# Patient Record
Sex: MALE | Race: WHITE | NOT HISPANIC OR LATINO | Employment: OTHER | URBAN - METROPOLITAN AREA
[De-identification: names, ages, dates, MRNs, and addresses within clinical notes are randomized per-mention and may not be internally consistent; named-entity substitution may affect disease eponyms.]

---

## 2017-01-10 ENCOUNTER — APPOINTMENT (OUTPATIENT)
Dept: LAB | Facility: CLINIC | Age: 82
End: 2017-01-10
Payer: MEDICARE

## 2017-01-10 DIAGNOSIS — Z51.81 ENCOUNTER FOR THERAPEUTIC DRUG LEVEL MONITORING: ICD-10-CM

## 2017-01-10 DIAGNOSIS — I48.91 ATRIAL FIBRILLATION (HCC): ICD-10-CM

## 2017-01-10 LAB
INR PPP: 3.07 (ref 0.86–1.16)
PROTHROMBIN TIME: 31.1 SECONDS (ref 12–14.3)

## 2017-01-10 PROCEDURE — 36415 COLL VENOUS BLD VENIPUNCTURE: CPT

## 2017-01-10 PROCEDURE — 85610 PROTHROMBIN TIME: CPT

## 2017-01-11 ENCOUNTER — APPOINTMENT (OUTPATIENT)
Dept: LAB | Age: 82
End: 2017-01-11
Payer: MEDICARE

## 2017-01-11 ENCOUNTER — TRANSCRIBE ORDERS (OUTPATIENT)
Dept: LAB | Age: 82
End: 2017-01-11

## 2017-01-11 DIAGNOSIS — I48.91 ATRIAL FIBRILLATION, UNSPECIFIED TYPE (HCC): Primary | ICD-10-CM

## 2017-01-16 ENCOUNTER — ALLSCRIPTS OFFICE VISIT (OUTPATIENT)
Dept: OTHER | Facility: OTHER | Age: 82
End: 2017-01-16

## 2017-01-17 ENCOUNTER — APPOINTMENT (OUTPATIENT)
Dept: LAB | Facility: CLINIC | Age: 82
End: 2017-01-17
Payer: MEDICARE

## 2017-01-17 ENCOUNTER — ALLSCRIPTS OFFICE VISIT (OUTPATIENT)
Dept: OTHER | Facility: OTHER | Age: 82
End: 2017-01-17

## 2017-01-17 DIAGNOSIS — I48.91 ATRIAL FIBRILLATION (HCC): ICD-10-CM

## 2017-01-17 DIAGNOSIS — E11.8 TYPE 2 DIABETES MELLITUS WITH COMPLICATIONS (HCC): ICD-10-CM

## 2017-01-17 DIAGNOSIS — Z51.81 ENCOUNTER FOR THERAPEUTIC DRUG LEVEL MONITORING: ICD-10-CM

## 2017-01-17 DIAGNOSIS — I48.91 ATRIAL FIBRILLATION, UNSPECIFIED TYPE (HCC): ICD-10-CM

## 2017-01-17 LAB
ALBUMIN SERPL BCP-MCNC: 3.7 G/DL (ref 3.5–5)
ALP SERPL-CCNC: 147 U/L (ref 46–116)
ALT SERPL W P-5'-P-CCNC: 19 U/L (ref 12–78)
ANION GAP SERPL CALCULATED.3IONS-SCNC: 7 MMOL/L (ref 4–13)
AST SERPL W P-5'-P-CCNC: 16 U/L (ref 5–45)
BACTERIA UR QL AUTO: ABNORMAL /HPF
BASOPHILS # BLD AUTO: 0.02 THOUSANDS/ΜL (ref 0–0.1)
BASOPHILS NFR BLD AUTO: 0 % (ref 0–1)
BILIRUB SERPL-MCNC: 0.56 MG/DL (ref 0.2–1)
BILIRUB UR QL STRIP: NEGATIVE
BUN SERPL-MCNC: 23 MG/DL (ref 5–25)
CALCIUM SERPL-MCNC: 8.7 MG/DL (ref 8.3–10.1)
CHLORIDE SERPL-SCNC: 104 MMOL/L (ref 100–108)
CHOLEST SERPL-MCNC: 116 MG/DL (ref 50–200)
CLARITY UR: ABNORMAL
CO2 SERPL-SCNC: 27 MMOL/L (ref 21–32)
COLOR UR: YELLOW
CREAT SERPL-MCNC: 2.41 MG/DL (ref 0.6–1.3)
CREAT UR-MCNC: 127 MG/DL
EOSINOPHIL # BLD AUTO: 0.14 THOUSAND/ΜL (ref 0–0.61)
EOSINOPHIL NFR BLD AUTO: 2 % (ref 0–6)
ERYTHROCYTE [DISTWIDTH] IN BLOOD BY AUTOMATED COUNT: 14 % (ref 11.6–15.1)
EST. AVERAGE GLUCOSE BLD GHB EST-MCNC: 146 MG/DL
GFR SERPL CREATININE-BSD FRML MDRD: 25.8 ML/MIN/1.73SQ M
GLUCOSE SERPL-MCNC: 117 MG/DL (ref 65–140)
GLUCOSE UR STRIP-MCNC: ABNORMAL MG/DL
HBA1C MFR BLD: 6.7 % (ref 4.2–6.3)
HCT VFR BLD AUTO: 41.8 % (ref 36.5–49.3)
HDLC SERPL-MCNC: 47 MG/DL (ref 40–60)
HGB BLD-MCNC: 13.8 G/DL (ref 12–17)
HGB UR QL STRIP.AUTO: ABNORMAL
INR PPP: 3.09 (ref 0.86–1.16)
KETONES UR STRIP-MCNC: NEGATIVE MG/DL
LEUKOCYTE ESTERASE UR QL STRIP: ABNORMAL
LYMPHOCYTES # BLD AUTO: 2.1 THOUSANDS/ΜL (ref 0.6–4.47)
LYMPHOCYTES NFR BLD AUTO: 25 % (ref 14–44)
MCH RBC QN AUTO: 30.5 PG (ref 26.8–34.3)
MCHC RBC AUTO-ENTMCNC: 33 G/DL (ref 31.4–37.4)
MCV RBC AUTO: 92 FL (ref 82–98)
MICROALBUMIN UR-MCNC: 75.9 MG/L (ref 0–20)
MICROALBUMIN/CREAT 24H UR: 60 MG/G CREATININE (ref 0–30)
MONOCYTES # BLD AUTO: 0.72 THOUSAND/ΜL (ref 0.17–1.22)
MONOCYTES NFR BLD AUTO: 9 % (ref 4–12)
NEUTROPHILS # BLD AUTO: 5.28 THOUSANDS/ΜL (ref 1.85–7.62)
NEUTS SEG NFR BLD AUTO: 64 % (ref 43–75)
NITRITE UR QL STRIP: POSITIVE
NON-SQ EPI CELLS URNS QL MICRO: ABNORMAL /HPF
NONHDLC SERPL-MCNC: 69 MG/DL
NRBC BLD AUTO-RTO: 0 /100 WBCS
PH UR STRIP.AUTO: 6 [PH] (ref 4.5–8)
PLATELET # BLD AUTO: 206 THOUSANDS/UL (ref 149–390)
PMV BLD AUTO: 10.1 FL (ref 8.9–12.7)
POTASSIUM SERPL-SCNC: 4 MMOL/L (ref 3.5–5.3)
PROT SERPL-MCNC: 7.5 G/DL (ref 6.4–8.2)
PROT UR STRIP-MCNC: ABNORMAL MG/DL
PROTHROMBIN TIME: 31.3 SECONDS (ref 12–14.3)
RBC # BLD AUTO: 4.53 MILLION/UL (ref 3.88–5.62)
RBC #/AREA URNS AUTO: ABNORMAL /HPF
SODIUM SERPL-SCNC: 138 MMOL/L (ref 136–145)
SP GR UR STRIP.AUTO: 1.02 (ref 1–1.03)
TRIGL SERPL-MCNC: 179 MG/DL
TSH SERPL DL<=0.05 MIU/L-ACNC: 2.48 UIU/ML (ref 0.36–3.74)
UROBILINOGEN UR QL STRIP.AUTO: 1 E.U./DL
WBC # BLD AUTO: 8.28 THOUSAND/UL (ref 4.31–10.16)
WBC #/AREA URNS AUTO: ABNORMAL /HPF

## 2017-01-17 PROCEDURE — 84478 ASSAY OF TRIGLYCERIDES: CPT

## 2017-01-17 PROCEDURE — 80053 COMPREHEN METABOLIC PANEL: CPT

## 2017-01-17 PROCEDURE — 84443 ASSAY THYROID STIM HORMONE: CPT

## 2017-01-17 PROCEDURE — 85025 COMPLETE CBC W/AUTO DIFF WBC: CPT

## 2017-01-17 PROCEDURE — 85610 PROTHROMBIN TIME: CPT

## 2017-01-17 PROCEDURE — 81001 URINALYSIS AUTO W/SCOPE: CPT

## 2017-01-17 PROCEDURE — 36415 COLL VENOUS BLD VENIPUNCTURE: CPT

## 2017-01-17 PROCEDURE — 83721 ASSAY OF BLOOD LIPOPROTEIN: CPT

## 2017-01-17 PROCEDURE — 82570 ASSAY OF URINE CREATININE: CPT

## 2017-01-17 PROCEDURE — 83036 HEMOGLOBIN GLYCOSYLATED A1C: CPT

## 2017-01-17 PROCEDURE — 83718 ASSAY OF LIPOPROTEIN: CPT

## 2017-01-17 PROCEDURE — 82043 UR ALBUMIN QUANTITATIVE: CPT

## 2017-01-24 ENCOUNTER — APPOINTMENT (OUTPATIENT)
Dept: LAB | Age: 82
End: 2017-01-24
Payer: MEDICARE

## 2017-01-24 ENCOUNTER — TRANSCRIBE ORDERS (OUTPATIENT)
Dept: LAB | Age: 82
End: 2017-01-24

## 2017-01-24 DIAGNOSIS — I48.91 ATRIAL FIBRILLATION (HCC): ICD-10-CM

## 2017-01-24 LAB
INR PPP: 2.19 (ref 0.86–1.16)
PROTHROMBIN TIME: 24.1 SECONDS (ref 12–14.3)

## 2017-01-24 PROCEDURE — 36415 COLL VENOUS BLD VENIPUNCTURE: CPT

## 2017-01-24 PROCEDURE — 85610 PROTHROMBIN TIME: CPT

## 2017-02-07 ENCOUNTER — APPOINTMENT (OUTPATIENT)
Dept: LAB | Age: 82
End: 2017-02-07
Payer: MEDICARE

## 2017-02-07 ENCOUNTER — TRANSCRIBE ORDERS (OUTPATIENT)
Dept: LAB | Age: 82
End: 2017-02-07

## 2017-02-07 DIAGNOSIS — Z51.81 ENCOUNTER FOR THERAPEUTIC DRUG LEVEL MONITORING: ICD-10-CM

## 2017-02-07 DIAGNOSIS — I48.91 ATRIAL FIBRILLATION (HCC): ICD-10-CM

## 2017-02-07 LAB
INR PPP: 2.66 (ref 0.86–1.16)
PROTHROMBIN TIME: 27.9 SECONDS (ref 12–14.3)

## 2017-02-07 PROCEDURE — 36415 COLL VENOUS BLD VENIPUNCTURE: CPT

## 2017-02-07 PROCEDURE — 85610 PROTHROMBIN TIME: CPT

## 2017-02-21 ENCOUNTER — APPOINTMENT (OUTPATIENT)
Dept: LAB | Age: 82
End: 2017-02-21
Payer: MEDICARE

## 2017-02-21 ENCOUNTER — TRANSCRIBE ORDERS (OUTPATIENT)
Dept: LAB | Age: 82
End: 2017-02-21

## 2017-02-21 ENCOUNTER — GENERIC CONVERSION - ENCOUNTER (OUTPATIENT)
Dept: OTHER | Facility: OTHER | Age: 82
End: 2017-02-21

## 2017-02-21 DIAGNOSIS — I48.91 ATRIAL FIBRILLATION (HCC): ICD-10-CM

## 2017-02-21 DIAGNOSIS — Z51.81 ENCOUNTER FOR THERAPEUTIC DRUG LEVEL MONITORING: ICD-10-CM

## 2017-02-21 LAB
INR PPP: 2.37 (ref 0.86–1.16)
PROTHROMBIN TIME: 25.6 SECONDS (ref 12–14.3)

## 2017-02-21 PROCEDURE — 36415 COLL VENOUS BLD VENIPUNCTURE: CPT

## 2017-02-21 PROCEDURE — 85610 PROTHROMBIN TIME: CPT

## 2017-03-07 ENCOUNTER — APPOINTMENT (OUTPATIENT)
Dept: LAB | Age: 82
End: 2017-03-07
Payer: MEDICARE

## 2017-03-07 ENCOUNTER — TRANSCRIBE ORDERS (OUTPATIENT)
Dept: LAB | Age: 82
End: 2017-03-07

## 2017-03-07 DIAGNOSIS — I48.91 ATRIAL FIBRILLATION, UNSPECIFIED TYPE (HCC): Primary | ICD-10-CM

## 2017-03-07 LAB
INR PPP: 2.72 (ref 0.86–1.16)
PROTHROMBIN TIME: 28.4 SECONDS (ref 12–14.3)

## 2017-03-07 PROCEDURE — 36415 COLL VENOUS BLD VENIPUNCTURE: CPT

## 2017-03-07 PROCEDURE — 85610 PROTHROMBIN TIME: CPT

## 2017-03-21 ENCOUNTER — APPOINTMENT (OUTPATIENT)
Dept: LAB | Age: 82
End: 2017-03-21
Payer: MEDICARE

## 2017-03-21 ENCOUNTER — TRANSCRIBE ORDERS (OUTPATIENT)
Dept: LAB | Age: 82
End: 2017-03-21

## 2017-03-21 DIAGNOSIS — I48.91 ATRIAL FIBRILLATION, UNSPECIFIED TYPE (HCC): Primary | ICD-10-CM

## 2017-03-21 LAB
INR PPP: 2.29 (ref 0.86–1.16)
PROTHROMBIN TIME: 24.9 SECONDS (ref 12–14.3)

## 2017-03-21 PROCEDURE — 85610 PROTHROMBIN TIME: CPT

## 2017-03-21 PROCEDURE — 36415 COLL VENOUS BLD VENIPUNCTURE: CPT

## 2017-04-04 ENCOUNTER — TRANSCRIBE ORDERS (OUTPATIENT)
Dept: LAB | Age: 82
End: 2017-04-04

## 2017-04-04 ENCOUNTER — APPOINTMENT (OUTPATIENT)
Dept: LAB | Age: 82
End: 2017-04-04
Payer: MEDICARE

## 2017-04-04 DIAGNOSIS — I48.91 ATRIAL FIBRILLATION, UNSPECIFIED TYPE (HCC): Primary | ICD-10-CM

## 2017-04-04 LAB
INR PPP: 2.82 (ref 0.86–1.16)
PROTHROMBIN TIME: 29.2 SECONDS (ref 12–14.3)

## 2017-04-04 PROCEDURE — 36415 COLL VENOUS BLD VENIPUNCTURE: CPT

## 2017-04-04 PROCEDURE — 85610 PROTHROMBIN TIME: CPT

## 2017-04-18 ENCOUNTER — TRANSCRIBE ORDERS (OUTPATIENT)
Dept: LAB | Age: 82
End: 2017-04-18

## 2017-04-18 ENCOUNTER — APPOINTMENT (OUTPATIENT)
Dept: LAB | Age: 82
End: 2017-04-18
Payer: MEDICARE

## 2017-04-18 DIAGNOSIS — I48.91 ATRIAL FIBRILLATION (HCC): ICD-10-CM

## 2017-04-18 DIAGNOSIS — Z51.81 ENCOUNTER FOR THERAPEUTIC DRUG LEVEL MONITORING: ICD-10-CM

## 2017-04-18 LAB
INR PPP: 3.32 (ref 0.86–1.16)
PROTHROMBIN TIME: 33 SECONDS (ref 12–14.3)

## 2017-04-18 PROCEDURE — 36415 COLL VENOUS BLD VENIPUNCTURE: CPT

## 2017-04-18 PROCEDURE — 85610 PROTHROMBIN TIME: CPT

## 2017-04-24 ENCOUNTER — TRANSCRIBE ORDERS (OUTPATIENT)
Dept: LAB | Age: 82
End: 2017-04-24

## 2017-04-24 ENCOUNTER — APPOINTMENT (OUTPATIENT)
Dept: LAB | Age: 82
End: 2017-04-24
Payer: MEDICARE

## 2017-04-24 DIAGNOSIS — I48.91 ATRIAL FIBRILLATION (HCC): ICD-10-CM

## 2017-04-24 DIAGNOSIS — Z51.81 ENCOUNTER FOR THERAPEUTIC DRUG LEVEL MONITORING: ICD-10-CM

## 2017-04-24 LAB
INR PPP: 3.43 (ref 0.86–1.16)
PROTHROMBIN TIME: 33.8 SECONDS (ref 12–14.3)

## 2017-04-24 PROCEDURE — 85610 PROTHROMBIN TIME: CPT

## 2017-04-24 PROCEDURE — 36415 COLL VENOUS BLD VENIPUNCTURE: CPT

## 2017-05-03 ENCOUNTER — TRANSCRIBE ORDERS (OUTPATIENT)
Dept: LAB | Age: 82
End: 2017-05-03

## 2017-05-03 ENCOUNTER — APPOINTMENT (OUTPATIENT)
Dept: LAB | Age: 82
End: 2017-05-03
Payer: MEDICARE

## 2017-05-03 DIAGNOSIS — Z51.81 ENCOUNTER FOR THERAPEUTIC DRUG LEVEL MONITORING: ICD-10-CM

## 2017-05-03 DIAGNOSIS — I48.91 ATRIAL FIBRILLATION (HCC): ICD-10-CM

## 2017-05-03 LAB
INR PPP: 1.84 (ref 0.86–1.16)
PROTHROMBIN TIME: 21.4 SECONDS (ref 12.1–14.4)

## 2017-05-03 PROCEDURE — 36415 COLL VENOUS BLD VENIPUNCTURE: CPT

## 2017-05-03 PROCEDURE — 85610 PROTHROMBIN TIME: CPT

## 2017-05-11 ENCOUNTER — ALLSCRIPTS OFFICE VISIT (OUTPATIENT)
Dept: OTHER | Facility: OTHER | Age: 82
End: 2017-05-11

## 2017-05-16 ENCOUNTER — TRANSCRIBE ORDERS (OUTPATIENT)
Dept: LAB | Age: 82
End: 2017-05-16

## 2017-05-16 ENCOUNTER — APPOINTMENT (OUTPATIENT)
Dept: LAB | Age: 82
End: 2017-05-16
Payer: MEDICARE

## 2017-05-16 DIAGNOSIS — I48.91 ATRIAL FIBRILLATION, UNSPECIFIED TYPE (HCC): Primary | ICD-10-CM

## 2017-05-16 LAB
INR PPP: 2.61 (ref 0.86–1.16)
PROTHROMBIN TIME: 28.3 SECONDS (ref 12.1–14.4)

## 2017-05-16 PROCEDURE — 85610 PROTHROMBIN TIME: CPT

## 2017-05-16 PROCEDURE — 36415 COLL VENOUS BLD VENIPUNCTURE: CPT

## 2017-05-31 ENCOUNTER — TRANSCRIBE ORDERS (OUTPATIENT)
Dept: LAB | Age: 82
End: 2017-05-31

## 2017-05-31 ENCOUNTER — APPOINTMENT (OUTPATIENT)
Dept: LAB | Age: 82
End: 2017-05-31
Payer: MEDICARE

## 2017-05-31 DIAGNOSIS — I48.91 ATRIAL FIBRILLATION, UNSPECIFIED TYPE (HCC): Primary | ICD-10-CM

## 2017-05-31 LAB
INR PPP: 2.22 (ref 0.86–1.16)
PROTHROMBIN TIME: 24.9 SECONDS (ref 12.1–14.4)

## 2017-05-31 PROCEDURE — 85610 PROTHROMBIN TIME: CPT

## 2017-05-31 PROCEDURE — 36415 COLL VENOUS BLD VENIPUNCTURE: CPT

## 2017-06-13 ENCOUNTER — APPOINTMENT (OUTPATIENT)
Dept: LAB | Age: 82
End: 2017-06-13
Payer: MEDICARE

## 2017-06-13 ENCOUNTER — TRANSCRIBE ORDERS (OUTPATIENT)
Dept: LAB | Age: 82
End: 2017-06-13

## 2017-06-13 DIAGNOSIS — I48.91 ATRIAL FIBRILLATION, UNSPECIFIED TYPE (HCC): Primary | ICD-10-CM

## 2017-06-13 LAB
INR PPP: 2.65 (ref 0.86–1.16)
PROTHROMBIN TIME: 28.6 SECONDS (ref 12.1–14.4)

## 2017-06-13 PROCEDURE — 36415 COLL VENOUS BLD VENIPUNCTURE: CPT

## 2017-06-13 PROCEDURE — 85610 PROTHROMBIN TIME: CPT

## 2017-06-27 DIAGNOSIS — Z51.81 ENCOUNTER FOR THERAPEUTIC DRUG LEVEL MONITORING: ICD-10-CM

## 2017-06-27 DIAGNOSIS — E11.8 TYPE 2 DIABETES MELLITUS WITH COMPLICATIONS (HCC): ICD-10-CM

## 2017-06-28 ENCOUNTER — APPOINTMENT (OUTPATIENT)
Dept: LAB | Age: 82
End: 2017-06-28
Payer: MEDICARE

## 2017-06-28 ENCOUNTER — TRANSCRIBE ORDERS (OUTPATIENT)
Dept: LAB | Age: 82
End: 2017-06-28

## 2017-06-28 DIAGNOSIS — Z51.81 ENCOUNTER FOR THERAPEUTIC DRUG LEVEL MONITORING: Primary | ICD-10-CM

## 2017-06-28 LAB
INR PPP: 2.35 (ref 0.86–1.16)
PROTHROMBIN TIME: 26 SECONDS (ref 12.1–14.4)

## 2017-06-28 PROCEDURE — 36415 COLL VENOUS BLD VENIPUNCTURE: CPT

## 2017-06-28 PROCEDURE — 85610 PROTHROMBIN TIME: CPT

## 2017-07-17 ENCOUNTER — APPOINTMENT (OUTPATIENT)
Dept: LAB | Facility: CLINIC | Age: 82
End: 2017-07-17
Payer: MEDICARE

## 2017-07-17 ENCOUNTER — ALLSCRIPTS OFFICE VISIT (OUTPATIENT)
Dept: OTHER | Facility: OTHER | Age: 82
End: 2017-07-17

## 2017-07-17 DIAGNOSIS — Z51.81 ENCOUNTER FOR THERAPEUTIC DRUG LEVEL MONITORING: ICD-10-CM

## 2017-07-17 LAB
INR PPP: 2.24 (ref 0.86–1.16)
PROTHROMBIN TIME: 25 SECONDS (ref 12.1–14.4)

## 2017-07-17 PROCEDURE — 36415 COLL VENOUS BLD VENIPUNCTURE: CPT

## 2017-07-17 PROCEDURE — 85610 PROTHROMBIN TIME: CPT

## 2017-08-01 ENCOUNTER — APPOINTMENT (OUTPATIENT)
Dept: LAB | Age: 82
End: 2017-08-01
Payer: MEDICARE

## 2017-08-01 ENCOUNTER — GENERIC CONVERSION - ENCOUNTER (OUTPATIENT)
Dept: OTHER | Facility: OTHER | Age: 82
End: 2017-08-01

## 2017-08-01 ENCOUNTER — TRANSCRIBE ORDERS (OUTPATIENT)
Dept: LAB | Age: 82
End: 2017-08-01

## 2017-08-01 DIAGNOSIS — Z51.81 ENCOUNTER FOR THERAPEUTIC DRUG MONITORING: Primary | ICD-10-CM

## 2017-08-01 LAB
INR PPP: 2.26 (ref 0.86–1.16)
PROTHROMBIN TIME: 25.2 SECONDS (ref 12.1–14.4)

## 2017-08-01 PROCEDURE — 85610 PROTHROMBIN TIME: CPT

## 2017-08-01 PROCEDURE — 36415 COLL VENOUS BLD VENIPUNCTURE: CPT

## 2017-08-15 ENCOUNTER — TRANSCRIBE ORDERS (OUTPATIENT)
Dept: LAB | Age: 82
End: 2017-08-15

## 2017-08-15 ENCOUNTER — APPOINTMENT (OUTPATIENT)
Dept: LAB | Age: 82
End: 2017-08-15
Payer: MEDICARE

## 2017-08-15 DIAGNOSIS — Z51.81 ENCOUNTER FOR THERAPEUTIC DRUG MONITORING: Primary | ICD-10-CM

## 2017-08-15 LAB
INR PPP: 2.41 (ref 0.86–1.16)
PROTHROMBIN TIME: 26.5 SECONDS (ref 12.1–14.4)

## 2017-08-15 PROCEDURE — 85610 PROTHROMBIN TIME: CPT

## 2017-08-15 PROCEDURE — 36415 COLL VENOUS BLD VENIPUNCTURE: CPT

## 2017-08-28 ENCOUNTER — APPOINTMENT (OUTPATIENT)
Dept: LAB | Age: 82
End: 2017-08-28
Payer: MEDICARE

## 2017-08-28 DIAGNOSIS — Z51.81 ENCOUNTER FOR THERAPEUTIC DRUG LEVEL MONITORING: ICD-10-CM

## 2017-08-28 DIAGNOSIS — E11.8 TYPE 2 DIABETES MELLITUS WITH COMPLICATIONS (HCC): ICD-10-CM

## 2017-08-28 DIAGNOSIS — I48.91 ATRIAL FIBRILLATION (HCC): ICD-10-CM

## 2017-08-28 LAB
ALBUMIN SERPL BCP-MCNC: 3.5 G/DL (ref 3.5–5)
ALP SERPL-CCNC: 153 U/L (ref 46–116)
ALT SERPL W P-5'-P-CCNC: 16 U/L (ref 12–78)
ANION GAP SERPL CALCULATED.3IONS-SCNC: 7 MMOL/L (ref 4–13)
AST SERPL W P-5'-P-CCNC: 16 U/L (ref 5–45)
BASOPHILS # BLD AUTO: 0.02 THOUSANDS/ΜL (ref 0–0.1)
BASOPHILS NFR BLD AUTO: 0 % (ref 0–1)
BILIRUB SERPL-MCNC: 0.59 MG/DL (ref 0.2–1)
BUN SERPL-MCNC: 22 MG/DL (ref 5–25)
CALCIUM SERPL-MCNC: 8.8 MG/DL (ref 8.3–10.1)
CHLORIDE SERPL-SCNC: 104 MMOL/L (ref 100–108)
CHOLEST SERPL-MCNC: 106 MG/DL (ref 50–200)
CO2 SERPL-SCNC: 25 MMOL/L (ref 21–32)
CREAT SERPL-MCNC: 2.52 MG/DL (ref 0.6–1.3)
EOSINOPHIL # BLD AUTO: 0.29 THOUSAND/ΜL (ref 0–0.61)
EOSINOPHIL NFR BLD AUTO: 4 % (ref 0–6)
ERYTHROCYTE [DISTWIDTH] IN BLOOD BY AUTOMATED COUNT: 13.7 % (ref 11.6–15.1)
EST. AVERAGE GLUCOSE BLD GHB EST-MCNC: 157 MG/DL
GFR SERPL CREATININE-BSD FRML MDRD: 22 ML/MIN/1.73SQ M
GLUCOSE P FAST SERPL-MCNC: 147 MG/DL (ref 65–99)
HBA1C MFR BLD: 7.1 % (ref 4.2–6.3)
HCT VFR BLD AUTO: 40.6 % (ref 36.5–49.3)
HDLC SERPL-MCNC: 31 MG/DL (ref 40–60)
HGB BLD-MCNC: 13.8 G/DL (ref 12–17)
INR PPP: 2.54 (ref 0.86–1.16)
LDLC SERPL CALC-MCNC: 36 MG/DL (ref 0–100)
LYMPHOCYTES # BLD AUTO: 1.87 THOUSANDS/ΜL (ref 0.6–4.47)
LYMPHOCYTES NFR BLD AUTO: 22 % (ref 14–44)
MCH RBC QN AUTO: 30.9 PG (ref 26.8–34.3)
MCHC RBC AUTO-ENTMCNC: 34 G/DL (ref 31.4–37.4)
MCV RBC AUTO: 91 FL (ref 82–98)
MONOCYTES # BLD AUTO: 0.62 THOUSAND/ΜL (ref 0.17–1.22)
MONOCYTES NFR BLD AUTO: 7 % (ref 4–12)
NEUTROPHILS # BLD AUTO: 5.53 THOUSANDS/ΜL (ref 1.85–7.62)
NEUTS SEG NFR BLD AUTO: 67 % (ref 43–75)
NRBC BLD AUTO-RTO: 0 /100 WBCS
PLATELET # BLD AUTO: 186 THOUSANDS/UL (ref 149–390)
PMV BLD AUTO: 10.3 FL (ref 8.9–12.7)
POTASSIUM SERPL-SCNC: 4.7 MMOL/L (ref 3.5–5.3)
PROT SERPL-MCNC: 7.1 G/DL (ref 6.4–8.2)
PROTHROMBIN TIME: 27.7 SECONDS (ref 12.1–14.4)
RBC # BLD AUTO: 4.47 MILLION/UL (ref 3.88–5.62)
SODIUM SERPL-SCNC: 136 MMOL/L (ref 136–145)
TRIGL SERPL-MCNC: 194 MG/DL
TSH SERPL DL<=0.05 MIU/L-ACNC: 1.83 UIU/ML (ref 0.36–3.74)
WBC # BLD AUTO: 8.36 THOUSAND/UL (ref 4.31–10.16)

## 2017-08-28 PROCEDURE — 85610 PROTHROMBIN TIME: CPT

## 2017-08-28 PROCEDURE — 84443 ASSAY THYROID STIM HORMONE: CPT

## 2017-08-28 PROCEDURE — 85025 COMPLETE CBC W/AUTO DIFF WBC: CPT

## 2017-08-28 PROCEDURE — 80061 LIPID PANEL: CPT

## 2017-08-28 PROCEDURE — 83036 HEMOGLOBIN GLYCOSYLATED A1C: CPT

## 2017-08-28 PROCEDURE — 80053 COMPREHEN METABOLIC PANEL: CPT

## 2017-08-28 PROCEDURE — 36415 COLL VENOUS BLD VENIPUNCTURE: CPT

## 2017-08-29 DIAGNOSIS — N18.4 CHRONIC KIDNEY DISEASE, STAGE IV (SEVERE) (HCC): ICD-10-CM

## 2017-08-29 DIAGNOSIS — I48.0 PAROXYSMAL ATRIAL FIBRILLATION (HCC): ICD-10-CM

## 2017-08-29 DIAGNOSIS — M79.89 OTHER DISORDERS OF SOFT TISSUE: ICD-10-CM

## 2017-08-29 DIAGNOSIS — Z51.81 ENCOUNTER FOR THERAPEUTIC DRUG LEVEL MONITORING: ICD-10-CM

## 2017-08-29 DIAGNOSIS — I48.91 ATRIAL FIBRILLATION (HCC): ICD-10-CM

## 2017-08-29 DIAGNOSIS — I25.10 ATHEROSCLEROTIC HEART DISEASE OF NATIVE CORONARY ARTERY WITHOUT ANGINA PECTORIS: ICD-10-CM

## 2017-08-30 ENCOUNTER — TRANSCRIBE ORDERS (OUTPATIENT)
Dept: LAB | Age: 82
End: 2017-08-30

## 2017-08-30 ENCOUNTER — APPOINTMENT (OUTPATIENT)
Dept: LAB | Age: 82
End: 2017-08-30
Payer: MEDICARE

## 2017-08-30 DIAGNOSIS — E11.8 TYPE 2 DIABETES MELLITUS WITH COMPLICATIONS (HCC): ICD-10-CM

## 2017-08-30 LAB
BACTERIA UR QL AUTO: ABNORMAL /HPF
BILIRUB UR QL STRIP: NEGATIVE
CLARITY UR: ABNORMAL
COLOR UR: YELLOW
CREAT UR-MCNC: 101 MG/DL
GLUCOSE UR STRIP-MCNC: NEGATIVE MG/DL
HGB UR QL STRIP.AUTO: ABNORMAL
HYALINE CASTS #/AREA URNS LPF: ABNORMAL /LPF
KETONES UR STRIP-MCNC: NEGATIVE MG/DL
LEUKOCYTE ESTERASE UR QL STRIP: ABNORMAL
MICROALBUMIN UR-MCNC: 57.9 MG/L (ref 0–20)
MICROALBUMIN/CREAT 24H UR: 57 MG/G CREATININE (ref 0–30)
NITRITE UR QL STRIP: NEGATIVE
NON-SQ EPI CELLS URNS QL MICRO: ABNORMAL /HPF
PH UR STRIP.AUTO: 6 [PH] (ref 4.5–8)
PROT UR STRIP-MCNC: ABNORMAL MG/DL
RBC #/AREA URNS AUTO: ABNORMAL /HPF
SP GR UR STRIP.AUTO: 1.01 (ref 1–1.03)
UROBILINOGEN UR QL STRIP.AUTO: 0.2 E.U./DL
WBC #/AREA URNS AUTO: ABNORMAL /HPF

## 2017-08-30 PROCEDURE — 82570 ASSAY OF URINE CREATININE: CPT

## 2017-08-30 PROCEDURE — 81001 URINALYSIS AUTO W/SCOPE: CPT

## 2017-08-30 PROCEDURE — 82043 UR ALBUMIN QUANTITATIVE: CPT

## 2017-09-12 ENCOUNTER — ALLSCRIPTS OFFICE VISIT (OUTPATIENT)
Dept: OTHER | Facility: OTHER | Age: 82
End: 2017-09-12

## 2017-09-12 ENCOUNTER — APPOINTMENT (OUTPATIENT)
Dept: LAB | Age: 82
End: 2017-09-12
Payer: MEDICARE

## 2017-09-12 DIAGNOSIS — Z51.81 ENCOUNTER FOR THERAPEUTIC DRUG LEVEL MONITORING: ICD-10-CM

## 2017-09-12 DIAGNOSIS — I48.91 ATRIAL FIBRILLATION (HCC): ICD-10-CM

## 2017-09-12 LAB
INR PPP: 2.81 (ref 0.86–1.16)
PROTHROMBIN TIME: 30 SECONDS (ref 12.1–14.4)

## 2017-09-12 PROCEDURE — 85610 PROTHROMBIN TIME: CPT

## 2017-09-12 PROCEDURE — 36415 COLL VENOUS BLD VENIPUNCTURE: CPT

## 2017-10-10 ENCOUNTER — APPOINTMENT (OUTPATIENT)
Dept: LAB | Age: 82
End: 2017-10-10
Payer: MEDICARE

## 2017-10-10 ENCOUNTER — TRANSCRIBE ORDERS (OUTPATIENT)
Dept: LAB | Age: 82
End: 2017-10-10

## 2017-10-10 DIAGNOSIS — Z51.81 ENCOUNTER FOR THERAPEUTIC DRUG LEVEL MONITORING: ICD-10-CM

## 2017-10-10 DIAGNOSIS — N18.4 CHRONIC KIDNEY DISEASE, STAGE IV (SEVERE) (HCC): ICD-10-CM

## 2017-10-10 DIAGNOSIS — M79.89 OTHER DISORDERS OF SOFT TISSUE: ICD-10-CM

## 2017-10-10 DIAGNOSIS — Z51.81 ENCOUNTER FOR THERAPEUTIC DRUG MONITORING: Primary | ICD-10-CM

## 2017-10-10 DIAGNOSIS — I25.10 ATHEROSCLEROTIC HEART DISEASE OF NATIVE CORONARY ARTERY WITHOUT ANGINA PECTORIS: ICD-10-CM

## 2017-10-10 DIAGNOSIS — I48.91 ATRIAL FIBRILLATION (HCC): ICD-10-CM

## 2017-10-10 DIAGNOSIS — I48.0 PAROXYSMAL ATRIAL FIBRILLATION (HCC): ICD-10-CM

## 2017-10-10 LAB
INR PPP: 2.43 (ref 0.86–1.16)
PROTHROMBIN TIME: 26.7 SECONDS (ref 12.1–14.4)

## 2017-10-10 PROCEDURE — 36415 COLL VENOUS BLD VENIPUNCTURE: CPT

## 2017-10-10 PROCEDURE — 85610 PROTHROMBIN TIME: CPT

## 2017-11-07 ENCOUNTER — APPOINTMENT (OUTPATIENT)
Dept: LAB | Age: 82
End: 2017-11-07
Payer: MEDICARE

## 2017-11-07 LAB
INR PPP: 2.54 (ref 0.86–1.16)
PROTHROMBIN TIME: 27.7 SECONDS (ref 12.1–14.4)

## 2017-11-07 PROCEDURE — 85610 PROTHROMBIN TIME: CPT

## 2017-11-07 PROCEDURE — 36415 COLL VENOUS BLD VENIPUNCTURE: CPT

## 2017-11-20 ENCOUNTER — TRANSCRIBE ORDERS (OUTPATIENT)
Dept: LAB | Age: 82
End: 2017-11-20

## 2017-11-20 ENCOUNTER — APPOINTMENT (OUTPATIENT)
Dept: LAB | Age: 82
End: 2017-11-20
Payer: MEDICARE

## 2017-11-20 DIAGNOSIS — I48.91 ATRIAL FIBRILLATION (HCC): ICD-10-CM

## 2017-11-20 DIAGNOSIS — Z51.81 ENCOUNTER FOR THERAPEUTIC DRUG LEVEL MONITORING: ICD-10-CM

## 2017-11-20 LAB
INR PPP: 2.75 (ref 0.86–1.16)
PROTHROMBIN TIME: 29.5 SECONDS (ref 12.1–14.4)

## 2017-11-20 PROCEDURE — 36415 COLL VENOUS BLD VENIPUNCTURE: CPT

## 2017-11-20 PROCEDURE — 85610 PROTHROMBIN TIME: CPT

## 2017-11-21 DIAGNOSIS — J61 PNEUMOCONIOSIS DUE TO ASBESTOS AND OTHER MINERAL FIBERS (HCC): ICD-10-CM

## 2017-11-21 DIAGNOSIS — I48.91 ATRIAL FIBRILLATION (HCC): ICD-10-CM

## 2017-11-21 DIAGNOSIS — Z51.81 ENCOUNTER FOR THERAPEUTIC DRUG LEVEL MONITORING: ICD-10-CM

## 2017-12-04 ENCOUNTER — TRANSCRIBE ORDERS (OUTPATIENT)
Dept: LAB | Age: 82
End: 2017-12-04

## 2017-12-04 ENCOUNTER — GENERIC CONVERSION - ENCOUNTER (OUTPATIENT)
Dept: OTHER | Facility: OTHER | Age: 82
End: 2017-12-04

## 2017-12-04 ENCOUNTER — APPOINTMENT (OUTPATIENT)
Dept: LAB | Age: 82
End: 2017-12-04
Payer: MEDICARE

## 2017-12-04 DIAGNOSIS — Z51.81 ENCOUNTER FOR THERAPEUTIC DRUG MONITORING: Primary | ICD-10-CM

## 2017-12-04 LAB
INR PPP: 3 (ref 0.86–1.16)
PROTHROMBIN TIME: 31.6 SECONDS (ref 12.1–14.4)

## 2017-12-04 PROCEDURE — 36415 COLL VENOUS BLD VENIPUNCTURE: CPT

## 2017-12-04 PROCEDURE — 85610 PROTHROMBIN TIME: CPT

## 2017-12-12 ENCOUNTER — APPOINTMENT (OUTPATIENT)
Dept: LAB | Age: 82
End: 2017-12-12
Payer: MEDICARE

## 2017-12-12 ENCOUNTER — GENERIC CONVERSION - ENCOUNTER (OUTPATIENT)
Dept: OTHER | Facility: OTHER | Age: 82
End: 2017-12-12

## 2017-12-12 ENCOUNTER — TRANSCRIBE ORDERS (OUTPATIENT)
Dept: LAB | Age: 82
End: 2017-12-12

## 2017-12-12 DIAGNOSIS — J61 PNEUMOCONIOSIS DUE TO ASBESTOS AND OTHER MINERAL FIBERS (HCC): ICD-10-CM

## 2017-12-12 DIAGNOSIS — Z51.81 ENCOUNTER FOR THERAPEUTIC DRUG LEVEL MONITORING: ICD-10-CM

## 2017-12-12 LAB
INR PPP: 3.05 (ref 0.86–1.16)
PROTHROMBIN TIME: 32 SECONDS (ref 12.1–14.4)

## 2017-12-12 PROCEDURE — 85610 PROTHROMBIN TIME: CPT

## 2017-12-12 PROCEDURE — 36415 COLL VENOUS BLD VENIPUNCTURE: CPT

## 2017-12-18 ENCOUNTER — APPOINTMENT (OUTPATIENT)
Dept: LAB | Age: 82
End: 2017-12-18
Payer: MEDICARE

## 2017-12-18 ENCOUNTER — GENERIC CONVERSION - ENCOUNTER (OUTPATIENT)
Dept: OTHER | Facility: OTHER | Age: 82
End: 2017-12-18

## 2017-12-18 ENCOUNTER — TRANSCRIBE ORDERS (OUTPATIENT)
Dept: LAB | Age: 82
End: 2017-12-18

## 2017-12-18 DIAGNOSIS — Z51.81 ENCOUNTER FOR THERAPEUTIC DRUG LEVEL MONITORING: ICD-10-CM

## 2017-12-18 DIAGNOSIS — I48.91 ATRIAL FIBRILLATION (HCC): ICD-10-CM

## 2017-12-18 LAB
INR PPP: 2.54 (ref 0.86–1.16)
PROTHROMBIN TIME: 27.7 SECONDS (ref 12.1–14.4)

## 2017-12-18 PROCEDURE — 36415 COLL VENOUS BLD VENIPUNCTURE: CPT

## 2017-12-18 PROCEDURE — 85610 PROTHROMBIN TIME: CPT

## 2017-12-19 ENCOUNTER — GENERIC CONVERSION - ENCOUNTER (OUTPATIENT)
Dept: OTHER | Facility: OTHER | Age: 82
End: 2017-12-19

## 2017-12-26 DIAGNOSIS — Z51.81 ENCOUNTER FOR THERAPEUTIC DRUG LEVEL MONITORING: ICD-10-CM

## 2017-12-26 DIAGNOSIS — I48.91 ATRIAL FIBRILLATION (HCC): ICD-10-CM

## 2017-12-27 ENCOUNTER — TRANSCRIBE ORDERS (OUTPATIENT)
Dept: LAB | Age: 82
End: 2017-12-27

## 2017-12-27 ENCOUNTER — GENERIC CONVERSION - ENCOUNTER (OUTPATIENT)
Dept: OTHER | Facility: OTHER | Age: 82
End: 2017-12-27

## 2017-12-27 ENCOUNTER — APPOINTMENT (OUTPATIENT)
Dept: LAB | Age: 82
End: 2017-12-27
Payer: MEDICARE

## 2017-12-27 DIAGNOSIS — Z51.81 ENCOUNTER FOR THERAPEUTIC DRUG LEVEL MONITORING: ICD-10-CM

## 2017-12-27 DIAGNOSIS — I48.91 ATRIAL FIBRILLATION (HCC): ICD-10-CM

## 2017-12-27 LAB
INR PPP: 2.7 (ref 0.86–1.16)
PROTHROMBIN TIME: 29 SECONDS (ref 12.1–14.4)

## 2017-12-27 PROCEDURE — 36415 COLL VENOUS BLD VENIPUNCTURE: CPT

## 2017-12-27 PROCEDURE — 85610 PROTHROMBIN TIME: CPT

## 2018-01-09 ENCOUNTER — TRANSCRIBE ORDERS (OUTPATIENT)
Dept: LAB | Age: 83
End: 2018-01-09

## 2018-01-09 ENCOUNTER — APPOINTMENT (OUTPATIENT)
Dept: LAB | Age: 83
End: 2018-01-09
Payer: MEDICARE

## 2018-01-09 ENCOUNTER — GENERIC CONVERSION - ENCOUNTER (OUTPATIENT)
Dept: OTHER | Facility: OTHER | Age: 83
End: 2018-01-09

## 2018-01-09 DIAGNOSIS — Z51.81 ENCOUNTER FOR THERAPEUTIC DRUG LEVEL MONITORING: ICD-10-CM

## 2018-01-09 DIAGNOSIS — I48.91 ATRIAL FIBRILLATION (HCC): ICD-10-CM

## 2018-01-09 LAB
INR PPP: 2.91 (ref 0.86–1.16)
PROTHROMBIN TIME: 30.8 SECONDS (ref 12.1–14.4)

## 2018-01-09 PROCEDURE — 36415 COLL VENOUS BLD VENIPUNCTURE: CPT

## 2018-01-09 PROCEDURE — 85610 PROTHROMBIN TIME: CPT

## 2018-01-12 VITALS
HEART RATE: 80 BPM | HEIGHT: 67 IN | SYSTOLIC BLOOD PRESSURE: 132 MMHG | DIASTOLIC BLOOD PRESSURE: 78 MMHG | WEIGHT: 185.5 LBS | BODY MASS INDEX: 29.11 KG/M2

## 2018-01-12 VITALS
WEIGHT: 183.25 LBS | HEIGHT: 67 IN | SYSTOLIC BLOOD PRESSURE: 112 MMHG | DIASTOLIC BLOOD PRESSURE: 72 MMHG | HEART RATE: 90 BPM | BODY MASS INDEX: 28.76 KG/M2

## 2018-01-13 VITALS
SYSTOLIC BLOOD PRESSURE: 110 MMHG | DIASTOLIC BLOOD PRESSURE: 78 MMHG | OXYGEN SATURATION: 97 % | HEART RATE: 88 BPM | BODY MASS INDEX: 29.03 KG/M2 | HEIGHT: 67 IN | WEIGHT: 185 LBS

## 2018-01-13 VITALS
SYSTOLIC BLOOD PRESSURE: 138 MMHG | HEART RATE: 80 BPM | BODY MASS INDEX: 30.18 KG/M2 | WEIGHT: 192.31 LBS | HEIGHT: 67 IN | DIASTOLIC BLOOD PRESSURE: 82 MMHG

## 2018-01-13 NOTE — RESULT NOTES
Verified Results  (1) PT WITH INR [id number] 11:46AM [employer]     Test Name Result Flag Reference   INR 2 52 H 0 86-1 16   PT 26 8 seconds H 12 0-14 3

## 2018-01-13 NOTE — RESULT NOTES
Verified Results  (1) PT WITH INR [id number] 10:09AM Cathryn Talamantes    Order Number: [id number]     Test Name Result Flag Reference   INR 2 37 H 0 86-1 16   PT 25 6 seconds H 12 0-14 3

## 2018-01-14 VITALS
DIASTOLIC BLOOD PRESSURE: 86 MMHG | HEART RATE: 89 BPM | WEIGHT: 180.25 LBS | SYSTOLIC BLOOD PRESSURE: 140 MMHG | BODY MASS INDEX: 28.23 KG/M2

## 2018-01-14 NOTE — RESULT NOTES
Verified Results  (1) PT WITH INR [id number] 10:01AM Darien Lockwood Order Number: [id number]     Test Name Result Flag Reference   INR 2 26 H 0 86-1 16   PT 25 2 seconds H 12 1-14 4

## 2018-01-15 NOTE — RESULT NOTES
Verified Results  (1) PT WITH INR [id number] 10:32AM David Franklin    Order Number: [id number]    TW Order Number: [id number]     Test Name Result Flag Reference   INR 2 41 H 0 86-1 16   PT 25 1 seconds H 11 8-14 1

## 2018-01-23 NOTE — RESULT NOTES
Verified Results  (1) PT WITH INR [id number] 10:37AM Zia DURBIN Order Number: [id number]     Test Name Result Flag Reference   INR 2 54 H 0 86-1 16   PT 27 7 seconds H 12 1-14 4

## 2018-01-24 ENCOUNTER — TRANSCRIBE ORDERS (OUTPATIENT)
Dept: LAB | Age: 83
End: 2018-01-24

## 2018-01-24 ENCOUNTER — APPOINTMENT (OUTPATIENT)
Dept: LAB | Age: 83
End: 2018-01-24
Payer: MEDICARE

## 2018-01-24 DIAGNOSIS — Z51.81 ENCOUNTER FOR THERAPEUTIC DRUG MONITORING: Primary | ICD-10-CM

## 2018-01-24 LAB
INR PPP: 3.11 (ref 0.86–1.16)
PROTHROMBIN TIME: 32.5 SECONDS (ref 12.1–14.4)

## 2018-01-24 PROCEDURE — 36415 COLL VENOUS BLD VENIPUNCTURE: CPT

## 2018-01-24 PROCEDURE — 85610 PROTHROMBIN TIME: CPT

## 2018-01-25 ENCOUNTER — ANTICOAG VISIT (OUTPATIENT)
Dept: INTERNAL MEDICINE CLINIC | Facility: CLINIC | Age: 83
End: 2018-01-25

## 2018-01-25 ENCOUNTER — TELEPHONE (OUTPATIENT)
Dept: INTERNAL MEDICINE CLINIC | Facility: CLINIC | Age: 83
End: 2018-01-25

## 2018-01-25 DIAGNOSIS — I48.91 ATRIAL FIBRILLATION, UNSPECIFIED TYPE (HCC): Primary | ICD-10-CM

## 2018-01-25 DIAGNOSIS — I48.0 PAROXYSMAL ATRIAL FIBRILLATION (HCC): Primary | ICD-10-CM

## 2018-01-25 NOTE — TELEPHONE ENCOUNTER
Per dr Fred Crawley note in results, cont same dose and recheck in a week  Pt currently on 3/1 5   I spoke w/ wife and notified

## 2018-01-26 ENCOUNTER — ANTICOAG VISIT (OUTPATIENT)
Dept: INTERNAL MEDICINE CLINIC | Facility: CLINIC | Age: 83
End: 2018-01-26

## 2018-01-26 DIAGNOSIS — I48.91 ATRIAL FIBRILLATION, UNSPECIFIED TYPE (HCC): Primary | ICD-10-CM

## 2018-01-30 ENCOUNTER — TRANSCRIBE ORDERS (OUTPATIENT)
Dept: LAB | Age: 83
End: 2018-01-30

## 2018-01-30 ENCOUNTER — APPOINTMENT (OUTPATIENT)
Dept: LAB | Age: 83
End: 2018-01-30
Payer: MEDICARE

## 2018-01-30 ENCOUNTER — TELEPHONE (OUTPATIENT)
Dept: INTERNAL MEDICINE CLINIC | Facility: CLINIC | Age: 83
End: 2018-01-30

## 2018-01-30 DIAGNOSIS — I48.0 PAROXYSMAL ATRIAL FIBRILLATION (HCC): ICD-10-CM

## 2018-01-30 LAB
INR PPP: 3.04 (ref 0.86–1.16)
PROTHROMBIN TIME: 31.9 SECONDS (ref 12.1–14.4)

## 2018-01-30 PROCEDURE — 85610 PROTHROMBIN TIME: CPT

## 2018-01-30 PROCEDURE — 36415 COLL VENOUS BLD VENIPUNCTURE: CPT

## 2018-02-03 DIAGNOSIS — E11.9 TYPE 2 DIABETES MELLITUS WITHOUT COMPLICATION, UNSPECIFIED LONG TERM INSULIN USE STATUS: Primary | ICD-10-CM

## 2018-02-05 RX ORDER — LINAGLIPTIN 5 MG/1
TABLET, FILM COATED ORAL
Qty: 90 TABLET | Refills: 0 | Status: SHIPPED | OUTPATIENT
Start: 2018-02-05 | End: 2018-05-03 | Stop reason: SDUPTHER

## 2018-02-06 ENCOUNTER — TRANSCRIBE ORDERS (OUTPATIENT)
Dept: LAB | Age: 83
End: 2018-02-06

## 2018-02-06 ENCOUNTER — TELEPHONE (OUTPATIENT)
Dept: INTERNAL MEDICINE CLINIC | Facility: CLINIC | Age: 83
End: 2018-02-06

## 2018-02-06 ENCOUNTER — APPOINTMENT (OUTPATIENT)
Dept: LAB | Age: 83
End: 2018-02-06
Payer: MEDICARE

## 2018-02-06 DIAGNOSIS — I48.91 ATRIAL FIBRILLATION, UNSPECIFIED TYPE (HCC): ICD-10-CM

## 2018-02-06 LAB
INR PPP: 3.61 (ref 0.86–1.16)
PROTHROMBIN TIME: 36.6 SECONDS (ref 12.1–14.4)

## 2018-02-06 PROCEDURE — 85610 PROTHROMBIN TIME: CPT

## 2018-02-06 PROCEDURE — 36415 COLL VENOUS BLD VENIPUNCTURE: CPT

## 2018-02-13 ENCOUNTER — APPOINTMENT (OUTPATIENT)
Dept: LAB | Age: 83
End: 2018-02-13
Payer: MEDICARE

## 2018-02-13 ENCOUNTER — TRANSCRIBE ORDERS (OUTPATIENT)
Dept: LAB | Age: 83
End: 2018-02-13

## 2018-02-13 ENCOUNTER — TELEPHONE (OUTPATIENT)
Dept: INTERNAL MEDICINE CLINIC | Facility: CLINIC | Age: 83
End: 2018-02-13

## 2018-02-13 DIAGNOSIS — I48.91 ATRIAL FIBRILLATION, UNSPECIFIED TYPE (HCC): Primary | ICD-10-CM

## 2018-02-13 DIAGNOSIS — I48.91 ATRIAL FIBRILLATION, UNSPECIFIED TYPE (HCC): ICD-10-CM

## 2018-02-13 LAB
INR PPP: 3.18 (ref 0.86–1.16)
PROTHROMBIN TIME: 33.1 SECONDS (ref 12.1–14.4)

## 2018-02-13 PROCEDURE — 85610 PROTHROMBIN TIME: CPT

## 2018-02-13 PROCEDURE — 36415 COLL VENOUS BLD VENIPUNCTURE: CPT

## 2018-02-13 RX ORDER — TAMSULOSIN HYDROCHLORIDE 0.4 MG/1
1 CAPSULE ORAL DAILY
COMMUNITY
Start: 2017-07-05 | End: 2018-12-29 | Stop reason: SDUPTHER

## 2018-02-13 RX ORDER — METOPROLOL TARTRATE 50 MG/1
1 TABLET, FILM COATED ORAL
COMMUNITY
Start: 2017-04-17 | End: 2018-04-07 | Stop reason: SDUPTHER

## 2018-02-13 RX ORDER — LEVOTHYROXINE SODIUM 0.07 MG/1
1 TABLET ORAL DAILY
COMMUNITY
Start: 2017-03-24 | End: 2018-03-30 | Stop reason: SDUPTHER

## 2018-02-13 RX ORDER — WARFARIN SODIUM 3 MG/1
1 TABLET ORAL DAILY
COMMUNITY
End: 2018-08-14 | Stop reason: SDUPTHER

## 2018-02-13 RX ORDER — ATORVASTATIN CALCIUM 40 MG/1
1 TABLET, FILM COATED ORAL DAILY
COMMUNITY
Start: 2015-10-20 | End: 2018-12-29 | Stop reason: SDUPTHER

## 2018-02-13 NOTE — TELEPHONE ENCOUNTER
----- Message from Tressa Sales MD sent at 2/13/2018  2:31 PM EST -----  Continue the same Coumadin and recheck the INR in 1 week

## 2018-02-14 ENCOUNTER — OFFICE VISIT (OUTPATIENT)
Dept: CARDIOLOGY CLINIC | Facility: CLINIC | Age: 83
End: 2018-02-14
Payer: MEDICARE

## 2018-02-14 VITALS
WEIGHT: 179.8 LBS | DIASTOLIC BLOOD PRESSURE: 84 MMHG | HEART RATE: 70 BPM | HEIGHT: 67 IN | BODY MASS INDEX: 28.22 KG/M2 | SYSTOLIC BLOOD PRESSURE: 138 MMHG

## 2018-02-14 DIAGNOSIS — I48.91 ATRIAL FIBRILLATION, UNSPECIFIED TYPE (HCC): Primary | ICD-10-CM

## 2018-02-14 DIAGNOSIS — E11.9 TYPE 2 DIABETES MELLITUS WITHOUT COMPLICATION, WITHOUT LONG-TERM CURRENT USE OF INSULIN (HCC): ICD-10-CM

## 2018-02-14 PROCEDURE — 93000 ELECTROCARDIOGRAM COMPLETE: CPT | Performed by: INTERNAL MEDICINE

## 2018-02-14 PROCEDURE — 99205 OFFICE O/P NEW HI 60 MIN: CPT | Performed by: INTERNAL MEDICINE

## 2018-02-14 NOTE — PROGRESS NOTES
EPS Progress Note - Nawaf James 80 y o  male MRN: [id number]           ASSESSMENT:  1  Atrial fibrillation, unspecified type ([address] )  POCT ECG   2  SSS - pacer in situ  3  CABG  4  CVA  5  CKD        PLAN:  This very pleasant gentleman has coronary artery disease with history of coronary artery bypass grafting his ejection fraction in 2015 was 50% which I explained is low normal he is on metoprolol atorvastatin and warfarin for secondary prevention of coronary artery disease  He has a pacemaker in situ that is working appropriately I will arrange transfer to our office they met with our pacemaker clinic today and will turn on his remote monitoring box  Next his kidney function has been stable since 2016 with creatinine ranging from 2 31 as high as 2 7 and in August of 2017 2 52  Shelvy Mingle He has an MRI compatible [employer] MRI I personally reviewed his interrogation from December of 2017 all leads were working appropriately very brief runs of atrial tachycardia impression normal device function    Shea Forrester is doing remarkably well for an 80year-old it will be my pleasure to follow up with him in six months    HPI:   Patient presents to establish cardiac care  ROS: labors with significant exertion but unchanged over past 4 years  Leg swelling changes from day to day but never consistent  No palpitations, no lightheadness or dizziness  All other 12 point ROS negative  Objective:     Vitals: Blood pressure 138/84, pulse 70, height 5' 7" (1 702 m), weight 81 6 kg (179 lb 12 8 oz)  , Body mass index is 28 16 kg/m²  ,        Physical Exam:    GEN: Nawaf James appears well, alert and oriented x 3, pleasant and cooperative   HEENT: pupils equal, round, and reactive to light; extraocular muscles intact  NECK: supple, no carotid bruits   HEART: regular rhythm, normal S1 and S2, no murmurs, clicks, gallops or rubs   LUNGS: clear to auscultation bilaterally; no wheezes, rales, or rhonchi   ABDOMEN: normal bowel sounds, soft, no tenderness, no distention  EXTREMITIES: peripheral pulses normal; no clubbing, cyanosis, or edema  NEURO: no focal findings   SKIN: normal without suspicious lesions on exposed skin    Medications:      Current Outpatient Prescriptions:     atorvastatin (LIPITOR) 40 mg tablet, Take 1 tablet by mouth daily, Disp: , Rfl:     glucose blood test strip, by In Vitro route daily, Disp: , Rfl:     levothyroxine 75 mcg tablet, Take 1 tablet by mouth daily, Disp: , Rfl:     metoprolol tartrate (LOPRESSOR) 50 mg tablet, Take 1 tablet by mouth, Disp: , Rfl:     tamsulosin (FLOMAX) 0 4 mg, Take 1 capsule by mouth daily, Disp: , Rfl:     TRADJENTA 5 MG TABS, TAKE 1 TABLET DAILY, Disp: 90 tablet, Rfl: 0    warfarin (COUMADIN) 3 mg tablet, Take 1 tablet by mouth daily, Disp: , Rfl:      Family History: mother heart disease unspecified     Labs & Results:  Below is the patient's most recent value for Albumin, ALT, AST, BUN, Calcium, Chloride, Cholesterol, CO2, Creatinine, GFR, Glucose, HDL, Hematocrit, Hemoglobin, Hemoglobin A1C, LDL, Magnesium, Phosphorus, Platelets, Potassium, PSA, Sodium, Triglycerides, and WBC  Lab Results   Component Value Date    ALT 16 08/28/2017    AST 16 08/28/2017    BUN 22 08/28/2017    CALCIUM 8 8 08/28/2017     08/28/2017    CHOL 106 08/28/2017    CO2 25 08/28/2017    CREATININE 2 52 (H) 08/28/2017    HDL 31 (L) 08/28/2017    HCT 40 6 08/28/2017    HGB 13 8 08/28/2017    HGBA1C 7 1 (H) 08/28/2017    MG 2 3 08/15/2016     08/28/2017    K 4 7 08/28/2017     08/28/2017    TRIG 194 (H) 08/28/2017    WBC 8 36 08/28/2017     Note: for a comprehensive list of the patient's lab results, access the Results Review activity           Cardiac testing:   Results for orders placed in visit on 10/29/15   Echo complete with contrast if indicated    [address], [address] [place] [address]  [phone]    Transthoracic Echocardiogram  2D, M-mode, and Color Doppler    Study date:  2015    Patient: Yelena Mclain  MR number: [id number]  Account number: [de-identified]  : 1932  Age: 80 years  Gender: Male  Status: Outpatient  Location: [facility]  Height: 67 in  Weight: 180 lb  BP: 128/ 70 mmHg    Indications:  Atrial Fibrillation  Diagnoses: I48 0 - Atrial fibrillation    Sonographer:  Goncalves RCS  Referring Physician:  Tapan Young MD  Group:  [facility]  Interpreting Physician:  Evi Gagnon MD    SUMMARY    LEFT VENTRICLE:  appears normal sized with mild concentric hypertrophy and markedly abnormal  septal motion likely due to conduction disease and prior open heart surgery  Hypokinesis is suggested involving the septum and anteroseptal walls, with  ejection fraction estimated at about 50% range  Doppler parameters were  consistent with abnormal left ventricular relaxation (grade 1 diastolic  dysfunction)  LEFT ATRIUM:  The atrium was mildly dilated  MITRAL VALVE:  There was moderate annular calcification  There was mild regurgitation  TRICUSPID VALVE:  There was mild regurgitation  AORTA:  The root exhibited normal size and mild fibrocalcific change  HISTORY: PRIOR HISTORY: Risk factors: hypertension, diabetes, and  medication-treated hypercholesterolemia  Cerebrovascular disease  PRIOR  PROCEDURES: Pacemaker implantation  Coronary bypass  PROCEDURE: The study was performed in the [address] [place] [address]  This  was a routine study  The transthoracic approach was used  The study included  complete 2D imaging, M-mode, and color Doppler  The heart rate was 71 bpm, at  the start of the study  Images were obtained from the parasternal, apical, and  subcostal acoustic windows  This was a technically difficult study      LEFT VENTRICLE: appears normal sized with mild concentric hypertrophy and  markedly abnormal septal motion likely due to conduction disease and prior open  heart surgery  Hypokinesis is suggested involving the septum and anteroseptal  walls, with ejection fraction estimated at about 50% range  DOPPLER: Doppler  parameters were consistent with abnormal left ventricular relaxation (grade 1  diastolic dysfunction)  RIGHT VENTRICLE: The size was normal  Systolic function was normal  Wall  thickness was normal  A pacing wire was present  LEFT ATRIUM: The atrium was mildly dilated  RIGHT ATRIUM: Size was normal  A pacing wire was present  MITRAL VALVE: There was moderate annular calcification  DOPPLER: There was mild  regurgitation  AORTIC VALVE: The valve was trileaflet  Leaflets exhibited mild calcification  TRICUSPID VALVE: DOPPLER: There was mild regurgitation  PERICARDIUM: There was no pericardial effusion  The pericardium was normal in  appearance  AORTA: The root exhibited normal size and mild fibrocalcific change  PULMONARY ARTERY: DOPPLER: Systolic pressure was within the normal range  SYSTEM MEASUREMENT TABLES    Apical four chamber  4 chamber Left Atrium Volume Index; Planimetry; End Systole; Apical four  chamber;: 17 49 cm2 Left Ventricular Diastolic Area; Method of Disks, Single  Plane; End Diastole; Apical four chamber;: 28 33 cm2 Left Ventricular systolic  Area; Method of Disks, Single Plane; End Systole; Apical four chamber;: 17 28  cm2 Right Atrium Systolic Area; Planimetry; End Systole; Apical four chamber;:  14 88 cm2 Right Ventricular Internal Diastolic Dimension; End Diastole; Apical  four chamber;: 30 3 mm    Unspecified Scan Mode  Aortic Root Diameter; End Systole;: 36 2 mm  Aortic valve Area; Continuity Equation by Velocity Time Integral; Systole;:  2 03 cm2  Cardiovascular Orifice Diameter; End Systole;: 21 2 mm  Gradient Pressure, Peak; Simplified Bernoulli; Antegrade Flow; Systole;: 9 8  mm[Hg] Gradient pressure, average; Simplified Bernoulli;  Antegrade Flow;  Systole;: 6 mm[Hg]  Left atrial diameter; End Diastole;: 41 9 mm  Cardiac Output; Teichholz; Systole;: 6 46 L/min  Heart rate; Teichholz;: 170 /min  Interventricular Septum Diastolic Thickness; Teichholz; End Diastole;: 11 3 mm  Left Ventricle Internal End Diastolic Dimension; Teichholz;: 40 3 mm  Left Ventricle Internal Systolic Dimension; Teichholz; End Systole;: 29 4 mm  Left Ventricle Mass; Mass AVCube with Teichholz; End Diastole;: 151 g  Left Ventricle Posterior Wall Diastolic Thickness; Teichholz; End Diastole;:  11 1 mm  Left Ventricular Ejection Fraction; Teichholz;: 53 3 %  Left Ventricular End Diastolic Volume; Teichholz;: 71 3 ml  Left Ventricular End Systolic Volume; Teichholz;: 33 3 ml  Left Ventricular Fractional Shortening;: 27 %  Stroke volume; Teichholz; Systole;: 38 ml  Gradient Pressure, Peak; Simplified Bernoulli; Antegrade Flow; Diastole;: 3 2  mm[Hg] Gradient pressure, average; Simplified Bernoulli; Antegrade Flow;  Diastole;: 1 mm[Hg]  Mitral Valve Area; Area by Pressure Half-Time; Systole;: 3 49 cm2  Mitral Valve E to A Ratio; Systole;: 0 93  Pressure half time; Diastole;: 0 06 s  Maximum Tricuspid valve regurgitation pressure gradient; Regurgitant Flow;  Systole;: 29 mm[Hg]    [facility]    Prepared and electronically signed by    Liam Fraga MD  Signed [id number] 12:38:11       No results found for this or any previous visit  No results found for this or any previous visit  No results found for this or any previous visit      EKG personally reviewed by Carolin Morales DO  apaced with jyoti and PK

## 2018-02-20 ENCOUNTER — APPOINTMENT (OUTPATIENT)
Dept: LAB | Age: 83
End: 2018-02-20
Payer: MEDICARE

## 2018-02-20 ENCOUNTER — ANTICOAG VISIT (OUTPATIENT)
Dept: INTERNAL MEDICINE CLINIC | Facility: CLINIC | Age: 83
End: 2018-02-20

## 2018-02-20 ENCOUNTER — TRANSCRIBE ORDERS (OUTPATIENT)
Dept: LAB | Age: 83
End: 2018-02-20

## 2018-02-20 ENCOUNTER — TELEPHONE (OUTPATIENT)
Dept: INTERNAL MEDICINE CLINIC | Facility: CLINIC | Age: 83
End: 2018-02-20

## 2018-02-20 DIAGNOSIS — I48.91 ATRIAL FIBRILLATION, UNSPECIFIED TYPE (HCC): ICD-10-CM

## 2018-02-20 LAB
INR PPP: 2.86 (ref 0.86–1.16)
PROTHROMBIN TIME: 30.4 SECONDS (ref 12.1–14.4)

## 2018-02-20 PROCEDURE — 36415 COLL VENOUS BLD VENIPUNCTURE: CPT

## 2018-02-20 PROCEDURE — 85610 PROTHROMBIN TIME: CPT

## 2018-02-27 ENCOUNTER — APPOINTMENT (OUTPATIENT)
Dept: LAB | Age: 83
End: 2018-02-27
Payer: MEDICARE

## 2018-02-27 ENCOUNTER — OFFICE VISIT (OUTPATIENT)
Dept: INTERNAL MEDICINE CLINIC | Facility: CLINIC | Age: 83
End: 2018-02-27
Payer: MEDICARE

## 2018-02-27 ENCOUNTER — TELEPHONE (OUTPATIENT)
Dept: INTERNAL MEDICINE CLINIC | Facility: CLINIC | Age: 83
End: 2018-02-27

## 2018-02-27 VITALS
SYSTOLIC BLOOD PRESSURE: 138 MMHG | OXYGEN SATURATION: 95 % | WEIGHT: 178.8 LBS | HEART RATE: 90 BPM | DIASTOLIC BLOOD PRESSURE: 78 MMHG | BODY MASS INDEX: 30.52 KG/M2 | HEIGHT: 64 IN

## 2018-02-27 DIAGNOSIS — I10 ESSENTIAL HYPERTENSION: ICD-10-CM

## 2018-02-27 DIAGNOSIS — E11.22 TYPE 2 DIABETES MELLITUS WITH STAGE 4 CHRONIC KIDNEY DISEASE, WITHOUT LONG-TERM CURRENT USE OF INSULIN (HCC): ICD-10-CM

## 2018-02-27 DIAGNOSIS — I48.0 PAROXYSMAL A-FIB (HCC): Primary | ICD-10-CM

## 2018-02-27 DIAGNOSIS — E03.9 ACQUIRED HYPOTHYROIDISM: ICD-10-CM

## 2018-02-27 DIAGNOSIS — E11.8 DIABETES MELLITUS WITH MULTIPLE COMPLICATIONS (HCC): ICD-10-CM

## 2018-02-27 DIAGNOSIS — N18.4 TYPE 2 DIABETES MELLITUS WITH STAGE 4 CHRONIC KIDNEY DISEASE, WITHOUT LONG-TERM CURRENT USE OF INSULIN (HCC): ICD-10-CM

## 2018-02-27 DIAGNOSIS — I25.810 CORONARY ARTERY DISEASE INVOLVING CORONARY BYPASS GRAFT OF NATIVE HEART WITHOUT ANGINA PECTORIS: ICD-10-CM

## 2018-02-27 DIAGNOSIS — I25.10 3-VESSEL CORONARY ARTERY DISEASE: ICD-10-CM

## 2018-02-27 DIAGNOSIS — I48.91 ATRIAL FIBRILLATION, UNSPECIFIED TYPE (HCC): Primary | ICD-10-CM

## 2018-02-27 PROCEDURE — 99214 OFFICE O/P EST MOD 30 MIN: CPT | Performed by: INTERNAL MEDICINE

## 2018-02-27 PROCEDURE — 85610 PROTHROMBIN TIME: CPT

## 2018-02-27 PROCEDURE — 36415 COLL VENOUS BLD VENIPUNCTURE: CPT

## 2018-02-27 NOTE — PROGRESS NOTES
Assessment/Plan:    Hypothyroidism  Treated and stable with levothyroxine 75    Diabetes mellitus with multiple complications ([facility])  Hemoglobin A1c has been stable with CKD stage 4 also on changing;    Paroxysmal a-fib (HCC)  Maintained on Coumadin  No recent episodes of symptomatic fibrillation  CKD (chronic kidney disease), stage IV (HCC)  Overall deterioration but recent stability    Hyperlipidemia  Sydney Ahmadi is here for follow up of elevated cholesterol  Compliance with treatment has been good  The patient exercises never  Patient complains of muscle pain associated with his medications  3-vessel coronary artery disease  Coronary Artery Disease  Patient presents for routine coronary artery disease follow-up  Current symptoms: none  Aggravating factors: none  Alleviating factors: none  Cardiac risk factors include advanced age (older than 54 for men, 72 for women), diabetes mellitus, dyslipidemia and family history of premature cardiovascular disease  Diagnoses and all orders for this visit:    Paroxysmal a-fib ([facility] )    Essential hypertension    Acquired hypothyroidism  -     Vitamin D 25 hydroxy; Future    Type 2 diabetes mellitus with stage 4 chronic kidney disease, without long-term current use of insulin ([facility])  -     CBC and differential; Future  -     Comprehensive metabolic panel; Future  -     HEMOGLOBIN A1C W/ EAG ESTIMATION; Future  -     Lipid Panel with Direct LDL reflex; Future  -     Microalbumin / creatinine urine ratio  -     TSH, 3rd generation with T4 reflex; Future  -     Urinalysis with reflex to microscopic  -     Vitamin D 25 hydroxy; Future    Diabetes mellitus with multiple complications ([facility] )    3-vessel coronary artery disease    Coronary artery disease involving coronary bypass graft of native heart without angina pectoris          Subjective:      Patient ID: Sydney Ahmadi is a 80 y o  male  F/u patient visit of this elderly patient    5 years ago, he had 2 strokes while visiting his daughter in [place] which affected the left side of his body  He still has some residual facial droop, dysarthria, and left-sided weakness  However, at this time, he has minimal residual signs  Actually, he looks normal  The etiology was atrial fibrillation  Currently, he is rate controlled,paced, and treated with warfarin for anticoagulation  Shortly thereafter, he had coronary artery bypass grafting for CAD  He told me he was asymptomatic  Background diagnoses apparently hypertension, diabetes, hyperlipidemia  He has chronic kidney disease severe enough to prevent the use of metformin  Creatinine 2 3 with BUN 26  Estimated GFR 33  Asbestosis and hypothyroidism also noted  These are stable   His last hemoglobin A1c which was done January 21017 was 6 7 %  Given his advanced age and multiple comorbidities which are already in place I am reluctant to push diabetic treatment but we will need to recheck  Globally he tires out easily  Apparently there are some issues regarding his difficulty in accepting this deterioration        The following portions of the patient's history were reviewed and updated as appropriate: allergies, current medications, past family history, past medical history, past social history, past surgical history and problem list     Review of Systems   Constitutional: Negative for activity change and appetite change  HENT: Negative for congestion, sore throat and trouble swallowing  Respiratory: Negative for cough and shortness of breath  Cardiovascular: Negative for chest pain and palpitations  Endocrine: Negative for cold intolerance and heat intolerance  Genitourinary: Positive for urgency  Negative for difficulty urinating and dysuria  Musculoskeletal: Positive for arthralgias  Skin: Negative for color change, pallor and rash  Neurological: Negative for dizziness and headaches     Psychiatric/Behavioral: Negative for dysphoric mood and sleep disturbance  Objective:      /78   Pulse 90   Ht 5' 4" (1 626 m)   Wt 81 1 kg (178 lb 12 8 oz)   SpO2 95%   BMI 30 69 kg/m²          Physical Exam   Constitutional: He is oriented to person, place, and time  He appears well-developed and well-nourished  Looks younger than stated age   HENT:   Head: Normocephalic and atraumatic  Eyes: Conjunctivae are normal  Pupils are equal, round, and reactive to light  Neck: Normal range of motion  No thyromegaly present  Cardiovascular: Normal rate, regular rhythm and S2 normal   Exam reveals distant heart sounds  Exam reveals no gallop  Murmur heard  Decrescendo systolic murmur is present with a grade of 2/6   Pulses:       Dorsalis pedis pulses are 1+ on the right side, and 1+ on the left side  Posterior tibial pulses are 0 on the right side, and 0 on the left side  Pulmonary/Chest: Effort normal  No respiratory distress  He has no wheezes  He has no rales  Abdominal: Soft  There is no tenderness  Genitourinary: Penis normal  Right testis shows no mass and no tenderness  Left testis shows no mass and no tenderness  Musculoskeletal: Normal range of motion  He exhibits no deformity  Lymphadenopathy:     He has no cervical adenopathy  Neurological: He is alert and oriented to person, place, and time  Skin: Skin is warm and dry  He is not diaphoretic  Psychiatric: He has a normal mood and affect   His behavior is normal  Judgment and thought content normal

## 2018-02-27 NOTE — TELEPHONE ENCOUNTER
----- Message from Tomas Duncan MD sent at 2/27/2018  4:43 PM EST -----  Continue the same Coumadin and recheck the INR in 2 weeks

## 2018-02-27 NOTE — PROGRESS NOTES
Assessment/Plan:    Hypothyroidism  Treated and stable with levothyroxine 75    Diabetes mellitus with multiple complications (HCC)  Hemoglobin A1c has been stable with CKD stage 4 also on changing;    Paroxysmal a-fib (HCC)  Maintained on Coumadin  No recent episodes of symptomatic fibrillation  CKD (chronic kidney disease), stage IV (HCC)  Overall deterioration but recent stability    Hyperlipidemia  Jamarcus Choi is here for follow up of elevated cholesterol  Compliance with treatment has been good  The patient exercises never  Patient complains of muscle pain associated with his medications  3-vessel coronary artery disease  Coronary Artery Disease  Patient presents for routine coronary artery disease follow-up  Current symptoms: none  Aggravating factors: none  Alleviating factors: none  Cardiac risk factors include advanced age (older than 54 for men, 72 for women), diabetes mellitus, dyslipidemia and family history of premature cardiovascular disease  Diagnoses and all orders for this visit:    Paroxysmal a-fib ([address] )    Essential hypertension    Acquired hypothyroidism  -     Vitamin D 25 hydroxy; Future    Type 2 diabetes mellitus with stage 4 chronic kidney disease, without long-term current use of insulin (HCC)  -     CBC and differential; Future  -     Comprehensive metabolic panel; Future  -     HEMOGLOBIN A1C W/ EAG ESTIMATION; Future  -     Lipid Panel with Direct LDL reflex; Future  -     Microalbumin / creatinine urine ratio  -     TSH, 3rd generation with T4 reflex; Future  -     Urinalysis with reflex to microscopic  -     Vitamin D 25 hydroxy; Future          Subjective:      Patient ID: Jamarcus hCoi is a 80 y o  male  Follow-up visit of an 59-year-old man who although he has multiple chronic diagnoses is actually doing fairly well            The following portions of the patient's history were reviewed and updated as appropriate: allergies, current medications, past family history, past medical history, past social history, past surgical history and problem list     Review of Systems      Objective:      /78   Pulse 90   Ht 5' 4" (1 626 m)   Wt 81 1 kg (178 lb 12 8 oz)   SpO2 95%   BMI 30 69 kg/m²          Physical Exam   Constitutional: He appears well-developed and well-nourished  Alert and appears younger than stated age  HENT:   Head: Normocephalic and atraumatic  Eyes: Pupils are equal, round, and reactive to light  Neck: No tracheal deviation present  No thyromegaly present  Cardiovascular: Normal rate and regular rhythm  Pulmonary/Chest: Effort normal and breath sounds normal  No respiratory distress  Abdominal: Soft  There is no tenderness  Musculoskeletal: Normal range of motion  Neurological: He is alert  Skin: Skin is warm and dry  Psychiatric: He has a normal mood and affect

## 2018-02-27 NOTE — ASSESSMENT & PLAN NOTE
Melonie Adam is here for follow up of elevated cholesterol  Compliance with treatment has been good  The patient exercises never  Patient complains of muscle pain associated with his medications

## 2018-02-27 NOTE — PATIENT INSTRUCTIONS
Continue PT plus on a regular basis  Routine testing next time  We will be transferring to primary care closer to home

## 2018-02-27 NOTE — ASSESSMENT & PLAN NOTE
Coronary Artery Disease  Patient presents for routine coronary artery disease follow-up  Current symptoms: none  Aggravating factors: none  Alleviating factors: none  Cardiac risk factors include advanced age (older than 54 for men, 72 for women), diabetes mellitus, dyslipidemia and family history of premature cardiovascular disease

## 2018-02-27 NOTE — PROGRESS NOTES
Diabetic Foot Exam    Patient's shoes and socks removed  Right Foot/Ankle   Right Foot Inspection  Skin Exam: skin normal and skin intact no dry skin, no warmth, no callus, no erythema, no maceration, no abnormal color, no pre-ulcer, no ulcer and no callus                            Sensory       Monofilament testing: intact  Vascular    The right DP pulse is 1+  The right PT pulse is 0  Left Foot/Ankle  Left Foot Inspection  Skin Exam: skin normal and skin intactno dry skin, no warmth, no erythema, no maceration, normal color, no pre-ulcer, no ulcer and no callus                                         Sensory       Monofilament: intact  Vascular    The left DP pulse is 1+  The left PT pulse is 0  Assign Risk Category:  Deformity present;  No loss of protective sensation; Weak pulses       Risk: 1

## 2018-03-13 ENCOUNTER — ANTICOAG VISIT (OUTPATIENT)
Dept: INTERNAL MEDICINE CLINIC | Facility: CLINIC | Age: 83
End: 2018-03-13

## 2018-03-13 ENCOUNTER — APPOINTMENT (OUTPATIENT)
Dept: LAB | Age: 83
End: 2018-03-13
Payer: MEDICARE

## 2018-03-13 ENCOUNTER — TELEPHONE (OUTPATIENT)
Dept: INTERNAL MEDICINE CLINIC | Facility: CLINIC | Age: 83
End: 2018-03-13

## 2018-03-13 DIAGNOSIS — I48.91 ATRIAL FIBRILLATION, UNSPECIFIED TYPE (HCC): ICD-10-CM

## 2018-03-13 LAB
INR PPP: 2.33 (ref 0.86–1.16)
PROTHROMBIN TIME: 25.8 SECONDS (ref 12.1–14.4)

## 2018-03-13 PROCEDURE — 85610 PROTHROMBIN TIME: CPT

## 2018-03-13 PROCEDURE — 36415 COLL VENOUS BLD VENIPUNCTURE: CPT

## 2018-03-13 NOTE — TELEPHONE ENCOUNTER
----- Message from Natali Ireland PA-C sent at 3/13/2018  3:17 PM EDT -----  Same Coumadin recheck 1 week

## 2018-03-20 ENCOUNTER — TELEPHONE (OUTPATIENT)
Dept: INTERNAL MEDICINE CLINIC | Facility: CLINIC | Age: 83
End: 2018-03-20

## 2018-03-20 ENCOUNTER — APPOINTMENT (OUTPATIENT)
Dept: LAB | Age: 83
End: 2018-03-20
Payer: MEDICARE

## 2018-03-20 ENCOUNTER — CLINICAL SUPPORT (OUTPATIENT)
Dept: CARDIOLOGY CLINIC | Facility: MEDICAL CENTER | Age: 83
End: 2018-03-20
Payer: MEDICARE

## 2018-03-20 DIAGNOSIS — I48.0 PAROXYSMAL ATRIAL FIBRILLATION (HCC): Primary | ICD-10-CM

## 2018-03-20 DIAGNOSIS — Z95.0 PRESENCE OF PERMANENT CARDIAC PACEMAKER: ICD-10-CM

## 2018-03-20 PROCEDURE — 93280 PM DEVICE PROGR EVAL DUAL: CPT | Performed by: INTERNAL MEDICINE

## 2018-03-20 PROCEDURE — 36415 COLL VENOUS BLD VENIPUNCTURE: CPT

## 2018-03-20 PROCEDURE — 85610 PROTHROMBIN TIME: CPT

## 2018-03-20 NOTE — PROGRESS NOTES
DC PACEMAKER INTERROGATED IN THE [place] OFFICE:  NP TO DEVICE CLINIC   BATTERY VOLTAGE ADEQUATE (5 5 YR)   AP 98 5%  0 7%   ALL LEAD PARAMETERS WITHIN NORMAL LIMITS   4 NEW VT-NS EPISODES WITH EGMS SHOWING 1 EPISODE OF NSVT (7 @ 168 BPM), AND 3 BRIEF EPISODES OF PAT   NO PROGRAMMING CHANGES MADE TO DEVICE PARAMETERS   NORMAL DEVICE FUNCTION   RG       Current Outpatient Prescriptions:     atorvastatin (LIPITOR) 40 mg tablet, Take 1 tablet by mouth daily, Disp: , Rfl:     glucose blood test strip, 1 each by Other route daily, Disp: 100 each, Rfl: 3    levothyroxine 75 mcg tablet, Take 1 tablet by mouth daily, Disp: , Rfl:     metoprolol tartrate (LOPRESSOR) 50 mg tablet, Take 1 tablet by mouth, Disp: , Rfl:     tamsulosin (FLOMAX) 0 4 mg, Take 1 capsule by mouth daily, Disp: , Rfl:     TRADJENTA 5 MG TABS, TAKE 1 TABLET DAILY, Disp: 90 tablet, Rfl: 0    warfarin (COUMADIN) 3 mg tablet, Take 1 tablet by mouth daily, Disp: , Rfl:     [facility], [address] [place] [address]  [phone]     Transthoracic Echocardiogram  2D, M-mode, and Color Doppler     Study date:  2015     Patient: Osmani Garvin  MR number: [id number]  Account number: [de-identified]  : 1932  Age: 80 years  Gender: Male  Status: Outpatient  Location: [facility]  Height: 67 in  Weight: 180 lb  BP: 128/ 70 mmHg     Indications:  Atrial Fibrillation      Diagnoses: I48 0 - Atrial fibrillation     Sonographer:  Thornton RCS  Referring Physician:  Keri Gross MD  Group:  [facility]  Interpreting Physician:  Abbey Aden MD     SUMMARY     LEFT VENTRICLE:  appears normal sized with mild concentric hypertrophy and markedly abnormal  septal motion likely due to conduction disease and prior open heart surgery  Hypokinesis is suggested involving the septum and anteroseptal walls, with  ejection fraction estimated at about 50% range   Doppler parameters were  consistent with abnormal left ventricular relaxation (grade 1 diastolic  dysfunction)

## 2018-03-20 NOTE — TELEPHONE ENCOUNTER
----- Message from Rin Baca PA-C sent at 3/20/2018  6:44 PM EDT -----  Same Coumadin recheck in a week

## 2018-03-28 ENCOUNTER — TELEPHONE (OUTPATIENT)
Dept: INTERNAL MEDICINE CLINIC | Facility: CLINIC | Age: 83
End: 2018-03-28

## 2018-03-28 ENCOUNTER — APPOINTMENT (OUTPATIENT)
Dept: LAB | Age: 83
End: 2018-03-28
Payer: MEDICARE

## 2018-03-28 DIAGNOSIS — I48.91 ATRIAL FIBRILLATION, UNSPECIFIED TYPE (HCC): Primary | ICD-10-CM

## 2018-03-28 DIAGNOSIS — I48.91 ATRIAL FIBRILLATION, UNSPECIFIED TYPE (HCC): ICD-10-CM

## 2018-03-28 LAB
INR PPP: 2.5 (ref 0.86–1.16)
PROTHROMBIN TIME: 27.3 SECONDS (ref 12.1–14.4)

## 2018-03-28 PROCEDURE — 85610 PROTHROMBIN TIME: CPT

## 2018-03-28 PROCEDURE — 36415 COLL VENOUS BLD VENIPUNCTURE: CPT

## 2018-03-28 NOTE — TELEPHONE ENCOUNTER
----- Message from Pineda Bojorquez MD sent at 3/28/2018 12:46 PM EDT -----  Continue the same Coumadin and recheck the INR in 2 weeks

## 2018-03-30 DIAGNOSIS — E03.9 HYPOTHYROIDISM, UNSPECIFIED TYPE: Primary | ICD-10-CM

## 2018-03-30 RX ORDER — LEVOTHYROXINE SODIUM 0.07 MG/1
TABLET ORAL
Qty: 90 TABLET | Refills: 3 | Status: SHIPPED | OUTPATIENT
Start: 2018-03-30 | End: 2019-03-30 | Stop reason: SDUPTHER

## 2018-04-07 DIAGNOSIS — I10 ESSENTIAL HYPERTENSION: Primary | ICD-10-CM

## 2018-04-07 RX ORDER — METOPROLOL TARTRATE 50 MG/1
TABLET, FILM COATED ORAL
Qty: 180 TABLET | Refills: 0 | Status: SHIPPED | OUTPATIENT
Start: 2018-04-07 | End: 2018-07-10 | Stop reason: SDUPTHER

## 2018-04-10 ENCOUNTER — ANTICOAG VISIT (OUTPATIENT)
Dept: INTERNAL MEDICINE CLINIC | Facility: CLINIC | Age: 83
End: 2018-04-10

## 2018-04-10 ENCOUNTER — APPOINTMENT (OUTPATIENT)
Dept: LAB | Age: 83
End: 2018-04-10
Payer: MEDICARE

## 2018-04-10 ENCOUNTER — TELEPHONE (OUTPATIENT)
Dept: INTERNAL MEDICINE CLINIC | Facility: CLINIC | Age: 83
End: 2018-04-10

## 2018-04-10 DIAGNOSIS — I48.91 ATRIAL FIBRILLATION, UNSPECIFIED TYPE (HCC): ICD-10-CM

## 2018-04-10 LAB
INR PPP: 2.57 (ref 0.86–1.16)
PROTHROMBIN TIME: 27.9 SECONDS (ref 12.1–14.4)

## 2018-04-10 PROCEDURE — 36415 COLL VENOUS BLD VENIPUNCTURE: CPT

## 2018-04-10 PROCEDURE — 85610 PROTHROMBIN TIME: CPT

## 2018-04-10 NOTE — TELEPHONE ENCOUNTER
----- Message from Alexandra Dominguez MD sent at 4/10/2018 12:43 PM EDT -----  Continue the same Coumadin and recheck the INR in 2 weeks  Continue the same Coumadin and recheck the INR in 2 weeks

## 2018-04-24 ENCOUNTER — ANTICOAG VISIT (OUTPATIENT)
Dept: INTERNAL MEDICINE CLINIC | Facility: CLINIC | Age: 83
End: 2018-04-24

## 2018-04-24 ENCOUNTER — TELEPHONE (OUTPATIENT)
Dept: INTERNAL MEDICINE CLINIC | Facility: CLINIC | Age: 83
End: 2018-04-24

## 2018-04-24 ENCOUNTER — LAB (OUTPATIENT)
Dept: LAB | Age: 83
End: 2018-04-24
Payer: MEDICARE

## 2018-04-24 DIAGNOSIS — I48.91 ATRIAL FIBRILLATION, UNSPECIFIED TYPE (HCC): ICD-10-CM

## 2018-04-24 DIAGNOSIS — I48.0 PAROXYSMAL ATRIAL FIBRILLATION (HCC): Primary | ICD-10-CM

## 2018-04-24 DIAGNOSIS — I48.0 PAROXYSMAL A-FIB (HCC): Primary | ICD-10-CM

## 2018-04-24 LAB — INR PPP: 2.34 (ref 0.86–1.16)

## 2018-04-24 PROCEDURE — 85610 PROTHROMBIN TIME: CPT

## 2018-04-24 PROCEDURE — 36415 COLL VENOUS BLD VENIPUNCTURE: CPT

## 2018-04-24 NOTE — TELEPHONE ENCOUNTER
----- Message from Nicho Castillo MD sent at 4/24/2018  1:17 PM EDT -----  Continue the same Coumadin and recheck the INR in 2 weeks  Continue the same Coumadin and recheck the INR in 2 weeks

## 2018-05-03 DIAGNOSIS — E11.9 TYPE 2 DIABETES MELLITUS WITHOUT COMPLICATION (HCC): ICD-10-CM

## 2018-05-03 RX ORDER — LINAGLIPTIN 5 MG/1
TABLET, FILM COATED ORAL
Qty: 90 TABLET | Refills: 0 | Status: SHIPPED | OUTPATIENT
Start: 2018-05-03 | End: 2018-08-09 | Stop reason: SDUPTHER

## 2018-05-08 ENCOUNTER — LAB (OUTPATIENT)
Dept: LAB | Age: 83
End: 2018-05-08
Payer: MEDICARE

## 2018-05-08 DIAGNOSIS — I48.0 PAROXYSMAL ATRIAL FIBRILLATION (HCC): ICD-10-CM

## 2018-05-08 LAB
INR PPP: 2.54 (ref 0.86–1.16)
INR PPP: 2.54 (ref 0.86–1.16)
PROTHROMBIN TIME: 27.7 SECONDS (ref 12.1–14.4)

## 2018-05-08 PROCEDURE — 36415 COLL VENOUS BLD VENIPUNCTURE: CPT

## 2018-05-08 PROCEDURE — 85610 PROTHROMBIN TIME: CPT

## 2018-05-09 ENCOUNTER — TELEPHONE (OUTPATIENT)
Dept: INTERNAL MEDICINE CLINIC | Facility: CLINIC | Age: 83
End: 2018-05-09

## 2018-05-09 DIAGNOSIS — I48.0 PAROXYSMAL A-FIB (HCC): Primary | ICD-10-CM

## 2018-05-09 NOTE — TELEPHONE ENCOUNTER
Wife called for Protime-INR from yesterday  She said that they usually call that day but she has not heard anything      Best contact# [phone]

## 2018-05-22 ENCOUNTER — APPOINTMENT (OUTPATIENT)
Dept: LAB | Age: 83
End: 2018-05-22
Payer: MEDICARE

## 2018-05-23 ENCOUNTER — TELEPHONE (OUTPATIENT)
Dept: INTERNAL MEDICINE CLINIC | Facility: CLINIC | Age: 83
End: 2018-05-23

## 2018-05-23 ENCOUNTER — ANTICOAG VISIT (OUTPATIENT)
Dept: INTERNAL MEDICINE CLINIC | Facility: CLINIC | Age: 83
End: 2018-05-23

## 2018-05-30 ENCOUNTER — OFFICE VISIT (OUTPATIENT)
Dept: FAMILY MEDICINE CLINIC | Facility: CLINIC | Age: 83
End: 2018-05-30
Payer: MEDICARE

## 2018-05-30 VITALS
HEIGHT: 64 IN | SYSTOLIC BLOOD PRESSURE: 126 MMHG | DIASTOLIC BLOOD PRESSURE: 70 MMHG | BODY MASS INDEX: 29.94 KG/M2 | WEIGHT: 175.4 LBS | TEMPERATURE: 97.4 F | HEART RATE: 84 BPM

## 2018-05-30 DIAGNOSIS — M75.01 ADHESIVE CAPSULITIS OF RIGHT SHOULDER: ICD-10-CM

## 2018-05-30 DIAGNOSIS — E78.2 MIXED HYPERLIPIDEMIA: ICD-10-CM

## 2018-05-30 DIAGNOSIS — I48.91 ATRIAL FIBRILLATION, UNSPECIFIED TYPE (HCC): ICD-10-CM

## 2018-05-30 DIAGNOSIS — E11.8 DIABETES MELLITUS WITH MULTIPLE COMPLICATIONS (HCC): ICD-10-CM

## 2018-05-30 DIAGNOSIS — I10 ESSENTIAL HYPERTENSION: ICD-10-CM

## 2018-05-30 DIAGNOSIS — Z13.5 SCREENING FOR DIABETIC RETINOPATHY: ICD-10-CM

## 2018-05-30 DIAGNOSIS — I25.10 3-VESSEL CORONARY ARTERY DISEASE: ICD-10-CM

## 2018-05-30 DIAGNOSIS — I48.0 PAROXYSMAL A-FIB (HCC): ICD-10-CM

## 2018-05-30 DIAGNOSIS — E11.8 TYPE 2 DIABETES MELLITUS WITH COMPLICATION, WITHOUT LONG-TERM CURRENT USE OF INSULIN (HCC): Primary | ICD-10-CM

## 2018-05-30 DIAGNOSIS — E03.9 ACQUIRED HYPOTHYROIDISM: ICD-10-CM

## 2018-05-30 DIAGNOSIS — N18.4 CKD (CHRONIC KIDNEY DISEASE), STAGE IV (HCC): ICD-10-CM

## 2018-05-30 PROBLEM — M75.00 FROZEN SHOULDER: Status: ACTIVE | Noted: 2018-05-30

## 2018-05-30 PROCEDURE — 99204 OFFICE O/P NEW MOD 45 MIN: CPT | Performed by: FAMILY MEDICINE

## 2018-05-30 NOTE — PROGRESS NOTES
Assessment/Plan:    Diabetes mellitus with multiple complications ([place] [address] )  Has been stable  Due for labs  Cont present meds  Recheck 6m - earlier if labs are abnormal    Hypothyroidism  Check TSH    3-vessel coronary artery disease  s/p CABG and pacer placement  Check labs  Cont present meds  Recheck 6m    Essential hypertension  At goal  Check labs    Paroxysmal a-fib (HCC)  Rate controlled  Rhythm seems regular by auscultation  Cont f/u with Cardio  Cont present meds    Frozen shoulder  I reviewed with pt and wife  Refer for PT - recheck 2-3 weeks if not improved    CKD (chronic kidney disease), stage IV (HCC)  Recheck CMP, microalbumen  Add PTH and Vit D levels  Diagnoses and all orders for this visit:    Type 2 diabetes mellitus with complication, without long-term current use of insulin ([facility])  -     Comprehensive metabolic panel; Future  -     HEMOGLOBIN A1C W/ EAG ESTIMATION; Future  -     Lipid panel; Future  -     Microalbumin / creatinine urine ratio; Future  -     TSH, 3rd generation; Future    Atrial fibrillation, unspecified type ([place] [address] )  -     Protime-INR; Standing  -     Protime-INR    Acquired hypothyroidism    Diabetes mellitus with multiple complications ([place] [address] )    3-vessel coronary artery disease    Essential hypertension  -     CBC and differential; Future    Paroxysmal A-fib (HCC)    CKD (chronic kidney disease), stage IV (HCC)  -     PTH, intact; Future    Mixed hyperlipidemia    Adhesive capsulitis of right shoulder  -     Ambulatory referral to Physical Therapy; Future    Screening for diabetic retinopathy  -     Ambulatory referral to Ophthalmology; Future    Other orders  -     Cancel: HEMOGLOBIN A1C W/ EAG ESTIMATION; Future          Subjective:      Patient ID: Mesfin Yi is a 80 y o  male  Here to be establsihed  - hx of CAD, s/p CABG x 3 in 2014  Pt also with hx of afib - followed by Dr Cleve Villa  Taking coumadin 3mg M,W,F and 1 5 mg al other days   Pt denies CP, palp, lightheadedness or other CV symptoms with or without exertion  - pt with hx of DMII - controlled  Tries to monitor diet but does not exercise routinely  Up to date with eye exams  - hx of CVA x 2  Initially had swallowing issues and L leg weakness  All better with therapy  Some gait disturbance but pt denies any falls  Complaint with meds  - pt denies any GI or  complaints at present  - pt notes some pain with decreased ROM of the R shoulder  Pain has been going on for over a year  No trauma noted  No distal weakness/numbness  - I reviewed PMHx, PSHx, FamHx and Soc Hx with pt  I also reviewed recent lab results        The following portions of the patient's history were reviewed and updated as appropriate:   He  has a past medical history of 3-vessel coronary artery disease  He   Patient Active Problem List    Diagnosis Date Noted    Frozen shoulder 05/30/2018    Paroxysmal A-fib ([facility] ) 04/13/2016    CKD (chronic kidney disease), stage IV ([facility] ) 01/12/2016    Hyperlipidemia 11/11/2015    Elevated serum alkaline phosphatase level 10/20/2015    Increased frequency of urination 10/20/2015    3-vessel coronary artery disease 08/26/2015    Diabetes mellitus with multiple complications ([facility] ) 74/43/0571    Hypothyroidism 08/26/2015    Asbestosis ([facility] ) 05/06/2014    Essential hypertension 05/06/2014    Seasonal allergies 05/06/2014     He  has a past surgical history that includes Coronary artery bypass graft and Cataract extraction  His family history includes No Known Problems in his father and mother  He  reports that he has never smoked  He has never used smokeless tobacco  He reports that he does not drink alcohol or use drugs    Current Outpatient Prescriptions   Medication Sig Dispense Refill    atorvastatin (LIPITOR) 40 mg tablet Take 1 tablet by mouth daily      glucose blood test strip 1 each by Other route daily 100 each 3    levothyroxine 75 mcg tablet TAKE 1 TABLET DAILY 90 tablet 3    metoprolol tartrate (LOPRESSOR) 50 mg tablet TAKE 1 TABLET BY MOUTH TWICE A DAY WITH FOOD 180 tablet 0    tamsulosin (FLOMAX) 0 4 mg Take 1 capsule by mouth daily      TRADJENTA 5 MG TABS TAKE 1 TABLET DAILY 90 tablet 0    warfarin (COUMADIN) 3 mg tablet Take 1 tablet by mouth daily       No current facility-administered medications for this visit  He has No Known Allergies       Review of Systems   HENT: Negative  Eyes: Negative  Respiratory: Negative  Cardiovascular: Negative  Gastrointestinal: Negative  Endocrine: Negative  Genitourinary: Negative  Musculoskeletal: Positive for arthralgias, gait problem and myalgias  Negative for back pain, joint swelling, neck pain and neck stiffness  Skin: Negative  Allergic/Immunologic: Negative  Neurological: Positive for weakness  Negative for dizziness, facial asymmetry, speech difficulty, light-headedness, numbness and headaches  Hematological: Negative  Psychiatric/Behavioral: Negative  Objective:      /70   Pulse 84   Temp (!) 97 4 °F (36 3 °C)   Ht 5' 4" (1 626 m)   Wt 79 6 kg (175 lb 6 4 oz)   BMI 30 11 kg/m²          Physical Exam   Constitutional: He is oriented to person, place, and time  He appears well-developed and well-nourished  HENT:   Head: Normocephalic and atraumatic  Right Ear: External ear normal    Left Ear: External ear normal    Nose: Nose normal    Mouth/Throat: Oropharynx is clear and moist    Eyes: Conjunctivae and EOM are normal  Pupils are equal, round, and reactive to light  Neck: Normal range of motion  Neck supple  No JVD present  No thyromegaly present  Cardiovascular: Normal rate, regular rhythm, normal heart sounds and intact distal pulses  Pulses are no weak pulses  Pulses:       Dorsalis pedis pulses are 2+ on the right side, and 2+ on the left side  Pulmonary/Chest: Effort normal and breath sounds normal    Abdominal: Soft   Bowel sounds are normal  He exhibits no distension and no mass  There is no tenderness  Musculoskeletal: He exhibits no edema  Right shoulder: He exhibits decreased range of motion (on abduction, anterior flexion and internal/external rotation)  He exhibits no swelling, no effusion, no spasm and normal strength  Feet:   Right Foot:   Skin Integrity: Negative for ulcer, skin breakdown, erythema, warmth, callus or dry skin  Left Foot:   Skin Integrity: Negative for ulcer, skin breakdown, erythema, warmth, callus or dry skin  Lymphadenopathy:     He has no cervical adenopathy  Neurological: He is alert and oriented to person, place, and time  No cranial nerve deficit or sensory deficit  Coordination and gait (sl ataxic gait with poor L arm swing) abnormal    Skin: Skin is warm and dry  Psychiatric: He has a normal mood and affect  His behavior is normal  Judgment and thought content normal    Vitals reviewed  Patient's shoes and socks removed  Right Foot/Ankle   Right Foot Inspection  Skin Exam: skin normal and skin intact no dry skin, no warmth, no callus, no erythema, no maceration, no abnormal color, no pre-ulcer, no ulcer and no callus                          Toe Exam: ROM and strength within normal limits  Sensory     Proprioception: intact   Monofilament testing: intact  Vascular  Capillary refills: < 3 seconds  The right DP pulse is 2+  Left Foot/Ankle  Left Foot Inspection  Skin Exam: skin normal and skin intactno dry skin, no warmth, no erythema, no maceration, normal color, no pre-ulcer, no ulcer and no callus                         Toe Exam: ROM and strength within normal limits                   Sensory     Proprioception: intact  Monofilament: intact  Vascular  Capillary refills: < 3 seconds  The left DP pulse is 2+  Assign Risk Category:  No deformity present; No loss of protective sensation;  No weak pulses       Risk: 0

## 2018-05-31 ENCOUNTER — TELEPHONE (OUTPATIENT)
Dept: INTERNAL MEDICINE CLINIC | Facility: CLINIC | Age: 83
End: 2018-05-31

## 2018-06-05 ENCOUNTER — LAB (OUTPATIENT)
Dept: LAB | Age: 83
End: 2018-06-05
Payer: MEDICARE

## 2018-06-05 ENCOUNTER — TELEPHONE (OUTPATIENT)
Dept: INTERNAL MEDICINE CLINIC | Facility: CLINIC | Age: 83
End: 2018-06-05

## 2018-06-05 ENCOUNTER — ANTICOAG VISIT (OUTPATIENT)
Dept: INTERNAL MEDICINE CLINIC | Facility: CLINIC | Age: 83
End: 2018-06-05

## 2018-06-05 DIAGNOSIS — E11.8 TYPE 2 DIABETES MELLITUS WITH COMPLICATION, WITHOUT LONG-TERM CURRENT USE OF INSULIN (HCC): ICD-10-CM

## 2018-06-05 DIAGNOSIS — N18.4 CKD (CHRONIC KIDNEY DISEASE), STAGE IV (HCC): ICD-10-CM

## 2018-06-05 DIAGNOSIS — I10 ESSENTIAL HYPERTENSION: ICD-10-CM

## 2018-06-05 DIAGNOSIS — I48.91 ATRIAL FIBRILLATION, UNSPECIFIED TYPE (HCC): ICD-10-CM

## 2018-06-05 DIAGNOSIS — I25.10 3-VESSEL CORONARY ARTERY DISEASE: Primary | ICD-10-CM

## 2018-06-05 LAB
ALBUMIN SERPL BCP-MCNC: 3.4 G/DL (ref 3.5–5)
ALP SERPL-CCNC: 148 U/L (ref 46–116)
ALT SERPL W P-5'-P-CCNC: 17 U/L (ref 12–78)
ANION GAP SERPL CALCULATED.3IONS-SCNC: 9 MMOL/L (ref 4–13)
AST SERPL W P-5'-P-CCNC: 19 U/L (ref 5–45)
BASOPHILS # BLD AUTO: 0.05 THOUSANDS/ΜL (ref 0–0.1)
BASOPHILS NFR BLD AUTO: 1 % (ref 0–1)
BILIRUB SERPL-MCNC: 0.62 MG/DL (ref 0.2–1)
BUN SERPL-MCNC: 28 MG/DL (ref 5–25)
CALCIUM SERPL-MCNC: 8.8 MG/DL (ref 8.3–10.1)
CHLORIDE SERPL-SCNC: 103 MMOL/L (ref 100–108)
CHOLEST SERPL-MCNC: 101 MG/DL (ref 50–200)
CO2 SERPL-SCNC: 25 MMOL/L (ref 21–32)
CREAT SERPL-MCNC: 2.5 MG/DL (ref 0.6–1.3)
CREAT UR-MCNC: 110 MG/DL
EOSINOPHIL # BLD AUTO: 0.26 THOUSAND/ΜL (ref 0–0.61)
EOSINOPHIL NFR BLD AUTO: 3 % (ref 0–6)
ERYTHROCYTE [DISTWIDTH] IN BLOOD BY AUTOMATED COUNT: 13.5 % (ref 11.6–15.1)
EST. AVERAGE GLUCOSE BLD GHB EST-MCNC: 160 MG/DL
GFR SERPL CREATININE-BSD FRML MDRD: 22 ML/MIN/1.73SQ M
GLUCOSE P FAST SERPL-MCNC: 157 MG/DL (ref 65–99)
HBA1C MFR BLD: 7.2 % (ref 4.2–6.3)
HCT VFR BLD AUTO: 40.4 % (ref 36.5–49.3)
HDLC SERPL-MCNC: 34 MG/DL (ref 40–60)
HGB BLD-MCNC: 13 G/DL (ref 12–17)
IMM GRANULOCYTES # BLD AUTO: 0.03 THOUSAND/UL (ref 0–0.2)
IMM GRANULOCYTES NFR BLD AUTO: 0 % (ref 0–2)
INR PPP: 2.15 (ref 0.86–1.17)
LDLC SERPL CALC-MCNC: 33 MG/DL (ref 0–100)
LYMPHOCYTES # BLD AUTO: 2.28 THOUSANDS/ΜL (ref 0.6–4.47)
LYMPHOCYTES NFR BLD AUTO: 25 % (ref 14–44)
MCH RBC QN AUTO: 30 PG (ref 26.8–34.3)
MCHC RBC AUTO-ENTMCNC: 32.2 G/DL (ref 31.4–37.4)
MCV RBC AUTO: 93 FL (ref 82–98)
MICROALBUMIN UR-MCNC: 95.8 MG/L (ref 0–20)
MICROALBUMIN/CREAT 24H UR: 87 MG/G CREATININE (ref 0–30)
MONOCYTES # BLD AUTO: 0.62 THOUSAND/ΜL (ref 0.17–1.22)
MONOCYTES NFR BLD AUTO: 7 % (ref 4–12)
NEUTROPHILS # BLD AUTO: 6 THOUSANDS/ΜL (ref 1.85–7.62)
NEUTS SEG NFR BLD AUTO: 64 % (ref 43–75)
NONHDLC SERPL-MCNC: 67 MG/DL
NRBC BLD AUTO-RTO: 0 /100 WBCS
PLATELET # BLD AUTO: 184 THOUSANDS/UL (ref 149–390)
PMV BLD AUTO: 10.9 FL (ref 8.9–12.7)
POTASSIUM SERPL-SCNC: 4.1 MMOL/L (ref 3.5–5.3)
PROT SERPL-MCNC: 7.6 G/DL (ref 6.4–8.2)
PROTHROMBIN TIME: 24.1 SECONDS (ref 11.8–14.2)
PTH-INTACT SERPL-MCNC: 74.8 PG/ML (ref 18.4–80.1)
RBC # BLD AUTO: 4.33 MILLION/UL (ref 3.88–5.62)
SODIUM SERPL-SCNC: 137 MMOL/L (ref 136–145)
TRIGL SERPL-MCNC: 170 MG/DL
TSH SERPL DL<=0.05 MIU/L-ACNC: 2.74 UIU/ML (ref 0.36–3.74)
WBC # BLD AUTO: 9.24 THOUSAND/UL (ref 4.31–10.16)

## 2018-06-05 PROCEDURE — 36415 COLL VENOUS BLD VENIPUNCTURE: CPT

## 2018-06-05 PROCEDURE — 84443 ASSAY THYROID STIM HORMONE: CPT

## 2018-06-05 PROCEDURE — 85610 PROTHROMBIN TIME: CPT

## 2018-06-05 PROCEDURE — 82570 ASSAY OF URINE CREATININE: CPT

## 2018-06-05 PROCEDURE — 83970 ASSAY OF PARATHORMONE: CPT

## 2018-06-05 PROCEDURE — 83036 HEMOGLOBIN GLYCOSYLATED A1C: CPT

## 2018-06-05 PROCEDURE — 80061 LIPID PANEL: CPT

## 2018-06-05 PROCEDURE — 82043 UR ALBUMIN QUANTITATIVE: CPT

## 2018-06-05 PROCEDURE — 85025 COMPLETE CBC W/AUTO DIFF WBC: CPT

## 2018-06-05 PROCEDURE — 80053 COMPREHEN METABOLIC PANEL: CPT

## 2018-06-05 NOTE — TELEPHONE ENCOUNTER
Then cancel the order for the INR and inform the patient's wife that the order needs to be obtained from the new doctor

## 2018-06-05 NOTE — TELEPHONE ENCOUNTER
Patient wife Jean Paul Trivedi advised of patient INR, no change in dose and recheck in 1 month  Patient also informed us that patient is no longer a patient of Dr Nurys Claudio  Patient switched to Dr Caron Chaparro

## 2018-06-19 ENCOUNTER — TELEPHONE (OUTPATIENT)
Dept: FAMILY MEDICINE CLINIC | Facility: CLINIC | Age: 83
End: 2018-06-19

## 2018-06-19 NOTE — TELEPHONE ENCOUNTER
He is supposed to go for Protime on 7/5 , he is going away on 6/24 and not back until 7/8,  Can he go for Protime on 6/22 and get back on [facility] when he returns? Pl adv

## 2018-06-21 ENCOUNTER — TELEPHONE (OUTPATIENT)
Dept: FAMILY MEDICINE CLINIC | Facility: CLINIC | Age: 83
End: 2018-06-21

## 2018-06-21 ENCOUNTER — APPOINTMENT (OUTPATIENT)
Dept: LAB | Age: 83
End: 2018-06-21
Payer: MEDICARE

## 2018-06-21 ENCOUNTER — ANTICOAG VISIT (OUTPATIENT)
Dept: FAMILY MEDICINE CLINIC | Facility: CLINIC | Age: 83
End: 2018-06-21

## 2018-06-21 ENCOUNTER — IN-CLINIC DEVICE VISIT (OUTPATIENT)
Dept: CARDIOLOGY CLINIC | Facility: CLINIC | Age: 83
End: 2018-06-21
Payer: MEDICARE

## 2018-06-21 ENCOUNTER — ANTICOAG VISIT (OUTPATIENT)
Dept: INTERNAL MEDICINE CLINIC | Facility: CLINIC | Age: 83
End: 2018-06-21

## 2018-06-21 DIAGNOSIS — Z95.0 PRESENCE OF CARDIAC PACEMAKER: ICD-10-CM

## 2018-06-21 DIAGNOSIS — I25.119 CORONARY ARTERY DISEASE WITH ANGINA PECTORIS, UNSPECIFIED VESSEL OR LESION TYPE, UNSPECIFIED WHETHER NATIVE OR TRANSPLANTED HEART (HCC): ICD-10-CM

## 2018-06-21 DIAGNOSIS — I48.0 PAROXYSMAL ATRIAL FIBRILLATION (HCC): Primary | ICD-10-CM

## 2018-06-21 DIAGNOSIS — I25.10 3-VESSEL CORONARY ARTERY DISEASE: ICD-10-CM

## 2018-06-21 LAB
INR PPP: 2.36 (ref 0.86–1.17)
PROTHROMBIN TIME: 25.9 SECONDS (ref 11.8–14.2)

## 2018-06-21 PROCEDURE — 93294 REM INTERROG EVL PM/LDLS PM: CPT | Performed by: INTERNAL MEDICINE

## 2018-06-21 PROCEDURE — 93296 REM INTERROG EVL PM/IDS: CPT | Performed by: INTERNAL MEDICINE

## 2018-06-21 PROCEDURE — 85610 PROTHROMBIN TIME: CPT

## 2018-06-21 PROCEDURE — 36415 COLL VENOUS BLD VENIPUNCTURE: CPT

## 2018-06-21 NOTE — TELEPHONE ENCOUNTER
Patients wife Con Serum called asking what the patients INR is? Also should patient get his next INR drawn in a month or less than a month on the regular date?

## 2018-06-21 NOTE — PROGRESS NOTES
Results for orders placed or performed in visit on 06/21/18   Cardiac EP device report    Narrative    DCP  CARELINK TRANSMISSION  BATTERY VOLTAGE ADEQUATE (5 5 YRS)  AP 97 4%  1 3%  ALL LEAD PARAMETERS WITHIN NORMAL LIMITS   3 NEW VT-NS EPISODES WITH EGMS SHOWING EPISODES OF BRIEF PAT W/ HR -156 BPM   PACEMAKER FUNCTIONING APPROPRIATELY    CH/EB

## 2018-07-09 DIAGNOSIS — I10 ESSENTIAL HYPERTENSION: ICD-10-CM

## 2018-07-09 RX ORDER — METOPROLOL TARTRATE 50 MG/1
TABLET, FILM COATED ORAL
Qty: 180 TABLET | Refills: 0 | OUTPATIENT
Start: 2018-07-09

## 2018-07-10 DIAGNOSIS — I10 ESSENTIAL HYPERTENSION: ICD-10-CM

## 2018-07-10 RX ORDER — METOPROLOL TARTRATE 50 MG/1
TABLET, FILM COATED ORAL
Qty: 180 TABLET | Refills: 0 | Status: SHIPPED | OUTPATIENT
Start: 2018-07-10 | End: 2018-10-07 | Stop reason: SDUPTHER

## 2018-07-19 ENCOUNTER — ANTICOAG VISIT (OUTPATIENT)
Dept: FAMILY MEDICINE CLINIC | Facility: CLINIC | Age: 83
End: 2018-07-19

## 2018-07-19 ENCOUNTER — APPOINTMENT (OUTPATIENT)
Dept: LAB | Age: 83
End: 2018-07-19
Payer: MEDICARE

## 2018-07-19 DIAGNOSIS — I48.91 ATRIAL FIBRILLATION, UNSPECIFIED TYPE (HCC): ICD-10-CM

## 2018-07-19 LAB
INR PPP: 2.6 (ref 0.86–1.17)
PROTHROMBIN TIME: 27.9 SECONDS (ref 11.8–14.2)

## 2018-07-19 PROCEDURE — 36415 COLL VENOUS BLD VENIPUNCTURE: CPT

## 2018-07-19 PROCEDURE — 85610 PROTHROMBIN TIME: CPT

## 2018-08-04 DIAGNOSIS — E11.9 TYPE 2 DIABETES MELLITUS WITHOUT COMPLICATION (HCC): ICD-10-CM

## 2018-08-04 RX ORDER — LINAGLIPTIN 5 MG/1
TABLET, FILM COATED ORAL
Qty: 90 TABLET | Refills: 0 | OUTPATIENT
Start: 2018-08-04

## 2018-08-08 DIAGNOSIS — E11.9 TYPE 2 DIABETES MELLITUS WITHOUT COMPLICATION (HCC): ICD-10-CM

## 2018-08-08 RX ORDER — LINAGLIPTIN 5 MG/1
TABLET, FILM COATED ORAL
Qty: 90 TABLET | Refills: 0 | Status: CANCELLED | OUTPATIENT
Start: 2018-08-08

## 2018-08-09 DIAGNOSIS — E11.9 TYPE 2 DIABETES MELLITUS WITHOUT COMPLICATION, WITH LONG-TERM CURRENT USE OF INSULIN (HCC): ICD-10-CM

## 2018-08-09 DIAGNOSIS — Z79.4 TYPE 2 DIABETES MELLITUS WITHOUT COMPLICATION, WITH LONG-TERM CURRENT USE OF INSULIN (HCC): ICD-10-CM

## 2018-08-14 DIAGNOSIS — I48.91 ATRIAL FIBRILLATION, UNSPECIFIED TYPE (HCC): Primary | ICD-10-CM

## 2018-08-14 RX ORDER — WARFARIN SODIUM 3 MG/1
TABLET ORAL
Qty: 90 TABLET | Refills: 3 | OUTPATIENT
Start: 2018-08-14

## 2018-08-14 RX ORDER — WARFARIN SODIUM 3 MG/1
TABLET ORAL
Qty: 90 TABLET | Refills: 3 | Status: ON HOLD | OUTPATIENT
Start: 2018-08-14 | End: 2019-06-16 | Stop reason: SDUPTHER

## 2018-08-21 ENCOUNTER — ANTICOAG VISIT (OUTPATIENT)
Dept: FAMILY MEDICINE CLINIC | Facility: CLINIC | Age: 83
End: 2018-08-21

## 2018-08-21 ENCOUNTER — APPOINTMENT (OUTPATIENT)
Dept: LAB | Age: 83
End: 2018-08-21
Payer: MEDICARE

## 2018-09-19 ENCOUNTER — APPOINTMENT (OUTPATIENT)
Dept: LAB | Age: 83
End: 2018-09-19
Payer: MEDICARE

## 2018-09-19 ENCOUNTER — TRANSCRIBE ORDERS (OUTPATIENT)
Dept: LAB | Age: 83
End: 2018-09-19

## 2018-09-19 ENCOUNTER — ANTICOAG VISIT (OUTPATIENT)
Dept: FAMILY MEDICINE CLINIC | Facility: CLINIC | Age: 83
End: 2018-09-19

## 2018-09-20 ENCOUNTER — REMOTE DEVICE CLINIC VISIT (OUTPATIENT)
Dept: CARDIOLOGY CLINIC | Facility: CLINIC | Age: 83
End: 2018-09-20
Payer: MEDICARE

## 2018-09-20 ENCOUNTER — OFFICE VISIT (OUTPATIENT)
Dept: CARDIOLOGY CLINIC | Facility: CLINIC | Age: 83
End: 2018-09-20
Payer: MEDICARE

## 2018-09-20 VITALS
HEART RATE: 84 BPM | WEIGHT: 175.7 LBS | DIASTOLIC BLOOD PRESSURE: 76 MMHG | BODY MASS INDEX: 30 KG/M2 | SYSTOLIC BLOOD PRESSURE: 132 MMHG | HEIGHT: 64 IN

## 2018-09-20 DIAGNOSIS — I25.10 3-VESSEL CORONARY ARTERY DISEASE: ICD-10-CM

## 2018-09-20 DIAGNOSIS — I48.91 ATRIAL FIBRILLATION, UNSPECIFIED TYPE (HCC): Primary | ICD-10-CM

## 2018-09-20 DIAGNOSIS — E11.8 DIABETES MELLITUS WITH MULTIPLE COMPLICATIONS (HCC): ICD-10-CM

## 2018-09-20 DIAGNOSIS — N18.4 CKD (CHRONIC KIDNEY DISEASE), STAGE IV (HCC): ICD-10-CM

## 2018-09-20 DIAGNOSIS — I10 ESSENTIAL HYPERTENSION: ICD-10-CM

## 2018-09-20 DIAGNOSIS — Z95.0 PRESENCE OF PERMANENT CARDIAC PACEMAKER: ICD-10-CM

## 2018-09-20 DIAGNOSIS — I25.119 CORONARY ARTERY DISEASE WITH ANGINA PECTORIS, UNSPECIFIED VESSEL OR LESION TYPE, UNSPECIFIED WHETHER NATIVE OR TRANSPLANTED HEART (HCC): Primary | ICD-10-CM

## 2018-09-20 DIAGNOSIS — I48.0 PAROXYSMAL ATRIAL FIBRILLATION (HCC): ICD-10-CM

## 2018-09-20 PROCEDURE — 93296 REM INTERROG EVL PM/IDS: CPT | Performed by: INTERNAL MEDICINE

## 2018-09-20 PROCEDURE — 93294 REM INTERROG EVL PM/LDLS PM: CPT | Performed by: INTERNAL MEDICINE

## 2018-09-20 PROCEDURE — 93000 ELECTROCARDIOGRAM COMPLETE: CPT | Performed by: INTERNAL MEDICINE

## 2018-09-20 PROCEDURE — 99214 OFFICE O/P EST MOD 30 MIN: CPT | Performed by: INTERNAL MEDICINE

## 2018-09-20 NOTE — PROGRESS NOTES
MDT DUAL CHAMBER PM  CARELINK TRANSMISSION:  BATTERY VOLTAGE ADEQUATE (5 5 YR)   AP 97 3%  1 0%    ALL LEAD PARAMETERS WITHIN NORMAL LIMITS   2 NEW VT EPISODES ON 7/16/2018 AT 7:32AM WITH EGMS SHOWING NSVT (19 @ 158 BPM, 14 @ 154 BPM - TERMINATION NOT DOCUMENTED)   EF 50%  (ECHO 11/2015)   PT TAKES METOPROLOL TART AND WARFARIN   NORMAL DEVICE FUNCTION   RG

## 2018-09-20 NOTE — PROGRESS NOTES
Cardiology Follow Up    Ovidio Martin  1/29/1932  [id number]  HEART & VASCULAR [facility]  [address]        Assessment/Plan     1  Atrial fibrillation, unspecified type ([facility] )  POCT ECG   2  Diabetes mellitus with multiple complications ([facility] )     3  3-vessel coronary artery disease     4  Essential hypertension     5  CKD (chronic kidney disease), stage IV ([facility] )       1  Paroxsymal nonvalvular atrial fibrillation, asymptomatic    * on [facility] w/ coumadin with last INR being 2 5    * currently a paced    * continue current dose of medications     2  T2DM   3  CKD IV    * Cr 2 50 on outpatient labs     4  CAD s/p CABG    * on BB    * on statin    * no ASA due to being on coumadin     5  S/p DC MDT PPM    * implanted in 2014    * a pacing     Patient is completely stable from a fib and device standpoint, plan for f/u in 1 year  History of Present Illness     HPI/INTERVAL HISTORY: Ovidio Martin is a 80 y o  male with a history of CAD s/p CABG x 3, HTN, paroxsymal nonvalvular atrial fibrillation AC w/ coumadin, s/p DC [employer] PPM (2014), T2DM, CKD IV, CVA x 2 who presents to the EP office for routine f/u  He normally follows with Dr Yasemin Foreman  Since his last visit he has no new concerns or complaints  He has been struggling with right shoulder discomfort but does not want to go to PT  He really wants to drive again since he has not since 2014  He is going to begin taking driving lessons with a private instructor  His only exercise is climbing the 13 stairs in his home multiple times per day which he can do without difficulty  Review of Systems  ROS as noted above, otherwise 12 point review of systems was performed and is negative         Historical Information   Social History     Social History    Marital status: /Civil Union     Spouse name: N/A    Number of children: N/A    Years of education: N/A     Occupational History    Not on file      Social History Main Topics    Smoking status: Never Smoker    Smokeless tobacco: Never Used    Alcohol use No    Drug use: No    Sexual activity: No     Other Topics Concern    Not on file     Social History Narrative    No advance directive     Past Medical History:   Diagnosis Date    3-vessel coronary artery disease     last assessed: 08/15/2016     Past Surgical History:   Procedure Laterality Date    CATARACT EXTRACTION      last assessed: 11/11/2015    CORONARY ARTERY BYPASS GRAFT      last assessed: 08/26/2015     History   Alcohol Use No     History   Drug Use No     History   Smoking Status    Never Smoker   Smokeless Tobacco    Never Used     Family History   Problem Relation Age of Onset    No Known Problems Mother     No Known Problems Father        Meds/Allergies       Current Outpatient Prescriptions:     atorvastatin (LIPITOR) 40 mg tablet, Take 1 tablet by mouth daily, Disp: , Rfl:     glucose blood test strip, 1 each by Other route daily, Disp: 100 each, Rfl: 3    levothyroxine 75 mcg tablet, TAKE 1 TABLET DAILY, Disp: 90 tablet, Rfl: 3    Linagliptin (TRADJENTA) 5 MG TABS, Take 5 mg by mouth daily, Disp: 90 tablet, Rfl: 3    metoprolol tartrate (LOPRESSOR) 50 mg tablet, TAKE 1 TABLET BY MOUTH TWICE A DAY WITH FOOD, Disp: 180 tablet, Rfl: 0    tamsulosin (FLOMAX) 0 4 mg, Take 1 capsule by mouth daily, Disp: , Rfl:     warfarin (COUMADIN) 3 mg tablet, TAKE 1 TABLET DAILY, Disp: 90 tablet, Rfl: 3    No Known Allergies    Objective   Vitals: Blood pressure 132/76, pulse 84, height 5' 4" (1 626 m), weight 79 7 kg (175 lb 11 2 oz)  Physical Exam   Constitutional: He is oriented to person, place, and time  He appears well-developed and well-nourished  HENT:   Head: Normocephalic and atraumatic  Eyes: EOM are normal  Pupils are equal, round, and reactive to light  Neck: Normal range of motion  Neck supple  Cardiovascular: Normal rate and regular rhythm  Pulmonary/Chest: Effort normal and breath sounds normal    Abdominal: Soft  Bowel sounds are normal    Musculoskeletal: Normal range of motion  Neurological: He is alert and oriented to person, place, and time  Skin: Skin is warm and dry  Psychiatric: He has a normal mood and affect  Labs:not applicable    Imaging: I have personally reviewed pertinent reports  ECHO:   Results for orders placed in visit on 10/29/15   Echo complete with contrast if indicated    Narrative [facility], [address] [facility]  [phone]    Transthoracic Echocardiogram  2D, M-mode, and Color Doppler    Study date:  2015    Patient: Mejia Pompa  MR number: [id number]  Account number: [de-identified]  : 1932  Age: 80 years  Gender: Male  Status: Outpatient  [facility]: [facility]  Height: 67 in  Weight: 180 lb  BP: 128/ 70 mmHg    Indications:  Atrial Fibrillation  Diagnoses: I48 0 - Atrial fibrillation    Sonographer:  Marquez RCS  Referring Physician:  Norma Johns MD  Group:  [facility]  Interpreting Physician:  Michelle Decker MD    SUMMARY    LEFT VENTRICLE:  appears normal sized with mild concentric hypertrophy and markedly abnormal  septal motion likely due to conduction disease and prior open heart surgery  Hypokinesis is suggested involving the septum and anteroseptal walls, with  ejection fraction estimated at about 50% range  Doppler parameters were  consistent with abnormal left ventricular relaxation (grade 1 diastolic  dysfunction)  LEFT ATRIUM:  The atrium was mildly dilated  MITRAL VALVE:  There was moderate annular calcification  There was mild regurgitation  TRICUSPID VALVE:  There was mild regurgitation  AORTA:  The root exhibited normal size and mild fibrocalcific change  HISTORY: PRIOR HISTORY: Risk factors: hypertension, diabetes, and  medication-treated hypercholesterolemia  Cerebrovascular disease  PRIOR  PROCEDURES: Pacemaker implantation  Coronary bypass  PROCEDURE: The study was performed in the [address] [place] [address]  This  was a routine study  The transthoracic approach was used  The study included  complete 2D imaging, M-mode, and color Doppler  The heart rate was 71 bpm, at  the start of the study  Images were obtained from the parasternal, apical, and  subcostal acoustic windows  This was a technically difficult study  LEFT VENTRICLE: appears normal sized with mild concentric hypertrophy and  markedly abnormal septal motion likely due to conduction disease and prior open  heart surgery  Hypokinesis is suggested involving the septum and anteroseptal  walls, with ejection fraction estimated at about 50% range  DOPPLER: Doppler  parameters were consistent with abnormal left ventricular relaxation (grade 1  diastolic dysfunction)  RIGHT VENTRICLE: The size was normal  Systolic function was normal  Wall  thickness was normal  A pacing wire was present  LEFT ATRIUM: The atrium was mildly dilated  RIGHT ATRIUM: Size was normal  A pacing wire was present  MITRAL VALVE: There was moderate annular calcification  DOPPLER: There was mild  regurgitation  AORTIC VALVE: The valve was trileaflet  Leaflets exhibited mild calcification  TRICUSPID VALVE: DOPPLER: There was mild regurgitation  PERICARDIUM: There was no pericardial effusion  The pericardium was normal in  appearance  AORTA: The root exhibited normal size and mild fibrocalcific change  PULMONARY ARTERY: DOPPLER: Systolic pressure was within the normal range  SYSTEM MEASUREMENT TABLES    Apical four chamber  4 chamber Left Atrium Volume Index; Planimetry; End Systole; Apical four  chamber;: 17 49 cm2 Left Ventricular Diastolic Area; Method of Disks, Single  Plane; End Diastole; Apical four chamber;: 28 33 cm2 Left Ventricular systolic  Area; Method of Disks, Single Plane;  End Systole; Apical four chamber;: 17 28  cm2 Right Atrium Systolic Area; Planimetry; End Systole; Apical four chamber;:  14 88 cm2 Right Ventricular Internal Diastolic Dimension; End Diastole; Apical  four chamber;: 30 3 mm    Unspecified Scan Mode  Aortic Root Diameter; End Systole;: 36 2 mm  Aortic valve Area; Continuity Equation by Velocity Time Integral; Systole;:  2 03 cm2  Cardiovascular Orifice Diameter; End Systole;: 21 2 mm  Gradient Pressure, Peak; Simplified Bernoulli; Antegrade Flow; Systole;: 9 8  mm[Hg] Gradient pressure, average; Simplified Bernoulli; Antegrade Flow;  Systole;: 6 mm[Hg]  Left atrial diameter; End Diastole;: 41 9 mm  Cardiac Output; Teichholz; Systole;: 6 46 L/min  Heart rate; Teichholz;: 170 /min  Interventricular Septum Diastolic Thickness; Teichholz; End Diastole;: 11 3 mm  Left Ventricle Internal End Diastolic Dimension; Teichholz;: 40 3 mm  Left Ventricle Internal Systolic Dimension; Teichholz; End Systole;: 29 4 mm  Left Ventricle Mass; Mass AVCube with Teichholz; End Diastole;: 151 g  Left Ventricle Posterior Wall Diastolic Thickness; Teichholz; End Diastole;:  11 1 mm  Left Ventricular Ejection Fraction; Teichholz;: 53 3 %  Left Ventricular End Diastolic Volume; Teichholz;: 71 3 ml  Left Ventricular End Systolic Volume; Teichholz;: 33 3 ml  Left Ventricular Fractional Shortening;: 27 %  Stroke volume; Teichholz; Systole;: 38 ml  Gradient Pressure, Peak; Simplified Bernoulli; Antegrade Flow; Diastole;: 3 2  mm[Hg] Gradient pressure, average; Simplified Bernoulli;  Antegrade Flow;  Diastole;: 1 mm[Hg]  Mitral Valve Area; Area by Pressure Half-Time; Systole;: 3 49 cm2  Mitral Valve E to A Ratio; Systole;: 0 93  Pressure half time; Diastole;: 0 06 s  Maximum Tricuspid valve regurgitation pressure gradient; Regurgitant Flow;  Systole;: 29 mm[Hg]    [facility]    Prepared and electronically signed by    Melisa Forbes MD  Signed [id number] 12:38:11         CATH/STRESS TEST:   None     EKG:   A-paced rhythm

## 2018-10-07 DIAGNOSIS — I10 ESSENTIAL HYPERTENSION: ICD-10-CM

## 2018-10-08 RX ORDER — METOPROLOL TARTRATE 50 MG/1
TABLET, FILM COATED ORAL
Qty: 180 TABLET | Refills: 0 | Status: SHIPPED | OUTPATIENT
Start: 2018-10-08 | End: 2019-01-03 | Stop reason: SDUPTHER

## 2018-10-19 ENCOUNTER — APPOINTMENT (OUTPATIENT)
Dept: LAB | Age: 83
End: 2018-10-19
Payer: MEDICARE

## 2018-10-22 ENCOUNTER — ANTICOAG VISIT (OUTPATIENT)
Dept: FAMILY MEDICINE CLINIC | Facility: CLINIC | Age: 83
End: 2018-10-22

## 2018-11-20 ENCOUNTER — TRANSCRIBE ORDERS (OUTPATIENT)
Dept: LAB | Age: 83
End: 2018-11-20

## 2018-11-20 ENCOUNTER — ANTICOAG VISIT (OUTPATIENT)
Dept: FAMILY MEDICINE CLINIC | Facility: CLINIC | Age: 83
End: 2018-11-20

## 2018-11-20 ENCOUNTER — APPOINTMENT (OUTPATIENT)
Dept: LAB | Age: 83
End: 2018-11-20
Payer: MEDICARE

## 2018-11-20 DIAGNOSIS — I48.91 ATRIAL FIBRILLATION, UNSPECIFIED TYPE (HCC): Primary | ICD-10-CM

## 2018-11-20 DIAGNOSIS — I48.91 ATRIAL FIBRILLATION, UNSPECIFIED TYPE (HCC): ICD-10-CM

## 2018-11-20 LAB
INR PPP: 2.62 (ref 0.86–1.17)
PROTHROMBIN TIME: 28.1 SECONDS (ref 11.8–14.2)

## 2018-11-20 PROCEDURE — 85610 PROTHROMBIN TIME: CPT

## 2018-11-20 PROCEDURE — 36415 COLL VENOUS BLD VENIPUNCTURE: CPT

## 2018-12-03 ENCOUNTER — OFFICE VISIT (OUTPATIENT)
Dept: FAMILY MEDICINE CLINIC | Facility: CLINIC | Age: 83
End: 2018-12-03
Payer: MEDICARE

## 2018-12-03 VITALS
HEART RATE: 80 BPM | HEIGHT: 64 IN | TEMPERATURE: 98.7 F | BODY MASS INDEX: 29.53 KG/M2 | DIASTOLIC BLOOD PRESSURE: 80 MMHG | WEIGHT: 173 LBS | SYSTOLIC BLOOD PRESSURE: 122 MMHG

## 2018-12-03 DIAGNOSIS — E78.2 MIXED HYPERLIPIDEMIA: ICD-10-CM

## 2018-12-03 DIAGNOSIS — J61 ASBESTOSIS (HCC): ICD-10-CM

## 2018-12-03 DIAGNOSIS — I48.0 PAROXYSMAL A-FIB (HCC): ICD-10-CM

## 2018-12-03 DIAGNOSIS — E03.9 ACQUIRED HYPOTHYROIDISM: ICD-10-CM

## 2018-12-03 DIAGNOSIS — I10 ESSENTIAL HYPERTENSION: ICD-10-CM

## 2018-12-03 DIAGNOSIS — Z00.00 MEDICARE ANNUAL WELLNESS VISIT, SUBSEQUENT: ICD-10-CM

## 2018-12-03 DIAGNOSIS — E11.8 DIABETES MELLITUS WITH MULTIPLE COMPLICATIONS (HCC): Primary | ICD-10-CM

## 2018-12-03 DIAGNOSIS — E11.21 TYPE 2 DIABETES MELLITUS WITH DIABETIC NEPHROPATHY, WITHOUT LONG-TERM CURRENT USE OF INSULIN (HCC): ICD-10-CM

## 2018-12-03 DIAGNOSIS — I25.10 3-VESSEL CORONARY ARTERY DISEASE: ICD-10-CM

## 2018-12-03 DIAGNOSIS — N18.4 CKD (CHRONIC KIDNEY DISEASE), STAGE IV (HCC): ICD-10-CM

## 2018-12-03 DIAGNOSIS — Z91.81 RISK FOR FALLS: ICD-10-CM

## 2018-12-03 LAB — SL AMB POCT HEMOGLOBIN AIC: 6.7

## 2018-12-03 PROCEDURE — 83036 HEMOGLOBIN GLYCOSYLATED A1C: CPT | Performed by: FAMILY MEDICINE

## 2018-12-03 PROCEDURE — G0439 PPPS, SUBSEQ VISIT: HCPCS | Performed by: FAMILY MEDICINE

## 2018-12-03 PROCEDURE — 99214 OFFICE O/P EST MOD 30 MIN: CPT | Performed by: FAMILY MEDICINE

## 2018-12-03 NOTE — PROGRESS NOTES
Assessment and Plan:    Problem List Items Addressed This Visit        Endocrine    Diabetes mellitus with multiple complications ([facility] ) - Primary    Relevant Orders    POCT hemoglobin A1c (Completed)    Microalbumin / creatinine urine ratio    Hypothyroidism    Relevant Orders    TSH, 3rd generation       Respiratory    Asbestosis ([facility] )    Relevant Orders    XR chest pa & lateral       Cardiovascular and Mediastinum    3-vessel coronary artery disease    Relevant Orders    CBC and differential    Comprehensive metabolic panel    Lipid panel    Essential hypertension    Relevant Orders    CBC and differential    Comprehensive metabolic panel    Lipid panel    Paroxysmal A-fib ([facility] )       Genitourinary    CKD (chronic kidney disease), stage IV ([facility] )       Other    Hyperlipidemia      Other Visit Diagnoses     Medicare annual wellness visit, subsequent        Risk for falls            Health Maintenance Due   Topic Date Due    Medicare Annual Wellness Visit (AWV)  01/29/1932    DTaP,Tdap,and Td Vaccines (1 - Tdap) 01/29/1953    Pneumococcal PPSV23/PCV13 65+ Years / Low and Medium Risk (2 of 2 - PPSV23) 10/20/2016    DM Eye Exam  01/20/2017    HEMOGLOBIN A1C  09/05/2018         HPI:  Miguel Rosen is a 80 y o  male here for his {AWV Welcome/Initial/Subsequent:50141}    Patient Active Problem List   Diagnosis    3-vessel coronary artery disease    [facility]osis ([facility]    CKD (chronic kidney disease), stage IV ([facility] )    Diabetes mellitus with multiple complications ([facility] )    Elevated serum alkaline phosphatase level    Hyperlipidemia    Essential hypertension    Hypothyroidism    Paroxysmal A-fib ([facility]    Seasonal allergies    Increased frequency of urination    Frozen shoulder     Past Medical History:   Diagnosis Date    3-vessel coronary artery disease     last assessed: 08/15/2016     Past Surgical History:   Procedure Laterality Date    CATARACT EXTRACTION      last assessed: 11/11/2015    CORONARY ARTERY BYPASS GRAFT      last assessed: 08/26/2015     Family History   Problem Relation Age of Onset    No Known Problems Mother     No Known Problems Father      History   Smoking Status    Never Smoker   Smokeless Tobacco    Never Used     History   Alcohol Use No      History   Drug Use No       Current Outpatient Prescriptions   Medication Sig Dispense Refill    atorvastatin (LIPITOR) 40 mg tablet Take 1 tablet by mouth daily      glucose blood test strip 1 each by Other route daily 100 each 3    levothyroxine 75 mcg tablet TAKE 1 TABLET DAILY 90 tablet 3    Linagliptin (TRADJENTA) 5 MG TABS Take 5 mg by mouth daily 90 tablet 3    metoprolol tartrate (LOPRESSOR) 50 mg tablet TAKE 1 TABLET TWICE A DAY WITH FOOD 180 tablet 0    tamsulosin (FLOMAX) 0 4 mg Take 1 capsule by mouth daily      warfarin (COUMADIN) 3 mg tablet TAKE 1 TABLET DAILY 90 tablet 3     No current facility-administered medications for this visit        No Known Allergies  Immunization History   Administered Date(s) Administered    Influenza 10/31/2013, 01/01/2014, 10/01/2015, 10/06/2015, 10/23/2018    Influenza TIV (IM) 01/01/2014, 10/01/2015    Pneumococcal Conjugate 13-Valent 02/17/2015, 10/20/2015    Pneumococcal Polysaccharide PPV23 01/29/1932    Tdap 01/29/1932    Zoster 01/29/1932       Patient Care Team:  Elias Hernandez MD as PCP - General (Family Medicine)    Medicare Screening Tests and Risk Assessments:  Medicare Annual Wellness Visit

## 2018-12-03 NOTE — PROGRESS NOTES
Assessment and Plan:    Problem List Items Addressed This Visit        Endocrine    Diabetes mellitus with multiple complications ([place] [address] ) - Primary    Hypothyroidism       Respiratory    Asbestosis ([place] [address] )       Cardiovascular and Mediastinum    3-vessel coronary artery disease    Essential hypertension    Paroxysmal A-fib ([facility] )       Genitourinary    CKD (chronic kidney disease), stage IV ([place] [address] )       Other    Hyperlipidemia        Health Maintenance Due   Topic Date Due    Medicare Annual Wellness Visit (AWV)  01/29/1932    DTaP,Tdap,and Td Vaccines (1 - Tdap) 01/29/1953    Pneumococcal PPSV23/PCV13 65+ Years / Low and Medium Risk (2 of 2 - PPSV23) 10/20/2016    DM Eye Exam  01/20/2017    HEMOGLOBIN A1C  09/05/2018         HPI:  Parmjit Snider is a 80 y o  male here for his Subsequent Wellness Visit      Patient Active Problem List   Diagnosis    3-vessel coronary artery disease    Asbestosis ([place] [address] )    CKD (chronic kidney disease), stage IV ([place] [address] )    Diabetes mellitus with multiple complications (HCC)    Elevated serum alkaline phosphatase level    Hyperlipidemia    Essential hypertension    Hypothyroidism    Paroxysmal A-fib (HCC)    Seasonal allergies    Increased frequency of urination    Frozen shoulder     Past Medical History:   Diagnosis Date    3-vessel coronary artery disease     last assessed: 08/15/2016     Past Surgical History:   Procedure Laterality Date    CATARACT EXTRACTION      last assessed: 11/11/2015    CORONARY ARTERY BYPASS GRAFT      last assessed: 08/26/2015     Family History   Problem Relation Age of Onset    No Known Problems Mother     No Known Problems Father      History   Smoking Status    Never Smoker   Smokeless Tobacco    Never Used     History   Alcohol Use No      History   Drug Use No       Current Outpatient Prescriptions   Medication Sig Dispense Refill    atorvastatin (LIPITOR) 40 mg tablet Take 1 tablet by mouth daily      glucose blood test strip 1 each by Other route daily 100 each 3    levothyroxine 75 mcg tablet TAKE 1 TABLET DAILY 90 tablet 3    Linagliptin (TRADJENTA) 5 MG TABS Take 5 mg by mouth daily 90 tablet 3    metoprolol tartrate (LOPRESSOR) 50 mg tablet TAKE 1 TABLET TWICE A DAY WITH FOOD 180 tablet 0    tamsulosin (FLOMAX) 0 4 mg Take 1 capsule by mouth daily      warfarin (COUMADIN) 3 mg tablet TAKE 1 TABLET DAILY 90 tablet 3     No current facility-administered medications for this visit  No Known Allergies  Immunization History   Administered Date(s) Administered    Influenza 10/31/2013, 01/01/2014, 10/01/2015, 10/06/2015, 10/23/2018    Influenza TIV (IM) 01/01/2014, 10/01/2015    Pneumococcal Conjugate 13-Valent 02/17/2015, 10/20/2015    Pneumococcal Polysaccharide PPV23 01/29/1932    Tdap 01/29/1932    Zoster 01/29/1932       Patient Care Team:  Marisela Vanegas MD as PCP - General (Family Medicine)    Medicare Screening Tests and Risk Assessments:  Ian Michael is here for his Subsequent Wellness visit  Health Risk Assessment:  Patient rates overall health as good  Patient feels that their physical health rating is Same  Eyesight was rated as Same  Hearing was rated as Same  Patient feels that their emotional and mental health rating is Same  Pain experienced by patient in the last 7 days has been None  Patient states that he has experienced no weight loss or gain in last 6 months  Emotional/Mental Health:  Patient has been feeling nervous/anxious  PHQ-9 Depression Screening:    Frequency of the following problems over the past two weeks:      1  Little interest or pleasure in doing things: 0 - not at all      2  Feeling down, depressed, or hopeless: 0 - not at all  PHQ-2 Score: 0          Broken Bones/Falls: Fall Risk Assessment:    In the past year, patient has experienced: History of falling in past year     Number of falls: 1  Patient does not feel he is unsteady standing  Patient is not taking medication that can cause feelings of lightheadedness or tiredness  Patient often has no need to rush to the toilet  Bladder/Bowel:  Patient has not leaked urine accidently in the last six months  Patient reports no loss of bowel control  Immunizations:  Patient has had a flu vaccination within the last year  Patient has received a pneumonia shot  Patient has not received a shingles shot  Patient has received tetanus/diphtheria shot  Home Safety:  Patient does not have trouble with stairs inside or outside of their home  Patient currently reports that there are no safety hazards present in home, working smoke alarms, working carbon monoxide detectors  Preventative Screenings:   no prostate cancer screen performed, no colon cancer screen completed, cholesterol screen completed, 6/5/2018  glaucoma eye exam completed,     Nutrition:  Current diet: Regular and Frequent junk food with servings of the following:    Medications:  Patient is currently taking over-the-counter supplements  List of OTC medications includes: tylenol   Patient is able to manage medications  Lifestyle Choices:  Patient reports no tobacco use  Patient has not smoked or used tobacco in the past   Patient reports alcohol use  Alcohol use per week: rare use   Patient does not drive a vehicle  Patient wears seat belt  Current level of exercise of physical activity described by patient as: around the house   Activities of Daily Living:  Can get out of bed by his or her self, able to dress self, able to make own meals, able to do own shopping, able to bathe self, can do own laundry/housekeeping, can manage own money, pay bills and track expenses    Previous Hospitalizations:  No hospitalization or ED visit in past 12 months        Advanced Directives:  Patient has decided on a power of   Patient has spoken to designated power of   Patient has completed advanced directive          Preventative Screening/Counseling:      Cardiovascular:      General: Risks and Benefits Discussed and Screening Current      Counseling: Improve Cholesterol, Improve Blood Pressure, Healthy Weight and Healthy Diet          Diabetes:      Counseling: Healthy Diet, Healthy Weight and Improve Physical Activity      Comments: Pt is diabetic         Colorectal Cancer:      General: Patient Declines and Risks and Benefits Discussed          Prostate Cancer:      General: Screening Not Indicated and Risks and Benefits Discussed          Osteoporosis:      General: Screening Not Indicated          AAA:      General: Screening Not Indicated          Glaucoma:      General: Risks and Benefits Discussed      Referrals: Optometry          HIV:      General: Screening Not Indicated          Hepatitis C:      General: Screening Current        Advanced Directives:   Patient has living will for healthcare, has durable POA for healthcare, patient has an advanced directive  Immunizations:      Influenza: Risks & Benefits Discussed and Influenza UTD This Year      Pneumococcal: Lifetime Vaccine Completed and Risks & Benefits Discussed      Shingrix: Patient Declines      Zostavax: Patient Declines      Other Preventative Counseling (Non-Medicare):   Fall Prevention and Nutrition Counseling

## 2018-12-03 NOTE — PROGRESS NOTES
Assessment/Plan:    Diabetes mellitus with multiple complications [facility])  Lab Results   Component Value Date    HGBA1C 6 7% 12/03/2018     Well controlled  Cont present treatment  Monitor labs  Recheck 6m      Hypothyroidism  Check TSH    Asbestosis ([facility] )  Breathing stable  Check CXR  Cont to monitor    3-vessel coronary artery disease  Up to date with Cardio  Pt without symptoms  Cont present care  Check labs  Recheck 6m    Essential hypertension  Well controlled  Cont present treatment  Monitor labs  Recheck 6m      Paroxysmal a-fib (HCC)  Clinically in NSR  Cont present care  Recheck 6m    CKD (chronic kidney disease), stage IV ([facility] )  Recheck labs  Cont to monitor    Hyperlipidemia  Check labs       Diagnoses and all orders for this visit:    Diabetes mellitus with multiple complications ([place] [address] )  -     POCT hemoglobin A1c  -     Microalbumin / creatinine urine ratio; Future    Asbestosis ([place] [address] )  -     XR chest pa & lateral; Future    Acquired hypothyroidism  -     TSH, 3rd generation; Future    3-vessel coronary artery disease  -     CBC and differential; Future  -     Comprehensive metabolic panel; Future  -     Lipid panel; Future    Essential hypertension  -     CBC and differential; Future  -     Comprehensive metabolic panel; Future  -     Lipid panel; Future    Paroxysmal A-fib (HCC)    CKD (chronic kidney disease), stage IV ([facility] )    Mixed hyperlipidemia    Medicare annual wellness visit, subsequent    Risk for falls    Type 2 diabetes mellitus with diabetic nephropathy, without long-term current use of insulin (HCC)  -     glucose blood test strip; 1 each by Other route daily          Subjective:      Patient ID: Miguel Rosen is a 80 y o  male  f/u multiple med issues and AWV  - Pt states that home BGs are controlled  Averaging between 130-140  No low BG episodes  Tries to monitor diet but does not exercise routinely   To have eye exam by end of January  - Pt with hx of afib - followed by Dr Renan Crandall (up to date)  Taking coumadin 3mg M,W,F and 1 5 mg al other days  Pt denies CP, palp, lightheadedness or other CV symptoms with or without exertion  - swallowing and L leg weakness are stable s/p CVA  Had one fall getting out of a car 2 weeks ago  Compliant with meds  - pt denies any GI or  complaints at present  - still with some R shoulder pain and stiffness  Does not do any exercises for it         The following portions of the patient's history were reviewed and updated as appropriate:   He  has a past medical history of 3-vessel coronary artery disease  He   Patient Active Problem List    Diagnosis Date Noted    Frozen shoulder 05/30/2018    Paroxysmal A-fib ([facility] ) 04/13/2016    CKD (chronic kidney disease), stage IV ([facility] ) 01/12/2016    Hyperlipidemia 11/11/2015    Elevated serum alkaline phosphatase level 10/20/2015    Increased frequency of urination 10/20/2015    3-vessel coronary artery disease 08/26/2015    Diabetes mellitus with multiple complications ([place] [address] ) 92[birth year]92/1980    Hypothyroidism 08/26/2015    Asbestosis ([place] [address] ) 05/06/2014    Essential hypertension 05/06/2014    Seasonal allergies 05/06/2014     He  has a past surgical history that includes Coronary artery bypass graft and Cataract extraction  He  reports that he has never smoked  He has never used smokeless tobacco  He reports that he does not drink alcohol or use drugs    Current Outpatient Prescriptions   Medication Sig Dispense Refill    atorvastatin (LIPITOR) 40 mg tablet Take 1 tablet by mouth daily      glucose blood test strip 1 each by Other route daily 100 each 1    levothyroxine 75 mcg tablet TAKE 1 TABLET DAILY 90 tablet 3    Linagliptin (TRADJENTA) 5 MG TABS Take 5 mg by mouth daily 90 tablet 3    metoprolol tartrate (LOPRESSOR) 50 mg tablet TAKE 1 TABLET TWICE A DAY WITH FOOD 180 tablet 0    tamsulosin (FLOMAX) 0 4 mg Take 1 capsule by mouth daily      warfarin (COUMADIN) 3 mg tablet TAKE 1 TABLET DAILY 90 tablet 3 No current facility-administered medications for this visit  He has No Known Allergies       Review of Systems   HENT: Negative  Eyes: Negative  Respiratory: Negative  Cardiovascular: Negative  Gastrointestinal: Negative  Endocrine: Negative  Genitourinary: Negative  Musculoskeletal: Positive for arthralgias, gait problem and myalgias  Negative for back pain, joint swelling, neck pain and neck stiffness  Skin: Negative  Allergic/Immunologic: Negative  Neurological: Positive for weakness  Negative for dizziness, facial asymmetry, speech difficulty, light-headedness, numbness and headaches  Hematological: Negative  Psychiatric/Behavioral: Negative  Objective:      /80   Pulse 80   Temp 98 7 °F (37 1 °C)   Ht 5' 4" (1 626 m)   Wt 78 5 kg (173 lb)   BMI 29 70 kg/m²          Physical Exam   Constitutional: He is oriented to person, place, and time  He appears well-developed and well-nourished  HENT:   Head: Normocephalic and atraumatic  Right Ear: External ear normal    Left Ear: External ear normal    Nose: Nose normal    Mouth/Throat: Oropharynx is clear and moist    Eyes: Pupils are equal, round, and reactive to light  Conjunctivae and EOM are normal    Neck: Normal range of motion  Neck supple  No JVD present  No thyromegaly present  Cardiovascular: Normal rate, regular rhythm, normal heart sounds and intact distal pulses  Pulses are no weak pulses  Pulses:       Dorsalis pedis pulses are 1+ on the right side, and 1+ on the left side  Pulmonary/Chest: Effort normal and breath sounds normal    Abdominal: Soft  Bowel sounds are normal  He exhibits no distension and no mass  There is no tenderness  Musculoskeletal: He exhibits edema (trace ankle edema)  Right shoulder: He exhibits decreased range of motion (on abduction, anterior flexion and internal/external rotation)  He exhibits no swelling, no effusion, no spasm and normal strength  Feet:   Right Foot:   Skin Integrity: Negative for ulcer, skin breakdown, erythema, warmth, callus or dry skin  Left Foot:   Skin Integrity: Negative for ulcer, skin breakdown, erythema, warmth, callus or dry skin  Lymphadenopathy:     He has no cervical adenopathy  Neurological: He is alert and oriented to person, place, and time  No cranial nerve deficit or sensory deficit  Coordination and gait (sl ataxic gait with poor L arm swing) abnormal    MMSE 28/30 (1/3 recall)   Skin: Skin is warm and dry  Psychiatric: He has a normal mood and affect  His behavior is normal  Judgment and thought content normal    PHQ-2 = 0   Vitals reviewed  Patient's shoes and socks removed  Right Foot/Ankle   Right Foot Inspection  Skin Exam: skin normal and skin intact no dry skin, no warmth, no callus, no erythema, no maceration, no abnormal color, no pre-ulcer, no ulcer and no callus                          Toe Exam: ROM and strength within normal limits  Sensory   Vibration: diminished    Monofilament testing: diminished  Vascular  Capillary refills: < 3 seconds  The right DP pulse is 1+  Left Foot/Ankle  Left Foot Inspection  Skin Exam: skin normal and skin intactno dry skin, no warmth, no erythema, no maceration, normal color, no pre-ulcer, no ulcer and no callus                         Toe Exam: ROM and strength within normal limits                   Sensory     Proprioception: intact  Monofilament: intact  Vascular  Capillary refills: < 3 seconds  The left DP pulse is 1+  Assign Risk Category:  No deformity present; No loss of protective sensation; No weak pulses       Risk: 0      Assessment and Plan:      Health Maintenance Due   Topic Date Due    Medicare Annual Wellness Visit (AWV)  01/29/1932    DTaP,Tdap,and Td Vaccines (1 - Tdap) 01/29/1953    Pneumococcal PPSV23/PCV13 65+ Years / Low and Medium Risk (2 of 2 - PPSV23) 10/20/2016    DM Eye Exam  01/20/2017         HPI:  Liz Arauz is a 80 y o  male here for his Subsequent Wellness Visit  Patient Active Problem List   Diagnosis    3-vessel coronary artery disease    Asbestosis ([facility] )    CKD (chronic kidney disease), stage IV ([facility] )    Diabetes mellitus with multiple complications ([facility] )    Elevated serum alkaline phosphatase level    Hyperlipidemia    Essential hypertension    Hypothyroidism    Paroxysmal A-fib (HCC)    Seasonal allergies    Increased frequency of urination    Frozen shoulder     Past Medical History:   Diagnosis Date    3-vessel coronary artery disease     last assessed: 08/15/2016     Past Surgical History:   Procedure Laterality Date    CATARACT EXTRACTION      last assessed: 11/11/2015    CORONARY ARTERY BYPASS GRAFT      last assessed: 08/26/2015     Family History   Problem Relation Age of Onset    No Known Problems Mother     No Known Problems Father      History   Smoking Status    Never Smoker   Smokeless Tobacco    Never Used     History   Alcohol Use No      History   Drug Use No       Current Outpatient Prescriptions   Medication Sig Dispense Refill    atorvastatin (LIPITOR) 40 mg tablet Take 1 tablet by mouth daily      glucose blood test strip 1 each by Other route daily 100 each 1    levothyroxine 75 mcg tablet TAKE 1 TABLET DAILY 90 tablet 3    Linagliptin (TRADJENTA) 5 MG TABS Take 5 mg by mouth daily 90 tablet 3    metoprolol tartrate (LOPRESSOR) 50 mg tablet TAKE 1 TABLET TWICE A DAY WITH FOOD 180 tablet 0    tamsulosin (FLOMAX) 0 4 mg Take 1 capsule by mouth daily      warfarin (COUMADIN) 3 mg tablet TAKE 1 TABLET DAILY 90 tablet 3     No current facility-administered medications for this visit        No Known Allergies  Immunization History   Administered Date(s) Administered    Influenza 10/31/2013, 01/01/2014, 10/01/2015, 10/06/2015, 10/23/2018    Influenza TIV (IM) 01/01/2014, 10/01/2015    Pneumococcal Conjugate 13-Valent 02/17/2015, 10/20/2015    Pneumococcal Polysaccharide PPV23 01/29/1932    Tdap 01/29/1932    Zoster 01/29/1932       Patient Care Team:  Aminah Caruso MD as PCP - General (Family Medicine)    Medicare Screening Tests and Risk Assessments:  Medicare Annual Wellness Visit

## 2018-12-03 NOTE — PROGRESS NOTES
Assessment and Plan:    Problem List Items Addressed This Visit        Endocrine    Diabetes mellitus with multiple complications [facility]) - Primary     Lab Results   Component Value Date    HGBA1C 6 7% 12/03/2018     Well controlled  Cont present treatment  Monitor labs  Recheck 6m           Relevant Orders    POCT hemoglobin A1c (Completed)    Microalbumin / creatinine urine ratio    Hypothyroidism     Check TSH         Relevant Orders    TSH, 3rd generation       Respiratory    Asbestosis ([facility] )     Breathing stable  Check CXR  Cont to monitor         Relevant Orders    XR chest pa & lateral       Cardiovascular and Mediastinum    3-vessel coronary artery disease     Up to date with Cardio  Pt without symptoms  Cont present care  Check labs  Recheck 6m         Relevant Orders    CBC and differential    Comprehensive metabolic panel    Lipid panel    Essential hypertension     Well controlled  Cont present treatment  Monitor labs  Recheck 6m           Relevant Orders    CBC and differential    Comprehensive metabolic panel    Lipid panel    Paroxysmal A-fib (HCC)     Clinically in NSR  Cont present care  Recheck 6m            Genitourinary    CKD (chronic kidney disease), stage IV ([facility] )     Recheck labs  Cont to monitor            Other    Hyperlipidemia     Check labs           Other Visit Diagnoses     Medicare annual wellness visit, subsequent        Risk for falls        Type 2 diabetes mellitus with diabetic nephropathy, without long-term current use of insulin ([facility] )        Relevant Medications    glucose blood test strip        Health Maintenance Due   Topic Date Due    Medicare Annual Wellness Visit (AWV)  01/29/1932    DTaP,Tdap,and Td Vaccines (1 - Tdap) 01/29/1953    Pneumococcal PPSV23/PCV13 65+ Years / Low and Medium Risk (2 of 2 - PPSV23) 10/20/2016    DM Eye Exam  01/20/2017         HPI:  Phillip Woodall is a 80 y o  male here for his Subsequent Wellness Visit      Patient Active Problem List Diagnosis    3-vessel coronary artery disease    Asbestosis ([facility] )    CKD (chronic kidney disease), stage IV ([facility] )    Diabetes mellitus with multiple complications ([place] [address] )    Elevated serum alkaline phosphatase level    Hyperlipidemia    Essential hypertension    Hypothyroidism    Paroxysmal A-fib (HCC)    Seasonal allergies    Increased frequency of urination    Frozen shoulder     Past Medical History:   Diagnosis Date    3-vessel coronary artery disease     last assessed: 08/15/2016     Past Surgical History:   Procedure Laterality Date    CATARACT EXTRACTION      last assessed: 11/11/2015    CORONARY ARTERY BYPASS GRAFT      last assessed: 08/26/2015     Family History   Problem Relation Age of Onset    No Known Problems Mother     No Known Problems Father      History   Smoking Status    Never Smoker   Smokeless Tobacco    Never Used     History   Alcohol Use No      History   Drug Use No       Current Outpatient Prescriptions   Medication Sig Dispense Refill    atorvastatin (LIPITOR) 40 mg tablet Take 1 tablet by mouth daily      glucose blood test strip 1 each by Other route daily 100 each 1    levothyroxine 75 mcg tablet TAKE 1 TABLET DAILY 90 tablet 3    Linagliptin (TRADJENTA) 5 MG TABS Take 5 mg by mouth daily 90 tablet 3    metoprolol tartrate (LOPRESSOR) 50 mg tablet TAKE 1 TABLET TWICE A DAY WITH FOOD 180 tablet 0    tamsulosin (FLOMAX) 0 4 mg Take 1 capsule by mouth daily      warfarin (COUMADIN) 3 mg tablet TAKE 1 TABLET DAILY 90 tablet 3     No current facility-administered medications for this visit        No Known Allergies  Immunization History   Administered Date(s) Administered    Influenza 10/31/2013, 01/01/2014, 10/01/2015, 10/06/2015, 10/23/2018    Influenza TIV (IM) 01/01/2014, 10/01/2015    Pneumococcal Conjugate 13-Valent 02/17/2015, 10/20/2015    Pneumococcal Polysaccharide PPV23 01[birth year]29/1932    Tdap 01[birth year]29/1932    Zoster 01/29/1932       Patient Care Team:  Sonja Antunez MD as PCP - General (Family Medicine)    Medicare Screening Tests and Risk Assessments:  Joseph Mendiola is here for his Subsequent Wellness visit  Health Risk Assessment:  Patient rates overall health as very good  Patient feels that their physical health rating is Slightly better  Eyesight was rated as Same  Hearing was rated as Same  Patient feels that their emotional and mental health rating is Slightly better  Pain experienced by patient in the last 7 days has been None  Patient states that he has experienced no weight loss or gain in last 6 months  Emotional/Mental Health:  Patient has been feeling nervous/anxious  PHQ-9 Depression Screening:    Frequency of the following problems over the past two weeks:      1  Little interest or pleasure in doing things: 0 - not at all      2  Feeling down, depressed, or hopeless: 0 - not at all  PHQ-2 Score: 0          Broken Bones/Falls: Fall Risk Assessment:    In the past year, patient has experienced: History of falling in past year  Patient does not feel he is unsteady standing  Patient is not taking medication that can cause feelings of lightheadedness or tiredness  Patient often has no need to rush to the toilet  Chronic conditions that may contribute to falls strokes  Bladder/Bowel:  Patient has not leaked urine accidently in the last six months  Patient reports no loss of bowel control  Immunizations:  Patient has had a flu vaccination within the last year  Patient has received a pneumonia shot  Patient has not received a shingles shot  Patient has not received tetanus/diphtheria shot  (Additional Comments: Refuses Shingrix/Zostvax)    Home Safety:  Patient does not have trouble with stairs inside or outside of their home  Patient currently reports that there are no safety hazards present in home, working smoke alarms, working carbon monoxide detectors        Preventative Screenings:   no prostate cancer screen performed, no colon cancer screen completed, cholesterol screen completed, glaucoma eye exam completed,     Nutrition:  Current diet: Diabetic and Low Cholesterol with servings of the following:    Medications:  Patient is not currently taking any over-the-counter supplements  Patient is able to manage medications  Lifestyle Choices:  Patient reports no tobacco use  Patient has not smoked or used tobacco in the past   Patient reports no alcohol use  Patient drives a vehicle  Patient wears seat belt  Current level of exercise of physical activity described by patient as: minimal         Activities of Daily Living:  Can get out of bed by his or her self, able to dress self, able to make own meals, able to do own shopping, able to bathe self, can do own laundry/housekeeping, can manage own money, pay bills and track expenses    Previous Hospitalizations:  No hospitalization or ED visit in past 12 months        Advanced Directives:  Patient has decided on a power of   Patient has spoken to designated power of   Patient has completed advanced directive          Preventative Screening/Counseling:      Cardiovascular:      General: Risks and Benefits Discussed      Counseling: Healthy Diet, Healthy Weight, Improve Cholesterol and Improve Exercise Tolerance          Diabetes:      General: Risks and Benefits Discussed      Counseling: Healthy Diet, Healthy Weight and Improve Physical Activity      Comments: Pt is diabetic        Colorectal Cancer:      General: Risks and Benefits Discussed and Screening Not Indicated          Prostate Cancer:      General: Screening Not Indicated          Osteoporosis:      General: Screening Not Indicated          AAA:      General: Screening Not Indicated          Glaucoma:      General: Risks and Benefits Discussed and Screening Current          HIV:      General: Screening Not Indicated          Hepatitis C:      General: Screening Not Indicated        Advanced Directives:   Patient has living will for healthcare, has durable POA for healthcare, patient has an advanced directive  Immunizations:      Influenza: Risks & Benefits Discussed and Influenza UTD This Year      Pneumococcal: Risks & Benefits Discussed      Shingrix: Risks & Benefits Discussed and Patient Declines      Zostavax: Risks & Benefits Discussed and Patient Declines      Other Preventative Counseling (Non-Medicare):   Fall Prevention and Nutrition Counseling

## 2018-12-03 NOTE — PROGRESS NOTES
Assessment/Plan:    No problem-specific Assessment & Plan notes found for this encounter  Diagnoses and all orders for this visit:    Diabetes mellitus with multiple complications ([facility] )    Asbestosis ([place] [address] )    Acquired hypothyroidism    3-vessel coronary artery disease    Essential hypertension    Paroxysmal A-fib ([facility] )    CKD (chronic kidney disease), stage IV ([facility] )    Mixed hyperlipidemia          Subjective:      Patient ID: Miguel Montana is a 80 y o  male  HPI    The following portions of the patient's history were reviewed and updated as appropriate:   He  has a past medical history of 3-vessel coronary artery disease  He   Patient Active Problem List    Diagnosis Date Noted    Frozen shoulder 05/30/2018    Paroxysmal A-fib ([facility] ) 04/13/2016    CKD (chronic kidney disease), stage IV ([place] [address] ) 01/12/2016    Hyperlipidemia 11/11/2015    Elevated serum alkaline phosphatase level 10/20/2015    Increased frequency of urination 10/20/2015    3-vessel coronary artery disease 08/26/2015    Diabetes mellitus with multiple complications ([place] [address] ) 99/99/4941    Hypothyroidism 08/26/2015    Asbestosis ([facility] ) 05/06/2014    Essential hypertension 05/06/2014    Seasonal allergies 05/06/2014     He  has a past surgical history that includes Coronary artery bypass graft and Cataract extraction  He  reports that he has never smoked  He has never used smokeless tobacco  He reports that he does not drink alcohol or use drugs    Current Outpatient Prescriptions   Medication Sig Dispense Refill    atorvastatin (LIPITOR) 40 mg tablet Take 1 tablet by mouth daily      glucose blood test strip 1 each by Other route daily 100 each 3    levothyroxine 75 mcg tablet TAKE 1 TABLET DAILY 90 tablet 3    Linagliptin (TRADJENTA) 5 MG TABS Take 5 mg by mouth daily 90 tablet 3    metoprolol tartrate (LOPRESSOR) 50 mg tablet TAKE 1 TABLET TWICE A DAY WITH FOOD 180 tablet 0    tamsulosin (FLOMAX) 0 4 mg Take 1 capsule by mouth daily      warfarin (COUMADIN) 3 mg tablet TAKE 1 TABLET DAILY 90 tablet 3     No current facility-administered medications for this visit  He has No Known Allergies       Review of Systems      Objective:      /80   Pulse 80   Temp 98 7 °F (37 1 °C)   Ht 5' 4" (1 626 m)   Wt 78 5 kg (173 lb)   BMI 29 70 kg/m²          Physical Exam

## 2018-12-04 NOTE — ASSESSMENT & PLAN NOTE
Lab Results   Component Value Date    HGBA1C 6 7% 12/03/2018     Well controlled  Cont present treatment  Monitor labs   Recheck 6m

## 2018-12-12 ENCOUNTER — APPOINTMENT (OUTPATIENT)
Dept: RADIOLOGY | Facility: MEDICAL CENTER | Age: 83
End: 2018-12-12
Payer: MEDICARE

## 2018-12-12 DIAGNOSIS — J61 ASBESTOSIS (HCC): ICD-10-CM

## 2018-12-12 PROCEDURE — 71046 X-RAY EXAM CHEST 2 VIEWS: CPT

## 2018-12-17 ENCOUNTER — APPOINTMENT (OUTPATIENT)
Dept: LAB | Age: 83
End: 2018-12-17
Payer: MEDICARE

## 2018-12-17 ENCOUNTER — ANTICOAG VISIT (OUTPATIENT)
Dept: FAMILY MEDICINE CLINIC | Facility: CLINIC | Age: 83
End: 2018-12-17

## 2018-12-17 DIAGNOSIS — I25.10 3-VESSEL CORONARY ARTERY DISEASE: ICD-10-CM

## 2018-12-17 DIAGNOSIS — E03.9 ACQUIRED HYPOTHYROIDISM: ICD-10-CM

## 2018-12-17 DIAGNOSIS — I10 ESSENTIAL HYPERTENSION: ICD-10-CM

## 2018-12-17 LAB
ALBUMIN SERPL BCP-MCNC: 3.4 G/DL (ref 3.5–5)
ALP SERPL-CCNC: 162 U/L (ref 46–116)
ALT SERPL W P-5'-P-CCNC: 17 U/L (ref 12–78)
ANION GAP SERPL CALCULATED.3IONS-SCNC: 9 MMOL/L (ref 4–13)
AST SERPL W P-5'-P-CCNC: 15 U/L (ref 5–45)
BASOPHILS # BLD AUTO: 0.03 THOUSANDS/ΜL (ref 0–0.1)
BASOPHILS NFR BLD AUTO: 0 % (ref 0–1)
BILIRUB SERPL-MCNC: 0.55 MG/DL (ref 0.2–1)
BUN SERPL-MCNC: 28 MG/DL (ref 5–25)
CALCIUM SERPL-MCNC: 8.9 MG/DL (ref 8.3–10.1)
CHLORIDE SERPL-SCNC: 104 MMOL/L (ref 100–108)
CHOLEST SERPL-MCNC: 119 MG/DL (ref 50–200)
CO2 SERPL-SCNC: 24 MMOL/L (ref 21–32)
CREAT SERPL-MCNC: 3.26 MG/DL (ref 0.6–1.3)
EOSINOPHIL # BLD AUTO: 0.24 THOUSAND/ΜL (ref 0–0.61)
EOSINOPHIL NFR BLD AUTO: 3 % (ref 0–6)
ERYTHROCYTE [DISTWIDTH] IN BLOOD BY AUTOMATED COUNT: 13.8 % (ref 11.6–15.1)
GFR SERPL CREATININE-BSD FRML MDRD: 16 ML/MIN/1.73SQ M
GLUCOSE P FAST SERPL-MCNC: 174 MG/DL (ref 65–99)
HCT VFR BLD AUTO: 42 % (ref 36.5–49.3)
HDLC SERPL-MCNC: 33 MG/DL (ref 40–60)
HGB BLD-MCNC: 13.3 G/DL (ref 12–17)
IMM GRANULOCYTES # BLD AUTO: 0.03 THOUSAND/UL (ref 0–0.2)
IMM GRANULOCYTES NFR BLD AUTO: 0 % (ref 0–2)
INR PPP: 2.42 (ref 0.86–1.17)
LDLC SERPL CALC-MCNC: 42 MG/DL (ref 0–100)
LYMPHOCYTES # BLD AUTO: 2.76 THOUSANDS/ΜL (ref 0.6–4.47)
LYMPHOCYTES NFR BLD AUTO: 29 % (ref 14–44)
MCH RBC QN AUTO: 30 PG (ref 26.8–34.3)
MCHC RBC AUTO-ENTMCNC: 31.7 G/DL (ref 31.4–37.4)
MCV RBC AUTO: 95 FL (ref 82–98)
MONOCYTES # BLD AUTO: 0.59 THOUSAND/ΜL (ref 0.17–1.22)
MONOCYTES NFR BLD AUTO: 6 % (ref 4–12)
NEUTROPHILS # BLD AUTO: 5.88 THOUSANDS/ΜL (ref 1.85–7.62)
NEUTS SEG NFR BLD AUTO: 62 % (ref 43–75)
NONHDLC SERPL-MCNC: 86 MG/DL
NRBC BLD AUTO-RTO: 0 /100 WBCS
PLATELET # BLD AUTO: 134 THOUSANDS/UL (ref 149–390)
PMV BLD AUTO: 10.6 FL (ref 8.9–12.7)
POTASSIUM SERPL-SCNC: 4.5 MMOL/L (ref 3.5–5.3)
PROT SERPL-MCNC: 7.6 G/DL (ref 6.4–8.2)
PROTHROMBIN TIME: 26.4 SECONDS (ref 11.8–14.2)
RBC # BLD AUTO: 4.44 MILLION/UL (ref 3.88–5.62)
SODIUM SERPL-SCNC: 137 MMOL/L (ref 136–145)
TRIGL SERPL-MCNC: 219 MG/DL
TSH SERPL DL<=0.05 MIU/L-ACNC: 3.08 UIU/ML (ref 0.36–3.74)
WBC # BLD AUTO: 9.53 THOUSAND/UL (ref 4.31–10.16)

## 2018-12-17 PROCEDURE — 80061 LIPID PANEL: CPT

## 2018-12-17 PROCEDURE — 36415 COLL VENOUS BLD VENIPUNCTURE: CPT

## 2018-12-17 PROCEDURE — 84443 ASSAY THYROID STIM HORMONE: CPT

## 2018-12-17 PROCEDURE — 80053 COMPREHEN METABOLIC PANEL: CPT

## 2018-12-17 PROCEDURE — 85025 COMPLETE CBC W/AUTO DIFF WBC: CPT

## 2018-12-17 PROCEDURE — 85610 PROTHROMBIN TIME: CPT

## 2018-12-20 ENCOUNTER — REMOTE DEVICE CLINIC VISIT (OUTPATIENT)
Dept: CARDIOLOGY CLINIC | Facility: CLINIC | Age: 83
End: 2018-12-20
Payer: MEDICARE

## 2018-12-20 DIAGNOSIS — Z95.0 CARDIAC PACEMAKER IN SITU: ICD-10-CM

## 2018-12-20 DIAGNOSIS — I48.0 PAROXYSMAL ATRIAL FIBRILLATION (HCC): Primary | ICD-10-CM

## 2018-12-20 DIAGNOSIS — I25.10 CORONARY ARTERY DISEASE INVOLVING NATIVE CORONARY ARTERY OF NATIVE HEART WITHOUT ANGINA PECTORIS: ICD-10-CM

## 2018-12-20 PROCEDURE — 93294 REM INTERROG EVL PM/LDLS PM: CPT | Performed by: INTERNAL MEDICINE

## 2018-12-20 PROCEDURE — 93296 REM INTERROG EVL PM/IDS: CPT | Performed by: INTERNAL MEDICINE

## 2018-12-20 NOTE — PROGRESS NOTES
Results for orders placed or performed in visit on 12/20/18   Cardiac EP device report    Narrative    MDT DUAL CHAMBER PM  CARELINK TRANSMISSION: BATTERY VOLTAGE ADEQUATE (4 5 YRS)  AP-99%, -0 5%  ALL AVAILABLE LEAD PARAMETERS WITHIN NORMAL LIMITS  1 NSVT EPISODE FOR 7 BEATS, AVG CL~350MS  EF-50% (ECHO 11/2/15)  PT ON WARFARIN & METOPROLOL  NORMAL DEVICE FUNCTION   GV

## 2018-12-29 DIAGNOSIS — R39.11 URINARY HESITANCY: ICD-10-CM

## 2018-12-29 DIAGNOSIS — E78.2 MIXED HYPERLIPIDEMIA: Primary | ICD-10-CM

## 2018-12-31 RX ORDER — ATORVASTATIN CALCIUM 40 MG/1
TABLET, FILM COATED ORAL
Qty: 90 TABLET | Refills: 3 | Status: SHIPPED | OUTPATIENT
Start: 2018-12-31 | End: 2019-11-08 | Stop reason: SDUPTHER

## 2018-12-31 RX ORDER — TAMSULOSIN HYDROCHLORIDE 0.4 MG/1
CAPSULE ORAL
Qty: 90 CAPSULE | Refills: 3 | Status: SHIPPED | OUTPATIENT
Start: 2018-12-31 | End: 2019-11-04 | Stop reason: SDUPTHER

## 2019-01-03 DIAGNOSIS — I10 ESSENTIAL HYPERTENSION: ICD-10-CM

## 2019-01-03 RX ORDER — METOPROLOL TARTRATE 50 MG/1
TABLET, FILM COATED ORAL
Qty: 180 TABLET | Refills: 1 | Status: ON HOLD | OUTPATIENT
Start: 2019-01-03 | End: 2019-07-08 | Stop reason: SDUPTHER

## 2019-01-17 ENCOUNTER — APPOINTMENT (OUTPATIENT)
Dept: LAB | Age: 84
End: 2019-01-17
Payer: MEDICARE

## 2019-01-17 ENCOUNTER — ANTICOAG VISIT (OUTPATIENT)
Dept: FAMILY MEDICINE CLINIC | Facility: CLINIC | Age: 84
End: 2019-01-17

## 2019-01-17 ENCOUNTER — TRANSCRIBE ORDERS (OUTPATIENT)
Dept: LAB | Age: 84
End: 2019-01-17

## 2019-01-17 DIAGNOSIS — I48.91 ATRIAL FIBRILLATION, UNSPECIFIED TYPE (HCC): Primary | ICD-10-CM

## 2019-01-17 DIAGNOSIS — I48.91 ATRIAL FIBRILLATION, UNSPECIFIED TYPE (HCC): ICD-10-CM

## 2019-01-17 LAB
INR PPP: 2.39 (ref 0.86–1.17)
PROTHROMBIN TIME: 26.1 SECONDS (ref 11.8–14.2)

## 2019-01-17 PROCEDURE — 85610 PROTHROMBIN TIME: CPT

## 2019-01-17 PROCEDURE — 36415 COLL VENOUS BLD VENIPUNCTURE: CPT

## 2019-02-18 ENCOUNTER — LAB (OUTPATIENT)
Dept: LAB | Age: 84
End: 2019-02-18
Payer: MEDICARE

## 2019-02-18 ENCOUNTER — TRANSCRIBE ORDERS (OUTPATIENT)
Dept: LAB | Age: 84
End: 2019-02-18

## 2019-02-18 DIAGNOSIS — I48.91 ATRIAL FIBRILLATION, UNSPECIFIED TYPE (HCC): Primary | ICD-10-CM

## 2019-02-18 DIAGNOSIS — I48.91 ATRIAL FIBRILLATION, UNSPECIFIED TYPE (HCC): ICD-10-CM

## 2019-02-18 LAB
INR PPP: 2.58 (ref 0.86–1.17)
PROTHROMBIN TIME: 27.7 SECONDS (ref 11.8–14.2)

## 2019-02-18 PROCEDURE — 36415 COLL VENOUS BLD VENIPUNCTURE: CPT

## 2019-02-18 PROCEDURE — 85610 PROTHROMBIN TIME: CPT

## 2019-02-19 ENCOUNTER — ANTICOAG VISIT (OUTPATIENT)
Dept: FAMILY MEDICINE CLINIC | Facility: CLINIC | Age: 84
End: 2019-02-19

## 2019-03-19 ENCOUNTER — TELEPHONE (OUTPATIENT)
Dept: INTERNAL MEDICINE CLINIC | Facility: CLINIC | Age: 84
End: 2019-03-19

## 2019-03-19 ENCOUNTER — APPOINTMENT (OUTPATIENT)
Dept: LAB | Age: 84
End: 2019-03-19
Payer: MEDICARE

## 2019-03-19 DIAGNOSIS — I48.91 ATRIAL FIBRILLATION, UNSPECIFIED TYPE (HCC): ICD-10-CM

## 2019-03-19 LAB
INR PPP: 2.73 (ref 0.86–1.17)
PROTHROMBIN TIME: 29 SECONDS (ref 11.8–14.2)

## 2019-03-19 PROCEDURE — 85610 PROTHROMBIN TIME: CPT

## 2019-03-19 PROCEDURE — 36415 COLL VENOUS BLD VENIPUNCTURE: CPT

## 2019-03-19 NOTE — TELEPHONE ENCOUNTER
----- Message from Andrea Johnson MD sent at 3/19/2019 12:35 PM EDT -----  Please call the patient regarding his  Result  Same coumadin recheck the INR in 2 weeks

## 2019-03-20 ENCOUNTER — ANTICOAG VISIT (OUTPATIENT)
Dept: FAMILY MEDICINE CLINIC | Facility: CLINIC | Age: 84
End: 2019-03-20

## 2019-03-20 DIAGNOSIS — I48.91 ATRIAL FIBRILLATION, UNSPECIFIED TYPE (HCC): Primary | ICD-10-CM

## 2019-03-29 DIAGNOSIS — E03.9 HYPOTHYROIDISM, UNSPECIFIED TYPE: ICD-10-CM

## 2019-03-29 RX ORDER — LEVOTHYROXINE SODIUM 0.07 MG/1
TABLET ORAL
Qty: 90 TABLET | Refills: 3 | OUTPATIENT
Start: 2019-03-29

## 2019-03-30 DIAGNOSIS — E03.9 HYPOTHYROIDISM, UNSPECIFIED TYPE: ICD-10-CM

## 2019-03-31 RX ORDER — LEVOTHYROXINE SODIUM 0.07 MG/1
TABLET ORAL
Qty: 90 TABLET | Refills: 3 | Status: SHIPPED | OUTPATIENT
Start: 2019-03-31 | End: 2020-04-17

## 2019-04-09 ENCOUNTER — IN-CLINIC DEVICE VISIT (OUTPATIENT)
Dept: CARDIOLOGY CLINIC | Facility: MEDICAL CENTER | Age: 84
End: 2019-04-09
Payer: MEDICARE

## 2019-04-09 DIAGNOSIS — Z95.0 PRESENCE OF PERMANENT CARDIAC PACEMAKER: ICD-10-CM

## 2019-04-09 DIAGNOSIS — I25.119 CORONARY ARTERY DISEASE INVOLVING NATIVE CORONARY ARTERY OF NATIVE HEART WITH ANGINA PECTORIS (HCC): ICD-10-CM

## 2019-04-09 DIAGNOSIS — I48.0 PAROXYSMAL ATRIAL FIBRILLATION (HCC): Primary | ICD-10-CM

## 2019-04-09 PROCEDURE — 93280 PM DEVICE PROGR EVAL DUAL: CPT | Performed by: INTERNAL MEDICINE

## 2019-04-16 ENCOUNTER — ANTICOAG VISIT (OUTPATIENT)
Dept: FAMILY MEDICINE CLINIC | Facility: CLINIC | Age: 84
End: 2019-04-16

## 2019-04-16 ENCOUNTER — APPOINTMENT (OUTPATIENT)
Dept: LAB | Age: 84
End: 2019-04-16
Payer: MEDICARE

## 2019-04-22 ENCOUNTER — TELEPHONE (OUTPATIENT)
Dept: FAMILY MEDICINE CLINIC | Facility: CLINIC | Age: 84
End: 2019-04-22

## 2019-04-22 ENCOUNTER — OFFICE VISIT (OUTPATIENT)
Dept: FAMILY MEDICINE CLINIC | Facility: CLINIC | Age: 84
End: 2019-04-22
Payer: MEDICARE

## 2019-04-22 VITALS
SYSTOLIC BLOOD PRESSURE: 122 MMHG | BODY MASS INDEX: 30.32 KG/M2 | WEIGHT: 177.6 LBS | DIASTOLIC BLOOD PRESSURE: 82 MMHG | HEART RATE: 80 BPM | TEMPERATURE: 98.4 F | HEIGHT: 64 IN

## 2019-04-22 DIAGNOSIS — B02.9 HERPES ZOSTER WITHOUT COMPLICATION: Primary | ICD-10-CM

## 2019-04-22 DIAGNOSIS — E11.8 DIABETES MELLITUS WITH MULTIPLE COMPLICATIONS (HCC): ICD-10-CM

## 2019-04-22 PROCEDURE — 99213 OFFICE O/P EST LOW 20 MIN: CPT | Performed by: FAMILY MEDICINE

## 2019-04-22 RX ORDER — VALACYCLOVIR HYDROCHLORIDE 1 G/1
1000 TABLET, FILM COATED ORAL DAILY
Qty: 7 TABLET | Refills: 0 | Status: SHIPPED | OUTPATIENT
Start: 2019-04-22 | End: 2019-06-12

## 2019-04-23 ENCOUNTER — TELEPHONE (OUTPATIENT)
Dept: FAMILY MEDICINE CLINIC | Facility: CLINIC | Age: 84
End: 2019-04-23

## 2019-04-23 DIAGNOSIS — B02.29 HZV (HERPES ZOSTER VIRUS) POST HERPETIC NEURALGIA: Primary | ICD-10-CM

## 2019-04-23 RX ORDER — GABAPENTIN 300 MG/1
300 CAPSULE ORAL DAILY
Qty: 30 CAPSULE | Refills: 1 | Status: SHIPPED | OUTPATIENT
Start: 2019-04-23 | End: 2019-05-15 | Stop reason: SDUPTHER

## 2019-04-24 ENCOUNTER — APPOINTMENT (OUTPATIENT)
Dept: LAB | Age: 84
End: 2019-04-24
Payer: MEDICARE

## 2019-04-24 DIAGNOSIS — E11.8 DIABETES MELLITUS WITH MULTIPLE COMPLICATIONS (HCC): ICD-10-CM

## 2019-04-24 LAB
ANION GAP SERPL CALCULATED.3IONS-SCNC: 5 MMOL/L (ref 4–13)
BUN SERPL-MCNC: 32 MG/DL (ref 5–25)
CALCIUM SERPL-MCNC: 8.3 MG/DL (ref 8.3–10.1)
CHLORIDE SERPL-SCNC: 105 MMOL/L (ref 100–108)
CO2 SERPL-SCNC: 24 MMOL/L (ref 21–32)
CREAT SERPL-MCNC: 3.49 MG/DL (ref 0.6–1.3)
EST. AVERAGE GLUCOSE BLD GHB EST-MCNC: 146 MG/DL
GFR SERPL CREATININE-BSD FRML MDRD: 15 ML/MIN/1.73SQ M
GLUCOSE SERPL-MCNC: 158 MG/DL (ref 65–140)
HBA1C MFR BLD: 6.7 % (ref 4.2–6.3)
POTASSIUM SERPL-SCNC: 4.3 MMOL/L (ref 3.5–5.3)
SODIUM SERPL-SCNC: 134 MMOL/L (ref 136–145)

## 2019-04-24 PROCEDURE — 36415 COLL VENOUS BLD VENIPUNCTURE: CPT

## 2019-04-24 PROCEDURE — 83036 HEMOGLOBIN GLYCOSYLATED A1C: CPT

## 2019-04-24 PROCEDURE — 80048 BASIC METABOLIC PNL TOTAL CA: CPT

## 2019-04-26 ENCOUNTER — APPOINTMENT (OUTPATIENT)
Dept: LAB | Facility: CLINIC | Age: 84
End: 2019-04-26
Payer: MEDICARE

## 2019-04-26 ENCOUNTER — OFFICE VISIT (OUTPATIENT)
Dept: FAMILY MEDICINE CLINIC | Facility: CLINIC | Age: 84
End: 2019-04-26
Payer: MEDICARE

## 2019-04-26 VITALS
DIASTOLIC BLOOD PRESSURE: 82 MMHG | HEIGHT: 64 IN | BODY MASS INDEX: 30.22 KG/M2 | TEMPERATURE: 98.4 F | WEIGHT: 177 LBS | SYSTOLIC BLOOD PRESSURE: 120 MMHG | HEART RATE: 78 BPM

## 2019-04-26 DIAGNOSIS — I10 ESSENTIAL HYPERTENSION: ICD-10-CM

## 2019-04-26 DIAGNOSIS — E11.8 DIABETES MELLITUS WITH MULTIPLE COMPLICATIONS (HCC): ICD-10-CM

## 2019-04-26 DIAGNOSIS — N18.4 CKD (CHRONIC KIDNEY DISEASE), STAGE IV (HCC): Primary | ICD-10-CM

## 2019-04-26 DIAGNOSIS — N18.4 CKD (CHRONIC KIDNEY DISEASE), STAGE IV (HCC): ICD-10-CM

## 2019-04-26 DIAGNOSIS — J61 ASBESTOSIS (HCC): ICD-10-CM

## 2019-04-26 LAB
CREAT UR-MCNC: 170 MG/DL
MICROALBUMIN UR-MCNC: 143 MG/L (ref 0–20)
MICROALBUMIN/CREAT 24H UR: 84 MG/G CREATININE (ref 0–30)

## 2019-04-26 PROCEDURE — 82570 ASSAY OF URINE CREATININE: CPT

## 2019-04-26 PROCEDURE — 99214 OFFICE O/P EST MOD 30 MIN: CPT | Performed by: FAMILY MEDICINE

## 2019-04-26 PROCEDURE — 82043 UR ALBUMIN QUANTITATIVE: CPT

## 2019-04-29 PROBLEM — R45.89 DEPRESSED MOOD: Status: ACTIVE | Noted: 2019-04-29

## 2019-04-29 PROBLEM — B02.9 HERPES ZOSTER WITHOUT COMPLICATION: Status: ACTIVE | Noted: 2019-04-29

## 2019-05-03 ENCOUNTER — HOSPITAL ENCOUNTER (OUTPATIENT)
Dept: RADIOLOGY | Facility: MEDICAL CENTER | Age: 84
Discharge: HOME | End: 2019-05-03
Payer: MEDICARE

## 2019-05-03 DIAGNOSIS — N18.4 CKD (CHRONIC KIDNEY DISEASE), STAGE IV (HCC): ICD-10-CM

## 2019-05-03 PROCEDURE — 76770 US EXAM ABDO BACK WALL COMP: CPT

## 2019-05-08 DIAGNOSIS — N40.0 ENLARGED PROSTATE: Primary | ICD-10-CM

## 2019-05-15 DIAGNOSIS — B02.29 HZV (HERPES ZOSTER VIRUS) POST HERPETIC NEURALGIA: ICD-10-CM

## 2019-05-16 RX ORDER — GABAPENTIN 300 MG/1
CAPSULE ORAL
Qty: 30 CAPSULE | Refills: 0 | Status: SHIPPED | OUTPATIENT
Start: 2019-05-16 | End: 2019-06-12

## 2019-05-21 ENCOUNTER — APPOINTMENT (OUTPATIENT)
Dept: LAB | Age: 84
End: 2019-05-21
Payer: MEDICARE

## 2019-05-21 ENCOUNTER — ANTICOAG VISIT (OUTPATIENT)
Dept: FAMILY MEDICINE CLINIC | Facility: CLINIC | Age: 84
End: 2019-05-21

## 2019-05-30 ENCOUNTER — CONSULT (OUTPATIENT)
Dept: NEPHROLOGY | Facility: CLINIC | Age: 84
End: 2019-05-30
Payer: MEDICARE

## 2019-05-30 VITALS — BODY MASS INDEX: 30.22 KG/M2 | HEIGHT: 64 IN | WEIGHT: 177 LBS

## 2019-05-30 DIAGNOSIS — N18.4 CKD (CHRONIC KIDNEY DISEASE), STAGE IV (HCC): ICD-10-CM

## 2019-05-30 DIAGNOSIS — I10 ESSENTIAL HYPERTENSION: Primary | ICD-10-CM

## 2019-05-30 LAB
SL AMB  POCT GLUCOSE, UA: ABNORMAL
SL AMB LEUKOCYTE ESTERASE,UA: ABNORMAL
SL AMB POCT BILIRUBIN,UA: ABNORMAL
SL AMB POCT BLOOD,UA: 50
SL AMB POCT CLARITY,UA: CLEAR
SL AMB POCT COLOR,UA: YELLOW
SL AMB POCT KETONES,UA: ABNORMAL
SL AMB POCT NITRITE,UA: ABNORMAL
SL AMB POCT PH,UA: 5
SL AMB POCT SPECIFIC GRAVITY,UA: 1.01
SL AMB POCT URINE PROTEIN: 30
SL AMB POCT UROBILINOGEN: ABNORMAL

## 2019-05-30 PROCEDURE — 81002 URINALYSIS NONAUTO W/O SCOPE: CPT | Performed by: INTERNAL MEDICINE

## 2019-05-30 PROCEDURE — 99204 OFFICE O/P NEW MOD 45 MIN: CPT | Performed by: INTERNAL MEDICINE

## 2019-06-05 ENCOUNTER — TELEPHONE (OUTPATIENT)
Dept: FAMILY MEDICINE CLINIC | Facility: CLINIC | Age: 84
End: 2019-06-05

## 2019-06-06 ENCOUNTER — APPOINTMENT (OUTPATIENT)
Dept: RADIOLOGY | Facility: MEDICAL CENTER | Age: 84
End: 2019-06-06
Payer: MEDICARE

## 2019-06-06 ENCOUNTER — TRANSCRIBE ORDERS (OUTPATIENT)
Dept: ADMINISTRATIVE | Facility: HOSPITAL | Age: 84
End: 2019-06-06

## 2019-06-06 DIAGNOSIS — S32.9XXA CLOSED NONDISPLACED FRACTURE OF PELVIS, UNSPECIFIED PART OF PELVIS, INITIAL ENCOUNTER (HCC): ICD-10-CM

## 2019-06-06 DIAGNOSIS — M84.359A STRESS FRACTURE, HIP, UNSPECIFIED, INITIAL ENCOUNTER FOR FRACTURE: Primary | ICD-10-CM

## 2019-06-06 DIAGNOSIS — M84.359A STRESS FRACTURE, HIP, UNSPECIFIED, INITIAL ENCOUNTER FOR FRACTURE: ICD-10-CM

## 2019-06-06 PROCEDURE — 73502 X-RAY EXAM HIP UNI 2-3 VIEWS: CPT

## 2019-06-06 PROCEDURE — 72100 X-RAY EXAM L-S SPINE 2/3 VWS: CPT

## 2019-06-11 ENCOUNTER — HOSPITAL ENCOUNTER (OUTPATIENT)
Dept: NON INVASIVE DIAGNOSTICS | Facility: CLINIC | Age: 84
Discharge: HOME | DRG: 690 | End: 2019-06-11
Payer: MEDICARE

## 2019-06-11 ENCOUNTER — OFFICE VISIT (OUTPATIENT)
Dept: FAMILY MEDICINE CLINIC | Facility: CLINIC | Age: 84
End: 2019-06-11
Payer: MEDICARE

## 2019-06-11 ENCOUNTER — HOSPITAL ENCOUNTER (OUTPATIENT)
Dept: RADIOLOGY | Facility: HOSPITAL | Age: 84
Discharge: HOME | DRG: 690 | End: 2019-06-11
Payer: MEDICARE

## 2019-06-11 DIAGNOSIS — I48.0 PAROXYSMAL A-FIB (HCC): ICD-10-CM

## 2019-06-11 DIAGNOSIS — R06.02 SHORTNESS OF BREATH: ICD-10-CM

## 2019-06-11 DIAGNOSIS — I10 ESSENTIAL HYPERTENSION: ICD-10-CM

## 2019-06-11 DIAGNOSIS — R60.0 LEG EDEMA, LEFT: ICD-10-CM

## 2019-06-11 DIAGNOSIS — N18.4 CKD (CHRONIC KIDNEY DISEASE), STAGE IV (HCC): ICD-10-CM

## 2019-06-11 DIAGNOSIS — R07.9 RIGHT-SIDED CHEST PAIN: ICD-10-CM

## 2019-06-11 DIAGNOSIS — R07.9 RIGHT-SIDED CHEST PAIN: Primary | ICD-10-CM

## 2019-06-11 DIAGNOSIS — I25.10 3-VESSEL CORONARY ARTERY DISEASE: ICD-10-CM

## 2019-06-11 PROCEDURE — 93000 ELECTROCARDIOGRAM COMPLETE: CPT | Performed by: FAMILY MEDICINE

## 2019-06-11 PROCEDURE — 93971 EXTREMITY STUDY: CPT

## 2019-06-11 PROCEDURE — 71046 X-RAY EXAM CHEST 2 VIEWS: CPT

## 2019-06-11 PROCEDURE — 93971 EXTREMITY STUDY: CPT | Performed by: SURGERY

## 2019-06-11 PROCEDURE — 99215 OFFICE O/P EST HI 40 MIN: CPT | Performed by: FAMILY MEDICINE

## 2019-06-12 ENCOUNTER — APPOINTMENT (OUTPATIENT)
Dept: NON INVASIVE DIAGNOSTICS | Facility: HOSPITAL | Age: 84
DRG: 690 | End: 2019-06-12
Payer: MEDICARE

## 2019-06-12 ENCOUNTER — TELEPHONE (OUTPATIENT)
Dept: OTHER | Facility: OTHER | Age: 84
End: 2019-06-12

## 2019-06-12 ENCOUNTER — APPOINTMENT (EMERGENCY)
Dept: RADIOLOGY | Facility: HOSPITAL | Age: 84
DRG: 690 | End: 2019-06-12
Payer: MEDICARE

## 2019-06-12 ENCOUNTER — TELEPHONE (OUTPATIENT)
Dept: FAMILY MEDICINE CLINIC | Facility: CLINIC | Age: 84
End: 2019-06-12

## 2019-06-12 ENCOUNTER — APPOINTMENT (OUTPATIENT)
Dept: ULTRASOUND IMAGING | Facility: HOSPITAL | Age: 84
DRG: 690 | End: 2019-06-12
Payer: MEDICARE

## 2019-06-12 ENCOUNTER — HOSPITAL ENCOUNTER (INPATIENT)
Facility: HOSPITAL | Age: 84
LOS: 3 days | Discharge: NURSING, REHAB OR LONG-TERM CARE | DRG: 690 | End: 2019-06-16
Attending: EMERGENCY MEDICINE | Admitting: INTERNAL MEDICINE
Payer: MEDICARE

## 2019-06-12 DIAGNOSIS — I48.91 ATRIAL FIBRILLATION, UNSPECIFIED TYPE (HCC): ICD-10-CM

## 2019-06-12 DIAGNOSIS — I48.0 PAROXYSMAL A-FIB (HCC): ICD-10-CM

## 2019-06-12 DIAGNOSIS — N39.0 UTI (URINARY TRACT INFECTION): ICD-10-CM

## 2019-06-12 DIAGNOSIS — R07.9 CHEST PAIN: Primary | ICD-10-CM

## 2019-06-12 DIAGNOSIS — N18.4 CKD (CHRONIC KIDNEY DISEASE), STAGE IV (HCC): ICD-10-CM

## 2019-06-12 DIAGNOSIS — I25.10 3-VESSEL CORONARY ARTERY DISEASE: ICD-10-CM

## 2019-06-12 DIAGNOSIS — E11.8 DIABETES MELLITUS WITH MULTIPLE COMPLICATIONS (HCC): ICD-10-CM

## 2019-06-12 PROBLEM — R26.2 AMBULATORY DYSFUNCTION: Status: ACTIVE | Noted: 2019-06-12

## 2019-06-12 LAB
ALBUMIN SERPL BCP-MCNC: 3.5 G/DL (ref 3.5–5)
ALP SERPL-CCNC: 154 U/L (ref 46–116)
ALT SERPL W P-5'-P-CCNC: 20 U/L (ref 12–78)
ANION GAP SERPL CALCULATED.3IONS-SCNC: 11 MMOL/L (ref 4–13)
APTT PPP: 42 SECONDS (ref 26–38)
AST SERPL W P-5'-P-CCNC: 23 U/L (ref 5–45)
ATRIAL RATE: 83 BPM
BASOPHILS # BLD AUTO: 0.03 THOUSANDS/ΜL (ref 0–0.1)
BASOPHILS NFR BLD AUTO: 0 % (ref 0–1)
BILIRUB SERPL-MCNC: 0.8 MG/DL (ref 0.2–1)
BUN SERPL-MCNC: 32 MG/DL (ref 5–25)
CALCIUM SERPL-MCNC: 9.1 MG/DL (ref 8.3–10.1)
CHLORIDE SERPL-SCNC: 106 MMOL/L (ref 100–108)
CO2 SERPL-SCNC: 23 MMOL/L (ref 21–32)
CREAT SERPL-MCNC: 3.43 MG/DL (ref 0.6–1.3)
EOSINOPHIL # BLD AUTO: 0.26 THOUSAND/ΜL (ref 0–0.61)
EOSINOPHIL NFR BLD AUTO: 3 % (ref 0–6)
ERYTHROCYTE [DISTWIDTH] IN BLOOD BY AUTOMATED COUNT: 14.2 % (ref 11.6–15.1)
GFR SERPL CREATININE-BSD FRML MDRD: 15 ML/MIN/1.73SQ M
GLUCOSE SERPL-MCNC: 128 MG/DL (ref 65–140)
GLUCOSE SERPL-MCNC: 148 MG/DL (ref 65–140)
GLUCOSE SERPL-MCNC: 153 MG/DL (ref 65–140)
GLUCOSE SERPL-MCNC: 217 MG/DL (ref 65–140)
HCT VFR BLD AUTO: 40.9 % (ref 36.5–49.3)
HGB BLD-MCNC: 13.3 G/DL (ref 12–17)
IMM GRANULOCYTES # BLD AUTO: 0.03 THOUSAND/UL (ref 0–0.2)
IMM GRANULOCYTES NFR BLD AUTO: 0 % (ref 0–2)
INR PPP: 2.58 (ref 0.86–1.17)
LYMPHOCYTES # BLD AUTO: 1.75 THOUSANDS/ΜL (ref 0.6–4.47)
LYMPHOCYTES NFR BLD AUTO: 21 % (ref 14–44)
MCH RBC QN AUTO: 30.6 PG (ref 26.8–34.3)
MCHC RBC AUTO-ENTMCNC: 32.5 G/DL (ref 31.4–37.4)
MCV RBC AUTO: 94 FL (ref 82–98)
MONOCYTES # BLD AUTO: 0.59 THOUSAND/ΜL (ref 0.17–1.22)
MONOCYTES NFR BLD AUTO: 7 % (ref 4–12)
NEUTROPHILS # BLD AUTO: 5.52 THOUSANDS/ΜL (ref 1.85–7.62)
NEUTS SEG NFR BLD AUTO: 69 % (ref 43–75)
NRBC BLD AUTO-RTO: 0 /100 WBCS
NT-PROBNP SERPL-MCNC: 2700 PG/ML
PLATELET # BLD AUTO: 201 THOUSANDS/UL (ref 149–390)
PMV BLD AUTO: 9.4 FL (ref 8.9–12.7)
POTASSIUM SERPL-SCNC: 3.9 MMOL/L (ref 3.5–5.3)
PROT SERPL-MCNC: 7.5 G/DL (ref 6.4–8.2)
PROTHROMBIN TIME: 26.9 SECONDS (ref 11.8–14.2)
QRS AXIS: -38 DEGREES
QRSD INTERVAL: 84 MS
QT INTERVAL: 376 MS
QTC INTERVAL: 425 MS
RBC # BLD AUTO: 4.35 MILLION/UL (ref 3.88–5.62)
SODIUM SERPL-SCNC: 140 MMOL/L (ref 136–145)
T WAVE AXIS: 55 DEGREES
TROPONIN I SERPL-MCNC: 0.07 NG/ML
TROPONIN I SERPL-MCNC: 0.1 NG/ML
TROPONIN I SERPL-MCNC: 0.11 NG/ML
VENTRICULAR RATE: 77 BPM
WBC # BLD AUTO: 8.18 THOUSAND/UL (ref 4.31–10.16)

## 2019-06-12 PROCEDURE — 82948 REAGENT STRIP/BLOOD GLUCOSE: CPT

## 2019-06-12 PROCEDURE — 85025 COMPLETE CBC W/AUTO DIFF WBC: CPT | Performed by: EMERGENCY MEDICINE

## 2019-06-12 PROCEDURE — 85610 PROTHROMBIN TIME: CPT | Performed by: EMERGENCY MEDICINE

## 2019-06-12 PROCEDURE — 93005 ELECTROCARDIOGRAM TRACING: CPT

## 2019-06-12 PROCEDURE — 99220 PR INITIAL OBSERVATION CARE/DAY 70 MINUTES: CPT | Performed by: INTERNAL MEDICINE

## 2019-06-12 PROCEDURE — 93306 TTE W/DOPPLER COMPLETE: CPT

## 2019-06-12 PROCEDURE — 99285 EMERGENCY DEPT VISIT HI MDM: CPT

## 2019-06-12 PROCEDURE — 84484 ASSAY OF TROPONIN QUANT: CPT | Performed by: INTERNAL MEDICINE

## 2019-06-12 PROCEDURE — 71046 X-RAY EXAM CHEST 2 VIEWS: CPT

## 2019-06-12 PROCEDURE — 93923 UPR/LXTR ART STDY 3+ LVLS: CPT

## 2019-06-12 PROCEDURE — 84484 ASSAY OF TROPONIN QUANT: CPT | Performed by: EMERGENCY MEDICINE

## 2019-06-12 PROCEDURE — 85730 THROMBOPLASTIN TIME PARTIAL: CPT | Performed by: EMERGENCY MEDICINE

## 2019-06-12 PROCEDURE — 80053 COMPREHEN METABOLIC PANEL: CPT | Performed by: EMERGENCY MEDICINE

## 2019-06-12 PROCEDURE — 83880 ASSAY OF NATRIURETIC PEPTIDE: CPT | Performed by: EMERGENCY MEDICINE

## 2019-06-12 PROCEDURE — 99205 OFFICE O/P NEW HI 60 MIN: CPT | Performed by: INTERNAL MEDICINE

## 2019-06-12 PROCEDURE — 93010 ELECTROCARDIOGRAM REPORT: CPT | Performed by: INTERNAL MEDICINE

## 2019-06-12 PROCEDURE — 36415 COLL VENOUS BLD VENIPUNCTURE: CPT | Performed by: EMERGENCY MEDICINE

## 2019-06-12 PROCEDURE — 99284 EMERGENCY DEPT VISIT MOD MDM: CPT | Performed by: EMERGENCY MEDICINE

## 2019-06-12 RX ORDER — LEVOTHYROXINE SODIUM 0.07 MG/1
75 TABLET ORAL DAILY
Status: DISCONTINUED | OUTPATIENT
Start: 2019-06-12 | End: 2019-06-16 | Stop reason: HOSPADM

## 2019-06-12 RX ORDER — TAMSULOSIN HYDROCHLORIDE 0.4 MG/1
0.4 CAPSULE ORAL DAILY
Status: DISCONTINUED | OUTPATIENT
Start: 2019-06-12 | End: 2019-06-16 | Stop reason: HOSPADM

## 2019-06-12 RX ORDER — ATORVASTATIN CALCIUM 40 MG/1
40 TABLET, FILM COATED ORAL EVERY EVENING
Status: DISCONTINUED | OUTPATIENT
Start: 2019-06-12 | End: 2019-06-16 | Stop reason: HOSPADM

## 2019-06-12 RX ORDER — ASPIRIN 81 MG/1
324 TABLET, CHEWABLE ORAL ONCE
Status: COMPLETED | OUTPATIENT
Start: 2019-06-12 | End: 2019-06-12

## 2019-06-12 RX ORDER — ONDANSETRON 2 MG/ML
4 INJECTION INTRAMUSCULAR; INTRAVENOUS EVERY 6 HOURS PRN
Status: DISCONTINUED | OUTPATIENT
Start: 2019-06-12 | End: 2019-06-16 | Stop reason: HOSPADM

## 2019-06-12 RX ORDER — ACETAMINOPHEN 325 MG/1
650 TABLET ORAL EVERY 4 HOURS PRN
Status: DISCONTINUED | OUTPATIENT
Start: 2019-06-12 | End: 2019-06-16 | Stop reason: HOSPADM

## 2019-06-12 RX ORDER — ASPIRIN 81 MG/1
81 TABLET ORAL DAILY
Status: DISCONTINUED | OUTPATIENT
Start: 2019-06-12 | End: 2019-06-16 | Stop reason: HOSPADM

## 2019-06-12 RX ORDER — WARFARIN SODIUM 3 MG/1
3 TABLET ORAL DAILY
Status: DISCONTINUED | OUTPATIENT
Start: 2019-06-12 | End: 2019-06-16 | Stop reason: HOSPADM

## 2019-06-12 RX ORDER — METOPROLOL TARTRATE 50 MG/1
50 TABLET, FILM COATED ORAL 2 TIMES DAILY WITH MEALS
Status: DISCONTINUED | OUTPATIENT
Start: 2019-06-12 | End: 2019-06-16 | Stop reason: HOSPADM

## 2019-06-12 RX ADMIN — ATORVASTATIN CALCIUM 40 MG: 40 TABLET, FILM COATED ORAL at 18:00

## 2019-06-12 RX ADMIN — INSULIN LISPRO 2 UNITS: 100 INJECTION, SOLUTION INTRAVENOUS; SUBCUTANEOUS at 18:00

## 2019-06-12 RX ADMIN — METOPROLOL TARTRATE 50 MG: 50 TABLET, FILM COATED ORAL at 18:00

## 2019-06-12 RX ADMIN — ASPIRIN 81 MG: 81 TABLET, COATED ORAL at 13:09

## 2019-06-12 RX ADMIN — TAMSULOSIN HYDROCHLORIDE 0.4 MG: 0.4 CAPSULE ORAL at 13:09

## 2019-06-12 RX ADMIN — LEVOTHYROXINE SODIUM 75 MCG: 75 TABLET ORAL at 13:09

## 2019-06-12 RX ADMIN — WARFARIN SODIUM 3 MG: 3 TABLET ORAL at 18:00

## 2019-06-12 RX ADMIN — ASPIRIN 81 MG 324 MG: 81 TABLET ORAL at 09:42

## 2019-06-13 ENCOUNTER — APPOINTMENT (OUTPATIENT)
Dept: NUCLEAR MEDICINE | Facility: HOSPITAL | Age: 84
DRG: 690 | End: 2019-06-13
Payer: MEDICARE

## 2019-06-13 ENCOUNTER — APPOINTMENT (OUTPATIENT)
Dept: NON INVASIVE DIAGNOSTICS | Facility: HOSPITAL | Age: 84
DRG: 690 | End: 2019-06-13
Payer: MEDICARE

## 2019-06-13 ENCOUNTER — APPOINTMENT (OUTPATIENT)
Dept: CT IMAGING | Facility: HOSPITAL | Age: 84
DRG: 690 | End: 2019-06-13
Payer: MEDICARE

## 2019-06-13 VITALS
DIASTOLIC BLOOD PRESSURE: 80 MMHG | RESPIRATION RATE: 20 BRPM | BODY MASS INDEX: 30.38 KG/M2 | OXYGEN SATURATION: 96 % | HEIGHT: 64 IN | HEART RATE: 64 BPM | SYSTOLIC BLOOD PRESSURE: 138 MMHG | TEMPERATURE: 98.3 F

## 2019-06-13 PROBLEM — R06.02 SHORTNESS OF BREATH: Status: ACTIVE | Noted: 2019-06-13

## 2019-06-13 PROBLEM — G89.29 CHRONIC BILATERAL LOW BACK PAIN WITHOUT SCIATICA: Status: ACTIVE | Noted: 2019-06-13

## 2019-06-13 PROBLEM — R60.0 LEG EDEMA, LEFT: Status: ACTIVE | Noted: 2019-06-13

## 2019-06-13 PROBLEM — I70.202 TIBIAL ARTERY OCCLUSION, LEFT (HCC): Status: ACTIVE | Noted: 2019-06-13

## 2019-06-13 PROBLEM — M54.50 CHRONIC BILATERAL LOW BACK PAIN WITHOUT SCIATICA: Status: ACTIVE | Noted: 2019-06-13

## 2019-06-13 PROBLEM — R30.0 DYSURIA: Status: ACTIVE | Noted: 2019-06-13

## 2019-06-13 LAB
ANION GAP SERPL CALCULATED.3IONS-SCNC: 10 MMOL/L (ref 4–13)
BACTERIA UR QL AUTO: ABNORMAL /HPF
BILIRUB UR QL STRIP: NEGATIVE
BUN SERPL-MCNC: 33 MG/DL (ref 5–25)
CALCIUM SERPL-MCNC: 8.6 MG/DL (ref 8.3–10.1)
CHEST PAIN STATEMENT: NORMAL
CHLORIDE SERPL-SCNC: 106 MMOL/L (ref 100–108)
CHOLEST SERPL-MCNC: 83 MG/DL (ref 50–200)
CLARITY UR: CLEAR
CO2 SERPL-SCNC: 23 MMOL/L (ref 21–32)
COLOR UR: YELLOW
CREAT SERPL-MCNC: 3.31 MG/DL (ref 0.6–1.3)
GFR SERPL CREATININE-BSD FRML MDRD: 16 ML/MIN/1.73SQ M
GLUCOSE P FAST SERPL-MCNC: 112 MG/DL (ref 65–99)
GLUCOSE SERPL-MCNC: 112 MG/DL (ref 65–140)
GLUCOSE SERPL-MCNC: 131 MG/DL (ref 65–140)
GLUCOSE SERPL-MCNC: 164 MG/DL (ref 65–140)
GLUCOSE SERPL-MCNC: 178 MG/DL (ref 65–140)
GLUCOSE SERPL-MCNC: 202 MG/DL (ref 65–140)
GLUCOSE UR STRIP-MCNC: NEGATIVE MG/DL
HDLC SERPL-MCNC: 29 MG/DL (ref 40–60)
HGB UR QL STRIP.AUTO: ABNORMAL
KETONES UR STRIP-MCNC: NEGATIVE MG/DL
LDLC SERPL CALC-MCNC: 31 MG/DL (ref 0–100)
LEUKOCYTE ESTERASE UR QL STRIP: ABNORMAL
MAX DIASTOLIC BP: 90 MMHG
MAX HEART RATE: 121 BPM
MAX PREDICTED HEART RATE: 133 BPM
MAX. SYSTOLIC BP: 205 MMHG
NITRITE UR QL STRIP: NEGATIVE
NON-SQ EPI CELLS URNS QL MICRO: ABNORMAL /HPF
NONHDLC SERPL-MCNC: 54 MG/DL
PH UR STRIP.AUTO: 5.5 [PH]
POTASSIUM SERPL-SCNC: 3.9 MMOL/L (ref 3.5–5.3)
PROT UR STRIP-MCNC: ABNORMAL MG/DL
PROTOCOL NAME: NORMAL
RBC #/AREA URNS AUTO: ABNORMAL /HPF
REASON FOR TERMINATION: NORMAL
SODIUM SERPL-SCNC: 139 MMOL/L (ref 136–145)
SP GR UR STRIP.AUTO: 1.02 (ref 1–1.03)
TARGET HR FORMULA: NORMAL
TEST INDICATION: NORMAL
TIME IN EXERCISE PHASE: NORMAL
TRIGL SERPL-MCNC: 117 MG/DL
TSH SERPL DL<=0.05 MIU/L-ACNC: 2.31 UIU/ML (ref 0.36–3.74)
UROBILINOGEN UR QL STRIP.AUTO: 0.2 E.U./DL
WBC #/AREA URNS AUTO: ABNORMAL /HPF

## 2019-06-13 PROCEDURE — G8987 SELF CARE CURRENT STATUS: HCPCS

## 2019-06-13 PROCEDURE — 80061 LIPID PANEL: CPT | Performed by: INTERNAL MEDICINE

## 2019-06-13 PROCEDURE — 93017 CV STRESS TEST TRACING ONLY: CPT

## 2019-06-13 PROCEDURE — 93016 CV STRESS TEST SUPVJ ONLY: CPT | Performed by: INTERNAL MEDICINE

## 2019-06-13 PROCEDURE — 97116 GAIT TRAINING THERAPY: CPT

## 2019-06-13 PROCEDURE — 87077 CULTURE AEROBIC IDENTIFY: CPT | Performed by: PHYSICIAN ASSISTANT

## 2019-06-13 PROCEDURE — G8988 SELF CARE GOAL STATUS: HCPCS

## 2019-06-13 PROCEDURE — 99225 PR SBSQ OBSERVATION CARE/DAY 25 MINUTES: CPT | Performed by: PHYSICIAN ASSISTANT

## 2019-06-13 PROCEDURE — G8978 MOBILITY CURRENT STATUS: HCPCS

## 2019-06-13 PROCEDURE — 70450 CT HEAD/BRAIN W/O DYE: CPT

## 2019-06-13 PROCEDURE — 93018 CV STRESS TEST I&R ONLY: CPT | Performed by: INTERNAL MEDICINE

## 2019-06-13 PROCEDURE — 82948 REAGENT STRIP/BLOOD GLUCOSE: CPT

## 2019-06-13 PROCEDURE — 80048 BASIC METABOLIC PNL TOTAL CA: CPT | Performed by: INTERNAL MEDICINE

## 2019-06-13 PROCEDURE — G8979 MOBILITY GOAL STATUS: HCPCS

## 2019-06-13 PROCEDURE — 99232 SBSQ HOSP IP/OBS MODERATE 35: CPT | Performed by: INTERNAL MEDICINE

## 2019-06-13 PROCEDURE — 84443 ASSAY THYROID STIM HORMONE: CPT | Performed by: PHYSICIAN ASSISTANT

## 2019-06-13 PROCEDURE — 78452 HT MUSCLE IMAGE SPECT MULT: CPT | Performed by: INTERNAL MEDICINE

## 2019-06-13 PROCEDURE — 93923 UPR/LXTR ART STDY 3+ LVLS: CPT | Performed by: SURGERY

## 2019-06-13 PROCEDURE — 93306 TTE W/DOPPLER COMPLETE: CPT | Performed by: INTERNAL MEDICINE

## 2019-06-13 PROCEDURE — 97167 OT EVAL HIGH COMPLEX 60 MIN: CPT

## 2019-06-13 PROCEDURE — A9502 TC99M TETROFOSMIN: HCPCS

## 2019-06-13 PROCEDURE — 78452 HT MUSCLE IMAGE SPECT MULT: CPT

## 2019-06-13 PROCEDURE — 97163 PT EVAL HIGH COMPLEX 45 MIN: CPT

## 2019-06-13 PROCEDURE — 87086 URINE CULTURE/COLONY COUNT: CPT | Performed by: PHYSICIAN ASSISTANT

## 2019-06-13 PROCEDURE — 87186 SC STD MICRODIL/AGAR DIL: CPT | Performed by: PHYSICIAN ASSISTANT

## 2019-06-13 PROCEDURE — 81001 URINALYSIS AUTO W/SCOPE: CPT | Performed by: PHYSICIAN ASSISTANT

## 2019-06-13 RX ORDER — FLUTICASONE PROPIONATE 50 MCG
1 SPRAY, SUSPENSION (ML) NASAL DAILY
Status: DISCONTINUED | OUTPATIENT
Start: 2019-06-13 | End: 2019-06-16 | Stop reason: HOSPADM

## 2019-06-13 RX ORDER — RANOLAZINE 500 MG/1
500 TABLET, EXTENDED RELEASE ORAL EVERY 12 HOURS SCHEDULED
Status: DISCONTINUED | OUTPATIENT
Start: 2019-06-13 | End: 2019-06-16 | Stop reason: HOSPADM

## 2019-06-13 RX ORDER — AMLODIPINE BESYLATE 5 MG/1
5 TABLET ORAL DAILY
Status: DISCONTINUED | OUTPATIENT
Start: 2019-06-13 | End: 2019-06-16 | Stop reason: HOSPADM

## 2019-06-13 RX ADMIN — INSULIN LISPRO 1 UNITS: 100 INJECTION, SOLUTION INTRAVENOUS; SUBCUTANEOUS at 17:09

## 2019-06-13 RX ADMIN — ACETAMINOPHEN 650 MG: 325 TABLET, FILM COATED ORAL at 22:20

## 2019-06-13 RX ADMIN — INSULIN LISPRO 1 UNITS: 100 INJECTION, SOLUTION INTRAVENOUS; SUBCUTANEOUS at 22:21

## 2019-06-13 RX ADMIN — RANOLAZINE 500 MG: 500 TABLET, FILM COATED, EXTENDED RELEASE ORAL at 22:20

## 2019-06-13 RX ADMIN — ASPIRIN 81 MG: 81 TABLET, COATED ORAL at 12:42

## 2019-06-13 RX ADMIN — INSULIN LISPRO 1 UNITS: 100 INJECTION, SOLUTION INTRAVENOUS; SUBCUTANEOUS at 12:50

## 2019-06-13 RX ADMIN — CEFTRIAXONE SODIUM 1000 MG: 10 INJECTION, POWDER, FOR SOLUTION INTRAVENOUS at 18:33

## 2019-06-13 RX ADMIN — METOPROLOL TARTRATE 50 MG: 50 TABLET, FILM COATED ORAL at 17:09

## 2019-06-13 RX ADMIN — REGADENOSON 0.4 MG: 0.08 INJECTION, SOLUTION INTRAVENOUS at 10:20

## 2019-06-13 RX ADMIN — ONDANSETRON 4 MG: 2 INJECTION INTRAMUSCULAR; INTRAVENOUS at 10:59

## 2019-06-13 RX ADMIN — LEVOTHYROXINE SODIUM 75 MCG: 75 TABLET ORAL at 12:43

## 2019-06-13 RX ADMIN — RANOLAZINE 500 MG: 500 TABLET, FILM COATED, EXTENDED RELEASE ORAL at 12:49

## 2019-06-13 RX ADMIN — WARFARIN SODIUM 3 MG: 3 TABLET ORAL at 17:08

## 2019-06-13 RX ADMIN — ATORVASTATIN CALCIUM 40 MG: 40 TABLET, FILM COATED ORAL at 17:08

## 2019-06-13 RX ADMIN — FLUTICASONE PROPIONATE 1 SPRAY: 50 SPRAY, METERED NASAL at 22:25

## 2019-06-13 RX ADMIN — AMLODIPINE BESYLATE 5 MG: 5 TABLET ORAL at 12:49

## 2019-06-13 RX ADMIN — TAMSULOSIN HYDROCHLORIDE 0.4 MG: 0.4 CAPSULE ORAL at 12:41

## 2019-06-14 PROBLEM — N39.0 UTI (URINARY TRACT INFECTION): Status: ACTIVE | Noted: 2019-06-13

## 2019-06-14 LAB
GLUCOSE SERPL-MCNC: 112 MG/DL (ref 65–140)
GLUCOSE SERPL-MCNC: 168 MG/DL (ref 65–140)
GLUCOSE SERPL-MCNC: 179 MG/DL (ref 65–140)
GLUCOSE SERPL-MCNC: 195 MG/DL (ref 65–140)

## 2019-06-14 PROCEDURE — 97535 SELF CARE MNGMENT TRAINING: CPT

## 2019-06-14 PROCEDURE — 97116 GAIT TRAINING THERAPY: CPT

## 2019-06-14 PROCEDURE — G8996 SWALLOW CURRENT STATUS: HCPCS

## 2019-06-14 PROCEDURE — 82948 REAGENT STRIP/BLOOD GLUCOSE: CPT

## 2019-06-14 PROCEDURE — 92610 EVALUATE SWALLOWING FUNCTION: CPT

## 2019-06-14 PROCEDURE — G8997 SWALLOW GOAL STATUS: HCPCS

## 2019-06-14 PROCEDURE — 97110 THERAPEUTIC EXERCISES: CPT

## 2019-06-14 PROCEDURE — 99232 SBSQ HOSP IP/OBS MODERATE 35: CPT | Performed by: PHYSICIAN ASSISTANT

## 2019-06-14 PROCEDURE — 99232 SBSQ HOSP IP/OBS MODERATE 35: CPT | Performed by: INTERNAL MEDICINE

## 2019-06-14 PROCEDURE — 86618 LYME DISEASE ANTIBODY: CPT | Performed by: INTERNAL MEDICINE

## 2019-06-14 RX ADMIN — METOPROLOL TARTRATE 50 MG: 50 TABLET, FILM COATED ORAL at 16:35

## 2019-06-14 RX ADMIN — INSULIN LISPRO 1 UNITS: 100 INJECTION, SOLUTION INTRAVENOUS; SUBCUTANEOUS at 16:37

## 2019-06-14 RX ADMIN — LEVOTHYROXINE SODIUM 75 MCG: 75 TABLET ORAL at 08:26

## 2019-06-14 RX ADMIN — TAMSULOSIN HYDROCHLORIDE 0.4 MG: 0.4 CAPSULE ORAL at 08:26

## 2019-06-14 RX ADMIN — FLUTICASONE PROPIONATE 1 SPRAY: 50 SPRAY, METERED NASAL at 21:48

## 2019-06-14 RX ADMIN — RANOLAZINE 500 MG: 500 TABLET, FILM COATED, EXTENDED RELEASE ORAL at 21:47

## 2019-06-14 RX ADMIN — CEFTRIAXONE SODIUM 1000 MG: 10 INJECTION, POWDER, FOR SOLUTION INTRAVENOUS at 17:32

## 2019-06-14 RX ADMIN — ASPIRIN 81 MG: 81 TABLET, COATED ORAL at 08:26

## 2019-06-14 RX ADMIN — ATORVASTATIN CALCIUM 40 MG: 40 TABLET, FILM COATED ORAL at 17:32

## 2019-06-14 RX ADMIN — AMLODIPINE BESYLATE 5 MG: 5 TABLET ORAL at 08:26

## 2019-06-14 RX ADMIN — METOPROLOL TARTRATE 50 MG: 50 TABLET, FILM COATED ORAL at 08:26

## 2019-06-14 RX ADMIN — WARFARIN SODIUM 3 MG: 3 TABLET ORAL at 17:32

## 2019-06-14 RX ADMIN — RANOLAZINE 500 MG: 500 TABLET, FILM COATED, EXTENDED RELEASE ORAL at 08:26

## 2019-06-15 LAB
B BURGDOR IGG SER IA-ACNC: 0.66
B BURGDOR IGM SER IA-ACNC: 0.16
BACTERIA UR CULT: ABNORMAL
GLUCOSE SERPL-MCNC: 136 MG/DL (ref 65–140)
GLUCOSE SERPL-MCNC: 144 MG/DL (ref 65–140)
GLUCOSE SERPL-MCNC: 178 MG/DL (ref 65–140)
GLUCOSE SERPL-MCNC: 230 MG/DL (ref 65–140)

## 2019-06-15 PROCEDURE — 99232 SBSQ HOSP IP/OBS MODERATE 35: CPT | Performed by: PHYSICIAN ASSISTANT

## 2019-06-15 PROCEDURE — 97530 THERAPEUTIC ACTIVITIES: CPT

## 2019-06-15 PROCEDURE — 82948 REAGENT STRIP/BLOOD GLUCOSE: CPT

## 2019-06-15 PROCEDURE — 97535 SELF CARE MNGMENT TRAINING: CPT

## 2019-06-15 RX ORDER — POLYETHYLENE GLYCOL 3350 17 G/17G
17 POWDER, FOR SOLUTION ORAL DAILY PRN
Status: DISCONTINUED | OUTPATIENT
Start: 2019-06-15 | End: 2019-06-16 | Stop reason: HOSPADM

## 2019-06-15 RX ORDER — BISACODYL 10 MG
10 SUPPOSITORY, RECTAL RECTAL DAILY PRN
Status: DISCONTINUED | OUTPATIENT
Start: 2019-06-15 | End: 2019-06-16 | Stop reason: HOSPADM

## 2019-06-15 RX ORDER — DOCUSATE SODIUM 100 MG/1
100 CAPSULE, LIQUID FILLED ORAL 2 TIMES DAILY
Status: DISCONTINUED | OUTPATIENT
Start: 2019-06-15 | End: 2019-06-16 | Stop reason: HOSPADM

## 2019-06-15 RX ADMIN — METOPROLOL TARTRATE 50 MG: 50 TABLET, FILM COATED ORAL at 08:57

## 2019-06-15 RX ADMIN — LEVOTHYROXINE SODIUM 75 MCG: 75 TABLET ORAL at 08:57

## 2019-06-15 RX ADMIN — ASPIRIN 81 MG: 81 TABLET, COATED ORAL at 08:57

## 2019-06-15 RX ADMIN — DOCUSATE SODIUM 100 MG: 100 CAPSULE, LIQUID FILLED ORAL at 17:29

## 2019-06-15 RX ADMIN — WARFARIN SODIUM 3 MG: 3 TABLET ORAL at 17:30

## 2019-06-15 RX ADMIN — METOPROLOL TARTRATE 50 MG: 50 TABLET, FILM COATED ORAL at 17:29

## 2019-06-15 RX ADMIN — FLUTICASONE PROPIONATE 1 SPRAY: 50 SPRAY, METERED NASAL at 22:09

## 2019-06-15 RX ADMIN — ATORVASTATIN CALCIUM 40 MG: 40 TABLET, FILM COATED ORAL at 17:29

## 2019-06-15 RX ADMIN — POLYETHYLENE GLYCOL 3350 17 G: 17 POWDER, FOR SOLUTION ORAL at 19:36

## 2019-06-15 RX ADMIN — INSULIN LISPRO 2 UNITS: 100 INJECTION, SOLUTION INTRAVENOUS; SUBCUTANEOUS at 12:23

## 2019-06-15 RX ADMIN — RANOLAZINE 500 MG: 500 TABLET, FILM COATED, EXTENDED RELEASE ORAL at 08:57

## 2019-06-15 RX ADMIN — TAMSULOSIN HYDROCHLORIDE 0.4 MG: 0.4 CAPSULE ORAL at 08:57

## 2019-06-15 RX ADMIN — RANOLAZINE 500 MG: 500 TABLET, FILM COATED, EXTENDED RELEASE ORAL at 21:53

## 2019-06-15 RX ADMIN — AMLODIPINE BESYLATE 5 MG: 5 TABLET ORAL at 08:57

## 2019-06-15 RX ADMIN — CEFTRIAXONE SODIUM 1000 MG: 10 INJECTION, POWDER, FOR SOLUTION INTRAVENOUS at 17:30

## 2019-06-16 VITALS
BODY MASS INDEX: 28.1 KG/M2 | HEART RATE: 69 BPM | RESPIRATION RATE: 18 BRPM | SYSTOLIC BLOOD PRESSURE: 161 MMHG | WEIGHT: 168.65 LBS | TEMPERATURE: 97.8 F | DIASTOLIC BLOOD PRESSURE: 70 MMHG | HEIGHT: 65 IN | OXYGEN SATURATION: 96 %

## 2019-06-16 LAB
ANION GAP SERPL CALCULATED.3IONS-SCNC: 12 MMOL/L (ref 4–13)
BASOPHILS # BLD AUTO: 0.04 THOUSANDS/ΜL (ref 0–0.1)
BASOPHILS NFR BLD AUTO: 1 % (ref 0–1)
BUN SERPL-MCNC: 43 MG/DL (ref 5–25)
CALCIUM SERPL-MCNC: 8.6 MG/DL (ref 8.3–10.1)
CHLORIDE SERPL-SCNC: 102 MMOL/L (ref 100–108)
CO2 SERPL-SCNC: 22 MMOL/L (ref 21–32)
CREAT SERPL-MCNC: 3.57 MG/DL (ref 0.6–1.3)
EOSINOPHIL # BLD AUTO: 0.38 THOUSAND/ΜL (ref 0–0.61)
EOSINOPHIL NFR BLD AUTO: 4 % (ref 0–6)
ERYTHROCYTE [DISTWIDTH] IN BLOOD BY AUTOMATED COUNT: 14.2 % (ref 11.6–15.1)
GFR SERPL CREATININE-BSD FRML MDRD: 14 ML/MIN/1.73SQ M
GLUCOSE SERPL-MCNC: 125 MG/DL (ref 65–140)
GLUCOSE SERPL-MCNC: 139 MG/DL (ref 65–140)
GLUCOSE SERPL-MCNC: 186 MG/DL (ref 65–140)
HCT VFR BLD AUTO: 38.8 % (ref 36.5–49.3)
HGB BLD-MCNC: 12.2 G/DL (ref 12–17)
IMM GRANULOCYTES # BLD AUTO: 0.04 THOUSAND/UL (ref 0–0.2)
IMM GRANULOCYTES NFR BLD AUTO: 1 % (ref 0–2)
INR PPP: 3.89 (ref 0.86–1.17)
LYMPHOCYTES # BLD AUTO: 2.39 THOUSANDS/ΜL (ref 0.6–4.47)
LYMPHOCYTES NFR BLD AUTO: 27 % (ref 14–44)
MCH RBC QN AUTO: 29.6 PG (ref 26.8–34.3)
MCHC RBC AUTO-ENTMCNC: 31.4 G/DL (ref 31.4–37.4)
MCV RBC AUTO: 94 FL (ref 82–98)
MONOCYTES # BLD AUTO: 0.72 THOUSAND/ΜL (ref 0.17–1.22)
MONOCYTES NFR BLD AUTO: 8 % (ref 4–12)
NEUTROPHILS # BLD AUTO: 5.25 THOUSANDS/ΜL (ref 1.85–7.62)
NEUTS SEG NFR BLD AUTO: 59 % (ref 43–75)
NRBC BLD AUTO-RTO: 0 /100 WBCS
PLATELET # BLD AUTO: 178 THOUSANDS/UL (ref 149–390)
PMV BLD AUTO: 10.7 FL (ref 8.9–12.7)
POTASSIUM SERPL-SCNC: 4.1 MMOL/L (ref 3.5–5.3)
PROTHROMBIN TIME: 37 SECONDS (ref 11.8–14.2)
RBC # BLD AUTO: 4.12 MILLION/UL (ref 3.88–5.62)
SODIUM SERPL-SCNC: 136 MMOL/L (ref 136–145)
WBC # BLD AUTO: 8.82 THOUSAND/UL (ref 4.31–10.16)

## 2019-06-16 PROCEDURE — 99239 HOSP IP/OBS DSCHRG MGMT >30: CPT | Performed by: PHYSICIAN ASSISTANT

## 2019-06-16 PROCEDURE — 85610 PROTHROMBIN TIME: CPT | Performed by: PHYSICIAN ASSISTANT

## 2019-06-16 PROCEDURE — 85025 COMPLETE CBC W/AUTO DIFF WBC: CPT | Performed by: INTERNAL MEDICINE

## 2019-06-16 PROCEDURE — 80048 BASIC METABOLIC PNL TOTAL CA: CPT | Performed by: PHYSICIAN ASSISTANT

## 2019-06-16 PROCEDURE — 82948 REAGENT STRIP/BLOOD GLUCOSE: CPT

## 2019-06-16 RX ORDER — AMLODIPINE BESYLATE 5 MG/1
5 TABLET ORAL DAILY
Qty: 30 TABLET | Refills: 0 | Status: SHIPPED | OUTPATIENT
Start: 2019-06-17 | End: 2019-09-14 | Stop reason: HOSPADM

## 2019-06-16 RX ORDER — RANOLAZINE 500 MG/1
500 TABLET, EXTENDED RELEASE ORAL EVERY 12 HOURS SCHEDULED
Qty: 60 TABLET | Refills: 0 | Status: SHIPPED | OUTPATIENT
Start: 2019-06-16 | End: 2019-10-03 | Stop reason: SDUPTHER

## 2019-06-16 RX ORDER — WARFARIN SODIUM 3 MG/1
3 TABLET ORAL DAILY
Qty: 1 TABLET | Refills: 0 | Status: SHIPPED | OUTPATIENT
Start: 2019-06-17 | End: 2019-07-12 | Stop reason: HOSPADM

## 2019-06-16 RX ORDER — ASPIRIN 81 MG/1
81 TABLET ORAL DAILY
Qty: 30 TABLET | Refills: 0 | Status: SHIPPED | OUTPATIENT
Start: 2019-06-17 | End: 2021-05-15 | Stop reason: HOSPADM

## 2019-06-16 RX ORDER — CEPHALEXIN 250 MG/1
250 CAPSULE ORAL EVERY 24 HOURS
Qty: 4 CAPSULE | Refills: 0 | Status: SHIPPED | OUTPATIENT
Start: 2019-06-16 | End: 2019-06-20

## 2019-06-16 RX ADMIN — METOPROLOL TARTRATE 50 MG: 50 TABLET, FILM COATED ORAL at 08:37

## 2019-06-16 RX ADMIN — DOCUSATE SODIUM 100 MG: 100 CAPSULE, LIQUID FILLED ORAL at 08:37

## 2019-06-16 RX ADMIN — TAMSULOSIN HYDROCHLORIDE 0.4 MG: 0.4 CAPSULE ORAL at 08:37

## 2019-06-16 RX ADMIN — AMLODIPINE BESYLATE 5 MG: 5 TABLET ORAL at 08:37

## 2019-06-16 RX ADMIN — RANOLAZINE 500 MG: 500 TABLET, FILM COATED, EXTENDED RELEASE ORAL at 08:37

## 2019-06-16 RX ADMIN — ASPIRIN 81 MG: 81 TABLET, COATED ORAL at 08:37

## 2019-06-16 RX ADMIN — LEVOTHYROXINE SODIUM 75 MCG: 75 TABLET ORAL at 08:37

## 2019-06-17 ENCOUNTER — TELEPHONE (OUTPATIENT)
Dept: FAMILY MEDICINE CLINIC | Facility: CLINIC | Age: 84
End: 2019-06-17

## 2019-06-18 ENCOUNTER — TRANSITIONAL CARE MANAGEMENT (OUTPATIENT)
Dept: FAMILY MEDICINE CLINIC | Facility: CLINIC | Age: 84
End: 2019-06-18

## 2019-06-25 ENCOUNTER — EPISODE CHANGES (OUTPATIENT)
Dept: CASE MANAGEMENT | Facility: HOSPITAL | Age: 84
End: 2019-06-25

## 2019-06-27 DIAGNOSIS — E11.9 TYPE 2 DIABETES MELLITUS WITHOUT COMPLICATION, WITH LONG-TERM CURRENT USE OF INSULIN (HCC): ICD-10-CM

## 2019-06-27 DIAGNOSIS — Z79.4 TYPE 2 DIABETES MELLITUS WITHOUT COMPLICATION, WITH LONG-TERM CURRENT USE OF INSULIN (HCC): ICD-10-CM

## 2019-06-27 RX ORDER — LINAGLIPTIN 5 MG/1
TABLET, FILM COATED ORAL
Qty: 90 TABLET | Refills: 3 | Status: SHIPPED | OUTPATIENT
Start: 2019-06-27 | End: 2020-07-11

## 2019-06-28 ENCOUNTER — TELEPHONE (OUTPATIENT)
Dept: FAMILY MEDICINE CLINIC | Facility: CLINIC | Age: 84
End: 2019-06-28

## 2019-07-01 ENCOUNTER — TRANSCRIBE ORDERS (OUTPATIENT)
Dept: ADMINISTRATIVE | Facility: HOSPITAL | Age: 84
End: 2019-07-01

## 2019-07-01 DIAGNOSIS — M54.5 LOW BACK PAIN, UNSPECIFIED BACK PAIN LATERALITY, UNSPECIFIED CHRONICITY, WITH SCIATICA PRESENCE UNSPECIFIED: ICD-10-CM

## 2019-07-01 DIAGNOSIS — M50.122 DISORDER OF INTERVERTEBRAL DISC AT C5-C6 LEVEL WITH RADICULOPATHY: ICD-10-CM

## 2019-07-01 DIAGNOSIS — M54.12 CERVICAL RADICULITIS: Primary | ICD-10-CM

## 2019-07-04 ENCOUNTER — APPOINTMENT (EMERGENCY)
Dept: CT IMAGING | Facility: HOSPITAL | Age: 84
DRG: 872 | End: 2019-07-04
Payer: MEDICARE

## 2019-07-04 ENCOUNTER — PATIENT OUTREACH (OUTPATIENT)
Dept: CASE MANAGEMENT | Facility: HOSPITAL | Age: 84
End: 2019-07-04

## 2019-07-04 ENCOUNTER — HOSPITAL ENCOUNTER (INPATIENT)
Facility: HOSPITAL | Age: 84
LOS: 8 days | Discharge: NURSING, REHAB OR LONG-TERM CARE | DRG: 872 | End: 2019-07-12
Attending: EMERGENCY MEDICINE | Admitting: HOSPITALIST
Payer: MEDICARE

## 2019-07-04 ENCOUNTER — APPOINTMENT (EMERGENCY)
Dept: RADIOLOGY | Facility: HOSPITAL | Age: 84
DRG: 872 | End: 2019-07-04
Payer: MEDICARE

## 2019-07-04 DIAGNOSIS — N17.9 AKI (ACUTE KIDNEY INJURY) (HCC): ICD-10-CM

## 2019-07-04 DIAGNOSIS — R78.81 BACTEREMIA: ICD-10-CM

## 2019-07-04 DIAGNOSIS — A41.9 SEVERE SEPSIS (HCC): ICD-10-CM

## 2019-07-04 DIAGNOSIS — R33.9 URINARY RETENTION: ICD-10-CM

## 2019-07-04 DIAGNOSIS — N39.0 URINARY TRACT INFECTION: ICD-10-CM

## 2019-07-04 DIAGNOSIS — A41.9 SEPSIS (HCC): Primary | ICD-10-CM

## 2019-07-04 DIAGNOSIS — K59.00 CONSTIPATION: ICD-10-CM

## 2019-07-04 DIAGNOSIS — R65.20 SEVERE SEPSIS (HCC): ICD-10-CM

## 2019-07-04 PROBLEM — I50.32 CHRONIC DIASTOLIC CONGESTIVE HEART FAILURE (HCC): Status: ACTIVE | Noted: 2019-07-04

## 2019-07-04 PROBLEM — E87.20 LACTIC ACIDOSIS: Status: ACTIVE | Noted: 2019-07-04

## 2019-07-04 PROBLEM — E87.2 LACTIC ACIDOSIS: Status: ACTIVE | Noted: 2019-07-04

## 2019-07-04 PROBLEM — I50.30 DIASTOLIC CHF (HCC): Status: ACTIVE | Noted: 2019-07-04

## 2019-07-04 PROBLEM — E87.1 HYPONATREMIA: Status: ACTIVE | Noted: 2019-07-04

## 2019-07-04 LAB
ALBUMIN SERPL BCP-MCNC: 3 G/DL (ref 3.5–5)
ALP SERPL-CCNC: 191 U/L (ref 46–116)
ALT SERPL W P-5'-P-CCNC: 19 U/L (ref 12–78)
ANION GAP SERPL CALCULATED.3IONS-SCNC: 11 MMOL/L (ref 4–13)
ANISOCYTOSIS BLD QL SMEAR: PRESENT
APTT PPP: 46 SECONDS (ref 23–37)
AST SERPL W P-5'-P-CCNC: 35 U/L (ref 5–45)
BACTERIA UR QL AUTO: ABNORMAL /HPF
BASOPHILS # BLD MANUAL: 0 THOUSAND/UL (ref 0–0.1)
BASOPHILS NFR MAR MANUAL: 0 % (ref 0–1)
BILIRUB SERPL-MCNC: 1.2 MG/DL (ref 0.2–1)
BILIRUB UR QL STRIP: NEGATIVE
BUN SERPL-MCNC: 60 MG/DL (ref 5–25)
CALCIUM SERPL-MCNC: 7.9 MG/DL (ref 8.3–10.1)
CHLORIDE SERPL-SCNC: 93 MMOL/L (ref 100–108)
CLARITY UR: CLEAR
CO2 SERPL-SCNC: 24 MMOL/L (ref 21–32)
COLOR UR: YELLOW
CREAT SERPL-MCNC: 4.21 MG/DL (ref 0.6–1.3)
DOHLE BOD BLD QL SMEAR: PRESENT
EOSINOPHIL # BLD MANUAL: 0 THOUSAND/UL (ref 0–0.4)
EOSINOPHIL NFR BLD MANUAL: 0 % (ref 0–6)
ERYTHROCYTE [DISTWIDTH] IN BLOOD BY AUTOMATED COUNT: 14 % (ref 11.6–15.1)
GFR SERPL CREATININE-BSD FRML MDRD: 12 ML/MIN/1.73SQ M
GLUCOSE SERPL-MCNC: 172 MG/DL (ref 65–140)
GLUCOSE UR STRIP-MCNC: NEGATIVE MG/DL
HCT VFR BLD AUTO: 32.3 % (ref 36.5–49.3)
HGB BLD-MCNC: 10.7 G/DL (ref 12–17)
HGB UR QL STRIP.AUTO: ABNORMAL
HYPERCHROMIA BLD QL SMEAR: PRESENT
INR PPP: 2.28 (ref 0.84–1.19)
KETONES UR STRIP-MCNC: NEGATIVE MG/DL
LACTATE SERPL-SCNC: 1.7 MMOL/L (ref 0.5–2)
LACTATE SERPL-SCNC: 2.8 MMOL/L (ref 0.5–2)
LEUKOCYTE ESTERASE UR QL STRIP: ABNORMAL
LIPASE SERPL-CCNC: 264 U/L (ref 73–393)
LYMPHOCYTES # BLD AUTO: 0.3 THOUSAND/UL (ref 0.6–4.47)
LYMPHOCYTES # BLD AUTO: 3 % (ref 14–44)
MCH RBC QN AUTO: 30.4 PG (ref 26.8–34.3)
MCHC RBC AUTO-ENTMCNC: 33.1 G/DL (ref 31.4–37.4)
MCV RBC AUTO: 92 FL (ref 82–98)
METAMYELOCYTES NFR BLD MANUAL: 3 % (ref 0–1)
MONOCYTES # BLD AUTO: 0 THOUSAND/UL (ref 0–1.22)
MONOCYTES NFR BLD: 0 % (ref 4–12)
MYELOCYTES NFR BLD MANUAL: 2 % (ref 0–1)
NEUTROPHILS # BLD MANUAL: 9.09 THOUSAND/UL (ref 1.85–7.62)
NEUTS BAND NFR BLD MANUAL: 27 % (ref 0–8)
NEUTS SEG NFR BLD AUTO: 63 % (ref 43–75)
NITRITE UR QL STRIP: NEGATIVE
NON-SQ EPI CELLS URNS QL MICRO: ABNORMAL /HPF
NRBC BLD AUTO-RTO: 0 /100 WBCS
PH UR STRIP.AUTO: 6 [PH]
PLATELET # BLD AUTO: 116 THOUSANDS/UL (ref 149–390)
PLATELET BLD QL SMEAR: ABNORMAL
PMV BLD AUTO: 10.3 FL (ref 8.9–12.7)
POLYCHROMASIA BLD QL SMEAR: PRESENT
POTASSIUM SERPL-SCNC: 4 MMOL/L (ref 3.5–5.3)
PROCALCITONIN SERPL-MCNC: 167.59 NG/ML
PROT SERPL-MCNC: 6.6 G/DL (ref 6.4–8.2)
PROT UR STRIP-MCNC: ABNORMAL MG/DL
PROTHROMBIN TIME: 24.2 SECONDS (ref 11.6–14.5)
RBC # BLD AUTO: 3.52 MILLION/UL (ref 3.88–5.62)
RBC #/AREA URNS AUTO: ABNORMAL /HPF
SODIUM SERPL-SCNC: 128 MMOL/L (ref 136–145)
SP GR UR STRIP.AUTO: 1.01 (ref 1–1.03)
TOTAL CELLS COUNTED SPEC: 100
TOXIC GRANULES BLD QL SMEAR: PRESENT
UROBILINOGEN UR QL STRIP.AUTO: 0.2 E.U./DL
VARIANT LYMPHS # BLD AUTO: 2 %
WBC # BLD AUTO: 10.1 THOUSAND/UL (ref 4.31–10.16)
WBC #/AREA URNS AUTO: ABNORMAL /HPF

## 2019-07-04 PROCEDURE — 84145 PROCALCITONIN (PCT): CPT | Performed by: EMERGENCY MEDICINE

## 2019-07-04 PROCEDURE — 83690 ASSAY OF LIPASE: CPT | Performed by: EMERGENCY MEDICINE

## 2019-07-04 PROCEDURE — 99284 EMERGENCY DEPT VISIT MOD MDM: CPT | Performed by: EMERGENCY MEDICINE

## 2019-07-04 PROCEDURE — 99223 1ST HOSP IP/OBS HIGH 75: CPT | Performed by: HOSPITALIST

## 2019-07-04 PROCEDURE — 85007 BL SMEAR W/DIFF WBC COUNT: CPT | Performed by: EMERGENCY MEDICINE

## 2019-07-04 PROCEDURE — 99285 EMERGENCY DEPT VISIT HI MDM: CPT

## 2019-07-04 PROCEDURE — 84300 ASSAY OF URINE SODIUM: CPT | Performed by: HOSPITALIST

## 2019-07-04 PROCEDURE — 87040 BLOOD CULTURE FOR BACTERIA: CPT | Performed by: EMERGENCY MEDICINE

## 2019-07-04 PROCEDURE — 85027 COMPLETE CBC AUTOMATED: CPT | Performed by: EMERGENCY MEDICINE

## 2019-07-04 PROCEDURE — 83605 ASSAY OF LACTIC ACID: CPT | Performed by: EMERGENCY MEDICINE

## 2019-07-04 PROCEDURE — 83935 ASSAY OF URINE OSMOLALITY: CPT | Performed by: HOSPITALIST

## 2019-07-04 PROCEDURE — 87086 URINE CULTURE/COLONY COUNT: CPT | Performed by: EMERGENCY MEDICINE

## 2019-07-04 PROCEDURE — 85730 THROMBOPLASTIN TIME PARTIAL: CPT | Performed by: EMERGENCY MEDICINE

## 2019-07-04 PROCEDURE — 81001 URINALYSIS AUTO W/SCOPE: CPT | Performed by: EMERGENCY MEDICINE

## 2019-07-04 PROCEDURE — 82948 REAGENT STRIP/BLOOD GLUCOSE: CPT

## 2019-07-04 PROCEDURE — 87186 SC STD MICRODIL/AGAR DIL: CPT | Performed by: EMERGENCY MEDICINE

## 2019-07-04 PROCEDURE — 85610 PROTHROMBIN TIME: CPT | Performed by: EMERGENCY MEDICINE

## 2019-07-04 PROCEDURE — 71046 X-RAY EXAM CHEST 2 VIEWS: CPT

## 2019-07-04 PROCEDURE — 36415 COLL VENOUS BLD VENIPUNCTURE: CPT | Performed by: EMERGENCY MEDICINE

## 2019-07-04 PROCEDURE — 74176 CT ABD & PELVIS W/O CONTRAST: CPT

## 2019-07-04 PROCEDURE — 96365 THER/PROPH/DIAG IV INF INIT: CPT

## 2019-07-04 PROCEDURE — 80053 COMPREHEN METABOLIC PANEL: CPT | Performed by: EMERGENCY MEDICINE

## 2019-07-04 PROCEDURE — 87077 CULTURE AEROBIC IDENTIFY: CPT | Performed by: EMERGENCY MEDICINE

## 2019-07-04 PROCEDURE — 83605 ASSAY OF LACTIC ACID: CPT | Performed by: HOSPITALIST

## 2019-07-04 RX ORDER — ONDANSETRON 2 MG/ML
4 INJECTION INTRAMUSCULAR; INTRAVENOUS EVERY 6 HOURS PRN
Status: DISCONTINUED | OUTPATIENT
Start: 2019-07-04 | End: 2019-07-12 | Stop reason: HOSPADM

## 2019-07-04 RX ORDER — METOPROLOL TARTRATE 50 MG/1
50 TABLET, FILM COATED ORAL 2 TIMES DAILY WITH MEALS
Status: DISCONTINUED | OUTPATIENT
Start: 2019-07-04 | End: 2019-07-12 | Stop reason: HOSPADM

## 2019-07-04 RX ORDER — WARFARIN SODIUM 2 MG/1
2 TABLET ORAL
Status: DISCONTINUED | OUTPATIENT
Start: 2019-07-04 | End: 2019-07-06

## 2019-07-04 RX ORDER — ASPIRIN 81 MG/1
81 TABLET ORAL DAILY
Status: DISCONTINUED | OUTPATIENT
Start: 2019-07-05 | End: 2019-07-12 | Stop reason: HOSPADM

## 2019-07-04 RX ORDER — LEVOTHYROXINE SODIUM 0.07 MG/1
75 TABLET ORAL
Status: DISCONTINUED | OUTPATIENT
Start: 2019-07-05 | End: 2019-07-12 | Stop reason: HOSPADM

## 2019-07-04 RX ORDER — RANOLAZINE 500 MG/1
500 TABLET, EXTENDED RELEASE ORAL EVERY 12 HOURS SCHEDULED
Status: DISCONTINUED | OUTPATIENT
Start: 2019-07-04 | End: 2019-07-12 | Stop reason: HOSPADM

## 2019-07-04 RX ORDER — ATORVASTATIN CALCIUM 40 MG/1
40 TABLET, FILM COATED ORAL EVERY EVENING
Status: DISCONTINUED | OUTPATIENT
Start: 2019-07-04 | End: 2019-07-12 | Stop reason: HOSPADM

## 2019-07-04 RX ORDER — SODIUM CHLORIDE 9 MG/ML
125 INJECTION, SOLUTION INTRAVENOUS CONTINUOUS
Status: DISPENSED | OUTPATIENT
Start: 2019-07-04 | End: 2019-07-05

## 2019-07-04 RX ORDER — TAMSULOSIN HYDROCHLORIDE 0.4 MG/1
0.4 CAPSULE ORAL DAILY
Status: DISCONTINUED | OUTPATIENT
Start: 2019-07-05 | End: 2019-07-12 | Stop reason: HOSPADM

## 2019-07-04 RX ADMIN — SODIUM CHLORIDE 500 ML: 0.9 INJECTION, SOLUTION INTRAVENOUS at 16:15

## 2019-07-04 RX ADMIN — SODIUM CHLORIDE 125 ML/HR: 0.9 INJECTION, SOLUTION INTRAVENOUS at 19:16

## 2019-07-04 RX ADMIN — RANOLAZINE 500 MG: 500 TABLET, FILM COATED, EXTENDED RELEASE ORAL at 21:13

## 2019-07-04 RX ADMIN — WARFARIN SODIUM 2 MG: 2 TABLET ORAL at 19:18

## 2019-07-04 RX ADMIN — ATORVASTATIN CALCIUM 40 MG: 40 TABLET, FILM COATED ORAL at 19:16

## 2019-07-04 RX ADMIN — SODIUM CHLORIDE 500 ML: 0.9 INJECTION, SOLUTION INTRAVENOUS at 17:35

## 2019-07-04 RX ADMIN — CEFEPIME HYDROCHLORIDE 2000 MG: 2 INJECTION, POWDER, FOR SOLUTION INTRAVENOUS at 16:23

## 2019-07-04 NOTE — H&P
H&P- Nevin Torresors 1/29/1932, 80 y o  male MRN: [id number]    Unit/Bed#: -01 Encounter: [id number]    Primary Care Provider: Janit Gilford, MD   Date and time admitted to hospital: 7/4/2019  3:34 PM        * Severe sepsis ([facility] [address] )  Assessment & Plan  · POA, evidenced by fevers, leukocytosis with bandemia and elevated lactate  · Suspect urinary source  · Continue cefepime for now  · Follow Ucx/ Bcx  · Narrow as able      Lactic acidosis  Assessment & Plan  · Elevated to 2 8 on admission  · Continue to trend until normal     MAGUI (acute kidney injury) ([facility] [address] )  Assessment & Plan  · BsCr: 3 4; elevated to 4 5 on admission  · Suspect prerenal due to sepsis  · Cont IVF hydration  · avoid hypotension, nephrotoxins     Hyponatremia  Assessment & Plan  · NA: 128 on admission from 136 a few weeks ago; pt has evidence of volume overload but wouldn't hold off on hydrating until sepsis trend is clear  · Check [place] Plant  · Received NS in the ED; will continue hydration for sepsis and follow-up repeat BMP in the am     Chronic diastolic congestive heart failure (HCC)  Assessment & Plan  Wt Readings from Last 3 Encounters:   07/04/19 82 5 kg (181 lb 14 1 oz)   06/12/19 76 5 kg (168 lb 10 4 oz)   05/30/19 80 3 kg (177 lb)     monitor volume status with resuscitation; pt is already above weight from recent discharge so suspect he will need diuresis once infection adequately treated           Chronic right-sided low back pain without sciatica  Assessment & Plan  · Pt reports worsening low back pain when moved at [facility]  Describes shooting pain down right leg  · Unable to lift his R leg; no dorsiflexion  · Will check MRI lumbar spine as concern for myelopathy       Paroxysmal A-fib (HCC)  Assessment & Plan  · Cont BB  · INR daily; currently 2 2  · Continue warfarin 3mg daily       VTE Prophylaxis: Warfarin (Coumadin)  / sequential compression device   Code Status: full  POLST: POLST form is not discussed and not completed at this time  Anticipated Length of Stay:  Patient will be admitted on an Inpatient basis with an anticipated length of stay of  > 2 midnights  Justification for Hospital Stay: sepsis, lactic acidosis     Total Time for Visit, including Counseling / Coordination of Care: 1 hour  Greater than 50% of this total time spent on direct patient counseling and coordination of care  Chief Complaint:   Chills     History of Present Illness:    Oswaldo Gomez is a 80 y o  male history of diastolic heart failure, paroxysmal AFib with pacemaker in place on Coumadin who presents with an episode of fever with chills  Patient was recently in the hospital have been sent to [facility] for rehab  According to family and patient patient had not been doing well at rehab  Not making much progress with physical therapy  Over the last 2 days has not been feeling well however today complained of shaking chills and had measured temperature of 102°  Has been unable to empty his bladder completely but denies dysuria  No flank pain  Main complaint is right-sided back pain mostly with passive movement  Patient is unable to lift his right leg and reports decreased sensation in that leg when compared to left  He has no cough or shortness of breath  Able to lie flat without issue  Does report increased lower extremity swelling  Has had chronic left-sided swelling but now bilateral     Review of Systems:    Review of Systems   Constitutional: Positive for chills and fever  Negative for fatigue  HENT: Negative  Respiratory: Negative  Cardiovascular: Negative  Musculoskeletal: Positive for back pain  Skin: Negative  Neurological: Positive for weakness and numbness  All other systems reviewed and are negative        Past Medical and Surgical History:     Past Medical History:   Diagnosis Date    3-vessel coronary artery disease     last assessed: 08/15/2016    BPH (benign prostatic hyperplasia)     Chronic kidney disease     Diabetes mellitus ([facility] [address] )     Hyperlipidemia     Hypertension     Shingles 04/29/2019    SOB (shortness of breath)     Stroke [facility])        Past Surgical History:   Procedure Laterality Date    CARDIAC PACEMAKER PLACEMENT      CATARACT EXTRACTION      last assessed: 11/11/2015    CORONARY ARTERY BYPASS GRAFT      last assessed: 08/26/2015       Meds/Allergies:    Prior to Admission medications    Medication Sig Start Date End Date Taking? Authorizing Provider   amLODIPine (NORVASC) 5 mg tablet Take 1 tablet (5 mg total) by mouth daily 6/17/19  Yes Enrique Valdez PA-C   aspirin (ECOTRIN LOW STRENGTH) 81 mg EC tablet Take 1 tablet (81 mg total) by mouth daily 6/17/19  Yes Kimberly Butt PA-C   atorvastatin (LIPITOR) 40 mg tablet TAKE 1 TABLET BY MOUTH EVERY DAY  Patient taking differently: TAKE 1 TABLET BY MOUTH EVERY EVENING 12/31/18  Yes Kip Berrios MD   glucose blood test strip 1 each by Other route daily 12/3/18  Yes Bernice Muse MD   levothyroxine 75 mcg tablet TAKE 1 TABLET BY MOUTH EVERY DAY 3/31/19  Yes Bernice Muse MD   metoprolol tartrate (LOPRESSOR) 50 mg tablet TAKE 1 TABLET BY MOUTH TWICE A DAY WITH FOOD 1/3/19  Yes Bernice Muse MD   ranolazine (RANEXA) 500 mg 12 hr tablet Take 1 tablet (500 mg total) by mouth every 12 (twelve) hours 6/16/19  Yes Kimberly Butt PA-C   tamsulosin (FLOMAX) 0 4 mg TAKE ONE CAPSULE BY MOUTH ONCE DAILY  Patient taking differently: TAKE ONE CAPSULE BY MOUTH ONCE hs 12/31/18  Yes Kip Berrios MD   TRADJENTA 5 MG TABS TAKE 1 TABLET (5 MG) BY MOUTH DAILY 6/27/19  Yes MAURICE Schwartz   warfarin (COUMADIN) 3 mg tablet Take 1 tablet (3 mg total) by mouth daily DO NOT TAKE COUMADIN ON Sunday, June 16th 2019 due to elevated INR  Please have repeat INR on Monday 6/17/19 and refer to your provider for further instruction on coumadin dosing    Patient taking differently: Take 2 mg by mouth daily DO NOT TAKE COUMADIN ON Sunday, June 16th 2019 due to elevated INR  Please have repeat INR on Monday 6/17/19 and refer to your provider for further instruction on coumadin dosing  6/17/19  Yes Juana Durand PA-C     I have reviewed home medications using allscripts  Allergies: No Known Allergies    Social History:     Marital Status: /Civil Union   Occupation:   Patient Pre-hospital Living Situation:   Patient Pre-hospital Level of Mobility:   Patient Pre-hospital Diet Restrictions:   Substance Use History:   Social History     Substance and Sexual Activity   Alcohol Use No     Social History     Tobacco Use   Smoking Status Never Smoker   Smokeless Tobacco Never Used     Social History     Substance and Sexual Activity   Drug Use No       Family History:    non-contributory    Physical Exam:     Vitals:   Blood Pressure: 115/56 (07/04/19 1751)  Pulse: 71 (07/04/19 1751)  Temperature: 98 8 °F (37 1 °C) (07/04/19 1751)  Temp Source: Oral (07/04/19 1751)  Respirations: 18 (07/04/19 1751)  Height: 5' 5" (165 1 cm) (07/04/19 1751)  Weight - Scale: 82 kg (180 lb 12 4 oz) (07/04/19 1751)  SpO2: 92 % (07/04/19 1751)    Physical Exam   Constitutional: He is oriented to person, place, and time  No distress  HENT:   Head: Normocephalic and atraumatic  Mouth/Throat: Oropharyngeal exudate present  Cardiovascular: Normal rate and regular rhythm  Exam reveals no friction rub  No murmur heard  Pulmonary/Chest: Effort normal and breath sounds normal  No stridor  No respiratory distress  He has no wheezes  Abdominal: Soft  Bowel sounds are normal  He exhibits no distension  There is no tenderness  There is no guarding  Musculoskeletal: He exhibits edema  He exhibits no deformity  Neurological: He is alert and oriented to person, place, and time  B/l LE weakness worse on right; absent dorsiflexion  Gross sensory deficits on R as well  Skin: Skin is warm and dry  He is not diaphoretic  No erythema     Psychiatric: He has a normal mood and affect  His behavior is normal    Nursing note and vitals reviewed  Additional Data:     Lab Results: I have personally reviewed pertinent reports  Results from last 7 days   Lab Units 07/04/19  1552   WBC Thousand/uL 10 10   HEMOGLOBIN g/dL 10 7*   HEMATOCRIT % 32 3*   PLATELETS Thousands/uL 116*   LYMPHO PCT % 3*   MONO PCT % 0*   EOS PCT % 0     Results from last 7 days   Lab Units 07/04/19  1552   POTASSIUM mmol/L 4 0   CHLORIDE mmol/L 93*   CO2 mmol/L 24   BUN mg/dL 60*   CREATININE mg/dL 4 21*   CALCIUM mg/dL 7 9*   ALK PHOS U/L 191*   ALT U/L 19   AST U/L 35     Results from last 7 days   Lab Units 07/04/19  1552   INR  2 28*       Imaging: I have personally reviewed pertinent reports  Ct Abdomen Pelvis Wo Contrast    Result Date: 7/4/2019  Narrative: CT ABDOMEN AND PELVIS WITHOUT IV CONTRAST INDICATION:   sepsis, back pain  COMPARISON:  CT chest abdomen pelvis 9/21/2010, chest radiograph 6/12/2019 TECHNIQUE:  CT examination of the abdomen and pelvis was performed without intravenous contrast   Axial, sagittal, and coronal 2D reformatted images were created from the source data and submitted for interpretation  Radiation dose length product (DLP) for this visit:  439 mGy-cm   This examination, like all CT scans performed in the [facility], was performed utilizing techniques to minimize radiation dose exposure, including the use of iterative reconstruction and automated exposure control  Enteric contrast was administered  FINDINGS: ABDOMEN LOWER CHEST:  There are calcified and noncalcified pleural plaques present  Partially visualized pacer leads  LIVER/BILIARY TREE:  Unremarkable  GALLBLADDER:  There are gallstone(s) within the gallbladder, without pericholecystic inflammatory changes  SPLEEN:  Unremarkable  PANCREAS:  Unremarkable  ADRENAL GLANDS:  Unremarkable  KIDNEYS/URETERS:  Unremarkable  No hydronephrosis   STOMACH AND BOWEL:  Mild stool retention throughout the colon  No evidence of bowel obstruction  APPENDIX:  No findings to suggest appendicitis  ABDOMINOPELVIC CAVITY:  No ascites or free intraperitoneal air  No lymphadenopathy  VESSELS:  Atherosclerotic changes are present  No evidence of aneurysm  PELVIS REPRODUCTIVE ORGANS:  Unremarkable for patient's age  URINARY BLADDER:  Unremarkable  ABDOMINAL WALL/INGUINAL REGIONS:  There are small fat-containing bilateral inguinal hernias  OSSEOUS STRUCTURES:  There are age appropriate degenerative changes  No acute fracture or destructive osseous lesion  There are median sternotomy wires  Impression: 1  No acute intra-abdominal inflammatory process  2   Moderate stool retention throughout the colon  3   Cholelithiasis without other evidence of acute cholecystitis  Workstation performed: [id number]     Xr Chest 2 Views    Result Date: 6/12/2019  Narrative: CHEST INDICATION:   CP/SOB  COMPARISON:  6/11/2019 EXAM PERFORMED/VIEWS:  XR CHEST PA & LATERAL FINDINGS:  Dual lead cardiac pacemaker unchanged in position with tips in the right atrium and right ventricle  Median sternotomy wires are aligned and intact  Mediastinal surgical clips again seen  Left atrial appendage exclusion device again noted  Cardiac silhouette is stable in size  Atherosclerotic vascular calcifications are again noted  The lungs are clear  No pneumothorax or pleural effusion  Calcified pleural plaques again seen  Osseous structures are unchanged  Impression: No acute cardiopulmonary disease  Unchanged calcified pleural plaques  Workstation performed: [id number]     Xr Chest Pa & Lateral    Result Date: 6/12/2019  Narrative: CHEST INDICATION:   R07 9: Chest pain, unspecified R06 02: Shortness of breath  COMPARISON:  Two-view chest 12/12/2018 EXAM PERFORMED/VIEWS:  XR CHEST PA & LATERAL FINDINGS: Sternal wire sutures and mediastinal clips are present compatible with prior coronary artery bypass graft surgery    Left atrial appendage occlusion clear  Left chest wall cardiac pacer device  Normal cardiac silhouette  Aortic calcification is present  Unfolded aorta  Calcified right pleural plaque  No airspace consolidation, pneumothorax, pulmonary edema, or pleural effusion  Bilateral acromioclavicular and glenohumeral degenerative disease  Thoracolumbar spondylosis  Impression: No radiographic evidence of acute intrathoracic process or significant interval change  Workstation performed: [id number]     Xr Spine Lumbar 2 Or 3 Views Injury    Result Date: 6/13/2019  Narrative: LUMBAR SPINE INDICATION:   M84 359A: Stress fracture, hip, unspecified, initial encounter for fracture  Fall last week  COMPARISON:  CT chest, abdomen and pelvis 9/21/2010 VIEWS:  XR SPINE LUMBAR 2 OR 3 VIEWS INJURY Images: 3 FINDINGS: There is no evidence of acute fracture or destructive osseous lesion  There is mild lumbar curvature convex left  Grade 1 anterolisthesis L3 on L4 with slight retrolisthesis of L2 on L3  There is diffuse, advanced degenerative disc disease throughout the lumbar spine  Lower lumbar facet arthropathy  Paravertebral ossifications lower thoracic spine  The pedicles appear intact  There are atherosclerotic calcifications  Soft tissues are otherwise unremarkable  Impression: No acute osseous abnormality  Degenerative changes as described  Workstation performed: [id number]     Xr Hip/pelv 2-3 Vws Right If Performed    Result Date: 6/13/2019  Narrative: RIGHT HIP INDICATION:   S32  9XXA: Fracture of unspecified parts of lumbosacral spine and pelvis, initial encounter for closed fracture  Right posterior hip pain, fall last week  COMPARISON:  CT chest, abdomen and pelvis 9/21/2010 VIEWS:  XR HIP/PELV 2-3 VWS RIGHT W PELVIS IF PERFORMED Images: 3 FINDINGS: There is no acute fracture or dislocation  No significant hip degenerative changes  No lytic or blastic osseous lesions  There are atherosclerotic calcifications   Small peripheral pelvic calcification(s) which may represent calcified phleboliths  Degenerative changes visualized lower lumbar spine  Impression: No fracture  Workstation performed: [id number]     Ct Head Wo Contrast    Result Date: 6/13/2019  Narrative: CT BRAIN - WITHOUT CONTRAST INDICATION:   amb dysfunction, weakness, anisocoria  COMPARISON:  None  TECHNIQUE:  CT examination of the brain was performed  In addition to axial images, coronal 2D reformatted images were created and submitted for interpretation  Radiation dose length product (DLP) for this visit:  1020 mGy-cm   This examination, like all CT scans performed in the [facility], was performed utilizing techniques to minimize radiation dose exposure, including the use of iterative reconstruction and automated exposure control  IMAGE QUALITY:  Diagnostic  FINDINGS: PARENCHYMA:  Encephalomalacia within the right frontal and parietal lobe extending inferiorly into the posterior superior temporal lobe  Chronic microangiopathic change within both cerebral hemispheres  Brainstem and cerebellum demonstrate normal density  No mass or hemorrhage  Mild cerebral volume loss  VENTRICLES AND EXTRA-AXIAL SPACES:  Asymmetry with mild ex vacuo dilatation of the posterior body, atria and occipital horn of the right lateral ventricle  VISUALIZED ORBITS AND PARANASAL SINUSES:  Unremarkable orbits  Mild mucosal thickening of the ethmoid air cells  CALVARIUM AND EXTRACRANIAL SOFT TISSUES:  Normal      Impression: Mild chronic microangiopathic change and age-appropriate cerebral volume loss  Right hemispheric encephalomalacia and gliosis suggestive of old infarction  Workstation performed: [id number]     Colleen Leak & Waveform Analysis, Multiple Levels    Result Date: 6/13/2019  Narrative:  [facility] REPORT CLINICAL: Indications: This is a follow up study from the venous duplex done on 6/11/19 which demonstrated an occluded left PTA   The patient denies any pain  Operative History: CABG Pacemaker Risk Factors The patient has history of HTN, Diabetes (Yes), HLD, CKD and CAD  He has no history of DVT or Recent Trauma  Clinical Right Pressure:  128/ mm Hg, Left Pressure:  122/ mm Hg  FINDINGS:  Segment       Rig  Left                          P    P  Ant  Tibial    79  193  Post  Tibial  118  152  Ankle         118  193  Metatarsal     87   89  Great Toe      76   83     CONCLUSION: Impression RIGHT LOWER LIMB Ankle/Brachial Index: 0 92  which is in the Normal range PPG/PVR Tracings are normal Metatarsal Pressure 87mmHg Great Toe Pressure: 76mmHg,  within the healing range  LEFT LOWER LIMB Ankle/Brachial Index: 1 5  which is in the above Normal/ Unreliable range PPG/PVR Tracings are normal Metatarsal Pressure 89mmHg Great Toe Pressure: 83mmHg, within the healing range  SIGNATURE: Electronically Signed by: Zoila Wright on 2019-06-13 01:12:10 PM    Vas Lower Limb Venous Duplex Study, Unilateral/limited    Result Date: 6/11/2019  Narrative:  [facility] REPORT CLINICAL: Indications: Patient has left leg swelling X one week  Operative History: CABG Risk Factors The patient has history of HTN, Diabetes ,AFIB, HLD, CKD, on Coumadin, and CAD  He has no history of DVT or Recent Trauma  The patient current BMI is 30 38, Weight is 177 lb and height is 64 in  FINDINGS:  Segment  Right            Left              Impression       Impression       CFV      Normal (Patent)  Normal (Patent)     CONCLUSION: Impression: RIGHT LOWER LIMB LIMITED: Evaluation shows no evidence of thrombus in the common femoral vein  Doppler evaluation shows a normal response to augmentation maneuvers  LEFT LOWER LIMB: No evidence of acute or chronic deep vein thrombosis No evidence of superficial thrombophlebitis noted  Doppler evaluation shows a normal response to augmentation maneuvers  Popliteal, and anterior tibial arterial Doppler waveforms are biphasic   The distal posterior tibial artery appears to be occluded  There is a well defined hypoechoic non-vascularized cystic-type structure noted in the popliteal fossa  Technical findings were given to Dr Brent Juarez  SIGNATURE: Electronically Signed by: Harjit Acosta on 2019-06-11 05:51:51 PM    Allscripts / Epic Records Reviewed: Yes     ** Please Note: This note has been constructed using a voice recognition system   **

## 2019-07-04 NOTE — ASSESSMENT & PLAN NOTE
· POA, evidenced by fevers, leukocytosis with bandemia and elevated lactate  · Suspect urinary source  · Continue cefepime for now  · Follow Ucx/ Bcx  · Narrow as able

## 2019-07-04 NOTE — ASSESSMENT & PLAN NOTE
· NA: 128 on admission from 136 a few weeks ago; pt has evidence of volume overload but wouldn't hold off on hydrating until sepsis trend is clear     · Check Veronica Villatoro  · Received NS in the ED; will continue hydration for sepsis and follow-up repeat BMP in the am

## 2019-07-04 NOTE — ASSESSMENT & PLAN NOTE
· Pt reports worsening low back pain when moved at [address]  Describes shooting pain down right leg  · Unable to lift his R leg; no dorsiflexion  · Will check MRI lumbar spine as concern for myelopathy

## 2019-07-04 NOTE — ASSESSMENT & PLAN NOTE
Wt Readings from Last 3 Encounters:   07/04/19 82 5 kg (181 lb 14 1 oz)   06/12/19 76 5 kg (168 lb 10 4 oz)   05/30/19 80 3 kg (177 lb)     monitor volume status with resuscitation; pt is already above weight from recent discharge so suspect he will need diuresis once infection adequately treated

## 2019-07-04 NOTE — ED PROVIDER NOTES
History  Chief Complaint   Patient presents with    Fever - 75 years or older     per EMS pt coming from [facility] with temp of 103  given 650 mg of tylenol at 230 pm  denies, SOB, CP, N/V/D       History provided by:  Patient   used: No    Fever - 75 years or older   Associated symptoms: no chest pain, no chills, no congestion, no cough, no diarrhea, no dysuria, no headaches, no nausea, no rash, no sore throat and no vomiting      Patient is a 80-year-old male presenting to emergency department with fever  Has been feeling weak  Lower back pain  Episode of vomiting today  No chest pain  No shortness of breath  No diarrhea  No abdominal pain  No trauma  No falls  No rashes  MDM septic evaluation, antibiotics, admission      Prior to Admission Medications   Prescriptions Last Dose Informant Patient Reported? Taking?    TRADJENTA 5 MG TABS   No Yes   Sig: TAKE 1 TABLET (5 MG) BY MOUTH DAILY   amLODIPine (NORVASC) 5 mg tablet   No Yes   Sig: Take 1 tablet (5 mg total) by mouth daily   aspirin (ECOTRIN LOW STRENGTH) 81 mg EC tablet   No Yes   Sig: Take 1 tablet (81 mg total) by mouth daily   atorvastatin (LIPITOR) 40 mg tablet  Self No Yes   Sig: TAKE 1 TABLET BY MOUTH EVERY DAY   Patient taking differently: TAKE 1 TABLET BY MOUTH EVERY EVENING   glucose blood test strip  Self No Yes   Si each by Other route daily   levothyroxine 75 mcg tablet  Self No Yes   Sig: TAKE 1 TABLET BY MOUTH EVERY DAY   metoprolol tartrate (LOPRESSOR) 50 mg tablet  Self No Yes   Sig: TAKE 1 TABLET BY MOUTH TWICE A DAY WITH FOOD   ranolazine (RANEXA) 500 mg 12 hr tablet   No Yes   Sig: Take 1 tablet (500 mg total) by mouth every 12 (twelve) hours   tamsulosin (FLOMAX) 0 4 mg  Self No Yes   Sig: TAKE ONE CAPSULE BY MOUTH ONCE DAILY   Patient taking differently: TAKE ONE CAPSULE BY MOUTH ONCE hs   warfarin (COUMADIN) 3 mg tablet   No Yes   Sig: Take 1 tablet (3 mg total) by mouth daily DO NOT TAKE COUMADIN ON Sunday, June 16th 2019 due to elevated INR  Please have repeat INR on Monday 6/17/19 and refer to your provider for further instruction on coumadin dosing  Patient taking differently: Take 2 mg by mouth daily DO NOT TAKE COUMADIN ON Sunday, June 16th 2019 due to elevated INR  Please have repeat INR on Monday 6/17/19 and refer to your provider for further instruction on coumadin dosing  Facility-Administered Medications: None       Past Medical History:   Diagnosis Date    3-vessel coronary artery disease     last assessed: 08/15/2016    BPH (benign prostatic hyperplasia)     Chronic kidney disease     Diabetes mellitus ([facility] [address] )     Hyperlipidemia     Hypertension     Shingles 04/29/2019    SOB (shortness of breath)     Stroke [facility])        Past Surgical History:   Procedure Laterality Date    CARDIAC PACEMAKER PLACEMENT      CATARACT EXTRACTION      last assessed: 11/11/2015    CORONARY ARTERY BYPASS GRAFT      last assessed: 08/26/2015       Family History   Problem Relation Age of Onset    Heart disease Mother     Cancer Father         stomach    No Known Problems Sister     No Known Problems Brother     No Known Problems Brother      I have reviewed and agree with the history as documented  Social History     Tobacco Use    Smoking status: Never Smoker    Smokeless tobacco: Never Used   Substance Use Topics    Alcohol use: No    Drug use: No        Review of Systems   Constitutional: Negative for chills, diaphoresis and fever  HENT: Negative for congestion and sore throat  Respiratory: Negative for cough, shortness of breath, wheezing and stridor  Cardiovascular: Negative for chest pain, palpitations and leg swelling  Gastrointestinal: Negative for abdominal pain, blood in stool, diarrhea, nausea and vomiting  Genitourinary: Negative for dysuria, frequency and urgency  Musculoskeletal: Negative for neck pain and neck stiffness     Skin: Negative for pallor and rash    Neurological: Negative for dizziness, syncope, weakness, light-headedness and headaches  All other systems reviewed and are negative  Physical Exam  Physical Exam   Constitutional: He is oriented to person, place, and time  He appears well-developed and well-nourished  HENT:   Head: Normocephalic and atraumatic  Eyes: Pupils are equal, round, and reactive to light  Neck: Neck supple  Cardiovascular: Normal rate, regular rhythm, normal heart sounds and intact distal pulses  Pulmonary/Chest: Effort normal and breath sounds normal  No respiratory distress  Abdominal: Soft  Bowel sounds are normal  There is no tenderness  Musculoskeletal: Normal range of motion  He exhibits no edema, tenderness or deformity  Neurological: He is alert and oriented to person, place, and time  Skin: Skin is warm and dry  Capillary refill takes less than 2 seconds  No rash noted  No erythema  No pallor  Vitals reviewed        Vital Signs  ED Triage Vitals   Temperature Pulse Respirations Blood Pressure SpO2   07/04/19 1536 07/04/19 1536 07/04/19 1536 07/04/19 1536 07/04/19 1536   (!) 100 8 °F (38 2 °C) 86 18 115/51 93 %      Temp Source Heart Rate Source Patient Position - Orthostatic VS BP Location FiO2 (%)   07/04/19 1536 07/04/19 1536 07/04/19 1536 07/04/19 1536 --   Oral Monitor Lying Right arm       Pain Score       07/04/19 1746       No Pain           Vitals:    07/04/19 1751 07/04/19 2206 07/05/19 0744 07/05/19 1614   BP: 115/56 126/64 140/65 132/64   Pulse: 71 70 70 69   Patient Position - Orthostatic VS: Lying Lying Lying Lying         Visual Acuity      ED Medications  Medications   atorvastatin (LIPITOR) tablet 40 mg (40 mg Oral Given 7/5/19 1649)   aspirin (ECOTRIN LOW STRENGTH) EC tablet 81 mg (81 mg Oral Given 7/5/19 0853)   levothyroxine tablet 75 mcg (75 mcg Oral Given 7/5/19 8684)   metoprolol tartrate (LOPRESSOR) tablet 50 mg (50 mg Oral Given 7/5/19 1648)   ranolazine (RANEXA) 12 hr tablet 500 mg (500 mg Oral Given 7/5/19 0853)   tamsulosin (FLOMAX) capsule 0 4 mg (0 4 mg Oral Given 7/5/19 0853)   warfarin (COUMADIN) tablet 2 mg (2 mg Oral Given 7/5/19 1648)   sodium chloride 0 9 % infusion (125 mL/hr Intravenous New Bag 7/5/19 0324)   ondansetron (ZOFRAN) injection 4 mg (has no administration in time range)   cefepime (MAXIPIME) 2,000 mg in dextrose 5 % 50 mL IVPB (has no administration in time range)   insulin lispro (HumaLOG) 100 units/mL subcutaneous injection 2-12 Units (2 Units Subcutaneous Not Given 7/5/19 1450)   insulin lispro (HumaLOG) 100 units/mL subcutaneous injection 1-5 Units (2 Units Subcutaneous Given 7/5/19 1650)   polyethylene glycol (MIRALAX) packet 17 g (17 g Oral Given 7/5/19 1649)   cefepime (MAXIPIME) 2 g/50 mL dextrose IVPB (0 mg Intravenous Stopped 7/4/19 1731)   sodium chloride 0 9 % bolus 500 mL (0 mL Intravenous Stopped 7/4/19 1731)   sodium chloride 0 9 % bolus 500 mL (500 mL Intravenous New Bag 7/4/19 1735)       Diagnostic Studies  Results Reviewed     Procedure Component Value Units Date/Time    Osmolality, urine [[id number]]  (Normal) Collected:  07/04/19 2350    Lab Status:  Final result Specimen:  Urine, Clean Catch Updated:  07/05/19 0821     Osmolality, Ur 350 mmol/KG     Sodium, urine, random [[id number]] Collected:  07/04/19 2350    Lab Status:  Final result Specimen:  Urine, Clean Catch Updated:  07/05/19 0821     Sodium, Ur 17    Procalcitonin [[id number]]  (Abnormal) Collected:  07/04/19 1552    Lab Status:  Final result Specimen:  Blood from Arm, Left Updated:  07/04/19 2316     Procalcitonin 167 59 ng/ml     Lactic acid x2 [[id number]]  (Normal) Collected:  07/04/19 1816    Lab Status:  Final result Specimen:  Blood from Arm, Left Updated:  07/04/19 1855     LACTIC ACID 1 7 mmol/L     Narrative:       Result may be elevated if tourniquet was used during collection      Urine Microscopic [[id number]]  (Abnormal) Collected:  07/04/19 0542    Lab Status: Final result Specimen:  Urine, Straight Cath Updated:  07/04/19 1649     RBC, UA 4-10 /hpf      WBC, UA 30-50 /hpf      Epithelial Cells Occasional /hpf      Bacteria, UA Innumerable /hpf     Urine culture [[id number]] Collected:  07/04/19 1622    Lab Status: In process Specimen:  Urine, Straight Cath Updated:  07/04/19 1649    UA w Reflex to Microscopic w Reflex to Culture [[id number]]  (Abnormal) Collected:  07/04/19 1622    Lab Status:  Final result Specimen:  Urine, Straight Cath Updated:  07/04/19 1643     Color, UA Yellow     Clarity, UA Clear     Specific Gravity, UA 1 015     pH, UA 6 0     Leukocytes, UA Moderate     Nitrite, UA Negative     Protein, UA Trace mg/dl      Glucose, UA Negative mg/dl      Ketones, UA Negative mg/dl      Urobilinogen, UA 0 2 E U /dl      Bilirubin, UA Negative     Blood, UA Small    CBC and differential [[id number]]  (Abnormal) Collected:  07/04/19 1552    Lab Status:  Final result Specimen:  Blood from Arm, Left Updated:  07/04/19 1640     WBC 10 10 Thousand/uL      RBC 3 52 Million/uL      Hemoglobin 10 7 g/dL      Hematocrit 32 3 %      MCV 92 fL      MCH 30 4 pg      MCHC 33 1 g/dL      RDW 14 0 %      MPV 10 3 fL      Platelets 713 Thousands/uL      nRBC 0 /100 WBCs     Narrative: This is an appended report  These results have been appended to a previously verified report  Lactic acid x2 [[id number]]  (Abnormal) Collected:  07/04/19 1552    Lab Status:  Final result Specimen:  Blood from Arm, Left Updated:  07/04/19 1630     LACTIC ACID 2 8 mmol/L     Narrative:       Result may be elevated if tourniquet was used during collection      Protime-INR [[id number]]  (Abnormal) Collected:  07/04/19 1552    Lab Status:  Final result Specimen:  Blood from Arm, Left Updated:  07/04/19 1621     Protime 24 2 seconds      INR 2 28    APTT [[id number]]  (Abnormal) Collected:  07/04/19 1552    Lab Status:  Final result Specimen:  Blood from Arm, Left Updated:  07/04/19 1621     PTT 46 seconds     Comprehensive metabolic panel [[id number]]  (Abnormal) Collected:  07/04/19 1552    Lab Status:  Final result Specimen:  Blood from Arm, Left Updated:  07/04/19 1618     Sodium 128 mmol/L      Potassium 4 0 mmol/L      Chloride 93 mmol/L      CO2 24 mmol/L      ANION GAP 11 mmol/L      BUN 60 mg/dL      Creatinine 4 21 mg/dL      Glucose 172 mg/dL      Calcium 7 9 mg/dL      AST 35 U/L      ALT 19 U/L      Alkaline Phosphatase 191 U/L      Total Protein 6 6 g/dL      Albumin 3 0 g/dL      Total Bilirubin 1 20 mg/dL      eGFR 12 ml/min/1 73sq m     Narrative:       [place] guidelines for Chronic Kidney Disease (CKD):     Stage 1 with normal or high GFR (GFR > 90 mL/min/1 73 square meters)    Stage 2 Mild CKD (GFR = 60-89 mL/min/1 73 square meters)    Stage 3A Moderate CKD (GFR = 45-59 mL/min/1 73 square meters)    Stage 3B Moderate CKD (GFR = 30-44 mL/min/1 73 square meters)    Stage 4 Severe CKD (GFR = 15-29 mL/min/1 73 square meters)    Stage 5 End Stage CKD (GFR <15 mL/min/1 73 square meters)  Note: GFR calculation is accurate only with a steady state creatinine    Lipase [[id number]]  (Normal) Collected:  07/04/19 1552    Lab Status:  Final result Specimen:  Blood from Arm, Left Updated:  07/04/19 1618     Lipase 264 u/L     Blood culture #1 [[id number]] Collected:  07/04/19 1552    Lab Status: In process Specimen:  Blood from Arm, Right Updated:  07/04/19 1558    Blood culture #2 [[id number]] Collected:  07/04/19 1552    Lab Status: In process Specimen:  Blood from Arm, Left Updated:  07/04/19 1558                 MRI lumbar spine wo contrast   Final Result by Graham Pastor MD (07/05 2596)         1  Advanced multilevel spondylosis, most severely at L2-3 where there is a large left paracentral disc extrusion        Workstation performed: [id number]         XR chest pa & lateral   Final Result by Edward Carbone MD (07/05 9517)      Findings most suspicious for developing patchy medial right upper pneumonia and new small right costophrenic angle effusion  Continued radiographic follow-up is recommended to document complete interval resolution  Calcified pleural plaques, similar from previous examination  The study was marked in [facility] for immediate notification  Workstation performed: [id number]         CT abdomen pelvis wo contrast   Final Result by Tressa Brewster MD (07/04 1617)   1  No acute intra-abdominal inflammatory process  2   Moderate stool retention throughout the colon  3   Cholelithiasis without other evidence of acute cholecystitis  Workstation performed: [id number]         MRI inpatient order    (Results Pending)              Procedures  Procedures       ED Course                   Initial Sepsis Screening     Row Name 07/04/19 6767                Is the patient's history suggestive of a new or worsening infection? (!) Yes (Proceed)  -AT        Suspected source of infection  urinary tract infection  -AT        Are two or more of the following signs & symptoms of infection both present and new to the patient? (!) Yes (Proceed)  -AT        Indicate SIRS criteria  Hyperthemia > 38 3C (100 9F);WBC > 10% bands  -AT        If the answer is yes to both questions, suspicion of sepsis is present          If severe sepsis is present AND tissue hypoperfusion perists in the hour after fluid resuscitation or lactate > 4, the patient meets criteria for SEPTIC SHOCK          Are any of the following organ dysfunction criteria present within 6 hours of suspected infection and SIRS criteria that are NOT considered to be chronic conditions?   No  -AT        Organ dysfunction          Date of presentation of severe sepsis          Time of presentation of severe sepsis          Tissue hypoperfusion persists in the hour after crystalloid fluid administration, evidenced, by either:          Was hypotension present within one hour of the conclusion of crystalloid fluid administration?         Date of presentation of septic shock          Time of presentation of septic shock            User Key  (r) = Recorded By, (t) = Taken By, (c) = Cosigned By    Initials Name Provider Type    AT Vernie Gowers, MD Physician                  MDM    Disposition  Final diagnoses:   Sepsis ([facility] [address] )   Urinary tract infection     Time reflects when diagnosis was documented in both MDM as applicable and the Disposition within this note     Time User Action Codes Description Comment    7/4/2019  4:54 PM Mara Barrett Add [A41 9] Sepsis ([facility] [address] )     7/4/2019  4:54 PM Mara Barrett Add [N39 0] Urinary tract infection       ED Disposition     ED Disposition Condition Date/Time Comment    Admit Stable Thu Jul 4, 2019  4:54 PM Case was discussed with medicine and the patient's admission status was agreed to be Admission Status: inpatient status to the service of Dr Modesta Gagnon   Follow-up Information    None         Current Discharge Medication List      CONTINUE these medications which have NOT CHANGED    Details   amLODIPine (NORVASC) 5 mg tablet Take 1 tablet (5 mg total) by mouth daily  Qty: 30 tablet, Refills: 0    Associated Diagnoses: Paroxysmal A-fib (HCC)      aspirin (ECOTRIN LOW STRENGTH) 81 mg EC tablet Take 1 tablet (81 mg total) by mouth daily  Qty: 30 tablet, Refills: 0    Associated Diagnoses: 3-vessel coronary artery disease      atorvastatin (LIPITOR) 40 mg tablet TAKE 1 TABLET BY MOUTH EVERY DAY  Qty: 90 tablet, Refills: 3    Associated Diagnoses: Mixed hyperlipidemia      glucose blood test strip 1 each by Other route daily  Qty: 100 each, Refills: 1    Associated Diagnoses: Type 2 diabetes mellitus with diabetic nephropathy, without long-term current use of insulin (HCC)      levothyroxine 75 mcg tablet TAKE 1 TABLET BY MOUTH EVERY DAY  Qty: 90 tablet, Refills: 3    Comments: DX Code Needed      Associated Diagnoses: Hypothyroidism, unspecified type      metoprolol tartrate (LOPRESSOR) 50 mg tablet TAKE 1 TABLET BY MOUTH TWICE A DAY WITH FOOD  Qty: 180 tablet, Refills: 1    Associated Diagnoses: Essential hypertension      ranolazine (RANEXA) 500 mg 12 hr tablet Take 1 tablet (500 mg total) by mouth every 12 (twelve) hours  Qty: 60 tablet, Refills: 0    Associated Diagnoses: Chest pain; 3-vessel coronary artery disease      tamsulosin (FLOMAX) 0 4 mg TAKE ONE CAPSULE BY MOUTH ONCE DAILY  Qty: 90 capsule, Refills: 3    Associated Diagnoses: Urinary hesitancy      TRADJENTA 5 MG TABS TAKE 1 TABLET (5 MG) BY MOUTH DAILY  Qty: 90 tablet, Refills: 3    Associated Diagnoses: Type 2 diabetes mellitus without complication, with long-term current use of insulin (HCC)      warfarin (COUMADIN) 3 mg tablet Take 1 tablet (3 mg total) by mouth daily DO NOT TAKE COUMADIN ON Sunday, June 16th 2019 due to elevated INR  Please have repeat INR on Monday 6/17/19 and refer to your provider for further instruction on coumadin dosing  Qty: 1 tablet, Refills: 0    Comments: PATIENT IS REQUESTING REFILLS  THANK YOU  Associated Diagnoses: Atrial fibrillation, unspecified type ([facility] [address] )           No discharge procedures on file      ED Provider  Electronically Signed by           Beverly Murray MD  07/05/19 8701       Beverly Murray MD  07/05/19 9958

## 2019-07-04 NOTE — SEPSIS NOTE
Sepsis Note   Isaiah Victor 80 y o  male MRN: [id number]  Unit/Bed#: ED 06 Encounter: [id number]      qSOFA     [address] Name 07/04/19 1603 07/04/19 1536             Altered mental status GCS < 15  0         Respiratory Rate > / =22    0       Systolic BP < / =294    0       Q Sofa Score  0  0           Initial Sepsis Screening     Row Name 07/04/19 1649                Is the patient's history suggestive of a new or worsening infection? (!) Yes (Proceed)  -AT        Suspected source of infection  urinary tract infection  -AT        Are two or more of the following signs & symptoms of infection both present and new to the patient? (!) Yes (Proceed)  -AT        Indicate SIRS criteria  Hyperthemia > 38 3C (100 9F);WBC > 10% bands  -AT        If the answer is yes to both questions, suspicion of sepsis is present          If severe sepsis is present AND tissue hypoperfusion perists in the hour after fluid resuscitation or lactate > 4, the patient meets criteria for SEPTIC SHOCK          Are any of the following organ dysfunction criteria present within 6 hours of suspected infection and SIRS criteria that are NOT considered to be chronic conditions? No  -AT        Organ dysfunction          Date of presentation of severe sepsis          Time of presentation of severe sepsis          Tissue hypoperfusion persists in the hour after crystalloid fluid administration, evidenced, by either:          Was hypotension present within one hour of the conclusion of crystalloid fluid administration?           Date of presentation of septic shock          Time of presentation of septic shock            User Key  (r) = Recorded By, (t) = Taken By, (c) = Cosigned By    Initials Name Provider Type    AT Tha Peng MD Physician

## 2019-07-05 ENCOUNTER — APPOINTMENT (INPATIENT)
Dept: MRI IMAGING | Facility: HOSPITAL | Age: 84
DRG: 872 | End: 2019-07-05
Payer: MEDICARE

## 2019-07-05 PROBLEM — E87.20 LACTIC ACIDOSIS: Status: RESOLVED | Noted: 2019-07-04 | Resolved: 2019-07-05

## 2019-07-05 PROBLEM — E87.2 LACTIC ACIDOSIS: Status: RESOLVED | Noted: 2019-07-04 | Resolved: 2019-07-05

## 2019-07-05 LAB
ANION GAP SERPL CALCULATED.3IONS-SCNC: 12 MMOL/L (ref 4–13)
BASOPHILS # BLD MANUAL: 0 THOUSAND/UL (ref 0–0.1)
BASOPHILS NFR MAR MANUAL: 0 % (ref 0–1)
BUN SERPL-MCNC: 59 MG/DL (ref 5–25)
CALCIUM SERPL-MCNC: 8 MG/DL (ref 8.3–10.1)
CHLORIDE SERPL-SCNC: 99 MMOL/L (ref 100–108)
CO2 SERPL-SCNC: 22 MMOL/L (ref 21–32)
CREAT SERPL-MCNC: 3.82 MG/DL (ref 0.6–1.3)
DOHLE BOD BLD QL SMEAR: PRESENT
EOSINOPHIL # BLD MANUAL: 0.38 THOUSAND/UL (ref 0–0.4)
EOSINOPHIL NFR BLD MANUAL: 3 % (ref 0–6)
ERYTHROCYTE [DISTWIDTH] IN BLOOD BY AUTOMATED COUNT: 14.3 % (ref 11.6–15.1)
GFR SERPL CREATININE-BSD FRML MDRD: 13 ML/MIN/1.73SQ M
GLUCOSE SERPL-MCNC: 115 MG/DL (ref 65–140)
GLUCOSE SERPL-MCNC: 117 MG/DL (ref 65–140)
GLUCOSE SERPL-MCNC: 157 MG/DL (ref 65–140)
GLUCOSE SERPL-MCNC: 161 MG/DL (ref 65–140)
GLUCOSE SERPL-MCNC: 169 MG/DL (ref 65–140)
GLUCOSE SERPL-MCNC: 260 MG/DL (ref 65–140)
HCT VFR BLD AUTO: 30 % (ref 36.5–49.3)
HGB BLD-MCNC: 9.7 G/DL (ref 12–17)
LYMPHOCYTES # BLD AUTO: 1.4 THOUSAND/UL (ref 0.6–4.47)
LYMPHOCYTES # BLD AUTO: 11 % (ref 14–44)
MAGNESIUM SERPL-MCNC: 1.9 MG/DL (ref 1.6–2.6)
MCH RBC QN AUTO: 30.4 PG (ref 26.8–34.3)
MCHC RBC AUTO-ENTMCNC: 32.3 G/DL (ref 31.4–37.4)
MCV RBC AUTO: 94 FL (ref 82–98)
MONOCYTES # BLD AUTO: 0.77 THOUSAND/UL (ref 0–1.22)
MONOCYTES NFR BLD: 6 % (ref 4–12)
NEUTROPHILS # BLD MANUAL: 10.21 THOUSAND/UL (ref 1.85–7.62)
NEUTS BAND NFR BLD MANUAL: 13 % (ref 0–8)
NEUTS SEG NFR BLD AUTO: 67 % (ref 43–75)
NRBC BLD AUTO-RTO: 0 /100 WBCS
OSMOLALITY UR: 350 MMOL/KG
PLATELET # BLD AUTO: 98 THOUSANDS/UL (ref 149–390)
PLATELET BLD QL SMEAR: ABNORMAL
PMV BLD AUTO: 11.6 FL (ref 8.9–12.7)
POTASSIUM SERPL-SCNC: 3.7 MMOL/L (ref 3.5–5.3)
RBC # BLD AUTO: 3.19 MILLION/UL (ref 3.88–5.62)
SODIUM 24H UR-SCNC: 17 MOL/L
SODIUM SERPL-SCNC: 133 MMOL/L (ref 136–145)
TOTAL CELLS COUNTED SPEC: 100
TOXIC GRANULES BLD QL SMEAR: PRESENT
WBC # BLD AUTO: 12.76 THOUSAND/UL (ref 4.31–10.16)

## 2019-07-05 PROCEDURE — 72148 MRI LUMBAR SPINE W/O DYE: CPT

## 2019-07-05 PROCEDURE — 80048 BASIC METABOLIC PNL TOTAL CA: CPT | Performed by: HOSPITALIST

## 2019-07-05 PROCEDURE — 99232 SBSQ HOSP IP/OBS MODERATE 35: CPT | Performed by: HOSPITALIST

## 2019-07-05 PROCEDURE — 85027 COMPLETE CBC AUTOMATED: CPT | Performed by: HOSPITALIST

## 2019-07-05 PROCEDURE — 82948 REAGENT STRIP/BLOOD GLUCOSE: CPT

## 2019-07-05 PROCEDURE — 83735 ASSAY OF MAGNESIUM: CPT | Performed by: HOSPITALIST

## 2019-07-05 PROCEDURE — 85007 BL SMEAR W/DIFF WBC COUNT: CPT | Performed by: HOSPITALIST

## 2019-07-05 RX ORDER — POLYETHYLENE GLYCOL 3350 17 G/17G
17 POWDER, FOR SOLUTION ORAL DAILY PRN
Status: DISCONTINUED | OUTPATIENT
Start: 2019-07-05 | End: 2019-07-12 | Stop reason: HOSPADM

## 2019-07-05 RX ADMIN — METOPROLOL TARTRATE 50 MG: 50 TABLET, FILM COATED ORAL at 16:48

## 2019-07-05 RX ADMIN — INSULIN LISPRO 1 UNITS: 100 INJECTION, SOLUTION INTRAVENOUS; SUBCUTANEOUS at 21:28

## 2019-07-05 RX ADMIN — INSULIN LISPRO 2 UNITS: 100 INJECTION, SOLUTION INTRAVENOUS; SUBCUTANEOUS at 16:50

## 2019-07-05 RX ADMIN — SODIUM CHLORIDE 125 ML/HR: 0.9 INJECTION, SOLUTION INTRAVENOUS at 03:24

## 2019-07-05 RX ADMIN — ASPIRIN 81 MG: 81 TABLET, COATED ORAL at 08:53

## 2019-07-05 RX ADMIN — RANOLAZINE 500 MG: 500 TABLET, FILM COATED, EXTENDED RELEASE ORAL at 21:27

## 2019-07-05 RX ADMIN — CEFEPIME HYDROCHLORIDE 2000 MG: 2 INJECTION, POWDER, FOR SOLUTION INTRAVENOUS at 18:00

## 2019-07-05 RX ADMIN — LEVOTHYROXINE SODIUM 75 MCG: 75 TABLET ORAL at 05:04

## 2019-07-05 RX ADMIN — METOPROLOL TARTRATE 50 MG: 50 TABLET, FILM COATED ORAL at 08:53

## 2019-07-05 RX ADMIN — TAMSULOSIN HYDROCHLORIDE 0.4 MG: 0.4 CAPSULE ORAL at 08:53

## 2019-07-05 RX ADMIN — INSULIN LISPRO 2 UNITS: 100 INJECTION, SOLUTION INTRAVENOUS; SUBCUTANEOUS at 01:11

## 2019-07-05 RX ADMIN — ATORVASTATIN CALCIUM 40 MG: 40 TABLET, FILM COATED ORAL at 16:49

## 2019-07-05 RX ADMIN — WARFARIN SODIUM 2 MG: 2 TABLET ORAL at 16:48

## 2019-07-05 RX ADMIN — POLYETHYLENE GLYCOL 3350 17 G: 17 POWDER, FOR SOLUTION ORAL at 16:49

## 2019-07-05 RX ADMIN — RANOLAZINE 500 MG: 500 TABLET, FILM COATED, EXTENDED RELEASE ORAL at 08:53

## 2019-07-05 NOTE — ASSESSMENT & PLAN NOTE
· Pt reports worsening low back pain when moved at [facility]  Describes shooting pain down right leg  · Unable to lift his R leg; no dorsiflexion  · Will check MRI lumbar spine as concern for myelopathy

## 2019-07-05 NOTE — ASSESSMENT & PLAN NOTE
· BsCr: 3 4; elevated to 4 5 on admission  · Improved to 3 8 with hydration    · Suspect prerenal due to sepsis  · avoid hypotension, nephrotoxins

## 2019-07-05 NOTE — ASSESSMENT & PLAN NOTE
· NA: 128 on admission from 136 a few weeks ago; pt has evidence of volume overload but wouldn't hold off on hydrating until sepsis trend is clear     · Check Katie: <20 suggesting hypovolemia  · BMP daily

## 2019-07-05 NOTE — PROGRESS NOTES
Progress Note - Nathalia Zamora 1/29/1932, 80 y o  male MRN: [id number]    Unit/Bed#: -01 Encounter: [id number]    Primary Care Provider: Miguel Angel Rayo MD   Date and time admitted to hospital: 7/4/2019  3:34 PM    * Severe sepsis [facility])  Assessment & Plan  · POA, evidenced by fevers, leukocytosis with bandemia and elevated lactate  · Suspect urinary source  · Continue cefepime for now  · Follow Ucx/ Bcx  · Narrow as able      Lactic acidosisresolved as of 7/5/2019  Assessment & Plan  · Elevated to 2 8 on admission  · Continue to trend until normal     MAGUI (acute kidney injury) ([facility] [address] )  Assessment & Plan  · BsCr: 3 4; elevated to 4 5 on admission  · Improved to 3 8 with hydration    · Suspect prerenal due to sepsis  · avoid hypotension, nephrotoxins     Hyponatremia  Assessment & Plan  · NA: 128 on admission from 136 a few weeks ago; pt has evidence of volume overload but wouldn't hold off on hydrating until sepsis trend is clear  · Check Katie: <20 suggesting hypovolemia  · BMP daily     Chronic diastolic congestive heart failure (HCC)  Assessment & Plan  Wt Readings from Last 3 Encounters:   07/04/19 82 kg (180 lb 12 4 oz)   06/12/19 76 5 kg (168 lb 10 4 oz)   05/30/19 80 3 kg (177 lb)     monitor volume status with resuscitation; pt is already above weight from recent discharge so suspect he will need diuresis once infection adequately treated   Continue I/Os and daily weights           Chronic right-sided low back pain without sciatica  Assessment & Plan  · Pt reports worsening low back pain when moved at [facility]  Describes shooting pain down right leg  · Unable to lift his R leg; no dorsiflexion  · Will check MRI lumbar spine as concern for myelopathy       Paroxysmal A-fib (HCC)  Assessment & Plan  · Cont BB  · INR daily; currently 2 2  · Continue warfarin 2-3mg daily       VTE Pharmacologic Prophylaxis:   Pharmacologic: Warfarin (Coumadin)  Mechanical VTE Prophylaxis in Place: Yes    Patient Centered Rounds: I have performed bedside rounds with nursing staff today  Discussions with Specialists or Other Care Team Provider:     Education and Discussions with Family / Patient: patient and family at bedside     Time Spent for Care: 30 minutes  More than 50% of total time spent on counseling and coordination of care as described above  Current Length of Stay: 1 day(s)    Current Patient Status: Inpatient   Certification Statement: The patient will continue to require additional inpatient hospital stay due to severe sepsis on IV antibiotics     Discharge Plan / Estimated Discharge Date: TBD based on clinical course    Code Status: Level 1 - Full Code    Subjective:   Pt is feeling a little better  No f/c  Denies pain at rest      Objective:     Vitals:   Temp (24hrs), Av °F (37 2 °C), Min:97 6 °F (36 4 °C), Max:100 8 °F (38 2 °C)    Temp:  [97 6 °F (36 4 °C)-100 8 °F (38 2 °C)] 97 6 °F (36 4 °C)  HR:  [70-86] 70  Resp:  [16-18] 16  BP: (115-140)/(51-65) 140/65  SpO2:  [90 %-94 %] 90 %  Body mass index is 30 08 kg/m²  Input and Output Summary (last 24 hours): Intake/Output Summary (Last 24 hours) at 2019 1036  Last data filed at 2019 3345  Gross per 24 hour   Intake 2627 08 ml   Output 550 ml   Net 2077 08 ml       Physical Exam:     Physical Exam   Constitutional: No distress  HENT:   Head: Normocephalic and atraumatic  Mouth/Throat: Oropharynx is clear and moist    Cardiovascular: Normal rate and regular rhythm  Exam reveals no friction rub  No murmur heard  Pulmonary/Chest: Effort normal and breath sounds normal  No stridor  No respiratory distress  Abdominal: Soft  Bowel sounds are normal  He exhibits no distension  There is no tenderness  There is no guarding  Musculoskeletal: He exhibits edema  Neurological: He is alert  RLe weakness with foot drop    Skin: Skin is warm  No rash noted  He is not diaphoretic  No erythema     Nursing note and vitals reviewed  Additional Data:     Labs:    Results from last 7 days   Lab Units 07/05/19  0459   WBC Thousand/uL 12 76*   HEMOGLOBIN g/dL 9 7*   HEMATOCRIT % 30 0*   PLATELETS Thousands/uL 98*   LYMPHO PCT % 11*   MONO PCT % 6   EOS PCT % 3     Results from last 7 days   Lab Units 07/05/19  0459 07/04/19  1552   POTASSIUM mmol/L 3 7 4 0   CHLORIDE mmol/L 99* 93*   CO2 mmol/L 22 24   BUN mg/dL 59* 60*   CREATININE mg/dL 3 82* 4 21*   CALCIUM mg/dL 8 0* 7 9*   ALK PHOS U/L  --  191*   ALT U/L  --  19   AST U/L  --  35     Results from last 7 days   Lab Units 07/04/19  1552   INR  2 28*       * I Have Reviewed All Lab Data Listed Above  * Additional Pertinent Lab Tests Reviewed: All Labs Within Last 24 Hours Reviewed    Imaging:    Imaging Reports Reviewed Today Include:   Imaging Personally Reviewed by Myself Includes:      Recent Cultures (last 7 days):           Last 24 Hours Medication List:     Current Facility-Administered Medications:  aspirin 81 mg Oral Daily Arben Musa MD   atorvastatin 40 mg Oral QPM Arben Musa MD   cefepime 2,000 mg Intravenous Q24H Arben Musa MD   insulin lispro 1-5 Units Subcutaneous HS Catherine Spaulding PA-C   insulin lispro 2-12 Units Subcutaneous TID AC Catherine Spaulding PA-C   levothyroxine 75 mcg Oral Early Morning Arben Musa MD   metoprolol tartrate 50 mg Oral BID With Meals Arben Musa MD   ondansetron 4 mg Intravenous Q6H PRN Arben Musa MD   ranolazine 500 mg Oral Q12H Johnathan Fan MD   tamsulosin 0 4 mg Oral Daily Arben Musa MD   warfarin 2 mg Oral Daily (warfarin) Arben Musa MD        Today, Patient Was Seen By: Arben Musa MD    ** Please Note: This note has been constructed using a voice recognition system   **

## 2019-07-05 NOTE — UTILIZATION REVIEW
Initial Clinical Review    Admission: Date/Time/Statement: 7/4/19 @ 1655     Orders Placed This Encounter   Procedures    Inpatient Admission     Standing Status:   Standing     Number of Occurrences:   1     Order Specific Question:   Admitting Physician     Answer:   Berkley Auryfanny     Order Specific Question:   Level of Care     Answer:   Med Surg [16]     Order Specific Question:   Estimated length of stay     Answer:   More than 2 Midnights     Order Specific Question:   Certification     Answer:   I certify that inpatient services are medically necessary for this patient for a duration of greater than two midnights  See H&P and MD Progress Notes for additional information about the patient's course of treatment  ED Arrival Information     Expected Arrival Acuity Means of Arrival Escorted By Service Admission Type    - 7/4/2019 15:34 Emergent Ambulance [facility] General Medicine Emergency    Arrival Complaint    -        Chief Complaint   Patient presents with    Fever - 75 years or older     [place] EMS [place] 103  given 650 mg of tylenol at 230 pm  denies, SOB, CP, N/V/D     Assessment/Plan: 80 y o  male history of diastolic heart failure, paroxysmal AFib with pacemaker in place on Coumadin who presents with an episode of fever with chills  Patient was recently in the hospital have been sent to [facility] for rehab  According to family and patient patient had not been doing well at rehab  Not making much progress with physical therapy  Over the last 2 days has not been feeling well however today complained of shaking chills and had measured temperature of 102°  Has been unable to empty his bladder completely but denies dysuria  No flank pain    Main complaint is right-sided back pain mostly with passive movement  Severe sepsis (HCC)  Assessment & Plan  · POA, evidenced by fevers, leukocytosis with bandemia and elevated lactate  · Suspect urinary source  · Continue cefepime for now  · Follow Ucx/ Bcx  ? Narrow as able     Lactic acidosis  Assessment & Plan  · Elevated to 2 8 on admission  · Continue to trend until normal      MAGUI (acute kidney injury) ([facility] [address] )  Assessment & Plan  · BsCr: 3 4; elevated to 4 5 on admission  · Suspect prerenal due to sepsis  · Cont IVF hydration  · avoid hypotension, nephrotoxins      Hyponatremia  Assessment & Plan  · NA: 128 on admission from 136 a few weeks ago; pt has evidence of volume overload but wouldn't hold off on hydrating until sepsis trend is clear  · Check Meryle Jeovanny  · Received NS in the ED; will continue hydration for sepsis and follow-up repeat BMP in the am      Chronic diastolic congestive heart failure (HCC)  Assessment & Plan      Wt Readings from Last 3 Encounters:   07/04/19 82 5 kg (181 lb 14 1 oz)   06/12/19 76 5 kg (168 lb 10 4 oz)   05/30/19 80 3 kg (177 lb)      monitor volume status with resuscitation; pt is already above weight from recent discharge so suspect he will need diuresis once infection adequately treated        Chronic right-sided low back pain without sciatica  Assessment & Plan  · Pt reports worsening low back pain when moved at [employer]  Describes shooting pain down right leg    · Unable to lift his R leg; no dorsiflexion  · Will check MRI lumbar spine as concern for myelopathy       Paroxysmal A-fib (HCC)  Assessment & Plan  · Cont BB  · INR daily; currently 2 2  · Continue warfarin 3mg daily          ED Triage Vitals   Temperature Pulse Respirations Blood Pressure SpO2   07/04/19 1536 07/04/19 1536 07/04/19 1536 07/04/19 1536 07/04/19 1536   (!) 100 8 °F (38 2 °C) 86 18 115/51 93 %      Temp Source Heart Rate Source Patient Position - Orthostatic VS BP Location FiO2 (%)   07/04/19 1536 07/04/19 1536 07/04/19 1536 07/04/19 1536 --   Oral Monitor Lying Right arm       Pain Score       07/04/19 1746       No Pain        Wt Readings from Last 1 Encounters:   07/05/19 82 6 kg (182 lb 1 6 oz)     Additional Vital Signs:   07/05/19 0744  97 6 °F (36 4 °C)  70  16  140/65  84  90 %  None (Room air)  Lying   07/04/19 2206  98 7 °F (37 1 °C)  70  18  126/64  89  94 %  None (Room air)  Lying   07/04/19 1751  98 8 °F (37 1 °C)  71  18  115/56  79  92 %  None (Room air)         Pertinent Labs/Diagnostic Test Results:   7/5/2019 CxR - Findings most suspicious for developing patchy medial right upper pneumonia and new small right costophrenic angle effusion   Continued radiographic follow-up is recommended to document complete interval resolution  Calcified pleural plaques, similar from previous examination    7/4/2019  CT abdomen - No acute intra-abdominal inflammatory process  2   Moderate stool retention throughout the colon    3   Cholelithiasis without other evidence of acute cholecystitis  Results from last 7 days   Lab Units 07/05/19  0459 07/04/19  1552   WBC Thousand/uL 12 76* 10 10   HEMOGLOBIN g/dL 9 7* 10 7*   HEMATOCRIT % 30 0* 32 3*   PLATELETS Thousands/uL 98* 116*   BANDS PCT % 13* 27*         Results from last 7 days   Lab Units 07/05/19  0459 07/04/19  1552   SODIUM mmol/L 133* 128*   POTASSIUM mmol/L 3 7 4 0   CHLORIDE mmol/L 99* 93*   CO2 mmol/L 22 24   ANION GAP mmol/L 12 11   BUN mg/dL 59* 60*   CREATININE mg/dL 3 82* 4 21*   EGFR ml/min/1 73sq m 13 12   CALCIUM mg/dL 8 0* 7 9*   MAGNESIUM mg/dL 1 9  --      Results from last 7 days   Lab Units 07/04/19  1552   AST U/L 35   ALT U/L 19   ALK PHOS U/L 191*   TOTAL PROTEIN g/dL 6 6   ALBUMIN g/dL 3 0*   TOTAL BILIRUBIN mg/dL 1 20*     Results from last 7 days   Lab Units 07/05/19  1121 07/05/19  0746 07/04/19  2351   POC GLUCOSE mg/dl 157* 117 260*     Results from last 7 days   Lab Units 07/05/19  0459 07/04/19  1552   GLUCOSE RANDOM mg/dL 115 172*     Results from last 7 days   Lab Units 07/04/19  1552   PROTIME seconds 24 2*   INR  2 28*   PTT seconds 46*     Results from last 7 days   Lab Units 07/04/19  0429 PROCALCITONIN ng/ml 167 59*     Results from last 7 days   Lab Units 07/04/19  1816 07/04/19  1552   LACTIC ACID mmol/L 1 7 2 8*     Results from last 7 days   Lab Units 07/04/19  1552   LIPASE u/L 264     Results from last 7 days   Lab Units 07/04/19  2350 07/04/19  1622   CLARITY UA   --  Clear   COLOR UA   --  Yellow   SPEC GRAV UA   --  1 015   PH UA   --  6 0   GLUCOSE UA mg/dl  --  Negative   KETONES UA mg/dl  --  Negative   BLOOD UA   --  Small*   PROTEIN UA mg/dl  --  Trace*   NITRITE UA   --  Negative   BILIRUBIN UA   --  Negative   UROBILINOGEN UA E U /dl  --  0 2   LEUKOCYTES UA   --  Moderate*   WBC UA /hpf  --  30-50*   RBC UA /hpf  --  4-10*   BACTERIA UA /hpf  --  Innumerable*   EPITHELIAL CELLS WET PREP /hpf  --  Occasional   SODIUM UR  17  --      ED Treatment:   Medication Administration from 07/04/2019 1528 to 07/04/2019 1744       Date/Time Order Dose Route Action Comments     07/04/2019 1623 cefepime (MAXIPIME) 2 g/50 mL dextrose IVPB 2,000 mg Intravenous New Bag      07/04/2019 1615 sodium chloride 0 9 % bolus 500 mL 500 mL Intravenous New Bag      07/04/2019 1735 sodium chloride 0 9 % bolus 500 mL 500 mL Intravenous New Bag         Past Medical History:   Diagnosis Date    3-vessel coronary artery disease     last assessed: 08/15/2016    BPH (benign prostatic hyperplasia)     Chronic kidney disease     Diabetes mellitus ([facility] [address] )     Hyperlipidemia     Hypertension     Shingles 04/29/2019    SOB (shortness of breath)     Stroke (HCC)      Present on Admission:   Paroxysmal A-fib (HCC)   Chronic right-sided low back pain without sciatica      Admitting Diagnosis: Urinary tract infection [N39 0]  Fever [R50 9]  Sepsis ([facility] [address] ) [A41 9]  Age/Sex: 80 y o  male  Admission Orders: 7/4/2019  1656 INPATIENT     Current Facility-Administered Medications:  aspirin 81 mg Oral Daily   atorvastatin 40 mg Oral QPM   cefepime 2,000 mg Intravenous Q24H   insulin lispro 1-5 Units Subcutaneous HS   insulin lispro 2-12 Units Subcutaneous TID AC   levothyroxine 75 mcg Oral Early Morning   metoprolol tartrate 50 mg Oral BID With Meals   ondansetron 4 mg Intravenous Q6H PRN   ranolazine 500 mg Oral Q12H DERIAN   tamsulosin 0 4 mg Oral Daily   warfarin 2 mg Oral Daily (warfarin)   sodium chloride 0 9 % infusion   Rate: 125 mL/hr Dose: 125 mL/hr  Freq: Continuous Route: IV  Last Dose: 125 mL/hr (07/05/19 0324)  Start: 07/04/19 1800 End: 07/05/19 0515      [facility] Utilization Review Department  Phone: [phone]; Fax [phone]  [email or web address]  ATTENTION: Please call with any questions or concerns to [phone]  and carefully listen to the prompts so that you are directed to the right person  Send all requests for admission clinical reviews, approved or denied determinations and any other requests to fax [phone]   All voicemails are confidential

## 2019-07-05 NOTE — PLAN OF CARE
Problem: Potential for Falls  Goal: Patient will remain free of falls  Description  INTERVENTIONS:  - Assess patient frequently for physical needs  -  Identify cognitive and physical deficits and behaviors that affect risk of falls    -  [place] fall precautions as indicated by assessment   - Educate patient/family on patient safety including physical limitations  - Instruct patient to call for assistance with activity based on assessment  - Modify environment to reduce risk of injury  - Consider OT/PT consult to assist with strengthening/mobility  Outcome: Progressing     Problem: Prexisting or High Potential for Compromised Skin Integrity  Goal: Skin integrity is maintained or improved  Description  INTERVENTIONS:  - Identify patients at risk for skin breakdown  - Assess and monitor skin integrity  - Assess and monitor nutrition and hydration status  - Monitor labs (i e  albumin)  - Assess for incontinence   - Turn and reposition patient  - Assist with mobility/ambulation  - Relieve pressure over bony prominences  - Avoid friction and shearing  - Provide appropriate hygiene as needed including keeping skin clean and dry  - Evaluate need for skin moisturizer/barrier cream  - Collaborate with interdisciplinary team (i e  Nutrition, Rehabilitation, etc )   - Patient/family teaching  Outcome: Progressing     Problem: METABOLIC, FLUID AND ELECTROLYTES - ADULT  Goal: Electrolytes maintained within normal limits  Description  INTERVENTIONS:  - Monitor labs and assess patient for signs and symptoms of electrolyte imbalances  - Administer electrolyte replacement as ordered  - Monitor response to electrolyte replacements, including repeat lab results as appropriate  - Instruct patient on fluid and nutrition as appropriate  Outcome: Progressing  Goal: Fluid balance maintained  Description  INTERVENTIONS:  - Monitor labs and assess for signs and symptoms of volume excess or deficit  - Monitor I/O and WT  - Instruct patient on fluid and nutrition as appropriate  Outcome: Progressing  Goal: Glucose maintained within target range  Description  INTERVENTIONS:  - Monitor Blood Glucose as ordered  - Assess for signs and symptoms of hyperglycemia and hypoglycemia  - Administer ordered medications to maintain glucose within target range  - Assess nutritional intake and initiate nutrition service referral as needed  Outcome: Progressing     Problem: INFECTION - ADULT  Goal: Absence or prevention of progression during hospitalization  Description  INTERVENTIONS:  - Assess and monitor for signs and symptoms of infection  - Monitor lab/diagnostic results  - Monitor all insertion sites, i e  indwelling lines, tubes, and drains  - Monitor endotracheal (as able) and nasal secretions for changes in amount and color  - [place] appropriate cooling/warming therapies per order  - Administer medications as ordered  - Instruct and encourage patient and family to use good hand hygiene technique  - Identify and instruct in appropriate isolation precautions for identified infection/condition  Outcome: Progressing  Goal: Absence of fever/infection during neutropenic period  Description  INTERVENTIONS:  - Monitor WBC  - Implement neutropenic guidelines  Outcome: Progressing     Problem: DISCHARGE PLANNING  Goal: Discharge to home or other facility with appropriate resources  Description  INTERVENTIONS:  - Identify barriers to discharge w/patient and caregiver  - Arrange for needed discharge resources and transportation as appropriate  - Identify discharge learning needs (meds, wound care, etc )  - Arrange for interpretive services to assist at discharge as needed  - Refer to Case Management Department for coordinating discharge planning if the patient needs post-hospital services based on physician/advanced practitioner order or complex needs related to functional status, cognitive ability, or social support system  Outcome: Progressing     Problem: Knowledge Deficit  Goal: Patient/family/caregiver demonstrates understanding of disease process, treatment plan, medications, and discharge instructions  Description  Complete learning assessment and assess knowledge base    Interventions:  - Provide teaching at level of understanding  - Provide teaching via preferred learning methods  Outcome: Progressing

## 2019-07-05 NOTE — ASSESSMENT & PLAN NOTE
Wt Readings from Last 3 Encounters:   07/04/19 82 kg (180 lb 12 4 oz)   06/12/19 76 5 kg (168 lb 10 4 oz)   05/30/19 80 3 kg (177 lb)     monitor volume status with resuscitation; pt is already above weight from recent discharge so suspect he will need diuresis once infection adequately treated   Continue I/Os and daily weights

## 2019-07-05 NOTE — SOCIAL WORK
CM met with pt and family at bedside  Family informed CM that pt was at [facility] for STR  Family refused pt to return there  Pt's wife stated they would like pt to return home with VNA services  Pt's son stated pt's S-I-L is an orthopedic surgeon , Dr Talha Alcantara  Pt's son stated they would like pt to be transferred to [place] so pt can have spinal surgery  Pt's son would like pt to transfer once he is  medically stable  CM will continue to follow

## 2019-07-05 NOTE — PHYSICIAN ADVISOR
Current patient class: Inpatient  The patient is currently on Hospital Day: 2 at [facility]      The patient was admitted to the hospital at [phone] on 7/4/19 for the following diagnosis:  Urinary tract infection [N39 0]  Fever [R50 9]  Sepsis ([address] ) [A41 9]       There is documentation in the medical record of an expected length of stay of at least 2 midnights  The patient is therefore expected to satisfy the 2 midnight benchmark and given the 2 midnight presumption is appropriate for INPATIENT ADMISSION  Given this expectation of a satisfying stay, CMS instructs us that the patient is most often appropriate for inpatient admission under part A provided medical necessity is documented in the chart  After review of the relevant documentation, labs, vital signs and test results, the patient is appropriate for INPATIENT ADMISSION  Admission to the hospital as an inpatient is a complex decision making process which requires the practitioner to consider the patients presenting complaint, history and physical examination and all relevant testing  With this in mind, in this case, the patient was deemed appropriate for INPATIENT ADMISSION  After review of the documentation and testing available at the time of the admission I concur with this clinical determination of medical necessity  Rationale is as follows: The patient is a 80 yrs old Male who presented to the ED at 7/4/2019  3:34 PM with a chief complaint of Fever - 75 years or older (per EMS pt coming from [facility] with temp of 103  given 650 mg of tylenol at 230 pm  denies, SOB, CP, N/V/D)     Patient admitted with a report of fever with chills, B/L leg swelling and right-sided back pain with inability to raise his leg  He did meet severe sepsis criteria and was started on IV antibiotics  Abnormal labs include a sodium of 128, BUN of 60, creatinine of 4 21, lactate of 2 8 and procalcitonin of 167  59    A UA revealed moderate leukocytes and result of urine and blood cultures are pending  A CXR reveals findings consistent with a RUL pneumonia  Imaging studies of the back are ordered in order to rule out a myelopathy  With the ongoing noted acute care for this patient, a two night admission class to the hospital would be considered appropriate          The patients vitals on arrival were ED Triage Vitals   Temperature Pulse Respirations Blood Pressure SpO2   07/04/19 1536 07/04/19 1536 07/04/19 1536 07/04/19 1536 07/04/19 1536   (!) 100 8 °F (38 2 °C) 86 18 115/51 93 %      Temp Source Heart Rate Source Patient Position - Orthostatic VS BP Location FiO2 (%)   07/04/19 1536 07/04/19 1536 07/04/19 1536 07/04/19 1536 --   Oral Monitor Lying Right arm       Pain Score       07/04/19 1746       No Pain           Past Medical History:   Diagnosis Date    3-vessel coronary artery disease     last assessed: 08/15/2016    BPH (benign prostatic hyperplasia)     Chronic kidney disease     Diabetes mellitus ([facility] )     Hyperlipidemia     Hypertension     Shingles 04/29/2019    SOB (shortness of breath)     Stroke [facility])      Past Surgical History:   Procedure Laterality Date    CARDIAC PACEMAKER PLACEMENT      CATARACT EXTRACTION      last assessed: 11/11/2015    CORONARY ARTERY BYPASS GRAFT      last assessed: 08/26/2015           Consults have been placed to:   None    Vitals:    07/04/19 1751 07/04/19 2206 07/05/19 0744 07/05/19 1017   BP: 115/56 126/64 140/65    BP Location: Right arm Right arm Right arm    Pulse: 71 70 70    Resp: 18 18 16    Temp: 98 8 °F (37 1 °C) 98 7 °F (37 1 °C) 97 6 °F (36 4 °C)    TempSrc: Oral Oral Oral    SpO2: 92% 94% 90%    Weight: 82 kg (180 lb 12 4 oz)   82 6 kg (182 lb 1 6 oz)   Height: 5' 5" (1 651 m)          Most recent labs:    Recent Labs     07/04/19  1552 07/05/19  0459   WBC 10 10 12 76*   HGB 10 7* 9 7*   HCT 32 3* 30 0*   * 98*   K 4 0 3 7   CALCIUM 7 9* 8 0*   BUN 60* 59* CREATININE 4 21* 3 82*   LIPASE 264  --    INR 2 28*  --    AST 35  --    ALT 19  --    ALKPHOS 191*  --        Scheduled Meds:  Current Facility-Administered Medications:  aspirin 81 mg Oral Daily Robin Jordan MD   atorvastatin 40 mg Oral QPM Robin Jordan MD   cefepime 2,000 mg Intravenous Q24H Robin Jordan MD   insulin lispro 1-5 Units Subcutaneous HS Catherine Spaulding PA-C   insulin lispro 2-12 Units Subcutaneous TID AC Catherine Spaulding PA-C   levothyroxine 75 mcg Oral Early Morning Robin Jordan MD   metoprolol tartrate 50 mg Oral BID With Meals Robin Jordan MD   ondansetron 4 mg Intravenous Q6H PRN Robin Jordan MD   ranolazine 500 mg Oral Q12H Atiya Woods MD   tamsulosin 0 4 mg Oral Daily Robin Jordan MD   warfarin 2 mg Oral Daily (warfarin) Robin Jordan MD     Continuous Infusions:   PRN Meds: ondansetron    Surgical procedures (if appropriate):

## 2019-07-05 NOTE — PLAN OF CARE
Problem: Potential for Falls  Goal: Patient will remain free of falls  Description  INTERVENTIONS:  - Assess patient frequently for physical needs  -  Identify cognitive and physical deficits and behaviors that affect risk of falls    -  [place] fall precautions as indicated by assessment   - Educate patient/family on patient safety including physical limitations  - Instruct patient to call for assistance with activity based on assessment  - Modify environment to reduce risk of injury  - Consider OT/PT consult to assist with strengthening/mobility  Outcome: Progressing     Problem: Prexisting or High Potential for Compromised Skin Integrity  Goal: Skin integrity is maintained or improved  Description  INTERVENTIONS:  - Identify patients at risk for skin breakdown  - Assess and monitor skin integrity  - Assess and monitor nutrition and hydration status  - Monitor labs (i e  albumin)  - Assess for incontinence   - Turn and reposition patient  - Assist with mobility/ambulation  - Relieve pressure over bony prominences  - Avoid friction and shearing  - Provide appropriate hygiene as needed including keeping skin clean and dry  - Evaluate need for skin moisturizer/barrier cream  - Collaborate with interdisciplinary team (i e  Nutrition, Rehabilitation, etc )   - Patient/family teaching  Outcome: Progressing     Problem: METABOLIC, FLUID AND ELECTROLYTES - ADULT  Goal: Electrolytes maintained within normal limits  Description  INTERVENTIONS:  - Monitor labs and assess patient for signs and symptoms of electrolyte imbalances  - Administer electrolyte replacement as ordered  - Monitor response to electrolyte replacements, including repeat lab results as appropriate  - Instruct patient on fluid and nutrition as appropriate  Outcome: Progressing  Goal: Fluid balance maintained  Description  INTERVENTIONS:  - Monitor labs and assess for signs and symptoms of volume excess or deficit  - Monitor I/O and WT  - Instruct patient on fluid and nutrition as appropriate  Outcome: Progressing  Goal: Glucose maintained within target range  Description  INTERVENTIONS:  - Monitor Blood Glucose as ordered  - Assess for signs and symptoms of hyperglycemia and hypoglycemia  - Administer ordered medications to maintain glucose within target range  - Assess nutritional intake and initiate nutrition service referral as needed  Outcome: Progressing     Problem: INFECTION - ADULT  Goal: Absence or prevention of progression during hospitalization  Description  INTERVENTIONS:  - Assess and monitor for signs and symptoms of infection  - Monitor lab/diagnostic results  - Monitor all insertion sites, i e  indwelling lines, tubes, and drains  - Monitor endotracheal (as able) and nasal secretions for changes in amount and color  - [place] appropriate cooling/warming therapies per order  - Administer medications as ordered  - Instruct and encourage patient and family to use good hand hygiene technique  - Identify and instruct in appropriate isolation precautions for identified infection/condition  Outcome: Progressing  Goal: Absence of fever/infection during neutropenic period  Description  INTERVENTIONS:  - Monitor WBC  - Implement neutropenic guidelines  Outcome: Progressing     Problem: DISCHARGE PLANNING  Goal: Discharge to home or other facility with appropriate resources  Description  INTERVENTIONS:  - Identify barriers to discharge w/patient and caregiver  - Arrange for needed discharge resources and transportation as appropriate  - Identify discharge learning needs (meds, wound care, etc )  - Arrange for interpretive services to assist at discharge as needed  - Refer to Case Management Department for coordinating discharge planning if the patient needs post-hospital services based on physician/advanced practitioner order or complex needs related to functional status, cognitive ability, or social support system  Outcome: Progressing     Problem: Knowledge Deficit  Goal: Patient/family/caregiver demonstrates understanding of disease process, treatment plan, medications, and discharge instructions  Description  Complete learning assessment and assess knowledge base    Interventions:  - Provide teaching at level of understanding  - Provide teaching via preferred learning methods  Outcome: Progressing

## 2019-07-06 PROBLEM — R33.9 URINARY RETENTION: Status: ACTIVE | Noted: 2019-07-06

## 2019-07-06 PROBLEM — R78.81 GRAM-NEGATIVE BACTEREMIA: Status: ACTIVE | Noted: 2019-07-06

## 2019-07-06 LAB
ANION GAP SERPL CALCULATED.3IONS-SCNC: 10 MMOL/L (ref 4–13)
BASOPHILS # BLD AUTO: 0.06 THOUSANDS/ΜL (ref 0–0.1)
BASOPHILS NFR BLD AUTO: 0 % (ref 0–1)
BUN SERPL-MCNC: 55 MG/DL (ref 5–25)
CALCIUM SERPL-MCNC: 7.9 MG/DL (ref 8.3–10.1)
CHLORIDE SERPL-SCNC: 100 MMOL/L (ref 100–108)
CO2 SERPL-SCNC: 23 MMOL/L (ref 21–32)
CREAT SERPL-MCNC: 3.61 MG/DL (ref 0.6–1.3)
EOSINOPHIL # BLD AUTO: 0.37 THOUSAND/ΜL (ref 0–0.61)
EOSINOPHIL NFR BLD AUTO: 3 % (ref 0–6)
ERYTHROCYTE [DISTWIDTH] IN BLOOD BY AUTOMATED COUNT: 14.2 % (ref 11.6–15.1)
GFR SERPL CREATININE-BSD FRML MDRD: 14 ML/MIN/1.73SQ M
GLUCOSE SERPL-MCNC: 110 MG/DL (ref 65–140)
GLUCOSE SERPL-MCNC: 129 MG/DL (ref 65–140)
GLUCOSE SERPL-MCNC: 143 MG/DL (ref 65–140)
GLUCOSE SERPL-MCNC: 144 MG/DL (ref 65–140)
GLUCOSE SERPL-MCNC: 191 MG/DL (ref 65–140)
HCT VFR BLD AUTO: 30.6 % (ref 36.5–49.3)
HGB BLD-MCNC: 10.3 G/DL (ref 12–17)
IMM GRANULOCYTES # BLD AUTO: 0.13 THOUSAND/UL (ref 0–0.2)
IMM GRANULOCYTES NFR BLD AUTO: 1 % (ref 0–2)
INR PPP: 2.25 (ref 0.84–1.19)
LYMPHOCYTES # BLD AUTO: 1.35 THOUSANDS/ΜL (ref 0.6–4.47)
LYMPHOCYTES NFR BLD AUTO: 9 % (ref 14–44)
MCH RBC QN AUTO: 31.1 PG (ref 26.8–34.3)
MCHC RBC AUTO-ENTMCNC: 33.7 G/DL (ref 31.4–37.4)
MCV RBC AUTO: 92 FL (ref 82–98)
MONOCYTES # BLD AUTO: 0.68 THOUSAND/ΜL (ref 0.17–1.22)
MONOCYTES NFR BLD AUTO: 5 % (ref 4–12)
NEUTROPHILS # BLD AUTO: 12.01 THOUSANDS/ΜL (ref 1.85–7.62)
NEUTS SEG NFR BLD AUTO: 82 % (ref 43–75)
NRBC BLD AUTO-RTO: 0 /100 WBCS
PLATELET # BLD AUTO: 130 THOUSANDS/UL (ref 149–390)
PMV BLD AUTO: 10.2 FL (ref 8.9–12.7)
POTASSIUM SERPL-SCNC: 3.7 MMOL/L (ref 3.5–5.3)
PROTHROMBIN TIME: 24 SECONDS (ref 11.6–14.5)
RBC # BLD AUTO: 3.31 MILLION/UL (ref 3.88–5.62)
SODIUM SERPL-SCNC: 133 MMOL/L (ref 136–145)
WBC # BLD AUTO: 14.6 THOUSAND/UL (ref 4.31–10.16)

## 2019-07-06 PROCEDURE — 80048 BASIC METABOLIC PNL TOTAL CA: CPT | Performed by: HOSPITALIST

## 2019-07-06 PROCEDURE — 85610 PROTHROMBIN TIME: CPT | Performed by: HOSPITALIST

## 2019-07-06 PROCEDURE — 99223 1ST HOSP IP/OBS HIGH 75: CPT | Performed by: INTERNAL MEDICINE

## 2019-07-06 PROCEDURE — 85025 COMPLETE CBC W/AUTO DIFF WBC: CPT | Performed by: HOSPITALIST

## 2019-07-06 PROCEDURE — 82948 REAGENT STRIP/BLOOD GLUCOSE: CPT

## 2019-07-06 PROCEDURE — 99232 SBSQ HOSP IP/OBS MODERATE 35: CPT | Performed by: HOSPITALIST

## 2019-07-06 RX ADMIN — RANOLAZINE 500 MG: 500 TABLET, FILM COATED, EXTENDED RELEASE ORAL at 08:52

## 2019-07-06 RX ADMIN — ATORVASTATIN CALCIUM 40 MG: 40 TABLET, FILM COATED ORAL at 17:14

## 2019-07-06 RX ADMIN — ASPIRIN 81 MG: 81 TABLET, COATED ORAL at 08:52

## 2019-07-06 RX ADMIN — LEVOTHYROXINE SODIUM 75 MCG: 75 TABLET ORAL at 06:09

## 2019-07-06 RX ADMIN — INSULIN LISPRO 2 UNITS: 100 INJECTION, SOLUTION INTRAVENOUS; SUBCUTANEOUS at 08:53

## 2019-07-06 RX ADMIN — RANOLAZINE 500 MG: 500 TABLET, FILM COATED, EXTENDED RELEASE ORAL at 21:18

## 2019-07-06 RX ADMIN — CEFTRIAXONE 2000 MG: 2 INJECTION, POWDER, FOR SOLUTION INTRAMUSCULAR; INTRAVENOUS at 14:44

## 2019-07-06 RX ADMIN — INSULIN LISPRO 2 UNITS: 100 INJECTION, SOLUTION INTRAVENOUS; SUBCUTANEOUS at 17:14

## 2019-07-06 RX ADMIN — METOPROLOL TARTRATE 50 MG: 50 TABLET, FILM COATED ORAL at 08:52

## 2019-07-06 RX ADMIN — METOPROLOL TARTRATE 50 MG: 50 TABLET, FILM COATED ORAL at 17:14

## 2019-07-06 RX ADMIN — POLYETHYLENE GLYCOL 3350 17 G: 17 POWDER, FOR SOLUTION ORAL at 08:53

## 2019-07-06 RX ADMIN — TAMSULOSIN HYDROCHLORIDE 0.4 MG: 0.4 CAPSULE ORAL at 08:52

## 2019-07-06 NOTE — PROGRESS NOTES
Patient resting comfortably in bed, family at bedside, no complaints at this time, call bell within reach, bed alarm on, will continue to monitor

## 2019-07-06 NOTE — ASSESSMENT & PLAN NOTE
· NA: 128 on admission from 136 a few weeks ago; pt has evidence of volume overload but wouldn't hold off on hydrating until sepsis trend is clear     · Katie: <20 suggesting hypovolemia  · BMP daily

## 2019-07-06 NOTE — ASSESSMENT & PLAN NOTE
· POA, evidenced by fevers, leukocytosis with bandemia and elevated lactate  · Suspect urinary source  · Continue cefepime for now  · Follow Ucx: >100K GNR  · Bcx: non lactose fermenting GNR x2 (?pseudomonas)

## 2019-07-06 NOTE — CONSULTS
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 that is sensitive to all cephalosporins   -discontinue cefepime as he is likely to have the same sensitive Klebsiella that he had early this month  -ceftriaxone 2 g IV Q 24 hours  -follow-up blood cultures and urine cultures and sensitivities and adjust antibiotics as needed  -will recheck blood cultures x2 sets as the patient does have a permanent pacemaker in place  -check CBC with diff and CMP  -recheck procalcitonin level  -supportive care    2  Klebsiella bacteremia-likely the same organism found in the urine repeatedly  Suspect this is all secondary to a UTI with obstructive pyelonephritis   -antibiotics as above  -follow up sensitivities and adjust antibiotics as needed  -recheck blood cultures x2 sets confirm clearance  -no additional ID workup for now    3  Obstructive pyelonephritis-patient noted to have urinary retention requiring straight catheterization  Suspect this is why he had relapse of the Klebsiella UTI   -antibiotics as above  -monitor symptomatology  -recommend urology evaluation  -straight catheterization as needed  -may end up needing Barrientos catheter    4  Acute kidney injury-complicating chronic kidney disease  Likely a pre renal issue  Perhaps sepsis playing a role  The renal function has improved with resuscitation and treatment of the sepsis  Perhaps obstruction was also playing a role    -monitor creatinine clearance  -volume management  -no need to dose adjust ceftriaxone  -straight catheterization as needed    5  Chronic right-sided low back pain-with some notable weakness that is been persisting  MRI of the lumbar spine negative   -await cervical spine MRI      Detailed review of the patient's previous hospital medical records from his recent hospital stay    Discussed in detail with the patient's son-in-law Dylon Segura, who is an orthopedic surgeon at [address] [place] [address]:  Reason for Consult:  Bacteremia  HPI: Kassie Yang is a 80y o  year old male with a history of ambulatory dysfunction with chronic disability requiring rehab placement admitted to [facility] with fever and shaking chills who I am asked to assist with management of positive blood culture  The patient apparently has been struggling with ambulation and generalized weakness in suffering from a severe bout of zoster earlier this year  He was admitted to the hospital on the 12th of June with ambulatory dysfunction and generalized weakness and found to have urinary tract infection  His urine culture grew Klebsiella sensitive to 1st generation cephalosporins and his antibiotics were initially ceftriaxone and de-escalated to Keflex and he completed a 7 day course of treatment  Because of his debilitation, he was admitted to rehab [facility]  Of note the patient has been struggling with completely voiding his urine recently  On the day prior to admission the patient developed sudden onset of fever and shaking chills  He was unable to completely void and he was sent to the ER for further evaluation  In the emergency department the patient was found have a high fever but was hemodynamically stable  He had blood cultures and urine cultures obtained and was started on cefepime and admitted for further management  He was found to have urinary retention and underwent straight catheterization    Since being admitted his temperatures come down although he continues to have a leukocytosis  He is feeling much better overall  He denies any current fever chills or sweats, denies any nausea vomiting or diarrhea, denies any cough or shortness of breath, denies any dysuria or hematuria  He does suffers from some chronic right-sided back pain  At baseline the patient has chronic kidney disease with a baseline creatinine of 3 4, however he was found to have acute kidney injury  REVIEW OF SYSTEMS:  A complete 12 point system-based review of systems is negative other than that noted in the HPI  PAST MEDICAL HISTORY:  Past Medical History:   Diagnosis Date    3-vessel coronary artery disease     last assessed: 08/15/2016    BPH (benign prostatic hyperplasia)     Chronic kidney disease     Diabetes mellitus ([facility] [address] )     Hyperlipidemia     Hypertension     Shingles 2019    SOB (shortness of breath)     Stroke [facility])      Past Surgical History:   Procedure Laterality Date    CARDIAC PACEMAKER PLACEMENT      CATARACT EXTRACTION      last assessed: 2015    CORONARY ARTERY BYPASS GRAFT      last assessed: 2015       FAMILY HISTORY:  Non-contributory    SOCIAL HISTORY:  Social History   Social History     Substance and Sexual Activity   Alcohol Use No     Social History     Substance and Sexual Activity   Drug Use No     Social History     Tobacco Use   Smoking Status Never Smoker   Smokeless Tobacco Never Used       ALLERGIES:  No Known Allergies    MEDICATIONS:  All current active medications have been reviewed    Antibiotics:  Cefepime 3    PHYSICAL EXAM:  Temp:  [97 6 °F (36 4 °C)-98 4 °F (36 9 °C)] 97 6 °F (36 4 °C)  HR:  [67-69] 69  Resp:  [17-18] 18  BP: (132-148)/(64-70) 148/70  SpO2:  [93 %-95 %] 94 %  Temp (24hrs), Av °F (36 7 °C), Min:97 6 °F (36 4 °C), Max:98 4 °F (36 9 °C)  Current: Temperature: 97 6 °F (36 4 °C)    Intake/Output Summary (Last 24 hours) at 2019 1325  Last data filed at 2019 0812  Gross per 24 hour   Intake 120 ml   Output 1589 ml Net -1469 ml       General Appearance:  Appearing well, nontoxic, and in no distress   Head:  Normocephalic, without obvious abnormality, atraumatic   Eyes:  Conjunctiva pink and sclera anicteric, both eyes   Nose: Nares normal, mucosa normal, no drainage   Throat: Oropharynx moist without lesions   Neck: Supple, symmetrical, no adenopathy, no tenderness/mass/nodules   Back:   Symmetric, no curvature, ROM normal, no CVA tenderness   Lungs:   Decreased breath sounds bilaterally, respirations unlabored   Chest Wall:  No tenderness or deformity   Heart:  RRR; no murmur, rub or gallop   Abdomen:   Soft, non-tender, non-distended, positive bowel sounds    Extremities: No cyanosis, clubbing or edema   Skin: No rashes or lesions  No draining wounds noted  Lymph nodes: Cervical, supraclavicular nodes normal   Neurologic: Alert and oriented times 3, extremity strength 5/5 and symmetric       LABS, IMAGING, & OTHER STUDIES:  Lab Results:  I have personally reviewed pertinent labs    Results from last 7 days   Lab Units 07/06/19  1000 07/05/19  0459 07/04/19  1552   WBC Thousand/uL 14 60* 12 76* 10 10   HEMOGLOBIN g/dL 10 3* 9 7* 10 7*   PLATELETS Thousands/uL 130* 98* 116*     Results from last 7 days   Lab Units 07/06/19  1000 07/05/19  0459 07/04/19  1552   SODIUM mmol/L 133* 133* 128*   POTASSIUM mmol/L 3 7 3 7 4 0   CHLORIDE mmol/L 100 99* 93*   CO2 mmol/L 23 22 24   BUN mg/dL 55* 59* 60*   CREATININE mg/dL 3 61* 3 82* 4 21*   EGFR ml/min/1 73sq m 14 13 12   CALCIUM mg/dL 7 9* 8 0* 7 9*   AST U/L  --   --  35   ALT U/L  --   --  19   ALK PHOS U/L  --   --  191*     Results from last 7 days   Lab Units 07/04/19  1622 07/04/19  1552   BLOOD CULTURE   --  Klebsiella pneumoniae*  Klebsiella pneumoniae*   GRAM STAIN RESULT   --  Gram negative rods*  Gram negative rods*   URINE CULTURE  >100,000 cfu/ml Gram Negative Yannick*  --      Results from last 7 days   Lab Units 07/04/19  1552   PROCALCITONIN ng/ml 167 59* Imaging Studies:   I have personally reviewed pertinent imaging study reports and images in PACS  MRI lumbar spine-multilevel spondylosis most prominent at L2-3    Chest x-ray-patchy consolidation on the right with a small effusion  No area of dense consolidation    CT abdomen and pelvis-no renal abscess or hydronephrosis    No other acute intra-abdominal pathology    Images personally reviewed by me in PACS

## 2019-07-06 NOTE — ASSESSMENT & PLAN NOTE
· Pt reports worsening low back pain when moved at [facility]  Describes shooting pain down right leg  · Unable to lift his R leg; no dorsiflexion  · MRI C-spine pending

## 2019-07-06 NOTE — ASSESSMENT & PLAN NOTE
· Was initially retaining requiring straight cath x2; was able to void spontaneously   · Urinary retention protocol

## 2019-07-06 NOTE — PROGRESS NOTES
Pt resting in bed, denies any pain, breakfast is ordered, call bell within reach  PCA and RN to bathe pt after breakfast  Will continue hourly rounds

## 2019-07-06 NOTE — PROGRESS NOTES
Pt was DTV @ 2300  Pt did not void  Scanned pt's bladder, 323mL present  Pt stated that he felt uncomfortable d/t full bladder  Straight cath pt @ 2323 f/ 500mL  Post sttraight cath bladder scan 37mL  Pt DTV 0530 7/6/19  Will continue to monitor

## 2019-07-06 NOTE — PROGRESS NOTES
Progress Note - Flynn Perry 1/29/1932, 80 y o  male MRN: [id number]    Unit/Bed#: -01 Encounter: [id number]    Primary Care Provider: Edgar Lakhani MD   Date and time admitted to hospital: 7/4/2019  3:34 PM    * Severe sepsis ([facility] [address] )  Assessment & Plan  · POA, evidenced by fevers, leukocytosis with bandemia and elevated lactate  · Suspect urinary source  · Continue cefepime for now  · Follow Ucx: >100K GNR  · Bcx: non lactose fermenting GNR x2 (?pseudomonas)      Gram-negative bacteremia  Assessment & Plan  · BCx: non lactose fermenting GNR x2   · Appreciate ID input     MAGUI (acute kidney injury) ([facility] )  Assessment & Plan  · BsCr: 3 4; elevated to 4 5 on admission  · Improved to 3 8 with hydration    · Suspect prerenal due to sepsis  · avoid hypotension, nephrotoxins     Hyponatremia  Assessment & Plan  · NA: 128 on admission from 136 a few weeks ago; pt has evidence of volume overload but wouldn't hold off on hydrating until sepsis trend is clear  · Katie: <20 suggesting hypovolemia  · BMP daily     Chronic diastolic congestive heart failure ([facility] [address] )  Assessment & Plan  Wt Readings from Last 3 Encounters:   07/06/19 82 5 kg (181 lb 14 1 oz)   06/12/19 76 5 kg (168 lb 10 4 oz)   05/30/19 80 3 kg (177 lb)     monitor volume status with resuscitation; pt is already above weight from recent discharge so suspect he will need diuresis once infection adequately treated   Continue I/Os and daily weights           Chronic right-sided low back pain without sciatica  Assessment & Plan  · Pt reports worsening low back pain when moved at [facility]  Describes shooting pain down right leg  · Unable to lift his R leg; no dorsiflexion  · MRI C-spine pending        Urinary retention  Assessment & Plan  · Was initially retaining requiring straight cath x2; was able to void spontaneously   · Urinary retention protocol     Paroxysmal A-fib (HCC)  Assessment & Plan  · Cont BB  · INR daily  · Will hold warfarin given upcoming surgery    VTE Pharmacologic Prophylaxis:   Pharmacologic: Warfarin (Coumadin)  Mechanical VTE Prophylaxis in Place: Yes    Patient Centered Rounds: I have performed bedside rounds with nursing staff today  Discussions with Specialists or Other Care Team Provider:     Education and Discussions with Family / Patient: patient and wife     Time Spent for Care: 30 minutes  More than 50% of total time spent on counseling and coordination of care as described above  Current Length of Stay: 2 day(s)    Current Patient Status: Inpatient   Certification Statement: The patient will continue to require additional inpatient hospital stay due to [employer] bacteremia     Discharge Plan / Estimated Discharge Date: TBD based on clinical course      Code Status: Level 1 - Full Code    Subjective:   Feeling better each day  No SOB  No CP  Appetite is poor     Objective:     Vitals:   Temp (24hrs), Av °F (36 7 °C), Min:97 6 °F (36 4 °C), Max:98 4 °F (36 9 °C)    Temp:  [97 6 °F (36 4 °C)-98 4 °F (36 9 °C)] 97 6 °F (36 4 °C)  HR:  [67-69] 69  Resp:  [17-18] 18  BP: (132-148)/(64-70) 148/70  SpO2:  [93 %-95 %] 94 %  Body mass index is 30 27 kg/m²  Input and Output Summary (last 24 hours): Intake/Output Summary (Last 24 hours) at 2019 0907  Last data filed at 2019 5723  Gross per 24 hour   Intake 120 ml   Output 1589 ml   Net -1469 ml       Physical Exam:     Physical Exam   Constitutional: He is oriented to person, place, and time  No distress  HENT:   Head: Normocephalic and atraumatic  Mouth/Throat: Oropharynx is clear and moist    Cardiovascular: Normal rate and regular rhythm  No murmur heard  Pulmonary/Chest: Effort normal and breath sounds normal  No stridor  No respiratory distress  He has no wheezes  Abdominal: Soft  Bowel sounds are normal  He exhibits no distension  There is no tenderness  There is no guarding  Musculoskeletal: He exhibits edema  He exhibits no deformity     Neurological: He is alert and oriented to person, place, and time  Skin: Skin is warm and dry  He is not diaphoretic  Psychiatric: He has a normal mood and affect  His behavior is normal    Nursing note and vitals reviewed  Additional Data:     Labs:    Results from last 7 days   Lab Units 07/05/19  0459   WBC Thousand/uL 12 76*   HEMOGLOBIN g/dL 9 7*   HEMATOCRIT % 30 0*   PLATELETS Thousands/uL 98*   LYMPHO PCT % 11*   MONO PCT % 6   EOS PCT % 3     Results from last 7 days   Lab Units 07/05/19  0459 07/04/19  1552   POTASSIUM mmol/L 3 7 4 0   CHLORIDE mmol/L 99* 93*   CO2 mmol/L 22 24   BUN mg/dL 59* 60*   CREATININE mg/dL 3 82* 4 21*   CALCIUM mg/dL 8 0* 7 9*   ALK PHOS U/L  --  191*   ALT U/L  --  19   AST U/L  --  35     Results from last 7 days   Lab Units 07/06/19  0557   INR  2 25*       * I Have Reviewed All Lab Data Listed Above  * Additional Pertinent Lab Tests Reviewed:  All Labs Within Last 24 Hours Reviewed    Imaging:    Imaging Reports Reviewed Today Include:   Imaging Personally Reviewed by Myself Includes:      Recent Cultures (last 7 days):     Results from last 7 days   Lab Units 07/04/19  1622 07/04/19  1552   BLOOD CULTURE   --  Non lactose fermenting gram negative yannick*  Non lactose fermenting gram negative yannick*   GRAM STAIN RESULT   --  Gram negative rods*  Gram negative rods*   URINE CULTURE  >100,000 cfu/ml Gram Negative Yannick*  --        Last 24 Hours Medication List:     Current Facility-Administered Medications:  aspirin 81 mg Oral Daily Pilar Whitley MD    atorvastatin 40 mg Oral QPM Pilar Whitley MD    cefepime 2,000 mg Intravenous Q24H Pilar Whitley MD Last Rate: 2,000 mg (07/05/19 1800)   insulin lispro 1-5 Units Subcutaneous HS Catherine Spaulding PA-C    insulin lispro 2-12 Units Subcutaneous TID AC Catherine Spaulding PA-C    levothyroxine 75 mcg Oral Early Morning Pilar Whitley MD    metoprolol tartrate 50 mg Oral BID With Meals Pilar Whitley MD    ondansetron 4 mg Intravenous Q6H PRN Pilar Whitley MD    polyethylene glycol 17 g Oral Daily PRN Wilfred Vicente MD    ranolazine 500 mg Oral Q12H Frankey Crochet, MD    tamsulosin 0 4 mg Oral Daily Wilfred Vicente MD         Today, Patient Was Seen By: Wilfred Vicente MD    ** Please Note: This note has been constructed using a voice recognition system   **

## 2019-07-06 NOTE — PROGRESS NOTES
Provider on call notified that patient has LUE swelling  Pt c/o LUE heaviness but denies pain  Radial pulse intact  Sensory and motor function normal   IV LUE flushing however will ask RN to place IV in RUE  Check RUE duplex r/o DVT in the setting of sepsis  INR this morning 2 25  Continue to monitor closely  LUE elevation

## 2019-07-07 ENCOUNTER — APPOINTMENT (INPATIENT)
Dept: ULTRASOUND IMAGING | Facility: HOSPITAL | Age: 84
DRG: 872 | End: 2019-07-07
Payer: MEDICARE

## 2019-07-07 PROBLEM — R60.0 EDEMA OF UPPER EXTREMITY: Status: ACTIVE | Noted: 2019-07-07

## 2019-07-07 LAB
ALBUMIN SERPL BCP-MCNC: 2.7 G/DL (ref 3.5–5)
ALP SERPL-CCNC: 108 U/L (ref 46–116)
ALT SERPL W P-5'-P-CCNC: 15 U/L (ref 12–78)
ANION GAP SERPL CALCULATED.3IONS-SCNC: 8 MMOL/L (ref 4–13)
AST SERPL W P-5'-P-CCNC: 31 U/L (ref 5–45)
BACTERIA BLD CULT: ABNORMAL
BACTERIA BLD CULT: ABNORMAL
BACTERIA UR CULT: ABNORMAL
BASOPHILS # BLD AUTO: 0.04 THOUSANDS/ΜL (ref 0–0.1)
BASOPHILS NFR BLD AUTO: 0 % (ref 0–1)
BILIRUB SERPL-MCNC: 0.6 MG/DL (ref 0.2–1)
BUN SERPL-MCNC: 49 MG/DL (ref 5–25)
CALCIUM SERPL-MCNC: 7.9 MG/DL (ref 8.3–10.1)
CHLORIDE SERPL-SCNC: 101 MMOL/L (ref 100–108)
CO2 SERPL-SCNC: 24 MMOL/L (ref 21–32)
CREAT SERPL-MCNC: 3.34 MG/DL (ref 0.6–1.3)
EOSINOPHIL # BLD AUTO: 0.41 THOUSAND/ΜL (ref 0–0.61)
EOSINOPHIL NFR BLD AUTO: 4 % (ref 0–6)
ERYTHROCYTE [DISTWIDTH] IN BLOOD BY AUTOMATED COUNT: 14.6 % (ref 11.6–15.1)
GFR SERPL CREATININE-BSD FRML MDRD: 16 ML/MIN/1.73SQ M
GLUCOSE SERPL-MCNC: 101 MG/DL (ref 65–140)
GLUCOSE SERPL-MCNC: 142 MG/DL (ref 65–140)
GLUCOSE SERPL-MCNC: 145 MG/DL (ref 65–140)
GLUCOSE SERPL-MCNC: 168 MG/DL (ref 65–140)
GLUCOSE SERPL-MCNC: 99 MG/DL (ref 65–140)
GRAM STN SPEC: ABNORMAL
GRAM STN SPEC: ABNORMAL
HCT VFR BLD AUTO: 31.5 % (ref 36.5–49.3)
HGB BLD-MCNC: 10.1 G/DL (ref 12–17)
IMM GRANULOCYTES # BLD AUTO: 0.11 THOUSAND/UL (ref 0–0.2)
IMM GRANULOCYTES NFR BLD AUTO: 1 % (ref 0–2)
INR PPP: 2.13 (ref 0.84–1.19)
LYMPHOCYTES # BLD AUTO: 1.43 THOUSANDS/ΜL (ref 0.6–4.47)
LYMPHOCYTES NFR BLD AUTO: 13 % (ref 14–44)
MCH RBC QN AUTO: 30 PG (ref 26.8–34.3)
MCHC RBC AUTO-ENTMCNC: 32.1 G/DL (ref 31.4–37.4)
MCV RBC AUTO: 94 FL (ref 82–98)
MONOCYTES # BLD AUTO: 0.59 THOUSAND/ΜL (ref 0.17–1.22)
MONOCYTES NFR BLD AUTO: 5 % (ref 4–12)
NEUTROPHILS # BLD AUTO: 8.71 THOUSANDS/ΜL (ref 1.85–7.62)
NEUTS SEG NFR BLD AUTO: 77 % (ref 43–75)
NRBC BLD AUTO-RTO: 0 /100 WBCS
PLATELET # BLD AUTO: 132 THOUSANDS/UL (ref 149–390)
PMV BLD AUTO: 11 FL (ref 8.9–12.7)
POTASSIUM SERPL-SCNC: 4.1 MMOL/L (ref 3.5–5.3)
PROCALCITONIN SERPL-MCNC: 131.47 NG/ML
PROT SERPL-MCNC: 6.1 G/DL (ref 6.4–8.2)
PROTHROMBIN TIME: 23 SECONDS (ref 11.6–14.5)
RBC # BLD AUTO: 3.37 MILLION/UL (ref 3.88–5.62)
SODIUM SERPL-SCNC: 133 MMOL/L (ref 136–145)
WBC # BLD AUTO: 11.29 THOUSAND/UL (ref 4.31–10.16)

## 2019-07-07 PROCEDURE — 85025 COMPLETE CBC W/AUTO DIFF WBC: CPT | Performed by: HOSPITALIST

## 2019-07-07 PROCEDURE — 99232 SBSQ HOSP IP/OBS MODERATE 35: CPT | Performed by: INTERNAL MEDICINE

## 2019-07-07 PROCEDURE — 87040 BLOOD CULTURE FOR BACTERIA: CPT | Performed by: INTERNAL MEDICINE

## 2019-07-07 PROCEDURE — 82948 REAGENT STRIP/BLOOD GLUCOSE: CPT

## 2019-07-07 PROCEDURE — 80053 COMPREHEN METABOLIC PANEL: CPT | Performed by: INTERNAL MEDICINE

## 2019-07-07 PROCEDURE — 99232 SBSQ HOSP IP/OBS MODERATE 35: CPT | Performed by: HOSPITALIST

## 2019-07-07 PROCEDURE — 93971 EXTREMITY STUDY: CPT

## 2019-07-07 PROCEDURE — 99222 1ST HOSP IP/OBS MODERATE 55: CPT | Performed by: UROLOGY

## 2019-07-07 PROCEDURE — 84145 PROCALCITONIN (PCT): CPT | Performed by: INTERNAL MEDICINE

## 2019-07-07 PROCEDURE — 93971 EXTREMITY STUDY: CPT | Performed by: SURGERY

## 2019-07-07 PROCEDURE — 85610 PROTHROMBIN TIME: CPT | Performed by: HOSPITALIST

## 2019-07-07 RX ORDER — BISACODYL 10 MG
10 SUPPOSITORY, RECTAL RECTAL DAILY PRN
Status: DISCONTINUED | OUTPATIENT
Start: 2019-07-07 | End: 2019-07-12

## 2019-07-07 RX ORDER — CEFAZOLIN SODIUM 1 G/50ML
1000 SOLUTION INTRAVENOUS EVERY 8 HOURS
Status: DISCONTINUED | OUTPATIENT
Start: 2019-07-07 | End: 2019-07-09

## 2019-07-07 RX ORDER — SODIUM PHOSPHATE, DIBASIC AND SODIUM PHOSPHATE, MONOBASIC 7; 19 G/133ML; G/133ML
1 ENEMA RECTAL ONCE AS NEEDED
Status: DISCONTINUED | OUTPATIENT
Start: 2019-07-07 | End: 2019-07-07

## 2019-07-07 RX ORDER — CEFAZOLIN SODIUM 1 G/50ML
1000 SOLUTION INTRAVENOUS EVERY 12 HOURS
Status: DISCONTINUED | OUTPATIENT
Start: 2019-07-07 | End: 2019-07-07

## 2019-07-07 RX ADMIN — ATORVASTATIN CALCIUM 40 MG: 40 TABLET, FILM COATED ORAL at 16:39

## 2019-07-07 RX ADMIN — LEVOTHYROXINE SODIUM 75 MCG: 75 TABLET ORAL at 05:07

## 2019-07-07 RX ADMIN — BISACODYL 10 MG: 10 SUPPOSITORY RECTAL at 12:54

## 2019-07-07 RX ADMIN — TAMSULOSIN HYDROCHLORIDE 0.4 MG: 0.4 CAPSULE ORAL at 08:00

## 2019-07-07 RX ADMIN — METOPROLOL TARTRATE 50 MG: 50 TABLET, FILM COATED ORAL at 16:34

## 2019-07-07 RX ADMIN — RANOLAZINE 500 MG: 500 TABLET, FILM COATED, EXTENDED RELEASE ORAL at 21:50

## 2019-07-07 RX ADMIN — CEFAZOLIN SODIUM 1000 MG: 1 SOLUTION INTRAVENOUS at 16:34

## 2019-07-07 RX ADMIN — METOPROLOL TARTRATE 50 MG: 50 TABLET, FILM COATED ORAL at 08:00

## 2019-07-07 RX ADMIN — INSULIN LISPRO 1 UNITS: 100 INJECTION, SOLUTION INTRAVENOUS; SUBCUTANEOUS at 21:53

## 2019-07-07 RX ADMIN — ASPIRIN 81 MG: 81 TABLET, COATED ORAL at 08:00

## 2019-07-07 RX ADMIN — CEFAZOLIN SODIUM 1000 MG: 1 SOLUTION INTRAVENOUS at 22:38

## 2019-07-07 RX ADMIN — RANOLAZINE 500 MG: 500 TABLET, FILM COATED, EXTENDED RELEASE ORAL at 08:01

## 2019-07-07 NOTE — PROGRESS NOTES
Progress Note - Verlan Cassette 1/29/1932, 80 y o  male MRN: [id number]    Unit/Bed#: -01 Encounter: [id number]    Primary Care Provider: Lionel Osborne MD   Date and time admitted to hospital: 7/4/2019  3:34 PM    * Severe sepsis ([facility] [address] )  Assessment & Plan  · POA, evidenced by fevers, leukocytosis with bandemia and elevated lactate  · Suspect urinary source  · Cefepime-->ceftriaxone   · Follow Ucx: >100K Klebsiella   · Bcx: klebsiella x2       Gram-negative bacteremia  Assessment & Plan  · Appreciate ID input     MAGUI (acute kidney injury) ([facility] [address] )  Assessment & Plan  · BsCr: 3 4; elevated to 4 5 on admission  · Improved to 3 3   · Suspect prerenal due to sepsis  · avoid hypotension, nephrotoxins     Hyponatremia  Assessment & Plan  · NA: 128 on admission from 136 a few weeks ago; pt has evidence of volume overload but wouldn't hold off on hydrating until sepsis trend is clear  · Stable at 133   · Tatiana Anais: <20 suggesting hypovolemia  · BMP daily     Chronic diastolic congestive heart failure (HCC)  Assessment & Plan  Wt Readings from Last 3 Encounters:   07/07/19 82 5 kg (181 lb 14 1 oz)   06/12/19 76 5 kg (168 lb 10 4 oz)   05/30/19 80 3 kg (177 lb)     monitor volume status with resuscitation; pt is already above weight from recent discharge so suspect he will need diuresis once infection adequately treated   Continue I/Os and daily weights           Chronic right-sided low back pain without sciatica  Assessment & Plan  · Pt reports worsening low back pain when moved at [facility]  Describes shooting pain down right leg    · Unable to lift his R leg; no dorsiflexion  · Awaiting MRI C-spine     Urinary retention  Assessment & Plan  · Was initially retaining requiring straight cath x2; was able to void spontaneously   · Urinary retention protocol   · Urology consult; appreciate input  · Finasteride added to regimen     Paroxysmal A-fib (HCC)  Assessment & Plan  · Cont BB  · INR daily: 2 1   · CHADSVasc: 7 (11% risk) also had prior embolic stroke   · Given this, would start heparin as AC when INR<2   · Will hold warfarin given upcoming surgery    Edema of upper extremity  Assessment & Plan  · Noted yesterday; suspect related to IV placement  · Now moved; arm elevated overnight with improvement  · UE duplex ordered       VTE Pharmacologic Prophylaxis:   Pharmacologic: Warfarin (Coumadin)  Mechanical VTE Prophylaxis in Place: Yes    Patient Centered Rounds: I have performed bedside rounds with nursing staff today  Discussions with Specialists or Other Care Team Provider:     Education and Discussions with Family / Patient:     Time Spent for Care: 30 minutes  More than 50% of total time spent on counseling and coordination of care as described above  Current Length of Stay: 3 day(s)    Current Patient Status: Inpatient   Certification Statement: The patient will continue to require additional inpatient hospital stay due to Klebsiella bacteremia on IV abx  Discharge Plan / Estimated Discharge Date: TBD based on clinical course; suspect dc midweek  Code Status: Level 1 - Full Code    Subjective:   Feels bloated  No appetite today  LUE swelling is improving  Objective:     Vitals:   Temp (24hrs), Av °F (36 7 °C), Min:97 5 °F (36 4 °C), Max:98 8 °F (37 1 °C)    Temp:  [97 5 °F (36 4 °C)-98 8 °F (37 1 °C)] 97 7 °F (36 5 °C)  HR:  [70] 70  Resp:  [18] 18  BP: (136-153)/(63-80) 136/63  SpO2:  [93 %-95 %] 95 %  Body mass index is 30 27 kg/m²  Input and Output Summary (last 24 hours): Intake/Output Summary (Last 24 hours) at 2019 1432  Last data filed at 2019 0145  Gross per 24 hour   Intake 240 ml   Output 825 ml   Net -585 ml       Physical Exam:     Physical Exam   Constitutional: He is oriented to person, place, and time  No distress  HENT:   Head: Normocephalic and atraumatic  Cardiovascular: Normal rate and regular rhythm  Exam reveals no friction rub     No murmur heard   Pulmonary/Chest: Effort normal and breath sounds normal  No stridor  No respiratory distress  Abdominal: He exhibits distension  There is no tenderness  There is no guarding  Hypoactive    Musculoskeletal: He exhibits edema (b/l LE and LUE )  Neurological: He is alert and oriented to person, place, and time  Skin: Skin is warm  He is not diaphoretic  Psychiatric: He has a normal mood and affect  His behavior is normal    Nursing note and vitals reviewed  Additional Data:     Labs:    Results from last 7 days   Lab Units 07/07/19  0604   WBC Thousand/uL 11 29*   HEMOGLOBIN g/dL 10 1*   HEMATOCRIT % 31 5*   PLATELETS Thousands/uL 132*   NEUTROS PCT % 77*   LYMPHS PCT % 13*   MONOS PCT % 5   EOS PCT % 4     Results from last 7 days   Lab Units 07/07/19  0604   POTASSIUM mmol/L 4 1   CHLORIDE mmol/L 101   CO2 mmol/L 24   BUN mg/dL 49*   CREATININE mg/dL 3 34*   CALCIUM mg/dL 7 9*   ALK PHOS U/L 108   ALT U/L 15   AST U/L 31     Results from last 7 days   Lab Units 07/07/19  0604   INR  2 13*       * I Have Reviewed All Lab Data Listed Above  * Additional Pertinent Lab Tests Reviewed:  All Labs Within Last 24 Hours Reviewed    Imaging:    Imaging Reports Reviewed Today Include:   Imaging Personally Reviewed by Myself Includes:      Recent Cultures (last 7 days):     Results from last 7 days   Lab Units 07/04/19  1622 07/04/19  1552   BLOOD CULTURE   --  Klebsiella pneumoniae*  Klebsiella pneumoniae*   GRAM STAIN RESULT   --  Gram negative rods*  Gram negative rods*   URINE CULTURE  >100,000 cfu/ml Klebsiella pneumoniae*  --        Last 24 Hours Medication List:     Current Facility-Administered Medications:  aspirin 81 mg Oral Daily Johnathan Infante MD   atorvastatin 40 mg Oral QPM Johnathan Infante MD   bisacodyl 10 mg Rectal Daily PRN Johnathan Infante MD   cefazolin 1,000 mg Intravenous Q8H Breanne Andrews MD   insulin lispro 1-5 Units Subcutaneous HS Catherine Spaulding PA-C   insulin lispro 2-12 Units Subcutaneous TID TAMRA Spaulding PA-C   levothyroxine 75 mcg Oral Early Morning Pilar Fought, MD   metoprolol tartrate 50 mg Oral BID With Meals [place] Fought, MD   ondansetron 4 mg Intravenous Q6H PRN [place] Fought, MD   polyethylene glycol 17 g Oral Daily PRN Pilar Fought, MD   ranolazine 500 mg Oral Q12H Ирина Guerra MD   sodium phosphate-biphosphate 1 enema Rectal Once PRN [place] Fought, MD   tamsulosin 0 4 mg Oral Daily Pilar Fought, MD        Today, Patient Was Seen By: Pilar Whitley MD    ** Please Note: This note has been constructed using a voice recognition system   **

## 2019-07-07 NOTE — ASSESSMENT & PLAN NOTE
· Noted yesterday; suspect related to IV placement  · Now moved; arm elevated overnight with improvement  · UE duplex ordered

## 2019-07-07 NOTE — ASSESSMENT & PLAN NOTE
· BsCr: 3 4; elevated to 4 5 on admission  · Improved to 3 3   · Suspect prerenal due to sepsis  · avoid hypotension, nephrotoxins

## 2019-07-07 NOTE — PLAN OF CARE
Problem: Potential for Falls  Goal: Patient will remain free of falls  Description  INTERVENTIONS:  - Assess patient frequently for physical needs  -  Identify cognitive and physical deficits and behaviors that affect risk of falls    -  [place] fall precautions as indicated by assessment   - Educate patient/family on patient safety including physical limitations  - Instruct patient to call for assistance with activity based on assessment  - Modify environment to reduce risk of injury  - Consider OT/PT consult to assist with strengthening/mobility  Outcome: Progressing     Problem: Prexisting or High Potential for Compromised Skin Integrity  Goal: Skin integrity is maintained or improved  Description  INTERVENTIONS:  - Identify patients at risk for skin breakdown  - Assess and monitor skin integrity  - Assess and monitor nutrition and hydration status  - Monitor labs (i e  albumin)  - Assess for incontinence   - Turn and reposition patient  - Assist with mobility/ambulation  - Relieve pressure over bony prominences  - Avoid friction and shearing  - Provide appropriate hygiene as needed including keeping skin clean and dry  - Evaluate need for skin moisturizer/barrier cream  - Collaborate with interdisciplinary team (i e  Nutrition, Rehabilitation, etc )   - Patient/family teaching  Outcome: Progressing     Problem: METABOLIC, FLUID AND ELECTROLYTES - ADULT  Goal: Electrolytes maintained within normal limits  Description  INTERVENTIONS:  - Monitor labs and assess patient for signs and symptoms of electrolyte imbalances  - Administer electrolyte replacement as ordered  - Monitor response to electrolyte replacements, including repeat lab results as appropriate  - Instruct patient on fluid and nutrition as appropriate  Outcome: Progressing  Goal: Fluid balance maintained  Description  INTERVENTIONS:  - Monitor labs and assess for signs and symptoms of volume excess or deficit  - Monitor I/O and WT  - Instruct patient on fluid and nutrition as appropriate  Outcome: Progressing  Goal: Glucose maintained within target range  Description  INTERVENTIONS:  - Monitor Blood Glucose as ordered  - Assess for signs and symptoms of hyperglycemia and hypoglycemia  - Administer ordered medications to maintain glucose within target range  - Assess nutritional intake and initiate nutrition service referral as needed  Outcome: Progressing     Problem: INFECTION - ADULT  Goal: Absence or prevention of progression during hospitalization  Description  INTERVENTIONS:  - Assess and monitor for signs and symptoms of infection  - Monitor lab/diagnostic results  - Monitor all insertion sites, i e  indwelling lines, tubes, and drains  - Monitor endotracheal (as able) and nasal secretions for changes in amount and color  - [place] appropriate cooling/warming therapies per order  - Administer medications as ordered  - Instruct and encourage patient and family to use good hand hygiene technique  - Identify and instruct in appropriate isolation precautions for identified infection/condition  Outcome: Progressing  Goal: Absence of fever/infection during neutropenic period  Description  INTERVENTIONS:  - Monitor WBC  - Implement neutropenic guidelines  Outcome: Progressing     Problem: DISCHARGE PLANNING  Goal: Discharge to home or other facility with appropriate resources  Description  INTERVENTIONS:  - Identify barriers to discharge w/patient and caregiver  - Arrange for needed discharge resources and transportation as appropriate  - Identify discharge learning needs (meds, wound care, etc )  - Arrange for interpretive services to assist at discharge as needed  - Refer to Case Management Department for coordinating discharge planning if the patient needs post-hospital services based on physician/advanced practitioner order or complex needs related to functional status, cognitive ability, or social support system  Outcome: Progressing     Problem: Knowledge Deficit  Goal: Patient/family/caregiver demonstrates understanding of disease process, treatment plan, medications, and discharge instructions  Description  Complete learning assessment and assess knowledge base    Interventions:  - Provide teaching at level of understanding  - Provide teaching via preferred learning methods  Outcome: Progressing

## 2019-07-07 NOTE — ASSESSMENT & PLAN NOTE
· Was initially retaining requiring straight cath x2; was able to void spontaneously   · Urinary retention protocol   · Urology consult; appreciate input  · Finasteride added to regimen

## 2019-07-07 NOTE — ASSESSMENT & PLAN NOTE
· Cont BB  · INR daily: 2 1   · CHADSVasc: 7 (11% risk) also had prior embolic stroke   · Given this, would start heparin as AC when INR<2   · Will hold warfarin given upcoming surgery

## 2019-07-07 NOTE — ASSESSMENT & PLAN NOTE
· POA, evidenced by fevers, leukocytosis with bandemia and elevated lactate  · Suspect urinary source  · Cefepime-->ceftriaxone   · Follow Ucx: >100K Klebsiella   · Bcx: klebsiella x2

## 2019-07-07 NOTE — ASSESSMENT & PLAN NOTE
Wt Readings from Last 3 Encounters:   07/07/19 82 5 kg (181 lb 14 1 oz)   06/12/19 76 5 kg (168 lb 10 4 oz)   05/30/19 80 3 kg (177 lb)     monitor volume status with resuscitation; pt is already above weight from recent discharge so suspect he will need diuresis once infection adequately treated   Continue I/Os and daily weights

## 2019-07-07 NOTE — PLAN OF CARE
Problem: Potential for Falls  Goal: Patient will remain free of falls  Description  INTERVENTIONS:  - Assess patient frequently for physical needs  -  Identify cognitive and physical deficits and behaviors that affect risk of falls    -  [place] fall precautions as indicated by assessment   - Educate patient/family on patient safety including physical limitations  - Instruct patient to call for assistance with activity based on assessment  - Modify environment to reduce risk of injury  - Consider OT/PT consult to assist with strengthening/mobility  Outcome: Progressing     Problem: Prexisting or High Potential for Compromised Skin Integrity  Goal: Skin integrity is maintained or improved  Description  INTERVENTIONS:  - Identify patients at risk for skin breakdown  - Assess and monitor skin integrity  - Assess and monitor nutrition and hydration status  - Monitor labs (i e  albumin)  - Assess for incontinence   - Turn and reposition patient  - Assist with mobility/ambulation  - Relieve pressure over bony prominences  - Avoid friction and shearing  - Provide appropriate hygiene as needed including keeping skin clean and dry  - Evaluate need for skin moisturizer/barrier cream  - Collaborate with interdisciplinary team (i e  Nutrition, Rehabilitation, etc )   - Patient/family teaching  Outcome: Progressing     Problem: METABOLIC, FLUID AND ELECTROLYTES - ADULT  Goal: Electrolytes maintained within normal limits  Description  INTERVENTIONS:  - Monitor labs and assess patient for signs and symptoms of electrolyte imbalances  - Administer electrolyte replacement as ordered  - Monitor response to electrolyte replacements, including repeat lab results as appropriate  - Instruct patient on fluid and nutrition as appropriate  Outcome: Progressing  Goal: Fluid balance maintained  Description  INTERVENTIONS:  - Monitor labs and assess for signs and symptoms of volume excess or deficit  - Monitor I/O and WT  - Instruct patient on fluid and nutrition as appropriate  Outcome: Progressing  Goal: Glucose maintained within target range  Description  INTERVENTIONS:  - Monitor Blood Glucose as ordered  - Assess for signs and symptoms of hyperglycemia and hypoglycemia  - Administer ordered medications to maintain glucose within target range  - Assess nutritional intake and initiate nutrition service referral as needed  Outcome: Progressing     Problem: INFECTION - ADULT  Goal: Absence or prevention of progression during hospitalization  Description  INTERVENTIONS:  - Assess and monitor for signs and symptoms of infection  - Monitor lab/diagnostic results  - Monitor all insertion sites, i e  indwelling lines, tubes, and drains  - Monitor endotracheal (as able) and nasal secretions for changes in amount and color  - [place] appropriate cooling/warming therapies per order  - Administer medications as ordered  - Instruct and encourage patient and family to use good hand hygiene technique  - Identify and instruct in appropriate isolation precautions for identified infection/condition  Outcome: Progressing  Goal: Absence of fever/infection during neutropenic period  Description  INTERVENTIONS:  - Monitor WBC  - Implement neutropenic guidelines  Outcome: Progressing     Problem: DISCHARGE PLANNING  Goal: Discharge to home or other facility with appropriate resources  Description  INTERVENTIONS:  - Identify barriers to discharge w/patient and caregiver  - Arrange for needed discharge resources and transportation as appropriate  - Identify discharge learning needs (meds, wound care, etc )  - Arrange for interpretive services to assist at discharge as needed  - Refer to Case Management Department for coordinating discharge planning if the patient needs post-hospital services based on physician/advanced practitioner order or complex needs related to functional status, cognitive ability, or social support system  Outcome: Progressing     Problem: Knowledge Deficit  Goal: Patient/family/caregiver demonstrates understanding of disease process, treatment plan, medications, and discharge instructions  Description  Complete learning assessment and assess knowledge base    Interventions:  - Provide teaching at level of understanding  - Provide teaching via preferred learning methods  Outcome: Progressing

## 2019-07-07 NOTE — ASSESSMENT & PLAN NOTE
· Pt reports worsening low back pain when moved at [facility]  Describes shooting pain down right leg    · Unable to lift his R leg; no dorsiflexion  · Awaiting MRI C-spine

## 2019-07-07 NOTE — PROGRESS NOTES
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 that is sensitive to all cephalosporins   -discontinue ceftriaxone  -cefazolin 1 g IV q 8 hours  -recheck blood cultures x2 sets as the patient does have a permanent pacemaker in place  -check CBC with diff and CMP  -recheck procalcitonin level  -supportive care  -if patient continues to improve, can likely transition to oral antibiotics in the next 24-48 hours as long as repeat blood cultures remain negative     2  Klebsiella bacteremia-Suspect this is all secondary to a UTI with obstructive UTI   -antibiotics as above  -follow up sensitivities and adjust antibiotics as needed  -recheck blood cultures x2 sets confirm clearance  -no additional ID workup for now     3  Obstructive UTI-patient noted to have urinary retention requiring straight catheterization  Suspect this is why he had relapse of the Klebsiella UTI   -antibiotics as above  -monitor symptomatology  -urology follow-up for management of retention  -straight catheterization as needed     4  Acute kidney injury-complicating chronic kidney disease  Likely a pre renal issue  Perhaps sepsis playing a role  The renal function has improved with resuscitation and treatment of the sepsis  Perhaps obstruction was also playing a role  -recheck BMP  -volume management  -dose adjusted antibiotics as above  -straight catheterization as needed     5  Chronic right-sided low back pain-with some notable weakness that is been persisting    MRI of the lumbar spine negative   -await cervical spine MRI      Antibiotics:  Ceftriaxone 2  Antibiotics 4  Subjective:  Patient has no fever, chills, sweats; no nausea, vomiting, diarrhea; no cough, shortness of breath; no pain  No new symptoms  He is feeling much better overall  Objective:  Vitals:  Temp:  [97 5 °F (36 4 °C)-98 8 °F (37 1 °C)] 97 7 °F (36 5 °C)  HR:  [70] 70  Resp:  [18] 18  BP: (136-153)/(63-80) 136/63  SpO2:  [93 %-95 %] 95 %  Temp (24hrs), Av °F (36 7 °C), Min:97 5 °F (36 4 °C), Max:98 8 °F (37 1 °C)  Current: Temperature: 97 7 °F (36 5 °C)    Physical Exam:   General Appearance:  Alert, interactive, nontoxic, no acute distress  Throat: Oropharynx moist without lesions  Lungs:   Clear to auscultation bilaterally; no wheezes, rhonchi or rales; respirations unlabored   Heart:  RRR; no murmur, rub or gallop   Abdomen:   Soft, non-tender, non-distended, positive bowel sounds  Extremities: No clubbing, cyanosis or edema   Skin: No new rashes or lesions  No draining wounds noted         Labs, Imaging, & Other studies:   All pertinent labs and imaging studies were personally reviewed  Results from last 7 days   Lab Units 19  0604 19  1000 19  0459   WBC Thousand/uL 11 29* 14 60* 12 76*   HEMOGLOBIN g/dL 10 1* 10 3* 9 7*   PLATELETS Thousands/uL 132* 130* 98*     Results from last 7 days   Lab Units 19  0604 19  1000 19  0459 19  1552   SODIUM mmol/L 133* 133* 133* 128*   POTASSIUM mmol/L 4 1 3 7 3 7 4 0   CHLORIDE mmol/L 101 100 99* 93*   CO2 mmol/L 24 23 22 24   BUN mg/dL 49* 55* 59* 60*   CREATININE mg/dL 3 34* 3 61* 3 82* 4 21*   EGFR ml/min/1 73sq m 16 14 13 12   CALCIUM mg/dL 7 9* 7 9* 8 0* 7 9*   AST U/L 31  --   --  35   ALT U/L 15  --   --  19   ALK PHOS U/L 108  --   --  191*     Results from last 7 days   Lab Units 19  1622 19  2162   BLOOD CULTURE   --  Klebsiella pneumoniae*  Klebsiella pneumoniae*   GRAM STAIN RESULT   --  Gram negative rods*  Gram negative rods*   URINE CULTURE  >100,000 cfu/ml Klebsiella pneumoniae*  --      Results from last 7 days   Lab Units 07/07/19  0604 07/04/19  1552   PROCALCITONIN ng/ml 131 47* 167  59*

## 2019-07-07 NOTE — CONSULTS
UROLOGY CONSULTATION NOTE     Patient Identifiers: Rhea Emery (MRN [id number])  Service Requesting Consultation:  [facility] infectious disease  Service Providing Consultation:  Urology, La Nena Mcintyre MD    Date of Service: 7/7/2019  Consults    Reason for Consultation:  Urinary tract infection, recurring    History of Present Illness:     Rhea Emery is a 80 y o  old with a history of some cognitive impairment, history of urinary tract infection previously, per review with the patient he states that I do not know, when family member she tells me that this is his 1st urinary tract infection in some time  In addition to his current urinary tract infection, he has poor mobility as well as history of admission to rehabilitation facility  Review of his imaging shows no hydronephrosis, it does show an impressive stool burden throughout the colon, which is a contributing factor to recurrent infections  When I ask the patient, he states that he does not have difficulty urinating, however given his mental state I am uncertain whether not this is objectively true  He is seen to have positive blood cultures with Klebsiella  Urine culture also reviewed  I did discuss with the patient the need for maximal medical therapy and his case with the addition of finasteride 5 milligrams p o  Daily as well as aggressive treatment of any component of constipation      Past Medical, Past Surgical History:     Past Medical History:   Diagnosis Date    3-vessel coronary artery disease     last assessed: 08/15/2016    BPH (benign prostatic hyperplasia)     Chronic kidney disease     Diabetes mellitus ([facility] [address] )     Hyperlipidemia     Hypertension     Shingles 04/29/2019    SOB (shortness of breath)     Stroke [facility])    :    Past Surgical History:   Procedure Laterality Date    CARDIAC PACEMAKER PLACEMENT      CATARACT EXTRACTION      last assessed: 11/11/2015    CORONARY ARTERY BYPASS GRAFT      last assessed: 08/26/2015   :    Medications, Allergies:     Current Facility-Administered Medications   Medication Dose Route Frequency    aspirin (ECOTRIN LOW STRENGTH) EC tablet 81 mg  81 mg Oral Daily    atorvastatin (LIPITOR) tablet 40 mg  40 mg Oral QPM    cefTRIAXone (ROCEPHIN) 2,000 mg in dextrose 5 % 50 mL IVPB  2,000 mg Intravenous Q24H    insulin lispro (HumaLOG) 100 units/mL subcutaneous injection 1-5 Units  1-5 Units Subcutaneous HS    insulin lispro (HumaLOG) 100 units/mL subcutaneous injection 2-12 Units  2-12 Units Subcutaneous TID AC    levothyroxine tablet 75 mcg  75 mcg Oral Early Morning    metoprolol tartrate (LOPRESSOR) tablet 50 mg  50 mg Oral BID With Meals    ondansetron (ZOFRAN) injection 4 mg  4 mg Intravenous Q6H PRN    polyethylene glycol (MIRALAX) packet 17 g  17 g Oral Daily PRN    ranolazine (RANEXA) 12 hr tablet 500 mg  500 mg Oral Q12H DERIAN    tamsulosin (FLOMAX) capsule 0 4 mg  0 4 mg Oral Daily       Allergies:  No Known Allergies:    Social and Family History:   Social History:   Social History     Tobacco Use    Smoking status: Never Smoker    Smokeless tobacco: Never Used   Substance Use Topics    Alcohol use: No    Drug use: No        Social History     Tobacco Use   Smoking Status Never Smoker   Smokeless Tobacco Never Used       Family History:  Family History   Problem Relation Age of Onset    Heart disease Mother     Cancer Father         stomach    No Known Problems Sister     No Known Problems Brother     No Known Problems Brother    :     Review of Systems:     General: Fever, chills, or night sweats: Negative currently, although reports of previous fever  Cardiac: Negative for chest pain  Pulmonary: Negative for shortness of breath  Gastrointestinal: Abdominal pain negative  Nausea, vomiting, or diarrhea negative,  Genitourinary: See HPI above  Patient does not have hematuria    All other systems were queried and were negative except as listed above in HPI and here in the ROS  Physical Exam:   /63 (BP Location: Right arm)   Pulse 70   Temp 97 7 °F (36 5 °C) (Oral)   Resp 18   Ht 5' 5" (1 651 m)   Wt 82 5 kg (181 lb 14 1 oz)   SpO2 95%   BMI 30 27 kg/m² Temp (24hrs), Av °F (36 7 °C), Min:97 5 °F (36 4 °C), Max:98 8 °F (37 1 °C)  current; Temperature: 97 7 °F (36 5 °C)  I/O last 24 hours: In: 240 [P O :240]  Out: 1050 [Urine:1050]  General: Patient is pleasant and in NAD  Awake and alert, patient is pleasantly aloof this morning    Cardiac: Peripheral edema: Slight, peripheral pulses are present     Pulmonary: Non-labored breathing, speaking in full sentences, no wheezing, no coughing    Abdomen: Soft, non-tender, non-distended  the bladder is not palpable    Genitourinary: Negative CVA tenderness, negative suprapubic tenderness  Neurological: Cranial nerves II-XII are intact, no gross deficits    Musculoskeletal: Extremities are warm, ROM is not assessed    Psychiatric: The patient's train of thought is Difficult to assess, cognitive impairment present, mood and affect are abnormal and aloof,     Lymphatic: There is not adenopathy in the abdominal region      Skin:  Skin changes of aging are present    SCOTT: none        Labs:     Lab Results   Component Value Date    HGB 10 1 (L) 2019    HCT 31 5 (L) 2019    WBC 11 29 (H) 2019     (L) 2019   ]    Lab Results   Component Value Date     2016    K 4 1 2019     2019    CO2 24 2019    BUN 49 (H) 2019    CREATININE 3 34 (H) 2019    CALCIUM 7 9 (L) 2019    GLUCOSE 161 (H) 2016   ]    Imaging:   I personally reviewed the images and report of the following studies, and reviewed them with the patient:    CT Abdomen/Pelvis: Reviewed, impressive stool burden throughout the colon, no hydronephrosis      ASSESSMENT:     80 y o  old male with  urinary tract infection with positive blood cultures, likely multifactorial due to poor mobility, constipation, and some degree of enlarged prostate  Subjectively, at least, the patient has no complaints currently  PLAN:       Recommend the addition of finasteride 5 milligrams p o  Daily to his medication regimen  Consider the addition of suppressive dosing of nitrofurantoin 100 milligrams nightly  Recommend straight catheterization as needed, if patient continues to be in retention can consider an indwelling Barrientos catheter for drainage of the urinary bladder in the long-term  No indication for urologic intervention at this time, the patient can follow up with us as an outpatient  Please re-consult as necessary  The following portions of the patient's history were reviewed and updated as appropriate: allergies, current medications, past family history, past medical history, past social history, past surgical history and problem list     Portions of the above record have been created with voice recognition software  Occasional wrong word or "sound alike" substitution may have occurred due to the inherent limitations of voice recognition software  Read the chart carefully and recognize, using context, where substitution may have occurred  Thank you for allowing me to participate in this patients care  Please do not hesitate to call with any additional questions    La Nena Mcintyre MD

## 2019-07-08 ENCOUNTER — TELEPHONE (OUTPATIENT)
Dept: NEPHROLOGY | Facility: CLINIC | Age: 84
End: 2019-07-08

## 2019-07-08 DIAGNOSIS — I10 ESSENTIAL HYPERTENSION: ICD-10-CM

## 2019-07-08 LAB
APTT PPP: 43 SECONDS (ref 23–37)
APTT PPP: >210 SECONDS (ref 23–37)
BASOPHILS # BLD AUTO: 0.04 THOUSANDS/ΜL (ref 0–0.1)
BASOPHILS NFR BLD AUTO: 1 % (ref 0–1)
EOSINOPHIL # BLD AUTO: 0.44 THOUSAND/ΜL (ref 0–0.61)
EOSINOPHIL NFR BLD AUTO: 5 % (ref 0–6)
ERYTHROCYTE [DISTWIDTH] IN BLOOD BY AUTOMATED COUNT: 14.2 % (ref 11.6–15.1)
GLUCOSE SERPL-MCNC: 114 MG/DL (ref 65–140)
GLUCOSE SERPL-MCNC: 119 MG/DL (ref 65–140)
GLUCOSE SERPL-MCNC: 137 MG/DL (ref 65–140)
GLUCOSE SERPL-MCNC: 138 MG/DL (ref 65–140)
HCT VFR BLD AUTO: 29.9 % (ref 36.5–49.3)
HGB BLD-MCNC: 9.6 G/DL (ref 12–17)
IMM GRANULOCYTES # BLD AUTO: 0.12 THOUSAND/UL (ref 0–0.2)
IMM GRANULOCYTES NFR BLD AUTO: 1 % (ref 0–2)
INR PPP: 1.67 (ref 0.84–1.19)
INR PPP: 1.88 (ref 0.84–1.19)
LYMPHOCYTES # BLD AUTO: 1.55 THOUSANDS/ΜL (ref 0.6–4.47)
LYMPHOCYTES NFR BLD AUTO: 18 % (ref 14–44)
MCH RBC QN AUTO: 30 PG (ref 26.8–34.3)
MCHC RBC AUTO-ENTMCNC: 32.1 G/DL (ref 31.4–37.4)
MCV RBC AUTO: 93 FL (ref 82–98)
MONOCYTES # BLD AUTO: 0.51 THOUSAND/ΜL (ref 0.17–1.22)
MONOCYTES NFR BLD AUTO: 6 % (ref 4–12)
NEUTROPHILS # BLD AUTO: 5.88 THOUSANDS/ΜL (ref 1.85–7.62)
NEUTS SEG NFR BLD AUTO: 69 % (ref 43–75)
NRBC BLD AUTO-RTO: 0 /100 WBCS
PLATELET # BLD AUTO: 145 THOUSANDS/UL (ref 149–390)
PMV BLD AUTO: 10.7 FL (ref 8.9–12.7)
PROCALCITONIN SERPL-MCNC: 55.38 NG/ML
PROTHROMBIN TIME: 18.9 SECONDS (ref 11.6–14.5)
PROTHROMBIN TIME: 20.8 SECONDS (ref 11.6–14.5)
RBC # BLD AUTO: 3.2 MILLION/UL (ref 3.88–5.62)
WBC # BLD AUTO: 8.54 THOUSAND/UL (ref 4.31–10.16)

## 2019-07-08 PROCEDURE — 99232 SBSQ HOSP IP/OBS MODERATE 35: CPT | Performed by: HOSPITALIST

## 2019-07-08 PROCEDURE — 99232 SBSQ HOSP IP/OBS MODERATE 35: CPT | Performed by: INTERNAL MEDICINE

## 2019-07-08 PROCEDURE — 84145 PROCALCITONIN (PCT): CPT | Performed by: INTERNAL MEDICINE

## 2019-07-08 PROCEDURE — 85025 COMPLETE CBC W/AUTO DIFF WBC: CPT | Performed by: HOSPITALIST

## 2019-07-08 PROCEDURE — 85610 PROTHROMBIN TIME: CPT | Performed by: HOSPITALIST

## 2019-07-08 PROCEDURE — 82948 REAGENT STRIP/BLOOD GLUCOSE: CPT

## 2019-07-08 PROCEDURE — 85730 THROMBOPLASTIN TIME PARTIAL: CPT | Performed by: HOSPITALIST

## 2019-07-08 RX ORDER — HEPARIN SODIUM 1000 [USP'U]/ML
3200 INJECTION, SOLUTION INTRAVENOUS; SUBCUTANEOUS AS NEEDED
Status: DISCONTINUED | OUTPATIENT
Start: 2019-07-08 | End: 2019-07-12

## 2019-07-08 RX ORDER — HEPARIN SODIUM 1000 [USP'U]/ML
6400 INJECTION, SOLUTION INTRAVENOUS; SUBCUTANEOUS AS NEEDED
Status: DISCONTINUED | OUTPATIENT
Start: 2019-07-08 | End: 2019-07-12

## 2019-07-08 RX ORDER — METOPROLOL TARTRATE 50 MG/1
TABLET, FILM COATED ORAL
Qty: 180 TABLET | Refills: 1 | Status: SHIPPED | OUTPATIENT
Start: 2019-07-08 | End: 2019-09-14 | Stop reason: HOSPADM

## 2019-07-08 RX ORDER — HEPARIN SODIUM 1000 [USP'U]/ML
6400 INJECTION, SOLUTION INTRAVENOUS; SUBCUTANEOUS ONCE
Status: COMPLETED | OUTPATIENT
Start: 2019-07-08 | End: 2019-07-08

## 2019-07-08 RX ORDER — HEPARIN SODIUM 10000 [USP'U]/100ML
3-30 INJECTION, SOLUTION INTRAVENOUS
Status: DISCONTINUED | OUTPATIENT
Start: 2019-07-08 | End: 2019-07-12

## 2019-07-08 RX ADMIN — METOPROLOL TARTRATE 50 MG: 50 TABLET, FILM COATED ORAL at 17:31

## 2019-07-08 RX ADMIN — CEFAZOLIN SODIUM 1000 MG: 1 SOLUTION INTRAVENOUS at 06:40

## 2019-07-08 RX ADMIN — ASPIRIN 81 MG: 81 TABLET, COATED ORAL at 09:43

## 2019-07-08 RX ADMIN — ATORVASTATIN CALCIUM 40 MG: 40 TABLET, FILM COATED ORAL at 17:31

## 2019-07-08 RX ADMIN — LEVOTHYROXINE SODIUM 75 MCG: 75 TABLET ORAL at 05:46

## 2019-07-08 RX ADMIN — TAMSULOSIN HYDROCHLORIDE 0.4 MG: 0.4 CAPSULE ORAL at 09:43

## 2019-07-08 RX ADMIN — CEFAZOLIN SODIUM 1000 MG: 1 SOLUTION INTRAVENOUS at 21:29

## 2019-07-08 RX ADMIN — HEPARIN SODIUM 6400 UNITS: 1000 INJECTION INTRAVENOUS; SUBCUTANEOUS at 11:28

## 2019-07-08 RX ADMIN — METOPROLOL TARTRATE 50 MG: 50 TABLET, FILM COATED ORAL at 09:43

## 2019-07-08 RX ADMIN — HEPARIN SODIUM 18 UNITS/KG/HR: 10000 INJECTION, SOLUTION INTRAVENOUS at 11:30

## 2019-07-08 RX ADMIN — RANOLAZINE 500 MG: 500 TABLET, FILM COATED, EXTENDED RELEASE ORAL at 09:43

## 2019-07-08 RX ADMIN — CEFAZOLIN SODIUM 1000 MG: 1 SOLUTION INTRAVENOUS at 15:14

## 2019-07-08 RX ADMIN — RANOLAZINE 500 MG: 500 TABLET, FILM COATED, EXTENDED RELEASE ORAL at 21:29

## 2019-07-08 NOTE — ASSESSMENT & PLAN NOTE
· Noted yesterday; suspect related to IV placement  · Improving with elevation   · UE duplex was negative

## 2019-07-08 NOTE — ASSESSMENT & PLAN NOTE
Wt Readings from Last 3 Encounters:   07/08/19 81 6 kg (180 lb)   06/12/19 76 5 kg (168 lb 10 4 oz)   05/30/19 80 3 kg (177 lb)     monitor volume status with resuscitation; pt is already above weight from recent discharge   Continue I/Os and daily weights

## 2019-07-08 NOTE — PROGRESS NOTES
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 that is sensitive to all cephalosporins   -continue Ancef  -follow up pending blood cultures  -check CBC with diff and CMP  -recheck procalcitonin level  -supportive care  -if cultures remain negative will transition to oral antibiotics tomorrow  -ideally would like to see cultures without growth for 72 hours prior to discharge      2  Klebsiella bacteremia-Suspect this is all secondary to a UTI with obstructive UTI   -antibiotics as above  -follow up pending blood cultures  -no additional ID workup for now     3  Obstructive UTI-patient noted to have urinary retention requiring straight catheterization   Suspect this is why he had relapse of the Klebsiella UTI   -antibiotics as above  -monitor symptomatology  -urology follow-up for management of retention  -straight catheterization as needed     4  Acute kidney injury-complicating chronic kidney disease   Likely a pre renal issue   Perhaps sepsis playing a role   The renal function has improved with resuscitation and treatment of the sepsis   Perhaps obstruction was also playing a role  -recheck BMP  -volume management  -dose adjusted antibiotics as above  -straight catheterization as needed     5  Chronic right-sided low back pain-with some notable weakness that is been persisting   MRI of the lumbar spine negative  Back pain remains stable at this time   -await cervical spine MRI     -patient plan for eventual surgery after this admission      Above plan discussed in detail with the patient and his partner at bedside  Primary service updated of the above plan  ID consult service will continue to follow  Antibiotics:  Ancef    24 hour events:  No acute events noted overnight on chart review  Procalcitonin continues to down trend  Patient's CBC is stable  Patient's other vitals are stable  Pending repeat cultures  He is currently afebrile    Subjective:  Patient has no fever, chills, sweats; no nausea, vomiting, diarrhea; no cough, shortness of breath; no pain  No new symptoms  He continues to report back pain which is primarily with motion  Reviewed with the patient and his partner and apparently his back pain has been progressive since April in again is exacerbated by movement  It also leads to shooting pain down the patient's right leg  Objective:  Vitals:  Temp:  [97 5 °F (36 4 °C)-98 9 °F (37 2 °C)] 98 2 °F (36 8 °C)  HR:  [70-73] 70  Resp:  [18] 18  BP: (137-168)/(63-73) 163/73  SpO2:  [92 %-96 %] 96 %  Temp (24hrs), Av 2 °F (36 8 °C), Min:97 5 °F (36 4 °C), Max:98 9 °F (37 2 °C)  Current: Temperature: 98 2 °F (36 8 °C)    Physical Exam:   General Appearance:  Alert, interactive, nontoxic, no acute distress  Chronically ill-appearing  Throat: Oropharynx moist without lesions  Lungs:   Clear to auscultation bilaterally; no wheezes, rhonchi or rales; respirations unlabored on room air   Heart:  RRR; no murmur, rub or gallop; no acute skin changes noted at the patient's pacemaker site  Abdomen:   Soft, non-tender, non-distended, positive bowel sounds  Extremities: No clubbing, cyanosis or edema; patient unable to turn for me on exam due to pain  At baseline he continues to have weakness in the right lower extremity  He has no focal deficit in the left lower extremity  Skin: No new rashes or lesions  No new draining wounds noted         Labs, Imaging, & Other studies:   All pertinent labs and imaging studies were personally reviewed  Results from last 7 days   Lab Units 07/08/19  0438 07/07/19  0604 07/06/19  1000   WBC Thousand/uL 8 54 11 29* 14 60*   HEMOGLOBIN g/dL 9 6* 10 1* 10 3*   PLATELETS Thousands/uL 145* 132* 130*     Results from last 7 days   Lab Units 07/07/19  0604  07/04/19  1552   POTASSIUM mmol/L 4 1   < > 4 0   CHLORIDE mmol/L 101   < > 93*   CO2 mmol/L 24   < > 24   BUN mg/dL 49*   < > 60*   CREATININE mg/dL 3 34*   < > 4 21*   EGFR ml/min/1 73sq m 16   < > 12   CALCIUM mg/dL 7 9*   < > 7 9*   AST U/L 31  --  35   ALT U/L 15  --  19   ALK PHOS U/L 108  --  191*    < > = values in this interval not displayed       Results from last 7 days   Lab Units 07/04/19  1622 07/04/19  1552   BLOOD CULTURE   --  Klebsiella pneumoniae*  Klebsiella pneumoniae*   GRAM STAIN RESULT   --  Gram negative rods*  Gram negative rods*   URINE CULTURE  >100,000 cfu/ml Klebsiella pneumoniae*  --

## 2019-07-08 NOTE — PROGRESS NOTES
Progress Note - Talya Dunn 1/29/1932, 80 y o  male MRN: [id number]    Unit/Bed#: -01 Encounter: [id number]    Primary Care Provider: Delmer Song MD   Date and time admitted to hospital: 7/4/2019  3:34 PM    * Severe sepsis ([facility] [address] )  Assessment & Plan  · POA, evidenced by fevers, leukocytosis with bandemia and elevated lactate  · Suspect urinary source  · Cefepime-->ceftriaxone   · Follow Ucx: >100K Klebsiella   · Bcx: klebsiella x2   · Repeat Bcx: NGTD       Gram-negative bacteremia  Assessment & Plan  · Appreciate ID input     MAGUI (acute kidney injury) ([facility] [address] )  Assessment & Plan  · BsCr: 3 4; elevated to 4 5 on admission  · Improved to 3 3   · Suspect prerenal due to sepsis  · avoid hypotension, nephrotoxins     Hyponatremia  Assessment & Plan  · NA: 128 on admission from 136 a few weeks ago; pt has evidence of volume overload but wouldn't hold off on hydrating until sepsis trend is clear  · Stable at 133   · Katie: <20 on admission suggesting hypovolemia  · BMP daily     Chronic diastolic congestive heart failure (HCC)  Assessment & Plan  Wt Readings from Last 3 Encounters:   07/08/19 81 6 kg (180 lb)   06/12/19 76 5 kg (168 lb 10 4 oz)   05/30/19 80 3 kg (177 lb)     monitor volume status with resuscitation; pt is already above weight from recent discharge   Continue I/Os and daily weights           Chronic right-sided low back pain without sciatica  Assessment & Plan  · Pt reports worsening low back pain when moved at [facility]  Describes shooting pain down right leg    · MRI L spine with large disk bulge   · Awaiting MRI C-spine     Urinary retention  Assessment & Plan  · Was initially retaining requiring straight cath x2; was able to void spontaneously   · Urinary retention protocol   · Urology consult; appreciate input  · Finasteride added to regimen     Paroxysmal A-fib (HCC)  Assessment & Plan  · Cont BB  · INR daily: 2 1   · CHADSVasc: 7 (11% risk) also had prior embolic stroke   · Given this, would start heparin gtt as AC when INR<2   · Will hold warfarin given upcoming surgery    Edema of upper extremity  Assessment & Plan  · Noted yesterday; suspect related to IV placement  · Improving with elevation   · UE duplex was negative       VTE Pharmacologic Prophylaxis:   Pharmacologic: Heparin Drip  Mechanical VTE Prophylaxis in Place: Yes    Patient Centered Rounds: I have performed bedside rounds with nursing staff today  Discussions with Specialists or Other Care Team Provider:     Education and Discussions with Family / Patient: patient and wife     Time Spent for Care: 30 minutes  More than 50% of total time spent on counseling and coordination of care as described above  Current Length of Stay: 4 day(s)    Current Patient Status: Inpatient   Certification Statement: The patient will continue to require additional inpatient hospital stay due to bacteremia on IV antibiotics     Discharge Plan / Estimated Discharge Date: TBD based on clinical course    Code Status: Level 1 - Full Code    Subjective:   Pt is feeling well  Would like to get up into the chair  No SOB  Objective:     Vitals:   Temp (24hrs), Av 1 °F (36 7 °C), Min:97 5 °F (36 4 °C), Max:98 9 °F (37 2 °C)    Temp:  [97 5 °F (36 4 °C)-98 9 °F (37 2 °C)] 98 °F (36 7 °C)  HR:  [70-73] 70  Resp:  [18] 18  BP: (137-168)/(63-71) 168/69  SpO2:  [92 %-95 %] 95 %  Body mass index is 29 95 kg/m²  Input and Output Summary (last 24 hours): Intake/Output Summary (Last 24 hours) at 2019 1034  Last data filed at 2019 0300  Gross per 24 hour   Intake    Output 750 ml   Net -750 ml       Physical Exam:     Physical Exam   Constitutional: No distress  HENT:   Head: Normocephalic and atraumatic  Cardiovascular: Normal rate and regular rhythm  Exam reveals no friction rub  No murmur heard  Pulmonary/Chest: Effort normal and breath sounds normal  No stridor  No respiratory distress  Abdominal: Soft   Bowel sounds are normal  He exhibits no distension  There is no tenderness  Musculoskeletal: He exhibits edema  Skin: Skin is warm and dry  He is not diaphoretic  Psychiatric: He has a normal mood and affect  His behavior is normal    Nursing note and vitals reviewed  Additional Data:     Labs:    Results from last 7 days   Lab Units 07/08/19  0438   WBC Thousand/uL 8 54   HEMOGLOBIN g/dL 9 6*   HEMATOCRIT % 29 9*   PLATELETS Thousands/uL 145*   NEUTROS PCT % 69   LYMPHS PCT % 18   MONOS PCT % 6   EOS PCT % 5     Results from last 7 days   Lab Units 07/07/19  0604   POTASSIUM mmol/L 4 1   CHLORIDE mmol/L 101   CO2 mmol/L 24   BUN mg/dL 49*   CREATININE mg/dL 3 34*   CALCIUM mg/dL 7 9*   ALK PHOS U/L 108   ALT U/L 15   AST U/L 31     Results from last 7 days   Lab Units 07/08/19  0438   INR  1 88*       * I Have Reviewed All Lab Data Listed Above  * Additional Pertinent Lab Tests Reviewed:  All Labs Within Last 24 Hours Reviewed    Imaging:    Imaging Reports Reviewed Today Include:   Imaging Personally Reviewed by Myself Includes:      Recent Cultures (last 7 days):     Results from last 7 days   Lab Units 07/04/19  1622 07/04/19  1552   BLOOD CULTURE   --  Klebsiella pneumoniae*  Klebsiella pneumoniae*   GRAM STAIN RESULT   --  Gram negative rods*  Gram negative rods*   URINE CULTURE  >100,000 cfu/ml Klebsiella pneumoniae*  --        Last 24 Hours Medication List:     Current Facility-Administered Medications:  aspirin 81 mg Oral Daily Chalino Mccurdy MD    atorvastatin 40 mg Oral QPM Chalino Mccurdy MD    bisacodyl 10 mg Rectal Daily PRN Chalino Mccurdy MD    cefazolin 1,000 mg Intravenous Q8H Coni White MD Last Rate: 1,000 mg (07/08/19 0640)   insulin lispro 1-5 Units Subcutaneous HS Catherine Spaulding PA-C    insulin lispro 2-12 Units Subcutaneous TID AC Catherine Spaulding PA-C    levothyroxine 75 mcg Oral Early Morning Chalino Mccurdy MD    metoprolol tartrate 50 mg Oral BID With Meals Chalino Mccurdy MD    ondansetron 4 mg Intravenous Q6H PRN Juan Carlos Bryan MD    polyethylene glycol 17 g Oral Daily PRN Juan Carlos Bryan MD    ranolazine 500 mg Oral Q12H Miguel Cutler MD    tamsulosin 0 4 mg Oral Daily Juan Carlos Bryan MD         Today, Patient Was Seen By: Juan Carlos Bryan MD    ** Please Note: This note has been constructed using a voice recognition system   **

## 2019-07-08 NOTE — PLAN OF CARE
Problem: Potential for Falls  Goal: Patient will remain free of falls  Description  INTERVENTIONS:  - Assess patient frequently for physical needs  -  Identify cognitive and physical deficits and behaviors that affect risk of falls    -  [place] fall precautions as indicated by assessment   - Educate patient/family on patient safety including physical limitations  - Instruct patient to call for assistance with activity based on assessment  - Modify environment to reduce risk of injury  - Consider OT/PT consult to assist with strengthening/mobility  Outcome: Progressing     Problem: Prexisting or High Potential for Compromised Skin Integrity  Goal: Skin integrity is maintained or improved  Description  INTERVENTIONS:  - Identify patients at risk for skin breakdown  - Assess and monitor skin integrity  - Assess and monitor nutrition and hydration status  - Monitor labs (i e  albumin)  - Assess for incontinence   - Turn and reposition patient  - Assist with mobility/ambulation  - Relieve pressure over bony prominences  - Avoid friction and shearing  - Provide appropriate hygiene as needed including keeping skin clean and dry  - Evaluate need for skin moisturizer/barrier cream  - Collaborate with interdisciplinary team (i e  Nutrition, Rehabilitation, etc )   - Patient/family teaching  Outcome: Progressing     Problem: METABOLIC, FLUID AND ELECTROLYTES - ADULT  Goal: Electrolytes maintained within normal limits  Description  INTERVENTIONS:  - Monitor labs and assess patient for signs and symptoms of electrolyte imbalances  - Administer electrolyte replacement as ordered  - Monitor response to electrolyte replacements, including repeat lab results as appropriate  - Instruct patient on fluid and nutrition as appropriate  Outcome: Progressing  Goal: Fluid balance maintained  Description  INTERVENTIONS:  - Monitor labs and assess for signs and symptoms of volume excess or deficit  - Monitor I/O and WT  - Instruct patient on fluid and nutrition as appropriate  Outcome: Progressing  Goal: Glucose maintained within target range  Description  INTERVENTIONS:  - Monitor Blood Glucose as ordered  - Assess for signs and symptoms of hyperglycemia and hypoglycemia  - Administer ordered medications to maintain glucose within target range  - Assess nutritional intake and initiate nutrition service referral as needed  Outcome: Progressing     Problem: INFECTION - ADULT  Goal: Absence or prevention of progression during hospitalization  Description  INTERVENTIONS:  - Assess and monitor for signs and symptoms of infection  - Monitor lab/diagnostic results  - Monitor all insertion sites, i e  indwelling lines, tubes, and drains  - Monitor endotracheal (as able) and nasal secretions for changes in amount and color  - [place] appropriate cooling/warming therapies per order  - Administer medications as ordered  - Instruct and encourage patient and family to use good hand hygiene technique  - Identify and instruct in appropriate isolation precautions for identified infection/condition  Outcome: Progressing  Goal: Absence of fever/infection during neutropenic period  Description  INTERVENTIONS:  - Monitor WBC  - Implement neutropenic guidelines  Outcome: Progressing     Problem: DISCHARGE PLANNING  Goal: Discharge to home or other facility with appropriate resources  Description  INTERVENTIONS:  - Identify barriers to discharge w/patient and caregiver  - Arrange for needed discharge resources and transportation as appropriate  - Identify discharge learning needs (meds, wound care, etc )  - Arrange for interpretive services to assist at discharge as needed  - Refer to Case Management Department for coordinating discharge planning if the patient needs post-hospital services based on physician/advanced practitioner order or complex needs related to functional status, cognitive ability, or social support system  Outcome: Progressing     Problem: Knowledge Deficit  Goal: Patient/family/caregiver demonstrates understanding of disease process, treatment plan, medications, and discharge instructions  Description  Complete learning assessment and assess knowledge base    Interventions:  - Provide teaching at level of understanding  - Provide teaching via preferred learning methods  Outcome: Progressing

## 2019-07-08 NOTE — ASSESSMENT & PLAN NOTE
· POA, evidenced by fevers, leukocytosis with bandemia and elevated lactate  · Suspect urinary source  · Cefepime-->ceftriaxone   · Follow Ucx: >100K Klebsiella   · Bcx: klebsiella x2   · Repeat Bcx: NGTD

## 2019-07-08 NOTE — ASSESSMENT & PLAN NOTE
· Cont BB  · INR daily: 2 1   · CHADSVasc: 7 (11% risk) also had prior embolic stroke   · Given this, would start heparin gtt as AC when INR<2   · Will hold warfarin given upcoming surgery

## 2019-07-08 NOTE — TELEPHONE ENCOUNTER
I left a message for patient to schedule August follow up with Dr Katy Pennington in the [facility] [place] office

## 2019-07-08 NOTE — ASSESSMENT & PLAN NOTE
· Pt reports worsening low back pain when moved at [address]  Describes shooting pain down right leg    · MRI L spine with large disk bulge   · Awaiting MRI C-spine

## 2019-07-08 NOTE — PLAN OF CARE
Problem: Potential for Falls  Goal: Patient will remain free of falls  Description  INTERVENTIONS:  - Assess patient frequently for physical needs  -  Identify cognitive and physical deficits and behaviors that affect risk of falls    -  [place] fall precautions as indicated by assessment   - Educate patient/family on patient safety including physical limitations  - Instruct patient to call for assistance with activity based on assessment  - Modify environment to reduce risk of injury  - Consider OT/PT consult to assist with strengthening/mobility  Outcome: Progressing     Problem: Prexisting or High Potential for Compromised Skin Integrity  Goal: Skin integrity is maintained or improved  Description  INTERVENTIONS:  - Identify patients at risk for skin breakdown  - Assess and monitor skin integrity  - Assess and monitor nutrition and hydration status  - Monitor labs (i e  albumin)  - Assess for incontinence   - Turn and reposition patient  - Assist with mobility/ambulation  - Relieve pressure over bony prominences  - Avoid friction and shearing  - Provide appropriate hygiene as needed including keeping skin clean and dry  - Evaluate need for skin moisturizer/barrier cream  - Collaborate with interdisciplinary team (i e  Nutrition, Rehabilitation, etc )   - Patient/family teaching  Outcome: Progressing     Problem: METABOLIC, FLUID AND ELECTROLYTES - ADULT  Goal: Electrolytes maintained within normal limits  Description  INTERVENTIONS:  - Monitor labs and assess patient for signs and symptoms of electrolyte imbalances  - Administer electrolyte replacement as ordered  - Monitor response to electrolyte replacements, including repeat lab results as appropriate  - Instruct patient on fluid and nutrition as appropriate  Outcome: Progressing  Goal: Fluid balance maintained  Description  INTERVENTIONS:  - Monitor labs and assess for signs and symptoms of volume excess or deficit  - Monitor I/O and WT  - Instruct patient on fluid and nutrition as appropriate  Outcome: Progressing  Goal: Glucose maintained within target range  Description  INTERVENTIONS:  - Monitor Blood Glucose as ordered  - Assess for signs and symptoms of hyperglycemia and hypoglycemia  - Administer ordered medications to maintain glucose within target range  - Assess nutritional intake and initiate nutrition service referral as needed  Outcome: Progressing     Problem: INFECTION - ADULT  Goal: Absence or prevention of progression during hospitalization  Description  INTERVENTIONS:  - Assess and monitor for signs and symptoms of infection  - Monitor lab/diagnostic results  - Monitor all insertion sites, i e  indwelling lines, tubes, and drains  - Monitor endotracheal (as able) and nasal secretions for changes in amount and color  - [place] appropriate cooling/warming therapies per order  - Administer medications as ordered  - Instruct and encourage patient and family to use good hand hygiene technique  - Identify and instruct in appropriate isolation precautions for identified infection/condition  Outcome: Progressing  Goal: Absence of fever/infection during neutropenic period  Description  INTERVENTIONS:  - Monitor WBC  - Implement neutropenic guidelines  Outcome: Progressing     Problem: DISCHARGE PLANNING  Goal: Discharge to home or other facility with appropriate resources  Description  INTERVENTIONS:  - Identify barriers to discharge w/patient and caregiver  - Arrange for needed discharge resources and transportation as appropriate  - Identify discharge learning needs (meds, wound care, etc )  - Arrange for interpretive services to assist at discharge as needed  - Refer to Case Management Department for coordinating discharge planning if the patient needs post-hospital services based on physician/advanced practitioner order or complex needs related to functional status, cognitive ability, or social support system  Outcome: Progressing     Problem: Knowledge Deficit  Goal: Patient/family/caregiver demonstrates understanding of disease process, treatment plan, medications, and discharge instructions  Description  Complete learning assessment and assess knowledge base    Interventions:  - Provide teaching at level of understanding  - Provide teaching via preferred learning methods  Outcome: Progressing

## 2019-07-09 ENCOUNTER — TELEPHONE (OUTPATIENT)
Dept: CARDIOLOGY CLINIC | Facility: CLINIC | Age: 84
End: 2019-07-09

## 2019-07-09 LAB
ANION GAP SERPL CALCULATED.3IONS-SCNC: 9 MMOL/L (ref 4–13)
APTT PPP: 183 SECONDS (ref 23–37)
APTT PPP: 57 SECONDS (ref 23–37)
APTT PPP: >210 SECONDS (ref 23–37)
BASOPHILS # BLD AUTO: 0.04 THOUSANDS/ΜL (ref 0–0.1)
BASOPHILS NFR BLD AUTO: 0 % (ref 0–1)
BUN SERPL-MCNC: 45 MG/DL (ref 5–25)
CALCIUM SERPL-MCNC: 8.1 MG/DL (ref 8.3–10.1)
CHLORIDE SERPL-SCNC: 99 MMOL/L (ref 100–108)
CO2 SERPL-SCNC: 24 MMOL/L (ref 21–32)
CREAT SERPL-MCNC: 3.24 MG/DL (ref 0.6–1.3)
EOSINOPHIL # BLD AUTO: 0.47 THOUSAND/ΜL (ref 0–0.61)
EOSINOPHIL NFR BLD AUTO: 5 % (ref 0–6)
ERYTHROCYTE [DISTWIDTH] IN BLOOD BY AUTOMATED COUNT: 14.2 % (ref 11.6–15.1)
GFR SERPL CREATININE-BSD FRML MDRD: 16 ML/MIN/1.73SQ M
GLUCOSE SERPL-MCNC: 101 MG/DL (ref 65–140)
GLUCOSE SERPL-MCNC: 154 MG/DL (ref 65–140)
GLUCOSE SERPL-MCNC: 159 MG/DL (ref 65–140)
GLUCOSE SERPL-MCNC: 170 MG/DL (ref 65–140)
GLUCOSE SERPL-MCNC: 93 MG/DL (ref 65–140)
HCT VFR BLD AUTO: 30.4 % (ref 36.5–49.3)
HGB BLD-MCNC: 9.9 G/DL (ref 12–17)
IMM GRANULOCYTES # BLD AUTO: 0.13 THOUSAND/UL (ref 0–0.2)
IMM GRANULOCYTES NFR BLD AUTO: 2 % (ref 0–2)
INR PPP: 1.73 (ref 0.84–1.19)
LYMPHOCYTES # BLD AUTO: 1.9 THOUSANDS/ΜL (ref 0.6–4.47)
LYMPHOCYTES NFR BLD AUTO: 21 % (ref 14–44)
MCH RBC QN AUTO: 30.4 PG (ref 26.8–34.3)
MCHC RBC AUTO-ENTMCNC: 32.6 G/DL (ref 31.4–37.4)
MCV RBC AUTO: 93 FL (ref 82–98)
MONOCYTES # BLD AUTO: 0.5 THOUSAND/ΜL (ref 0.17–1.22)
MONOCYTES NFR BLD AUTO: 6 % (ref 4–12)
NEUTROPHILS # BLD AUTO: 5.87 THOUSANDS/ΜL (ref 1.85–7.62)
NEUTS SEG NFR BLD AUTO: 66 % (ref 43–75)
NRBC BLD AUTO-RTO: 0 /100 WBCS
PLATELET # BLD AUTO: 153 THOUSANDS/UL (ref 149–390)
PMV BLD AUTO: 11.9 FL (ref 8.9–12.7)
POTASSIUM SERPL-SCNC: 4.1 MMOL/L (ref 3.5–5.3)
PROTHROMBIN TIME: 19.5 SECONDS (ref 11.6–14.5)
RBC # BLD AUTO: 3.26 MILLION/UL (ref 3.88–5.62)
SODIUM SERPL-SCNC: 132 MMOL/L (ref 136–145)
WBC # BLD AUTO: 8.91 THOUSAND/UL (ref 4.31–10.16)

## 2019-07-09 PROCEDURE — 80048 BASIC METABOLIC PNL TOTAL CA: CPT | Performed by: HOSPITALIST

## 2019-07-09 PROCEDURE — 85610 PROTHROMBIN TIME: CPT | Performed by: HOSPITALIST

## 2019-07-09 PROCEDURE — 85730 THROMBOPLASTIN TIME PARTIAL: CPT | Performed by: HOSPITALIST

## 2019-07-09 PROCEDURE — 99232 SBSQ HOSP IP/OBS MODERATE 35: CPT | Performed by: INTERNAL MEDICINE

## 2019-07-09 PROCEDURE — 82948 REAGENT STRIP/BLOOD GLUCOSE: CPT

## 2019-07-09 PROCEDURE — 92610 EVALUATE SWALLOWING FUNCTION: CPT

## 2019-07-09 PROCEDURE — 85730 THROMBOPLASTIN TIME PARTIAL: CPT | Performed by: INTERNAL MEDICINE

## 2019-07-09 PROCEDURE — G8996 SWALLOW CURRENT STATUS: HCPCS

## 2019-07-09 PROCEDURE — 85025 COMPLETE CBC W/AUTO DIFF WBC: CPT | Performed by: HOSPITALIST

## 2019-07-09 PROCEDURE — G8997 SWALLOW GOAL STATUS: HCPCS

## 2019-07-09 RX ORDER — WARFARIN SODIUM 3 MG/1
3 TABLET ORAL
Status: COMPLETED | OUTPATIENT
Start: 2019-07-09 | End: 2019-07-09

## 2019-07-09 RX ORDER — FINASTERIDE 5 MG/1
5 TABLET, FILM COATED ORAL DAILY
Status: DISCONTINUED | OUTPATIENT
Start: 2019-07-09 | End: 2019-07-12 | Stop reason: HOSPADM

## 2019-07-09 RX ORDER — CEFDINIR 300 MG/1
300 CAPSULE ORAL EVERY 24 HOURS
Status: DISCONTINUED | OUTPATIENT
Start: 2019-07-09 | End: 2019-07-12 | Stop reason: HOSPADM

## 2019-07-09 RX ADMIN — ASPIRIN 81 MG: 81 TABLET, COATED ORAL at 08:47

## 2019-07-09 RX ADMIN — HEPARIN SODIUM 12 UNITS/KG/HR: 10000 INJECTION, SOLUTION INTRAVENOUS at 10:27

## 2019-07-09 RX ADMIN — RANOLAZINE 500 MG: 500 TABLET, FILM COATED, EXTENDED RELEASE ORAL at 08:47

## 2019-07-09 RX ADMIN — INSULIN LISPRO 2 UNITS: 100 INJECTION, SOLUTION INTRAVENOUS; SUBCUTANEOUS at 17:37

## 2019-07-09 RX ADMIN — HEPARIN SODIUM 3200 UNITS: 1000 INJECTION INTRAVENOUS; SUBCUTANEOUS at 11:29

## 2019-07-09 RX ADMIN — LEVOTHYROXINE SODIUM 75 MCG: 75 TABLET ORAL at 05:13

## 2019-07-09 RX ADMIN — RANOLAZINE 500 MG: 500 TABLET, FILM COATED, EXTENDED RELEASE ORAL at 21:11

## 2019-07-09 RX ADMIN — METOPROLOL TARTRATE 50 MG: 50 TABLET, FILM COATED ORAL at 08:45

## 2019-07-09 RX ADMIN — TAMSULOSIN HYDROCHLORIDE 0.4 MG: 0.4 CAPSULE ORAL at 08:47

## 2019-07-09 RX ADMIN — FINASTERIDE 5 MG: 5 TABLET, FILM COATED ORAL at 17:44

## 2019-07-09 RX ADMIN — BISACODYL 10 MG: 10 SUPPOSITORY RECTAL at 11:38

## 2019-07-09 RX ADMIN — CEFDINIR 300 MG: 300 CAPSULE ORAL at 17:37

## 2019-07-09 RX ADMIN — INSULIN LISPRO 2 UNITS: 100 INJECTION, SOLUTION INTRAVENOUS; SUBCUTANEOUS at 11:33

## 2019-07-09 RX ADMIN — WARFARIN SODIUM 3 MG: 3 TABLET ORAL at 17:37

## 2019-07-09 RX ADMIN — CEFAZOLIN SODIUM 1000 MG: 1 SOLUTION INTRAVENOUS at 08:45

## 2019-07-09 RX ADMIN — METOPROLOL TARTRATE 50 MG: 50 TABLET, FILM COATED ORAL at 17:37

## 2019-07-09 RX ADMIN — INSULIN LISPRO 1 UNITS: 100 INJECTION, SOLUTION INTRAVENOUS; SUBCUTANEOUS at 21:29

## 2019-07-09 RX ADMIN — ATORVASTATIN CALCIUM 40 MG: 40 TABLET, FILM COATED ORAL at 17:37

## 2019-07-09 NOTE — PLAN OF CARE
Problem: Potential for Falls  Goal: Patient will remain free of falls  Description  INTERVENTIONS:  - Assess patient frequently for physical needs  -  Identify cognitive and physical deficits and behaviors that affect risk of falls    -  [place] fall precautions as indicated by assessment   - Educate patient/family on patient safety including physical limitations  - Instruct patient to call for assistance with activity based on assessment  - Modify environment to reduce risk of injury  - Consider OT/PT consult to assist with strengthening/mobility  Outcome: Progressing     Problem: Prexisting or High Potential for Compromised Skin Integrity  Goal: Skin integrity is maintained or improved  Description  INTERVENTIONS:  - Identify patients at risk for skin breakdown  - Assess and monitor skin integrity  - Assess and monitor nutrition and hydration status  - Monitor labs (i e  albumin)  - Assess for incontinence   - Turn and reposition patient  - Assist with mobility/ambulation  - Relieve pressure over bony prominences  - Avoid friction and shearing  - Provide appropriate hygiene as needed including keeping skin clean and dry  - Evaluate need for skin moisturizer/barrier cream  - Collaborate with interdisciplinary team (i e  Nutrition, Rehabilitation, etc )   - Patient/family teaching  Outcome: Progressing     Problem: METABOLIC, FLUID AND ELECTROLYTES - ADULT  Goal: Electrolytes maintained within normal limits  Description  INTERVENTIONS:  - Monitor labs and assess patient for signs and symptoms of electrolyte imbalances  - Administer electrolyte replacement as ordered  - Monitor response to electrolyte replacements, including repeat lab results as appropriate  - Instruct patient on fluid and nutrition as appropriate  Outcome: Progressing  Goal: Fluid balance maintained  Description  INTERVENTIONS:  - Monitor labs and assess for signs and symptoms of volume excess or deficit  - Monitor I/O and WT  - Instruct patient on fluid and nutrition as appropriate  Outcome: Progressing  Goal: Glucose maintained within target range  Description  INTERVENTIONS:  - Monitor Blood Glucose as ordered  - Assess for signs and symptoms of hyperglycemia and hypoglycemia  - Administer ordered medications to maintain glucose within target range  - Assess nutritional intake and initiate nutrition service referral as needed  Outcome: Progressing     Problem: INFECTION - ADULT  Goal: Absence or prevention of progression during hospitalization  Description  INTERVENTIONS:  - Assess and monitor for signs and symptoms of infection  - Monitor lab/diagnostic results  - Monitor all insertion sites, i e  indwelling lines, tubes, and drains  - Monitor endotracheal (as able) and nasal secretions for changes in amount and color  - [place] appropriate cooling/warming therapies per order  - Administer medications as ordered  - Instruct and encourage patient and family to use good hand hygiene technique  - Identify and instruct in appropriate isolation precautions for identified infection/condition  Outcome: Progressing  Goal: Absence of fever/infection during neutropenic period  Description  INTERVENTIONS:  - Monitor WBC  - Implement neutropenic guidelines  Outcome: Progressing     Problem: DISCHARGE PLANNING  Goal: Discharge to home or other facility with appropriate resources  Description  INTERVENTIONS:  - Identify barriers to discharge w/patient and caregiver  - Arrange for needed discharge resources and transportation as appropriate  - Identify discharge learning needs (meds, wound care, etc )  - Arrange for interpretive services to assist at discharge as needed  - Refer to Case Management Department for coordinating discharge planning if the patient needs post-hospital services based on physician/advanced practitioner order or complex needs related to functional status, cognitive ability, or social support system  Outcome: Progressing     Problem: Knowledge Deficit  Goal: Patient/family/caregiver demonstrates understanding of disease process, treatment plan, medications, and discharge instructions  Description  Complete learning assessment and assess knowledge base    Interventions:  - Provide teaching at level of understanding  - Provide teaching via preferred learning methods  Outcome: Progressing

## 2019-07-09 NOTE — PROGRESS NOTES
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 that is sensitive to all cephalosporins  -transition to oral cefdinir  -follow up pending blood cultures while admitted  -continue to trend fever curve/vitals while admitted  -repeat CBC and BMP tomorrow  -supportive care  -if cultures from 07/07 remain without growth for 72 hours patient is cleared from an ID standpoint for discharge   -plan to treat for total of 10 days through 7/17     2  Klebsiella bacteremia-Suspect this is all secondary to a UTI with obstructive UTI   -antibiotics as above  -follow up pending blood cultures  -additional care as per primary     3  Obstructive UTI-patient noted to have urinary retention requiring straight catheterization   Suspect this is why he had relapse of the Klebsiella UTI   -antibiotics as above  -monitor symptomatology  -urology follow-up for management of retention  -straight catheterization as needed     4  Acute kidney injury-complicating chronic kidney disease   Likely a pre renal issue   Perhaps sepsis playing a role   The renal function has improved with resuscitation and treatment of the sepsis   Perhaps obstruction was also playing a role  -recheck BMP  -volume management  -dose adjusted antibiotics as above  -straight catheterization as needed     5  Chronic right-sided low back pain-with some notable weakness that is been persisting   MRI of the lumbar spine negative    Back pain remains stable at this time   -await cervical spine MRI     -patient planned for eventual surgery after this admission      Above plan discussed in detail with the patient and his partner at bedside      Primary service updated of the above plan      ID consult service will continue to follow  Antibiotics:  Ancef    24 hour events:  No acute events noted overnight on chart review  Patient's cultures remain without growth  Patient's other vitals are stable  He is currently afebrile and without leukocytosis  Creatinine is stable    Subjective:  Patient has no fever, chills, sweats; no nausea, vomiting, diarrhea; no cough, shortness of breath; no pain  No new symptoms  He currently reports ongoing immobility due to back pain  He continues to have weakness in the right lower leg  Objective:  Vitals:  Temp:  [98 2 °F (36 8 °C)-98 9 °F (37 2 °C)] 98 2 °F (36 8 °C)  HR:  [58-70] 70  Resp:  [18] 18  BP: (160-178)/(71-84) 178/84  SpO2:  [95 %-96 %] 95 %  Temp (24hrs), Av 4 °F (36 9 °C), Min:98 2 °F (36 8 °C), Max:98 9 °F (37 2 °C)  Current: Temperature: 98 2 °F (36 8 °C)    Physical Exam:   General Appearance:  Alert, interactive, nontoxic, no acute distress  Patient again remains on his back as he reports feeling more comfortable in terms of pain  Throat: Oropharynx moist without lesions  Lungs:   Clear to auscultation bilaterally; no wheezes, rhonchi or rales; respirations unlabored on room air   Heart:  RRR; no murmur, rub or gallop   Abdomen:   Soft, non-tender, non-distended, positive bowel sounds  Extremities: No clubbing, cyanosis or edema   Skin: No new rashes or lesions  No new draining wounds noted         Labs, Imaging, & Other studies:   All pertinent labs and imaging studies were personally reviewed  Results from last 7 days   Lab Units 19  04219  0438 19  0604   WBC Thousand/uL 8 91 8 54 11 29*   HEMOGLOBIN g/dL 9 9* 9 6* 10 1*   PLATELETS Thousands/uL 153 145* 132*     Results from last 7 days   Lab Units 19  0427 19  0604  19  1552   POTASSIUM mmol/L 4 1 4 1   < > 4 0   CHLORIDE mmol/L 99* 101   < > 93*   CO2 mmol/L 24 24   < > 24   BUN mg/dL 45* 49*   < > 60*   CREATININE mg/dL 3 24* 3 34*   < > 4 21*   EGFR ml/min/1 73sq m 16 16   < > 12   CALCIUM mg/dL 8 1* 7 9*   < > 7 9*   AST U/L  --  31  --  35   ALT U/L  --  15  --  19   ALK PHOS U/L  --  108  --  191*    < > = values in this interval not displayed  Results from last 7 days   Lab Units 07/07/19  1505 07/04/19  1622 07/04/19  1552   BLOOD CULTURE  No Growth at 24 hrs    No Growth at 24 hrs   --  Klebsiella pneumoniae*  Klebsiella pneumoniae*   GRAM STAIN RESULT   --   --  Gram negative rods*  Gram negative rods*   URINE CULTURE   --  >100,000 cfu/ml Klebsiella pneumoniae*  --

## 2019-07-09 NOTE — ASSESSMENT & PLAN NOTE
Assessment:  Patient reports lower back pain  Not able to ambulate currently  MRI showed large left paracentral disc extrusion L2-3 level  For possible surgery this weekend according to wife  Plan: Will await final results of blood culture  For possible surgery once infection is cleared  Refer to PT for evaluation and treatment

## 2019-07-09 NOTE — ASSESSMENT & PLAN NOTE
Assessment:  Probably secondary to klebsiella bacteremia from UTI  Patient had previous history of infection with same organism, was treated with cephalosporins, patient responded well  Patient is currently on Ancef 1000mg/IV every 8 hours  No recurrence of fever  Latest WBC result unremarkable  Plan:  Monitor CBC with differential  Awaiting results of repeat blood culture  Infectious Disease consult done  Noted possible shift to oral antibiotic tomorrow if cultures remain negative for growth

## 2019-07-09 NOTE — SPEECH THERAPY NOTE
Speech-Language Pathology Bedside Swallow Evaluation        Patient Name: Francoise OLIVEROS Date: 7/9/2019     Problem List  Patient Active Problem List   Diagnosis    3-vessel coronary artery disease    Asbestosis ([facility] )    CKD (chronic kidney disease), stage IV ([facility] )    Diabetes mellitus with multiple complications (HCC)    Elevated serum alkaline phosphatase level    Hyperlipidemia    Essential hypertension    Hypothyroidism    Paroxysmal A-fib (HCC)    Seasonal allergies    Increased frequency of urination    Frozen shoulder    Herpes zoster without complication    Depressed mood    Chest pain    Ambulatory dysfunction/generalized weakness    Leg edema, left    Shortness of breath    Chronic right-sided low back pain without sciatica    UTI (urinary tract infection)    Tibial artery occlusion, left (HCC)    Severe sepsis (HCC)    MAGUI (acute kidney injury) ([facility] )    Hyponatremia    Chronic diastolic congestive heart failure (HCC)    Gram-negative bacteremia    Urinary retention    Edema of upper extremity       Past Medical History  Past Medical History:   Diagnosis Date    3-vessel coronary artery disease     last assessed: 08/15/2016    BPH (benign prostatic hyperplasia)     Chronic kidney disease     Diabetes mellitus ([facility] )     Hyperlipidemia     Hypertension     Shingles 04/29/2019    SOB (shortness of breath)     Stroke [facility])        Past Surgical History  Past Surgical History:   Procedure Laterality Date    CARDIAC PACEMAKER PLACEMENT      CATARACT EXTRACTION      last assessed: 11/11/2015    CORONARY ARTERY BYPASS GRAFT      last assessed: 08/26/2015       Summary    Pt presents with Mild oropharyngeal dysphagia characterized by risk for aspiration w/ thin liquids by straw and pocketing on L        Recommendations:   Diet: soft/level 3 diet and thin liquids - NO STRAW  Meds: whole with puree   Frequent Oral care: 2x/day  Aspiration precautions and compensatory swallowing strategies: upright posture, only feed when fully alert, slow rate of feeding, small bites/sips and no straws  Other Recommendations/ considerations: will follow up for diet tolerance       Current Medical Status  Pt is a 80 y o  male who presented to [address] [place] [address] with severe sepsis  Pt presented w/ fever and chills  Patient was recently in the hospital have been sent to [facility] for rehab  According to family and patient patient had not been doing well at rehab  Not making much progress with physical therapy  Over the last 2 days has not been feeling well however today complained of shaking chills and had measured temperature of 102°  Has been unable to empty his bladder completely but denies dysuria  No flank pain  Main complaint is right-sided back pain mostly with passive movement  Patient is unable to lift his right leg and reports decreased sensation in that leg when compared to left  He has no cough or shortness of breath  Able to lie flat without issue  Does report increased lower extremity swelling  Has had chronic left-sided swelling but now bilateral     Past medical history:   Please see H&P for details    Special Studies:  CXR: 7/4/19 Findings most suspicious for developing patchy medial right upper pneumonia and new small right costophrenic angle effusion  Social/Education/Vocational Hx:  Pt lives with family    Swallow Information   Current Risks for Dysphagia & Aspiration: known history of dysphagia  Current Symptoms/Concerns: current pneumonia  Current Diet: mechanically altered/level 2 diet and thin liquids -wife stated this is the most pt has eaten in days   (ate less than 50%)  Baseline Diet: regular diet and thin liquids    Baseline Assessment   Behavior/Cognition: alert  Speech/Language Status: able to participate in conversation and able to follow commands  Patient Positioning: upright in bed     Swallow Mechanism Exam   Facial: left facial droop  Labial: decreased ROM left side  Lingual: WFL  Velum: unable to visualize  Mandible: adequate ROM  Dentition: full dentures  Vocal quality:clear/adequate   Volitional Cough: strong/productive   Respiratory: RA    Consistencies Assessed and Performance   Consistencies Administered: pt seen at end of lunch w/ chopped carrots, minced chicken, mashed pot, danielle doone cookie, NTL, thin liquids by cup and straw  Oral Stage: pt self fed small amts of mashed potatoes, minced chicken, chopped carrots  Adequate mastication/manipulation  Pt c/o some pocketing on R, although facial/labial droop is on L  Pt had decreased oral control w/ thin liquids by straw, better control w/ cup sips of thin liquids  Pharyngeal Stage: Swallows appeared timely  Coughing noted after straw drinking, no s/s aspiration w/ cup sips of thin liquids         Esophageal Concerns: none reported      Results Reviewed with: patient, RN, MD and family   Dysphagia Goals: pt will tolerate dysphagia 3 diet with thin liquids without s/s of aspiration x48 hours

## 2019-07-09 NOTE — TELEPHONE ENCOUNTER
Pt's wife called, pt has been in and out of the hospital and rehab recently  Currently admitted in [facility] for UTI and sepsis  He will be undergoing back surgery in the near future  She needed to cancel the appt with you on 7/22 but will call back to reschedule when pt is feeling better

## 2019-07-09 NOTE — ASSESSMENT & PLAN NOTE
Assessment:  Probable cause of recurrence of klebsiella bacteremia  Required straight catheterization previously  Urology consult done  Patient assessed today and reports that he is able to urinate  Plan:  Continue monitoring I&O  Continue Tamsulosin 0 4mg OD  Start Finasteride 5mg OD

## 2019-07-09 NOTE — SOCIAL WORK
LOS 5   UTI bundle;  Readmission  Pt had been admitted with chest pain on 6/12/19 and subsequently treated for UTI with IV abx  Pt was then discharged to [facility] for rehab  Pt then returned to [facility] ED with temp of 103 and found to have Sepsis  Prior to rehab, pt lives with his wife in a 2-level home with 1-2 JYOTSNA  Pt had been using a cane however before admission he had regressed to a RW  No other DME  Pt had been independent with ADLs  Pt came from [facility] where he has been for rehab since 6/16/19  Pt has not had [facility] in the past   PCP- Dr Randal Vaca  Pharmacy- [facility] [address] in [place]  Pt able to afford medications with Rx coverage  Pt has SSI and pension for income  Pt had been driving prior to last admission  He does not currently drive  CM reviewed discharge planning process including the following: identifying caregivers at home, preference for d/c planning needs, availability of [facility] to [facility] program, availability of treatment team to discuss questions or concerns patient and/or family may have regarding diagnosis, plan of care, old or new medications and discharge planning   CM will continue to follow for care coordination and update assessment as appropriate

## 2019-07-09 NOTE — TELEPHONE ENCOUNTER
Patient is currently in the hospital and then going to [facility] for a procedure and then rehab per wife, they will call back to schedule once he is fully recovered

## 2019-07-09 NOTE — PROGRESS NOTES
Progress Note Michelle Cantu 1/29/1932, 80 y o  male MRN: [id number]    Unit/Bed#: -01 Encounter: [id number]    Primary Care Provider: Renetta Taylor MD   Date and time admitted to hospital: 7/4/2019  3:34 PM        Urinary retention  Assessment & Plan  Assessment:  Probable cause of recurrence of klebsiella bacteremia  Required straight catheterization previously  Urology consult done  Patient assessed today and reports that he is able to urinate  Plan:  Continue monitoring I&O  Continue Tamsulosin 0 4mg OD  Start Finasteride 5mg OD  * Severe sepsis [facility])  Assessment & Plan  Assessment:  Probably secondary to klebsiella bacteremia from UTI  Patient had previous history of infection with same organism, was treated with cephalosporins, patient responded well  Patient is currently on Ancef 1000mg/IV every 8 hours  No recurrence of fever  Latest WBC result unremarkable  Plan:  Monitor CBC with differential  Awaiting results of repeat blood culture  Infectious Disease consult done  Noted possible shift to oral antibiotic tomorrow if cultures remain negative for growth  Objective:  Vitals: Blood pressure (!) 178/84, pulse 70, temperature 98 2 °F (36 8 °C), temperature source Oral, resp  rate 18, height 5' 5" (1 651 m), weight 81 kg (178 lb 9 2 oz), SpO2 95 %  ,Body mass index is 29 72 kg/m²        Intake/Output Summary (Last 24 hours) at 7/9/2019 1458  Last data filed at 7/9/2019 1100  Gross per 24 hour   Intake 540 ml   Output 1775 ml   Net -1235 ml       Invasive Devices     Peripheral Intravenous Line            Peripheral IV 07/06/19 Right Wrist 2 days                Physical Exam: BP (!) 178/84 (BP Location: Right arm)   Pulse 70   Temp 98 2 °F (36 8 °C) (Oral)   Resp 18   Ht 5' 5" (1 651 m)   Wt 81 kg (178 lb 9 2 oz) Comment: patient not ambulating at this time  SpO2 95%   BMI 29 72 kg/m²     General Appearance:    Alert, cooperative, no distress, appears stated age Head:    Normocephalic, without obvious abnormality, atraumatic   Eyes:    PERRL, conjunctiva/corneas clear, EOM's intact, fundi     benign, both eyes        Ears:    Normal TM's and external ear canals, both ears   Nose:   Nares normal, septum midline, mucosa normal, no drainage    or sinus tenderness   Throat:   Lips, mucosa, and tongue normal; teeth and gums normal   Neck:   Supple, symmetrical, trachea midline, no adenopathy;        thyroid:  No enlargement/tenderness/nodules; no carotid    bruit or JVD   Back:     Symmetric, no curvature, ROM normal, no CVA tenderness   Lungs:     Clear to auscultation bilaterally, respirations unlabored   Chest wall:    No tenderness or deformity   Heart:    Regular rate and rhythm, S1 and S2 normal, no murmur, rub   or gallop   Abdomen:     Soft, non-tender, bowel sounds active all four quadrants,     no masses, no organomegaly   Genitalia:    Normal male without lesion, discharge or tenderness   Rectal:    Normal tone, normal prostate, no masses or tenderness;    guaiac negative stool   Extremities:   Extremities normal, atraumatic, no cyanosis or edema   Pulses:   2+ and symmetric all extremities   Skin:   Skin color, texture, turgor normal, no rashes or lesions   Lymph nodes:   Cervical, supraclavicular, and axillary nodes normal   Neurologic:   CNII-XII intact  Normal strength, sensation and reflexes       throughout       Lab, Imaging and other studies: I have personally reviewed pertinent reports      VTE Pharmacologic Prophylaxis: Heparin  VTE Mechanical Prophylaxis: sequential compression device

## 2019-07-10 LAB
ANION GAP SERPL CALCULATED.3IONS-SCNC: 9 MMOL/L (ref 4–13)
APTT PPP: 46 SECONDS (ref 23–37)
APTT PPP: 64 SECONDS (ref 23–37)
APTT PPP: 98 SECONDS (ref 23–37)
BASOPHILS # BLD AUTO: 0.04 THOUSANDS/ΜL (ref 0–0.1)
BASOPHILS NFR BLD AUTO: 1 % (ref 0–1)
BUN SERPL-MCNC: 45 MG/DL (ref 5–25)
CALCIUM SERPL-MCNC: 8 MG/DL (ref 8.3–10.1)
CHLORIDE SERPL-SCNC: 100 MMOL/L (ref 100–108)
CO2 SERPL-SCNC: 23 MMOL/L (ref 21–32)
CREAT SERPL-MCNC: 3.25 MG/DL (ref 0.6–1.3)
EOSINOPHIL # BLD AUTO: 0.36 THOUSAND/ΜL (ref 0–0.61)
EOSINOPHIL NFR BLD AUTO: 4 % (ref 0–6)
ERYTHROCYTE [DISTWIDTH] IN BLOOD BY AUTOMATED COUNT: 14.3 % (ref 11.6–15.1)
GFR SERPL CREATININE-BSD FRML MDRD: 16 ML/MIN/1.73SQ M
GLUCOSE SERPL-MCNC: 136 MG/DL (ref 65–140)
GLUCOSE SERPL-MCNC: 146 MG/DL (ref 65–140)
GLUCOSE SERPL-MCNC: 180 MG/DL (ref 65–140)
GLUCOSE SERPL-MCNC: 74 MG/DL (ref 65–140)
GLUCOSE SERPL-MCNC: 96 MG/DL (ref 65–140)
HCT VFR BLD AUTO: 30.8 % (ref 36.5–49.3)
HGB BLD-MCNC: 9.7 G/DL (ref 12–17)
IMM GRANULOCYTES # BLD AUTO: 0.11 THOUSAND/UL (ref 0–0.2)
IMM GRANULOCYTES NFR BLD AUTO: 1 % (ref 0–2)
INR PPP: 1.67 (ref 0.84–1.19)
LYMPHOCYTES # BLD AUTO: 1.98 THOUSANDS/ΜL (ref 0.6–4.47)
LYMPHOCYTES NFR BLD AUTO: 23 % (ref 14–44)
MCH RBC QN AUTO: 29.8 PG (ref 26.8–34.3)
MCHC RBC AUTO-ENTMCNC: 31.5 G/DL (ref 31.4–37.4)
MCV RBC AUTO: 95 FL (ref 82–98)
MONOCYTES # BLD AUTO: 0.69 THOUSAND/ΜL (ref 0.17–1.22)
MONOCYTES NFR BLD AUTO: 8 % (ref 4–12)
NEUTROPHILS # BLD AUTO: 5.47 THOUSANDS/ΜL (ref 1.85–7.62)
NEUTS SEG NFR BLD AUTO: 63 % (ref 43–75)
NRBC BLD AUTO-RTO: 0 /100 WBCS
PLATELET # BLD AUTO: 152 THOUSANDS/UL (ref 149–390)
PMV BLD AUTO: 11.9 FL (ref 8.9–12.7)
POTASSIUM SERPL-SCNC: 4.6 MMOL/L (ref 3.5–5.3)
PROTHROMBIN TIME: 18.9 SECONDS (ref 11.6–14.5)
RBC # BLD AUTO: 3.26 MILLION/UL (ref 3.88–5.62)
SODIUM SERPL-SCNC: 132 MMOL/L (ref 136–145)
WBC # BLD AUTO: 8.65 THOUSAND/UL (ref 4.31–10.16)

## 2019-07-10 PROCEDURE — 82948 REAGENT STRIP/BLOOD GLUCOSE: CPT

## 2019-07-10 PROCEDURE — G8979 MOBILITY GOAL STATUS: HCPCS

## 2019-07-10 PROCEDURE — G8978 MOBILITY CURRENT STATUS: HCPCS

## 2019-07-10 PROCEDURE — 99232 SBSQ HOSP IP/OBS MODERATE 35: CPT | Performed by: INTERNAL MEDICINE

## 2019-07-10 PROCEDURE — 85730 THROMBOPLASTIN TIME PARTIAL: CPT | Performed by: INTERNAL MEDICINE

## 2019-07-10 PROCEDURE — 80048 BASIC METABOLIC PNL TOTAL CA: CPT | Performed by: HOSPITALIST

## 2019-07-10 PROCEDURE — 92526 ORAL FUNCTION THERAPY: CPT

## 2019-07-10 PROCEDURE — 97163 PT EVAL HIGH COMPLEX 45 MIN: CPT

## 2019-07-10 PROCEDURE — 85025 COMPLETE CBC W/AUTO DIFF WBC: CPT | Performed by: HOSPITALIST

## 2019-07-10 PROCEDURE — 85610 PROTHROMBIN TIME: CPT | Performed by: HOSPITALIST

## 2019-07-10 RX ORDER — WARFARIN SODIUM 3 MG/1
3 TABLET ORAL
Status: COMPLETED | OUTPATIENT
Start: 2019-07-10 | End: 2019-07-10

## 2019-07-10 RX ADMIN — ATORVASTATIN CALCIUM 40 MG: 40 TABLET, FILM COATED ORAL at 17:51

## 2019-07-10 RX ADMIN — WARFARIN SODIUM 3 MG: 3 TABLET ORAL at 17:51

## 2019-07-10 RX ADMIN — METOPROLOL TARTRATE 50 MG: 50 TABLET, FILM COATED ORAL at 17:51

## 2019-07-10 RX ADMIN — CEFDINIR 300 MG: 300 CAPSULE ORAL at 14:17

## 2019-07-10 RX ADMIN — FINASTERIDE 5 MG: 5 TABLET, FILM COATED ORAL at 08:21

## 2019-07-10 RX ADMIN — RANOLAZINE 500 MG: 500 TABLET, FILM COATED, EXTENDED RELEASE ORAL at 08:21

## 2019-07-10 RX ADMIN — HEPARIN SODIUM 9 UNITS/KG/HR: 10000 INJECTION, SOLUTION INTRAVENOUS at 14:42

## 2019-07-10 RX ADMIN — RANOLAZINE 500 MG: 500 TABLET, FILM COATED, EXTENDED RELEASE ORAL at 21:27

## 2019-07-10 RX ADMIN — TAMSULOSIN HYDROCHLORIDE 0.4 MG: 0.4 CAPSULE ORAL at 08:21

## 2019-07-10 RX ADMIN — LEVOTHYROXINE SODIUM 75 MCG: 75 TABLET ORAL at 06:12

## 2019-07-10 RX ADMIN — INSULIN LISPRO 2 UNITS: 100 INJECTION, SOLUTION INTRAVENOUS; SUBCUTANEOUS at 12:03

## 2019-07-10 RX ADMIN — HEPARIN SODIUM 3200 UNITS: 1000 INJECTION INTRAVENOUS; SUBCUTANEOUS at 17:57

## 2019-07-10 RX ADMIN — ASPIRIN 81 MG: 81 TABLET, COATED ORAL at 08:21

## 2019-07-10 RX ADMIN — METOPROLOL TARTRATE 50 MG: 50 TABLET, FILM COATED ORAL at 08:21

## 2019-07-10 NOTE — ASSESSMENT & PLAN NOTE
· Probably secondary to klebsiella bacteremia from UTI  No recurrence of fever today  Latest WBC result 8 65  · Awaiting results of repeat blood culture   Infectious Disease input noted:  · Shift to oral cefdinir 300mg OD  · Monitor CBC with differential

## 2019-07-10 NOTE — SOCIAL WORK
Cm met with pt and wife at length to discuss plans for DC  Wife is aware pt will need to go to rehab for several days to finish his PO abx course before transferring to [place] to get surgery  Wife stated she does not want him to go back to [facility], [facility] as they had a bad experience  CM explained pt is able to go to any other facility they would like  Discussed Freedom of Choice with patient  List of facilities given to patient via in person  patient aware the list is custom filtered for them by zip code location and that [facility] post acute providers are designated  Pt's daughter was on the phone and suggested pt go to a rehab near [place] so it would be easier to transfer pt once he has completed his abx  Cm explained the transportation may not be covered as pt needs to be going to the closest facility chosen  Daughter understands this  Wife then stated she would prefer pt stay local as she would like to visit every day  Wife is quite overwhelmed and cm suggested she discuss facilities with her son who lives locally and take some time to choose some facilities  She stated she defiantly wants a referral made to [facility] and is considering [facility]  She requested referrals be made to those 2 facilities at this time  Referrals have been made  Cm will follow up with her to obtain additional choices

## 2019-07-10 NOTE — ASSESSMENT & PLAN NOTE
· Initial cultures showed klebsiella pneumonia infection  · Currently on cefdinir 300mg tab OD for 10 days  · Follow up final culture results

## 2019-07-10 NOTE — ASSESSMENT & PLAN NOTE
Wt Readings from Last 3 Encounters:   07/10/19 81 2 kg (179 lb 0 2 oz)   06/12/19 76 5 kg (168 lb 10 4 oz)   05/30/19 80 3 kg (177 lb)     · Monitor volume status   Continue I/Os and weigh daily

## 2019-07-10 NOTE — PLAN OF CARE
Problem: Potential for Falls  Goal: Patient will remain free of falls  Description  INTERVENTIONS:  - Assess patient frequently for physical needs  -  Identify cognitive and physical deficits and behaviors that affect risk of falls    -  [place] fall precautions as indicated by assessment   - Educate patient/family on patient safety including physical limitations  - Instruct patient to call for assistance with activity based on assessment  - Modify environment to reduce risk of injury  - Consider OT/PT consult to assist with strengthening/mobility  Outcome: Progressing     Problem: Prexisting or High Potential for Compromised Skin Integrity  Goal: Skin integrity is maintained or improved  Description  INTERVENTIONS:  - Identify patients at risk for skin breakdown  - Assess and monitor skin integrity  - Assess and monitor nutrition and hydration status  - Monitor labs (i e  albumin)  - Assess for incontinence   - Turn and reposition patient  - Assist with mobility/ambulation  - Relieve pressure over bony prominences  - Avoid friction and shearing  - Provide appropriate hygiene as needed including keeping skin clean and dry  - Evaluate need for skin moisturizer/barrier cream  - Collaborate with interdisciplinary team (i e  Nutrition, Rehabilitation, etc )   - Patient/family teaching  Outcome: Progressing     Problem: METABOLIC, FLUID AND ELECTROLYTES - ADULT  Goal: Electrolytes maintained within normal limits  Description  INTERVENTIONS:  - Monitor labs and assess patient for signs and symptoms of electrolyte imbalances  - Administer electrolyte replacement as ordered  - Monitor response to electrolyte replacements, including repeat lab results as appropriate  - Instruct patient on fluid and nutrition as appropriate  Outcome: Progressing  Goal: Fluid balance maintained  Description  INTERVENTIONS:  - Monitor labs and assess for signs and symptoms of volume excess or deficit  - Monitor I/O and WT  - Instruct patient on fluid and nutrition as appropriate  Outcome: Progressing  Goal: Glucose maintained within target range  Description  INTERVENTIONS:  - Monitor Blood Glucose as ordered  - Assess for signs and symptoms of hyperglycemia and hypoglycemia  - Administer ordered medications to maintain glucose within target range  - Assess nutritional intake and initiate nutrition service referral as needed  Outcome: Progressing     Problem: INFECTION - ADULT  Goal: Absence or prevention of progression during hospitalization  Description  INTERVENTIONS:  - Assess and monitor for signs and symptoms of infection  - Monitor lab/diagnostic results  - Monitor all insertion sites, i e  indwelling lines, tubes, and drains  - Monitor endotracheal (as able) and nasal secretions for changes in amount and color  - [place] appropriate cooling/warming therapies per order  - Administer medications as ordered  - Instruct and encourage patient and family to use good hand hygiene technique  - Identify and instruct in appropriate isolation precautions for identified infection/condition  Outcome: Progressing  Goal: Absence of fever/infection during neutropenic period  Description  INTERVENTIONS:  - Monitor WBC  - Implement neutropenic guidelines  Outcome: Progressing     Problem: DISCHARGE PLANNING  Goal: Discharge to home or other facility with appropriate resources  Description  INTERVENTIONS:  - Identify barriers to discharge w/patient and caregiver  - Arrange for needed discharge resources and transportation as appropriate  - Identify discharge learning needs (meds, wound care, etc )  - Arrange for interpretive services to assist at discharge as needed  - Refer to Case Management Department for coordinating discharge planning if the patient needs post-hospital services based on physician/advanced practitioner order or complex needs related to functional status, cognitive ability, or social support system  Outcome: Progressing     Problem: Knowledge Deficit  Goal: Patient/family/caregiver demonstrates understanding of disease process, treatment plan, medications, and discharge instructions  Description  Complete learning assessment and assess knowledge base  Interventions:  - Provide teaching at level of understanding  - Provide teaching via preferred learning methods  Outcome: Progressing     Problem: Nutrition/Hydration-ADULT  Goal: Nutrient/Hydration intake appropriate for improving, restoring or maintaining nutritional needs  Description  Monitor and assess patient's nutrition/hydration status for malnutrition (ex- brittle hair, bruises, dry skin, pale skin and conjunctiva, muscle wasting, smooth red tongue, and disorientation)  Collaborate with interdisciplinary team and initiate plan and interventions as ordered  Monitor patient's weight and dietary intake as ordered or per policy  Utilize nutrition screening tool and intervene per policy  Determine patient's food preferences and provide high-protein, high-caloric foods as appropriate       INTERVENTIONS:  - Monitor oral intake, urinary output, labs, and treatment plans  - Assess nutrition and hydration status and recommend course of action  - Evaluate amount of meals eaten  - Assist patient with eating if necessary   - Allow adequate time for meals  - Recommend/ encourage appropriate diets, oral nutritional supplements, and vitamin/mineral supplements  - Order, calculate, and assess calorie counts as needed  - Recommend, monitor, and adjust tube feedings and TPN/PPN based on assessed needs  - Assess need for intravenous fluids  - Provide specific nutrition/hydration education as appropriate  - Include patient/family/caregiver in decisions related to nutrition  Outcome: Progressing

## 2019-07-10 NOTE — PHYSICAL THERAPY NOTE
PHYSICAL THERAPY EVALUATION  NAME:  Solo Kowalski  DATE: 07/10/19    AGE:   80 y o  Mrn:   [id number]  ADMIT DX:  Urinary tract infection [N39 0]  Fever [R50 9]  Sepsis ([facility] ) [A41 9]    Past Medical History:   Diagnosis Date    3-vessel coronary artery disease     last assessed: 08/15/2016    BPH (benign prostatic hyperplasia)     Chronic kidney disease     Diabetes mellitus ([facility] )     Hyperlipidemia     Hypertension     Shingles 04/29/2019    SOB (shortness of breath)     Stroke ([facility] )      Length Of Stay: 6  Performed at least 2 patient identifiers during session: Name and Birthday  PHYSICAL THERAPY EVALUATION :    07/10/19 1500   Note Type   Note type Eval only   Pain Assessment   Pain Assessment 0-10   Pain Score No Pain   Home Living   Type of [address] Two level   Home Equipment Stair glide   Additional Comments , pt lives with his wife in a 2-level home with 1-2 JYOTSNA  Pt had been using a cane however before admission he had regressed to a RW  No other DME  Pt had been independent with ADLs  [email or web address]  Prior Function   Level of [place] Independent with ADLs and functional mobility  (Needed A prior to THIS admission)   Lives With Spouse   Falls in the last 6 months 0   Restrictions/Precautions   Braces or Orthoses   (no brace ordered)   Other Precautions Bed Alarm; Chair Alarm;Cognitive; Fall Risk   General   Additional Pertinent History Per family, pt is going to be having surgery in [place], performed by son in law to fix his cervical disc   Family/Caregiver Present Yes  (wife)   Cognition   Overall Cognitive Status Impaired   Arousal/Participation Lethargic   Orientation Level Oriented to person   Memory Decreased short term memory;Decreased recall of recent events;Decreased recall of precautions   Following Commands Follows one step commands with increased time or repetition   RUE Strength   RUE Overall Strength Within Functional Limits - able to perform ADL tasks with strength   LUE Strength   LUE Overall Strength Deficits;Due to pain  (<90 @ shoulder, pt attributes to bicep tear)   RLE Assessment   RLE Assessment   (pitting edema)   Strength RLE   R Hip Flexion 3+/5   R Knee Extension 4-/5   R Ankle Dorsiflexion 3-/5   LLE Assessment   LLE Assessment   (pitting edema)   Strength LLE   L Hip Flexion 4/5   L Knee Extension 4/5   L Ankle Dorsiflexion 4-/5   Coordination   Movements are Fluid and Coordinated 0  (bradykinetic)   Light Touch   RLE Light Touch Grossly intact   LLE Light Touch Grossly intact   Bed Mobility   Supine to Sit 3  Moderate assistance   Additional items Assist x 1;HOB elevated; Bedrails; Increased time required;Verbal cues   Sit to Supine 2  Maximal assistance   Additional items Assist x 1; Increased time required;Verbal cues; Bedrails;HOB elevated   Transfers   Sit to Stand 2  Maximal assistance   Additional items Assist x 1; Increased time required;Verbal cues  (BUE support of bedrailing)   Stand to Sit 2  Maximal assistance   Additional items Assist x 1; Increased time required;Verbal cues;Armrests  (BUE support of bedrailing)   Ambulation/Elevation   Gait pattern   (advance/retreat only w/BUE blocking x3 reps BLEs)   Gait Assistance 3  Moderate assist   Additional items Assist x 1;Verbal cues   Assistive Device   (BUE support of bedrailing)   Distance 0, pregait only   Stair Management Assistance Not tested   Balance   Static [place] +   Static Standing Poor +  (20-30 seconds x 3 trials)   Ambulatory Poor   Endurance Deficit   Endurance Deficit Yes   Endurance Deficit Description limited standing tolerance , < 1 min   Activity Tolerance   Activity Tolerance Patient limited by pain; Patient limited by fatigue   Medical Staff Made Aware spoke to [facility] from case management   Nurse Made Aware spoke to [facility] and [facility], including recommend quick move lift for transfers   Assessment   Prognosis Fair   Problem List Decreased strength;Decreased range of motion;Decreased endurance; Impaired balance;Decreased mobility; Decreased coordination;Pain  (gait deviations)   Assessment Pt is a 80 y o  male seen for PT evaluation s/p admit to [facility] on 7/4/2019 w/ Severe sepsis ([address] )  Order placed for PT  Upon evaluation: Pt requires mod to max assistance for bed mobility, mod to max assistance for transfers and not appropriate for more than pregait activity at this time  Pt's clinical presentation is currently unstable/unpredictable given the functional mobility deficits above, especially weakness, decreased endurance, gait deviations, pain, decreased activity tolerance and decreased functional mobility tolerance, coupled with fall risks including impaired balance and impaired coordination, and combined with medical complications of hypertension , abnormal H&H, multiple readmissions, abnormal sodium values and fear/retreat  Pt to benefit from continued skilled PT tx while in hospital and upon DC to address deficits as defined above and maximize level of functional mobility  From PT/mobility standpoint, recommendation at time of d/c would be inpatient rehab pending progress in order to maximize pt's functional independence and consistency w/ mobility in order to facilitate return to PLOF  Recommend trial with WC next 1-2 sessions, trial with quick move next 1-2 sessions, ther ex next 1-2 sessions and case management consult  Barriers to Discharge Decreased caregiver support; Inaccessible home environment   Barriers to Discharge Comments Comorbidities affecting pt's physical performance at time of assessment include: DM, HTN, obesity, CAD and CABG and pacemaker, recent shingles 2019, CKD  Personal factors affecting pt at time of IE include: steps to enter environment, multi-level environment, advanced age, inability to perform IADLs, inability to navigate community distances and recent fall(s)     Goals   Patient Goals to be able to walk again in the future   STG Expiration Date 07/20/19   Short Term Goal #1 Pt will: Perform bed mobility tasks to min A to decrease burden of care, prepare for transfers and reposition self in bed  Perform transfers to min A to decrease risk for falls and improve ease of transfers  Perform ambulation with rolling walker for <10' to  A Of 1  to decrease burden of care and decrease risk for falls  Propel WC for 50' w/S to have alternative means for mobility until pt has upcoming surgery; Standing tolerance w/BUE support w/A of 1 for > 1 min to improve activity tolerance and improve ability to perform upright tasks at home   Treatment Day 0   Plan   Treatment/Interventions Functional transfer training;LE strengthening/ROM; Therapeutic exercise; Endurance training;Cognitive reorientation;Patient/family training;Equipment eval/education; Bed mobility;Gait training;Spoke to case management;Spoke to nursing  [facility] Mobility)   PT Frequency Other (Comment)  (3-5x/wk)   Recommendation   Recommendation Short-term skilled PT   Equipment Recommended Wheelchair; Other (Comment)  (quick move)   Barthel Index   Feeding 5   Bathing 0   Grooming Score 5   Dressing Score 0   Bladder Score 10   Bowels Score 10   Toilet Use Score 0   Transfers (Bed/Chair) Score 0   Mobility (Level Surface) Score 0   Stairs Score 0   Barthel Index Score 30     (Please find full objective findings from PT assessment regarding body systems outlined above)      Adriana Schwab, PT, DPT

## 2019-07-10 NOTE — ASSESSMENT & PLAN NOTE
· Patient reports lower back pain  Not able to ambulate currently  MRI showed large left paracentral disc extrusion L2-3 level  Still for possible back surgery  Currently patient is not complaining of pain  · Referred to PT for evaluation and treatment  ID input noted  · Antibiotic shifted to oral cefdinir for 10 days   Once course finished, may do surgery for back  · Follow up final culture results

## 2019-07-10 NOTE — ASSESSMENT & PLAN NOTE
· BsCr: 3 4; crea on admission elevated to 4 5  · Crea today improved to 3 25  · Possible cause prerenal due to sepsis  · Watch out for hypotension, avoid nephrotoxins, monitor BMP

## 2019-07-10 NOTE — ASSESSMENT & PLAN NOTE
· Patient's INR today is 1 67  Patient on Heparin drip and Coumadin  · Continue Heparin drip until INR within therapeutic range   Give Coumadin 3mg tab PO

## 2019-07-10 NOTE — NURSING NOTE
Pt resting in bed  Bed in lowest position, bed alarm on, call bell within reach  Will continue to monitor

## 2019-07-10 NOTE — PROGRESS NOTES
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 that is sensitive to all cephalosporins   -continue oral cefdinir  -follow up pending blood cultures while admitted  -continue to trend fever curve/vitals while admitted  -supportive care  -if cultures from 07/07 remain without growth for 72 hours patient is cleared from an ID standpoint for discharge   -plan to treat for total of 10 days through 7/17     2  Klebsiella bacteremia-Suspect this is all secondary to a UTI with obstructive UTI   -antibiotics as above  -follow up pending blood cultures  -additional care as per primary     3  Obstructive UTI-patient noted to have urinary retention requiring straight catheterization   Suspect this is why he had relapse of the Klebsiella UTI   -antibiotics as above  -monitor symptomatology  -urology follow-up for management of retention  -straight catheterization as needed     4  Acute kidney injury-complicating chronic kidney disease   Likely a pre renal issue   Perhaps sepsis playing a role   The renal function has improved with resuscitation and treatment of the sepsis   Perhaps obstruction was also playing a role   -volume management  -dose adjusted antibiotics as above  -straight catheterization as needed     5   Chronic right-sided low back pain-with some notable weakness that is been persisting   MRI of the lumbar spine negative   Back pain remains stable at this time   -await cervical spine MRI     -patient planned for eventual surgery after this admission      Above plan discussed in detail with the patient and his partner at bedside      Primary service updated of the above plan      ID consult service will continue to follow          Antibiotics:  Cefdinir    24 hour events:  No acute events noted overnight on chart review  Patient's blood cultures remain without growth at 48 hours  He is without any leukocytosis  Patient's other labs are stable  No new imaging overnight    Subjective:  Patient has no fever, chills, sweats; no nausea, vomiting, diarrhea; no cough, shortness of breath; no pain  No new symptoms  He continues with back pain and decreased mobility then in the right lower extremity  Currently tolerating antibiotic without development of rash or diarrhea  Objective:  Vitals:  Temp:  [98 °F (36 7 °C)-98 1 °F (36 7 °C)] 98 1 °F (36 7 °C)  HR:  [70-71] 70  Resp:  [18] 18  BP: (143-161)/(67-72) 143/70  SpO2:  [95 %-96 %] 95 %  Temp (24hrs), Av °F (36 7 °C), Min:98 °F (36 7 °C), Max:98 1 °F (36 7 °C)  Current: Temperature: 98 1 °F (36 7 °C)    Physical Exam:   General Appearance:  Alert, interactive, nontoxic, no acute distress  Throat: Oropharynx moist without lesions  Lungs:   Clear to auscultation bilaterally; no wheezes, rhonchi or rales; respirations unlabored   Heart:  RRR; no murmur, rub or gallop   Abdomen:   Soft, non-tender, non-distended, positive bowel sounds  Extremities: No clubbing, cyanosis or edema; patient continues with decreased range of motion in the right lower extremity  Skin: No new rashes or lesions  No new draining wounds noted         Labs, Imaging, & Other studies:   All pertinent labs and imaging studies were personally reviewed  Results from last 7 days   Lab Units 07/10/19  0433 19  0428 19  0438   WBC Thousand/uL 8 65 8 91 8 54   HEMOGLOBIN g/dL 9 7* 9 9* 9 6*   PLATELETS Thousands/uL 152 153 145*     Results from last 7 days   Lab Units 07/10/19  0428  19  0604  19  1552   POTASSIUM mmol/L 4 6   < > 4 1   < > 4 0 CHLORIDE mmol/L 100   < > 101   < > 93*   CO2 mmol/L 23   < > 24   < > 24   BUN mg/dL 45*   < > 49*   < > 60*   CREATININE mg/dL 3 25*   < > 3 34*   < > 4 21*   EGFR ml/min/1 73sq m 16   < > 16   < > 12   CALCIUM mg/dL 8 0*   < > 7 9*   < > 7 9*   AST U/L  --   --  31  --  35   ALT U/L  --   --  15  --  19   ALK PHOS U/L  --   --  108  --  191*    < > = values in this interval not displayed  Results from last 7 days   Lab Units 07/07/19  1505 07/04/19  1622 07/04/19  1552   BLOOD CULTURE  No Growth at 48 hrs  No Growth at 48 hrs    --  Klebsiella pneumoniae*  Klebsiella pneumoniae*   GRAM STAIN RESULT   --   --  Gram negative rods*  Gram negative rods*   URINE CULTURE   --  >100,000 cfu/ml Klebsiella pneumoniae*  --

## 2019-07-10 NOTE — PLAN OF CARE
Problem: Potential for Falls  Goal: Patient will remain free of falls  Description  INTERVENTIONS:  - Assess patient frequently for physical needs  -  Identify cognitive and physical deficits and behaviors that affect risk of falls    -  [place] fall precautions as indicated by assessment   - Educate patient/family on patient safety including physical limitations  - Instruct patient to call for assistance with activity based on assessment  - Modify environment to reduce risk of injury  - Consider OT/PT consult to assist with strengthening/mobility  Outcome: Progressing     Problem: Prexisting or High Potential for Compromised Skin Integrity  Goal: Skin integrity is maintained or improved  Description  INTERVENTIONS:  - Identify patients at risk for skin breakdown  - Assess and monitor skin integrity  - Assess and monitor nutrition and hydration status  - Monitor labs (i e  albumin)  - Assess for incontinence   - Turn and reposition patient  - Assist with mobility/ambulation  - Relieve pressure over bony prominences  - Avoid friction and shearing  - Provide appropriate hygiene as needed including keeping skin clean and dry  - Evaluate need for skin moisturizer/barrier cream  - Collaborate with interdisciplinary team (i e  Nutrition, Rehabilitation, etc )   - Patient/family teaching  Outcome: Progressing     Problem: METABOLIC, FLUID AND ELECTROLYTES - ADULT  Goal: Electrolytes maintained within normal limits  Description  INTERVENTIONS:  - Monitor labs and assess patient for signs and symptoms of electrolyte imbalances  - Administer electrolyte replacement as ordered  - Monitor response to electrolyte replacements, including repeat lab results as appropriate  - Instruct patient on fluid and nutrition as appropriate  Outcome: Progressing  Goal: Fluid balance maintained  Description  INTERVENTIONS:  - Monitor labs and assess for signs and symptoms of volume excess or deficit  - Monitor I/O and WT  - Instruct patient on fluid and nutrition as appropriate  Outcome: Progressing  Goal: Glucose maintained within target range  Description  INTERVENTIONS:  - Monitor Blood Glucose as ordered  - Assess for signs and symptoms of hyperglycemia and hypoglycemia  - Administer ordered medications to maintain glucose within target range  - Assess nutritional intake and initiate nutrition service referral as needed  Outcome: Progressing     Problem: INFECTION - ADULT  Goal: Absence or prevention of progression during hospitalization  Description  INTERVENTIONS:  - Assess and monitor for signs and symptoms of infection  - Monitor lab/diagnostic results  - Monitor all insertion sites, i e  indwelling lines, tubes, and drains  - Monitor endotracheal (as able) and nasal secretions for changes in amount and color  - [place] appropriate cooling/warming therapies per order  - Administer medications as ordered  - Instruct and encourage patient and family to use good hand hygiene technique  - Identify and instruct in appropriate isolation precautions for identified infection/condition  Outcome: Progressing  Goal: Absence of fever/infection during neutropenic period  Description  INTERVENTIONS:  - Monitor WBC  - Implement neutropenic guidelines  Outcome: Progressing     Problem: DISCHARGE PLANNING  Goal: Discharge to home or other facility with appropriate resources  Description  INTERVENTIONS:  - Identify barriers to discharge w/patient and caregiver  - Arrange for needed discharge resources and transportation as appropriate  - Identify discharge learning needs (meds, wound care, etc )  - Arrange for interpretive services to assist at discharge as needed  - Refer to Case Management Department for coordinating discharge planning if the patient needs post-hospital services based on physician/advanced practitioner order or complex needs related to functional status, cognitive ability, or social support system  Outcome: Progressing     Problem: Knowledge Deficit  Goal: Patient/family/caregiver demonstrates understanding of disease process, treatment plan, medications, and discharge instructions  Description  Complete learning assessment and assess knowledge base  Interventions:  - Provide teaching at level of understanding  - Provide teaching via preferred learning methods  Outcome: Progressing     Problem: Nutrition/Hydration-ADULT  Goal: Nutrient/Hydration intake appropriate for improving, restoring or maintaining nutritional needs  Description  Monitor and assess patient's nutrition/hydration status for malnutrition (ex- brittle hair, bruises, dry skin, pale skin and conjunctiva, muscle wasting, smooth red tongue, and disorientation)  Collaborate with interdisciplinary team and initiate plan and interventions as ordered  Monitor patient's weight and dietary intake as ordered or per policy  Utilize nutrition screening tool and intervene per policy  Determine patient's food preferences and provide high-protein, high-caloric foods as appropriate       INTERVENTIONS:  - Monitor oral intake, urinary output, labs, and treatment plans  - Assess nutrition and hydration status and recommend course of action  - Evaluate amount of meals eaten  - Assist patient with eating if necessary   - Allow adequate time for meals  - Recommend/ encourage appropriate diets, oral nutritional supplements, and vitamin/mineral supplements  - Order, calculate, and assess calorie counts as needed  - Recommend, monitor, and adjust tube feedings and TPN/PPN based on assessed needs  - Assess need for intravenous fluids  - Provide specific nutrition/hydration education as appropriate  - Include patient/family/caregiver in decisions related to nutrition  Outcome: Progressing

## 2019-07-10 NOTE — SPEECH THERAPY NOTE
Speech Language/Pathology    Speech/Language Pathology Progress Note    Patient Name: Clif Ferreira  XXMOL'[id number] Date: 7/10/2019       Subjective:  Pt seen for dysphagia tx at end of lunch  Pt on dysphagia 3 diet w/ thin liquids by cup  Objective: Wife present, stated pt did well w/ breakfast and lunch, she made sure pt used a cup and no coughing noted  However, pt does continue to take sips of water by straw, wife also stated no coughing noted  No pocketing noted after meal  Pt drank 8 oz of milk by cup, tended to drink fast- single and successive sips  Appeared w/ good oral control and transfer  Swallows were timely, no coughing noted, voice remained clear  Requested pt drink some water by straw since wife stated pt drank w/ straw earlier w/o coughing, however pt refused at this time, "too full"  Assessment:  Pt tolerated cup drinking w/o overt s/s aspiration  Plan/Recommendations:   Will follow up at a meal      Rakesh Hamilton CCC-SLP  Speech Pathogist  Available via  tiger text

## 2019-07-10 NOTE — PROGRESS NOTES
Progress Note Tien Martines 1/29/1932, 80 y o  male MRN: [id number]    Unit/Bed#: -01 Encounter: [id number]    Primary Care Provider: Devonte Leblanc MD   Date and time admitted to hospital: 7/4/2019  3:34 PM        * Severe sepsis [facility])  Assessment & Plan  · Probably secondary to klebsiella bacteremia from UTI  No recurrence of fever today  Latest WBC result 8 65  · Awaiting results of repeat blood culture  Infectious Disease input noted:  · Shift to oral cefdinir 300mg OD  · Monitor CBC with differential    Paroxysmal A-fib (HCC)  Assessment & Plan  · Patient's INR today is 1 67  Patient on Heparin drip and Coumadin  · Continue Heparin drip until INR within therapeutic range  Give Coumadin 3mg tab PO    Chronic right-sided low back pain without sciatica  Assessment & Plan  · Patient reports lower back pain  Not able to ambulate currently  MRI showed large left paracentral disc extrusion L2-3 level  Still for possible back surgery  Currently patient is not complaining of pain  · Referred to PT for evaluation and treatment  ID input noted  · Antibiotic shifted to oral cefdinir for 10 days  Once course finished, may do surgery for back  · Follow up final culture results    Urinary retention  Assessment & Plan  · Patient currently able to void  · Continue medications  Monitor I&Os      Gram-negative bacteremia  Assessment & Plan  · Initial cultures showed klebsiella pneumonia infection  · Currently on cefdinir 300mg tab OD for 10 days  · Follow up final culture results    Chronic diastolic congestive heart failure [facility])  Assessment & Plan  Wt Readings from Last 3 Encounters:   07/10/19 81 2 kg (179 lb 0 2 oz)   06/12/19 76 5 kg (168 lb 10 4 oz)   05/30/19 80 3 kg (177 lb)     · Monitor volume status  Continue I/Os and weigh daily          Hyponatremia  Assessment & Plan  · Current Na level 132  · Monitor fluid status   Monitor BMP    MAGUI (acute kidney injury) ([facility] [address] )  Assessment & Plan  · BsCr: 3 4; crea on admission elevated to 4 5  · Crea today improved to 3 25  · Possible cause prerenal due to sepsis  · Watch out for hypotension, avoid nephrotoxins, monitor BMP    VTE Pharmacologic Prophylaxis:   Pharmacologic: Heparin Drip  Mechanical VTE Prophylaxis in Place: Yes    Patient Centered Rounds: I have performed bedside rounds with nursing staff today  Discussions with Specialists or Other Care Team Provider: Discussed possible discharge to rehab facility with case management    Education and Discussions with Family / Patient: Discussed with patient and wife  Time Spent for Care: 20 minutes  More than 50% of total time spent on counseling and coordination of care as described above  Current Length of Stay: 6 day(s)    Current Patient Status: Inpatient   Certification Statement: The patient will continue to require additional inpatient hospital stay due to IV meds and monitoring of labs    Discharge Plan: Possible discharge within 24-48 hours  Discussed with case management possible discharge to rehab facility    Code Status: Level 1 - Full Code      Subjective:   Patient seen today  Comfortable, able to sleep well last night  No other complaints noted  Objective:     Vitals:   Temp (24hrs), Av °F (36 7 °C), Min:98 °F (36 7 °C), Max:98 1 °F (36 7 °C)    Temp:  [98 °F (36 7 °C)-98 1 °F (36 7 °C)] 98 1 °F (36 7 °C)  HR:  [70-71] 70  Resp:  [18] 18  BP: (143-161)/(67-72) 143/70  SpO2:  [95 %-96 %] 95 %  Body mass index is 29 79 kg/m²  Input and Output Summary (last 24 hours): Intake/Output Summary (Last 24 hours) at 7/10/2019 1207  Last data filed at 7/10/2019 1201  Gross per 24 hour   Intake 360 ml   Output 1400 ml   Net -1040 ml       Physical Exam:     Physical Exam   Constitutional: He is oriented to person, place, and time  No distress  HENT:   Head: Normocephalic and atraumatic  Eyes: Pupils are equal, round, and reactive to light   Conjunctivae and EOM are normal  No scleral icterus  Neck: Normal range of motion  Neck supple  No JVD present  Cardiovascular: Normal rate and regular rhythm  Pulmonary/Chest: Effort normal and breath sounds normal    Abdominal: Soft  Bowel sounds are normal  He exhibits no distension and no mass  There is no tenderness  Musculoskeletal: Normal range of motion  He exhibits no tenderness or deformity  Lymphadenopathy:     He has no cervical adenopathy  Neurological: He is alert and oriented to person, place, and time  Skin: Skin is warm  No rash noted  Psychiatric: He has a normal mood and affect  His behavior is normal          Additional Data:     Labs:    Results from last 7 days   Lab Units 07/10/19  0433  07/05/19  0459   WBC Thousand/uL 8 65   < > 12 76*   HEMOGLOBIN g/dL 9 7*   < > 9 7*   HEMATOCRIT % 30 8*   < > 30 0*   PLATELETS Thousands/uL 152   < > 98*   BANDS PCT %  --   --  13*   NEUTROS PCT % 63   < >  --    LYMPHS PCT % 23   < >  --    LYMPHO PCT %  --   --  11*   MONOS PCT % 8   < >  --    MONO PCT %  --   --  6   EOS PCT % 4   < > 3    < > = values in this interval not displayed  Results from last 7 days   Lab Units 07/10/19  0428  07/07/19  0604   SODIUM mmol/L 132*   < > 133*   POTASSIUM mmol/L 4 6   < > 4 1   CHLORIDE mmol/L 100   < > 101   CO2 mmol/L 23   < > 24   BUN mg/dL 45*   < > 49*   CREATININE mg/dL 3 25*   < > 3 34*   ANION GAP mmol/L 9   < > 8   CALCIUM mg/dL 8 0*   < > 7 9*   ALBUMIN g/dL  --   --  2 7*   TOTAL BILIRUBIN mg/dL  --   --  0 60   ALK PHOS U/L  --   --  108   ALT U/L  --   --  15   AST U/L  --   --  31   GLUCOSE RANDOM mg/dL 74   < > 101    < > = values in this interval not displayed       Results from last 7 days   Lab Units 07/10/19  0428   INR  1 67*     Results from last 7 days   Lab Units 07/10/19  1058 07/10/19  0655 07/09/19  2119 07/09/19  1551 07/09/19  1055 07/09/19  0719 07/08/19  2049 07/08/19  1557 07/08/19  1120 07/08/19  0730 07/07/19  2109 07/07/19  1614   POC GLUCOSE mg/dl 180* 96 170* 159* 154* 101 119 137 138 114 168* 142*         Results from last 7 days   Lab Units 07/08/19  0438 07/07/19  0604 07/04/19  1816 07/04/19  1552   LACTIC ACID mmol/L  --   --  1 7 2 8*   PROCALCITONIN ng/ml 55 38* 131 47*  --  167 59*           * I Have Reviewed All Lab Data Listed Above  * Additional Pertinent Lab Tests Reviewed: All Labs Within Last 24 Hours Reviewed    Imaging:    Imaging Reports Reviewed Today Include: Awaiting MRI of L-spine  Imaging Personally Reviewed by Myself Includes:    Recent Cultures (last 7 days):     Results from last 7 days   Lab Units 07/07/19  1505 07/04/19  1622 07/04/19  1552   BLOOD CULTURE  No Growth at 48 hrs  No Growth at 48 hrs    --  Klebsiella pneumoniae*  Klebsiella pneumoniae*   GRAM STAIN RESULT   --   --  Gram negative rods*  Gram negative rods*   URINE CULTURE   --  >100,000 cfu/ml Klebsiella pneumoniae*  --        Last 24 Hours Medication List:     Current Facility-Administered Medications:  aspirin 81 mg Oral Daily Roly Fields MD    atorvastatin 40 mg Oral QPM Roly Fields MD    bisacodyl 10 mg Rectal Daily PRN Roly Fields MD    cefdinir 300 mg Oral Q24H Francisco Hurtado MD    finasteride 5 mg Oral Daily Elie Villegas MD    heparin (porcine) 3-30 Units/kg/hr (Order-Specific) Intravenous Titrated Roly Fields MD Last Rate: 9 Units/kg/hr (07/10/19 1000)   heparin (porcine) 3,200 Units Intravenous PRN Roly Fields MD    heparin (porcine) 6,400 Units Intravenous PRN Roly Fields MD    insulin lispro 1-5 Units Subcutaneous HS Catherine Spaulding PA-C    insulin lispro 2-12 Units Subcutaneous TID AC Catherine Spaulding PA-C    levothyroxine 75 mcg Oral Early Morning Roly Fields MD    metoprolol tartrate 50 mg Oral BID With Meals Roly Fields MD    ondansetron 4 mg Intravenous Q6H PRN Roly Fields MD    polyethylene glycol 17 g Oral Daily PRN Roly Fields MD    ranolazine 500 mg Oral Q12H Socorro Zacarias MD    tamsulosin 0 4 mg Oral Daily Nataly Urban MD    warfarin 3 mg Oral Once (warfarin) Anu Herndon MD         Today, Patient Was Seen By: Anu Herndon MD    ** Please Note: Dictation voice to text software may have been used in the creation of this document   **

## 2019-07-10 NOTE — PLAN OF CARE
Problem: PHYSICAL THERAPY ADULT  Goal: Performs mobility at highest level of function for planned discharge setting  See evaluation for individualized goals  Description  Treatment/Interventions: Functional transfer training, LE strengthening/ROM, Therapeutic exercise, Endurance training, Cognitive reorientation, Patient/family training, Equipment eval/education, Bed mobility, Gait training, Spoke to case management, Spoke to nursing(WC Mobility)  Equipment Recommended: Wheelchair, Other (Comment)(quick move)       See flowsheet documentation for full assessment, interventions and recommendations  Note:   Prognosis: Fair  Problem List: Decreased strength, Decreased range of motion, Decreased endurance, Impaired balance, Decreased mobility, Decreased coordination, Pain(gait deviations)  Assessment: Pt is a 80 y o  male seen for PT evaluation s/p admit to [address] [place] [address] on 7/4/2019 w/ Severe sepsis ([facility] [address] )  Order placed for PT  Upon evaluation: Pt requires mod to max assistance for bed mobility, mod to max assistance for transfers and not appropriate for more than pregait activity at this time  Pt's clinical presentation is currently unstable/unpredictable given the functional mobility deficits above, especially weakness, decreased endurance, gait deviations, pain, decreased activity tolerance and decreased functional mobility tolerance, coupled with fall risks including impaired balance and impaired coordination, and combined with medical complications of hypertension , abnormal H&H, multiple readmissions, abnormal sodium values and fear/retreat  Pt to benefit from continued skilled PT tx while in hospital and upon DC to address deficits as defined above and maximize level of functional mobility   From PT/mobility standpoint, recommendation at time of d/c would be inpatient rehab pending progress in order to maximize pt's functional independence and consistency w/ mobility in order to facilitate return to PLOF   Recommend trial with WC next 1-2 sessions, trial with quick move next 1-2 sessions, ther ex next 1-2 sessions and case management consult  Barriers to Discharge: Decreased caregiver support, Inaccessible home environment  Barriers to Discharge Comments: Comorbidities affecting pt's physical performance at time of assessment include: DM, HTN, obesity, CAD and CABG and pacemaker, recent shingles 2019, CKD  Personal factors affecting pt at time of IE include: steps to enter environment, multi-level environment, advanced age, inability to perform IADLs, inability to navigate community distances and recent fall(s)  Recommendation: Short-term skilled PT          See flowsheet documentation for full assessment

## 2019-07-11 ENCOUNTER — APPOINTMENT (INPATIENT)
Dept: MRI IMAGING | Facility: HOSPITAL | Age: 84
DRG: 872 | End: 2019-07-11
Payer: MEDICARE

## 2019-07-11 LAB
ANION GAP SERPL CALCULATED.3IONS-SCNC: 9 MMOL/L (ref 4–13)
APTT PPP: 159 SECONDS (ref 23–37)
APTT PPP: 73 SECONDS (ref 23–37)
APTT PPP: 73 SECONDS (ref 23–37)
BASOPHILS # BLD AUTO: 0.03 THOUSANDS/ΜL (ref 0–0.1)
BASOPHILS NFR BLD AUTO: 0 % (ref 0–1)
BUN SERPL-MCNC: 45 MG/DL (ref 5–25)
CALCIUM SERPL-MCNC: 8.2 MG/DL (ref 8.3–10.1)
CHLORIDE SERPL-SCNC: 100 MMOL/L (ref 100–108)
CO2 SERPL-SCNC: 23 MMOL/L (ref 21–32)
CREAT SERPL-MCNC: 3.22 MG/DL (ref 0.6–1.3)
EOSINOPHIL # BLD AUTO: 0.36 THOUSAND/ΜL (ref 0–0.61)
EOSINOPHIL NFR BLD AUTO: 5 % (ref 0–6)
ERYTHROCYTE [DISTWIDTH] IN BLOOD BY AUTOMATED COUNT: 14.3 % (ref 11.6–15.1)
GFR SERPL CREATININE-BSD FRML MDRD: 16 ML/MIN/1.73SQ M
GLUCOSE SERPL-MCNC: 105 MG/DL (ref 65–140)
GLUCOSE SERPL-MCNC: 127 MG/DL (ref 65–140)
GLUCOSE SERPL-MCNC: 152 MG/DL (ref 65–140)
GLUCOSE SERPL-MCNC: 176 MG/DL (ref 65–140)
GLUCOSE SERPL-MCNC: 98 MG/DL (ref 65–140)
HCT VFR BLD AUTO: 30.9 % (ref 36.5–49.3)
HGB BLD-MCNC: 9.9 G/DL (ref 12–17)
IMM GRANULOCYTES # BLD AUTO: 0.14 THOUSAND/UL (ref 0–0.2)
IMM GRANULOCYTES NFR BLD AUTO: 2 % (ref 0–2)
INR PPP: 1.88 (ref 0.84–1.19)
INR PPP: 1.91 (ref 0.84–1.19)
LYMPHOCYTES # BLD AUTO: 2.06 THOUSANDS/ΜL (ref 0.6–4.47)
LYMPHOCYTES NFR BLD AUTO: 30 % (ref 14–44)
MCH RBC QN AUTO: 30.3 PG (ref 26.8–34.3)
MCHC RBC AUTO-ENTMCNC: 32 G/DL (ref 31.4–37.4)
MCV RBC AUTO: 95 FL (ref 82–98)
MONOCYTES # BLD AUTO: 0.56 THOUSAND/ΜL (ref 0.17–1.22)
MONOCYTES NFR BLD AUTO: 8 % (ref 4–12)
NEUTROPHILS # BLD AUTO: 3.8 THOUSANDS/ΜL (ref 1.85–7.62)
NEUTS SEG NFR BLD AUTO: 55 % (ref 43–75)
NRBC BLD AUTO-RTO: 0 /100 WBCS
PLATELET # BLD AUTO: 217 THOUSANDS/UL (ref 149–390)
PMV BLD AUTO: 10.2 FL (ref 8.9–12.7)
POTASSIUM SERPL-SCNC: 4.1 MMOL/L (ref 3.5–5.3)
PROTHROMBIN TIME: 20.8 SECONDS (ref 11.6–14.5)
PROTHROMBIN TIME: 21.1 SECONDS (ref 11.6–14.5)
RBC # BLD AUTO: 3.27 MILLION/UL (ref 3.88–5.62)
SODIUM SERPL-SCNC: 132 MMOL/L (ref 136–145)
WBC # BLD AUTO: 6.95 THOUSAND/UL (ref 4.31–10.16)

## 2019-07-11 PROCEDURE — 85025 COMPLETE CBC W/AUTO DIFF WBC: CPT | Performed by: HOSPITALIST

## 2019-07-11 PROCEDURE — 85730 THROMBOPLASTIN TIME PARTIAL: CPT | Performed by: INTERNAL MEDICINE

## 2019-07-11 PROCEDURE — 92526 ORAL FUNCTION THERAPY: CPT

## 2019-07-11 PROCEDURE — 99232 SBSQ HOSP IP/OBS MODERATE 35: CPT | Performed by: INTERNAL MEDICINE

## 2019-07-11 PROCEDURE — 85610 PROTHROMBIN TIME: CPT | Performed by: INTERNAL MEDICINE

## 2019-07-11 PROCEDURE — 97530 THERAPEUTIC ACTIVITIES: CPT

## 2019-07-11 PROCEDURE — 72141 MRI NECK SPINE W/O DYE: CPT

## 2019-07-11 PROCEDURE — 82948 REAGENT STRIP/BLOOD GLUCOSE: CPT

## 2019-07-11 PROCEDURE — 80048 BASIC METABOLIC PNL TOTAL CA: CPT | Performed by: HOSPITALIST

## 2019-07-11 RX ORDER — WARFARIN SODIUM 3 MG/1
3 TABLET ORAL
Status: COMPLETED | OUTPATIENT
Start: 2019-07-11 | End: 2019-07-11

## 2019-07-11 RX ADMIN — ASPIRIN 81 MG: 81 TABLET, COATED ORAL at 08:40

## 2019-07-11 RX ADMIN — RANOLAZINE 500 MG: 500 TABLET, FILM COATED, EXTENDED RELEASE ORAL at 21:07

## 2019-07-11 RX ADMIN — METOPROLOL TARTRATE 50 MG: 50 TABLET, FILM COATED ORAL at 17:01

## 2019-07-11 RX ADMIN — WARFARIN SODIUM 3 MG: 3 TABLET ORAL at 17:01

## 2019-07-11 RX ADMIN — INSULIN LISPRO 2 UNITS: 100 INJECTION, SOLUTION INTRAVENOUS; SUBCUTANEOUS at 16:46

## 2019-07-11 RX ADMIN — LEVOTHYROXINE SODIUM 75 MCG: 75 TABLET ORAL at 06:07

## 2019-07-11 RX ADMIN — CEFDINIR 300 MG: 300 CAPSULE ORAL at 14:04

## 2019-07-11 RX ADMIN — FINASTERIDE 5 MG: 5 TABLET, FILM COATED ORAL at 08:40

## 2019-07-11 RX ADMIN — RANOLAZINE 500 MG: 500 TABLET, FILM COATED, EXTENDED RELEASE ORAL at 08:40

## 2019-07-11 RX ADMIN — HEPARIN SODIUM 8 UNITS/KG/HR: 10000 INJECTION, SOLUTION INTRAVENOUS at 22:23

## 2019-07-11 RX ADMIN — INSULIN LISPRO 2 UNITS: 100 INJECTION, SOLUTION INTRAVENOUS; SUBCUTANEOUS at 13:06

## 2019-07-11 RX ADMIN — TAMSULOSIN HYDROCHLORIDE 0.4 MG: 0.4 CAPSULE ORAL at 08:40

## 2019-07-11 RX ADMIN — ATORVASTATIN CALCIUM 40 MG: 40 TABLET, FILM COATED ORAL at 17:01

## 2019-07-11 RX ADMIN — METOPROLOL TARTRATE 50 MG: 50 TABLET, FILM COATED ORAL at 08:40

## 2019-07-11 NOTE — PROGRESS NOTES
Progress Note Keiry Harrington 1/29/1932, 80 y o  male MRN: [id number]    Unit/Bed#: -01 Encounter: [id number]    Primary Care Provider: Atul Jay MD   Date and time admitted to hospital: 7/4/2019  3:34 PM        * Severe sepsis [facility])  Assessment & Plan  · Probably secondary to klebsiella bacteremia from UTI  Patient afebrile today  WBC yesterday 8 65; WBC today 6 95  · Preliminary result of blood culture: no growth after 72 hours  · Currently on day 3 oral cefdinir 300mg OD  To complete 10 days of antibiotic course    Paroxysmal A-fib ([facility] [address] )  Assessment & Plan  · Patient's INR yesterday: 1 67; INR today is 1 88  Patient on heparin drip and coumadin 3mg tab  · Continue heparin drip for now  PT/INR at 5pm  If within therapeutic range, may discontinue heparin drip  · Coumadin 3mg tab PO tonight  Recheck INR tomorrow    Chronic right-sided low back pain without sciatica  Assessment & Plan  · Patient reports feeling comfortable, back pain is stable  Still not able to ambulate currently  · MRI C-spine results: Degenerative changes with multilevel mild canal narrowing more pronounced at C4-5   Multilevel high-grade foraminal stenosis  Results relayed to patient as well as wife and daughter  · Continue monitoring for pain, ensure comfort and safety  · Spoke with wife and daughter, scheduled date for back surgery July 18  Surgeon suggested to stop blood thinners starting tomorrow, July 12  · For discharge to rehab facility while completing course of antibiotic and awaiting date of surgery    Urinary retention  Assessment & Plan  · Patient currently able to void  · Continue medications   Monitor I&Os      Gram-negative bacteremia  Assessment & Plan  · Initial cultures showed klebsiella pneumoniae infection  · Prelim report of culture: no growth after 72 hours  · Currently on day 3 cefdinir 300mg tab OD to complete for 10 days    Chronic diastolic congestive heart failure ([facility] [address] )  Assessment & Plan  Wt Readings from Last 3 Encounters:   19 81 kg (178 lb 9 2 oz)   19 76 5 kg (168 lb 10 4 oz)   19 80 3 kg (177 lb)     · Monitor volume status  Continue I/Os and weigh daily          Hyponatremia  Assessment & Plan  · Current Na level 132, stable  · Monitor fluid status  Monitor BMP    MAGUI (acute kidney injury) ([facility] [address] )  Assessment & Plan  · BsCr: 3 4; crea on admission elevated to 4 5  · Crea yesterday: 3 25; crea today 3 22  · Possible cause prerenal due to sepsis  · Watch out for hypotension, avoid nephrotoxins, monitor BMP      VTE Pharmacologic Prophylaxis:   Pharmacologic: Warfarin (Coumadin)  Mechanical VTE Prophylaxis in Place: Yes    Patient Centered Rounds: I have performed bedside rounds with nursing staff today  Discussions with Specialists or Other Care Team Provider: Discussed with  possible discharge to rehab facility  Education and Discussions with Family / Patient: Discussed with patient and wife    Time Spent for Care: 30 minutes  More than 50% of total time spent on counseling and coordination of care as described above  Current Length of Stay: 7 day(s)    Current Patient Status: Inpatient   Certification Statement: The patient will continue to require additional inpatient hospital stay due to monitoring and transfer to rehab facility    Discharge Plan: Possible discharge in the next 24-48 hours, awaiting rehab facility    Code Status: Level 1 - Full Code      Subjective:   Patient seen today  Comfortable, pain is stable  Good appetite  For MRI of c-spine today  Objective:     Vitals:   Temp (24hrs), Av 1 °F (36 7 °C), Min:98 °F (36 7 °C), Max:98 1 °F (36 7 °C)    Temp:  [98 °F (36 7 °C)-98 1 °F (36 7 °C)] 98 1 °F (36 7 °C)  HR:  [70-71] 71  Resp:  [18] 18  BP: (128-161)/(60-72) 150/72  SpO2:  [96 %-98 %] 98 %  Body mass index is 29 72 kg/m²  Input and Output Summary (last 24 hours):        Intake/Output Summary (Last 24 hours) at 2019 1315  Last data filed at 7/11/2019 1307  Gross per 24 hour   Intake 240 ml   Output 1600 ml   Net -1360 ml       Physical Exam:     Physical Exam   Constitutional: He is oriented to person, place, and time  No distress  HENT:   Head: Normocephalic and atraumatic  Eyes: Pupils are equal, round, and reactive to light  Conjunctivae and EOM are normal  No scleral icterus  Neck: Normal range of motion  Neck supple  No JVD present  Cardiovascular: Normal rate and regular rhythm  Pulmonary/Chest: Effort normal and breath sounds normal    Abdominal: Soft  Bowel sounds are normal  He exhibits no distension and no mass  There is no tenderness  Lymphadenopathy:     He has no cervical adenopathy  Neurological: He is alert and oriented to person, place, and time  Skin: Skin is warm  No rash noted  Psychiatric: He has a normal mood and affect  His behavior is normal    Extremities: +1 edema bilateral leg      Additional Data:     Labs:    Results from last 7 days   Lab Units 07/11/19  0434  07/05/19  0459   WBC Thousand/uL 6 95   < > 12 76*   HEMOGLOBIN g/dL 9 9*   < > 9 7*   HEMATOCRIT % 30 9*   < > 30 0*   PLATELETS Thousands/uL 217   < > 98*   BANDS PCT %  --   --  13*   NEUTROS PCT % 55   < >  --    LYMPHS PCT % 30   < >  --    LYMPHO PCT %  --   --  11*   MONOS PCT % 8   < >  --    MONO PCT %  --   --  6   EOS PCT % 5   < > 3    < > = values in this interval not displayed  Results from last 7 days   Lab Units 07/11/19  0434  07/07/19  0604   SODIUM mmol/L 132*   < > 133*   POTASSIUM mmol/L 4 1   < > 4 1   CHLORIDE mmol/L 100   < > 101   CO2 mmol/L 23   < > 24   BUN mg/dL 45*   < > 49*   CREATININE mg/dL 3 22*   < > 3 34*   ANION GAP mmol/L 9   < > 8   CALCIUM mg/dL 8 2*   < > 7 9*   ALBUMIN g/dL  --   --  2 7*   TOTAL BILIRUBIN mg/dL  --   --  0 60   ALK PHOS U/L  --   --  108   ALT U/L  --   --  15   AST U/L  --   --  31   GLUCOSE RANDOM mg/dL 98   < > 101    < > = values in this interval not displayed  Results from last 7 days   Lab Units 07/11/19  0434   INR  1 88*     Results from last 7 days   Lab Units 07/11/19  1058 07/11/19  0658 07/10/19  2054 07/10/19  1531 07/10/19  1058 07/10/19  0655 07/09/19  2119 07/09/19  1551 07/09/19  1055 07/09/19  0719 07/08/19  2049 07/08/19  1557   POC GLUCOSE mg/dl 152* 105 136 146* 180* 96 170* 159* 154* 101 119 137         Results from last 7 days   Lab Units 07/08/19  0438 07/07/19  0604 07/04/19  1816 07/04/19  1552   LACTIC ACID mmol/L  --   --  1 7 2 8*   PROCALCITONIN ng/ml 55 38* 131 47*  --  167 59*           * I Have Reviewed All Lab Data Listed Above  * Additional Pertinent Lab Tests Reviewed: [address] Admission Reviewed    Imaging:    Imaging Reports Reviewed Today Include: MRI c-spine: Degenerative changes with multilevel mild canal narrowing more pronounced at C4-5   Multilevel high-grade foraminal stenosis  Imaging Personally Reviewed by Myself Includes:    Recent Cultures (last 7 days):     Results from last 7 days   Lab Units 07/07/19  1505 07/04/19  1622 07/04/19  1552   BLOOD CULTURE  No Growth at 72 hrs  No Growth at 72 hrs    --  Klebsiella pneumoniae*  Klebsiella pneumoniae*   GRAM STAIN RESULT   --   --  Gram negative rods*  Gram negative rods*   URINE CULTURE   --  >100,000 cfu/ml Klebsiella pneumoniae*  --        Last 24 Hours Medication List:     Current Facility-Administered Medications:  aspirin 81 mg Oral Daily Yoan Hoskins MD    atorvastatin 40 mg Oral QPM Yoan Hoskins MD    bisacodyl 10 mg Rectal Daily PRN Yoan Hoskins MD    cefdinir 300 mg Oral Q24H Doyle Harrison MD    finasteride 5 mg Oral Daily Alysia Villegas MD    heparin (porcine) 3-30 Units/kg/hr (Order-Specific) Intravenous Titrated Yoan Hoskins MD Last Rate: 8 Units/kg/hr (07/11/19 0900)   heparin (porcine) 3,200 Units Intravenous PRN Yoan Hoskins MD    heparin (porcine) 6,400 Units Intravenous PRN Yoan Hoskins MD    insulin lispro 1-5 Units Subcutaneous HS Catherine Spaulding PA-C    insulin lispro 2-12 Units Subcutaneous TID AC Catherine Spaulding PA-C    levothyroxine 75 mcg Oral Early Morning Wilfred Vicente MD    metoprolol tartrate 50 mg Oral BID With Meals Wilfred Vicente MD    ondansetron 4 mg Intravenous Q6H PRN Wilfred Vicente MD    polyethylene glycol 17 g Oral Daily PRN Wilfred Vicente MD    ranolazine 500 mg Oral Q12H Frankey Crochet, MD    tamsulosin 0 4 mg Oral Daily Wilfred Vicente MD    warfarin 3 mg Oral Once (warfarin) Marilou Bond MD         Today, Patient Was Seen By: Marilou Bond MD    ** Please Note: Dictation voice to text software may have been used in the creation of this document   **

## 2019-07-11 NOTE — ASSESSMENT & PLAN NOTE
· Patient reports feeling comfortable, back pain is stable  Still not able to ambulate currently  · For possible discharge to rehab facility while completing course of antibiotic  Will proceed with planned surgery once antibiotic course is done  Explained to patient and wife   Amenable with plan  · MRI C-spine results: Degenerative changes with multilevel mild canal narrowing more pronounced at C4-5   Multilevel high-grade foraminal stenosis  · Continue monitoring for pain, ensure comfort and safety  · PT input noted:  · To benefit from continued skilled PT treatment while in hospital  · From PT/mobility standpoint, recommendation at time of discharge would be inpatient rehab pending progress

## 2019-07-11 NOTE — PLAN OF CARE
Problem: Potential for Falls  Goal: Patient will remain free of falls  Description  INTERVENTIONS:  - Assess patient frequently for physical needs  -  Identify cognitive and physical deficits and behaviors that affect risk of falls    -  [place] fall precautions as indicated by assessment   - Educate patient/family on patient safety including physical limitations  - Instruct patient to call for assistance with activity based on assessment  - Modify environment to reduce risk of injury  - Consider OT/PT consult to assist with strengthening/mobility  Outcome: Progressing     Problem: Prexisting or High Potential for Compromised Skin Integrity  Goal: Skin integrity is maintained or improved  Description  INTERVENTIONS:  - Identify patients at risk for skin breakdown  - Assess and monitor skin integrity  - Assess and monitor nutrition and hydration status  - Monitor labs (i e  albumin)  - Assess for incontinence   - Turn and reposition patient  - Assist with mobility/ambulation  - Relieve pressure over bony prominences  - Avoid friction and shearing  - Provide appropriate hygiene as needed including keeping skin clean and dry  - Evaluate need for skin moisturizer/barrier cream  - Collaborate with interdisciplinary team (i e  Nutrition, Rehabilitation, etc )   - Patient/family teaching  Outcome: Progressing     Problem: METABOLIC, FLUID AND ELECTROLYTES - ADULT  Goal: Electrolytes maintained within normal limits  Description  INTERVENTIONS:  - Monitor labs and assess patient for signs and symptoms of electrolyte imbalances  - Administer electrolyte replacement as ordered  - Monitor response to electrolyte replacements, including repeat lab results as appropriate  - Instruct patient on fluid and nutrition as appropriate  Outcome: Progressing  Goal: Fluid balance maintained  Description  INTERVENTIONS:  - Monitor labs and assess for signs and symptoms of volume excess or deficit  - Monitor I/O and WT  - Instruct patient on fluid and nutrition as appropriate  Outcome: Progressing  Goal: Glucose maintained within target range  Description  INTERVENTIONS:  - Monitor Blood Glucose as ordered  - Assess for signs and symptoms of hyperglycemia and hypoglycemia  - Administer ordered medications to maintain glucose within target range  - Assess nutritional intake and initiate nutrition service referral as needed  Outcome: Progressing     Problem: INFECTION - ADULT  Goal: Absence or prevention of progression during hospitalization  Description  INTERVENTIONS:  - Assess and monitor for signs and symptoms of infection  - Monitor lab/diagnostic results  - Monitor all insertion sites, i e  indwelling lines, tubes, and drains  - Monitor endotracheal (as able) and nasal secretions for changes in amount and color  - [place] appropriate cooling/warming therapies per order  - Administer medications as ordered  - Instruct and encourage patient and family to use good hand hygiene technique  - Identify and instruct in appropriate isolation precautions for identified infection/condition  Outcome: Progressing  Goal: Absence of fever/infection during neutropenic period  Description  INTERVENTIONS:  - Monitor WBC  - Implement neutropenic guidelines  Outcome: Progressing     Problem: DISCHARGE PLANNING  Goal: Discharge to home or other facility with appropriate resources  Description  INTERVENTIONS:  - Identify barriers to discharge w/patient and caregiver  - Arrange for needed discharge resources and transportation as appropriate  - Identify discharge learning needs (meds, wound care, etc )  - Arrange for interpretive services to assist at discharge as needed  - Refer to Case Management Department for coordinating discharge planning if the patient needs post-hospital services based on physician/advanced practitioner order or complex needs related to functional status, cognitive ability, or social support system  Outcome: Progressing     Problem: Knowledge Deficit  Goal: Patient/family/caregiver demonstrates understanding of disease process, treatment plan, medications, and discharge instructions  Description  Complete learning assessment and assess knowledge base  Interventions:  - Provide teaching at level of understanding  - Provide teaching via preferred learning methods  Outcome: Progressing     Problem: Nutrition/Hydration-ADULT  Goal: Nutrient/Hydration intake appropriate for improving, restoring or maintaining nutritional needs  Description  Monitor and assess patient's nutrition/hydration status for malnutrition (ex- brittle hair, bruises, dry skin, pale skin and conjunctiva, muscle wasting, smooth red tongue, and disorientation)  Collaborate with interdisciplinary team and initiate plan and interventions as ordered  Monitor patient's weight and dietary intake as ordered or per policy  Utilize nutrition screening tool and intervene per policy  Determine patient's food preferences and provide high-protein, high-caloric foods as appropriate       INTERVENTIONS:  - Monitor oral intake, urinary output, labs, and treatment plans  - Assess nutrition and hydration status and recommend course of action  - Evaluate amount of meals eaten  - Assist patient with eating if necessary   - Allow adequate time for meals  - Recommend/ encourage appropriate diets, oral nutritional supplements, and vitamin/mineral supplements  - Order, calculate, and assess calorie counts as needed  - Recommend, monitor, and adjust tube feedings and TPN/PPN based on assessed needs  - Assess need for intravenous fluids  - Provide specific nutrition/hydration education as appropriate  - Include patient/family/caregiver in decisions related to nutrition  Outcome: Progressing

## 2019-07-11 NOTE — PHYSICAL THERAPY NOTE
PHYSICAL THERAPY TREATMENT NOTE  NAME:  Flora Peer  DATE: 07/11/19    Length Of Stay: 7  Performed at least 2 patient identifiers during session: Name and Birthday    TREATMENT:      07/11/19 7493   Pain Assessment   Pain Assessment No/denies pain   Restrictions/Precautions   Other Precautions Bed Alarm; Fall Risk   General   Chart Reviewed Yes   Response to Previous Treatment Patient reporting fatigue but able to participate  Family/Caregiver Present Yes  (wife and grandson)   Cognition   Overall Cognitive Status Impaired   Arousal/Participation Alert   Attention Attends with cues to redirect   Orientation Level Oriented to person   Memory Decreased short term memory;Decreased recall of recent events;Decreased recall of precautions   Following Commands Follows one step commands with increased time or repetition   Subjective   Subjective Pt cooperative, reports agreeable to trying to get OOB to chair with quick move  Bed Mobility   Supine to Sit 3  Moderate assistance   Additional items Assist x 1;HOB elevated; Increased time required;Verbal cues; Bedrails   Transfers   Sit to Stand 3  Moderate assistance  (first trial; min A second trial from quick move)   Additional items Assist x 1; Increased time required;Verbal cues  (UE support of quick move)   Stand to Sit 3  Moderate assistance   Additional items Assist x 1; Increased time required;Verbal cues;Armrests   Other 1  Dependent  (quick move bed to chair after demonstration of equipment)   Ambulation/Elevation   Gait pattern Not tested   Balance   Static Sitting Fair +   Static Standing Poor +  (retropulsive)   Endurance Deficit   Endurance Deficit Yes   Endurance Deficit Description limited standing tolerance, needs therapeutic rests between mobility trials   Activity Tolerance   Activity Tolerance Patient limited by pain; Patient limited by fatigue   Nurse Made Aware spoke to Dulce Munguia RN including re: need for quick move back to bed, limiting sitting tolerance time   Exercises   Knee AROM Long Arc Quad Sitting;10 reps;AAROM; Bilateral  (instructions to slow pace)   Ankle Pumps Supine;10 reps;AAROM; Bilateral  (instructions to slow pace)   Balance training  standing tolerance w/quick move UE support for 40 seconds and 35 seconds   Assessment   Prognosis Fair   Problem List Decreased strength;Decreased range of motion;Decreased endurance; Impaired balance;Decreased mobility; Decreased coordination;Pain  (gait deviations)   Assessment Pt made inconsistent progress this session  Pt needed similar A for bed mobility, but less consistent A For sit<>stand transfers  Pt was successful in getting to  target surface this session w/ A Of 1 using quick move  Recommend limited sitting tolerance in chair for return back to bed, A Of 2 w/quick move via nursing staff, possibly using pad as sling, and Skilled PT to progress pt toward treatment goals  Barriers to Discharge Decreased caregiver support; Inaccessible home environment   Goals   Patient Goals agreeeable to get OOB, wants to walk in the future   STG Expiration Date 07/20/19   Treatment Day 1   Plan   Treatment/Interventions Functional transfer training;LE strengthening/ROM; Therapeutic exercise;Cognitive reorientation;Patient/family training;Equipment eval/education;Gait training;Bed mobility; Endurance training;Spoke to nursing   Progress Slow progress, decreased activity tolerance   PT Frequency Other (Comment)  (3-5x/wk)   Recommendation   Recommendation Short-term skilled PT   Equipment Recommended Other (Comment)  (quick move)       Frankey Reil, PT, DPT

## 2019-07-11 NOTE — ASSESSMENT & PLAN NOTE
· Initial cultures showed klebsiella pneumoniae infection  · Prelim report of culture: no growth after 72 hours  · Currently on day 3 cefdinir 300mg tab OD to complete for 10 days

## 2019-07-11 NOTE — ASSESSMENT & PLAN NOTE
· Initial cultures showed klebsiella pneumoniae infection  · Prelim report of culture: no growth after 72 hours  · Currently on day 4 cefdinir 300mg tab OD to complete for 10 days

## 2019-07-11 NOTE — ASSESSMENT & PLAN NOTE
Wt Readings from Last 3 Encounters:   07/11/19 81 kg (178 lb 9 2 oz)   06/12/19 76 5 kg (168 lb 10 4 oz)   05/30/19 80 3 kg (177 lb)     · Monitor volume status   Continue I/Os and weigh daily

## 2019-07-11 NOTE — SOCIAL WORK
CM met with pt's wife at bedside  Pt's wife confirmed DC plan with CM  is STR prior to pt going to [facility] for orthopedic surgery  Referrals have been placed in [employer] by MARYJO Corbin to [facility]  [facility] has no male beds at this time  [facility] is still reviewing patient  Pt's wife requested CM to make make a referral to [facility] at this time  CM further discussed this referral with pt's wife as she previously stated she did not want pt to return there  CM also telephoned pt's daughter Royer Liz to inform her of [facility] referral  Pt's daughter is in agreement with referral to [facility]  CM will continue to follow   Pt will need transportation arrangements

## 2019-07-11 NOTE — ASSESSMENT & PLAN NOTE
· BsCr: 3 4; crea on admission elevated to 4 5  · Crea yesterday: 3 25; crea today 3 22  · Possible cause prerenal due to sepsis  · Watch out for hypotension, avoid nephrotoxins, monitor BMP

## 2019-07-11 NOTE — PLAN OF CARE
Problem: PHYSICAL THERAPY ADULT  Goal: Performs mobility at highest level of function for planned discharge setting  See evaluation for individualized goals  Description  Treatment/Interventions: Functional transfer training, LE strengthening/ROM, Therapeutic exercise, Endurance training, Cognitive reorientation, Patient/family training, Equipment eval/education, Bed mobility, Gait training, Spoke to case management, Spoke to nursing(WC Mobility)  Equipment Recommended: Wheelchair, Other (Comment)(quick move)       See flowsheet documentation for full assessment, interventions and recommendations  Note:   Prognosis: Fair  Problem List: Decreased strength, Decreased range of motion, Decreased endurance, Impaired balance, Decreased mobility, Decreased coordination, Pain(gait deviations)  Assessment: Pt made inconsistent progress this session  Pt needed similar A for bed mobility, but less consistent A For sit<>stand transfers  Pt was successful in getting to  target surface this session w/ A Of 1 using quick move  Recommend limited sitting tolerance in chair for return back to bed, A Of 2 w/quick move via nursing staff, possibly using pad as sling, and Skilled PT to progress pt toward treatment goals  Barriers to Discharge: Decreased caregiver support, Inaccessible home environment  Barriers to Discharge Comments: Comorbidities affecting pt's physical performance at time of assessment include: DM, HTN, obesity, CAD and CABG and pacemaker, recent shingles 2019, CKD  Personal factors affecting pt at time of IE include: steps to enter environment, multi-level environment, advanced age, inability to perform IADLs, inability to navigate community distances and recent fall(s)  Recommendation: Short-term skilled PT          See flowsheet documentation for full assessment

## 2019-07-11 NOTE — PLAN OF CARE
Problem: Potential for Falls  Goal: Patient will remain free of falls  Description  INTERVENTIONS:  - Assess patient frequently for physical needs  -  Identify cognitive and physical deficits and behaviors that affect risk of falls    -  [place] fall precautions as indicated by assessment   - Educate patient/family on patient safety including physical limitations  - Instruct patient to call for assistance with activity based on assessment  - Modify environment to reduce risk of injury  - Consider OT/PT consult to assist with strengthening/mobility  7/11/2019 0806 by Nadine Corcoran RN  Outcome: Progressing  7/11/2019 0806 by Nadine Corcoran RN  Outcome: Progressing     Problem: Prexisting or High Potential for Compromised Skin Integrity  Goal: Skin integrity is maintained or improved  Description  INTERVENTIONS:  - Identify patients at risk for skin breakdown  - Assess and monitor skin integrity  - Assess and monitor nutrition and hydration status  - Monitor labs (i e  albumin)  - Assess for incontinence   - Turn and reposition patient  - Assist with mobility/ambulation  - Relieve pressure over bony prominences  - Avoid friction and shearing  - Provide appropriate hygiene as needed including keeping skin clean and dry  - Evaluate need for skin moisturizer/barrier cream  - Collaborate with interdisciplinary team (i e  Nutrition, Rehabilitation, etc )   - Patient/family teaching  7/11/2019 0806 by Nadine Corcoran RN  Outcome: Progressing  7/11/2019 0806 by Nadine Corcoran RN  Outcome: Progressing     Problem: METABOLIC, FLUID AND ELECTROLYTES - ADULT  Goal: Electrolytes maintained within normal limits  Description  INTERVENTIONS:  - Monitor labs and assess patient for signs and symptoms of electrolyte imbalances  - Administer electrolyte replacement as ordered  - Monitor response to electrolyte replacements, including repeat lab results as appropriate  - Instruct patient on fluid and nutrition as appropriate  7/11/2019 0806 by Gregory Macias RN  Outcome: Progressing  7/11/2019 0806 by Gregory Macias RN  Outcome: Progressing  Goal: Fluid balance maintained  Description  INTERVENTIONS:  - Monitor labs and assess for signs and symptoms of volume excess or deficit  - Monitor I/O and WT  - Instruct patient on fluid and nutrition as appropriate  7/11/2019 0806 by Gregory Macias RN  Outcome: Progressing  7/11/2019 0806 by Gregory Macias RN  Outcome: Progressing  Goal: Glucose maintained within target range  Description  INTERVENTIONS:  - Monitor Blood Glucose as ordered  - Assess for signs and symptoms of hyperglycemia and hypoglycemia  - Administer ordered medications to maintain glucose within target range  - Assess nutritional intake and initiate nutrition service referral as needed  7/11/2019 0806 by Gregory Macias RN  Outcome: Progressing  7/11/2019 0806 by Gregory Macias RN  Outcome: Progressing     Problem: INFECTION - ADULT  Goal: Absence or prevention of progression during hospitalization  Description  INTERVENTIONS:  - Assess and monitor for signs and symptoms of infection  - Monitor lab/diagnostic results  - Monitor all insertion sites, i e  indwelling lines, tubes, and drains  - Monitor endotracheal (as able) and nasal secretions for changes in amount and color  - [place] appropriate cooling/warming therapies per order  - Administer medications as ordered  - Instruct and encourage patient and family to use good hand hygiene technique  - Identify and instruct in appropriate isolation precautions for identified infection/condition  7/11/2019 0806 by Gregory Macias RN  Outcome: Progressing  7/11/2019 0806 by Gregory Macias RN  Outcome: Progressing  Goal: Absence of fever/infection during neutropenic period  Description  INTERVENTIONS:  - Monitor WBC  - Implement neutropenic guidelines  7/11/2019 0806 by Gregory Macias RN  Outcome: Progressing  7/11/2019 0806 by Pretty Alicea Annette Reed RN  Outcome: Progressing     Problem: DISCHARGE PLANNING  Goal: Discharge to home or other facility with appropriate resources  Description  INTERVENTIONS:  - Identify barriers to discharge w/patient and caregiver  - Arrange for needed discharge resources and transportation as appropriate  - Identify discharge learning needs (meds, wound care, etc )  - Arrange for interpretive services to assist at discharge as needed  - Refer to Case Management Department for coordinating discharge planning if the patient needs post-hospital services based on physician/advanced practitioner order or complex needs related to functional status, cognitive ability, or social support system  7/11/2019 0806 by Yaya Ceballos RN  Outcome: Progressing  7/11/2019 0806 by Yaya Ceballos RN  Outcome: Progressing     Problem: Knowledge Deficit  Goal: Patient/family/caregiver demonstrates understanding of disease process, treatment plan, medications, and discharge instructions  Description  Complete learning assessment and assess knowledge base  Interventions:  - Provide teaching at level of understanding  - Provide teaching via preferred learning methods  7/11/2019 0806 by Yaya Ceballos RN  Outcome: Progressing  7/11/2019 0806 by Yaya Ceballos RN  Outcome: Progressing     Problem: Nutrition/Hydration-ADULT  Goal: Nutrient/Hydration intake appropriate for improving, restoring or maintaining nutritional needs  Description  Monitor and assess patient's nutrition/hydration status for malnutrition (ex- brittle hair, bruises, dry skin, pale skin and conjunctiva, muscle wasting, smooth red tongue, and disorientation)  Collaborate with interdisciplinary team and initiate plan and interventions as ordered  Monitor patient's weight and dietary intake as ordered or per policy  Utilize nutrition screening tool and intervene per policy   Determine patient's food preferences and provide high-protein, high-caloric foods as appropriate       INTERVENTIONS:  - Monitor oral intake, urinary output, labs, and treatment plans  - Assess nutrition and hydration status and recommend course of action  - Evaluate amount of meals eaten  - Assist patient with eating if necessary   - Allow adequate time for meals  - Recommend/ encourage appropriate diets, oral nutritional supplements, and vitamin/mineral supplements  - Order, calculate, and assess calorie counts as needed  - Recommend, monitor, and adjust tube feedings and TPN/PPN based on assessed needs  - Assess need for intravenous fluids  - Provide specific nutrition/hydration education as appropriate  - Include patient/family/caregiver in decisions related to nutrition  7/11/2019 0806 by Soto De La O RN  Outcome: Progressing  7/11/2019 0806 by Soto De La O RN  Outcome: Progressing     Problem: PAIN - ADULT  Goal: Verbalizes/displays adequate comfort level or baseline comfort level  Description  Interventions:  - Encourage patient to monitor pain and request assistance  - Assess pain using appropriate pain scale  - Administer analgesics based on type and severity of pain and evaluate response  - Implement non-pharmacological measures as appropriate and evaluate response  - Consider cultural and social influences on pain and pain management  - Notify physician/advanced practitioner if interventions unsuccessful or patient reports new pain  Outcome: Progressing

## 2019-07-11 NOTE — NURSING NOTE
Patient alert and awake able to make needs known, no complaints of pain offered, IV patent and intact, currently pt is resting in bed with call bell in reach, bed in lowest and locked position and alarmed at this time

## 2019-07-11 NOTE — ASSESSMENT & PLAN NOTE
· Probably secondary to klebsiella bacteremia from UTI  Patient afebrile today  · Latest WBC 6 95  · Preliminary result of blood culture: no growth after 72 hours  · Currently on day 4 oral cefdinir 300mg OD   To complete 10 days of antibiotic course

## 2019-07-11 NOTE — ASSESSMENT & PLAN NOTE
· Patient's INR yesterday: 1 67; INR today is 1 88  Patient on heparin drip and coumadin 3mg tab  · Continue heparin drip  Continue coumadin 3mg tab PO   Recheck INR tomorrow

## 2019-07-11 NOTE — ASSESSMENT & PLAN NOTE
· BsCr: 3 4; crea on admission elevated to 4 5  · Latest crea: 3 22; latest K 4 1  · Possible cause prerenal due to sepsis  · Watch out for hypotension, avoid nephrotoxins, monitor BMP

## 2019-07-11 NOTE — ASSESSMENT & PLAN NOTE
· Patient's INR today: 2 24  · Noted ortho surgeon's plan for patient   He is scheduled for back surgery 7/18/2019  · Discontinue heparin drip and coumadin starting today until date of planned surgery

## 2019-07-11 NOTE — ASSESSMENT & PLAN NOTE
· Probably secondary to klebsiella bacteremia from UTI  Patient afebrile today  WBC yesterday 8 65; WBC today 6 95  · Preliminary result of blood culture: no growth after 72 hours  · Currently on day 3 oral cefdinir 300mg OD   To complete 10 days of antibiotic course

## 2019-07-11 NOTE — SPEECH THERAPY NOTE
Speech Language/Pathology    Speech/Language Pathology Progress Note    Patient Name: Edith Perez  TMGAM'E Date: 7/11/2019       Subjective:  Pt seen for dysphagia tx at lunch  Wife present, reported pt is going back to  tomorrow  Objective:  Pt self fed chopped stuffed shells, drank milk by cup at end of meal  Mastication/manipulation and transfer were [facility]  No pocketing noted  Pt took single cup sips quickly, but appeared w/ good oral control and no coughing while drinking milk  Delayed cough noted x1 while eating stuffed shells  Swallows were timely and complete  Assessment:  Pt tolerating dysphagia 3 diet w/ thin liquids by cup  Pt is pleased w/ pt's po intake on current diet and she wishes to continue on modified diet  Plan/Recommendations: Will sign off, wife requested to take pink aspiration precaution sign w/ pt going back to [facility] tomorrow  reconsult if needed      Rakesh Hamilton CCC-SLP  Speech Pathogist  Available via  tiger text

## 2019-07-11 NOTE — PROGRESS NOTES
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 that is sensitive to all cephalosporins   -continue oral cefdinir  -follow up pending blood cultures while admitted  -continue to trend fever curve/vitals while admitted  -supportive care  -With cultures from 07/07 negative for 72 hours, patient is cleared from an ID standpoint for discharge   -plan to treat for total of 10 days through 7/17     2  Klebsiella bacteremia-Suspect this is all secondary to a UTI with obstructive UTI   -antibiotics as above  -follow up pending blood cultures  -additional care as per primary     3  Obstructive UTI-patient noted to have urinary retention requiring straight catheterization   Suspect this is why he had relapse of the Klebsiella UTI   -antibiotics as above  -monitor symptomatology  -urology follow-up for management of retention  -straight catheterization as needed     4  Acute kidney injury-complicating chronic kidney disease   Likely a pre renal issue   Perhaps sepsis playing a role   The renal function has improved with resuscitation and treatment of the sepsis   Perhaps obstruction was also playing a role   -volume management  -dose adjusted antibiotics as above  -straight catheterization as needed     5  Chronic right-sided low back pain-with some notable weakness that is been persisting   MRI of the lumbar spine negative   Back pain remains stable at this time    Cervical spine MRI shows degenerative changes    -discussed MRI findings with patient and significant other   -patient planned for eventual surgery after this admission      Above plan discussed in detail with the patient and his partner at bedside      Antibiotics:  Cefdinir     Subjective:  Patient is sluggish in bed  He has no fever, chills, sweats; no nausea, vomiting, diarrhea; no cough, shortness of breath; no new pain, but chronic back pain  No new symptoms  He continues with back pain and decreased mobility then in the right lower extremity  Currently tolerating antibiotic without development of rash or diarrhea  Objective:  Vitals:  Temp:  [98 °F (36 7 °C)-98 1 °F (36 7 °C)] 98 1 °F (36 7 °C)  HR:  [70-71] 71  Resp:  [18] 18  BP: (128-161)/(60-72) 150/72  SpO2:  [96 %-98 %] 98 %  Temp (24hrs), Av 1 °F (36 7 °C), Min:98 °F (36 7 °C), Max:98 1 °F (36 7 °C)  Current: Temperature: 98 1 °F (36 7 °C)    Physical Exam:   General Appearance:  Alert, sluggish in bed, interactive, nontoxic, no acute distress  Throat: Oropharynx moist without lesions  Lungs:   Clear to auscultation bilaterally; no wheezes, rhonchi or rales; respirations unlabored   Heart:  RRR; no murmur, rub or gallop   Abdomen:   Soft, non-tender, non-distended, positive bowel sounds  : No Barrientos catheter, no urethral drainage, no suprapubic tenderness, no CVA tenderness, no balanitis    Extremities: No clubbing or cyanosis, no edema, right arm IV site nontender   Skin: No new rashes or lesions  No draining wounds noted       Labs, Imaging, & Other studies:   All pertinent labs and imaging studies were personally reviewed  Results from last 7 days   Lab Units 19  0434 07/10/19  0433 19  0428   WBC Thousand/uL 6 95 8 65 8 91   HEMOGLOBIN g/dL 9 9* 9 7* 9 9*   PLATELETS Thousands/uL 217 152 153     Results from last 7 days   Lab Units 19  0434  19  0604  19  1552   POTASSIUM mmol/L 4 1   < > 4 1   < > 4 0   CHLORIDE mmol/L 100   < > 101   < > 93*   CO2 mmol/L 23   < > 24   < > 24   BUN mg/dL 45*   < > 49*   < > 60*   CREATININE mg/dL 3 22*   < > 3 34*   < > 4 21*   EGFR ml/min/1 73sq m 16   < > 16   < > 12   CALCIUM mg/dL 8 2*   < > 7 9*   < > 7 9*   AST U/L  --   --  31  --  35   ALT U/L  --   --  15  --  19   ALK PHOS U/L  --   --  108  --  191*    < > = values in this interval not displayed  Results from last 7 days   Lab Units 07/08/19  0438 07/07/19  0604 07/04/19  1552   PROCALCITONIN ng/ml 55 38* 131 47* 167  59*     Results from last 7 days   Lab Units 07/07/19  1505 07/04/19  1622 07/04/19  1552   BLOOD CULTURE  No Growth at 72 hrs  No Growth at 72 hrs  --  Klebsiella pneumoniae*  Klebsiella pneumoniae*   GRAM STAIN RESULT   --   --  Gram negative rods*  Gram negative rods*   URINE CULTURE   --  >100,000 cfu/ml Klebsiella pneumoniae*  --     07/11/2019 MRI cervical spine: Images are degraded by motion limiting accurate evaluation    Degenerative changes with multilevel mild canal narrowing more pronounced at C4-5   Multilevel high-grade foraminal stenosis as detailed

## 2019-07-12 VITALS
DIASTOLIC BLOOD PRESSURE: 68 MMHG | WEIGHT: 179.01 LBS | RESPIRATION RATE: 18 BRPM | HEART RATE: 70 BPM | SYSTOLIC BLOOD PRESSURE: 148 MMHG | BODY MASS INDEX: 29.83 KG/M2 | HEIGHT: 65 IN | TEMPERATURE: 97.7 F | OXYGEN SATURATION: 96 %

## 2019-07-12 PROBLEM — R33.9 URINARY RETENTION: Status: RESOLVED | Noted: 2019-07-06 | Resolved: 2019-07-12

## 2019-07-12 PROBLEM — N17.9 AKI (ACUTE KIDNEY INJURY) (HCC): Status: RESOLVED | Noted: 2019-07-04 | Resolved: 2019-07-12

## 2019-07-12 PROBLEM — R65.20 SEVERE SEPSIS (HCC): Status: RESOLVED | Noted: 2019-07-04 | Resolved: 2019-07-12

## 2019-07-12 PROBLEM — A41.9 SEVERE SEPSIS (HCC): Status: RESOLVED | Noted: 2019-07-04 | Resolved: 2019-07-12

## 2019-07-12 PROBLEM — R78.81 GRAM-NEGATIVE BACTEREMIA: Status: RESOLVED | Noted: 2019-07-06 | Resolved: 2019-07-12

## 2019-07-12 PROBLEM — R60.0 EDEMA OF UPPER EXTREMITY: Status: RESOLVED | Noted: 2019-07-07 | Resolved: 2019-07-12

## 2019-07-12 LAB
APTT PPP: 73 SECONDS (ref 23–37)
GLUCOSE SERPL-MCNC: 109 MG/DL (ref 65–140)
GLUCOSE SERPL-MCNC: 116 MG/DL (ref 65–140)
GLUCOSE SERPL-MCNC: 162 MG/DL (ref 65–140)
INR PPP: 2.24 (ref 0.84–1.19)
PROTHROMBIN TIME: 23.9 SECONDS (ref 11.6–14.5)

## 2019-07-12 PROCEDURE — 85730 THROMBOPLASTIN TIME PARTIAL: CPT | Performed by: INTERNAL MEDICINE

## 2019-07-12 PROCEDURE — 82948 REAGENT STRIP/BLOOD GLUCOSE: CPT

## 2019-07-12 PROCEDURE — 85610 PROTHROMBIN TIME: CPT | Performed by: INTERNAL MEDICINE

## 2019-07-12 PROCEDURE — 99239 HOSP IP/OBS DSCHRG MGMT >30: CPT | Performed by: INTERNAL MEDICINE

## 2019-07-12 RX ORDER — FINASTERIDE 5 MG/1
5 TABLET, FILM COATED ORAL DAILY
Qty: 30 TABLET | Refills: 0 | Status: SHIPPED | OUTPATIENT
Start: 2019-07-13 | End: 2019-10-01 | Stop reason: SDUPTHER

## 2019-07-12 RX ORDER — DOCUSATE SODIUM 100 MG/1
100 CAPSULE, LIQUID FILLED ORAL 2 TIMES DAILY
Qty: 10 CAPSULE | Refills: 0 | Status: SHIPPED | OUTPATIENT
Start: 2019-07-12 | End: 2019-09-14 | Stop reason: HOSPADM

## 2019-07-12 RX ORDER — SENNOSIDES 8.6 MG
1 TABLET ORAL
Qty: 120 EACH | Refills: 0 | Status: SHIPPED | OUTPATIENT
Start: 2019-07-12 | End: 2019-09-14 | Stop reason: HOSPADM

## 2019-07-12 RX ORDER — BISACODYL 10 MG
10 SUPPOSITORY, RECTAL RECTAL DAILY
Status: DISCONTINUED | OUTPATIENT
Start: 2019-07-13 | End: 2019-07-12 | Stop reason: HOSPADM

## 2019-07-12 RX ORDER — SENNOSIDES 8.6 MG
1 TABLET ORAL
Status: DISCONTINUED | OUTPATIENT
Start: 2019-07-12 | End: 2019-07-12 | Stop reason: HOSPADM

## 2019-07-12 RX ORDER — DOCUSATE SODIUM 100 MG/1
100 CAPSULE, LIQUID FILLED ORAL 2 TIMES DAILY
Status: DISCONTINUED | OUTPATIENT
Start: 2019-07-12 | End: 2019-07-12 | Stop reason: HOSPADM

## 2019-07-12 RX ORDER — BISACODYL 10 MG
10 SUPPOSITORY, RECTAL RECTAL DAILY
Qty: 12 SUPPOSITORY | Refills: 0 | Status: SHIPPED | OUTPATIENT
Start: 2019-07-13 | End: 2019-09-14 | Stop reason: HOSPADM

## 2019-07-12 RX ORDER — CEFDINIR 300 MG/1
300 CAPSULE ORAL EVERY 24 HOURS
Qty: 5 CAPSULE | Refills: 0 | Status: SHIPPED | OUTPATIENT
Start: 2019-07-12 | End: 2019-07-17

## 2019-07-12 RX ADMIN — ATORVASTATIN CALCIUM 40 MG: 40 TABLET, FILM COATED ORAL at 18:16

## 2019-07-12 RX ADMIN — ASPIRIN 81 MG: 81 TABLET, COATED ORAL at 08:51

## 2019-07-12 RX ADMIN — TAMSULOSIN HYDROCHLORIDE 0.4 MG: 0.4 CAPSULE ORAL at 08:51

## 2019-07-12 RX ADMIN — RANOLAZINE 500 MG: 500 TABLET, FILM COATED, EXTENDED RELEASE ORAL at 08:51

## 2019-07-12 RX ADMIN — INSULIN LISPRO 2 UNITS: 100 INJECTION, SOLUTION INTRAVENOUS; SUBCUTANEOUS at 11:39

## 2019-07-12 RX ADMIN — METOPROLOL TARTRATE 50 MG: 50 TABLET, FILM COATED ORAL at 08:30

## 2019-07-12 RX ADMIN — METOPROLOL TARTRATE 50 MG: 50 TABLET, FILM COATED ORAL at 16:10

## 2019-07-12 RX ADMIN — LEVOTHYROXINE SODIUM 75 MCG: 75 TABLET ORAL at 06:02

## 2019-07-12 RX ADMIN — CEFDINIR 300 MG: 300 CAPSULE ORAL at 14:11

## 2019-07-12 RX ADMIN — DOCUSATE SODIUM 100 MG: 100 CAPSULE, LIQUID FILLED ORAL at 18:16

## 2019-07-12 RX ADMIN — FINASTERIDE 5 MG: 5 TABLET, FILM COATED ORAL at 08:51

## 2019-07-12 NOTE — ASSESSMENT & PLAN NOTE
Wt Readings from Last 3 Encounters:   07/12/19 81 2 kg (179 lb 0 2 oz)   06/12/19 76 5 kg (168 lb 10 4 oz)   05/30/19 80 3 kg (177 lb)     · Monitor volume status   Continue I/Os and weigh daily  · Continue medications

## 2019-07-12 NOTE — ASSESSMENT & PLAN NOTE
· Back pain stable   Patient reports feeling comfortable, still not able to ambulate currently  · Received inpatient PT treatment; noted input of PT  · Continue monitoring for pain, ensure comfort and safety  · For discharge to short term rehab facility while completing course of antibiotic and awaiting planned date of surgery (7/18/2019)  · Discontinue all blood thinners starting today until surgery

## 2019-07-12 NOTE — DISCHARGE SUMMARY
Discharge- Verlan Cassette 1/29/1932, 80 y o  male MRN: [id number]    Unit/Bed#: -01 Encounter: [id number]    Primary Care Provider: Lionel Osborne MD   Date and time admitted to hospital: 7/4/2019  3:34 PM        Paroxysmal A-fib [facility])  Assessment & Plan  · Patient's INR today: 2 24  · Noted ortho surgeon's plan for patient  He is scheduled for back surgery 7/18/2019  · Discontinue heparin drip and coumadin starting today until date of planned surgery    Chronic right-sided low back pain without sciatica  Assessment & Plan  · Back pain stable  Patient reports feeling comfortable, still not able to ambulate currently  · Received inpatient PT treatment; noted input of PT  · Continue monitoring for pain, ensure comfort and safety  · For discharge to short term rehab facility while completing course of antibiotic and awaiting planned date of surgery (7/18/2019)  · Discontinue all blood thinners starting today until surgery    Chronic diastolic congestive heart failure [facility])  Assessment & Plan  Wt Readings from Last 3 Encounters:   07/12/19 81 2 kg (179 lb 0 2 oz)   06/12/19 76 5 kg (168 lb 10 4 oz)   05/30/19 80 3 kg (177 lb)     · Monitor volume status  Continue I/Os and weigh daily  · Continue medications        Hyponatremia  Assessment & Plan  · Current Na level 132, stable  · Monitor fluid status   Monitor BMP          Discharging Physician / Practitioner: Sarah Handley MD  PCP: Lionel Osborne MD  Admission Date:   Admission Orders (From admission, onward)    Ordered        07/04/19 1655  Inpatient Admission  Once             Discharge Date: 07/12/19    Resolved Problems  Date Reviewed: 7/4/2019          Resolved    * (Principal) Severe sepsis ([facility] ) 7/12/2019     Resolved by  Sarah Handley MD    Lactic acidosis 7/5/2019     Resolved by  Irish Figueroa MD    MAGUI (acute kidney injury) ([facility] ) 7/12/2019     Resolved by  Sarah Handley MD    Gram-negative bacteremia 7/12/2019     Resolved by  Shakira Perdue MD    Urinary retention 7/12/2019     Resolved by  Shakira Perdue MD    Edema of upper extremity 7/12/2019     Resolved by  Shakira Perdue MD          Consultations During Hospital Stay:  · Consult with ID  · Consult with Urology    Procedures Performed:   ·     Significant Findings / Test Results:   · MRI c-spine and l-spine: Degenerative changes with multilevel mild canal narrowing more pronounced at C4-5   Multilevel high-grade foraminal stenosis; Advanced multilevel spondylosis, most severely at L2-3 where there is a large left paracentral disc extrusion  · Blood culture: positive for klebsiella pneumoniae    Incidental Findings:   ·     Test Results Pending at Discharge (will require follow up):   ·      Outpatient Tests Requested:  ·     Complications:      Reason for Admission: fever    Hospital Course:     Edilma Luke is a 80 y o  male patient who originally presented to the hospital on 7/4/2019 due to fever  WBC revealed leukocytosis  Urine culture and blood culture positive for klebsiella pneumoniae  Patient was seen by ID and was given IV antibiotics  Patient responded well and subsequently leukocytosis improved  Patient remained afebrile and IV antibiotics shifted to oral meds  Patient to continue antibiotic course until 7/17/2019  Patient also complained of back pain  MRI findings revealed degenerative changes  Patient was then scheduled for surgery on 7/18/2019  INR monitored and levels within therapeutic range  Blood thinners discontinued today until day of surgery  Patient will be discharged to rehab facility while finishing course of antibiotic and awaiting date of surgery  Please see above list of diagnoses and related plan for additional information  Condition at Discharge: stable     Discharge Day Visit / Exam:     Subjective:  Patient is comfortable, able to tolerate diet  Back pain stable   No other complaints  Vitals: Blood Pressure: 161/72 (07/12/19 0738)  Pulse: 71 (07/12/19 0738)  Temperature: 97 6 °F (36 4 °C) (07/12/19 0738)  Temp Source: Oral (07/12/19 0738)  Respirations: 18 (07/12/19 0738)  Height: 5' 5" (165 1 cm) (07/04/19 1751)  Weight - Scale: 81 2 kg (179 lb 0 2 oz)(pt non ambulatory) (07/12/19 0548)  SpO2: 95 % (07/12/19 0738)       Exam:   Physical Exam   Constitutional: He is oriented to person, place, and time  No distress  HENT:   Head: Normocephalic and atraumatic  Eyes: Pupils are equal, round, and reactive to light  Conjunctivae and EOM are normal  No scleral icterus  Neck: Normal range of motion  Neck supple  No JVD present  Cardiovascular: Normal rate and regular rhythm  Pulmonary/Chest: Effort normal and breath sounds normal    Abdominal: Soft  Bowel sounds are normal  He exhibits no distension and no mass  There is no tenderness  Lymphadenopathy:     He has no cervical adenopathy  Neurological: He is alert and oriented to person, place, and time  Skin: Skin is warm  No rash noted  Psychiatric: He has a normal mood and affect  His behavior is normal    Extremities: +1 bilateral leg edema      Discussion with Family: Discussed with patient and family  Discharge instructions/Information to patient and family:   Discontinue all blood thinners until date of surgery 7/18/2018  Continue oral antibiotics to finish until 7/17/2018    Provisions for Follow-Up Care:  See after visit summary for information related to follow-up care and any pertinent home health orders  Disposition:     [facility] [address] to [facility]:   · [facility] - discussed with  transfer and transport    Planned Readmission:      Discharge Statement:  I spent 30 minutes discharging the patient  This time was spent on the day of discharge  I had direct contact with the patient on the day of discharge   Greater than 50% of the total time was spent examining patient, answering all patient questions, arranging and discussing plan of care with patient as well as directly providing post-discharge instructions  Additional time then spent on discharge activities  Discharge Medications:  See after visit summary for reconciled discharge medications provided to patient and family        ** Please Note: This note has been constructed using a voice recognition system **

## 2019-07-12 NOTE — TRANSPORTATION MEDICAL NECESSITY
Section I - General Information    Name of Patient: Mesfin Yi                 : 1932    Medicare #: [id number]  Transport Date: 19 (PCS is valid for round trips on this date and for all repetitive trips in the 60-day range as noted below )  Origin: [facility]  Is the pt's stay covered under Medicare Part A (PPS/DRG)   [x]     Closest appropriate facility? If no, why is transport to more distant facility required? Yes  If hospice pt, is this transport related to pt's terminal illness? NA       Section II - Medical Necessity Questionnaire  Ambulance transportation is medically necessary only if other means of transport are contraindicated or would be potentially harmful to the patient  To meet this requirement, the patient must either be "bed confined" or suffer from a condition such that transport by means other than ambulance is contraindicated by the patient's condition  The following questions must be answered by the medical professional signing below for this form to be valid:    1)  Describe the MEDICAL CONDITION (physical and/or mental) of this patient AT [address] [place] [address] that requires the patient to be transported in an ambulance and why transport by other means is contraindicated by the patient's condition: Bed bound; Not able to stand    2) Is the patient "bed confined" as defined below? Yes  To be "be confined" the patient must satisfy all three of the following conditions: (1) unable to get up from bed without Assistance; AND (2) unable to ambulate; AND (3) unable to sit in a chair or wheelchair  3) Can this patient safely be transported by car or wheelchair van (i e , seated during transport without a medical attendant or monitoring)?    No    4) In addition to completing questions 1-3 above, please check any of the following conditions that apply*:   *Note: supporting documentation for any boxes checked must be maintained in the patient's medical records  If hosp-hosp transfer, describe services needed at 2nd facility not available at 1st facility? Unable to tolerate seated position for time needed to transport   Other(specify) bed bound      Section III - Signature of Physician or Healthcare Professional  I certify that the above information is true and correct based on my evaluation of this patient, and represent that the patient requires transport by ambulance and that other forms of transport are contraindicated  I understand that this information will be used by the Centers for Medicare and Medicaid Services (CMS) to support the determination of medical necessity for ambulance services, and I represent that I have personal knowledge of the patient's condition at time of transport  []  If this box is checked, I also certify that the patient is physically or mentally incapable of signing the ambulance service's claim and that the institution with which I am affiliated has furnished care, services, or assistance to the patient  My signature below is made on behalf of the patient pursuant to 42 CFR §424 36(b)(4)  In accordance with 42 CFR §424 37, the specific reason(s) that the patient is physically or mentally incapable of signing the claim form is as follows: NA       Signature of Physician* or Healthcare Professional______________________________________________________________  Signature Date 07/12/19 @ 1700 (For scheduled repetitive transports, this form is not valid for transports performed more than 60 days after this date)    Printed Name & Credentials of Physician or Healthcare Professional (MD, DO, RN, etc )_Jeneile Wilmer Halsted, HEATHER, MISTYW___________________  *Form must be signed by patient's attending physician for scheduled, repetitive transports   For non-repetitive, unscheduled ambulance transports, if unable to obtain the signature of the attending physician, any of the following may sign (choose appropriate option below)  [] Physician Assistant []  Clinical Nurse Specialist []  Registered Nurse  []  Nurse Practitioner  [x] Discharge Planner

## 2019-07-12 NOTE — NURSING NOTE
Pt discharged via stretcher with transport team  Discharge instructions reviewed with receiving facility  Discharge instructions and scripts given to transport team  RN called receiving facility and gave report

## 2019-07-12 NOTE — PROGRESS NOTES
Pt resting comfortably in bed  Pt has no complaints of pain and show no signs of distress  Call bell within reach  Will continue to monitor

## 2019-07-12 NOTE — PROGRESS NOTES
Progress Note Shila Sanders 1/29/1932, 80 y o  male MRN: [id number]    Unit/Bed#: -01 Encounter: [id number]    Primary Care Provider: Dee Crews MD   Date and time admitted to hospital: 7/4/2019  3:34 PM        * Severe sepsis [facility])  Assessment & Plan  · Probably secondary to klebsiella bacteremia from UTI  Patient afebrile today  · Latest WBC 6 95  · Preliminary result of blood culture: no growth after 72 hours  · Currently on day 4 oral cefdinir 300mg OD  To complete 10 days of antibiotic course    Paroxysmal A-fib ([address] )  Assessment & Plan  · Patient's INR today: 2 24  · Noted ortho surgeon's plan for patient  He is scheduled for back surgery 7/18/2019  · Discontinue heparin drip and coumadin starting today until date of planned surgery    Chronic right-sided low back pain without sciatica  Assessment & Plan  · Back pain stable  Patient reports feeling comfortable, still not able to ambulate currently  · Received inpatient PT treatment; noted input of PT  · Continue monitoring for pain, ensure comfort and safety  · For discharge to short term rehab facility while completing course of antibiotic and awaiting planned date of surgery (7/18/2019)  · Discontinue all blood thinners starting today until surgery    Urinary retention  Assessment & Plan  · Patient currently able to void  · Continue medications  Monitor I&Os      Gram-negative bacteremia  Assessment & Plan  · Initial cultures showed klebsiella pneumoniae infection  · Prelim report of culture: no growth after 72 hours  · Currently on day 4 cefdinir 300mg tab OD to complete for 10 days    Chronic diastolic congestive heart failure (HCC)  Assessment & Plan  Wt Readings from Last 3 Encounters:   07/11/19 81 kg (178 lb 9 2 oz)   06/12/19 76 5 kg (168 lb 10 4 oz)   05/30/19 80 3 kg (177 lb)     · Monitor volume status   Continue I/Os and weigh daily          Hyponatremia  Assessment & Plan  · Current Na level 132, stable  · Monitor fluid status  Monitor BMP    MAGUI (acute kidney injury) ([facility] )  Assessment & Plan  · BsCr: 3 4; crea on admission elevated to 4 5  · Latest crea: 3 22; latest K 4 1  · Possible cause prerenal due to sepsis  · Watch out for hypotension, avoid nephrotoxins, monitor BMP      VTE Pharmacologic Prophylaxis:   Pharmacologic: Stopped; for surgery 2019  Mechanical VTE Prophylaxis in Place: Yes    Patient Centered Rounds: I have performed bedside rounds with nursing staff today  Discussions with Specialists or Other Care Team Provider: Discussed with  discharge to rehab facility while awaiting surgery    Education and Discussions with Family / Patient: Discussed with patient and wife at bedside    Time Spent for Care: 20 minutes  More than 50% of total time spent on counseling and coordination of care as described above  Current Length of Stay: 8 day(s)    Current Patient Status: Inpatient   Certification Statement: The patient will continue to require additional inpatient hospital stay due to awaiting transfer to rehab facility    Discharge Plan: Continue oral antibiotic course; for back surgery 2019    Code Status: Level 1 - Full Code      Subjective:   Patient seen today  Back pain stable  Has not had bowel movement  For transfer to rehab facility awaiting surgery and completion of antibiotic course  Objective:     Vitals:   Temp (24hrs), Av °F (36 7 °C), Min:97 6 °F (36 4 °C), Max:98 2 °F (36 8 °C)    Temp:  [97 6 °F (36 4 °C)-98 2 °F (36 8 °C)] 97 6 °F (36 4 °C)  HR:  [70-71] 71  Resp:  [18] 18  BP: (134-161)/(66-72) 161/72  SpO2:  [95 %-97 %] 95 %  Body mass index is 29 79 kg/m²  Input and Output Summary (last 24 hours): Intake/Output Summary (Last 24 hours) at 2019 1229  Last data filed at 2019 1111  Gross per 24 hour   Intake 762 65 ml   Output 2420 ml   Net -1657 35 ml       Physical Exam:     Physical Exam   Constitutional: He is oriented to person, place, and time  No distress  HENT:   Head: Normocephalic and atraumatic  Eyes: Pupils are equal, round, and reactive to light  Conjunctivae and EOM are normal  No scleral icterus  Neck: Normal range of motion  Neck supple  No JVD present  Cardiovascular: Normal rate and regular rhythm  Pulmonary/Chest: Effort normal and breath sounds normal    Abdominal: Soft  Bowel sounds are normal  He exhibits no distension and no mass  There is no tenderness  Lymphadenopathy:     He has no cervical adenopathy  Neurological: He is alert and oriented to person, place, and time  Skin: Skin is warm  No rash noted  Psychiatric: He has a normal mood and affect  His behavior is normal    Extremities: +1 bilateral leg edema       Additional Data:     Labs:    Results from last 7 days   Lab Units 07/11/19  0434   WBC Thousand/uL 6 95   HEMOGLOBIN g/dL 9 9*   HEMATOCRIT % 30 9*   PLATELETS Thousands/uL 217   NEUTROS PCT % 55   LYMPHS PCT % 30   MONOS PCT % 8   EOS PCT % 5     Results from last 7 days   Lab Units 07/11/19  0434  07/07/19  0604   SODIUM mmol/L 132*   < > 133*   POTASSIUM mmol/L 4 1   < > 4 1   CHLORIDE mmol/L 100   < > 101   CO2 mmol/L 23   < > 24   BUN mg/dL 45*   < > 49*   CREATININE mg/dL 3 22*   < > 3 34*   ANION GAP mmol/L 9   < > 8   CALCIUM mg/dL 8 2*   < > 7 9*   ALBUMIN g/dL  --   --  2 7*   TOTAL BILIRUBIN mg/dL  --   --  0 60   ALK PHOS U/L  --   --  108   ALT U/L  --   --  15   AST U/L  --   --  31   GLUCOSE RANDOM mg/dL 98   < > 101    < > = values in this interval not displayed       Results from last 7 days   Lab Units 07/12/19  0440   INR  2 24*     Results from last 7 days   Lab Units 07/12/19  1110 07/12/19  0742 07/11/19  2113 07/11/19  1454 07/11/19  1058 07/11/19  0658 07/10/19  2054 07/10/19  1531 07/10/19  1058 07/10/19  0655 07/09/19  2119 07/09/19  1551   POC GLUCOSE mg/dl 162* 109 127 176* 152* 105 136 146* 180* 96 170* 159*         Results from last 7 days   Lab Units 07/08/19  6917 07/07/19  0604   PROCALCITONIN ng/ml 55 38* 131 47*           * I Have Reviewed All Lab Data Listed Above  * Additional Pertinent Lab Tests Reviewed: [address] [facility] Admission Reviewed    Imaging:    Imaging Reports Reviewed Today Include:   Imaging Personally Reviewed by Myself Includes:      Recent Cultures (last 7 days):     Results from last 7 days   Lab Units 07/07/19  1505   BLOOD CULTURE  No Growth After 4 Days  No Growth After 4 Days  Last 24 Hours Medication List:     Current Facility-Administered Medications:  aspirin 81 mg Oral Daily Arben Musa MD   atorvastatin 40 mg Oral QPM Arben Musa MD   bisacodyl 10 mg Rectal Daily PRN Arben Musa MD   cefdinir 300 mg Oral Q24H Graciela Viveros MD   finasteride 5 mg Oral Daily Maria Guadalupe Villegas MD   insulin lispro 1-5 Units Subcutaneous HS Catherine Spaulding PA-C   insulin lispro 2-12 Units Subcutaneous TID AC Catherine Spaulding PA-C   levothyroxine 75 mcg Oral Early Morning Arben Musa MD   metoprolol tartrate 50 mg Oral BID With Meals Arben Musa MD   ondansetron 4 mg Intravenous Q6H PRN Arben Musa MD   polyethylene glycol 17 g Oral Daily PRN Arben Musa MD   ranolazine 500 mg Oral Q12H Johnathan Fan MD   tamsulosin 0 4 mg Oral Daily Arben Musa MD        Today, Patient Was Seen By: Anthony Gutierrez MD    ** Please Note: Dictation voice to text software may have been used in the creation of this document   **

## 2019-07-12 NOTE — SOCIAL WORK
Pt is stable for DC  [facility] is able to accept pt for rehab  Wife has decided that she prefers for pt to return there  Transportation has been arranged through [address]) with [employer] to  pt at 1900  Pt is bed bound and not able to stand  Nursing (Ashia Pacheco), [facility] Resident (Niels), pt , wife and facility are aware of DC arrangements  IMM has been reviewed with pt and wife at bedside  LMN has been completed, faxed to [facility] and placed in binder book  No further cm interventions needed at this time

## 2019-07-13 LAB
BACTERIA BLD CULT: NORMAL
BACTERIA BLD CULT: NORMAL

## 2019-07-15 ENCOUNTER — TRANSITIONAL CARE MANAGEMENT (OUTPATIENT)
Dept: FAMILY MEDICINE CLINIC | Facility: CLINIC | Age: 84
End: 2019-07-15

## 2019-07-23 ENCOUNTER — PATIENT OUTREACH (OUTPATIENT)
Dept: CASE MANAGEMENT | Facility: HOSPITAL | Age: 84
End: 2019-07-23

## 2019-07-24 ENCOUNTER — EPISODE CHANGES (OUTPATIENT)
Dept: CASE MANAGEMENT | Facility: OTHER | Age: 84
End: 2019-07-24

## 2019-07-24 ENCOUNTER — PATIENT OUTREACH (OUTPATIENT)
Dept: CASE MANAGEMENT | Facility: OTHER | Age: 84
End: 2019-07-24

## 2019-07-24 NOTE — PROGRESS NOTES
[facility] staff member in formed this CM that the patient is expected to stay about 2 weeks  CM was then transferred to the rehab facility's  in which a vm was left asking for a return call

## 2019-07-24 NOTE — PROGRESS NOTES
Spoke wit wife who reports that patient was not discharged to home from [facility] -- states he went via stretcher on 7/16 directly to [facility]  Patient had back surgery 7/18 and discharged 7/21 from [facility] to rehab center --Recovering from spine surgery  [facility] [address]  Wife is not aware of how long the patient will need rehab / when the discharge date is

## 2019-07-24 NOTE — PROGRESS NOTES
Spoke with Tuyet to validate patient is currently at [facility]  CM was then transferred to Amanuel Ortiz who again transferred this CM to the [facility] office who stated the patient was discharged completely from the facility to home on 7/16 for an expected surgery at [facility]  No other information was provided

## 2019-07-24 NOTE — PROGRESS NOTES
Contacted facility to see if patient had returned and was informed that patient is currently at the [place] [facility] facility  Influenza, Pediatric  Influenza, more commonly known as “the flu,” is a viral infection that primarily affects your child's respiratory tract. The respiratory tract includes organs that help your child breathe, such as the lungs, nose, and throat. The flu causes many common cold symptoms, as well as a high fever and body aches.  The flu spreads easily from person to person (is contagious). Having your child get a flu shot (influenza vaccination) every year is the best way to prevent influenza.  What are the causes?  Influenza is caused by a virus. Your child can catch the virus by:  · Breathing in droplets from an infected person's cough or sneeze.  · Touching something that was recently contaminated with the virus and then touching his or her mouth, nose, or eyes.  What increases the risk?  Your child may be more likely to get the flu if he or she:  · Does not clean his or her hands frequently with soap and water or alcohol-based hand .  · Has close contact with many people during cold and flu season.  · Touches his or her mouth, eyes, or nose without washing or sanitizing his or her hands first.  · Does not drink enough fluids or does not eat a healthy diet.  · Does not get enough sleep or exercise.  · Is under a high amount of stress.  · Does not get a yearly (annual) flu shot.  Your child may be at a higher risk of complications from the flu, such as a severe lung infection (pneumonia), if he or she:  · Has a weakened disease-fighting system (immune system). Your child may have a weakened immune system if he or she:  ¨ Has HIV or AIDS.  ¨ Is undergoing chemotherapy.  ¨ Is taking medicines that reduce the activity of (suppress) the immune system.  · Has a long-term (chronic) illness, such as heart disease, kidney disease, diabetes, or lung disease.  · Has a liver disorder.  · Has anemia.  What are the signs or symptoms?  Symptoms of this condition typically last 4-10 days. Symptoms can vary depending on  your child's age, and they may include:  · Fever.  · Chills.  · Headache, body aches, or muscle aches.  · Sore throat.  · Cough.  · Runny or congested nose.  · Chest discomfort and cough.  · Poor appetite.  · Weakness or tiredness (fatigue).  · Dizziness.  · Nausea or vomiting.  How is this diagnosed?  This condition may be diagnosed based on your child's medical history and a physical exam. Your child's health care provider may do a nose or throat swab test to confirm the diagnosis.  How is this treated?  If influenza is detected early, your child can be treated with antiviral medicine. Antiviral medicine can reduce the length of your child's illness and the severity of his or her symptoms. This medicine may be given by mouth (orally) or through an IV tube that is inserted in one of your child's veins.  The goal of treatment is to relieve your child's symptoms by taking care of your child at home. This may include having your child take over-the-counter medicines and drink plenty of fluids. Adding humidity to the air in your home may also help to relieve your child's symptoms.  In some cases, influenza goes away on its own. Severe influenza or complications from influenza may be treated in a hospital.  Follow these instructions at home:  Medicines   · Give your child over-the-counter and prescription medicines only as told by your child's health care provider.  · Do not give your child aspirin because of the association with Reye syndrome.  General instructions     · Use a cool mist humidifier to add humidity to the air in your child's room. This can make it easier for your child to breathe.  · Have your child:  ¨ Rest as needed.  ¨ Drink enough fluid to keep his or her urine clear or pale yellow.  ¨ Cover his or her mouth and nose when coughing or sneezing.  ¨ Wash his or her hands with soap and water often, especially after coughing or sneezing. If soap and water are not available, have your child use hand  . You should wash or sanitize your hands often as well.  · Keep your child home from work, school, or  as told by your child's health care provider. Unless your child is visiting a health care provider, it is best to keep your child home until his or her fever has been gone for 24 hours after without the use of medicine.  · Clear mucus from your young child's nose, if needed, by gentle suction with a bulb syringe.  · Keep all follow-up visits as told by your child's health care provider. This is important.  How is this prevented?  · Having your child get an annual flu shot is the best way to prevent your child from getting the flu.  ¨ An annual flu shot is recommended for every child who is 6 months or older. Different shots are available for different age groups.  ¨ Your child may get the flu shot in late summer, fall, or winter. If your child needs two doses of the vaccine, it is best to get the first shot done as early as possible. Ask your child's health care provider when your child should get the flu shot.  · Have your child wash his or her hands often or use hand  often if soap and water are not available.  · Have your child avoid contact with people who are sick during cold and flu season.  · Make sure your child is eating a healthy diet, getting plenty of rest, drinking plenty of fluids, and exercising regularly.  Contact a health care provider if:  · Your child develops new symptoms.  · Your child has:  ¨ Ear pain. In young children and babies, this may cause crying and waking at night.  ¨ Chest pain.  ¨ Diarrhea.  ¨ A fever.  · Your child's cough gets worse.  · Your child produces more mucus.  · Your child feels nauseous.  · Your child vomits.  Get help right away if:  · Your child develops difficulty breathing or starts breathing quickly.  · Your child's skin or nails turn blue or purple.  · Your child is not drinking enough fluids.  · Your child will not wake up or interact with  you.  · Your child develops a sudden headache.  · Your child cannot stop vomiting.  · Your child has severe pain or stiffness in his or her neck.  · Your child who is younger than 3 months has a temperature of 100°F (38°C) or higher.  This information is not intended to replace advice given to you by your health care provider. Make sure you discuss any questions you have with your health care provider.  Document Released: 12/18/2006 Document Revised: 05/25/2017 Document Reviewed: 10/11/2016  Elsevier Interactive Patient Education © 2017 [employer] Inc[employer]

## 2019-07-25 ENCOUNTER — EPISODE CHANGES (OUTPATIENT)
Dept: CASE MANAGEMENT | Facility: HOSPITAL | Age: 84
End: 2019-07-25

## 2019-08-01 ENCOUNTER — TELEPHONE (OUTPATIENT)
Dept: UROLOGY | Facility: MEDICAL CENTER | Age: 84
End: 2019-08-01

## 2019-08-01 NOTE — TELEPHONE ENCOUNTER
Please Triage - ER Follow Up  New Patient - Emani Sahu    What is the reason for the patients appointment? Urinary Retention  Being discharged on 8/11  Do we accept the patient's insurance or is the patient Self-Pay? Medicare & AARP      Has the patient seen a previous Urologist?  Dr Angel Castaneda        Has the patient had any outside testing done? Yes- wife having faxed to [phone]        What is the patients location preference for an office visit? [place]      Does the patient have a personal history of cancer?   Alejandra Mueller can be reached at [phone]

## 2019-08-01 NOTE — TELEPHONE ENCOUNTER
Spoke with Dr Carrie Lewis office   They are having patients Urology records faxed over to [phone]

## 2019-08-02 NOTE — TELEPHONE ENCOUNTER
Awaiting previous urology records  Per chart discharge summary scanned , [facility] after orthopedic surgery at [facility] for L2-L3 lumbar stenosis   History of UTI -treated per note camargo removed in hospital

## 2019-08-02 NOTE — TELEPHONE ENCOUNTER
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

## 2019-08-12 ENCOUNTER — REMOTE DEVICE CLINIC VISIT (OUTPATIENT)
Dept: CARDIOLOGY CLINIC | Facility: CLINIC | Age: 84
End: 2019-08-12
Payer: MEDICARE

## 2019-08-12 ENCOUNTER — TELEPHONE (OUTPATIENT)
Dept: FAMILY MEDICINE CLINIC | Facility: CLINIC | Age: 84
End: 2019-08-12

## 2019-08-12 DIAGNOSIS — Z95.0 PRESENCE OF CARDIAC PACEMAKER: Primary | ICD-10-CM

## 2019-08-12 PROCEDURE — 93296 REM INTERROG EVL PM/IDS: CPT | Performed by: INTERNAL MEDICINE

## 2019-08-12 PROCEDURE — 93294 REM INTERROG EVL PM/LDLS PM: CPT | Performed by: INTERNAL MEDICINE

## 2019-08-12 NOTE — PROGRESS NOTES
Results for orders placed or performed in visit on 08/12/19   Cardiac EP device report    Narrative    MDT DUAL CHAMBER PM  CARELINK TRANSMISSION: BATTERY VOLTAGE ADEQUATE (5 YRS)  AP: 99 4%  : 0 7% (MVP-ON)  ALL AVAILABLE LEAD PARAMETERS WITHIN NORMAL LIMITS  NO SIGNIFICANT HIGH RATE EPISODES  PACEMAKER FUNCTIONING APPROPRIATELY    [address] [place] [address]

## 2019-08-12 NOTE — TELEPHONE ENCOUNTER
(1) Needs INR order and when they are suppose to do it again  They are going to to BNP on 8/16 if you want it done then  (2)  His Tradjenta was discontinued and he was put on Nesina 6 25 mg daily  He only has Rafal Pila at home, he needs a script sent to [facility] if this is what you want him to take   This is according discharge meds from rehab  (3)  He does not want the Tylenol they ordered every 6 hours around the clock from back surgery  Can they change order to PRN ?    (4)  They ordered cranberry 450 mg with lunch, does he really need this ?     (5)  They ordered Miralax, Lactulose and Senecot, can they be made PRN also ?

## 2019-08-12 NOTE — TELEPHONE ENCOUNTER
1) OK INR - May have it done Wed  2) Go back on the Tradjenta  3) Change Tylenol to prn  4) Can hold the cranberry tabs  5) Can change Miralax, and Senecot to prn   Cont the lactulose regularly until I see him

## 2019-08-12 NOTE — TELEPHONE ENCOUNTER
Called Leticia with [facility] Nurs[facility] and she was given Verbal orders for all below

## 2019-08-16 ENCOUNTER — ANTICOAG VISIT (OUTPATIENT)
Dept: FAMILY MEDICINE CLINIC | Facility: CLINIC | Age: 84
End: 2019-08-16

## 2019-08-16 LAB — INR PPP: 3.5 (ref 0.84–1.19)

## 2019-08-16 RX ORDER — WARFARIN SODIUM 3 MG/1
1.5 TABLET ORAL
Status: ON HOLD | COMMUNITY
Start: 2019-08-09 | End: 2019-09-14 | Stop reason: SDUPTHER

## 2019-08-16 NOTE — PROGRESS NOTES
INR 3 5  Pt has been at goal for a while prior to this  Hold coumadin today and tomorrow  Restart med at regular dose on Sunday   Recheck INR 1 week from Monday

## 2019-08-19 ENCOUNTER — TELEPHONE (OUTPATIENT)
Dept: FAMILY MEDICINE CLINIC | Facility: CLINIC | Age: 84
End: 2019-08-19

## 2019-08-19 NOTE — TELEPHONE ENCOUNTER
Pt's wife aware and she scheduled appoint with Sherie Mckeon for appoint 3-4 weeks out , Giovanna Dave will call when she speaks with the Visiting nurse

## 2019-08-19 NOTE — TELEPHONE ENCOUNTER
OK for visiting nurse to draw INR  Lets watch patient for 3-4 weeks - I give him a tentative appt in 3-4 weeks and will see him if he is strong enough to come to the office

## 2019-08-19 NOTE — TELEPHONE ENCOUNTER
Patient's wife called  She had to cancel his appt for tomorrow because he is still not mobile enough to leave the house  He does have PT coming in and they consider him homebound and advised against taking him out  She wants to know what she should as far as following up with you  Also, patient is due for repeat INR on the 26th and she will be unable to get him to the lab  He does have skilled nursing coming in, can they draw these labs for him?

## 2019-08-21 ENCOUNTER — TELEPHONE (OUTPATIENT)
Dept: FAMILY MEDICINE CLINIC | Facility: CLINIC | Age: 84
End: 2019-08-21

## 2019-08-21 NOTE — TELEPHONE ENCOUNTER
It is hard for me to make recommendations re; this without seeing him   Is there any way they can bring him in?

## 2019-08-21 NOTE — TELEPHONE ENCOUNTER
Received call from [facility]  Milad Vernon stated she visited patient at home today and both feet were beat red and swollen  The patient was wearing compression stockings and shoes with plastic braces  Milad Vernon had the patient take off the compression stockings and after and hour and a half the redness went down but the feet are cool to the touch  She told the family to keep the stockings off until she spoke to the PCP  Also patient is not on a water pill

## 2019-08-22 ENCOUNTER — HOSPITAL ENCOUNTER (INPATIENT)
Facility: HOSPITAL | Age: 84
LOS: 5 days | DRG: 291 | End: 2019-08-27
Attending: EMERGENCY MEDICINE | Admitting: INTERNAL MEDICINE
Payer: MEDICARE

## 2019-08-22 ENCOUNTER — APPOINTMENT (EMERGENCY)
Dept: RADIOLOGY | Facility: HOSPITAL | Age: 84
DRG: 291 | End: 2019-08-22
Payer: MEDICARE

## 2019-08-22 DIAGNOSIS — N18.4 CKD (CHRONIC KIDNEY DISEASE), STAGE IV (HCC): ICD-10-CM

## 2019-08-22 DIAGNOSIS — R60.1 GENERALIZED EDEMA: Primary | ICD-10-CM

## 2019-08-22 DIAGNOSIS — R77.8 ELEVATED TROPONIN: ICD-10-CM

## 2019-08-22 DIAGNOSIS — N18.4 CKD (CHRONIC KIDNEY DISEASE), STAGE IV (HCC): Primary | ICD-10-CM

## 2019-08-22 DIAGNOSIS — N18.9 CHRONIC RENAL FAILURE: ICD-10-CM

## 2019-08-22 DIAGNOSIS — I50.9 CHF (CONGESTIVE HEART FAILURE) (HCC): Primary | ICD-10-CM

## 2019-08-22 PROBLEM — I50.33 ACUTE ON CHRONIC DIASTOLIC HEART FAILURE (HCC): Status: ACTIVE | Noted: 2019-07-04

## 2019-08-22 PROBLEM — R39.11 BENIGN PROSTATIC HYPERPLASIA WITH URINARY HESITANCY: Status: ACTIVE | Noted: 2019-08-22

## 2019-08-22 PROBLEM — R93.89 ABNORMAL CHEST X-RAY: Chronic | Status: ACTIVE | Noted: 2019-08-22

## 2019-08-22 PROBLEM — N40.1 BENIGN PROSTATIC HYPERPLASIA WITH URINARY HESITANCY: Status: ACTIVE | Noted: 2019-08-22

## 2019-08-22 LAB
ALBUMIN SERPL BCP-MCNC: 3.1 G/DL (ref 3.5–5)
ALP SERPL-CCNC: 118 U/L (ref 46–116)
ALT SERPL W P-5'-P-CCNC: 14 U/L (ref 12–78)
ANION GAP SERPL CALCULATED.3IONS-SCNC: 10 MMOL/L (ref 4–13)
ANION GAP SERPL CALCULATED.3IONS-SCNC: 7 MMOL/L (ref 4–13)
APTT PPP: 42 SECONDS (ref 23–37)
AST SERPL W P-5'-P-CCNC: 24 U/L (ref 5–45)
BASOPHILS # BLD AUTO: 0.03 THOUSANDS/ΜL (ref 0–0.1)
BASOPHILS NFR BLD AUTO: 0 % (ref 0–1)
BILIRUB SERPL-MCNC: 0.4 MG/DL (ref 0.2–1)
BUN SERPL-MCNC: 37 MG/DL (ref 5–25)
BUN SERPL-MCNC: 40 MG/DL (ref 5–25)
CALCIUM SERPL-MCNC: 8 MG/DL (ref 8.3–10.1)
CALCIUM SERPL-MCNC: 8.3 MG/DL (ref 8.3–10.1)
CHLORIDE SERPL-SCNC: 101 MMOL/L (ref 100–108)
CHLORIDE SERPL-SCNC: 102 MMOL/L (ref 100–108)
CO2 SERPL-SCNC: 24 MMOL/L (ref 21–32)
CO2 SERPL-SCNC: 28 MMOL/L (ref 21–32)
CREAT SERPL-MCNC: 3.49 MG/DL (ref 0.6–1.3)
CREAT SERPL-MCNC: 3.68 MG/DL (ref 0.6–1.3)
EOSINOPHIL # BLD AUTO: 0.18 THOUSAND/ΜL (ref 0–0.61)
EOSINOPHIL NFR BLD AUTO: 2 % (ref 0–6)
ERYTHROCYTE [DISTWIDTH] IN BLOOD BY AUTOMATED COUNT: 14.4 % (ref 11.6–15.1)
EST. AVERAGE GLUCOSE BLD GHB EST-MCNC: 111 MG/DL
GFR SERPL CREATININE-BSD FRML MDRD: 14 ML/MIN/1.73SQ M
GFR SERPL CREATININE-BSD FRML MDRD: 15 ML/MIN/1.73SQ M
GLUCOSE SERPL-MCNC: 121 MG/DL (ref 65–140)
GLUCOSE SERPL-MCNC: 126 MG/DL (ref 65–140)
GLUCOSE SERPL-MCNC: 151 MG/DL (ref 65–140)
GLUCOSE SERPL-MCNC: 175 MG/DL (ref 65–140)
HBA1C MFR BLD: 5.5 % (ref 4.2–6.3)
HCT VFR BLD AUTO: 30.6 % (ref 36.5–49.3)
HGB BLD-MCNC: 9.5 G/DL (ref 12–17)
IMM GRANULOCYTES # BLD AUTO: 0.03 THOUSAND/UL (ref 0–0.2)
IMM GRANULOCYTES NFR BLD AUTO: 0 % (ref 0–2)
INR PPP: 2.64 (ref 0.84–1.19)
LYMPHOCYTES # BLD AUTO: 1.73 THOUSANDS/ΜL (ref 0.6–4.47)
LYMPHOCYTES NFR BLD AUTO: 24 % (ref 14–44)
MAGNESIUM SERPL-MCNC: 1.9 MG/DL (ref 1.6–2.6)
MCH RBC QN AUTO: 30.3 PG (ref 26.8–34.3)
MCHC RBC AUTO-ENTMCNC: 31 G/DL (ref 31.4–37.4)
MCV RBC AUTO: 98 FL (ref 82–98)
MONOCYTES # BLD AUTO: 0.58 THOUSAND/ΜL (ref 0.17–1.22)
MONOCYTES NFR BLD AUTO: 8 % (ref 4–12)
NEUTROPHILS # BLD AUTO: 4.8 THOUSANDS/ΜL (ref 1.85–7.62)
NEUTS SEG NFR BLD AUTO: 66 % (ref 43–75)
NRBC BLD AUTO-RTO: 0 /100 WBCS
NT-PROBNP SERPL-MCNC: 7901 PG/ML
PLATELET # BLD AUTO: 230 THOUSANDS/UL (ref 149–390)
PMV BLD AUTO: 9.1 FL (ref 8.9–12.7)
POTASSIUM SERPL-SCNC: 4.1 MMOL/L (ref 3.5–5.3)
POTASSIUM SERPL-SCNC: 4.7 MMOL/L (ref 3.5–5.3)
PROT SERPL-MCNC: 6.7 G/DL (ref 6.4–8.2)
PROTHROMBIN TIME: 27.2 SECONDS (ref 11.6–14.5)
RBC # BLD AUTO: 3.14 MILLION/UL (ref 3.88–5.62)
SODIUM SERPL-SCNC: 136 MMOL/L (ref 136–145)
SODIUM SERPL-SCNC: 136 MMOL/L (ref 136–145)
TROPONIN I SERPL-MCNC: 0.1 NG/ML
TROPONIN I SERPL-MCNC: 0.1 NG/ML
TROPONIN I SERPL-MCNC: 0.11 NG/ML
TROPONIN I SERPL-MCNC: 0.11 NG/ML
WBC # BLD AUTO: 7.35 THOUSAND/UL (ref 4.31–10.16)

## 2019-08-22 PROCEDURE — 99223 1ST HOSP IP/OBS HIGH 75: CPT | Performed by: INTERNAL MEDICINE

## 2019-08-22 PROCEDURE — 83036 HEMOGLOBIN GLYCOSYLATED A1C: CPT | Performed by: PSYCHIATRY & NEUROLOGY

## 2019-08-22 PROCEDURE — 96374 THER/PROPH/DIAG INJ IV PUSH: CPT

## 2019-08-22 PROCEDURE — 71046 X-RAY EXAM CHEST 2 VIEWS: CPT

## 2019-08-22 PROCEDURE — 36415 COLL VENOUS BLD VENIPUNCTURE: CPT | Performed by: EMERGENCY MEDICINE

## 2019-08-22 PROCEDURE — 84484 ASSAY OF TROPONIN QUANT: CPT | Performed by: PSYCHIATRY & NEUROLOGY

## 2019-08-22 PROCEDURE — 85730 THROMBOPLASTIN TIME PARTIAL: CPT | Performed by: EMERGENCY MEDICINE

## 2019-08-22 PROCEDURE — 99285 EMERGENCY DEPT VISIT HI MDM: CPT | Performed by: EMERGENCY MEDICINE

## 2019-08-22 PROCEDURE — 99285 EMERGENCY DEPT VISIT HI MDM: CPT

## 2019-08-22 PROCEDURE — 83735 ASSAY OF MAGNESIUM: CPT | Performed by: PSYCHIATRY & NEUROLOGY

## 2019-08-22 PROCEDURE — 83880 ASSAY OF NATRIURETIC PEPTIDE: CPT | Performed by: EMERGENCY MEDICINE

## 2019-08-22 PROCEDURE — 93005 ELECTROCARDIOGRAM TRACING: CPT

## 2019-08-22 PROCEDURE — 82728 ASSAY OF FERRITIN: CPT | Performed by: PSYCHIATRY & NEUROLOGY

## 2019-08-22 PROCEDURE — 80053 COMPREHEN METABOLIC PANEL: CPT | Performed by: EMERGENCY MEDICINE

## 2019-08-22 PROCEDURE — 82948 REAGENT STRIP/BLOOD GLUCOSE: CPT

## 2019-08-22 PROCEDURE — 85610 PROTHROMBIN TIME: CPT | Performed by: EMERGENCY MEDICINE

## 2019-08-22 PROCEDURE — 85025 COMPLETE CBC W/AUTO DIFF WBC: CPT | Performed by: EMERGENCY MEDICINE

## 2019-08-22 PROCEDURE — 80048 BASIC METABOLIC PNL TOTAL CA: CPT | Performed by: PSYCHIATRY & NEUROLOGY

## 2019-08-22 PROCEDURE — 84484 ASSAY OF TROPONIN QUANT: CPT | Performed by: EMERGENCY MEDICINE

## 2019-08-22 RX ORDER — FUROSEMIDE 10 MG/ML
20 INJECTION INTRAMUSCULAR; INTRAVENOUS
Status: DISCONTINUED | OUTPATIENT
Start: 2019-08-22 | End: 2019-08-23

## 2019-08-22 RX ORDER — DOCUSATE SODIUM 100 MG/1
100 CAPSULE, LIQUID FILLED ORAL 2 TIMES DAILY
Status: DISCONTINUED | OUTPATIENT
Start: 2019-08-22 | End: 2019-08-27 | Stop reason: HOSPADM

## 2019-08-22 RX ORDER — ATORVASTATIN CALCIUM 40 MG/1
40 TABLET, FILM COATED ORAL EVERY EVENING
Status: DISCONTINUED | OUTPATIENT
Start: 2019-08-22 | End: 2019-08-27 | Stop reason: HOSPADM

## 2019-08-22 RX ORDER — METOPROLOL TARTRATE 50 MG/1
50 TABLET, FILM COATED ORAL 2 TIMES DAILY WITH MEALS
Status: DISCONTINUED | OUTPATIENT
Start: 2019-08-22 | End: 2019-08-23

## 2019-08-22 RX ORDER — INSULIN GLARGINE 100 [IU]/ML
32 INJECTION, SOLUTION SUBCUTANEOUS
Status: DISCONTINUED | OUTPATIENT
Start: 2019-08-23 | End: 2019-08-25

## 2019-08-22 RX ORDER — RANOLAZINE 500 MG/1
500 TABLET, EXTENDED RELEASE ORAL EVERY 12 HOURS SCHEDULED
Status: DISCONTINUED | OUTPATIENT
Start: 2019-08-22 | End: 2019-08-27 | Stop reason: HOSPADM

## 2019-08-22 RX ORDER — ACETAMINOPHEN 325 MG/1
650 TABLET ORAL EVERY 6 HOURS PRN
Status: DISCONTINUED | OUTPATIENT
Start: 2019-08-22 | End: 2019-08-27 | Stop reason: HOSPADM

## 2019-08-22 RX ORDER — AMLODIPINE BESYLATE 5 MG/1
5 TABLET ORAL DAILY
Status: DISCONTINUED | OUTPATIENT
Start: 2019-08-23 | End: 2019-08-23

## 2019-08-22 RX ORDER — WARFARIN SODIUM 2.5 MG/1
2.5 TABLET ORAL
Status: COMPLETED | OUTPATIENT
Start: 2019-08-22 | End: 2019-08-22

## 2019-08-22 RX ORDER — ASPIRIN 81 MG/1
81 TABLET ORAL DAILY
Status: DISCONTINUED | OUTPATIENT
Start: 2019-08-23 | End: 2019-08-27 | Stop reason: HOSPADM

## 2019-08-22 RX ORDER — FUROSEMIDE 10 MG/ML
40 INJECTION INTRAMUSCULAR; INTRAVENOUS ONCE
Status: COMPLETED | OUTPATIENT
Start: 2019-08-22 | End: 2019-08-22

## 2019-08-22 RX ORDER — TORSEMIDE 20 MG/1
20 TABLET ORAL DAILY
Qty: 30 TABLET | Refills: 5 | Status: ON HOLD | OUTPATIENT
Start: 2019-08-22 | End: 2019-09-14 | Stop reason: SDUPTHER

## 2019-08-22 RX ORDER — ONDANSETRON 2 MG/ML
4 INJECTION INTRAMUSCULAR; INTRAVENOUS EVERY 6 HOURS PRN
Status: DISCONTINUED | OUTPATIENT
Start: 2019-08-22 | End: 2019-08-27 | Stop reason: HOSPADM

## 2019-08-22 RX ORDER — TAMSULOSIN HYDROCHLORIDE 0.4 MG/1
0.4 CAPSULE ORAL DAILY
Status: DISCONTINUED | OUTPATIENT
Start: 2019-08-23 | End: 2019-08-27 | Stop reason: HOSPADM

## 2019-08-22 RX ORDER — LEVOTHYROXINE SODIUM 0.07 MG/1
75 TABLET ORAL DAILY
Status: DISCONTINUED | OUTPATIENT
Start: 2019-08-23 | End: 2019-08-27 | Stop reason: HOSPADM

## 2019-08-22 RX ADMIN — FUROSEMIDE 40 MG: 10 INJECTION, SOLUTION INTRAMUSCULAR; INTRAVENOUS at 14:41

## 2019-08-22 RX ADMIN — METOPROLOL TARTRATE 50 MG: 50 TABLET, FILM COATED ORAL at 20:58

## 2019-08-22 RX ADMIN — RANOLAZINE 500 MG: 500 TABLET, FILM COATED, EXTENDED RELEASE ORAL at 20:58

## 2019-08-22 RX ADMIN — DESMOPRESSIN ACETATE 40 MG: 0.2 TABLET ORAL at 20:58

## 2019-08-22 RX ADMIN — FUROSEMIDE 20 MG: 10 INJECTION, SOLUTION INTRAMUSCULAR; INTRAVENOUS at 20:59

## 2019-08-22 RX ADMIN — WARFARIN SODIUM 2.5 MG: 2.5 TABLET ORAL at 20:58

## 2019-08-22 RX ADMIN — DOCUSATE SODIUM 100 MG: 100 CAPSULE, LIQUID FILLED ORAL at 20:57

## 2019-08-22 NOTE — ASSESSMENT & PLAN NOTE
Initial creatinine of 3 5-approximate baseline 3 3-3 5  Managed by Dr Marc Livingston (Nephrology)  · Nephrology consult pending  · BMP in the a m  · Diuresis using Lasix; hold torsemide

## 2019-08-22 NOTE — PLAN OF CARE
Problem: Potential for Falls  Goal: Patient will remain free of falls  Description  INTERVENTIONS:  - Assess patient frequently for physical needs  -  Identify cognitive and physical deficits and behaviors that affect risk of falls    -  [place] fall precautions as indicated by assessment   - Educate patient/family on patient safety including physical limitations  - Instruct patient to call for assistance with activity based on assessment  - Modify environment to reduce risk of injury  - Consider OT/PT consult to assist with strengthening/mobility  Outcome: Progressing     Problem: Prexisting or High Potential for Compromised Skin Integrity  Goal: Skin integrity is maintained or improved  Description  INTERVENTIONS:  - Identify patients at risk for skin breakdown  - Assess and monitor skin integrity  - Assess and monitor nutrition and hydration status  - Monitor labs   - Assess for incontinence   - Turn and reposition patient  - Assist with mobility/ambulation  - Relieve pressure over bony prominences  - Avoid friction and shearing  - Provide appropriate hygiene as needed including keeping skin clean and dry  - Evaluate need for skin moisturizer/barrier cream  - Collaborate with interdisciplinary team   - Patient/family teaching  - Consider wound care consult   Outcome: Progressing     Problem: CARDIOVASCULAR - ADULT  Goal: Maintains optimal cardiac output and hemodynamic stability  Description  INTERVENTIONS:  - Monitor I/O, vital signs and rhythm  - Monitor for S/S and trends of decreased cardiac output  - Administer and titrate ordered vasoactive medications to optimize hemodynamic stability  - Assess quality of pulses, skin color and temperature  - Assess for signs of decreased coronary artery perfusion  - Instruct patient to report change in severity of symptoms  Outcome: Progressing  Goal: Absence of cardiac dysrhythmias or at baseline rhythm  Description  INTERVENTIONS:  - Continuous cardiac monitoring, vital signs, obtain 12 lead EKG if ordered  - Administer antiarrhythmic and heart rate control medications as ordered  - Monitor electrolytes and administer replacement therapy as ordered  Outcome: Progressing     Problem: RESPIRATORY - ADULT  Goal: Achieves optimal ventilation and oxygenation  Description  INTERVENTIONS:  - Assess for changes in respiratory status  - Assess for changes in mentation and behavior  - Position to facilitate oxygenation and minimize respiratory effort  - Oxygen administered by appropriate delivery if ordered  - Initiate smoking cessation education as indicated  - Encourage broncho-pulmonary hygiene including cough, deep breathe, Incentive Spirometry  - Assess the need for suctioning and aspirate as needed  - Assess and instruct to report SOB or any respiratory difficulty  - Respiratory Therapy support as indicated  Outcome: Progressing     Problem: METABOLIC, FLUID AND ELECTROLYTES - ADULT  Goal: Electrolytes maintained within normal limits  Description  INTERVENTIONS:  - Monitor labs and assess patient for signs and symptoms of electrolyte imbalances  - Administer electrolyte replacement as ordered  - Monitor response to electrolyte replacements, including repeat lab results as appropriate  - Instruct patient on fluid and nutrition as appropriate  Outcome: Progressing  Goal: Fluid balance maintained  Description  INTERVENTIONS:  - Monitor labs   - Monitor I/O and WT  - Instruct patient on fluid and nutrition as appropriate  - Assess for signs & symptoms of volume excess or deficit  Outcome: Progressing  Goal: Glucose maintained within target range  Description  INTERVENTIONS:  - Monitor Blood Glucose as ordered  - Assess for signs and symptoms of hyperglycemia and hypoglycemia  - Administer ordered medications to maintain glucose within target range  - Assess nutritional intake and initiate nutrition service referral as needed  Outcome: Progressing

## 2019-08-22 NOTE — TELEPHONE ENCOUNTER
Wife called back, she can't lift patient out of bed to get him in wheel chair, he is home bound, he is filling up with fluid,  She will try to get son to help her if she needs him to come in but no guarantee  She is trying to get home services for him  Pl adv

## 2019-08-22 NOTE — ASSESSMENT & PLAN NOTE
Prior history of rate controlled AFib and stroke (2014)  Initial EKG demonstrated sinus rhythm and first-degree AV block, consistent with prior EKG  Managed by Dr Rhonda Cooper (Cardiology)  Present INR 2 64  · Continue telemetry  · Continue warfarin  · INR in the a m  · Continue metoprolol

## 2019-08-22 NOTE — TELEPHONE ENCOUNTER
I don't typically do home visits and would not be able to get there before next Wednesday  Can he stand? Are they monitoring his weight?

## 2019-08-22 NOTE — TELEPHONE ENCOUNTER
The patient's wife called and stated that the patient is home bound but he is having swelling in his legs, arms, and abdomen with some lung wheezing and with no shortness of breath and they are unable to monitor his weight because his scale at home is broke  Per Dr Portillo Benton please have the patient start Torsemide 20 mg daily and and repeat a BMP next week and call us next week with how he is doing  Spoke with the patient's wife and she is aware to have him start Torsemide 20 mg daily and then to repeat a BMP next week which has been mailed out for her and to call us next week with an update        Chon Dickey MA

## 2019-08-22 NOTE — ASSESSMENT & PLAN NOTE
Initial blood pressure 121/59-present blood pressure 142/67  · Continue monitor vitals  · Continue amlodipine and metoprolol

## 2019-08-22 NOTE — H&P
H&P- Sweetie Vargas 1/29/1932, 80 y o  male MRN: [id number]    Unit/Bed#: -01 Encounter: [id number]    Primary Care Provider: Oni Matta MD   Date and time admitted to hospital: 8/22/2019  1:15 PM    Benign prostatic hyperplasia with urinary hesitancy  Assessment & Plan  Patient endorsed urinary hesitancy  Managed by outpatient urology  · Continue Flomax  Elevated troponin I level  Assessment & Plan  Troponin 0 11   · Continue to trend troponin  Paroxysmal A-fib [facility])  Assessment & Plan  Prior history of rate controlled AFib and stroke (2014)  Initial EKG demonstrated sinus rhythm and first-degree AV block, consistent with prior EKG  Managed by Dr Elfego Thomas (Cardiology)  Present INR 2 64  · Continue telemetry  · Continue warfarin  · INR in the a m  · Continue metoprolol  Hypothyroidism  Assessment & Plan  · Continue Synthroid  Essential hypertension  Assessment & Plan  Initial blood pressure 121/59-present blood pressure 142/67  · Continue monitor vitals  · Continue amlodipine and metoprolol  Hyperlipidemia  Assessment & Plan  · Continue Lipitor  Diabetes mellitus with multiple complications [facility])  Assessment & Plan  Lab Results   Component Value Date    HGBA1C 6 7 (H) 04/24/2019       Recent Labs     08/22/19  1650   POCGLU 126       Blood Sugar Average: Last 72 hrs:  (P) 126   · Prudy Maurice  · Hemoglobin A1c in the a m   · Initiate sliding scale insulin  · Continue to monitor blood glucose  CKD (chronic kidney disease), stage IV ([facility])  Assessment & Plan  Initial creatinine of 3 5-approximate baseline 3 3-3 5  Managed by Dr Stephanie Metcalf (Nephrology)  · Nephrology consult pending  · BMP in the a m  · Diuresis using Lasix; hold torsemide  3-vessel coronary artery disease  Assessment & Plan  Prior history of coronary artery bypass graft (2013) and pacemaker (2015)  No complaints of chest pain/pressure  · Continue monitor vitals    · Continue amlodipine, metoprolol and ranolazine  · Continue warfarin  · INR in the a m  * Acute on chronic diastolic heart failure (HCC)  Assessment & Plan  Initial patient presentation of worsening dyspnea on exertion and lower extremity edema; likely cardiorenal syndrome  XR chest demonstrated cardiomegaly and small pleural effusions  ProBNP 7,901  Troponin 0 11  Previous echo (06/12/2019) demonstrated EF of 55% and grade 1 diastolic dysfunction  · Continue to monitor vitals  · Cardiology consult pending  · Continue telemetry  · Continue to trend troponin; elevation likely secondary to CHF exacerbation, rule out ACS  · Monitor I/0s and weight  · Cardiac diet 2 g sodium and 1500mL restriction  · Diuresis using Lasix 20 mg b i d   · Continue metoprolol  VTE Prophylaxis: Warfarin (Coumadin)  / sequential compression device   Code Status:  Level 3-DNR/DNI  POLST: POLST form is not discussed and not completed at this time  Discussion with family:  Patient's daughter and son-in-law  Anticipated Length of Stay:  Patient will be admitted on an Inpatient basis with an anticipated length of stay of  greater than 2 midnights  Justification for Hospital Stay:  See above for assessment and management  Total Time for Visit, including Counseling / Coordination of Care: 70   Greater than 50% of this total time spent on direct patient counseling and coordination of care  Chief Complaint:   Dyspnea on exertion  History of Present Illness:    Edilma Luke is a 80 y o  male, history pertinent for chronic diastolic congestive heart failure, chronic kidney disease stage 4 and rate controlled atrial fibrillation, that presents to [facility] with worsening dyspnea on exertion in addition to worsening lower extremity edema  Previously, patient underwent spinal surgery and subsequent rehabilitation; discharged on 08/18/2019    Per the patient, rehab held his torsemide 20 mg daily because of his chronic kidney disease/creatinine  Consequently, his symptomatology worsened  His daughter and son-in-law suggested he be evaluated in the ED for "retention"  He stated he is unaware of any quantitative weight change, although he is "heavier"  Review of Systems:    Review of Systems   Constitutional: Positive for activity change and unexpected weight change  Negative for appetite change, diaphoresis and fatigue  Respiratory: Positive for shortness of breath and wheezing  Negative for cough and chest tightness  Cardiovascular: Positive for leg swelling  Negative for chest pain and palpitations  Gastrointestinal: Negative for abdominal distention, abdominal pain, constipation, diarrhea, nausea and vomiting  Genitourinary: Positive for dysuria and frequency  Negative for flank pain and hematuria  Musculoskeletal: Negative for arthralgias and myalgias  Skin: Negative for color change  Neurological: Negative for light-headedness and headaches  Psychiatric/Behavioral: Negative for confusion  Past Medical and Surgical History:     Past Medical History:   Diagnosis Date    3-vessel coronary artery disease     last assessed: 08/15/2016    BPH (benign prostatic hyperplasia)     Chronic kidney disease     Diabetes mellitus ([facility] [address] )     Hyperlipidemia     Hypertension     Shingles 04/29/2019    SOB (shortness of breath)     Stroke [facility])        Past Surgical History:   Procedure Laterality Date    CARDIAC PACEMAKER PLACEMENT      CATARACT EXTRACTION      last assessed: 11/11/2015    CORONARY ARTERY BYPASS GRAFT      last assessed: 08/26/2015       Meds/Allergies:    Prior to Admission medications    Medication Sig Start Date End Date Taking?  Authorizing Provider   amLODIPine (NORVASC) 5 mg tablet Take 1 tablet (5 mg total) by mouth daily 6/17/19  Yes Kimberly Butt PA-C   aspirin (ECOTRIN LOW STRENGTH) 81 mg EC tablet Take 1 tablet (81 mg total) by mouth daily 6/17/19 Yes Zohreh Clark PA-C   atorvastatin (LIPITOR) 40 mg tablet TAKE 1 TABLET BY MOUTH EVERY DAY  Patient taking differently: TAKE 1 TABLET BY MOUTH EVERY EVENING 12/31/18  Yes Nicho Castillo MD   docusate sodium (COLACE) 100 mg capsule Take 1 capsule (100 mg total) by mouth 2 (two) times a day 7/12/19  Yes Alma Dailey MD   finasteride (PROSCAR) 5 mg tablet Take 1 tablet (5 mg total) by mouth daily 7/13/19  Yes Alma Dailey MD   glucose blood test strip 1 each by Other route daily 12/3/18  Yes Miriam Bustos MD   levothyroxine 75 mcg tablet TAKE 1 TABLET BY MOUTH EVERY DAY 3/31/19  Yes Miriam Bustos MD   metoprolol tartrate (LOPRESSOR) 50 mg tablet TAKE 1 TABLET BY MOUTH TWICE A DAY WITH FOOD 7/8/19  Yes Miriam Bustos MD   ranolazine (RANEXA) 500 mg 12 hr tablet Take 1 tablet (500 mg total) by mouth every 12 (twelve) hours 6/16/19  Yes Zohreh Clark PA-C   tamsulosin (FLOMAX) 0 4 mg TAKE ONE CAPSULE BY MOUTH ONCE DAILY  Patient taking differently: TAKE ONE CAPSULE BY MOUTH ONCE hs 12/31/18  Yes Nicho Castillo MD   torsemide (DEMADEX) 20 mg tablet Take 1 tablet (20 mg total) by mouth daily 8/22/19  Yes Julissa Smallwood MD   TRADJENTA 5 MG TABS TAKE 1 TABLET (5 MG) BY MOUTH DAILY 6/27/19  Yes MAURICE Walker   warfarin (COUMADIN) 3 mg tablet Take 1 5 mg by mouth 8/9/19  Yes Historical Provider, MD   bisacodyl (DULCOLAX) 10 mg suppository Insert 1 suppository (10 mg total) into the rectum daily  Patient not taking: Reported on 8/22/2019 7/13/19   Alma Dailey MD   senna (SENOKOT) 8 6 mg Take 1 tablet (8 6 mg total) by mouth daily at bedtime  Patient not taking: Reported on 8/22/2019 7/12/19   Alma Dailey MD     I have reviewed home medications with patient personally  Allergies: No Known Allergies    Social History:     Marital Status: /Civil Union   Occupation:  Retired    Patient Pre-hospital Living Situation:  Independently with wife  Patient Pre-hospital Level of Mobility:  No restrictions  Patient Pre-hospital Diet Restrictions:  No restrictions  Substance Use History:  Denied  Social History     Substance and Sexual Activity   Alcohol Use No     Social History     Tobacco Use   Smoking Status Never Smoker   Smokeless Tobacco Never Used     Social History     Substance and Sexual Activity   Drug Use No       Family History:    Family History   Problem Relation Age of Onset    Heart disease Mother     Cancer Father         stomach    No Known Problems Sister     No Known Problems Brother     No Known Problems Brother        Physical Exam:     Vitals:   Blood Pressure: 142/67 (08/22/19 1647)  Pulse: 74 (08/22/19 1647)  Temperature: 97 7 °F (36 5 °C) (08/22/19 1647)  Temp Source: Oral (08/22/19 1647)  Respirations: 18 (08/22/19 1647)  Height: 5' 5" (165 1 cm) (08/22/19 1647)  Weight - Scale: 83 6 kg (184 lb 4 9 oz) (08/22/19 1647)  SpO2: 96 % (08/22/19 1647)    Physical Exam   Constitutional: He is oriented to person, place, and time  Vital signs are normal  He appears well-developed and well-nourished  He is cooperative  He does not appear ill  No distress  HENT:   Head: Normocephalic and atraumatic  Eyes: Pupils are equal, round, and reactive to light  EOM are normal    Neck: Normal range of motion  Neck supple  Cardiovascular: Normal rate, regular rhythm, intact distal pulses and normal pulses  Exam reveals no gallop and no distant heart sounds  Murmur heard  Systolic murmur is present with a grade of 1/6  Pulmonary/Chest: Effort normal  No respiratory distress  He has decreased breath sounds in the right lower field and the left lower field  He has no rhonchi  He has no rales  Abdominal: Soft  Normal appearance and bowel sounds are normal  He exhibits no distension and no ascites  There is no tenderness  There is no rigidity and no rebound  Musculoskeletal: Normal range of motion  He exhibits edema  Right ankle: He exhibits swelling  Left ankle: He exhibits swelling  2+ pedal edema noted bilaterally  Neurological: He is alert and oriented to person, place, and time  Skin: Skin is warm, dry and intact  Capillary refill takes less than 2 seconds  He is not diaphoretic  Psychiatric: He has a normal mood and affect  His speech is normal and behavior is normal  Judgment and thought content normal    Vitals reviewed  Additional Data:     Lab Results: I have personally reviewed pertinent reports  Results from last 7 days   Lab Units 08/22/19  1346   WBC Thousand/uL 7 35   HEMOGLOBIN g/dL 9 5*   HEMATOCRIT % 30 6*   PLATELETS Thousands/uL 230   NEUTROS PCT % 66   LYMPHS PCT % 24   MONOS PCT % 8   EOS PCT % 2     Results from last 7 days   Lab Units 08/22/19  1347   SODIUM mmol/L 136   POTASSIUM mmol/L 4 7   CHLORIDE mmol/L 102   CO2 mmol/L 24   BUN mg/dL 37*   CREATININE mg/dL 3 49*   ANION GAP mmol/L 10   CALCIUM mg/dL 8 3   ALBUMIN g/dL 3 1*   TOTAL BILIRUBIN mg/dL 0 40   ALK PHOS U/L 118*   ALT U/L 14   AST U/L 24   GLUCOSE RANDOM mg/dL 121     Results from last 7 days   Lab Units 08/22/19  1347   INR  2 64*     Results from last 7 days   Lab Units 08/22/19  1650   POC GLUCOSE mg/dl 126               Imaging: I have personally reviewed pertinent reports  XR chest 2 views   Final Result by Michael Carpio MD (08/22 9944)         1  Cardiomegaly and small pleural effusions  2   Bilateral upper lobe pleural plaques  3   Increasing parenchymal density in the right upper lobe  Recommend CT scan of the chest to exclude a parenchymal abnormality versus pleural plaques  This is marked for notification in the Epic system  Workstation performed: [id number]             EKG, Pathology, and Other Studies Reviewed on Admission:   · EKG:  Sinus rhythm at rate of 69 beats per minute, first-degree AV block noted in addition to 1 PVC  No ischemic changes noted      Carey Floyd / [address] Records Reviewed: Yes     ** Please Note: This note has been constructed using a voice recognition system   **

## 2019-08-22 NOTE — ASSESSMENT & PLAN NOTE
Initial patient presentation of worsening dyspnea on exertion and lower extremity edema; likely cardiorenal syndrome  XR chest demonstrated cardiomegaly and small pleural effusions  ProBNP 7,901  Troponin 0 11  Previous echo (06/12/2019) demonstrated EF of 55% and grade 1 diastolic dysfunction  · Continue to monitor vitals  · Cardiology consult pending  · Continue telemetry  · Continue to trend troponin; elevation likely secondary to CHF exacerbation, rule out ACS  · Monitor I/0s and weight  · Cardiac diet 2 g sodium and 1500mL restriction  · Diuresis using Lasix 20 mg b i d   · Continue metoprolol

## 2019-08-22 NOTE — ED PROVIDER NOTES
History  Chief Complaint   Patient presents with    Shortness of Breath     pts wife reports SOB since July  was in rehab and d/c'd last Sunday  reports generalized fluid retention since then  81 y/o male presents today with family reporting lower extremity swelling and shortness of breath which has been worsening for weeks  Family reports he was just discharged from physical rehab after a lumbar surgery  He was discharged on Sunday  He has not been on any diuretics previously  Family states the swelling has been worsening for weeks but the doctor in the rehab didn't want to give him a diuretic because his 'kidneys are bad'  History provided by:  Patient  Shortness of Breath   Severity:  Unable to specify  Onset quality:  Gradual  Timing:  Constant  Progression:  Worsening  Chronicity:  New  Relieved by:  None tried  Worsened by:  Nothing  Ineffective treatments:  None tried  Associated symptoms: no abdominal pain, no fever, no headaches, no rash and no sore throat    Risk factors: recent surgery    Risk factors: no hx of cancer        Prior to Admission Medications   Prescriptions Last Dose Informant Patient Reported? Taking?    TRADJENTA 5 MG TABS   No No   Sig: TAKE 1 TABLET (5 MG) BY MOUTH DAILY   amLODIPine (NORVASC) 5 mg tablet   No No   Sig: Take 1 tablet (5 mg total) by mouth daily   aspirin (ECOTRIN LOW STRENGTH) 81 mg EC tablet   No No   Sig: Take 1 tablet (81 mg total) by mouth daily   atorvastatin (LIPITOR) 40 mg tablet  Self No No   Sig: TAKE 1 TABLET BY MOUTH EVERY DAY   Patient taking differently: TAKE 1 TABLET BY MOUTH EVERY EVENING   bisacodyl (DULCOLAX) 10 mg suppository   No No   Sig: Insert 1 suppository (10 mg total) into the rectum daily   docusate sodium (COLACE) 100 mg capsule   No No   Sig: Take 1 capsule (100 mg total) by mouth 2 (two) times a day   finasteride (PROSCAR) 5 mg tablet   No No   Sig: Take 1 tablet (5 mg total) by mouth daily   glucose blood test strip  Self No No   Si each by Other route daily   levothyroxine 75 mcg tablet  Self No No   Sig: TAKE 1 TABLET BY MOUTH EVERY DAY   metoprolol tartrate (LOPRESSOR) 50 mg tablet   No No   Sig: TAKE 1 TABLET BY MOUTH TWICE A DAY WITH FOOD   ranolazine (RANEXA) 500 mg 12 hr tablet   No No   Sig: Take 1 tablet (500 mg total) by mouth every 12 (twelve) hours   senna (SENOKOT) 8 6 mg   No No   Sig: Take 1 tablet (8 6 mg total) by mouth daily at bedtime   tamsulosin (FLOMAX) 0 4 mg  Self No No   Sig: TAKE ONE CAPSULE BY MOUTH ONCE DAILY   Patient taking differently: TAKE ONE CAPSULE BY MOUTH ONCE hs   torsemide (DEMADEX) 20 mg tablet   No No   Sig: Take 1 tablet (20 mg total) by mouth daily   warfarin (COUMADIN) 3 mg tablet   Yes No   Sig: Take 1 5 mg by mouth      Facility-Administered Medications: None       Past Medical History:   Diagnosis Date    3-vessel coronary artery disease     last assessed: 08/15/2016    BPH (benign prostatic hyperplasia)     Chronic kidney disease     Diabetes mellitus ([facility] [address] )     Hyperlipidemia     Hypertension     Shingles 2019    SOB (shortness of breath)     Stroke [facility])        Past Surgical History:   Procedure Laterality Date    CARDIAC PACEMAKER PLACEMENT      CATARACT EXTRACTION      last assessed: 2015    CORONARY ARTERY BYPASS GRAFT      last assessed: 2015       Family History   Problem Relation Age of Onset    Heart disease Mother     Cancer Father         stomach    No Known Problems Sister     No Known Problems Brother     No Known Problems Brother      I have reviewed and agree with the history as documented  Social History     Tobacco Use    Smoking status: Never Smoker    Smokeless tobacco: Never Used   Substance Use Topics    Alcohol use: No    Drug use: No        Review of Systems   Constitutional: Negative for chills, fatigue and fever  HENT: Negative for postnasal drip, sore throat and trouble swallowing      Respiratory: Positive for shortness of breath  Negative for chest tightness  Cardiovascular: Positive for leg swelling  Gastrointestinal: Negative for abdominal pain  Genitourinary: Positive for difficulty urinating (chronic)  Negative for dysuria  Musculoskeletal: Negative for back pain  Skin: Negative for rash  Allergic/Immunologic: Negative for immunocompromised state  Neurological: Negative for dizziness, light-headedness and headaches  Psychiatric/Behavioral: Negative for confusion  Physical Exam  Physical Exam   Constitutional: He is oriented to person, place, and time  He appears well-developed and well-nourished  HENT:   Head: Normocephalic and atraumatic  Mouth/Throat: Uvula is midline, oropharynx is clear and moist and mucous membranes are normal  No tonsillar exudate  Eyes: Pupils are equal, round, and reactive to light  Neck: Normal range of motion  Neck supple  Cardiovascular: Normal rate and regular rhythm  Pulmonary/Chest: Effort normal and breath sounds normal    Abdominal: Soft  Bowel sounds are normal  There is no tenderness  There is no rebound and no guarding  Musculoskeletal: He exhibits no tenderness or deformity  Right lower leg: He exhibits edema (+3)  Left lower leg: He exhibits edema (+3)  Neurological: He is alert and oriented to person, place, and time  Patient moving all extremities equally, no focal neuro deficits noted  Skin: Skin is warm and dry  Psychiatric: He has a normal mood and affect  Nursing note and vitals reviewed        Vital Signs  ED Triage Vitals [08/22/19 1317]   Temperature Pulse Respirations Blood Pressure SpO2   98 2 °F (36 8 °C) 77 20 121/59 99 %      Temp Source Heart Rate Source Patient Position - Orthostatic VS BP Location FiO2 (%)   Oral Monitor -- -- --      Pain Score       No Pain           Vitals:    08/22/19 1317   BP: 121/59   Pulse: 77         Visual Acuity      ED Medications  Medications   furosemide (LASIX) injection 40 mg (40 mg Intravenous Given 8/22/19 1441)       Diagnostic Studies  Results Reviewed     Procedure Component Value Units Date/Time    Troponin I [[id number]]     Lab Status:  No result Specimen:  Blood     B-type natriuretic peptide [[id number]]  (Abnormal) Collected:  08/22/19 1347    Lab Status:  Final result Specimen:  Blood from Arm, Right Updated:  08/22/19 1420     NT-proBNP 7,901 pg/mL     Troponin I [[id number]]  (Abnormal) Collected:  08/22/19 1347    Lab Status:  Final result Specimen:  Blood from Arm, Right Updated:  08/22/19 1414     Troponin I 0 11 ng/mL     Comprehensive metabolic panel [[id number]]  (Abnormal) Collected:  08/22/19 1347    Lab Status:  Final result Specimen:  Blood from Arm, Right Updated:  08/22/19 1412     Sodium 136 mmol/L      Potassium 4 7 mmol/L      Chloride 102 mmol/L      CO2 24 mmol/L      ANION GAP 10 mmol/L      BUN 37 mg/dL      Creatinine 3 49 mg/dL      Glucose 121 mg/dL      Calcium 8 3 mg/dL      AST 24 U/L      ALT 14 U/L      Alkaline Phosphatase 118 U/L      Total Protein 6 7 g/dL      Albumin 3 1 g/dL      Total Bilirubin 0 40 mg/dL      eGFR 15 ml/min/1 73sq m     Narrative:       [place] guidelines for Chronic Kidney Disease (CKD):     Stage 1 with normal or high GFR (GFR > 90 mL/min/1 73 square meters)    Stage 2 Mild CKD (GFR = 60-89 mL/min/1 73 square meters)    Stage 3A Moderate CKD (GFR = 45-59 mL/min/1 73 square meters)    Stage 3B Moderate CKD (GFR = 30-44 mL/min/1 73 square meters)    Stage 4 Severe CKD (GFR = 15-29 mL/min/1 73 square meters)    Stage 5 End Stage CKD (GFR <15 mL/min/1 73 square meters)  Note: GFR calculation is accurate only with a steady state creatinine    Protime-INR [[id number]]  (Abnormal) Collected:  08/22/19 1347    Lab Status:  Final result Specimen:  Blood from Arm, Right Updated:  08/22/19 1406     Protime 27 2 seconds      INR 2 64    APTT [[id number]]  (Abnormal) Collected:  08/22/19 1347    Lab Status:  Final result Specimen:  Blood from Arm, Right Updated:  08/22/19 1406     PTT 42 seconds     CBC and differential [[id number]]  (Abnormal) Collected:  08/22/19 1346    Lab Status:  Final result Specimen:  Blood from Arm, Right Updated:  08/22/19 1357     WBC 7 35 Thousand/uL      RBC 3 14 Million/uL      Hemoglobin 9 5 g/dL      Hematocrit 30 6 %      MCV 98 fL      MCH 30 3 pg      MCHC 31 0 g/dL      RDW 14 4 %      MPV 9 1 fL      Platelets 932 Thousands/uL      nRBC 0 /100 WBCs      Neutrophils Relative 66 %      Immat GRANS % 0 %      Lymphocytes Relative 24 %      Monocytes Relative 8 %      Eosinophils Relative 2 %      Basophils Relative 0 %      Neutrophils Absolute 4 80 Thousands/µL      Immature Grans Absolute 0 03 Thousand/uL      Lymphocytes Absolute 1 73 Thousands/µL      Monocytes Absolute 0 58 Thousand/µL      Eosinophils Absolute 0 18 Thousand/µL      Basophils Absolute 0 03 Thousands/µL                  XR chest 2 views    (Results Pending)              Procedures  ECG 12 Lead Documentation Only  Date/Time: 8/22/2019 2:55 PM  Performed by: Kristin Lee DO  Authorized by: Tammy Haney DO     Indications / Diagnosis:  Shortness of breath  ECG reviewed by me, the ED Provider: yes    Patient location:  ED  Previous ECG:     Previous ECG:  Compared to current  Comments:      Sinus rhythm with a first-degree AV block at 69 beats per minute  Slight leftward axis, slow R progression, no ST T wave abnormalities suggestive of ischemia  QTC is normal   One PVC is noted  No significant change compared to prior from 06/12/2019             ED Course                               MDM  Number of Diagnoses or Management Options  CHF (congestive heart failure) ([facility] [address] ): new and requires workup  Chronic renal failure: new and requires workup  Elevated troponin: new and requires workup  Diagnosis management comments: 2:49 PM  MDM: Patient presents to the Emergency Department and was diagnosed with acute CHF  This is a new problem that will require additional planned work-up in a hospitalized setting  Clinical laboratory testing, radiology imaging, and medical testing (EKG) were ordered  I independently reviewed the radiologic imaging, EKG, and laboratory studies  This case is considered high risk secondary to the above listed disease process that poses a threat to bodily function that requires further diagnostic testing and management which may include the administration of parenteral controlled substances  Discussed with ASHLEY  We had a detailed discussion of the patient's condition and case,  including need for admission  Accepts to his/her service  Bed request/bridging orders placed             Amount and/or Complexity of Data Reviewed  Clinical lab tests: ordered and reviewed  Tests in the radiology section of CPT®: ordered and reviewed  Tests in the medicine section of CPT®: reviewed and ordered  Discuss the patient with other providers: yes  Independent visualization of images, tracings, or specimens: yes    Risk of Complications, Morbidity, and/or Mortality  Presenting problems: high  Diagnostic procedures: high  Management options: high    Patient Progress  Patient progress: stable      Disposition  Final diagnoses:   CHF (congestive heart failure) ([facility] )   Chronic renal failure   Elevated troponin     Time reflects when diagnosis was documented in both MDM as applicable and the Disposition within this note     Time User Action Codes Description Comment    8/22/2019  2:35 PM Mando Simmons Add [I50 9] [facility]     8/22/2019  2:35 PM Mando Simmons Add [N18 9] Chronic renal failure     8/22/2019  2:35 PM Reyes Haney Add [R74 8] Elevated troponin       ED Disposition     ED Disposition Condition Date/Time Comment    Admit Stable Thu Aug 22, 2019  2:49 PM Case was discussed with ASHLEY and the patient's admission status was agreed to be Admission Status: inpatient status to the service of Dr Zuly Basurto   Follow-up Information    None         Patient's Medications   Discharge Prescriptions    TORSEMIDE (DEMADEX) 20 MG TABLET    Take 1 tablet (20 mg total) by mouth daily       Start Date: 8/22/2019 End Date: --       Order Dose: 20 mg       Quantity: 30 tablet    Refills: 5     No discharge procedures on file      ED Provider  Electronically Signed by           Charleen Fraser,   08/22/19 [address] [facility], DO  08/22/19 6947

## 2019-08-22 NOTE — ASSESSMENT & PLAN NOTE
Lab Results   Component Value Date    HGBA1C 6 7 (H) 04/24/2019       Recent Labs     08/22/19  1650   POCGLU 126       Blood Sugar Average: Last 72 hrs:  (P) 126   · Hold Tradjenta  · Hemoglobin A1c in the a m   · Initiate sliding scale insulin  · Continue to monitor blood glucose

## 2019-08-22 NOTE — ASSESSMENT & PLAN NOTE
Prior history of coronary artery bypass graft (2013) and pacemaker (2015)  No complaints of chest pain/pressure  · Continue monitor vitals  · Continue amlodipine, metoprolol and ranolazine  · Continue warfarin  · INR in the a m

## 2019-08-22 NOTE — TELEPHONE ENCOUNTER
Wife called, she did not take him to the ER, she was advised to call the kidney doctor by the rehab center, and she said he is ok now, they are giving him water pill  She is asking if you do home care visits if she cant get him here

## 2019-08-22 NOTE — TELEPHONE ENCOUNTER
Cadence Robles called back, he is wheezing,and filling up with fluids to his knees, I advised 911 as per Dr Raoul Weber instructions

## 2019-08-23 LAB
ANION GAP SERPL CALCULATED.3IONS-SCNC: 6 MMOL/L (ref 4–13)
BACTERIA UR QL AUTO: ABNORMAL /HPF
BILIRUB UR QL STRIP: NEGATIVE
BUN SERPL-MCNC: 38 MG/DL (ref 5–25)
CALCIUM SERPL-MCNC: 8.3 MG/DL (ref 8.3–10.1)
CHLORIDE SERPL-SCNC: 102 MMOL/L (ref 100–108)
CLARITY UR: ABNORMAL
CO2 SERPL-SCNC: 28 MMOL/L (ref 21–32)
COLOR UR: YELLOW
CREAT SERPL-MCNC: 3.61 MG/DL (ref 0.6–1.3)
ERYTHROCYTE [DISTWIDTH] IN BLOOD BY AUTOMATED COUNT: 14.6 % (ref 11.6–15.1)
FERRITIN SERPL-MCNC: 121 NG/ML (ref 8–388)
FERRITIN SERPL-MCNC: 128 NG/ML (ref 8–388)
GFR SERPL CREATININE-BSD FRML MDRD: 14 ML/MIN/1.73SQ M
GLUCOSE SERPL-MCNC: 110 MG/DL (ref 65–140)
GLUCOSE SERPL-MCNC: 110 MG/DL (ref 65–140)
GLUCOSE SERPL-MCNC: 126 MG/DL (ref 65–140)
GLUCOSE SERPL-MCNC: 147 MG/DL (ref 65–140)
GLUCOSE SERPL-MCNC: 155 MG/DL (ref 65–140)
GLUCOSE UR STRIP-MCNC: NEGATIVE MG/DL
HCT VFR BLD AUTO: 30.1 % (ref 36.5–49.3)
HGB BLD-MCNC: 9.4 G/DL (ref 12–17)
HGB UR QL STRIP.AUTO: ABNORMAL
INR PPP: 2.8 (ref 0.84–1.19)
IRON SATN MFR SERPL: 14 %
IRON SERPL-MCNC: 43 UG/DL (ref 65–175)
KETONES UR STRIP-MCNC: NEGATIVE MG/DL
LEUKOCYTE ESTERASE UR QL STRIP: ABNORMAL
MCH RBC QN AUTO: 30.5 PG (ref 26.8–34.3)
MCHC RBC AUTO-ENTMCNC: 31.2 G/DL (ref 31.4–37.4)
MCV RBC AUTO: 98 FL (ref 82–98)
NITRITE UR QL STRIP: NEGATIVE
NON-SQ EPI CELLS URNS QL MICRO: ABNORMAL /HPF
PH UR STRIP.AUTO: 6 [PH]
PLATELET # BLD AUTO: 217 THOUSANDS/UL (ref 149–390)
PMV BLD AUTO: 9.4 FL (ref 8.9–12.7)
POTASSIUM SERPL-SCNC: 3.8 MMOL/L (ref 3.5–5.3)
PROT UR STRIP-MCNC: NEGATIVE MG/DL
PROTHROMBIN TIME: 28.5 SECONDS (ref 11.6–14.5)
RBC # BLD AUTO: 3.08 MILLION/UL (ref 3.88–5.62)
RBC #/AREA URNS AUTO: ABNORMAL /HPF
SODIUM SERPL-SCNC: 136 MMOL/L (ref 136–145)
SP GR UR STRIP.AUTO: 1.01 (ref 1–1.03)
TIBC SERPL-MCNC: 305 UG/DL (ref 250–450)
TROPONIN I SERPL-MCNC: 0.09 NG/ML
UROBILINOGEN UR QL STRIP.AUTO: 0.2 E.U./DL
WBC # BLD AUTO: 7.36 THOUSAND/UL (ref 4.31–10.16)
WBC #/AREA URNS AUTO: ABNORMAL /HPF

## 2019-08-23 PROCEDURE — 80048 BASIC METABOLIC PNL TOTAL CA: CPT | Performed by: NURSE PRACTITIONER

## 2019-08-23 PROCEDURE — 97116 GAIT TRAINING THERAPY: CPT

## 2019-08-23 PROCEDURE — G8979 MOBILITY GOAL STATUS: HCPCS

## 2019-08-23 PROCEDURE — 83540 ASSAY OF IRON: CPT | Performed by: PSYCHIATRY & NEUROLOGY

## 2019-08-23 PROCEDURE — 81001 URINALYSIS AUTO W/SCOPE: CPT | Performed by: NURSE PRACTITIONER

## 2019-08-23 PROCEDURE — G8987 SELF CARE CURRENT STATUS: HCPCS

## 2019-08-23 PROCEDURE — 99223 1ST HOSP IP/OBS HIGH 75: CPT | Performed by: INTERNAL MEDICINE

## 2019-08-23 PROCEDURE — 99232 SBSQ HOSP IP/OBS MODERATE 35: CPT | Performed by: INTERNAL MEDICINE

## 2019-08-23 PROCEDURE — 82948 REAGENT STRIP/BLOOD GLUCOSE: CPT

## 2019-08-23 PROCEDURE — 97163 PT EVAL HIGH COMPLEX 45 MIN: CPT

## 2019-08-23 PROCEDURE — 83550 IRON BINDING TEST: CPT | Performed by: PSYCHIATRY & NEUROLOGY

## 2019-08-23 PROCEDURE — 97167 OT EVAL HIGH COMPLEX 60 MIN: CPT

## 2019-08-23 PROCEDURE — 85610 PROTHROMBIN TIME: CPT | Performed by: PSYCHIATRY & NEUROLOGY

## 2019-08-23 PROCEDURE — G8978 MOBILITY CURRENT STATUS: HCPCS

## 2019-08-23 PROCEDURE — 82728 ASSAY OF FERRITIN: CPT | Performed by: PSYCHIATRY & NEUROLOGY

## 2019-08-23 PROCEDURE — G8988 SELF CARE GOAL STATUS: HCPCS

## 2019-08-23 PROCEDURE — 85027 COMPLETE CBC AUTOMATED: CPT | Performed by: PSYCHIATRY & NEUROLOGY

## 2019-08-23 PROCEDURE — 84484 ASSAY OF TROPONIN QUANT: CPT | Performed by: PSYCHIATRY & NEUROLOGY

## 2019-08-23 RX ORDER — METOPROLOL TARTRATE 50 MG/1
50 TABLET, FILM COATED ORAL 2 TIMES DAILY WITH MEALS
Status: DISCONTINUED | OUTPATIENT
Start: 2019-08-23 | End: 2019-08-27 | Stop reason: HOSPADM

## 2019-08-23 RX ORDER — WARFARIN SODIUM 2.5 MG/1
2.5 TABLET ORAL
Status: DISCONTINUED | OUTPATIENT
Start: 2019-08-23 | End: 2019-08-24

## 2019-08-23 RX ORDER — AMLODIPINE BESYLATE 5 MG/1
5 TABLET ORAL DAILY
Status: DISCONTINUED | OUTPATIENT
Start: 2019-08-24 | End: 2019-08-27 | Stop reason: HOSPADM

## 2019-08-23 RX ORDER — FUROSEMIDE 10 MG/ML
40 INJECTION INTRAMUSCULAR; INTRAVENOUS
Status: DISCONTINUED | OUTPATIENT
Start: 2019-08-23 | End: 2019-08-24

## 2019-08-23 RX ADMIN — FUROSEMIDE 20 MG: 10 INJECTION, SOLUTION INTRAMUSCULAR; INTRAVENOUS at 13:22

## 2019-08-23 RX ADMIN — FUROSEMIDE 40 MG: 10 INJECTION, SOLUTION INTRAMUSCULAR; INTRAVENOUS at 17:26

## 2019-08-23 RX ADMIN — METOPROLOL TARTRATE 50 MG: 50 TABLET, FILM COATED ORAL at 17:26

## 2019-08-23 RX ADMIN — RANOLAZINE 500 MG: 500 TABLET, FILM COATED, EXTENDED RELEASE ORAL at 20:30

## 2019-08-23 RX ADMIN — FUROSEMIDE 20 MG: 10 INJECTION, SOLUTION INTRAMUSCULAR; INTRAVENOUS at 09:10

## 2019-08-23 RX ADMIN — LEVOTHYROXINE SODIUM 75 MCG: 75 TABLET ORAL at 06:17

## 2019-08-23 RX ADMIN — ASPIRIN 81 MG: 81 TABLET, COATED ORAL at 09:09

## 2019-08-23 RX ADMIN — RANOLAZINE 500 MG: 500 TABLET, FILM COATED, EXTENDED RELEASE ORAL at 09:11

## 2019-08-23 RX ADMIN — INSULIN LISPRO 1 UNITS: 100 INJECTION, SOLUTION INTRAVENOUS; SUBCUTANEOUS at 13:24

## 2019-08-23 RX ADMIN — IRON SUCROSE 200 MG: 20 INJECTION, SOLUTION INTRAVENOUS at 21:31

## 2019-08-23 RX ADMIN — TAMSULOSIN HYDROCHLORIDE 0.4 MG: 0.4 CAPSULE ORAL at 09:10

## 2019-08-23 RX ADMIN — DESMOPRESSIN ACETATE 40 MG: 0.2 TABLET ORAL at 17:25

## 2019-08-23 RX ADMIN — AMLODIPINE BESYLATE 5 MG: 5 TABLET ORAL at 09:10

## 2019-08-23 RX ADMIN — WARFARIN SODIUM 2.5 MG: 2.5 TABLET ORAL at 18:18

## 2019-08-23 RX ADMIN — METOPROLOL TARTRATE 50 MG: 50 TABLET, FILM COATED ORAL at 09:11

## 2019-08-23 RX ADMIN — INSULIN GLARGINE 32 UNITS: 100 INJECTION, SOLUTION SUBCUTANEOUS at 09:11

## 2019-08-23 NOTE — SOCIAL WORK
LOS 1 DAY  PATIENT IS A BUNDLE FROM A PREVIOUS ADMISSION  PATIENT IS NOT A 30 DAY READMISSION  CM met with patient at bedside, patient alert and oriented  Patient accompanied by his spouse, daughter Aiden Bronsonr, son-in-law and granddaughter  Patient resides with his spouse in a 2 story home in [place]  There are 2 JYOTSNA and patient is able to navigate the steps and his home with family assistance  Patient's son is in the process of installing a ramp at the entrance to the home  Patient also has a stair glide between floors  Patient reports utilizing a walker or rollator to ambulate and states that he also has a wheelchair and transport chair at home as well as a bedside commode  Patient reports he receives assistance with all ADLs  Patient reports he is current with [facility] for SN/PT/OT/HHA  Patient also reports recent history of SNF at [address] [place] [address] as well as [facility] in [place]  Patient utilizes [facility] on [address], has Rx plan and is able to afford all copays  Patient denies history of MH/substance use  Patient identifies his spouse Stacia Carrion as his POA and states he has AD as well  Patient receives Pension and SSI and his family was providing all transport up until last month when he entered the hospital and rehab and states it's been increasingly difficult for family to transport  Transport will need to be arranged for patient upon discharge  Patient and family understand PT/OT evals are pending at this time but stated that if recommended, they'd definitely be interested in rehab  CM provided Acute and Sub Acute rehab lists to patient and his family at their request and they stated they'd review  CM Department to follow up with family following PT/OT evaluations and obtain referral choices if appropriate  CM Department will continue to assess for needs and will follow through discharge      CM reviewed discharge planning process including the following: identifying caregivers at home, preference for d/c planning needs, availability of treatment team to discuss questions or concerns patient and/or family may have regarding diagnosis, plan of care, old or new medications and discharge planning   CM will continue to follow for care coordination and update assessment as appropriate

## 2019-08-23 NOTE — PLAN OF CARE
Problem: Potential for Falls  Goal: Patient will remain free of falls  Description  INTERVENTIONS:  - Assess patient frequently for physical needs  -  Identify cognitive and physical deficits and behaviors that affect risk of falls    -  [place] fall precautions as indicated by assessment   - Educate patient/family on patient safety including physical limitations  - Instruct patient to call for assistance with activity based on assessment  - Modify environment to reduce risk of injury  - Consider OT/PT consult to assist with strengthening/mobility  Outcome: Progressing     Problem: Prexisting or High Potential for Compromised Skin Integrity  Goal: Skin integrity is maintained or improved  Description  INTERVENTIONS:  - Identify patients at risk for skin breakdown  - Assess and monitor skin integrity  - Assess and monitor nutrition and hydration status  - Monitor labs   - Assess for incontinence   - Turn and reposition patient  - Assist with mobility/ambulation  - Relieve pressure over bony prominences  - Avoid friction and shearing  - Provide appropriate hygiene as needed including keeping skin clean and dry  - Evaluate need for skin moisturizer/barrier cream  - Collaborate with interdisciplinary team   - Patient/family teaching  - Consider wound care consult   Outcome: Progressing     Problem: CARDIOVASCULAR - ADULT  Goal: Maintains optimal cardiac output and hemodynamic stability  Description  INTERVENTIONS:  - Monitor I/O, vital signs and rhythm  - Monitor for S/S and trends of decreased cardiac output  - Administer and titrate ordered vasoactive medications to optimize hemodynamic stability  - Assess quality of pulses, skin color and temperature  - Assess for signs of decreased coronary artery perfusion  - Instruct patient to report change in severity of symptoms  Outcome: Progressing  Goal: Absence of cardiac dysrhythmias or at baseline rhythm  Description  INTERVENTIONS:  - Continuous cardiac monitoring, vital signs, obtain 12 lead EKG if ordered  - Administer antiarrhythmic and heart rate control medications as ordered  - Monitor electrolytes and administer replacement therapy as ordered  Outcome: Progressing     Problem: RESPIRATORY - ADULT  Goal: Achieves optimal ventilation and oxygenation  Description  INTERVENTIONS:  - Assess for changes in respiratory status  - Assess for changes in mentation and behavior  - Position to facilitate oxygenation and minimize respiratory effort  - Oxygen administered by appropriate delivery if ordered  - Initiate smoking cessation education as indicated  - Encourage broncho-pulmonary hygiene including cough, deep breathe, Incentive Spirometry  - Assess the need for suctioning and aspirate as needed  - Assess and instruct to report SOB or any respiratory difficulty  - Respiratory Therapy support as indicated  Outcome: Progressing     Problem: METABOLIC, FLUID AND ELECTROLYTES - ADULT  Goal: Electrolytes maintained within normal limits  Description  INTERVENTIONS:  - Monitor labs and assess patient for signs and symptoms of electrolyte imbalances  - Administer electrolyte replacement as ordered  - Monitor response to electrolyte replacements, including repeat lab results as appropriate  - Instruct patient on fluid and nutrition as appropriate  Outcome: Progressing  Goal: Fluid balance maintained  Description  INTERVENTIONS:  - Monitor labs   - Monitor I/O and WT  - Instruct patient on fluid and nutrition as appropriate  - Assess for signs & symptoms of volume excess or deficit  Outcome: Progressing  Goal: Glucose maintained within target range  Description  INTERVENTIONS:  - Monitor Blood Glucose as ordered  - Assess for signs and symptoms of hyperglycemia and hypoglycemia  - Administer ordered medications to maintain glucose within target range  - Assess nutritional intake and initiate nutrition service referral as needed  Outcome: Progressing

## 2019-08-23 NOTE — UTILIZATION REVIEW
Initial Clinical Review    Admission: Date/Time/Statement: Inpatient Admission Orders (From admission, onward)     Ordered        08/22/19 1448  Inpatient Admission (expected length of stay for this patient Order details is greater than two midnights)  Once                   Orders Placed This Encounter   Procedures    Inpatient Admission (expected length of stay for this patient Order details is greater than two midnights)     Standing Status:   Standing     Number of Occurrences:   1     Order Specific Question:   Admitting Physician     Answer:   Chalino Shin [[id number]]     Order Specific Question:   Level of Care     Answer:   Med Surg [[id number]]     Order Specific Question:   Estimated length of stay     Answer:   More than 2 Midnights     Order Specific Question:   Certification     Answer:   I certify that inpatient services are medically necessary for this patient for a duration of greater than two midnights  See H&P and MD Progress Notes for additional information about the patient's course of treatment  ED Arrival Information     Expected Arrival Acuity Means of Arrival Escorted By Service Admission Type    - 8/22/2019 13:06 Urgent Wheelchair Family Member General Medicine Urgent    Arrival Complaint    sob        Chief Complaint   Patient presents with    Shortness of Breath     pts wife reports SOB since July  was in rehab and d/c'd last Sunday  reports generalized fluid retention since then  Assessment/Plan: 80 yr old male to the ed from home  with family c/o more swelling in his extremities  Previously he was here then went to a hospital In [place] and then to rehab  And no one kept him on diuretic, lasix   Nephrology today told them he should restart his diuretic  Rosella Goldmann He is admitted as an inpatient with acute on chronic heart failure  Plan is iv lasix , telmetry , cardiology to follow, I&0, daily wts  Low salt diet and fluid restriction  ckd- manage chf and see if ckd improves  - trend trops  Cardiology consult  Volume overload is likely a result of worsening renal function  Agree with diuresis  Nephrology consult- elevated cr  - recheck labs today , bladder scan pvr -- iv diuretic  Chest-   1   Cardiomegaly and small pleural effusions  2   Bilateral upper lobe pleural plaques  3   Increasing parenchymal density in the right upper lobe  Recommend CT scan of the chest to exclude a parenchymal abnormality versus pleural plaques    ekg-Sinus rhythm with a first-degree AV block at 69 beats per minute     Slight leftward axis, slow R progression, no ST T wave abnormalities   suggestive of ischemia   QTC is normal   One PVC is noted     ED Triage Vitals   Temperature Pulse Respirations Blood Pressure SpO2   08/22/19 1317 08/22/19 1317 08/22/19 1317 08/22/19 1317 08/22/19 1317   98 2 °F (36 8 °C) 77 20 121/59 99 %      Temp Source Heart Rate Source Patient Position - Orthostatic VS BP Location FiO2 (%)   08/22/19 1317 08/22/19 1317 08/22/19 1513 08/22/19 1513 --   Oral Monitor Lying Right arm       Pain Score       08/22/19 1317       No Pain        Wt Readings from Last 1 Encounters:   08/23/19 83 5 kg (184 lb 1 4 oz)     Additional Vital Signs:   08/23/19 0814              None (Room air)     08/23/19 0722  97 5 °F (36 4 °C)  74  18  167/70  101  96 %  None (Room air)  Lying   08/22/19 2200  97 7 °F (36 5 °C)  75  18  152/62  96  96 %  None (Room air)  Lying   08/22/19 1647  97 7 °F (36 5 °C)  74  18  142/67    96 %  None (Room air)  Sitting   08/22/19 1620              None (Room air)     08/22/19 1513    69  18  141/64    95 %  None (Room air)  Lying   08/22/19 1317  98 2 °F (36 8 °C)  77  20  121/59    99 %  None (Room          Pertinent Labs/Diagnostic Test Results:   Results from last 7 days   Lab Units 08/23/19  0459 08/22/19  1346   WBC Thousand/uL 7 36 7 35   HEMOGLOBIN g/dL 9 4* 9 5*   HEMATOCRIT % 30 1* 30 6*   PLATELETS Thousands/uL 217 230   NEUTROS ABS Thousands/µL  -- 4 80         Results from last 7 days   Lab Units 08/23/19  1058 08/22/19  2337 08/22/19  1347   SODIUM mmol/L 136 136 136   POTASSIUM mmol/L 3 8 4 1 4 7   CHLORIDE mmol/L 102 101 102   CO2 mmol/L 28 28 24   ANION GAP mmol/L 6 7 10   BUN mg/dL 38* 40* 37*   CREATININE mg/dL 3 61* 3 68* 3 49*   EGFR ml/min/1 73sq m 14 14 15   CALCIUM mg/dL 8 3 8 0* 8 3   MAGNESIUM mg/dL  --  1 9  --      Results from last 7 days   Lab Units 08/22/19  1347   AST U/L 24   ALT U/L 14   ALK PHOS U/L 118*   TOTAL PROTEIN g/dL 6 7   ALBUMIN g/dL 3 1*   TOTAL BILIRUBIN mg/dL 0 40     Results from last 7 days   Lab Units 08/23/19  1108 08/23/19  0724 08/22/19  2055 08/22/19  1650   POC GLUCOSE mg/dl 155* 110 175* 126     Results from last 7 days   Lab Units 08/23/19  1058 08/22/19  2337 08/22/19  1347   GLUCOSE RANDOM mg/dL 147* 151* 121         Results from last 7 days   Lab Units 08/22/19  1958   HEMOGLOBIN A1C % 5 5   EAG mg/dl 111     Results from last 7 days   Lab Units 08/23/19  1058 08/22/19  2336 08/22/19  1958 08/22/19  1602 08/22/19  1347   TROPONIN I ng/mL 0 09* 0 10* 0 10* 0 11* 0 11*         Results from last 7 days   Lab Units 08/23/19  0458 08/22/19  1347   PROTIME seconds 28 5* 27 2*   INR  2 80* 2 64*   PTT seconds  --  42*         Results from last 7 days   Lab Units 08/22/19  1347   NT-PRO BNP pg/mL 7,901*     Results from last 7 days   Lab Units 08/23/19  0459 08/22/19  2336   FERRITIN ng/mL 128 121     Results from last 7 days   Lab Units 08/23/19  1110   CLARITY UA  Slightly Cloudy   COLOR UA  Yellow   SPEC GRAV UA  1 015   PH UA  6 0   GLUCOSE UA mg/dl Negative   KETONES UA mg/dl Negative   BLOOD UA  Trace-Intact*   PROTEIN UA mg/dl Negative   NITRITE UA  Negative   BILIRUBIN UA  Negative   UROBILINOGEN UA E U /dl 0 2   LEUKOCYTES UA  Moderate*   WBC UA /hpf 10-20*   RBC UA /hpf 1-2*   BACTERIA UA /hpf Moderate*   EPITHELIAL CELLS WET PREP /hpf Occasional       ED Treatment:   Medication Administration from 08/22/2019 1306 to 08/22/2019 1646       Date/Time Order Dose Route Action Comments     08/22/2019 1441 furosemide (LASIX) injection 40 mg 40 mg Intravenous Given         Past Medical History:   Diagnosis Date    3-vessel coronary artery disease     last assessed: 08/15/2016    BPH (benign prostatic hyperplasia)     Chronic kidney disease     [facility] mellitus ([facility] [address] )     Hyperlipidemia     Hypertension     Shingles 04/29/2019    SOB (shortness of breath)     Stroke ([facility] )      Present on Admission:   3-vessel coronary artery disease   CKD (chronic kidney disease), stage IV ([facility])   Acute on chronic diastolic heart failure ([facility])   Paroxysmal A-fib ([facility])   Hyperlipidemia   Diabetes mellitus with multiple complications ([facility])   Essential hypertension   Hypothyroidism   Elevated troponin I level   Benign prostatic hyperplasia with urinary hesitancy   Abnormal chest x-ray      Admitting Diagnosis: CHF (congestive heart failure) ([facility]) [I50 9]  SOB (shortness of breath) [R06 02]  Chronic renal failure [N18 9]  Elevated troponin [R74 8]  Age/Sex: 80 y o  male  Admission Orders:    Current Facility-Administered Medications:  acetaminophen 650 mg Oral Q6H PRN   [START ON 8/24/2019] amLODIPine 5 mg Oral Daily   aspirin 81 mg Oral Daily   atorvastatin 40 mg Oral QPM   docusate sodium 100 mg Oral BID   furosemide 40 mg Intravenous BID (diuretic)   insulin glargine 32 Units Subcutaneous Daily With Breakfast   insulin lispro 1-6 Units Subcutaneous TID AC   levothyroxine 75 mcg Oral Daily   metoprolol tartrate 50 mg Oral BID With Meals   ondansetron 4 mg Intravenous Q6H PRN   ranolazine 500 mg Oral Q12H DERIAN   tamsulosin 0 4 mg Oral Daily   Daily wt  Urinary retention protocol, bladder scan  fs glucose 4 x daily  scd  telm  1500 fluid restriction daily  Pt/ot      IP CONSULT TO CARDIOLOGY  IP CONSULT TO NEPHROLOGY    Network Utilization Review Department  Phone: [phone];  Fax [phone]  [email or web address]  ATTENTION: Please call with any questions or concerns to [phone]  and carefully listen to the prompts so that you are directed to the right person  Send all requests for admission clinical reviews, approved or denied determinations and any other requests to fax [phone]   All voicemails are confidential

## 2019-08-23 NOTE — ASSESSMENT & PLAN NOTE
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

## 2019-08-23 NOTE — CONSULTS
Consultation - Nephrology   Odalys Maldonado 80 y o  male MRN: [id number]  Unit/Bed#: -01 Encounter: [id number]    ASSESSMENT/PLAN:  Elevated creatinine:  Presented with creatinine of 3 5 which is actually within baseline  Likely related to volume overload with possible post renal component  - creatinine elevated 08/22 to 3 68,    - recheck am labs today    -bladder scan PVR with urinary retention protocol    -check UA   -not on Ace inhibitor/Arb  - placed on IV diuretic, will increase to Lasix 40 mg BID  -renal ultrasound 05/2019 showed bilateral medical renal disease   -avoid nephrotoxins  -I/O     CKD IV:  Follows with Dr Toñito Mejia  Creatinine around 1 9 in 2014 and 2 9 in 2015  More recently 3 3-3 5, concern for progression of disease   -continued outpatient follow-up  Acute on chronic diastolic heart failure:  EF of 55% with grade 1 diastolic dysfunction    - S/P 40 mg IV Lasix x 1    - will increase Lasix to 40 mg BID  -daily weight, I/O   -will placed on 1 5 L per day fluid restriction   -chest x-ray:  Cardiomegaly, small pleural effusions, bilateral upper lobe plaques   -cardiology following  Blood pressure: overall acceptable, slightly above goal this morning   -avoid episodes of hypotension   -continue amlodipine 5 mg daily, metoprolol 50 mg 2 times per day, will place holding parameters for systolic blood pressure less than 130  History of BPH with urinary retention:  -check bladder scans with urinary retention protocol   -continue on Flomax  Anemia:  Hemoglobin is stable in the 90s  -iron panel shows iron of 43, ferritin 128, iron sat of 14, TIBC 305       Other:  Atrial fibrillation, hypothyroidism, hyperlipidemia, diabetes        HISTORY OF PRESENT ILLNESS:  Requesting Physician: Romayne Rink, DO  Reason for Consult: Elevated creatiine    Odalys Maldonado is a 80y o  year old male with past medical history of CKD 4, BPH, hypertension, diabetes, atrial fibrillation, and hypothyroidism, who was admitted to [facility] after presenting with increased lower extremity edema  A renal consultation is requested today for assistance in the management of elevated creatinine on CKD stage 5  The patient states that he has had on and off admissions over the past couple of months  He also had surgery on his herniated disc in July 2018  He had a recent bout of shingles  He has been attending rehab since for decreased mobility  He states that he has had lower extremity edema which has been pretty stable since April of this year  He previously was not on diuretics  He was placed on diuretics yesterday per Dr Liz prior, to the emergency department  He does not check his weights at home  He states that he has lost 2 lb since the last time he was weighed at rehab  He states his weight is typically around 168 lb  He states that he drinks about 2 8 oz glasses of water per day  He denies use of NSAIDs at home  He denies any previous renal history or family history of renal related disease  He denies any chest pain or shortness of breath  He denies nausea, vomiting, diarrhea  He denies any current issues with urination but states that in the past he has required straight catheterization for urinary retention      PAST MEDICAL HISTORY:  Past Medical History:   Diagnosis Date    3-vessel coronary artery disease     last assessed: 08/15/2016    BPH (benign prostatic hyperplasia)     Chronic kidney disease     Diabetes mellitus ([facility] [address] )     Hyperlipidemia     Hypertension     Shingles 04/29/2019    SOB (shortness of breath)     Stroke [facility])        PAST SURGICAL HISTORY:  Past Surgical History:   Procedure Laterality Date    CARDIAC PACEMAKER PLACEMENT      CATARACT EXTRACTION      last assessed: 11/11/2015    CORONARY ARTERY BYPASS GRAFT      last assessed: 08/26/2015       ALLERGIES:  No Known Allergies    SOCIAL HISTORY:  Social History     Substance and Sexual Activity   Alcohol Use No     Social History     Substance and Sexual Activity   Drug Use No     Social History     Tobacco Use   Smoking Status Never Smoker   Smokeless Tobacco Never Used       FAMILY HISTORY:  Family History   Problem Relation Age of Onset    Heart disease Mother     Cancer Father         stomach    No Known Problems Sister     No Known Problems Brother     No Known Problems Brother        MEDICATIONS:    Current Facility-Administered Medications:     acetaminophen (TYLENOL) tablet 650 mg, 650 mg, Oral, Q6H PRN, Jordan C Holter, DO    amLODIPine (NORVASC) tablet 5 mg, 5 mg, Oral, Daily, Jordan C Holter, DO, 5 mg at 08/23/19 0910    aspirin (ECOTRIN LOW STRENGTH) EC tablet 81 mg, 81 mg, Oral, Daily, Jordan C Holter, DO, 81 mg at 08/23/19 0909    atorvastatin (LIPITOR) tablet 40 mg, 40 mg, Oral, QPM, Jordan C Holter, DO, 40 mg at 08/22/19 2058    docusate sodium (COLACE) capsule 100 mg, 100 mg, Oral, BID, Jordan C Holter, DO, 100 mg at 08/22/19 2057    furosemide (LASIX) injection 20 mg, 20 mg, Intravenous, BID (diuretic), Jordan C Holter, DO, 20 mg at 08/23/19 0910    insulin glargine (LANTUS) subcutaneous injection 32 Units 0 32 mL, 32 Units, Subcutaneous, Daily With Breakfast, Jordan C Holter, DO, 32 Units at 08/23/19 0911    insulin lispro (HumaLOG) 100 units/mL subcutaneous injection 1-6 Units, 1-6 Units, Subcutaneous, TID AC **AND** Fingerstick Glucose (POCT), , , TID AC, Jordan C Holter, DO    levothyroxine tablet 75 mcg, 75 mcg, Oral, Daily, Jordan C Holter, DO, 75 mcg at 08/23/19 0617    metoprolol tartrate (LOPRESSOR) tablet 50 mg, 50 mg, Oral, BID With Meals, [place] C Holter, DO, 50 mg at 08/23/19 0911    ondansetron (ZOFRAN) injection 4 mg, 4 mg, Intravenous, Q6H PRN, [place] C Holter, DO    ranolazine (RANEXA) 12 hr tablet 500 mg, 500 mg, Oral, Q12H DERIAN, Jordan C Holter, DO, 500 mg at 08/23/19 0911    tamsulosin (FLOMAX) capsule 0 4 mg, 0 4 mg, Oral, Daily, Jordan C Holter, DO, 0 4 mg at 08/23/19 0910    REVIEW OF SYSTEMS:  A complete review of systems was performed and found to be negative unless otherwise noted in the history of present illness  General: No fevers, chills  Cardiovascular:  - chest pain, + leg edema  Respiratory: No cough, sputum production,  - shortness of breath  Gastrointestinal:  - nausea/vomiting,  - diarrhea,  - abdominal pain  Genitourinary: No hematuria  No foamy urine    No dysuria    PHYSICAL EXAM:  Current Weight: Weight - Scale: 83 5 kg (184 lb 1 4 oz)  First Weight: Weight - Scale: 83 6 kg (184 lb 4 9 oz)  Vitals:    08/22/19 1647 08/22/19 2200 08/23/19 0557 08/23/19 0722   BP: 142/67 152/62  167/70   BP Location: Right arm Right arm  Right arm   Pulse: 74 75  74   Resp: 18 18  18   Temp: 97 7 °F (36 5 °C) 97 7 °F (36 5 °C)  97 5 °F (36 4 °C)   TempSrc: Oral Oral  Oral   SpO2: 96% 96%  96%   Weight: 83 6 kg (184 lb 4 9 oz)  83 5 kg (184 lb 1 4 oz)    Height: 5' 5" (1 651 m)          Intake/Output Summary (Last 24 hours) at 8/23/2019 0939  Last data filed at 8/23/2019 0456  Gross per 24 hour   Intake 0 ml   Output 1400 ml   Net -1400 ml     General: NAD  Skin: warm, dry, intact, no rash  HEENT: Moist mucous membranes, sclera anicteric, normocephalic, atraumatic  Neck: No apparent JVD appreciated  Chest:lung sounds clear B/L, on RA   CVS:Regular rate and rhythm, no murmer   Abdomen: Soft, round, non-tender, +BS  Extremities: B/L LE edema present  Neuro: alert and oriented  Psych: appropriate mood and affect     Invasive Devices:      Lab Results:   Results from last 7 days   Lab Units 08/23/19  0459 08/22/19  2337 08/22/19  1347 08/22/19  1346   WBC Thousand/uL 7 36  --   --  7 35   HEMOGLOBIN g/dL 9 4*  --   --  9 5*   HEMATOCRIT % 30 1*  --   --  30 6*   PLATELETS Thousands/uL 217  --   --  230   POTASSIUM mmol/L  --  4 1 4 7  --    CHLORIDE mmol/L  --  101 102  --    CO2 mmol/L  --  28 24  --    BUN mg/dL  --  40* 37*  --    CREATININE mg/dL  --  3 68* 3 49*  -- CALCIUM mg/dL  --  8 0* 8 3  --    MAGNESIUM mg/dL  --  1 9  --   --    ALK PHOS U/L  --   --  118*  --    ALT U/L  --   --  14  --    AST U/L  --   --  24  --

## 2019-08-23 NOTE — ASSESSMENT & PLAN NOTE
Prior history of coronary artery bypass graft (2013) and pacemaker (2015)  No complaints of chest pain/pressure  · Continue monitor vitals  · Continue amlodipine, metoprolol and ranolazine  · Continue warfarin  · Continue to monitor INR; presently 2 8

## 2019-08-23 NOTE — PLAN OF CARE
Problem: PHYSICAL THERAPY ADULT  Goal: Performs mobility at highest level of function for planned discharge setting  See evaluation for individualized goals  Description  Treatment/Interventions: Functional transfer training, LE strengthening/ROM, Elevations, Therapeutic exercise, Endurance training, Patient/family training, Equipment eval/education, Bed mobility, Gait training  Equipment Recommended: Other (Comment)(roller walker)       See flowsheet documentation for full assessment, interventions and recommendations  Outcome: Progressing  Note:   Prognosis: Fair  Problem List: Decreased strength, Decreased endurance, Decreased range of motion, Impaired balance, Decreased mobility, Decreased coordination, Decreased safety awareness(LE edema)  Assessment: Pt presented due to worsening edema  Dx: abnormal chest x-ray, elevated troponin, a-fib, HTN, DM, and acute on chronic diastolic heart failure  order placed for PT eval and tx, w/ activity order of up as tolerated  pt presents w/ comorbidities of CHF, CAD, DM, hyperlipidemia, HTN, CVA, pacer, CABG, and CKD and personal factors of advanced age, living in 2 story house, mobilizing w/ assistive device, stair(s) to enter home and positive fall history  pt presents w/ weakness, decreased ROM, decreased endurance, impaired balance, gait deviations, impaired coordination, decreased safety awareness, fall risk and LE edema  these impairments are evident in findings from physical examination (weakness, decreased ROM, impaired coordination and edema of extremities), mobility assessment (need for min to mod assist w/ all phases of mobility when usually mobilizing independently, tolerance to only 35 feet of ambulation and need for cueing for mobility technique), and 4 Item DGI score of 4/12  pt needed input for task focus and mobility technique/safety  pt is at risk for falls due to physical and safety awareness deficits   pt's clinical presentation is unstable/unpredictable (evident in need for assist w/ all phases of mobility when usually mobilizing independently, tolerance to only 35 feet of ambulation and need for input for task focus and mobility technique/safety)  pt needs inpatient PT tx to improve mobility deficits  discharge recommendation is for inpatient rehab to reduce fall risk and maximize level of functional independence  Recommendation: Short-term skilled PT          See flowsheet documentation for full assessment

## 2019-08-23 NOTE — ASSESSMENT & PLAN NOTE
Initial blood pressure 121/59-present blood pressure 142/67  · Continue monitor vitals  · Continue amlodipine and metoprolol with parameters

## 2019-08-23 NOTE — PHYSICAL THERAPY NOTE
PHYSICAL THERAPY TREATMENT NOTE    Patient Name: Nevin Candelaria  [id number] Date: 8/23/2019 08/23/19 0472   Pain Assessment   Pain Assessment No/denies pain   Restrictions/Precautions   Braces or Orthoses AFO; Other (Comment)  (pt was unable to don AFOs due to LE edema )   Other Precautions Chair Alarm; Bed Alarm;Telemetry; Fall Risk   General   Chart Reviewed Yes   Family/Caregiver Present Yes   Cognition   Arousal/Participation Alert; Cooperative   Attention Attends with cues to redirect   Orientation Level Oriented to person; Other (Comment)  (pt was identified w/ full name, birth date)   Following Commands Follows one step commands with increased time or repetition   Subjective   Subjective pt agreed to participate in PT intervention  pt was provided w/ education regardin transfer technique and walker management  education was completed w/ verbal instruction and demonstration  Bed Mobility   Sit to Supine 3  Moderate assistance   Additional items Assist x 1; Increased time required;Verbal cues;LE management   Transfers   Sit to Stand 4  Minimal assistance   Additional items Assist x 1; Increased time required;Verbal cues  (for hand placement, LE positioning)   Stand to Sit 4  Minimal assistance   Additional items Assist x 1; Increased time required;Verbal cues  (for hand placement)   Ambulation/Elevation   Gait pattern Forward Flexion  (bilateral steppage)   Gait Assistance 4  Minimal assist  (w/ chair follow)   Additional items Assist x 1;Verbal cues; Tactile cues  (for walker positioning)   Assistive Device Rolling walker   Distance 45 feet  (additional not possible due to fatigue)   Stair Management Assistance Not tested   Balance   Static Sitting Fair +   Static Standing Fair -   Ambulatory Poor +  (w/ roller walker)   Activity Tolerance   Activity Tolerance Patient limited by fatigue   Nurse Made Aware spoke to [address] OT, Justin CM   Assessment   Prognosis Fair   Problem List Decreased strength;Decreased endurance;Decreased range of motion; Impaired balance;Decreased mobility; Decreased coordination;Decreased safety awareness  (LE edema)   Assessment pt was provided w/ education regarding technique regarding walker management and transfers  education was provided due to findings from initial eval  pt shows improvement in mobility status after education w/ increased ambulation distance and decreased level of assist to maintain safety  pt remains at risk for falls  continued inpatient PT is indicated to reduce fall risk  Goals   Patient Goals walk around the house   STG Expiration Date 09/02/19   Short Term Goal #1 pt will: Increase bilateral LE strength 1/2 grade to facilitate independent mobility, Perform all bed mobility tasks w/ supervision to decrease fall risk factors, Perform all transfers w/ supervision to improve independence, Ambulate 150 ft  with least restrictive assistive device w/ supervision w/o LOB to facilitate safe return home, Navigate 2 stairs w/ modx1 with bilateral handrails to facilitate return to previous living environment, Increase all balance 1/2 grade to decrease risk for falls, Complete exercise program independently, Tolerate 3 hr OOB to faciliate upright tolerance, Improve Barthel Index score to 65 or greater to facilitate independence and Increase 4 Item DGI score to 8/12 or greater to decrease risk for falls   Treatment Day 1   Plan   Treatment/Interventions Functional transfer training;LE strengthening/ROM; Elevations; Therapeutic exercise; Endurance training;Patient/family training;Equipment eval/education; Bed mobility;Gait training   Progress Progressing toward goals   PT Frequency 5x/wk   Recommendation   Recommendation Short-term skilled PT   Equipment Recommended Other (Comment)  (roller walker)     Skilled inpatient PT recommended while in hospital to progress pt toward treatment goals      Kaitlyn Aranda Bertha Boys

## 2019-08-23 NOTE — PROGRESS NOTES
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  Benign prostatic hyperplasia with urinary hesitancy  Assessment & Plan  Patient endorsed urinary hesitancy  Managed by outpatient urology  · Continue Flomax  Elevated troponin I level  Assessment & Plan  Troponin 0 11   · Per Cardiology, likely secondary to heart strain in presence of overload  Paroxysmal A-fib [facility])  Assessment & Plan  Prior history of rate controlled AFib and stroke (2014)  Initial EKG demonstrated sinus rhythm and first-degree AV block, consistent with prior EKG  Managed by Dr Jefe Whittington (Cardiology)  Present INR 2 64  · Continue telemetry  · Continue warfarin  · INR 2 8; continue to monitor  · Continue metoprolol  Hypothyroidism  Assessment & Plan  · Continue Synthroid  Essential hypertension  Assessment & Plan  Initial blood pressure 121/59-present blood pressure 142/67  · Continue monitor vitals  · Continue amlodipine and metoprolol with parameters  Hyperlipidemia  Assessment & Plan  · Continue Lipitor  Diabetes mellitus with multiple complications [facility])  Assessment & Plan  Lab Results   Component Value Date    HGBA1C 5 5 08/22/2019       Recent Labs     08/22/19  1650 08/22/19  2055 08/23/19  0724 08/23/19  1108   POCGLU 126 175* 110 155*       Blood Sugar Average: Last 72 hrs:  (P) 141 5   · Hold Tradjenta  · Hemoglobin A1c 5 5    · Initiate sliding scale insulin  · Continue to monitor blood glucose  CKD (chronic kidney disease), stage IV ([facility])  Assessment & Plan  Initial creatinine of 3 5-approximate baseline 3 3-3 5  Managed by Dr Dimas Rea (Nephrology)  · Nephrology consult appreciated-creatinine at baseline, recommendation to continue outpatient nephrology management  · Continue the trend BMP  · Diuresis using IV Lasix 40 mg b i d ; hold torsemide  3-vessel coronary artery disease  Assessment & Plan  Prior history of coronary artery bypass graft (2013) and pacemaker (2015)  No complaints of chest pain/pressure  · Continue monitor vitals  · Continue amlodipine, metoprolol and ranolazine  · Continue warfarin  · Continue to monitor INR; presently 2 8  * Acute on chronic diastolic heart failure (HCC)  Assessment & Plan  Initial patient presentation of worsening dyspnea on exertion and lower extremity edema; likely cardiorenal syndrome  XR chest demonstrated cardiomegaly and small pleural effusions  ProBNP 7,901  Troponin 0 11  Previous echo (06/12/2019) demonstrated EF of 55% and grade 1 diastolic dysfunction  · Continue to monitor vitals  · Cardiology consult appreciated-no evidence of heart failure, overload likely secondary to worsening renal function and retention  Mild troponin elevation likely secondary to elevated creatinine and overload  and retention as a result  No cardiac workup necessary  Defer to Nephrology regarding diuresis  · Nephrology consult appreciated-increase IV Lasix 20 mg to 40 mg b i d  and recommendation for UA/bladder scan with PVR  · Continue telemetry  · Continue to trend troponin; elevation likely secondary to CHF exacerbation, rule out ACS  · Monitor I/0s and weight  · Cardiac diet 2 g sodium and 1500mL restriction  · Diuresis using Lasix 20 mg b i d   · Continue metoprolol             VTE Pharmacologic Prophylaxis:   Pharmacologic: Warfarin (Coumadin)  Mechanical VTE Prophylaxis in Place: Yes    Patient Centered Rounds: I have performed bedside rounds with nursing staff today  Discussions with Specialists or Other Care Team Provider:  Case management  Education and Discussions with Family / Patient:  Patient and patient's spouse  Time Spent for Care: 30 minutes  More than 50% of total time spent on counseling and coordination of care as described above  Current Length of Stay: 1 day(s)    Current Patient Status: Inpatient   Certification Statement: The patient will continue to require additional inpatient hospital stay due to Continuation of IV diuresis in presence of overload  Discharge Plan:  Possible patient discharge in 24-48 hours  Code Status: Level 3 - DNAR and DNI      Subjective:   No complaints stated by the patient at time of exam   He denies shortness of breath/breathing problems, chest pain/pressure/palpitations and abdominal discomfort/nausea  He endorsed appropriate appetite and tolerating diet/restriction well  Patient denied problems voiding and endorsed increased frequency secondary to diuresis  Objective:     Vitals:   Temp (24hrs), Av 8 °F (36 6 °C), Min:97 5 °F (36 4 °C), Max:98 2 °F (36 8 °C)    Temp:  [97 5 °F (36 4 °C)-98 2 °F (36 8 °C)] 97 5 °F (36 4 °C)  HR:  [69-77] 74  Resp:  [18-20] 18  BP: (121-167)/(59-70) 167/70  SpO2:  [95 %-99 %] 96 %  Body mass index is 30 63 kg/m²  Input and Output Summary (last 24 hours): Intake/Output Summary (Last 24 hours) at 2019 1246  Last data filed at 2019 1100  Gross per 24 hour   Intake 240 ml   Output 2150 ml   Net -1910 ml       Physical Exam:     Physical Exam   Constitutional: He is oriented to person, place, and time  Vital signs are normal  He appears well-developed and well-nourished  He is cooperative  He does not appear ill  No distress  HENT:   Head: Normocephalic and atraumatic  Eyes: Pupils are equal, round, and reactive to light  EOM are normal    Neck: Normal range of motion  Neck supple     Cardiovascular: Normal rate, regular rhythm, normal heart sounds, intact distal pulses and normal pulses  Exam reveals no gallop and no friction rub  No murmur heard  Pulmonary/Chest: Effort normal and breath sounds normal  No respiratory distress  He has no decreased breath sounds  He has no rales  Abdominal: Soft  Normal appearance and bowel sounds are normal  He exhibits no distension and no ascites  There is no tenderness  There is no rigidity and no rebound  Musculoskeletal: Normal range of motion  He exhibits edema  Right ankle: He exhibits swelling  Left ankle: He exhibits swelling  1+ pedal edema noted bilaterally  Somewhat improved in comparison to previous examination  2+ pedal pulses noted bilaterally  Neurological: He is alert and oriented to person, place, and time  Skin: Skin is warm, dry and intact  Capillary refill takes less than 2 seconds  Psychiatric: He has a normal mood and affect  His speech is normal and behavior is normal  Judgment and thought content normal    Nursing note and vitals reviewed  Additional Data:     Labs:    Results from last 7 days   Lab Units 08/23/19  0459 08/22/19  1346   WBC Thousand/uL 7 36 7 35   HEMOGLOBIN g/dL 9 4* 9 5*   HEMATOCRIT % 30 1* 30 6*   PLATELETS Thousands/uL 217 230   NEUTROS PCT %  --  66   LYMPHS PCT %  --  24   MONOS PCT %  --  8   EOS PCT %  --  2     Results from last 7 days   Lab Units 08/23/19  1058  08/22/19  1347   SODIUM mmol/L 136   < > 136   POTASSIUM mmol/L 3 8   < > 4 7   CHLORIDE mmol/L 102   < > 102   CO2 mmol/L 28   < > 24   BUN mg/dL 38*   < > 37*   CREATININE mg/dL 3 61*   < > 3 49*   ANION GAP mmol/L 6   < > 10   CALCIUM mg/dL 8 3   < > 8 3   ALBUMIN g/dL  --   --  3 1*   TOTAL BILIRUBIN mg/dL  --   --  0 40   ALK PHOS U/L  --   --  118*   ALT U/L  --   --  14   AST U/L  --   --  24   GLUCOSE RANDOM mg/dL 147*   < > 121    < > = values in this interval not displayed       Results from last 7 days   Lab Units 08/23/19  0458 INR  2 80*     Results from last 7 days   Lab Units 08/23/19  1108 08/23/19  0724 08/22/19  2055 08/22/19  1650   POC GLUCOSE mg/dl 155* 110 175* 126     Results from last 7 days   Lab Units 08/22/19  1958   HEMOGLOBIN A1C % 5 5               * I Have Reviewed All Lab Data Listed Above  * Additional Pertinent Lab Tests Reviewed: All Labs For Current Hospital Admission Reviewed    Imaging:    Imaging Reports Reviewed Today Include:  None  Imaging Personally Reviewed by Myself Includes:  None  Recent Cultures (last 7 days):           Last 24 Hours Medication List:     Current Facility-Administered Medications:  acetaminophen 650 mg Oral Q6H PRN Rudell Clayman C Holter, DO   [START ON 8/24/2019] amLODIPine 5 mg Oral Daily MAURICE Barroso   aspirin 81 mg Oral Daily Rudell Clayman C Holter,    atorvastatin 40 mg Oral QPM Jordan C Holter,    docusate sodium 100 mg Oral BID Jordan C Holter,    furosemide 40 mg Intravenous BID (diuretic) MAURICE Barroso   insulin glargine 32 Units Subcutaneous Daily With Breakfast Jordan C Holter,    insulin lispro 1-6 Units Subcutaneous TID AC Jordan C Holter,    levothyroxine 75 mcg Oral Daily Jordan C Holter,    metoprolol tartrate 50 mg Oral BID With Meals MAURICE Barroso   ondansetron 4 mg Intravenous Q6H PRN Jordan C Holter,    ranolazine 500 mg Oral Q12H [facility] Rudell Clayman C Holter, DO   tamsulosin 0 4 mg Oral Daily Rudell Clayman C Holter, DO        Today, Patient Was Seen By: Autumn Card Holter, DO    ** Please Note: Dictation voice to text software may have been used in the creation of this document   **

## 2019-08-23 NOTE — ASSESSMENT & PLAN NOTE
Initial creatinine of 3 5-approximate baseline 3 3-3 5  Managed by Dr Gemma Rudd (Nephrology)  · Nephrology consult appreciated-creatinine at baseline, recommendation to continue outpatient nephrology management  · Continue the trend BMP  · Diuresis using IV Lasix 40 mg b i d ; hold torsemide

## 2019-08-23 NOTE — PLAN OF CARE
Problem: OCCUPATIONAL THERAPY ADULT  Goal: Performs self-care activities at highest level of function for planned discharge setting  See evaluation for individualized goals  Description  Treatment Interventions: ADL retraining, Functional transfer training, Endurance training, Patient/family training, Equipment evaluation/education, Compensatory technique education, Continued evaluation, Activityengagement  Equipment Recommended: Bedside commode, Tub seat with back, Other (comment)(has hospital bed, stair glide)       See flowsheet documentation for full assessment, interventions and recommendations  Note:   Limitation: Decreased ADL status, Decreased UE strength, Decreased UE ROM, Decreased endurance, Decreased self-care trans, Decreased high-level ADLs     Assessment: Pt is an 79yo male [facility]  Pt presents w/ acute on chronic diastolic heart failure and significant PMH impacting his occupational performance including chronic diastolic CHF, a-fib, s/p CABG, pacemaker, DM, HTN, hx stroke, L2-3 discectomy (July 2019)  Pt reports living w/ wife in [address] [place] [address] w/ first floor set- up PTA and hospital bed, stair glide  Pt reports discharge home from rehab and is using RW for functional mobility wearing B AFO's, custom shoes  Pt needs assistance w/ ADL/ IADL  Upon eval, pt completed bed mobility w/ min A  Pt completed LBD w/ max A and UBD w/ min A  Pt demonstrated limited AROM B shoulders L>R  Pt completed sit <> stand w/ min A and engaged in functional mobility short distances w/ min A  Pt alert and oriented to person, place consistently and able to report his birthdate  Pt demonstrated appropriate long term recall and able to introduce family present   Pt presents w/ decreased LE strength, increased edema, decreased standing tolerance, decreased standing balance, decreased activity tolerance, decreased endurance impacting his I and safety w/ dressing, bathing, functional mobility, functional transfers, activity engagement, clothing mgmt, oral hygiene, feeding, and food prep / clean up  Pt would benefit from OT while in acute care to address deficits and post acute rehab when medically stable for discharge from acute care   Will continue to follow     OT Discharge Recommendation: Other (Comment)(post acute rehab)  OT - OK to Discharge: (post acute rehab when stable)

## 2019-08-23 NOTE — PLAN OF CARE
Problem: PHYSICAL THERAPY ADULT  Goal: Performs mobility at highest level of function for planned discharge setting  See evaluation for individualized goals  Description  Treatment/Interventions: Functional transfer training, LE strengthening/ROM, Elevations, Therapeutic exercise, Endurance training, Patient/family training, Equipment eval/education, Bed mobility, Gait training  Equipment Recommended: Other (Comment)(roller walker)       See flowsheet documentation for full assessment, interventions and recommendations  8/23/2019 1629 by Cheko Faith PT  Outcome: Progressing  Note:   Prognosis: Fair  Problem List: Decreased strength, Decreased endurance, Decreased range of motion, Impaired balance, Decreased mobility, Decreased coordination, Decreased safety awareness(LE edema)  Assessment: pt was provided w/ education regarding technique regarding walker management and transfers  education was provided due to findings from initial eval  pt shows improvement in mobility status after education w/ increased ambulation distance and decreased level of assist to maintain safety  pt remains at risk for falls  continued inpatient PT is indicated to reduce fall risk  Recommendation: Short-term skilled PT          See flowsheet documentation for full assessment  8/23/2019 1621 by Cheko Faith PT  Outcome: Progressing  Note:   Prognosis: Fair  Problem List: Decreased strength, Decreased endurance, Decreased range of motion, Impaired balance, Decreased mobility, Decreased coordination, Decreased safety awareness(LE edema)  Assessment: Pt presented due to worsening edema  Dx: abnormal chest x-ray, elevated troponin, a-fib, HTN, DM, and acute on chronic diastolic heart failure  order placed for PT eval and tx, w/ activity order of up as tolerated   pt presents w/ comorbidities of CHF, CAD, DM, hyperlipidemia, HTN, CVA, pacer, CABG, and CKD and personal factors of advanced age, living in 2 story house, mobilizing w/ assistive device, stair(s) to enter home and positive fall history  pt presents w/ weakness, decreased ROM, decreased endurance, impaired balance, gait deviations, impaired coordination, decreased safety awareness, fall risk and LE edema  these impairments are evident in findings from physical examination (weakness, decreased ROM, impaired coordination and edema of extremities), mobility assessment (need for min to mod assist w/ all phases of mobility when usually mobilizing independently, tolerance to only 35 feet of ambulation and need for cueing for mobility technique), and 4 Item DGI score of 4/12  pt needed input for task focus and mobility technique/safety  pt is at risk for falls due to physical and safety awareness deficits  pt's clinical presentation is unstable/unpredictable (evident in need for assist w/ all phases of mobility when usually mobilizing independently, tolerance to only 35 feet of ambulation and need for input for task focus and mobility technique/safety)  pt needs inpatient PT tx to improve mobility deficits  discharge recommendation is for inpatient rehab to reduce fall risk and maximize level of functional independence  Recommendation: Short-term skilled PT          See flowsheet documentation for full assessment

## 2019-08-23 NOTE — PHYSICAL THERAPY NOTE
PHYSICAL THERAPY EVALUATION NOTE    Patient Name: Medhat Ackerman  FWLFZ'U Date: 8/23/2019  AGE:   80 y o  Mrn:   [id number]  ADMIT DX:  CHF (congestive heart failure) (HCC) [I50 9]  SOB (shortness of breath) [R06 02]  Chronic renal failure [N18 9]  Elevated troponin [R74 8]    Past Medical History:   Diagnosis Date    3-vessel coronary artery disease     last assessed: 08/15/2016    BPH (benign prostatic hyperplasia)     Chronic kidney disease     Diabetes mellitus ([facility] )     Hyperlipidemia     Hypertension     Shingles 04/29/2019    SOB (shortness of breath)     Stroke [facility])      Length Of Stay: 1  PHYSICAL THERAPY EVALUATION :    08/23/19 1543   Pain Assessment   Pain Assessment No/denies pain   Home Living   Type of [address] [place] [address] Two level;1/2 bath on main level;Bed/bath upstairs; Other (Comment)  (2 JYOTSNA  stairglide to 2nd floor )   Additional Comments lives w/ spouse  family is supportive  ambulates w/ roller walker and bilateral AFOs  owns cane  1 fall in last 6 months  Prior Function   Comments pt seen supine in bed w/ spouse and family present  denied pain or dizziness  pt wants to walk around the house  Restrictions/Precautions   Braces or Orthoses AFO; Other (Comment)  (pt was unable to don AFOs due to LE edema )   Other Precautions Chair Alarm; Bed Alarm;Telemetry; Fall Risk   General   Additional Pertinent History room air resting pulse ox 96% and 70 BPM    Family/Caregiver Present Yes   Cognition   Arousal/Participation Cooperative   Orientation Level Oriented to person; Other (Comment)  (pt was identified w/ full name, birth date)   Comments 2+ edema B LEs (worse on right)   RLE Assessment   RLE Assessment X  (3+/5, ankle PF 3/5 DF 3-/5)   LLE Assessment   LLE Assessment X  (3+/5, ankle PF 3/5 DF 3-/5)   Coordination   Movements are Fluid and Coordinated 0   Coordination and Movement Description impaired coordination all extremities   Light Touch   RLE Light Touch Grossly intact   LLE Light Touch Grossly intact   Bed Mobility   Supine to Sit 4  Minimal assistance   Additional items Assist x 1;HOB elevated; Bedrails; Increased time required;Verbal cues;LE management   Transfers   Sit to Stand 3  Moderate assistance   Additional items Assist x 1; Increased time required   Stand to Sit 4  Minimal assistance   Additional items Assist x 1; Increased time required   Additional Comments 4 Item DGI: 4/12   Ambulation/Elevation   Gait pattern Forward Flexion;Decreased foot clearance  (bilateral steppage - likely related to inability to don AFOs)   Gait Assistance 3  Moderate assist   Additional items Assist x 1  (w/ chair follow)   Assistive Device Rolling walker   Distance 35, 20 feet w/ seated rest break x 3 minutes  (additional not possible due to fatigue)   Stair Management Assistance Not tested  (due to limited ambulation distance)   Balance   Static Sitting Fair +   Static Standing Poor +   Ambulatory Poor  (w/ roller walker)   Activity Tolerance   Activity Tolerance Patient limited by fatigue   Nurse Made Aware spoke to Tigist Patiño OT, Tirzi CM   Assessment   Prognosis Fair   Problem List Decreased strength;Decreased endurance;Decreased range of motion; Impaired balance;Decreased mobility; Decreased coordination;Decreased safety awareness  (LE edema)   Assessment Pt presented due to worsening edema  Dx: abnormal chest x-ray, elevated troponin, a-fib, HTN, DM, and acute on chronic diastolic heart failure  order placed for PT eval and tx, w/ activity order of up as tolerated  pt presents w/ comorbidities of CHF, CAD, DM, hyperlipidemia, HTN, CVA, pacer, CABG, and CKD and personal factors of advanced age, living in 2 story house, mobilizing w/ assistive device, stair(s) to enter home and positive fall history   pt presents w/ weakness, decreased ROM, decreased endurance, impaired balance, gait deviations, impaired coordination, decreased safety awareness, fall risk and LE edema  these impairments are evident in findings from physical examination (weakness, decreased ROM, impaired coordination and edema of extremities), mobility assessment (need for min to mod assist w/ all phases of mobility when usually mobilizing independently, tolerance to only 35 feet of ambulation and need for cueing for mobility technique), and 4 Item DGI score of 4/12  pt needed input for task focus and mobility technique/safety  pt is at risk for falls due to physical and safety awareness deficits  pt's clinical presentation is unstable/unpredictable (evident in need for assist w/ all phases of mobility when usually mobilizing independently, tolerance to only 35 feet of ambulation and need for input for task focus and mobility technique/safety)  pt needs inpatient PT tx to improve mobility deficits  discharge recommendation is for inpatient rehab to reduce fall risk and maximize level of functional independence  Goals   Patient Goals walk around the house   STG Expiration Date 09/02/19   Short Term Goal #1 pt will: Increase bilateral LE strength 1/2 grade to facilitate independent mobility, Perform all bed mobility tasks w/ supervision to decrease fall risk factors, Perform all transfers w/ supervision to improve independence, Ambulate 150 ft  with least restrictive assistive device w/ supervision w/o LOB to facilitate safe return home, Navigate 2 stairs w/ modx1 with bilateral handrails to facilitate return to previous living environment, Increase all balance 1/2 grade to decrease risk for falls, Complete exercise program independently, Tolerate 3 hr OOB to faciliate upright tolerance, Improve Barthel Index score to 65 or greater to facilitate independence and Increase 4 Item DGI score to 8/12 or greater to decrease risk for falls   Plan   Treatment/Interventions Functional transfer training;LE strengthening/ROM; Elevations; Therapeutic exercise; Endurance training;Patient/family training;Equipment eval/education; Bed mobility;Gait training   PT Frequency 5x/wk   Recommendation   Recommendation Short-term skilled PT   Equipment Recommended Other (Comment)  (roller walker)   Barthel Index   Feeding 5   Bathing 0   Grooming Score 0   Dressing Score 0   Bladder Score 10   Bowels Score 10   Toilet Use Score 5   Transfers (Bed/Chair) Score 10   Mobility (Level Surface) Score 0   Stairs Score 0   Barthel Index Score 40     Skilled PT recommended while in hospital and upon DC to progress pt toward treatment goals       Leeanna Olvera, PT

## 2019-08-23 NOTE — CONSULTS
Consultation - Cardiology   Paris Gravely 80 y o  male MRN: [id number]  Unit/Bed#: -01 Encounter: [id number]    Assessment/Plan     Principal Problem:    Acute on chronic diastolic heart failure ([facility] )  Active Problems:    3-vessel coronary artery disease    CKD (chronic kidney disease), stage IV ([facility] )    Diabetes mellitus with multiple complications ([facility] 75 )    Hyperlipidemia    Essential hypertension    Hypothyroidism    Paroxysmal A-fib (HCC)    Elevated troponin I level    Benign prostatic hyperplasia with urinary hesitancy    Abnormal chest x-ray      Assessment/Plan    1  Volume overload-primarily lower extremity edema  Progressive CKD  Also has some diastolic dysfunction on his recent echo  There may be a  component of iatrogenic volume overload  He has had several hospitalizations in the past few months and 2 with diagnosis of sepsis  On Lasix 20 IV b i d  After receiving a 40 IV dose yesterday in the ED  Will defer diuresis to Nephrology  They will be increasing his Lasix to 40 IV b i d  2  CKD 4-creatinine had been in range of 2 5-3  Creatinine issues been running 3 5 closer to 4  He is followed by Dr Brody Giles  Deferred Nephrology if further workup is necessary  3  Non MI troponin elevation-due to heart failure/volume overload/CKD    4  CAD with history of CABG  Pharmacological Myoview stress test 06/2019-small amount of ischemia basal inferior wall  2D echo 06/2018-normal AV function with grade 1 diastolic dysfunction  Mild MR  On aspirin 81, atorvastatin 40, BB, Ranexa 500 b i d  Patient without complaints of chest pain  5  PAF/PPM ( dual chamber Med[employer]onic)  -in sinus rhythm  On Coumadin, metoprolol  INR 2 8     6  Diabetes mellitus type 2-for primary team    7  HTN--160    On Norvasc 5, Lopressor 50 b i d   Monitor with diuresis    History of Present Illness   Physician Requesting Consult: Inderjit Arauz DO  Reason for Consult / Principal Problem:  Heart failure    HPI: Erika Jiang is a 80y o  year old male with chronic diastolic heart failure, CKD 4, atrial fibrillation, CAD with prior CABG 2013, PPM, HTN, HLD, diabetes who presents with worsening dyspnea and lower extremity edema  The patient adamantly denies having worsening shortness of breath to me  Wife reports that he has been having lower extremity edema for several months which has been worsening  He has had several hospitalizations in the last few months and 2 had a diagnosis of sepsis  Suspect additional volume was given at that time  There is also hospitalization with atypical chest pain shortness of breath and appears that he had few doses of IV diuretics  He was not discharged on diuretics  His creatinine of 3 68 with BUN of 40  ProBNP of 7900 previously 2700 when not volume overloaded  Troponin 0 10 x 4  Weight is currently 184 lb  Three months ago 177  Chest x-ray-cardiomegaly and small bilateral pleural effusions  There is also bilateral upper lobe pleural plaques  Increasing parenchymal density in the right upper lobe  PPM interrogation 08/2019 primarily a paced  No high rate episodes  06/2019 2D echo EF 55% with grade 1 diastolic dysfunction  RV size and function normal   Mild MR     06/2019-stress test done in the setting of atypical chest pain with troponin of 0 1 and weakness  Small amount of ischemia a basal inferior wall  It was not felt to be causing the patient's dyspnea weakness and atypical chest pain  Ranexa 500 mg b i d  Was started  Was also mention that they would not want to proceed with coronary angiography due to underlying CKD  Inpatient consult to Cardiology  Consult performed by: MAURICE Montgomery  Consult ordered by: [place] C Holter, DO          Review of Systems   Constitutional: Positive for unexpected weight change  HENT: Negative  Eyes: Negative  Respiratory: Negative for shortness of breath  Cardiovascular: Positive for leg swelling   Negative for chest pain and palpitations  Gastrointestinal: Negative  Genitourinary: Negative  Musculoskeletal: Positive for gait problem  Hematological: Bruises/bleeds easily  Psychiatric/Behavioral: Negative          Historical Information   Past Medical History:   Diagnosis Date    3-vessel coronary artery disease     last assessed: 08/15/2016    BPH (benign prostatic hyperplasia)     Chronic kidney disease     [facility] mellitus ([facility] )     Hyperlipidemia     Hypertension     Shingles 04/29/2019    SOB (shortness of breath)     Stroke [facility])      Past Surgical History:   Procedure Laterality Date    CARDIAC PACEMAKER PLACEMENT      CATARACT EXTRACTION      last assessed: 11/11/2015    CORONARY ARTERY BYPASS GRAFT      last assessed: 08/26/2015     Social History     Substance and Sexual Activity   Alcohol Use No     Social History     Substance and Sexual Activity   Drug Use No     Social History     Tobacco Use   Smoking Status Never Smoker   Smokeless Tobacco Never Used     Family History:   Family History   Problem Relation Age of Onset    Heart disease Mother     Cancer Father         stomach    No Known Problems Sister     No Known Problems Brother     No Known Problems Brother        Meds/Allergies   current meds:   Current Facility-Administered Medications   Medication Dose Route Frequency    acetaminophen (TYLENOL) tablet 650 mg  650 mg Oral Q6H PRN    amLODIPine (NORVASC) tablet 5 mg  5 mg Oral Daily    aspirin (ECOTRIN LOW STRENGTH) EC tablet 81 mg  81 mg Oral Daily    atorvastatin (LIPITOR) tablet 40 mg  40 mg Oral QPM    docusate sodium (COLACE) capsule 100 mg  100 mg Oral BID    furosemide (LASIX) injection 20 mg  20 mg Intravenous BID (diuretic)    insulin glargine (LANTUS) subcutaneous injection 32 Units 0 32 mL  32 Units Subcutaneous Daily With Breakfast    insulin lispro (HumaLOG) 100 units/mL subcutaneous injection 1-6 Units  1-6 Units Subcutaneous TID AC    levothyroxine tablet 75 mcg  75 mcg Oral Daily    metoprolol tartrate (LOPRESSOR) tablet 50 mg  50 mg Oral BID With Meals    ondansetron (ZOFRAN) injection 4 mg  4 mg Intravenous Q6H PRN    ranolazine (RANEXA) 12 hr tablet 500 mg  500 mg Oral Q12H [address]    tamsulosin (FLOMAX) capsule 0 4 mg  0 4 mg Oral Daily    and PTA meds:    Medications Prior to Admission   Medication    amLODIPine (NORVASC) 5 mg tablet    aspirin (ECOTRIN LOW STRENGTH) 81 mg EC tablet    atorvastatin (LIPITOR) 40 mg tablet    docusate sodium (COLACE) 100 mg capsule    finasteride (PROSCAR) 5 mg tablet    glucose blood test strip    levothyroxine 75 mcg tablet    metoprolol tartrate (LOPRESSOR) 50 mg tablet    ranolazine (RANEXA) 500 mg 12 hr tablet    tamsulosin (FLOMAX) 0 4 mg    torsemide (DEMADEX) 20 mg tablet    TRADJENTA 5 MG TABS    warfarin (COUMADIN) 3 mg tablet    bisacodyl (DULCOLAX) 10 mg suppository    senna (SENOKOT) 8 6 mg     No Known Allergies    Objective   Vitals: Blood pressure 167/70, pulse 74, temperature 97 5 °F (36 4 °C), temperature source Oral, resp  rate 18, height 5' 5" (1 651 m), weight 83 5 kg (184 lb 1 4 oz), SpO2 96 %    Orthostatic Blood Pressures      Most Recent Value   Blood Pressure  167/70 filed at 08/23/2019 6844   Patient Position - Orthostatic VS  Lying filed at 08/23/2019 7158            Intake/Output Summary (Last 24 hours) at 8/23/2019 0930  Last data filed at 8/23/2019 0456  Gross per 24 hour   Intake 0 ml   Output 1400 ml   Net -1400 ml       Invasive Devices     Peripheral Intravenous Line            Peripheral IV 08/22/19 Right;Dorsal (posterior) Hand less than 1 day                Physical Exam: /70 (BP Location: Right arm)   Pulse 74   Temp 97 5 °F (36 4 °C) (Oral)   Resp 18   Ht 5' 5" (1 651 m)   Wt 83 5 kg (184 lb 1 4 oz)   SpO2 96%   BMI 30 63 kg/m²   General Appearance:    Alert, cooperative, no distress, appears stated age   Head:    Normocephalic, no scleral icterus   Eyes:    PERRL   Nose:   Nares normal, septum midline, mucosa normal, no drainage    Throat:   Lips, mucosa, and tongue normal   Neck:   Supple, symmetrical, trachea midline        Back:     Symmetric   Lungs:     Clear to auscultation bilaterally, respirations unlabored   Chest Wall:    No tenderness or deformity    Heart:    Regular rate and rhythm, S1 and S2 normal, no murmur, rub   or gallop   Abdomen:     Soft, non-tender, bowel sounds active all four quadrants,     no masses, no organomegaly   Extremities:   Extremities normal, atraumatic, no cyanosis , 2+ entire leg edema   Pulses:   2+ and symmetric all extremities   Skin:   Skin color, texture, turgor normal, no rashes or lesions   Neurologic:   Alert and oriented to person place and time   No focal deficits       Lab Results:   Recent Results (from the past 72 hour(s))   CBC and differential    Collection Time: 08/22/19  1:46 PM   Result Value Ref Range    WBC 7 35 4 31 - 10 16 Thousand/uL    RBC 3 14 (L) 3 88 - 5 62 Million/uL    Hemoglobin 9 5 (L) 12 0 - 17 0 g/dL    Hematocrit 30 6 (L) 36 5 - 49 3 %    MCV 98 82 - 98 fL    MCH 30 3 26 8 - 34 3 pg    MCHC 31 0 (L) 31 4 - 37 4 g/dL    RDW 14 4 11 6 - 15 1 %    MPV 9 1 8 9 - 12 7 fL    Platelets 208 184 - 269 Thousands/uL    nRBC 0 /100 WBCs    Neutrophils Relative 66 43 - 75 %    Immat GRANS % 0 0 - 2 %    Lymphocytes Relative 24 14 - 44 %    Monocytes Relative 8 4 - 12 %    Eosinophils Relative 2 0 - 6 %    Basophils Relative 0 0 - 1 %    Neutrophils Absolute 4 80 1 85 - 7 62 Thousands/µL    Immature Grans Absolute 0 03 0 00 - 0 20 Thousand/uL    Lymphocytes Absolute 1 73 0 60 - 4 47 Thousands/µL    Monocytes Absolute 0 58 0 17 - 1 22 Thousand/µL    Eosinophils Absolute 0 18 0 00 - 0 61 Thousand/µL    Basophils Absolute 0 03 0 00 - 0 10 Thousands/µL   Comprehensive metabolic panel    Collection Time: 08/22/19  1:47 PM   Result Value Ref Range    Sodium 136 136 - 145 mmol/L    Potassium 4 7 3 5 - 5 3 mmol/L    Chloride 102 100 - 108 mmol/L    CO2 24 21 - 32 mmol/L    ANION GAP 10 4 - 13 mmol/L    BUN 37 (H) 5 - 25 mg/dL    Creatinine 3 49 (H) 0 60 - 1 30 mg/dL    Glucose 121 65 - 140 mg/dL    Calcium 8 3 8 3 - 10 1 mg/dL    AST 24 5 - 45 U/L    ALT 14 12 - 78 U/L    Alkaline Phosphatase 118 (H) 46 - 116 U/L    Total Protein 6 7 6 4 - 8 2 g/dL    Albumin 3 1 (L) 3 5 - 5 0 g/dL    Total Bilirubin 0 40 0 20 - 1 00 mg/dL    eGFR 15 ml/min/1 73sq m   Troponin I    Collection Time: 08/22/19  1:47 PM   Result Value Ref Range    Troponin I 0 11 (H) <=0 04 ng/mL   B-type natriuretic peptide    Collection Time: 08/22/19  1:47 PM   Result Value Ref Range    NT-proBNP 7,901 (H) <450 pg/mL   Protime-INR    Collection Time: 08/22/19  1:47 PM   Result Value Ref Range    Protime 27 2 (H) 11 6 - 14 5 seconds    INR 2 64 (H) 0 84 - 1 19   APTT    Collection Time: 08/22/19  1:47 PM   Result Value Ref Range    PTT 42 (H) 23 - 37 seconds   Troponin I    Collection Time: 08/22/19  4:02 PM   Result Value Ref Range    Troponin I 0 11 (H) <=0 04 ng/mL   Fingerstick Glucose (POCT)    Collection Time: 08/22/19  4:50 PM   Result Value Ref Range    POC Glucose 126 65 - 140 mg/dl   Troponin I    Collection Time: 08/22/19  7:58 PM   Result Value Ref Range    Troponin I 0 10 (H) <=0 04 ng/mL   Hemoglobin A1c w/EAG Estimation (Orders if not completed within the last 90 days)    Collection Time: 08/22/19  7:58 PM   Result Value Ref Range    Hemoglobin A1C 5 5 4 2 - 6 3 %     mg/dl   Fingerstick Glucose (POCT)    Collection Time: 08/22/19  8:55 PM   Result Value Ref Range    POC Glucose 175 (H) 65 - 140 mg/dl   Troponin I    Collection Time: 08/22/19 11:36 PM   Result Value Ref Range    Troponin I 0 10 (H) <=0 04 ng/mL   Ferritin    Collection Time: 08/22/19 11:36 PM   Result Value Ref Range    Ferritin 121 8 - 388 ng/mL   Basic metabolic panel    Collection Time: 08/22/19 11:37 PM   Result Value Ref Range    Sodium 136 136 - 145 mmol/L    Potassium 4 1 3 5 - 5 3 mmol/L    Chloride 101 100 - 108 mmol/L    CO2 28 21 - 32 mmol/L    ANION GAP 7 4 - 13 mmol/L    BUN 40 (H) 5 - 25 mg/dL    Creatinine 3 68 (H) 0 60 - 1 30 mg/dL    Glucose 151 (H) 65 - 140 mg/dL    Calcium 8 0 (L) 8 3 - 10 1 mg/dL    eGFR 14 ml/min/1 73sq m   Magnesium    Collection Time: 19 11:37 PM   Result Value Ref Range    Magnesium 1 9 1 6 - 2 6 mg/dL   Protime-INR    Collection Time: 19  4:58 AM   Result Value Ref Range    Protime 28 5 (H) 11 6 - 14 5 seconds    INR 2 80 (H) 0 84 - 1 19   CBC (With Platelets)    Collection Time: 19  4:59 AM   Result Value Ref Range    WBC 7 36 4 31 - 10 16 Thousand/uL    RBC 3 08 (L) 3 88 - 5 62 Million/uL    Hemoglobin 9 4 (L) 12 0 - 17 0 g/dL    Hematocrit 30 1 (L) 36 5 - 49 3 %    MCV 98 82 - 98 fL    MCH 30 5 26 8 - 34 3 pg    MCHC 31 2 (L) 31 4 - 37 4 g/dL    RDW 14 6 11 6 - 15 1 %    Platelets 674 955 - 371 Thousands/uL    MPV 9 4 8 9 - 12 7 fL   Fingerstick Glucose (POCT)    Collection Time: 19  7:24 AM   Result Value Ref Range    POC Glucose 110 65 - 140 mg/dl     [place] [address] [address] [place] [address]  [phone]     Transthoracic Echocardiogram  2D, M-mode, Doppler, and Color Doppler     Study date:  2019     Patient: Allie Hernandez  MR number: [id number]  Account number: [de-identified]  : 1932  Age: 80 years  Gender: Male  Status: Outpatient  Location: Bedside  Height: 65 in  Weight: 168 lb  BP: 136/ 63 mmHg     Indications: CAD     Diagnoses: I25 119 - Atherosclerotic heart disease of native coronary artery with unspecified angina pectoris     Sonographer:  FRAN Valles  Primary Physician:  Socorro Houston MD  Referring Physician:  Khanh Amaral PA-C  Group:  [facility]  Interpreting Physician:  Dylan Hernández MD     SUMMARY     LEFT VENTRICLE:  Size was normal   Systolic function was normal  Ejection fraction was estimated to be 55 %  There was mild concentric hypertrophy  Doppler parameters were consistent with abnormal left ventricular relaxation (grade 1 diastolic dysfunction)      VENTRICULAR SEPTUM:  There was dyssynergic motion  These changes are consistent with a post-thoracotomy state      RIGHT VENTRICLE:  The size was normal   Systolic function was normal   A pacing wire was present      MITRAL VALVE:  There was mild regurgitation      TRICUSPID VALVE:  There was trace regurgitation      HISTORY: PRIOR HISTORY: Chest Pain; 3-vessel CAD; CKD; DM2; HTN; HLD; Pacemaker     PROCEDURE: The procedure was performed at the bedside  This was a routine study  The transthoracic approach was used  The study included complete 2D imaging, M-mode, complete spectral Doppler, and color Doppler  The heart rate was 84 bpm,  at the start of the study  Images were obtained from the parasternal, apical, subcostal, and suprasternal notch acoustic windows  Image quality was adequate      LEFT VENTRICLE: Size was normal  Systolic function was normal  Ejection fraction was estimated to be 55 %  There were no regional wall motion abnormalities  Wall thickness was mildly increased  There was mild concentric hypertrophy  DOPPLER: Doppler parameters were consistent with abnormal left ventricular relaxation (grade 1 diastolic dysfunction)      VENTRICULAR SEPTUM: There was dyssynergic motion  These changes are consistent with a post-thoracotomy state      RIGHT VENTRICLE: The size was normal  Systolic function was normal  Wall thickness was normal  A pacing wire was present      LEFT ATRIUM: Size was normal      RIGHT ATRIUM: Size was normal      MITRAL VALVE: Valve structure was normal  There was normal leaflet separation  DOPPLER: The transmitral velocity was within the normal range  There was no evidence for stenosis  There was mild regurgitation      AORTIC VALVE: The valve was trileaflet   Leaflets exhibited mildly increased thickness, mild calcification, mildly reduced cuspal separation, and sclerosis  DOPPLER: Transaortic velocity was within the normal range  There was no evidence  for stenosis  There was no regurgitation      TRICUSPID VALVE: The valve structure was normal  There was normal leaflet separation  DOPPLER: The transtricuspid velocity was within the normal range  There was no evidence for stenosis  There was trace regurgitation  The tricuspid jet  envelope definition was inadequate for estimation of RV systolic pressure  There are no indirect findings suggestive of moderate or severe pulmonary hypertension      PULMONIC VALVE: Leaflets exhibited normal thickness, no calcification, and normal cuspal separation  DOPPLER: The transpulmonic velocity was within the normal range  There was trace regurgitation      PERICARDIUM: There was no pericardial effusion  The pericardium was normal in appearance      AORTA: The root exhibited normal size      SYSTEMIC VEINS: IVC: The inferior vena cava was normal in size and course   Respirophasic changes were normal      [address] [place] [address], [address] [place] [address]  [phone]     Rest/Stress Gated SPECT Myocardial Perfusion Imaging After Regadenoson     Patient: Allie Hernandez  MR number: [id number]  Account number: [de-identified]  : 1932  Age: 80 years  Gender: Male  Status: Outpatient  Location: Stress lab  Height: 65 in  Weight: 168 lb  BP: 205/ 90 mmHg     Allergies: NO KNOWN ALLERGIES     Diagnosis: R06 00 - Dyspnea, unspecified, R07 9 - Chest pain, unspecified     Primary Physician:  Socorro Houston MD  RN:  Ai Kay RN  Referring Physician:  Dylan Hernández MD  Technician:  Soraya Centeno  Group:  [facility]  Report Prepared By[de-identified]  Ai Kay RN  Interpreting Physician:  Dylan Hernández MD     INDICATIONS: Evaluation of known coronary artery disease      HISTORY: The patient is a 80year old  male  Chest pain status: recent onset chest pain  Other symptoms: dyspnea of recent onset and decreased effort tolerance  Coronary artery disease risk factors: diabetes mellitus  Cardiovascular history: coronary artery disease, aortic valve disease, and stroke  Prior cardiovascular procedures: coronary artery bypass grafting and pacemaker  Co-morbidity: history of renal disease  Medications: Coumadin, a beta  blocker, diabetic medications, and thyroid medications      PHYSICAL EXAM: Baseline physical exam screening: normal and no wheezes audible      REST ECG: Atrial fibrillation  Nonspecific ST and T wave abnormalities were present      PROCEDURE: The study was performed in the the Stress lab  A regadenoson infusion pharmacologic stress test was performed  Gated SPECT myocardial perfusion imaging was performed after stress  Systolic blood pressure was 205 mmHg, at the  start of the study  Diastolic blood pressure was 90 mmHg, at the start of the study  The heart rate was 83 bpm, at the start of the study  IV double checked  Regadenoson protocol:  HR bpm SBP mmHg DBP mmHg Symptoms  Baseline 83 205 90 none  2 min 95 161 70 mild dyspnea  4 min 113 190 85 nausea  No medications or fluids given      STRESS SUMMARY: Duration of pharmacologic stress was 3 min and 0 sec  There was no chest pain during stress  The stress test was terminated due to protocol completion  Pre oxygen saturation: 95 %  Peak oxygen saturation: 99 %   The stress  ECG was negative for ischemia and normal  Arrhythmia during stress: atrial fibrillation      ISOTOPE ADMINISTRATION:  Resting isotope administration Stress isotope administration  Agent Tetrofosmin Tetrofosmin  Dose 10 mCi 32 6 mCi  Date 06/13/2019 06/13/2019  Injection time 08:13 10:19  Imaging time 09:32 11:10  Injection-image interval 79 min 51 min     The radiopharmaceutical was injected at the peak effect of pharmacologic stress      MYOCARDIAL PERFUSION IMAGING:  The image quality was good  The TID ratio was 0 88      PERFUSION DEFECTS:  -  There was a small, severe, reversible myocardial perfusion defect of the basal inferior wall      GATED SPECT:  The calculated left ventricular ejection fraction was 64 %  There was no left ventricular regional abnormality      SUMMARY:  -  Stress results: There was no chest pain during stress  -  ECG conclusions: The stress ECG was negative for ischemia and normal  Arrhythmia during stress: atrial fibrillation   -  Perfusion imaging: There was a small, severe, reversible myocardial perfusion defect of the basal inferior wall  -  Gated SPECT: The calculated left ventricular ejection fraction was 64 %  There was no left ventricular regional abnormality      IMPRESSIONS: Abnormal study after pharmacologic vasodilation  There was a small amount of ischemia of the basal inferior wall  Left ventricular systolic function was normal      Prepared and signed by  Ernestina Bhatti MD  Signed 06/13/2019 12:47:12    Imaging: I have personally reviewed pertinent reports  Tele- NSR  VTE Prophylaxis: Warfarin (Coumadin)    Code Status: Level 3 - DNAR and DNI  Advance Directive and Living Will: Yes    Power of :    POLST:      Counseling / Coordination of Care  Total floor / unit time spent today 45 minutes  Greater than 50% of total time was spent with the patient and / or family counseling and / or coordination of care

## 2019-08-23 NOTE — ASSESSMENT & PLAN NOTE
Initial patient presentation of worsening dyspnea on exertion and lower extremity edema; likely cardiorenal syndrome  XR chest demonstrated cardiomegaly and small pleural effusions  ProBNP 7,901  Troponin 0 11  Previous echo (06/12/2019) demonstrated EF of 55% and grade 1 diastolic dysfunction  · Continue to monitor vitals  · Cardiology consult appreciated-no evidence of heart failure, overload likely secondary to worsening renal function and retention  Mild troponin elevation likely secondary to elevated creatinine and overload  and retention as a result  No cardiac workup necessary  Defer to Nephrology regarding diuresis  · Nephrology consult appreciated-increase IV Lasix 20 mg to 40 mg b i d  and recommendation for UA/bladder scan with PVR  · Continue telemetry  · Continue to trend troponin; elevation likely secondary to CHF exacerbation, rule out ACS  · Monitor I/0s and weight  · Cardiac diet 2 g sodium and 1500mL restriction  · Diuresis using Lasix 20 mg b i d   · Continue metoprolol

## 2019-08-23 NOTE — OCCUPATIONAL THERAPY NOTE
[address] [facility]     Patient Name: Alejandro Hayward  PAJNC'C Date: 8/23/2019  Problem List  Patient Active Problem List   Diagnosis    3-vessel coronary artery disease    Asbestosis ([facility] )    CKD (chronic kidney disease), stage IV ([facility] )    Diabetes mellitus with multiple complications (HCC)    Elevated serum alkaline phosphatase level    Hyperlipidemia    Essential hypertension    Hypothyroidism    Paroxysmal A-fib (HCC)    Seasonal allergies    Increased frequency of urination    Frozen shoulder    Herpes zoster without complication    Depressed mood    Chest pain    Ambulatory dysfunction/generalized weakness    Leg edema, left    Shortness of breath    Chronic right-sided low back pain without sciatica    UTI (urinary tract infection)    Tibial artery occlusion, left (HCC)    Hyponatremia    Acute on chronic diastolic heart failure (HCC)    Elevated troponin I level    Benign prostatic hyperplasia with urinary hesitancy    Abnormal chest x-ray     Past Medical History  Past Medical History:   Diagnosis Date    3-vessel coronary artery disease     last assessed: 08/15/2016    BPH (benign prostatic hyperplasia)     Chronic kidney disease     Diabetes mellitus ([facility] )     Hyperlipidemia     Hypertension     Shingles 04/29/2019    SOB (shortness of breath)     Stroke [facility])      Past Surgical History  Past Surgical History:   Procedure Laterality Date    CARDIAC PACEMAKER PLACEMENT      CATARACT EXTRACTION      last assessed: 11/11/2015    CORONARY ARTERY BYPASS GRAFT      last assessed: 08/26/2015 08/23/19 1554   Note Type   Note type Eval/Treat   Restrictions/Precautions   Weight Bearing Precautions Per Order No   Braces or Orthoses AFO; Other (Comment)  (B AFO's)   Other Precautions Chair Alarm; Bed Alarm; Fall Risk   Home Living   Type of Home House; Other (Comment)  (2 SH)   Home Layout Two level;Bed/bath upstairs; Able to live on main level with bedroom/bathroom;1/2 bath on main level   Bathroom Shower/Tub Walk-in shower   Bathroom Toilet Standard   Bathroom Equipment Grab bars in shower; Shower chair   Bathroom Accessibility Accessible   Home Equipment Walker;Cane;Stair glide; Hospital bed;Grab bars; Other (Comment)  (B AFOS, custom shoes)   Additional Comments Pt reports living w/ wife in 2 [facility] w/ first floor set- up  Pt reports stairglide installed and operational now, but he has not used it  Pt reports staying on first floor w/ 1/2 bath and hospital bed PTA   Prior Function   Level of [place] Needs assistance with IADLs   Lives With Spouse   Receives Help From Family;Home health   ADL Assistance Needs assistance   IADLs Needs assistance   Falls in the last 6 months 1 to 4   Vocational Retired   Comments Pt reports recent discharge home w/ [facility] / home health from rehab and is using RW for functional mobiltiy w/ assistance  Supportive family and wife    Lifestyle   Autonomy Pt reports needing assistance w/ ADL/ IADL PTA using RW for functional mobility   Reciprocal Relationships Supportive family   Service to Others 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[employer] and served in [address] [place] [address] for 4 years   Intrinsic Gratification Pt reports enjoying making spaetzle and stuffed cabbage, and building models   ADL   Eating Assistance 6  Modified independent   Eating Deficit Setup   Grooming Assistance 5  Supervision/Setup  (seated in chair vs EOB)   Grooming Deficit Setup;Supervision/safety; Increased time to complete   UB Bathing Assistance Unable to assess   LB Bathing Assistance Unable to assess   UB Dressing Assistance 4  Minimal Assistance   UB Dressing Deficit Setup;Supervision/safety; Increased time to complete;Pull around back; Fasteners   LB Dressing Assistance 2  Maximal Assistance  (mod A to don pants)   LB Dressing Deficit Don/doff R sock; Don/doff L sock; Don/doff R shoe;Don/doff L shoe   Additional Comments pt required max A to don B socks, AFO's, and custom velcro shoes  Pt presents w/ +1 edema R LE and removed AFO's  Pt engaged in LBD while seated at EOB   Bed Mobility   Supine to Sit 4  Minimal assistance   Additional items Assist x 1;HOB elevated; Increased time required;Verbal cues   Sit to Supine 3  Moderate assistance   Additional items Assist x 1; Increased time required;LE management;Verbal cues   Additional Comments Pt returned to bed post eval w/ needs met, call bell in reach, bed alarm activated, and family present   Transfers   Sit to Stand 4  Minimal assistance  (min A<> S w/ + time, cues for hand placement)   Additional items Assist x 1; Increased time required;Verbal cues;Armrests; Bedrails   Stand to Sit 4  Minimal assistance   Additional items Assist x 1; Armrests; Increased time required;Verbal cues  (hand placement, controlled decnent)   Functional Mobility   Functional Mobility 4  Minimal assistance   Additional Comments min A using RW + time (w/out B AFO's due to R LE edema)   Additional items Rolling walker   Balance   Static Sitting Fair +   Dynamic Sitting Fair -   Static Standing Poor +   Ambulatory Poor +   Activity Tolerance   Activity Tolerance Patient limited by fatigue   Medical Staff Made Aware spoke to PT, RJ and CMJustin   Nurse Made Aware spoke to Esther DIAZ   RUE Assessment   RUE Assessment X  (limited AROM shoulder >90*)   RUE Strength   RUE Overall Strength Deficits; Other (Comment)  (able to complete ADL, grasp at least 3+/5)   LUE Assessment   LUE Assessment X  (limited AROM shoulder, <30*)   LUE Strength   LUE Overall Strength Deficits; Other (Comment)   Edema   LUE Edema +1   Hand Function   Gross Motor Coordination Functional   Fine Motor Coordination   (R hand dominance)   Sensation   Light Touch No apparent deficits  (B UE)   Sharp/Dull Not tested   Cognition   Overall Cognitive Status Impaired   Arousal/Participation Alert; Cooperative   Attention Attends with cues to redirect   Orientation Level Oriented to person;Oriented to place  (able to report birthdate)   Memory Decreased recall of recent events;Decreased short term memory  (family present aassisted w/ recall)   Following Commands Follows one step commands with increased time or repetition   Comments Identified pt by full name and birthdate  Pt able to follow one step directions and provide limited social history  Family present often spoke over pt  Alert and oriented to person, place  + time for consistent recall details  Assessment   Limitation Decreased ADL status; Decreased UE strength;Decreased UE ROM; Decreased endurance;Decreased self-care trans;Decreased high-level ADLs   Assessment Pt is an 79yo male [facility]  Pt presents w/ acute on chronic diastolic heart failure and significant PMH impacting his occupational performance including chronic diastolic CHF, a-fib, s/p CABG, pacemaker, DM, HTN, hx stroke, L2-3 discectomy (July 2019)  Pt reports living w/ wife in 2 [facility] w/ first floor set- up PTA and hospital bed, stair glide  Pt reports discharge home from rehab and is using RW for functional mobility wearing B AFO's, custom shoes  Pt needs assistance w/ ADL/ IADL  Upon eval, pt completed bed mobility w/ min A  Pt completed LBD w/ max A and UBD w/ min A  Pt demonstrated limited AROM B shoulders L>R  Pt completed sit <> stand w/ min A and engaged in functional mobility short distances w/ min A  Pt alert and oriented to person, place consistently and able to report his birthdate  Pt demonstrated appropriate long term recall and able to introduce family present  Pt presents w/ decreased LE strength, increased edema, decreased standing tolerance, decreased standing balance, decreased activity tolerance, decreased endurance impacting his I and safety w/ dressing, bathing, functional mobility, functional transfers, activity engagement, clothing mgmt, oral hygiene, feeding, and food prep / clean up   Pt would benefit from OT while in acute care to address deficits and post acute rehab when medically stable for discharge from acute care  Will continue to follow   Goals   Patient Goals Pt stated that he would like to go home and be able to walk around the house w/ out assistance   Plan   Treatment Interventions ADL retraining;Functional transfer training; Endurance training;Patient/family training;Equipment evaluation/education; Compensatory technique education;Continued evaluation; Activityengagement   Goal Expiration Date 08/30/19   OT Frequency 3-5x/wk   Recommendation   OT Discharge Recommendation Other (Comment)  (post acute rehab)   Equipment Recommended Bedside commode;Tub seat with back; Other (comment)  (has hospital bed, stair glide)   OT - OK to Discharge   (post acute rehab when stable)   Barthel Index   Feeding 10   Bathing 0   Grooming Score 0   Dressing Score 5   Bladder Score 10   Bowels Score 10   Toilet Use Score 5   Transfers (Bed/Chair) Score 10   Mobility (Level Surface) Score 0   Stairs Score 0   Barthel Index Score 50   Modified [place] Scale   Modified [place] Scale 4   Pt goals to be met by 8/30/2019:  1  Pt will complete bed mobility supine <> sit w/ S to max I w/ ADL performance and improve activity engagement to return to PLOF w/ wife  2  Pt will complete functional transfers to bed, chair, and toilet using AD, DME as needed w/ S to max I w/ ADL performance  3  Pt will complete functional shower transfers using AD, DME as needed w/ S to max I w/ bathing to return to PLOF w/ wife  4  Pt will complete UBD w/ S after set- up  5  Pt will complete grooming w/ S after set- up seated at sink to max I w/ ADL performance  6  Pt will demonstrate increased functional standing tolerance for at least 10 minutes while engaged in ADL's using AD, DME as needed to improve activity engagement to participate in meal prep  7  Pt will demonstrate decreased edema UE/LE to max I w/ ADL performance and improve activity tolerance  8   Pt will demonstrate good attention and verbalize understanding of safety precautions to max I w/ ADL performance  9   Pt will demonstrate good attention and verbalize understanding of energy conservation tech to max I w/ ADL performance    Estelita Real, OTR/L

## 2019-08-23 NOTE — PLAN OF CARE
Problem: Potential for Falls  Goal: Patient will remain free of falls  Description  INTERVENTIONS:  - Assess patient frequently for physical needs  -  Identify cognitive and physical deficits and behaviors that affect risk of falls    -  [place] fall precautions as indicated by assessment   - Educate patient/family on patient safety including physical limitations  - Instruct patient to call for assistance with activity based on assessment  - Modify environment to reduce risk of injury  - Consider OT/PT consult to assist with strengthening/mobility  Outcome: Progressing     Problem: Prexisting or High Potential for Compromised Skin Integrity  Goal: Skin integrity is maintained or improved  Description  INTERVENTIONS:  - Identify patients at risk for skin breakdown  - Assess and monitor skin integrity  - Assess and monitor nutrition and hydration status  - Monitor labs   - Assess for incontinence   - Turn and reposition patient  - Assist with mobility/ambulation  - Relieve pressure over bony prominences  - Avoid friction and shearing  - Provide appropriate hygiene as needed including keeping skin clean and dry  - Evaluate need for skin moisturizer/barrier cream  - Collaborate with interdisciplinary team   - Patient/family teaching  - Consider wound care consult   Outcome: Progressing     Problem: CARDIOVASCULAR - ADULT  Goal: Maintains optimal cardiac output and hemodynamic stability  Description  INTERVENTIONS:  - Monitor I/O, vital signs and rhythm  - Monitor for S/S and trends of decreased cardiac output  - Administer and titrate ordered vasoactive medications to optimize hemodynamic stability  - Assess quality of pulses, skin color and temperature  - Assess for signs of decreased coronary artery perfusion  - Instruct patient to report change in severity of symptoms  Outcome: Progressing  Goal: Absence of cardiac dysrhythmias or at baseline rhythm  Description  INTERVENTIONS:  - Continuous cardiac monitoring, vital signs, obtain 12 lead EKG if ordered  - Administer antiarrhythmic and heart rate control medications as ordered  - Monitor electrolytes and administer replacement therapy as ordered  Outcome: Progressing     Problem: RESPIRATORY - ADULT  Goal: Achieves optimal ventilation and oxygenation  Description  INTERVENTIONS:  - Assess for changes in respiratory status  - Assess for changes in mentation and behavior  - Position to facilitate oxygenation and minimize respiratory effort  - Oxygen administered by appropriate delivery if ordered  - Initiate smoking cessation education as indicated  - Encourage broncho-pulmonary hygiene including cough, deep breathe, Incentive Spirometry  - Assess the need for suctioning and aspirate as needed  - Assess and instruct to report SOB or any respiratory difficulty  - Respiratory Therapy support as indicated  Outcome: Progressing     Problem: METABOLIC, FLUID AND ELECTROLYTES - ADULT  Goal: Electrolytes maintained within normal limits  Description  INTERVENTIONS:  - Monitor labs and assess patient for signs and symptoms of electrolyte imbalances  - Administer electrolyte replacement as ordered  - Monitor response to electrolyte replacements, including repeat lab results as appropriate  - Instruct patient on fluid and nutrition as appropriate  Outcome: Progressing  Goal: Fluid balance maintained  Description  INTERVENTIONS:  - Monitor labs   - Monitor I/O and WT  - Instruct patient on fluid and nutrition as appropriate  - Assess for signs & symptoms of volume excess or deficit  Outcome: Progressing  Goal: Glucose maintained within target range  Description  INTERVENTIONS:  - Monitor Blood Glucose as ordered  - Assess for signs and symptoms of hyperglycemia and hypoglycemia  - Administer ordered medications to maintain glucose within target range  - Assess nutritional intake and initiate nutrition service referral as needed  Outcome: Progressing

## 2019-08-23 NOTE — ASSESSMENT & PLAN NOTE
Lab Results   Component Value Date    HGBA1C 5 5 08/22/2019       Recent Labs     08/22/19  1650 08/22/19 2055 08/23/19  0724 08/23/19  1108   POCGLU 126 175* 110 155*       Blood Sugar Average: Last 72 hrs:  (P) 141 5   · Hold Tradjenta  · Hemoglobin A1c 5 5  · Initiate sliding scale insulin  · Continue to monitor blood glucose

## 2019-08-23 NOTE — ASSESSMENT & PLAN NOTE
Prior history of rate controlled AFib and stroke (2014)  Initial EKG demonstrated sinus rhythm and first-degree AV block, consistent with prior EKG  Managed by Dr aKtarzyna Koo (Cardiology)  Present INR 2 64  · Continue telemetry  · Continue warfarin  · INR 2 8; continue to monitor  · Continue metoprolol

## 2019-08-24 LAB
ANION GAP SERPL CALCULATED.3IONS-SCNC: 8 MMOL/L (ref 4–13)
ATRIAL RATE: 48 BPM
BUN SERPL-MCNC: 46 MG/DL (ref 5–25)
CALCIUM SERPL-MCNC: 8.1 MG/DL (ref 8.3–10.1)
CHLORIDE SERPL-SCNC: 101 MMOL/L (ref 100–108)
CO2 SERPL-SCNC: 28 MMOL/L (ref 21–32)
CREAT SERPL-MCNC: 3.49 MG/DL (ref 0.6–1.3)
GFR SERPL CREATININE-BSD FRML MDRD: 15 ML/MIN/1.73SQ M
GLUCOSE SERPL-MCNC: 189 MG/DL (ref 65–140)
GLUCOSE SERPL-MCNC: 58 MG/DL (ref 65–140)
GLUCOSE SERPL-MCNC: 71 MG/DL (ref 65–140)
GLUCOSE SERPL-MCNC: 72 MG/DL (ref 65–140)
GLUCOSE SERPL-MCNC: 76 MG/DL (ref 65–140)
GLUCOSE SERPL-MCNC: 93 MG/DL (ref 65–140)
GLUCOSE SERPL-MCNC: 98 MG/DL (ref 65–140)
INR PPP: 3.05 (ref 0.84–1.19)
MAGNESIUM SERPL-MCNC: 1.9 MG/DL (ref 1.6–2.6)
PHOSPHATE SERPL-MCNC: 3.7 MG/DL (ref 2.3–4.1)
POTASSIUM SERPL-SCNC: 3.3 MMOL/L (ref 3.5–5.3)
PROTHROMBIN TIME: 30.5 SECONDS (ref 11.6–14.5)
QRS AXIS: -28 DEGREES
QRSD INTERVAL: 88 MS
QT INTERVAL: 408 MS
QTC INTERVAL: 437 MS
SODIUM SERPL-SCNC: 137 MMOL/L (ref 136–145)
T WAVE AXIS: 43 DEGREES
VENTRICULAR RATE: 69 BPM

## 2019-08-24 PROCEDURE — 83735 ASSAY OF MAGNESIUM: CPT | Performed by: INTERNAL MEDICINE

## 2019-08-24 PROCEDURE — 99232 SBSQ HOSP IP/OBS MODERATE 35: CPT | Performed by: INTERNAL MEDICINE

## 2019-08-24 PROCEDURE — 85610 PROTHROMBIN TIME: CPT | Performed by: INTERNAL MEDICINE

## 2019-08-24 PROCEDURE — 93010 ELECTROCARDIOGRAM REPORT: CPT | Performed by: INTERNAL MEDICINE

## 2019-08-24 PROCEDURE — 84100 ASSAY OF PHOSPHORUS: CPT | Performed by: INTERNAL MEDICINE

## 2019-08-24 PROCEDURE — 82948 REAGENT STRIP/BLOOD GLUCOSE: CPT

## 2019-08-24 PROCEDURE — 80048 BASIC METABOLIC PNL TOTAL CA: CPT | Performed by: INTERNAL MEDICINE

## 2019-08-24 RX ORDER — FUROSEMIDE 10 MG/ML
40 INJECTION INTRAMUSCULAR; INTRAVENOUS
Status: DISCONTINUED | OUTPATIENT
Start: 2019-08-24 | End: 2019-08-25

## 2019-08-24 RX ORDER — POTASSIUM CHLORIDE 20 MEQ/1
40 TABLET, EXTENDED RELEASE ORAL ONCE
Status: COMPLETED | OUTPATIENT
Start: 2019-08-24 | End: 2019-08-24

## 2019-08-24 RX ORDER — WARFARIN SODIUM 2 MG/1
2 TABLET ORAL
Status: DISCONTINUED | OUTPATIENT
Start: 2019-08-24 | End: 2019-08-25

## 2019-08-24 RX ADMIN — INSULIN LISPRO 1 UNITS: 100 INJECTION, SOLUTION INTRAVENOUS; SUBCUTANEOUS at 12:09

## 2019-08-24 RX ADMIN — FUROSEMIDE 40 MG: 10 INJECTION, SOLUTION INTRAMUSCULAR; INTRAVENOUS at 08:36

## 2019-08-24 RX ADMIN — IRON SUCROSE 200 MG: 20 INJECTION, SOLUTION INTRAVENOUS at 11:26

## 2019-08-24 RX ADMIN — WARFARIN SODIUM 2 MG: 2 TABLET ORAL at 17:11

## 2019-08-24 RX ADMIN — METOPROLOL TARTRATE 50 MG: 50 TABLET, FILM COATED ORAL at 17:11

## 2019-08-24 RX ADMIN — DESMOPRESSIN ACETATE 40 MG: 0.2 TABLET ORAL at 17:11

## 2019-08-24 RX ADMIN — RANOLAZINE 500 MG: 500 TABLET, FILM COATED, EXTENDED RELEASE ORAL at 08:36

## 2019-08-24 RX ADMIN — ASPIRIN 81 MG: 81 TABLET, COATED ORAL at 08:36

## 2019-08-24 RX ADMIN — LEVOTHYROXINE SODIUM 75 MCG: 75 TABLET ORAL at 06:07

## 2019-08-24 RX ADMIN — INSULIN GLARGINE 32 UNITS: 100 INJECTION, SOLUTION SUBCUTANEOUS at 08:35

## 2019-08-24 RX ADMIN — METOPROLOL TARTRATE 50 MG: 50 TABLET, FILM COATED ORAL at 08:00

## 2019-08-24 RX ADMIN — DOCUSATE SODIUM 100 MG: 100 CAPSULE, LIQUID FILLED ORAL at 08:35

## 2019-08-24 RX ADMIN — RANOLAZINE 500 MG: 500 TABLET, FILM COATED, EXTENDED RELEASE ORAL at 21:34

## 2019-08-24 RX ADMIN — TAMSULOSIN HYDROCHLORIDE 0.4 MG: 0.4 CAPSULE ORAL at 08:35

## 2019-08-24 RX ADMIN — FUROSEMIDE 40 MG: 10 INJECTION, SOLUTION INTRAMUSCULAR; INTRAVENOUS at 17:11

## 2019-08-24 RX ADMIN — POTASSIUM CHLORIDE 40 MEQ: 1500 TABLET, EXTENDED RELEASE ORAL at 11:31

## 2019-08-24 RX ADMIN — DOCUSATE SODIUM 100 MG: 100 CAPSULE, LIQUID FILLED ORAL at 17:11

## 2019-08-24 RX ADMIN — AMLODIPINE BESYLATE 5 MG: 5 TABLET ORAL at 08:36

## 2019-08-24 RX ADMIN — FUROSEMIDE 40 MG: 10 INJECTION, SOLUTION INTRAMUSCULAR; INTRAVENOUS at 12:08

## 2019-08-24 NOTE — ASSESSMENT & PLAN NOTE
Prior history of rate controlled AFib and stroke (2014)  Initial EKG demonstrated sinus rhythm and first-degree AV block, consistent with prior EKG  Managed by Dr Regino Urena (Cardiology)  INR today is 3 05    Plan   1  Continue telemetry  2  Continue warfarin  3  Monitor INR  4  Continue metoprolol

## 2019-08-24 NOTE — ASSESSMENT & PLAN NOTE
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

## 2019-08-24 NOTE — PLAN OF CARE
Problem: Potential for Falls  Goal: Patient will remain free of falls  Description  INTERVENTIONS:  - Assess patient frequently for physical needs  -  Identify cognitive and physical deficits and behaviors that affect risk of falls    -  [place] fall precautions as indicated by assessment   - Educate patient/family on patient safety including physical limitations  - Instruct patient to call for assistance with activity based on assessment  - Modify environment to reduce risk of injury  - Consider OT/PT consult to assist with strengthening/mobility  Outcome: Progressing     Problem: Prexisting or High Potential for Compromised Skin Integrity  Goal: Skin integrity is maintained or improved  Description  INTERVENTIONS:  - Identify patients at risk for skin breakdown  - Assess and monitor skin integrity  - Assess and monitor nutrition and hydration status  - Monitor labs   - Assess for incontinence   - Turn and reposition patient  - Assist with mobility/ambulation  - Relieve pressure over bony prominences  - Avoid friction and shearing  - Provide appropriate hygiene as needed including keeping skin clean and dry  - Evaluate need for skin moisturizer/barrier cream  - Collaborate with interdisciplinary team   - Patient/family teaching  - Consider wound care consult   Outcome: Progressing     Problem: CARDIOVASCULAR - ADULT  Goal: Maintains optimal cardiac output and hemodynamic stability  Description  INTERVENTIONS:  - Monitor I/O, vital signs and rhythm  - Monitor for S/S and trends of decreased cardiac output  - Administer and titrate ordered vasoactive medications to optimize hemodynamic stability  - Assess quality of pulses, skin color and temperature  - Assess for signs of decreased coronary artery perfusion  - Instruct patient to report change in severity of symptoms  Outcome: Progressing  Goal: Absence of cardiac dysrhythmias or at baseline rhythm  Description  INTERVENTIONS:  - Continuous cardiac monitoring, vital signs, obtain 12 lead EKG if ordered  - Administer antiarrhythmic and heart rate control medications as ordered  - Monitor electrolytes and administer replacement therapy as ordered  Outcome: Progressing     Problem: RESPIRATORY - ADULT  Goal: Achieves optimal ventilation and oxygenation  Description  INTERVENTIONS:  - Assess for changes in respiratory status  - Assess for changes in mentation and behavior  - Position to facilitate oxygenation and minimize respiratory effort  - Oxygen administered by appropriate delivery if ordered  - Initiate smoking cessation education as indicated  - Encourage broncho-pulmonary hygiene including cough, deep breathe, Incentive Spirometry  - Assess the need for suctioning and aspirate as needed  - Assess and instruct to report SOB or any respiratory difficulty  - Respiratory Therapy support as indicated  Outcome: Progressing     Problem: METABOLIC, FLUID AND ELECTROLYTES - ADULT  Goal: Electrolytes maintained within normal limits  Description  INTERVENTIONS:  - Monitor labs and assess patient for signs and symptoms of electrolyte imbalances  - Administer electrolyte replacement as ordered  - Monitor response to electrolyte replacements, including repeat lab results as appropriate  - Instruct patient on fluid and nutrition as appropriate  Outcome: Progressing  Goal: Fluid balance maintained  Description  INTERVENTIONS:  - Monitor labs   - Monitor I/O and WT  - Instruct patient on fluid and nutrition as appropriate  - Assess for signs & symptoms of volume excess or deficit  Outcome: Progressing  Goal: Glucose maintained within target range  Description  INTERVENTIONS:  - Monitor Blood Glucose as ordered  - Assess for signs and symptoms of hyperglycemia and hypoglycemia  - Administer ordered medications to maintain glucose within target range  - Assess nutritional intake and initiate nutrition service referral as needed  Outcome: Progressing     Problem: RESPIRATORY - ADULT  Goal: Achieves optimal ventilation and oxygenation  Description  INTERVENTIONS:  - Assess for changes in respiratory status  - Assess for changes in mentation and behavior  - Position to facilitate oxygenation and minimize respiratory effort  - Oxygen administered by appropriate delivery if ordered  - Initiate smoking cessation education as indicated  - Encourage broncho-pulmonary hygiene including cough, deep breathe, Incentive Spirometry  - Assess the need for suctioning and aspirate as needed  - Assess and instruct to report SOB or any respiratory difficulty  - Respiratory Therapy support as indicated  Outcome: Progressing

## 2019-08-24 NOTE — ASSESSMENT & PLAN NOTE
Prior history of rate controlled AFib and stroke (2014)  Initial EKG demonstrated sinus rhythm and first-degree AV block, consistent with prior EKG  Managed by Dr Tres Munoz (Cardiology)  INR today is 3 05    Plan   1  Reduce warfarin dose to 2mg daily  2  Monitor INR  3  Continue metoprolol

## 2019-08-24 NOTE — ASSESSMENT & PLAN NOTE
Initial patient presentation of worsening dyspnea on exertion and lower extremity edema; XR chest demonstrated cardiomegaly and small pleural effusions  ProBNP 7,901  Troponin 0 11  Previous echo (06/12/2019) demonstrated EF of 55% and grade 1 diastolic dysfunction  Cardiology consult done : no evidence of heart failure, overload likely secondary to worsening renal function and retention  Mild troponin elevation likely secondary to elevated creatinine and overload  and retention as a result  No cardiac workup necessary  Currently being managed for volume overload likely secondary to renal insufficiency with IV lasix 40 mg tid  He shows improvement in his symptoms, with reduced shortness of breath, and improved lower limb swelling  Lungs: occasional bilateral rales  Vital signs stable  Creatinine today is 3 61 (baseline 2 5-3)  Weight 7 lb loss from yesterday (177 lbs today)  Net urine output:  -1805  He is being followed up by cardiology    Plan   1  Continue to monitor vitals  2  Nephrology consult appreciated  3  Monitor I/0s and weight  4  Cardiac diet 2 g sodium and 1500mL restriction    5  Continue metoprolol           Wt Readings from Last 3 Encounters:   08/24/19 80 4 kg (177 lb 3 2 oz)   07/12/19 81 2 kg (179 lb 0 2 oz)   06/12/19 76 5 kg (168 lb 10 4 oz)

## 2019-08-24 NOTE — ASSESSMENT & PLAN NOTE
On admission patient had an initial elevation of troponin levels  (0 11) with subsequent readings of 0 11, 0 10, 0 09  Cardiac work up for acute ischemia is negative  Seen by cardiology  Troponin elevation is likely due to volume overload instead of a cardiac cause  No further workup per Cardiology team   He denies chest pain, palpitations, nausea, vomiting, shortness of breath  Plan  1  No cardiac work up needed at this point  2   Continue current management

## 2019-08-24 NOTE — PROGRESS NOTES
NEPHROLOGY PROGRESS NOTE   Chiquita Hernandezr 80 y o  male MRN: [id number]  Unit/Bed#: -01 Encounter: [id number]    ASSESSMENT & PLAN:  80 y  o male with history of chronic kidney disease stage 4, BPH, hypertension presented with increased lower extremity edema gradually worsening  Denies any shortness of breath  He mentions that he has not been on any diuretics in the past and was started on diuretics 1 day before admission by his outpatient nephrologist but never actually took it  He also complains of weight gain  Nephrology consulted for chronic kidney disease and for fluid overload  · Chronic kidney disease stage 4:  Baseline creatinine recently around 3 3-3 5 with gradual progression of CKD  Previously creatinine was 1 9 in 2014 and 2 9 in 2015  Chronic kidney disease likely due to hypertensive nephrosclerosis and diabetic nephropathy as well as age-related nephron loss  Previous kidney ultrasound from May 2019 showed right kidney 9 0 cm and left kidney 9 0 cm in size  Admission creatinine was 3 49 and today creatinine is stable at 3 49  May have to except slight increase in creatinine to maintain euvolemia  Continue to monitor renal function and avoid nephrotoxins  UA with urine microscopy showed 1-2 RBC and 10-20 WBC  Patient denies any urinary symptoms  · Fluid overload/acute on chronic diastolic CHF:  Ejection fraction 55% with grade 1 diastolic dysfunction  increase Lasix to 40 mg IV t i d  For more diuresis  Will need diuretics at the time of discharge  Continue monitor input and output and daily weight  Chest x-ray on admission was suggestive of fluid overload  · Hypokalemia:  Replaced potassium chloride  · Primary hypertension with chronic kidney disease stage 4:  Blood pressure acceptable  Continue current medication with hold parameters  · History of BPH with urinary retention-continue Flomax  · Anemia, iron deficiency:  Hemoglobin stable iron saturation 14% with ferritin 128  Status post IV Venofer on 08/23  Will order another dose of of 200 mg IV Venofer  · Diabetes mellitus type 2:  Continue management per primary team    Discussed with Dr Junior Ojeda        SUBJECTIVE:  No new complaints  No shortness of breath    OBJECTIVE:  Current Weight: Weight - Scale: 80 4 kg (177 lb 3 2 oz)  Vitals:    08/24/19 0700   BP: 137/62   Pulse: 69   Resp: 18   Temp: 97 7 °F (36 5 °C)   SpO2: 98%       Intake/Output Summary (Last 24 hours) at 8/24/2019 0954  Last data filed at 8/24/2019 0901  Gross per 24 hour   Intake 480 ml   Output 2525 ml   Net -2045 ml       Physical Exam  General:  Ill looking, awake  Eyes: Conjunctivae pink,  Sclera anicteric  ENT: lips and mucous membranes moist  Neck: supple   Chest: Clear to Auscultation both lungs,  no crackles, ronchus or wheezing  CVS: S1 & S2 present, normal rate, regular rhythm, no murmur    Abdomen: soft, non-tender, non-distended, Bowel sounds normoactive  Extremities:  1+ edema both legs  Skin: no rash  Neuro: awake, alert, oriented x3      Medications:    Current Facility-Administered Medications:     acetaminophen (TYLENOL) tablet 650 mg, 650 mg, Oral, Q6H PRN, [place] C Holter, DO    amLODIPine (NORVASC) tablet 5 mg, 5 mg, Oral, Daily, BYRON CodyNP, 5 mg at 08/24/19 0836    aspirin (ECOTRIN LOW STRENGTH) EC tablet 81 mg, 81 mg, Oral, Daily, Jordan C Holter, DO, 81 mg at 08/24/19 0836    atorvastatin (LIPITOR) tablet 40 mg, 40 mg, Oral, QPM, Jordan C Holter, DO, 40 mg at 08/23/19 1725    docusate sodium (COLACE) capsule 100 mg, 100 mg, Oral, BID, Jordan C Holter, DO, 100 mg at 08/24/19 0835    furosemide (LASIX) injection 40 mg, 40 mg, Intravenous, BID (diuretic), Josey Redd, CRNP, 40 mg at 08/24/19 0836    insulin glargine (LANTUS) subcutaneous injection 32 Units 0 32 mL, 32 Units, Subcutaneous, Daily With Breakfast, Jordan C Holter, DO, 32 Units at 08/24/19 0861    insulin lispro (HumaLOG) 100 units/mL subcutaneous injection 1-6 Units, 1-6 Units, Subcutaneous, TID AC, 1 Units at 08/23/19 1324 **AND** Fingerstick Glucose (POCT), , , TID AC, Jordan C Holter, DO    levothyroxine tablet 75 mcg, 75 mcg, Oral, Daily, Jordan C Holter, DO, 75 mcg at 08/24/19 0607    metoprolol tartrate (LOPRESSOR) tablet 50 mg, 50 mg, Oral, BID With Meals, MAURICE Lawrence, 50 mg at 08/24/19 0800    ondansetron (ZOFRAN) injection 4 mg, 4 mg, Intravenous, Q6H PRN, [place] C Holter, DO    ranolazine (RANEXA) 12 hr tablet 500 mg, 500 mg, Oral, Q12H [address], Jordan C Holter, DO, 500 mg at 08/24/19 0836    tamsulosin (FLOMAX) capsule 0 4 mg, 0 4 mg, Oral, Daily, Jordan C Holter, DO, 0 4 mg at 08/24/19 7355    warfarin (COUMADIN) tablet 2 5 mg, 2 5 mg, Oral, Daily (warfarin), Jordan C Holter, DO, 2 5 mg at 08/23/19 1818    Invasive Devices:        Lab Results:   Results from last 7 days   Lab Units 08/24/19  0601 08/23/19  1058 08/23/19  0459 08/22/19  2337 08/22/19  1347  08/22/19  1346   WBC Thousand/uL  --   --  7 36  --   --   --  7 35   HEMOGLOBIN g/dL  --   --  9 4*  --   --   --  9 5*   HEMATOCRIT %  --   --  30 1*  --   --   --  30 6*   PLATELETS Thousands/uL  --   --  217  --   --   --  230   POTASSIUM mmol/L 3 3* 3 8  --  4 1 4 7   < >  --    CHLORIDE mmol/L 101 102  --  101 102   < >  --    CO2 mmol/L 28 28  --  28 24   < >  --    BUN mg/dL 46* 38*  --  40* 37*   < >  --    CREATININE mg/dL 3 49* 3 61*  --  3 68* 3 49*   < >  --    CALCIUM mg/dL 8 1* 8 3  --  8 0* 8 3   < >  --    MAGNESIUM mg/dL 1 9  --   --  1 9  --   --   --    PHOSPHORUS mg/dL 3 7  --   --   --   --   --   --    ALK PHOS U/L  --   --   --   --  118*  --   --    ALT U/L  --   --   --   --  14  --   --    AST U/L  --   --   --   --  24  --   --     < > = values in this interval not displayed  Previous work up:         Portions of the record may have been created with voice recognition software   Occasional wrong word or "sound a like" substitutions may have occurred due to the inherent limitations of voice recognition software  Read the chart carefully and recognize, using context, where substitutions have occurred  If you have any questions, please contact the dictating provider

## 2019-08-24 NOTE — ASSESSMENT & PLAN NOTE
Lab Results   Component Value Date    HGBA1C 5 5 08/22/2019       Recent Labs     08/23/19  1619 08/23/19  2103 08/24/19  0734 08/24/19  1116   POCGLU 110 126 72 189*       Blood Sugar Average: Last 72 hrs:  (P) 132 875     Morning blood glucose was 72  Patient denies sweating, palpitations, dizziness or confusion  Plan  1  Continue current management with sliding scale insulin  2   Monitor blood glucose

## 2019-08-24 NOTE — SOCIAL WORK
CM received call from Dom Pang in admissions at [facility] who reports that [facility] may be able to accept patient IF his IV lasix dose frequency becomes less upon discharge; [facility] will call spouse and will continue to follow patient's hospital course

## 2019-08-24 NOTE — SOCIAL WORK
Patient's wife called  to notify that they had made a decision on discharge planning  Spouse reports that she has reviewed the list of rehab facilities and Patient/spouse choose 1- [facility]  Referrals placed in [facility]

## 2019-08-24 NOTE — ASSESSMENT & PLAN NOTE
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

## 2019-08-24 NOTE — ASSESSMENT & PLAN NOTE
Patient has chronic kidney disease stage 4: he initially presented with worsening lower extremity edema and shortness of breath  Cardiac work up revealed no acute exacerbation of his heart failure  His baseline creatinine was around 3 3-3 5 with gradual progression  Nephrology is following up   His chronic kidney disease is most likely due to hypertensive nephrosclerosis and diabetic nephropathy  He could also have a component of age related nephron loss  He is currently being managed with IV lasix 40 mg t i d, with improvement in his symptoms  He states that he has marked improvement in his shortness of breath, and lower limb swelling  Examination revealed occasional crackles in B/L lungs, with pitting edema up to mid calf  Renal ultrasound (May 2019): right kidney 9 0 cm and left kidney 9 0 cm in size     Creatinine : 3 46   (Admission creatinine -3 49)    UA with urine microscopy showed 1-2 RBC and 10-20 WBC  Weight 7 lb loss from yesterday (177 lbs today)  Net urine output:  -1805    Plan   1  Continue IV lasix 40 mg tid per nephrology recommendation  2  Monitor renal functions  3  Avoid nephrotoxins  4  Monitor daily weight  5  Monitor vitals  6   IO/OP

## 2019-08-24 NOTE — ASSESSMENT & PLAN NOTE
His blood pressure today was 137/62 and stable  Plan   1  Continue amlodipine and metoprolol  2   Continue to monitor BP

## 2019-08-24 NOTE — ASSESSMENT & PLAN NOTE
Lab Results   Component Value Date    HGBA1C 5 5 08/22/2019       Recent Labs     08/23/19  1108 08/23/19  1619 08/23/19  2103 08/24/19  0734   POCGLU 155* 110 126 72       Blood Sugar Average: Last 72 hrs:  (P) 765 [id number]     Morning blood glucose was 72  Patient denies sweating, palpitations, dizziness or confusion  Plan  1  Continue current management with sliding scale insulin  2   Monitor blood glucose

## 2019-08-24 NOTE — PROGRESS NOTES
Progress Note Pj Cui 1/29/1932, 80 y o  male MRN: [id number]    Unit/Bed#: -01 Encounter: [id number]    Primary Care Provider: Pineda Boland MD   Date and time admitted to hospital: 8/22/2019  1:15 PM        Kidney disease with fluid retention  Assessment & Plan  Patient has chronic kidney disease stage 4: he initially presented with worsening lower extremity edema and shortness of breath  Cardiac work up revealed no acute exacerbation of his heart failure  His baseline creatinine was around 3 3-3 5 with gradual progression  Nephrology is following up   His chronic kidney disease is most likely due to hypertensive nephrosclerosis and diabetic nephropathy  He could also have a component of age related nephron loss  He is currently being managed with IV lasix 40 mg t i d, with improvement in his symptoms  He states that he has marked improvement in his shortness of breath, and lower limb swelling  Examination revealed occasional crackles in B/L lungs, with pitting edema up to mid calf  Renal ultrasound (May 2019): right kidney 9 0 cm and left kidney 9 0 cm in size     Creatinine : 3 46   (Admission creatinine -3 49)    UA with urine microscopy showed 1-2 RBC and 10-20 WBC  Weight 7 lb loss from yesterday (177 lbs today)  Net urine output:  -1805    Plan   1  Continue IV lasix 40 mg tid per nephrology recommendation  2  Monitor renal functions  3  Avoid nephrotoxins  4  Monitor daily weight  5  Monitor vitals  6  IO/OP    * Chronic diastolic heart failure (HCC)  Assessment & Plan  Initial patient presentation of worsening dyspnea on exertion and lower extremity edema; XR chest demonstrated cardiomegaly and small pleural effusions  ProBNP 7,901  Troponin 0 11  Previous echo (06/12/2019) demonstrated EF of 55% and grade 1 diastolic dysfunction  Cardiology consult done : no evidence of heart failure, overload likely secondary to worsening renal function and retention    Mild troponin elevation likely secondary to elevated creatinine and overload  and retention as a result  No cardiac workup necessary  Currently being managed for volume overload likely secondary to renal insufficiency with IV lasix 40 mg tid  He shows improvement in his symptoms, with reduced shortness of breath, and improved lower limb swelling  Lungs: occasional bilateral rales  Vital signs stable  Creatinine today is 3 61 (baseline 2 5-3)  Weight 7 lb loss from yesterday (177 lbs today)  Net urine output:  -1805  He is being followed up by cardiology    Plan   1  Continue to monitor vitals  2  Nephrology consult appreciated  3  Monitor I/0s and weight  4  Cardiac diet 2 g sodium and 1500mL restriction  5  Continue metoprolol           Wt Readings from Last 3 Encounters:   08/24/19 80 4 kg (177 lb 3 2 oz)   07/12/19 81 2 kg (179 lb 0 2 oz)   06/12/19 76 5 kg (168 lb 10 4 oz)       Elevated troponin I level  Assessment & Plan  On admission patient had an initial elevation of troponin levels  (0 11) with subsequent readings of 0 11, 0 10, 0 09  Cardiac work up for acute ischemia is negative  Seen by cardiology  Troponin elevation is likely due to volume overload instead of a cardiac cause  No further workup per Cardiology team   He denies chest pain, palpitations, nausea, vomiting, shortness of breath  Plan  1  No cardiac work up needed at this point  2  Continue current management    3-vessel coronary artery disease  Assessment & Plan  Patient has a history of 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  He had elevated troponin on presentation, however, this was likely secondary to volume overload per cardiology  Pharmacological Myoview stress test 06/2019) :small amount of ischemia basal inferior wall  2D echo (06/2018) :normal AV function with grade 1 diastolic dysfunction  Mild MR  Currently on aspirin 81, atorvastatin 40, BB, Ranexa 500 b i d    He is asymptomatic at this point, he denies chest pain, palpitations, nausea, vomiting  Plan  1  Continue current management with aspirin, atorvastatin, and Ranexa      Diabetes mellitus with multiple complications [facility])  Assessment & Plan  Lab Results   Component Value Date    HGBA1C 5 5 08/22/2019       Recent Labs     08/23/19  1619 08/23/19  2103 08/24/19  0734 08/24/19  1116   POCGLU 110 126 72 189*       Blood Sugar Average: Last 72 hrs:  (P) 132 875     Morning blood glucose was 72  Patient denies sweating, palpitations, dizziness or confusion  Plan  3  Continue current management with sliding scale insulin  4  Monitor blood glucose    Abnormal chest x-ray  Assessment & Plan  XR chest demonstrated bilateral upper lobe pleural plaques in addition to increasing parenchymal density in the right upper lobe-recommendation for CT scan to exclude parenchymal abnormality versus pleural plaque  Patient is aware of previously noted pleural plaques-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  Paroxysmal A-fib [facility])  Assessment & Plan  Prior history of rate controlled AFib and stroke (2014)  Initial EKG demonstrated sinus rhythm and first-degree AV block, consistent with prior EKG  Managed by Dr Arben Parikh (Cardiology)  INR today is 3 05    Plan   1  Reduce warfarin dose to 2mg daily  2  Monitor INR  3  Continue metoprolol  Hypothyroidism  Assessment & Plan  Currently asymptomatic    Plan   2  Continue current management    Essential hypertension  Assessment & Plan  His blood pressure today was 137/62 and stable  Plan   3  Continue amlodipine and metoprolol  4   Continue to monitor BP    Hyperlipidemia  Assessment & Plan  Continue Lipitor      VTE Pharmacologic Prophylaxis:   Pharmacologic: Warfarin (Coumadin)  Mechanical VTE Prophylaxis in Place: Yes    Discussions with Specialists or Other Care Team Provider: none    Education and Discussions with Family / Patient: patient only    Current Length of Stay: 2 day(s)    Current Patient Status: Inpatient     Discharge Plan / Estimated Discharge Date: to be decided    Code Status: Level 3 - DNAR and DNI      Subjective: Today, Mr Sigala Pro states that he feels better, and sees an improvement in his symptoms  He is no longer short of breath at rest  No orthopnea, chest pain, palpitations  He denies nausea, vomiting, cough, fever, chills, abdominal pain, or constipation  No bleeding from gums, hematuria or chrissy  Objective:     Vitals:   Temp (24hrs), Av °F (36 7 °C), Min:97 7 °F (36 5 °C), Max:98 6 °F (37 °C)    Temp:  [97 7 °F (36 5 °C)-98 6 °F (37 °C)] 97 7 °F (36 5 °C)  HR:  [69-71] 69  Resp:  [18] 18  BP: (122-137)/(60-62) 137/62  SpO2:  [96 %-98 %] 98 %  Body mass index is 29 49 kg/m²  Input and Output Summary (last 24 hours): Intake/Output Summary (Last 24 hours) at 2019 1454  Last data filed at 2019 1126  Gross per 24 hour   Intake 600 ml   Output 2625 ml   Net -2025 ml       Physical Exam:     Physical Exam   Constitutional: He is oriented to person, place, and time  No distress  HENT:   Head: Normocephalic and atraumatic  Eyes: Pupils are equal, round, and reactive to light  EOM are normal    Neck: No JVD present  No thyromegaly present  Cardiovascular: Normal rate and normal heart sounds  No murmur heard  Irregular rhythm   Pulmonary/Chest: Effort normal and breath sounds normal  Rales: occasional rales - B/L lung field  Abdominal: Soft  Bowel sounds are normal  He exhibits no distension  There is no tenderness  Musculoskeletal: Normal range of motion  Neurological: He is alert and oriented to person, place, and time  Skin: He is not diaphoretic  Nursing note and vitals reviewed        Additional Data:     Labs:    Results from last 7 days   Lab Units 19  0459 19  1346   WBC Thousand/uL 7 36 7 35   HEMOGLOBIN g/dL 9 4* 9 5*   HEMATOCRIT % 30 1* 30 6*   PLATELETS Thousands/uL 217 230   NEUTROS PCT %  --  66   LYMPHS PCT %  --  24   MONOS PCT %  --  8   EOS PCT % --  2     Results from last 7 days   Lab Units 08/24/19  0601  08/22/19  1347   POTASSIUM mmol/L 3 3*   < > 4 7   CHLORIDE mmol/L 101   < > 102   CO2 mmol/L 28   < > 24   BUN mg/dL 46*   < > 37*   CREATININE mg/dL 3 49*   < > 3 49*   CALCIUM mg/dL 8 1*   < > 8 3   ALK PHOS U/L  --   --  118*   ALT U/L  --   --  14   AST U/L  --   --  24    < > = values in this interval not displayed  Results from last 7 days   Lab Units 08/24/19  0601   INR  3 05*       * I Have Reviewed All Lab Data Listed Above  * Additional Pertinent Lab Tests Reviewed: [place] [facility] Admission Reviewed    Imaging:    Imaging Reports Reviewed Today Include: chest X ray  Imaging Personally Reviewed by Myself Includes:  Chest X ray    Recent Cultures (last 7 days):           Last 24 Hours Medication List:     Current Facility-Administered Medications:  acetaminophen 650 mg Oral Q6H PRN [facility] Betsyter, DO   amLODIPine 5 mg Oral Daily MAURICE Ye   aspirin 81 mg Oral Daily [facility] Holter, DO   atorvastatin 40 mg Oral QPM [facility] Holter, DO   docusate sodium 100 mg Oral BID [facility] Holter, DO   furosemide 40 mg Intravenous TID (diuretic) Emmett Cunningham MD   insulin glargine 32 Units Subcutaneous Daily With Breakfast [facility] Holter, DO   insulin lispro 1-6 Units Subcutaneous TID AC [facility] Holter, DO   levothyroxine 75 mcg Oral Daily [facility] Holter, DO   metoprolol tartrate 50 mg Oral BID With Meals MAURICE Ye   ondansetron 4 mg Intravenous Q6H PRN [facility] Holter, DO   ranolazine 500 mg Oral Q12H [address] [facility] Holter, DO   tamsulosin 0 4 mg Oral Daily [facility] Betsyter, DO   warfarin 2 mg Oral Daily (warfarin) Huma Mercedes MD        Today, Patient Was Seen By: Huma Mercedes MD    ** Please Note: This note has been constructed using a voice recognition system   **

## 2019-08-24 NOTE — SOCIAL WORK
CM met with patient and wife Arie Cruz present at bedside  Arie Cruz reports that she has not had a chance to review the STR list; requests another list   CM provided patient/spouse with SNF list   Spouse reports that patient had previous STR stays in [facility] and does NOT want patient to return there; also had hx of rehab in the [address] [place] [address] area following a surgery performed by patient's son in law  Patient has a hx [facility] services with [facility] prior to admission  Spouse requests referral to [facility] for [facility] if patient goes home  Referral placed  CM will follow up with spouse regarding choices

## 2019-08-25 LAB
ANION GAP SERPL CALCULATED.3IONS-SCNC: 11 MMOL/L (ref 4–13)
BUN SERPL-MCNC: 44 MG/DL (ref 5–25)
CALCIUM SERPL-MCNC: 7.9 MG/DL (ref 8.3–10.1)
CHLORIDE SERPL-SCNC: 102 MMOL/L (ref 100–108)
CO2 SERPL-SCNC: 27 MMOL/L (ref 21–32)
CREAT SERPL-MCNC: 3.6 MG/DL (ref 0.6–1.3)
GFR SERPL CREATININE-BSD FRML MDRD: 14 ML/MIN/1.73SQ M
GLUCOSE SERPL-MCNC: 148 MG/DL (ref 65–140)
GLUCOSE SERPL-MCNC: 176 MG/DL (ref 65–140)
GLUCOSE SERPL-MCNC: 260 MG/DL (ref 65–140)
GLUCOSE SERPL-MCNC: 60 MG/DL (ref 65–140)
GLUCOSE SERPL-MCNC: 61 MG/DL (ref 65–140)
INR PPP: 3.54 (ref 0.84–1.19)
MAGNESIUM SERPL-MCNC: 1.9 MG/DL (ref 1.6–2.6)
POTASSIUM SERPL-SCNC: 3.3 MMOL/L (ref 3.5–5.3)
PROTHROMBIN TIME: 34.4 SECONDS (ref 11.6–14.5)
SODIUM SERPL-SCNC: 140 MMOL/L (ref 136–145)

## 2019-08-25 PROCEDURE — 85610 PROTHROMBIN TIME: CPT | Performed by: INTERNAL MEDICINE

## 2019-08-25 PROCEDURE — 99232 SBSQ HOSP IP/OBS MODERATE 35: CPT | Performed by: INTERNAL MEDICINE

## 2019-08-25 PROCEDURE — 82948 REAGENT STRIP/BLOOD GLUCOSE: CPT

## 2019-08-25 PROCEDURE — 80048 BASIC METABOLIC PNL TOTAL CA: CPT | Performed by: INTERNAL MEDICINE

## 2019-08-25 PROCEDURE — 83735 ASSAY OF MAGNESIUM: CPT | Performed by: INTERNAL MEDICINE

## 2019-08-25 RX ORDER — TORSEMIDE 20 MG/1
40 TABLET ORAL DAILY
Status: DISCONTINUED | OUTPATIENT
Start: 2019-08-25 | End: 2019-08-27 | Stop reason: HOSPADM

## 2019-08-25 RX ORDER — INSULIN GLARGINE 100 [IU]/ML
27 INJECTION, SOLUTION SUBCUTANEOUS
Status: DISCONTINUED | OUTPATIENT
Start: 2019-08-26 | End: 2019-08-27 | Stop reason: HOSPADM

## 2019-08-25 RX ORDER — POTASSIUM CHLORIDE 20 MEQ/1
40 TABLET, EXTENDED RELEASE ORAL ONCE
Status: COMPLETED | OUTPATIENT
Start: 2019-08-25 | End: 2019-08-25

## 2019-08-25 RX ADMIN — DOCUSATE SODIUM 100 MG: 100 CAPSULE, LIQUID FILLED ORAL at 08:37

## 2019-08-25 RX ADMIN — LEVOTHYROXINE SODIUM 75 MCG: 75 TABLET ORAL at 06:10

## 2019-08-25 RX ADMIN — DOCUSATE SODIUM 100 MG: 100 CAPSULE, LIQUID FILLED ORAL at 17:35

## 2019-08-25 RX ADMIN — IRON SUCROSE 200 MG: 20 INJECTION, SOLUTION INTRAVENOUS at 10:24

## 2019-08-25 RX ADMIN — TORSEMIDE 40 MG: 20 TABLET ORAL at 10:24

## 2019-08-25 RX ADMIN — INSULIN LISPRO 3 UNITS: 100 INJECTION, SOLUTION INTRAVENOUS; SUBCUTANEOUS at 12:12

## 2019-08-25 RX ADMIN — FUROSEMIDE 40 MG: 10 INJECTION, SOLUTION INTRAMUSCULAR; INTRAVENOUS at 06:10

## 2019-08-25 RX ADMIN — AMLODIPINE BESYLATE 5 MG: 5 TABLET ORAL at 08:37

## 2019-08-25 RX ADMIN — METOPROLOL TARTRATE 50 MG: 50 TABLET, FILM COATED ORAL at 08:00

## 2019-08-25 RX ADMIN — ASPIRIN 81 MG: 81 TABLET, COATED ORAL at 08:37

## 2019-08-25 RX ADMIN — RANOLAZINE 500 MG: 500 TABLET, FILM COATED, EXTENDED RELEASE ORAL at 21:31

## 2019-08-25 RX ADMIN — DESMOPRESSIN ACETATE 40 MG: 0.2 TABLET ORAL at 17:34

## 2019-08-25 RX ADMIN — POTASSIUM CHLORIDE 40 MEQ: 1500 TABLET, EXTENDED RELEASE ORAL at 08:37

## 2019-08-25 RX ADMIN — RANOLAZINE 500 MG: 500 TABLET, FILM COATED, EXTENDED RELEASE ORAL at 08:37

## 2019-08-25 RX ADMIN — TAMSULOSIN HYDROCHLORIDE 0.4 MG: 0.4 CAPSULE ORAL at 08:45

## 2019-08-25 NOTE — ASSESSMENT & PLAN NOTE
Prior history of coronary artery bypass graft (2013) and pacemaker (2015)  No complaints of chest pain/pressure/palpitations or shortness of breath  · Continue to monitor vitals  · Continue amlodipine 5 mg, metoprolol 50 mg and ranolazine 500 mg

## 2019-08-25 NOTE — ASSESSMENT & PLAN NOTE
Initial blood pressure 121/59-present blood pressure 144/66  · Continue monitor vitals  · Continue amlodipine 5 mg and metoprolol 50 mg with parameters

## 2019-08-25 NOTE — PROGRESS NOTES
NEPHROLOGY PROGRESS NOTE   Meño Notice 80 y o  male MRN: [id number]  Unit/Bed#: -01 Encounter: [id number]    ASSESSMENT & PLAN:  80 y  o male with history of chronic kidney disease stage 4, BPH, hypertension presented with increased lower extremity edema gradually worsening  Denies any shortness of breath  He mentions that he has not been on any diuretics in the past and was started on diuretics 1 day before admission by his outpatient nephrologist but never actually took it  He also complains of weight gain  Nephrology consulted for chronic kidney disease and for fluid overload  · Chronic kidney disease stage 4:    · Baseline creatinine recently around 3 3-3 5 with gradual progression of CKD  Previously creatinine was 1 9 in 2014 and 2 9 in 2015  Chronic kidney disease likely due to hypertensive nephrosclerosis and diabetic nephropathy as well as age-related nephron loss  · Previous kidney ultrasound from May 2019 showed right kidney 9 0 cm and left kidney 9 0 cm in size  · Admission creatinine was 3 49 and today creatinine is slightly elevated at 3 6  Most likely baseline creatinine is 3 5-3 6 to maintain euvolemic  May have to except slight increase in creatinine to maintain euvolemia  · Continue to monitor renal function and avoid nephrotoxins  · UA with urine microscopy showed 1-2 RBC and 10-20 WBC  Patient denies any urinary symptoms  · Fluid overload/acute on chronic diastolic CHF:  Ejection fraction 55% with grade 1 diastolic dysfunction  Chest x-ray on admission was suggestive of fluid overload  Volume overload improving and patient is already 6 L negative  Weight is down to 171 lb from 184 lb on admission  Target weight is likely 168-170 lb  Will switch to oral diuretics-starting on torsemide 40 mg daily  Monitor response on oral torsemide for another 24 hour before discharge  Starting on higher dose of torsemide considering advanced CKD    · Hypokalemia:  Replaced potassium chloride  · Primary hypertension with chronic kidney disease stage 4:  Blood pressure acceptable  Continue current medication with hold parameters  · History of BPH with urinary retention-continue Flomax  · Anemia, iron deficiency:  Hemoglobin stable iron saturation 14% with ferritin 128  Status post IV Venofer on 08/23 and 8/24  Will give another dose of 200 mg IV Venofer today  · Diabetes mellitus type 2:  Continue management per primary team    Discussed with Dr Hayes Posada        SUBJECTIVE:  Leg edema improving  No shortness of breath  No dizziness    OBJECTIVE:  Current Weight: Weight - Scale: 77 6 kg (171 lb 1 2 oz)  Vitals:    08/25/19 0729   BP: 144/66   Pulse: 80   Resp: 18   Temp: (!) 97 4 °F (36 3 °C)   SpO2: 97%       Intake/Output Summary (Last 24 hours) at 8/25/2019 0844  Last data filed at 8/25/2019 7200  Gross per 24 hour   Intake 600 ml   Output 2950 ml   Net -2350 ml       Physical Exam  General:  Ill looking, awake  Eyes: Conjunctivae pink,  Sclera anicteric  ENT: lips and mucous membranes moist  Neck: supple   Chest: Clear to Auscultation both lungs,  no crackles, ronchus or wheezing  CVS: S1 & S2 present, normal rate, regular rhythm, no murmur    Abdomen: soft, non-tender, non-distended, Bowel sounds normoactive  Extremities:  Trace edema both legs  Skin: no rash  Neuro: awake, alert, oriented x3        Medications:    Current Facility-Administered Medications:     acetaminophen (TYLENOL) tablet 650 mg, 650 mg, Oral, Q6H PRN, [place] C Holter, DO    amLODIPine (NORVASC) tablet 5 mg, 5 mg, Oral, Daily, MAURICE Arce, 5 mg at 08/25/19 0776    aspirin (ECOTRIN LOW STRENGTH) EC tablet 81 mg, 81 mg, Oral, Daily, Jordan C Holter, DO, 81 mg at 08/25/19 0837    atorvastatin (LIPITOR) tablet 40 mg, 40 mg, Oral, QPM, Jordan C Holter, DO, 40 mg at 08/24/19 1711    docusate sodium (COLACE) capsule 100 mg, 100 mg, Oral, BID, Jordan C Holter, DO, 100 mg at 08/25/19 0837   furosemide (LASIX) injection 40 mg, 40 mg, Intravenous, TID (diuretic), Kevin Busby MD, 40 mg at 08/25/19 0610    insulin glargine (LANTUS) subcutaneous injection 32 Units 0 32 mL, 32 Units, Subcutaneous, Daily With Breakfast, Jordan C Holter, DO, 32 Units at 08/24/19 0835    insulin lispro (HumaLOG) 100 units/mL subcutaneous injection 1-6 Units, 1-6 Units, Subcutaneous, TID AC, 1 Units at 08/24/19 1209 **AND** Fingerstick Glucose (POCT), , , TID AC, Jordan C Holter, DO    levothyroxine tablet 75 mcg, 75 mcg, Oral, Daily, Jordan C Holter, DO, 75 mcg at 08/25/19 0610    metoprolol tartrate (LOPRESSOR) tablet 50 mg, 50 mg, Oral, BID With Meals, MAURICE Barroso, 50 mg at 08/25/19 0800    ondansetron (ZOFRAN) injection 4 mg, 4 mg, Intravenous, Q6H PRN, [place] C Holter, DO    ranolazine (RANEXA) 12 hr tablet 500 mg, 500 mg, Oral, Q12H [address], Jordan C Holter, DO, 500 mg at 08/25/19 0837    tamsulosin (FLOMAX) capsule 0 4 mg, 0 4 mg, Oral, Daily, Jordan C Holter, DO, 0 4 mg at 08/24/19 1945    warfarin (COUMADIN) tablet 2 mg, 2 mg, Oral, Daily (warfarin), Lala Pittman MD, 2 mg at 08/24/19 1711    Invasive Devices:        Lab Results:   Results from last 7 days   Lab Units 08/25/19  0624 08/24/19  0601 08/23/19  1058 08/23/19  0459 08/22/19  2337 08/22/19  1347  08/22/19  1346   WBC Thousand/uL  --   --   --  7 36  --   --   --  7 35   HEMOGLOBIN g/dL  --   --   --  9 4*  --   --   --  9 5*   HEMATOCRIT %  --   --   --  30 1*  --   --   --  30 6*   PLATELETS Thousands/uL  --   --   --  217  --   --   --  230   POTASSIUM mmol/L 3 3* 3 3* 3 8  --  4 1 4 7   < >  --    CHLORIDE mmol/L 102 101 102  --  101 102   < >  --    CO2 mmol/L 27 28 28  --  28 24   < >  --    BUN mg/dL 44* 46* 38*  --  40* 37*   < >  --    CREATININE mg/dL 3 60* 3 49* 3 61*  --  3 68* 3 49*   < >  --    CALCIUM mg/dL 7 9* 8 1* 8 3  --  8 0* 8 3   < >  --    MAGNESIUM mg/dL 1 9 1 9  --   --  1 9  --   --   --    PHOSPHORUS mg/dL  -- 3 7  --   --   --   --   --   --    ALK PHOS U/L  --   --   --   --   --  118*  --   --    ALT U/L  --   --   --   --   --  14  --   --    AST U/L  --   --   --   --   --  24  --   --     < > = values in this interval not displayed  Previous work up:         Portions of the record may have been created with voice recognition software  Occasional wrong word or "sound a like" substitutions may have occurred due to the inherent limitations of voice recognition software  Read the chart carefully and recognize, using context, where substitutions have occurred  If you have any questions, please contact the dictating provider

## 2019-08-25 NOTE — DISCHARGE SUMMARY
Discharge- Nikko Snare 1/29/1932, 80 y o  male MRN: [id number]    Unit/Bed#: -01 Encounter: [id number]    Primary Care Provider: Miriam Bustos MD   Date and time admitted to hospital: 8/22/2019  1:15 PM    Kidney disease with fluid retention  Assessment & Plan  Initial creatinine of 3 5-approximate baseline 3 3-3 5  Managed by Dr Catalina Mendoza (Nephrology)  · Nephrology consult appreciated-creatinine at baseline, recommendation to continue outpatient nephrology management  Diuretics necessary at time of discharge  · Continue the monitor BMP-present creatinine 3 6  · Continue to replete potassium as warranted  · Discontinue IV Lasix 40 mg t i d   · Continue to torsemide 40 mg daily  Diabetes mellitus with multiple complications [facility])  Assessment & Plan  Lab Results   Component Value Date    HGBA1C 5 5 08/22/2019       Recent Labs     08/24/19  2204 08/24/19  2223 08/25/19  0734 08/25/19  1100   POCGLU 76 93 61* 260*       Blood Sugar Average: Last 72 hrs:  (P) 122 [id number] blood glucose this a m  61-morning insulin held  · Hold Tradjenta  · Hemoglobin A1c 5 5  · Decreased Lantus dose 32 to 27 units  · Continue sliding scale insulin  · Continue to monitor blood glucose  3-vessel coronary artery disease  Assessment & Plan  Prior history of coronary artery bypass graft (2013) and pacemaker (2015)  No complaints of chest pain/pressure/palpitations or shortness of breath  · Continue to monitor vitals  · Continue amlodipine 5 mg, metoprolol 50 mg and ranolazine 500 mg  Abnormal chest x-ray  Assessment & Plan  XR chest demonstrated bilateral upper lobe pleural plaques in addition to increasing parenchymal density in the right upper lobe-recommendation for CT scan to exclude parenchymal abnormality versus pleural plaque  · Patient aware of previously noted pleural plaques-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        Discharging Physician / Practitioner: Doni Cohen MD  PCP: Manuel Akhtar MD  Admission Date:   Admission Orders (From admission, onward)     Ordered        08/22/19 1448  Inpatient Admission (expected length of stay for this patient Order details is greater than two midnights)  Once                   Discharge Date: 08/27/19    Resolved Problems  Date Reviewed: 8/25/2019          Resolved    Elevated troponin I level 8/27/2019     Resolved by  Penny Ponce MD    Acute right-sided low back pain 8/27/2019     Resolved by  Penny Ponce MD          Consultations During Hospital Stay:  · Cardiology  · Nephrology  · Physical therapy  · Occupational therapy  Procedures Performed:   · None  Significant Findings / Test Results:   · CBC-See below  · CMP-See below  · Pro BMP-See below  · Troponin-See below  · XR chest-See below  · XR hip/pelvis and lumbar spine-See below  Incidental Findings:   · None  Test Results Pending at Discharge (will require follow up): · None  Outpatient Tests Requested:  · None  Complications:  None  Reason for Admission:  Significant lower extremity edema/overload  Hospital Course:     Oswaldo Gomez is a 80 y o  male patient, pertinent history of chronic diastolic heart failure and paroxysmal A-fib, that originally presented to [address] [place] [address] ED on 8/22/2019 due to significant lower extremity edema in addition to worsening dyspnea on exertion  Patient previously discharged s/p rehab secondary to spinal surgery; previously prescribed torsemide held throughout rehab  Initial vitals in the ED:  Temperature 98 2°, heart rate 77, respirations 20, blood pressure 121/59, SpO2 99%  IV Lasix 40 mg administered  BNP 7,901  Troponin 0 11  Initial CMP remarkable for creatinine 3 49 (baseline approximately 3 5-3 6) and BUN 37  Initial CBC remarkable for hemoglobin 9 5 and hematocrit 30 6    EKG demonstrated sinus rhythm, rate of 69 and first-degree AV block; no acute ischemic changes  XR chest demonstrated small pleural effusions and increasing parenchymal density of the right upper lobe; patient aware finding in 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  Patient admitted to [facility]  Cardiology consulted  I/Os and weights trended  Repeat troponins trended downward; likely elevated in presence of overload  Per Cardiology, overload likely secondary to worsening renal function and retention  Recommendation to continue diuresis per nephrology  No further cardiac evaluation necessary  Nephrology consulted  IV Lasix 40 mg t i d  recommended by Nephrology  Hypokalemia; 3 3-repleted using 40 mEq  Iron deficiency anemia; IV Venofer 200 mg administered  Approximately 8 L and 11 lb diuresed  Nephrology transition IV Lasix to p o  Torsemide 40 mg  Patient had an acute episode of low back pain  Hip/pelvis and lumbar Xrays showed chronic findings with no acute changes  Physical therapy and occupational therapy recommendation for short-term skilled rehab  Discussed with patient and patient's spouse discharge plan to [address] [place] [address] acute rehab, congestive heart failure education and continuation of oral diuretic-patient is amenable  Please see above list of diagnoses and related plan for additional information       Condition at Discharge: stable     Discharge Day Visit / Exam:     Subjective:  ***  Vitals: Blood Pressure: 128/75 (08/27/19 0700)  Pulse: 71 (08/27/19 0700)  Temperature: 97 5 °F (36 4 °C) (08/27/19 0700)  Temp Source: Oral (08/27/19 0700)  Respirations: 18 (08/27/19 0700)  Height: 5' 5" (165 1 cm) (08/22/19 1647)  Weight - Scale: 78 7 kg (173 lb 6 4 oz) (08/27/19 0532)  SpO2: 95 % (08/27/19 0700)  Exam:   Physical Exam  ( *** Be Sure to Include Physical Exam: Delete this entire line when you have entered your exam)  Discussion with Family: ***    Discharge instructions/Information to patient and family:   See after visit summary for information provided to patient and family  Provisions for Follow-Up Care:  See after visit summary for information related to follow-up care and any pertinent home health orders  Disposition:     Acute Rehab at [facility]  For Discharges to [address]:   · Not Applicable to this Patient - Not Applicable to this Patient    Planned Readmission:  None     Discharge Statement:  I spent greater than 30 minutes discharging the patient  This time was spent on the day of discharge  I had direct contact with the patient on the day of discharge  Greater than 50% of the total time was spent examining patient, answering all patient questions, arranging and discussing plan of care with patient as well as directly providing post-discharge instructions  Additional time then spent on discharge activities  Discharge Medications:  See after visit summary for reconciled discharge medications provided to patient and family        ** Please Note: This note has been constructed using a voice recognition system **

## 2019-08-25 NOTE — ASSESSMENT & PLAN NOTE
Prior history of rate controlled AFib and stroke (2014)  Initial EKG demonstrated sinus rhythm and first-degree AV block, consistent with prior EKG  Managed by Dr Emily Avila (Cardiology)  Present INR 3 54   · Hold warfarin 2 mg-reconsider restarting warfarin once decrease in INR achieved  · Continue monitor INR  · Continue metoprolol 50 mg

## 2019-08-25 NOTE — PROGRESS NOTES
Progress Note Pj Cui 1/29/1932, 80 y o  male MRN: [id number]    Unit/Bed#: -01 Encounter: [id number]    Primary Care Provider: Pineda Boland MD   Date and time admitted to hospital: 8/22/2019  1:15 PM    Abnormal chest x-ray  Assessment & Plan  XR chest demonstrated bilateral upper lobe pleural plaques in addition to increasing parenchymal density in the right upper lobe-recommendation for CT scan to exclude parenchymal abnormality versus pleural plaque  · Patient aware of previously noted pleural plaques-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  Benign prostatic hyperplasia with urinary hesitancy  Assessment & Plan  Patient endorsed urinary hesitancy  Managed by outpatient urology  · Continue Flomax 0 4 mg     Elevated troponin I level  Assessment & Plan  Initial Troponin 0 11   · Per Cardiology, likely secondary to heart strain in presence of overload  Paroxysmal A-fib [facility])  Assessment & Plan  Prior history of rate controlled AFib and stroke (2014)  Initial EKG demonstrated sinus rhythm and first-degree AV block, consistent with prior EKG  Managed by Dr Jaziel Cleveland (Cardiology)  Present INR 3 54   · Hold warfarin 2 mg-reconsider restarting warfarin once decrease in INR achieved  · Continue monitor INR  · Continue metoprolol 50 mg  Hypothyroidism  Assessment & Plan  · Continue Synthroid 75 mcg  Essential hypertension  Assessment & Plan  Initial blood pressure 121/59-present blood pressure 144/66  · Continue monitor vitals  · Continue amlodipine 5 mg and metoprolol 50 mg with parameters  Hyperlipidemia  Assessment & Plan  · Continue Lipitor 40 mg      Diabetes mellitus with multiple complications [facility])  Assessment & Plan  Lab Results   Component Value Date    HGBA1C 5 5 08/22/2019       Recent Labs     08/24/19  2133 08/24/19  2204 08/24/19  2223 08/25/19  0734   POCGLU 58* 76 93 61*       Blood Sugar Average: Last 72 hrs:  (P) 111 [id number] blood glucose this a m  61-morning insulin held  · Hold Tradjenta  · Hemoglobin A1c 5 5  · Continue sliding scale insulin  · Continue to monitor blood glucose  Kidney disease with fluid retention  Assessment & Plan  Initial creatinine of 3 5-approximate baseline 3 3-3 5  Managed by Dr Aimee Reinoso (Nephrology)  · Nephrology consult appreciated-creatinine at baseline, recommendation to continue outpatient nephrology management  Diuretics necessary at time of discharge  · Continue the monitor BMP-present creatinine 3 6  · Continue to replete potassium as warranted  · Discontinue IV Lasix 40 mg t i d   · Continue to torsemide 40 mg daily  3-vessel coronary artery disease  Assessment & Plan  Prior history of coronary artery bypass graft (2013) and pacemaker (2015)  No complaints of chest pain/pressure/palpitations or shortness of breath  · Continue to monitor vitals  · Continue amlodipine 5 mg, metoprolol 50 mg and ranolazine 500 mg     * Chronic diastolic heart failure (HCC)  Assessment & Plan  Initial patient presentation of worsening dyspnea on exertion and lower extremity edema; likely cardiorenal syndrome  XR chest demonstrated cardiomegaly and small pleural effusions  ProBNP 7,901  Troponin 0 11  Previous echo (06/12/2019) demonstrated EF of 55% and grade 1 diastolic dysfunction  · Continue to monitor vitals  · Cardiology consult appreciated-no evidence of heart failure, overload likely secondary to worsening renal function and retention  Mild troponin elevation likely secondary to elevated creatinine and overload and retention as a result  No cardiac workup necessary  Defer to Nephrology regarding diuresis  · Nephrology consult appreciated-discontinue IV Lasix 40 mg t i d   Continue torsemide 40 mg daily  · Continue retention protocol  · Continue to monitor I/0s and weight  · Continue Cardiac diet 2 g sodium and 1500mL restriction  · Continue metoprolol 50 mg and amlodipine 5 mg             VTE Pharmacologic Prophylaxis:   Pharmacologic: Warfarin (Coumadin)  Mechanical VTE Prophylaxis in Place: Yes    Patient Centered Rounds: I have performed bedside rounds with nursing staff today  Discussions with Specialists or Other Care Team Provider:  None  Education and Discussions with Family / Patient:  Patient and patient's spouse  Time Spent for Care: 30 minutes  More than 50% of total time spent on counseling and coordination of care as described above  Current Length of Stay: 3 day(s)    Current Patient Status: Inpatient   Certification Statement: The patient will continue to require additional inpatient hospital stay due to Transition IV Lasix dose p o  Torsemide in preparation for discharge  Discharge Plan:  Possible patient discharge in 24-48 hours  Code Status: Level 3 - DNAR and DNI      Subjective:   No complaints stated by patient at time of exam   He denied shortness of breath, chest pain/palpitations problems voiding  He endorsed appropriate appetite stated he is eating well in addition to adhering to 1500 mL restriction  Discussed with patient nephrology recommendation to transition diuresis to p o  Demadex 40 mg in preparation for discharge  Objective:     Vitals:   Temp (24hrs), Av 5 °F (36 4 °C), Min:97 4 °F (36 3 °C), Max:97 5 °F (36 4 °C)    Temp:  [97 4 °F (36 3 °C)-97 5 °F (36 4 °C)] 97 4 °F (36 3 °C)  HR:  [62-80] 80  Resp:  [18] 18  BP: (133-144)/(60-66) 144/66  SpO2:  [96 %-98 %] 97 %  Body mass index is 28 47 kg/m²  Input and Output Summary (last 24 hours): Intake/Output Summary (Last 24 hours) at 2019 1328  Last data filed at 2019 1241  Gross per 24 hour   Intake 600 ml   Output 3200 ml   Net -2600 ml       Physical Exam:     Physical Exam   Constitutional: He is oriented to person, place, and time  Vital signs are normal  He appears well-developed and well-nourished  He is cooperative  He does not appear ill  No distress     HENT:   Head: Normocephalic and atraumatic  Eyes: Pupils are equal, round, and reactive to light  EOM are normal    Neck: Normal range of motion  Neck supple  Cardiovascular: Normal rate, regular rhythm, intact distal pulses and normal pulses  Exam reveals no friction rub  Murmur heard  Pulmonary/Chest: Effort normal  No respiratory distress  He has decreased breath sounds in the right lower field and the left lower field  He has no rales  Slightly diminished breath sounds in the lower lung fields, bilaterally  Abdominal: Soft  Normal appearance and bowel sounds are normal  He exhibits no distension  There is no tenderness  There is no rigidity and no rebound  Musculoskeletal: Normal range of motion  He exhibits edema  Right knee: He exhibits swelling  Left knee: He exhibits swelling  Right ankle: He exhibits swelling  Left ankle: He exhibits swelling  1 to 2+ pedal edema noted bilaterally, extending to the thigh  Neurological: He is alert and oriented to person, place, and time  Skin: Skin is warm, dry and intact  Capillary refill takes 2 to 3 seconds  He is not diaphoretic  Psychiatric: He has a normal mood and affect  His speech is normal and behavior is normal  Judgment and thought content normal    Nursing note and vitals reviewed          Additional Data:     Labs:    Results from last 7 days   Lab Units 08/23/19  0459 08/22/19  1346   WBC Thousand/uL 7 36 7 35   HEMOGLOBIN g/dL 9 4* 9 5*   HEMATOCRIT % 30 1* 30 6*   PLATELETS Thousands/uL 217 230   NEUTROS PCT %  --  66   LYMPHS PCT %  --  24   MONOS PCT %  --  8   EOS PCT %  --  2     Results from last 7 days   Lab Units 08/25/19  0624  08/22/19  1347   SODIUM mmol/L 140   < > 136   POTASSIUM mmol/L 3 3*   < > 4 7   CHLORIDE mmol/L 102   < > 102   CO2 mmol/L 27   < > 24   BUN mg/dL 44*   < > 37*   CREATININE mg/dL 3 60*   < > 3 49*   ANION GAP mmol/L 11   < > 10   CALCIUM mg/dL 7 9*   < > 8 3   ALBUMIN g/dL  --   --  3 1* TOTAL BILIRUBIN mg/dL  --   --  0 40   ALK PHOS U/L  --   --  118*   ALT U/L  --   --  14   AST U/L  --   --  24   GLUCOSE RANDOM mg/dL 60*   < > 121    < > = values in this interval not displayed  Results from last 7 days   Lab Units 08/25/19  0624   INR  3 54*     Results from last 7 days   Lab Units 08/25/19  1100 08/25/19  0734 08/24/19  2223 08/24/19  2204 08/24/19  2133 08/24/19  1611 08/24/19  1116 08/24/19  0734 08/23/19  2103 08/23/19  1619 08/23/19  1108 08/23/19  0724   POC GLUCOSE mg/dl 260* 61* 93 76 58* 98 189* 72 126 110 155* 110     Results from last 7 days   Lab Units 08/22/19  1958   HEMOGLOBIN A1C % 5 5               * I Have Reviewed All Lab Data Listed Above  * Additional Pertinent Lab Tests Reviewed: All Labs For Current Hospital Admission Reviewed    Imaging:    Imaging Reports Reviewed Today Include:  None  Imaging Personally Reviewed by Myself Includes:  None  Recent Cultures (last 7 days):           Last 24 Hours Medication List:     Current Facility-Administered Medications:  acetaminophen 650 mg Oral Q6H PRN [place] C Holter,    amLODIPine 5 mg Oral Daily MAURICE Ye   aspirin 81 mg Oral Daily [place] C Holter, DO   atorvastatin 40 mg Oral QPM Jordan C Holter, DO   docusate sodium 100 mg Oral BID Jordan C Holter, DO   [START ON 8/26/2019] insulin glargine 27 Units Subcutaneous Daily With Breakfast Jordan C Holter, DO   insulin lispro 1-6 Units Subcutaneous TID AC Jordan C Holter, DO   levothyroxine 75 mcg Oral Daily Jordan C Holter, DO   metoprolol tartrate 50 mg Oral BID With Meals MAURICE Ye   ondansetron 4 mg Intravenous Q6H PRN Jordan C Holter, DO   ranolazine 500 mg Oral Q12H [address] [facility] C Holter, DO   tamsulosin 0 4 mg Oral Daily [facility] C Holter, DO   torsemide 40 mg Oral Daily Emmett Cunningham MD        Today, Patient Was Seen By: Arvie Panning Holter, DO    ** Please Note: Dictation voice to text software may have been used in the creation of this document  **

## 2019-08-25 NOTE — ASSESSMENT & PLAN NOTE
Lab Results   Component Value Date    HGBA1C 5 5 08/22/2019       Recent Labs     08/24/19  2204 08/24/19  2223 08/25/19  0734 08/25/19  1100   POCGLU 76 93 61* 260*       Blood Sugar Average: Last 72 hrs:  (P) 122 [id number] blood glucose this a m  61-morning insulin held  · Hold Tradjenta  · Hemoglobin A1c 5 5  · Decreased Lantus dose 32 to 27 units  · Continue sliding scale insulin  · Continue to monitor blood glucose

## 2019-08-25 NOTE — ASSESSMENT & PLAN NOTE
Initial creatinine of 3 5-approximate baseline 3 3-3 5  Managed by Dr Cj Colvin (Nephrology)  · Nephrology consult appreciated-creatinine at baseline, recommendation to continue outpatient nephrology management  Diuretics necessary at time of discharge  · Continue the monitor BMP-present creatinine 3 6  · Continue to replete potassium as warranted  · Discontinue IV Lasix 40 mg t i d   · Continue to torsemide 40 mg daily

## 2019-08-25 NOTE — ASSESSMENT & PLAN NOTE
Initial patient presentation of worsening dyspnea on exertion and lower extremity edema; likely cardiorenal syndrome  XR chest demonstrated cardiomegaly and small pleural effusions  ProBNP 7,901  Troponin 0 11  Previous echo (06/12/2019) demonstrated EF of 55% and grade 1 diastolic dysfunction  · Continue to monitor vitals  · Cardiology consult appreciated-no evidence of heart failure, overload likely secondary to worsening renal function and retention  Mild troponin elevation likely secondary to elevated creatinine and overload and retention as a result  No cardiac workup necessary  Defer to Nephrology regarding diuresis  · Nephrology consult appreciated-discontinue IV Lasix 40 mg t i d   Continue torsemide 40 mg daily  · Continue retention protocol  · Continue to monitor I/0s and weight  · Continue Cardiac diet 2 g sodium and 1500mL restriction  · Continue metoprolol 50 mg and amlodipine 5 mg

## 2019-08-25 NOTE — ASSESSMENT & PLAN NOTE
Lab Results   Component Value Date    HGBA1C 5 5 08/22/2019       Recent Labs     08/24/19  2133 08/24/19  2204 08/24/19  2223 08/25/19  0734   POCGLU 58* 76 93 61*       Blood Sugar Average: Last 72 hrs:  (P) 111 [id number] blood glucose this a m  61-morning insulin held  · Hold Tradjenta  · Hemoglobin A1c 5 5  · Continue sliding scale insulin  · Continue to monitor blood glucose

## 2019-08-25 NOTE — ASSESSMENT & PLAN NOTE
Initial creatinine of 3 5-approximate baseline 3 3-3 5  Managed by Dr Robyn Dye (Nephrology)  · Nephrology consult appreciated-creatinine at baseline, recommendation to continue outpatient nephrology management  Diuretics necessary at time of discharge  · Continue the monitor BMP-present creatinine 3 6  · Continue to replete potassium as warranted  · Discontinue IV Lasix 40 mg t i d   · Continue to torsemide 40 mg daily

## 2019-08-26 ENCOUNTER — APPOINTMENT (INPATIENT)
Dept: RADIOLOGY | Facility: HOSPITAL | Age: 84
DRG: 291 | End: 2019-08-26
Payer: MEDICARE

## 2019-08-26 PROBLEM — M54.50 ACUTE RIGHT-SIDED LOW BACK PAIN: Status: ACTIVE | Noted: 2019-08-26

## 2019-08-26 PROBLEM — D68.32 WARFARIN-INDUCED COAGULOPATHY (HCC): Status: ACTIVE | Noted: 2019-08-26

## 2019-08-26 PROBLEM — T45.515A WARFARIN-INDUCED COAGULOPATHY (HCC): Status: ACTIVE | Noted: 2019-08-26

## 2019-08-26 LAB
ANION GAP SERPL CALCULATED.3IONS-SCNC: 9 MMOL/L (ref 4–13)
BUN SERPL-MCNC: 45 MG/DL (ref 5–25)
CALCIUM SERPL-MCNC: 8.2 MG/DL (ref 8.3–10.1)
CHLORIDE SERPL-SCNC: 100 MMOL/L (ref 100–108)
CO2 SERPL-SCNC: 29 MMOL/L (ref 21–32)
CREAT SERPL-MCNC: 3.54 MG/DL (ref 0.6–1.3)
GFR SERPL CREATININE-BSD FRML MDRD: 15 ML/MIN/1.73SQ M
GLUCOSE SERPL-MCNC: 105 MG/DL (ref 65–140)
GLUCOSE SERPL-MCNC: 156 MG/DL (ref 65–140)
GLUCOSE SERPL-MCNC: 271 MG/DL (ref 65–140)
GLUCOSE SERPL-MCNC: 77 MG/DL (ref 65–140)
GLUCOSE SERPL-MCNC: 93 MG/DL (ref 65–140)
INR PPP: 3.74 (ref 0.84–1.19)
POTASSIUM SERPL-SCNC: 4.6 MMOL/L (ref 3.5–5.3)
PROTHROMBIN TIME: 35.9 SECONDS (ref 11.6–14.5)
SODIUM SERPL-SCNC: 138 MMOL/L (ref 136–145)

## 2019-08-26 PROCEDURE — 97535 SELF CARE MNGMENT TRAINING: CPT

## 2019-08-26 PROCEDURE — 85610 PROTHROMBIN TIME: CPT | Performed by: INTERNAL MEDICINE

## 2019-08-26 PROCEDURE — 99232 SBSQ HOSP IP/OBS MODERATE 35: CPT | Performed by: INTERNAL MEDICINE

## 2019-08-26 PROCEDURE — 73502 X-RAY EXAM HIP UNI 2-3 VIEWS: CPT

## 2019-08-26 PROCEDURE — 97530 THERAPEUTIC ACTIVITIES: CPT

## 2019-08-26 PROCEDURE — 82948 REAGENT STRIP/BLOOD GLUCOSE: CPT

## 2019-08-26 PROCEDURE — 97116 GAIT TRAINING THERAPY: CPT

## 2019-08-26 PROCEDURE — 80048 BASIC METABOLIC PNL TOTAL CA: CPT | Performed by: INTERNAL MEDICINE

## 2019-08-26 PROCEDURE — 72100 X-RAY EXAM L-S SPINE 2/3 VWS: CPT

## 2019-08-26 PROCEDURE — 97110 THERAPEUTIC EXERCISES: CPT

## 2019-08-26 RX ORDER — MUSCLE RUB CREAM 100; 150 MG/G; MG/G
CREAM TOPICAL 4 TIMES DAILY PRN
Status: DISCONTINUED | OUTPATIENT
Start: 2019-08-26 | End: 2019-08-27 | Stop reason: HOSPADM

## 2019-08-26 RX ORDER — LIDOCAINE 50 MG/G
1 PATCH TOPICAL DAILY
Status: DISCONTINUED | OUTPATIENT
Start: 2019-08-26 | End: 2019-08-27 | Stop reason: HOSPADM

## 2019-08-26 RX ADMIN — METOPROLOL TARTRATE 50 MG: 50 TABLET, FILM COATED ORAL at 07:40

## 2019-08-26 RX ADMIN — RANOLAZINE 500 MG: 500 TABLET, FILM COATED, EXTENDED RELEASE ORAL at 07:41

## 2019-08-26 RX ADMIN — AMLODIPINE BESYLATE 5 MG: 5 TABLET ORAL at 07:40

## 2019-08-26 RX ADMIN — METOPROLOL TARTRATE 50 MG: 50 TABLET, FILM COATED ORAL at 17:28

## 2019-08-26 RX ADMIN — TORSEMIDE 40 MG: 20 TABLET ORAL at 07:40

## 2019-08-26 RX ADMIN — INSULIN GLARGINE 27 UNITS: 100 INJECTION, SOLUTION SUBCUTANEOUS at 08:36

## 2019-08-26 RX ADMIN — ASPIRIN 81 MG: 81 TABLET, COATED ORAL at 07:40

## 2019-08-26 RX ADMIN — DOCUSATE SODIUM 100 MG: 100 CAPSULE, LIQUID FILLED ORAL at 17:29

## 2019-08-26 RX ADMIN — INSULIN LISPRO 4 UNITS: 100 INJECTION, SOLUTION INTRAVENOUS; SUBCUTANEOUS at 12:21

## 2019-08-26 RX ADMIN — DOCUSATE SODIUM 100 MG: 100 CAPSULE, LIQUID FILLED ORAL at 07:41

## 2019-08-26 RX ADMIN — INSULIN LISPRO 1 UNITS: 100 INJECTION, SOLUTION INTRAVENOUS; SUBCUTANEOUS at 08:37

## 2019-08-26 RX ADMIN — DESMOPRESSIN ACETATE 40 MG: 0.2 TABLET ORAL at 17:28

## 2019-08-26 RX ADMIN — ACETAMINOPHEN 650 MG: 325 TABLET ORAL at 07:40

## 2019-08-26 RX ADMIN — LIDOCAINE 1 PATCH: 50 PATCH TOPICAL at 17:29

## 2019-08-26 RX ADMIN — RANOLAZINE 500 MG: 500 TABLET, FILM COATED, EXTENDED RELEASE ORAL at 21:26

## 2019-08-26 RX ADMIN — TAMSULOSIN HYDROCHLORIDE 0.4 MG: 0.4 CAPSULE ORAL at 07:41

## 2019-08-26 RX ADMIN — LEVOTHYROXINE SODIUM 75 MCG: 75 TABLET ORAL at 06:20

## 2019-08-26 NOTE — SOCIAL WORK
CM met with patient and spouse at bedside to review referral responses  At this time, [facility] is unable to accept but [facility] has accepted  [facility] has also accepted  Patient and his spouse discussed and elected to discharge to [facility]  CM provided directions from patient's home to [facility] at patient's spouse's request  SLIM aware  CM will need to arrange [facility] transport once patient is medically clear for dc  Patient and spouse aware of [address] made aware of determination as well

## 2019-08-26 NOTE — ASSESSMENT & PLAN NOTE
INR continues to increase, currently 3 74   · Hold warfarin 2 mg; last dose 8/24 pm dose  · Continue monitor INR

## 2019-08-26 NOTE — ASSESSMENT & PLAN NOTE
Lab Results   Component Value Date    HGBA1C 5 5 08/22/2019       Recent Labs     08/25/19  1603 08/25/19 2055 08/26/19  0721 08/26/19  1108   POCGLU 148* 176* 156* 271*       Blood Sugar Average: Last 72 hrs:  (P) 134 9375     · Continue Lantus 27 units with breakfast   · Continue sliding scale insulin  · Continue to monitor blood glucose    · Diabetic diet

## 2019-08-26 NOTE — ASSESSMENT & PLAN NOTE
Blood pressure stable, 139/63 this morning  · Continue to monitor vitals  · Continue home amlodipine 5 mg and metoprolol 50 mg with hold parameters

## 2019-08-26 NOTE — ASSESSMENT & PLAN NOTE
Initial Troponin 0 11  Decreased to 0 09 on repeat  · Per Cardiology, likely secondary to heart strain in presence of overload

## 2019-08-26 NOTE — PLAN OF CARE
Problem: Potential for Falls  Goal: Patient will remain free of falls  Description  INTERVENTIONS:  - Assess patient frequently for physical needs  -  Identify cognitive and physical deficits and behaviors that affect risk of falls    -  [place] fall precautions as indicated by assessment   - Educate patient/family on patient safety including physical limitations  - Instruct patient to call for assistance with activity based on assessment  - Modify environment to reduce risk of injury  - Consider OT/PT consult to assist with strengthening/mobility  Outcome: Progressing     Problem: Prexisting or High Potential for Compromised Skin Integrity  Goal: Skin integrity is maintained or improved  Description  INTERVENTIONS:  - Identify patients at risk for skin breakdown  - Assess and monitor skin integrity  - Assess and monitor nutrition and hydration status  - Monitor labs   - Assess for incontinence   - Turn and reposition patient  - Assist with mobility/ambulation  - Relieve pressure over bony prominences  - Avoid friction and shearing  - Provide appropriate hygiene as needed including keeping skin clean and dry  - Evaluate need for skin moisturizer/barrier cream  - Collaborate with interdisciplinary team   - Patient/family teaching  - Consider wound care consult   Outcome: Progressing     Problem: CARDIOVASCULAR - ADULT  Goal: Maintains optimal cardiac output and hemodynamic stability  Description  INTERVENTIONS:  - Monitor I/O, vital signs and rhythm  - Monitor for S/S and trends of decreased cardiac output  - Administer and titrate ordered vasoactive medications to optimize hemodynamic stability  - Assess quality of pulses, skin color and temperature  - Assess for signs of decreased coronary artery perfusion  - Instruct patient to report change in severity of symptoms  Outcome: Progressing  Goal: Absence of cardiac dysrhythmias or at baseline rhythm  Description  INTERVENTIONS:  - Continuous cardiac monitoring, vital signs, obtain 12 lead EKG if ordered  - Administer antiarrhythmic and heart rate control medications as ordered  - Monitor electrolytes and administer replacement therapy as ordered  Outcome: Progressing     Problem: RESPIRATORY - ADULT  Goal: Achieves optimal ventilation and oxygenation  Description  INTERVENTIONS:  - Assess for changes in respiratory status  - Assess for changes in mentation and behavior  - Position to facilitate oxygenation and minimize respiratory effort  - Oxygen administered by appropriate delivery if ordered  - Initiate smoking cessation education as indicated  - Encourage broncho-pulmonary hygiene including cough, deep breathe, Incentive Spirometry  - Assess the need for suctioning and aspirate as needed  - Assess and instruct to report SOB or any respiratory difficulty  - Respiratory Therapy support as indicated  Outcome: Progressing     Problem: METABOLIC, FLUID AND ELECTROLYTES - ADULT  Goal: Electrolytes maintained within normal limits  Description  INTERVENTIONS:  - Monitor labs and assess patient for signs and symptoms of electrolyte imbalances  - Administer electrolyte replacement as ordered  - Monitor response to electrolyte replacements, including repeat lab results as appropriate  - Instruct patient on fluid and nutrition as appropriate  Outcome: Progressing  Goal: Fluid balance maintained  Description  INTERVENTIONS:  - Monitor labs   - Monitor I/O and WT  - Instruct patient on fluid and nutrition as appropriate  - Assess for signs & symptoms of volume excess or deficit  Outcome: Progressing  Goal: Glucose maintained within target range  Description  INTERVENTIONS:  - Monitor Blood Glucose as ordered  - Assess for signs and symptoms of hyperglycemia and hypoglycemia  - Administer ordered medications to maintain glucose within target range  - Assess nutritional intake and initiate nutrition service referral as needed  Outcome: Progressing     Problem: PAIN - ADULT  Goal: Verbalizes/displays adequate comfort level or baseline comfort level  Description  Interventions:  - Encourage patient to monitor pain and request assistance  - Assess pain using appropriate pain scale  - Administer analgesics based on type and severity of pain and evaluate response  - Implement non-pharmacological measures as appropriate and evaluate response  - Consider cultural and social influences on pain and pain management  - Notify physician/advanced practitioner if interventions unsuccessful or patient reports new pain  Outcome: Progressing     Problem: SAFETY ADULT  Goal: Maintain or return to baseline ADL function  Description  INTERVENTIONS:  -  Assess patient's ability to carry out ADLs; assess patient's baseline for ADL function and identify physical deficits which impact ability to perform ADLs (bathing, care of mouth/teeth, toileting, grooming, dressing, etc )  - Assess/evaluate cause of self-care deficits   - Assess range of motion  - Assess patient's mobility; develop plan if impaired  - Assess patient's need for assistive devices and provide as appropriate  - Encourage maximum independence but intervene and supervise when necessary  - Involve family in performance of ADLs  - Assess for home care needs following discharge   - Consider OT consult to assist with ADL evaluation and planning for discharge  - Provide patient education as appropriate  Outcome: Progressing  Goal: Maintain or return mobility status to optimal level  Description  INTERVENTIONS:  - Assess patient's baseline mobility status (ambulation, transfers, stairs, etc )    - Identify cognitive and physical deficits and behaviors that affect mobility  - Identify mobility aids required to assist with transfers and/or ambulation (gait belt, sit-to-stand, lift, walker, cane, etc )  - [place] fall precautions as indicated by assessment  - Record patient progress and toleration of activity level on Mobility SBAR; progress patient to next Phase/Stage  - Instruct patient to call for assistance with activity based on assessment  - Consider rehabilitation consult to assist with strengthening/weightbearing, etc   Outcome: Progressing     Problem: DISCHARGE PLANNING  Goal: Discharge to home or other facility with appropriate resources  Description  INTERVENTIONS:  - Identify barriers to discharge w/patient and caregiver  - Arrange for needed discharge resources and transportation as appropriate  - Identify discharge learning needs (meds, wound care, etc )  - Arrange for interpretive services to assist at discharge as needed  - Refer to Case Management Department for coordinating discharge planning if the patient needs post-hospital services based on physician/advanced practitioner order or complex needs related to functional status, cognitive ability, or social support system  Outcome: Progressing

## 2019-08-26 NOTE — PLAN OF CARE
Problem: Potential for Falls  Goal: Patient will remain free of falls  Description  INTERVENTIONS:  - Assess patient frequently for physical needs  -  Identify cognitive and physical deficits and behaviors that affect risk of falls    -  [place] fall precautions as indicated by assessment   - Educate patient/family on patient safety including physical limitations  - Instruct patient to call for assistance with activity based on assessment  - Modify environment to reduce risk of injury  - Consider OT/PT consult to assist with strengthening/mobility  Outcome: Progressing     Problem: Prexisting or High Potential for Compromised Skin Integrity  Goal: Skin integrity is maintained or improved  Description  INTERVENTIONS:  - Identify patients at risk for skin breakdown  - Assess and monitor skin integrity  - Assess and monitor nutrition and hydration status  - Monitor labs   - Assess for incontinence   - Turn and reposition patient  - Assist with mobility/ambulation  - Relieve pressure over bony prominences  - Avoid friction and shearing  - Provide appropriate hygiene as needed including keeping skin clean and dry  - Evaluate need for skin moisturizer/barrier cream  - Collaborate with interdisciplinary team   - Patient/family teaching  - Consider wound care consult   Outcome: Progressing     Problem: CARDIOVASCULAR - ADULT  Goal: Maintains optimal cardiac output and hemodynamic stability  Description  INTERVENTIONS:  - Monitor I/O, vital signs and rhythm  - Monitor for S/S and trends of decreased cardiac output  - Administer and titrate ordered vasoactive medications to optimize hemodynamic stability  - Assess quality of pulses, skin color and temperature  - Assess for signs of decreased coronary artery perfusion  - Instruct patient to report change in severity of symptoms  Outcome: Progressing  Goal: Absence of cardiac dysrhythmias or at baseline rhythm  Description  INTERVENTIONS:  - Continuous cardiac monitoring, vital signs, obtain 12 lead EKG if ordered  - Administer antiarrhythmic and heart rate control medications as ordered  - Monitor electrolytes and administer replacement therapy as ordered  Outcome: Progressing     Problem: RESPIRATORY - ADULT  Goal: Achieves optimal ventilation and oxygenation  Description  INTERVENTIONS:  - Assess for changes in respiratory status  - Assess for changes in mentation and behavior  - Position to facilitate oxygenation and minimize respiratory effort  - Oxygen administered by appropriate delivery if ordered  - Initiate smoking cessation education as indicated  - Encourage broncho-pulmonary hygiene including cough, deep breathe, Incentive Spirometry  - Assess the need for suctioning and aspirate as needed  - Assess and instruct to report SOB or any respiratory difficulty  - Respiratory Therapy support as indicated  Outcome: Progressing     Problem: METABOLIC, FLUID AND ELECTROLYTES - ADULT  Goal: Electrolytes maintained within normal limits  Description  INTERVENTIONS:  - Monitor labs and assess patient for signs and symptoms of electrolyte imbalances  - Administer electrolyte replacement as ordered  - Monitor response to electrolyte replacements, including repeat lab results as appropriate  - Instruct patient on fluid and nutrition as appropriate  Outcome: Progressing  Goal: Fluid balance maintained  Description  INTERVENTIONS:  - Monitor labs   - Monitor I/O and WT  - Instruct patient on fluid and nutrition as appropriate  - Assess for signs & symptoms of volume excess or deficit  Outcome: Progressing  Goal: Glucose maintained within target range  Description  INTERVENTIONS:  - Monitor Blood Glucose as ordered  - Assess for signs and symptoms of hyperglycemia and hypoglycemia  - Administer ordered medications to maintain glucose within target range  - Assess nutritional intake and initiate nutrition service referral as needed  Outcome: Progressing

## 2019-08-26 NOTE — PHYSICAL THERAPY NOTE
PHYSICAL THERAPY NOTE    Patient Name: Ovidio KABAW'D Date: 19 8961   Pain Assessment   Pain Assessment 0-10   Pain Score 9   Pain Location Back   Pain Orientation Right   Restrictions/Precautions   Weight Bearing Precautions Per Order No   Braces or Orthoses AFO   Other Precautions Chair Alarm; Bed Alarm;Telemetry; Fall Risk   General   Family/Caregiver Present No   Subjective   Subjective Patient supine in bed and is agreeable to participate in therapy session  Patient identifers obtained from name &   Bed Mobility   Supine to Sit 4  Minimal assistance   Additional items Assist x 1;HOB elevated; Bedrails; Increased time required;Verbal cues;LE management   Sit to Supine Unable to assess   Additional Comments Patient seated OOB in recliner post session with chair alarm engaged, call bell and belongings in reach  Transfers   Sit to Stand 4  Minimal assistance   Additional items Assist x 1; Armrests; Increased time required;Verbal cues   Stand to Sit 4  Minimal assistance   Additional items Assist x 1; Armrests; Increased time required;Verbal cues   Ambulation/Elevation   Gait pattern Forward Flexion; Steppage   Gait Assistance 4  Minimal assist  (times of mod ax1)   Additional items Assist x 1;Verbal cues   Assistive Device Rolling walker   Distance 50' x1   Balance   Static Sitting Fair +   Dynamic Sitting Fair -   Static Standing Poor +   Ambulatory Poor +   Activity Tolerance   Activity Tolerance Patient limited by fatigue;Patient limited by pain   Nurse Made Aware Spoke to Pretty [place]a RN    Exercises   Knee AROM Long Arc Quad Sitting;10 reps;AROM; Bilateral   Ankle Pumps Sitting;10 reps;AROM; Bilateral   Marching Sitting;10 reps;AROM; Bilateral   Assessment   Prognosis Fair   Problem List Decreased strength;Decreased endurance;Decreased range of motion; Impaired balance;Decreased mobility; Decreased coordination;Decreased safety awareness  (LE edema)   Assessment Patient agreeable to participate in therapy session however expresses increase in back pain  Patient continues to require min ax1 with increased time and verbal instruction for technique  Multiple sit<>stand transfers with min ax1 and verbal instruction for hand placement  Patient ablet o ambulate consistent gait distance with min ax1 and periods of mod ax1  Requires assistance for steadying balance initally and with turns/directional changes  Patient with poor walker management require increased manual assistance  Patient fatigued post ambulation limiting further trials  Patient participated in seated B LE exercise program with input for pace and form  Continue to focus on OOB mobility with progression of transfers and ambulation as appropriate  Goals   STG Expiration Date 09/02/19   Treatment Day 2   Plan   Treatment/Interventions Functional transfer training;LE strengthening/ROM; Elevations; Therapeutic exercise; Endurance training;Patient/family training;Equipment eval/education; Bed mobility;Gait training;Spoke to nursing   Progress Progressing toward goals   PT Frequency 5x/wk   Recommendation   Recommendation Short-term skilled PT   Equipment Recommended Other (Comment)  (roller walker)       Wilmon Factor, PTA

## 2019-08-26 NOTE — PROGRESS NOTES
[facility] PROGRESS NOTE   Isaiah Victor 80 y o  male MRN: [id number]  Unit/Bed#: -01 Encounter: [id number]  Reason for Consult:  CKD    ASSESSMENT and PLAN:  The patient is an 70-year-old gentleman with a history of CKD, BPH and hypertension who presented with lower extremity edema which has been gradually worsening  He had been prescribed diuretics day before admission but had never started them  Nephrology was consulted for management of chronic kidney disease and fluid overload  1  Chronic kidney disease, stage IV:  · Baseline creatinine 3 3-3 5  · Etiology hypertensive nephrosclerosis, diabetic nephropathy as well as age-related nephron loss  · Will likely need to accept a slightly higher creatinine to maintain euvolemic a a  · Urinalysis 1-2 RBCs, 10-20 WBCs  · Creatinine currently 3 54  · Still has lower extremity edema primarily from mid calf to feet  · Encouraged leg elevation  · Support stockings also indicated  · Continue diuretic  · Monitor daily weight  2  Fluid overload/acute on chronic diastolic CHF:    · Ejection fraction 34%, grade 1 diastolic dysfunction  · Chest x-ray on admission suggestive of volume overload  · Patient switch to oral diuretics yesterday  · On torsemide 40 mg daily  · Response appears to be adequate  · Weight has decline by 10 lb since admission and appears to be stable at 174 lbs  Target weight 168-170 lbs  · Modest fluid restriction, salt restricted diet  3  Hypokalemia:  Resolved  Potassium up to 4 6  4  Essential hypertension:    · Blood pressure acceptable continue current medications  · On amlodipine 5 mg daily, metoprolol 50 mg b i d  And diuretic  Flomax for BPH  5  BPH with urinary retention:  Continue Flomax  6  Anemia:    · Iron deficiency  Iron saturation 14%, ferritin 128  Status post 3 doses of Venofer, 600 mg total  7   Diabetes mellitus type 2: management per primary team    DISPOSITION:  Stable from a renal standpoint    SUBJECTIVE / INTERVAL HISTORY:  Complains of intermittent back pain with pain traveling down the right leg  Pain occurred with movement overnight but has improved with Tylenol    Denies shortness of breath    OBJECTIVE:  Current Weight: Weight - Scale: 79 kg (174 lb 2 6 oz)  Vitals:    08/25/19 2204 08/26/19 0500 08/26/19 0600 08/26/19 0718   BP: 131/64   139/63   BP Location: Right arm   Right arm   Pulse: 70   80   Resp: 18   18   Temp: 98 5 °F (36 9 °C)   97 8 °F (36 6 °C)   TempSrc: Oral   Oral   SpO2: 96%   96%   Weight:  79 kg (174 lb 2 6 oz) 79 kg (174 lb 2 6 oz)    Height:           Intake/Output Summary (Last 24 hours) at 8/26/2019 1230  Last data filed at 8/26/2019 1220  Gross per 24 hour   Intake 240 ml   Output 1800 ml   Net -1560 ml     General: NAD, sitting out of bed in the chair  Skin: no rash  Eyes: anicteric sclera  ENT: moist mucous membrane  Neck: supple, no JVD  Chest: CTA b/l, no ronchii, no wheeze, no rubs, no rales  CVS: s1s2, no murmur, no gallop, no rub  Abdomen: soft, nontender, nl sounds  Extremities:  +2 ankle edema, pedal edema  : no [place]  Neuro: AAOX3  Psych: normal affect  Medications:    Current Facility-Administered Medications:     acetaminophen (TYLENOL) tablet 650 mg, 650 mg, Oral, Q6H PRN, Jordan C Holter, DO, 650 mg at 08/26/19 0740    amLODIPine (NORVASC) tablet 5 mg, 5 mg, Oral, Daily, BYRON OakleyNP, 5 mg at 08/26/19 0740    aspirin (ECOTRIN LOW STRENGTH) EC tablet 81 mg, 81 mg, Oral, Daily, Jordan C Holter, DO, 81 mg at 08/26/19 0740    atorvastatin (LIPITOR) tablet 40 mg, 40 mg, Oral, QPM, Jordan C Holter, DO, 40 mg at 08/25/19 1734    docusate sodium (COLACE) capsule 100 mg, 100 mg, Oral, BID, Jordan C Holter, DO, 100 mg at 08/26/19 0741    insulin glargine (LANTUS) subcutaneous injection 27 Units 0 27 mL, 27 Units, Subcutaneous, Daily With Breakfast, Jordan C Holter, DO, 27 Units at 08/26/19 0836    insulin lispro (HumaLOG) 100 units/mL subcutaneous injection 1-6 Units, 1-6 Units, Subcutaneous, TID AC, 4 Units at 08/26/19 1221 **AND** Fingerstick Glucose (POCT), , , TID AC, Jordan C Holter, DO    levothyroxine tablet 75 mcg, 75 mcg, Oral, Daily, Jordan C Holter, DO, 75 mcg at 08/26/19 0620    metoprolol tartrate (LOPRESSOR) tablet 50 mg, 50 mg, Oral, BID With Meals, MAURICE Buchanan, 50 mg at 08/26/19 0740    ondansetron (ZOFRAN) injection 4 mg, 4 mg, Intravenous, Q6H PRN, [place] C Holter, DO    ranolazine (RANEXA) 12 hr tablet 500 mg, 500 mg, Oral, Q12H [address], Jordan C Holter, DO, 500 mg at 08/26/19 0741    tamsulosin (FLOMAX) capsule 0 4 mg, 0 4 mg, Oral, Daily, Jordan C Holter, DO, 0 4 mg at 08/26/19 0741    torsemide (DEMADEX) tablet 40 mg, 40 mg, Oral, Daily, Bradley Duncan MD, 40 mg at 08/26/19 0740    Laboratory Results:  Results from last 7 days   Lab Units 08/26/19  0520 08/25/19  0624 08/24/19  0601 08/23/19  1058 08/23/19  0459 08/22/19  2337 08/22/19  1347 08/22/19  1346   WBC Thousand/uL  --   --   --   --  7 36  --   --  7 35   HEMOGLOBIN g/dL  --   --   --   --  9 4*  --   --  9 5*   HEMATOCRIT %  --   --   --   --  30 1*  --   --  30 6*   PLATELETS Thousands/uL  --   --   --   --  217  --   --  230   POTASSIUM mmol/L 4 6 3 3* 3 3* 3 8  --  4 1 4 7  --    CHLORIDE mmol/L 100 102 101 102  --  101 102  --    CO2 mmol/L 29 27 28 28  --  28 24  --    BUN mg/dL 45* 44* 46* 38*  --  40* 37*  --    CREATININE mg/dL 3 54* 3 60* 3 49* 3 61*  --  3 68* 3 49*  --    CALCIUM mg/dL 8 2* 7 9* 8 1* 8 3  --  8 0* 8 3  --    MAGNESIUM mg/dL  --  1 9 1 9  --   --  1 9  --   --    PHOSPHORUS mg/dL  --   --  3 7  --   --   --   --   --

## 2019-08-26 NOTE — PLAN OF CARE
Problem: PHYSICAL THERAPY ADULT  Goal: Performs mobility at highest level of function for planned discharge setting  See evaluation for individualized goals  Description  Treatment/Interventions: Functional transfer training, LE strengthening/ROM, Elevations, Therapeutic exercise, Endurance training, Patient/family training, Equipment eval/education, Bed mobility, Gait training  Equipment Recommended: Other (Comment)(roller walker)       See flowsheet documentation for full assessment, interventions and recommendations  Outcome: Progressing  Note:   Prognosis: Fair  Problem List: Decreased strength, Decreased endurance, Decreased range of motion, Impaired balance, Decreased mobility, Decreased coordination, Decreased safety awareness(LE edema)  Assessment: Patient agreeable to participate in therapy session however expresses increase in back pain  Patient continues to require min ax1 with increased time and verbal instruction for technique  Multiple sit<>stand transfers with min ax1 and verbal instruction for hand placement  Patient ablet o ambulate consistent gait distance with min ax1 and periods of mod ax1  Requires assistance for steadying balance initally and with turns/directional changes  Patient with poor walker management require increased manual assistance  Patient fatigued post ambulation limiting further trials  Patient participated in seated B LE exercise program with input for pace and form  Continue to focus on OOB mobility with progression of transfers and ambulation as appropriate  Recommendation: Short-term skilled PT          See flowsheet documentation for full assessment

## 2019-08-26 NOTE — PROGRESS NOTES
Progress Note - Tammy Ireland 1/29/1932, 80 y o  male MRN: [id number]    Unit/Bed#: -Eliel Encounter: [id number]    Primary Care Provider: Nery Dykes MD   Date and time admitted to hospital: 8/22/2019  1:15 PM        * Chronic diastolic heart failure [facility])  Assessment & Plan  Wt Readings from Last 3 Encounters:   08/26/19 79 kg (174 lb 2 6 oz)   07/12/19 81 2 kg (179 lb 0 2 oz)   06/12/19 76 5 kg (168 lb 10 4 oz)     Likely cardiorenal syndrome given CXR with small pleural effusions, initial ProBNP 7,901  Previous echo (06/12/2019) demonstrated EF of 55% and grade 1 diastolic dysfunction  Weight down 10lb since admission but up 4lb since yesterday, possibly due to scale error  I/Os showing -7L since admission, -1 6L yesterday  · Cardiology following  No cardiac workup necessary  · Nephrology consult appreciated- Continue torsemide 40 mg daily  · Continue to monitor I/Os and weight  · Continue Cardiac diet 2 g sodium and 1500mL restriction  · Continue home metoprolol 50 mg and amlodipine 5 mg  Acute right-sided low back pain  Assessment & Plan  Nontraumatic, rule out acute pathology status post surgery in mid July  · Lumbar and hip X-rays ordered  · Lidocaine patch and bengay added for pain relief in addition to prn tylenol    Kidney disease with fluid retention  Assessment & Plan  Initial creatinine of 3 5-approximate baseline 3 3-3 5  Managed by Dr Adrian Zaldivar (Nephrology)  · Nephrology consult appreciated- creatinine at baseline, recommendation to continue outpatient nephrology management  Diuretics at discharge  · Continue to monitor BMP-present creatinine 3 54  · Continue to replete potassium as warranted  · Continue torsemide 40 mg PO daily      Diabetes mellitus with multiple complications [facility])  Assessment & Plan  Lab Results   Component Value Date    HGBA1C 5 5 08/22/2019       Recent Labs     08/25/19  1603 08/25/19  2055 08/26/19  0721 08/26/19  1108   POCGLU 148* 176* 156* 271*       Blood Sugar Average: Last 72 hrs:  (P) 134 9375     · Continue Lantus 27 units with breakfast   · Continue sliding scale insulin  · Continue to monitor blood glucose  · Diabetic diet    Elevated troponin I level  Assessment & Plan  Initial Troponin 0 11  Decreased to 0 09 on repeat  · Per Cardiology, likely secondary to heart strain in presence of overload  Essential hypertension  Assessment & Plan  Blood pressure stable, 139/63 this morning  · Continue to monitor vitals  · Continue home amlodipine 5 mg and metoprolol 50 mg with  hold parameters  Warfarin-induced coagulopathy (HCC)  Assessment & Plan   INR continues to increase, currently 3 74   · Hold warfarin 2 mg; last dose 8/24 pm dose  · Continue monitor INR  Paroxysmal A-fib [facility])  Assessment & Plan  Initial EKG demonstrated sinus rhythm and first-degree AV block, consistent with prior EKG  Managed by Dr Katarzyna Koo (Cardiology)     · Continue metoprolol 50 mg   · Holding warfarin due to supratherapeutic INR    Hyperlipidemia  Assessment & Plan  Continue home Lipitor 40 mg daily    Benign prostatic hyperplasia with urinary hesitancy  Assessment & Plan  Continue home Flomax 0 4 mg daily  Patient showing no signs or symptoms of acute retention    Hypothyroidism  Assessment & Plan  Continue home Synthroid 75 mcg daily    3-vessel coronary artery disease  Assessment & Plan  Status post CABG  · Continue home amlodipine, ranolazine, and metoprolol      VTE Pharmacologic Prophylaxis:   Pharmacologic: Holding warfarin due to supratherapeutic INR  Mechanical VTE Prophylaxis in Place: Yes    Discussions with Specialists or Other Care Team Provider:  Nursing, attending physician    Education and Discussions with Family / Patient:  Patient and wife at bedside    Current Length of Stay: 4 day(s)    Current Patient Status: Inpatient     Discharge Plan / Estimated Discharge Date: [facility]    Code Status: Level 3 - DNAR and DNI      Subjective:   Patient reports improvement in lower extremity edema this morning  He does report acute right-sided hip pain when sitting up in bed this morning  He states this pain was relieved with Tylenol and has not recurred since then but that it feels similar to pain he had before his back surgery in mid July  Patient's wife, at bedside, reports concern regarding his pain and states she would like to find out what is causing his pain prior to discharge to acute rehab  Objective:     Vitals:   Temp (24hrs), Av 2 °F (36 8 °C), Min:97 8 °F (36 6 °C), Max:98 5 °F (36 9 °C)    Temp:  [97 8 °F (36 6 °C)-98 5 °F (36 9 °C)] 97 8 °F (36 6 °C)  HR:  [70-80] 80  Resp:  [18] 18  BP: (131-139)/(63-64) 139/63  SpO2:  [96 %] 96 %  Body mass index is 28 98 kg/m²  Input and Output Summary (last 24 hours): Intake/Output Summary (Last 24 hours) at 2019 1616  Last data filed at 2019 1220  Gross per 24 hour   Intake 120 ml   Output 1000 ml   Net -880 ml       Physical Exam:     Physical Exam   Constitutional: He is oriented to person, place, and time  Vital signs are normal  He appears well-developed and well-nourished  He is cooperative  He does not appear ill  No distress  HENT:   Head: Normocephalic and atraumatic  Eyes: Pupils are equal, round, and reactive to light  Conjunctivae and EOM are normal  No scleral icterus  Neck: Normal range of motion  Neck supple  No JVD present  No tracheal deviation present  Cardiovascular: Normal rate, regular rhythm, intact distal pulses and normal pulses  Exam reveals no friction rub  No murmur heard  Pulmonary/Chest: Effort normal  No respiratory distress  He has decreased breath sounds (mild) in the right lower field and the left lower field  He has no rales  Slightly diminished breath sounds in the lower lung fields, bilaterally  Abdominal: Soft  Normal appearance and bowel sounds are normal  He exhibits no distension   There is no tenderness  There is no rigidity and no rebound  Musculoskeletal: Normal range of motion  He exhibits edema (2+ LE edema bilaterally)  1 to 2+ pedal edema noted bilaterally, extending to the thigh  Neurological: He is alert and oriented to person, place, and time  Skin: Skin is warm, dry and intact  He is not diaphoretic  Psychiatric: He has a normal mood and affect  His speech is normal and behavior is normal  Judgment and thought content normal    Nursing note and vitals reviewed  Additional Data:     Labs:    Results from last 7 days   Lab Units 08/23/19  0459 08/22/19  1346   WBC Thousand/uL 7 36 7 35   HEMOGLOBIN g/dL 9 4* 9 5*   HEMATOCRIT % 30 1* 30 6*   PLATELETS Thousands/uL 217 230   NEUTROS PCT %  --  66   LYMPHS PCT %  --  24   MONOS PCT %  --  8   EOS PCT %  --  2     Results from last 7 days   Lab Units 08/26/19  0520  08/22/19  1347   POTASSIUM mmol/L 4 6   < > 4 7   CHLORIDE mmol/L 100   < > 102   CO2 mmol/L 29   < > 24   BUN mg/dL 45*   < > 37*   CREATININE mg/dL 3 54*   < > 3 49*   CALCIUM mg/dL 8 2*   < > 8 3   ALK PHOS U/L  --   --  118*   ALT U/L  --   --  14   AST U/L  --   --  24    < > = values in this interval not displayed  Results from last 7 days   Lab Units 08/26/19  0520   INR  3 74*       * I Have Reviewed All Lab Data Listed Above  * Additional Pertinent Lab Tests Reviewed:  All Labs Within Last 24 Hours Reviewed    Imaging:    Imaging Reports Reviewed Today Include: no new imaging   Imaging Personally Reviewed by Myself Includes:  no new imaging     Recent Cultures (last 7 days):           Last 24 Hours Medication List:     Current Facility-Administered Medications:  acetaminophen 650 mg Oral Q6H PRN [place] C Holter, DO   amLODIPine 5 mg Oral Daily MAURICE Schafer   aspirin 81 mg Oral Daily [place] C Holter, DO   atorvastatin 40 mg Oral QPM [place] C Holter, DO   docusate sodium 100 mg Oral BID [place] C Holter, DO   insulin glargine 27 Units Subcutaneous Daily With Breakfast [facility]  Hol[facility], DO   insulin lispro 1-6 Units Subcutaneous TID [facility] EDITH Meyerter, DO   levothyroxine 75 mcg Oral Daily [facility] Holling, DO   lidocaine 1 patch Topical Daily Tony Bailey MD   menthol-methyl salicylate  Apply externally 4x Daily PRN Tony Bailey MD   metoprolol tartrate 50 mg Oral BID With Meals MAURICE Mcnamara   ondansetron 4 mg Intravenous Q6H PRN [facility] Betsyter, DO   ranolazine 500 mg Oral Q12H [facility] Holter, DO   tamsulosin 0 4 mg Oral Daily [facility] Holter, DO   torsemide 40 mg Oral Daily Jayda Stevens MD        Today, Patient Was Seen By: Tony Bailey MD    ** Please Note: This note has been constructed using a voice recognition system   **

## 2019-08-26 NOTE — OCCUPATIONAL THERAPY NOTE
[address] Progress Note     Patient Name: Anisha Watson  [id number] Date: 8/26/2019  Problem List  Patient Active Problem List   Diagnosis    3-vessel coronary artery disease    Asbestosis ([facility] )    Kidney disease with fluid retention    Diabetes mellitus with multiple complications ([facility] )    Elevated serum alkaline phosphatase level    Hyperlipidemia    Essential hypertension    Hypothyroidism    Paroxysmal A-fib (HCC)    Seasonal allergies    Increased frequency of urination    Frozen shoulder    Herpes zoster without complication    Depressed mood    Chest pain    Ambulatory dysfunction/generalized weakness    Leg edema, left    Shortness of breath    Chronic right-sided low back pain without sciatica    UTI (urinary tract infection)    Tibial artery occlusion, left (HCC)    Hyponatremia    Chronic diastolic heart failure (HCC)    Elevated troponin I level    Benign prostatic hyperplasia with urinary hesitancy    Abnormal chest x-ray           08/26/19 0905   Restrictions/Precautions   Weight Bearing Precautions Per Order No   Braces or Orthoses AFO; Other (Comment)  (B AFO's)   Other Precautions Chair Alarm; Bed Alarm; Fall Risk;Pain   General   Response to Previous Treatment Patient with no complaints from previous session   Family/Caregiver Present Pt's wife preesent during session   Pain Assessment   Pain Assessment 0-10   Pain Score 4   Pain Type Acute pain;Chronic pain   Pain Location Back; Hip   Pain Orientation Right   Effect of Pain on Daily Activities limits activity and standing tolerance during bathing, dressing   Patient's Stated Pain Goal No pain   Hospital Pain Intervention(s) Repositioned; Ambulation/increased activity; Emotional support  (RNTed medicated pt prior to session)   Response to Interventions tolerated   ADL   Where Assessed Sitting at sink  (seated in reclner chair)   Eating Assistance 6  Modified independent   Eating Deficit Setup; Increased time to complete   Eating Comments seated in chair, finishing up breakfast upon therapist arrival   Grooming Assistance 5  Supervision/Setup   Grooming Deficit Setup; Increased time to complete   Grooming Comments seated in recliner chair to complete oral hygiene after bathing   UB Bathing Assistance 4  Minimal Assistance   UB Bathing Deficit Setup;Supervision/safety; Increased time to complete   UB Bathing Comments seated in chair at sink   LB Bathing Assistance 2  Maximal Assistance   LB Bathing Deficit Setup;Steadying; Increased time to complete; Buttocks; Perineal area   LB Bathing Comments standing at sink using RW   UB Dressing Assistance 3  Moderate Assistance   UB Dressing Deficit Setup;Pull around back;Verbal cueing; Increased time to complete   LB Dressing Assistance 3  Moderate Assistance   LB Dressing Deficit Setup; Thread RLE into pants; Thread LLE into pants;Pull up over hips; Requires assistive device for steadying;Steadying   LB Dressing Comments to don pants while seated in arm chair at bathroom sink  Pt required max A to don socks   Toileting Comments incontinent of urine prior to therapist arrival    Bed Mobility   Supine to Sit Unable to assess   Sit to Supine Unable to assess   Additional Comments Pt seated OOB in chair upon therapist arrival and OOB in chair at end of session w/ needs met, and chair alarm activated  Call bell in reach and wife present   Transfers   Sit to Stand 4  Minimal assistance   Additional items Assist x 1; Armrests; Increased time required;Verbal cues   Stand to Sit 4  Minimal assistance   Additional items Assist x 1; Armrests; Increased time required;Verbal cues   Additional Comments Pt performed sit <> stand three times during session (2X from arm chair in bathroom, 1 X from bedside recliner chair   Functional Mobility   Functional Mobility 4  Minimal assistance   Additional Comments min A using RW to / from bathroom on flat, level surfaces   Required mod A when negotiating obstacles turning wearing slipper socks  Additional items Rolling walker   Cognition   Overall Cognitive Status Impaired   Arousal/Participation Alert; Cooperative   Attention Difficulty attending to directions   Orientation Level Oriented to person;Oriented to place;Oriented to situation   Memory Decreased recall of recent events;Decreased recall of precautions   Following Commands Follows one step commands with increased time or repetition   Comments Identified pt by full namd and wristband  Pt able to follow one step directions w/ + time and cues  Benefits from prompts for walker mgmt, pacing  Pt deferred / asked wife for assistance w/ recall when talking about grandkids  Activity Tolerance   Activity Tolerance Patient limited by fatigue;Patient limited by pain   Medical Staff Made Aware spoke to RN, Tank Recio; PCA, and PTAGraciela La Feria   Assessment   Assessment Pt seen for OT tx session day 1 this AM focusing on challenging activity tolerance during ADL performance  Pt engaged in bathing and dressing in bathroom after eating breakfast  Pt required min A for functional mobility using RW on straight, level susurfaces and mod A when negotiatinig turns, obstacles wearing slipper socks  Pt able to follow one step directions and communicate wants / needs  Pt agreeable, motivated to participate, and wanted to get washed up  Continue to recommend post acute rehab when medically stable for discharge from acute care to return to baseline level of I  Will continue to follow   Plan   Treatment Interventions ADL retraining;Functional transfer training;UE strengthening/ROM; Endurance training;Patient/family training;Equipment evaluation/education; Activityengagement; Energy conservation;Continued evaluation   Goal Expiration Date 08/30/19   Treatment Day 1   OT Frequency 3-5x/wk   Recommendation   OT Discharge Recommendation Other (Comment)  (post acute rehab)   Equipment Recommended Bedside commode;Tub seat with back   OT - OK to Discharge   (post acute rehab when stable)   Barthel Index   Feeding 10   Bathing 0   Grooming Score 5   Dressing Score 5   Bladder Score 5   Bowels Score 10   Toilet Use Score 5   Transfers (Bed/Chair) Score 10   Mobility (Level Surface) Score 0   Stairs Score 0   Barthel Index Score 50   Modified Gila Scale   Modified Gila Scale 4   Estelita Real, OTR/L

## 2019-08-26 NOTE — ASSESSMENT & PLAN NOTE
Initial EKG demonstrated sinus rhythm and first-degree AV block, consistent with prior EKG  Managed by Dr Jennye Cogan (Cardiology)     · Continue metoprolol 50 mg   · Holding warfarin due to supratherapeutic INR

## 2019-08-26 NOTE — ASSESSMENT & PLAN NOTE
Wt Readings from Last 3 Encounters:   08/26/19 79 kg (174 lb 2 6 oz)   07/12/19 81 2 kg (179 lb 0 2 oz)   06/12/19 76 5 kg (168 lb 10 4 oz)     Likely cardiorenal syndrome given CXR with small pleural effusions, initial ProBNP 7,901  Previous echo (06/12/2019) demonstrated EF of 55% and grade 1 diastolic dysfunction  Weight down 10lb since admission but up 4lb since yesterday, possibly due to scale error  I/Os showing -7L since admission, -1 6L yesterday  · Cardiology following  No cardiac workup necessary  · Nephrology consult appreciated- Continue torsemide 40 mg daily  · Continue to monitor I/Os and weight  · Continue Cardiac diet 2 g sodium and 1500mL restriction  · Continue home metoprolol 50 mg and amlodipine 5 mg

## 2019-08-26 NOTE — ASSESSMENT & PLAN NOTE
Nontraumatic, rule out acute pathology status post surgery in mid July    · Lumbar and hip X-rays ordered  · Lidocaine patch and bengay added for pain relief in addition to prn tylenol

## 2019-08-26 NOTE — ASSESSMENT & PLAN NOTE
Initial creatinine of 3 5-approximate baseline 3 3-3 5  Managed by Dr Polly Solo (Nephrology)  · Nephrology consult appreciated- creatinine at baseline, recommendation to continue outpatient nephrology management  Diuretics at discharge  · Continue to monitor BMP-present creatinine 3 54  · Continue to replete potassium as warranted  · Continue torsemide 40 mg PO daily

## 2019-08-26 NOTE — TELEPHONE ENCOUNTER
Patient wife just called jonathan, she saw a missed call, advised these calls were made prior to pt going in the hosp   patient is still in the hospital

## 2019-08-26 NOTE — PLAN OF CARE
Problem: OCCUPATIONAL THERAPY ADULT  Goal: Performs self-care activities at highest level of function for planned discharge setting  See evaluation for individualized goals  Description  Treatment Interventions: ADL retraining, Functional transfer training, Endurance training, Patient/family training, Equipment evaluation/education, Compensatory technique education, Continued evaluation, Activityengagement  Equipment Recommended: Bedside commode, Tub seat with back, Other (comment)(has hospital bed, stair glide)       See flowsheet documentation for full assessment, interventions and recommendations  Outcome: Progressing  Note:   Limitation: Decreased ADL status, Decreased UE strength, Decreased UE ROM, Decreased endurance, Decreased self-care trans, Decreased high-level ADLs     Assessment: Pt seen for OT tx session day 1 this AM focusing on challenging activity tolerance during ADL performance  Pt engaged in bathing and dressing in bathroom after eating breakfast  Pt required min A for functional mobility using RW on straight, level susurfaces and mod A when negotiatinig turns, obstacles wearing slipper socks  Pt able to follow one step directions and communicate wants / needs  Pt agreeable, motivated to participate, and wanted to get washed up  Continue to recommend post acute rehab when medically stable for discharge from acute care to return to baseline level of I   Will continue to follow     OT Discharge Recommendation: Other (Comment)(post acute rehab)  OT - OK to Discharge: (post acute rehab when stable)

## 2019-08-26 NOTE — PROGRESS NOTES
PHYSICAL MEDICINE AND REHABILITATION   PREADMISSION ASSESSMENT     Projected [facility] Diagnoses:  Impairment of mobility, safety and Activities of Daily Living (ADLs) due to Debility:  16  Debility (Non-cardiac/Non-pulmonary)   Etiology: Ambulatory Dysfunction due to Fluid Overload  Date of Onset: 8/22/19   Date of surgery: N/A    PATIENT INFORMATION  Name: Paris Caicedo Phone #: [phone] (home)   Address: [place] [id number]  YOB: 1932 Age: 80 y o  SS#   Marital Status: /Civil Union  Ethnicity:   Employment Status: retired  Extended Emergency Contact Information  Primary Emergency Contact: [address] [place] [address]  Address: [address] Phone: [phone]  Mobile Phone: [phone]  Relation: Spouse  Secondary Emergency Contact: [address] [place] Phone: [phone]  Relation: Child  Advance Directive: Level 3 DNAR/ DNI, AD Unknown    INSURANCE/COVERAGE:     Primary Payor: MEDICARE / Plan: MEDICARE A AND B / Product Type: Medicare A & B Fee for Service /   Secondary Payer: AARP    Payer Contact:  Payer Contact:   Contact Phone:  Contact Phone:   Authorization #:   Coverage Dates:  LCD:   Medicare ID #: [id number]  Medicare Days: 44/30/60  Medical Record #: [id number]    REFERRAL SOURCE:   Referring provider: Inderjit Arauz DO  Referring facility: [address] [place] [address]  Room: [facility]/MS [address]1994  PCP: Randal Vaca MD PCP phone number: [phone]    MEDICAL INFORMATION  HPI: Paris Caiecdo is an 80year old male with PMH of CAD s/p CABG, DN, HTN, HLD, CHF, HTN, CKD 4, BPH, A-fib s/p PPM, stroke, and left torn bicep who presented to [facility] on 8/22/19 with increasing bilateral lower extremity edema  On presentation edema was up to patient's thighs    Per family patient was recently at rehab and rehab doctor was not comfortable prescribing patient diuretics due to his kidney function  Patient had been discharged home from rehab and was cleared by his primary nephrologist to start back on torsemide the day before presentation but patient had not yet restarted the medication  On presentation creatinine was 3 49  Patient's baseline creatine is 3 4-3 5  BNP was 7,901, and troponins were 0 11  EKG showed normal sinus rhythm with 1st degree AV block that is unchanged from previous EKG  Chest x-ray was completed that showed cardiomegaly, small pleural effusions, and bilateral upper lobe pleural plaques  Per SLIM and conversation with SLIM patient has a history of asbestos exposure and plaques are known about  He was placed on telemetry and started on IV lasix  Cardiology was consulted and felt that patient was in fluid overload due to kidney function and not due to CHF as he was without shortness of breath  Elevated troponins were likely a response to increased creatinine and overload on heart  Cardiology recommended that he continue on IV lasix 40mg BID  Patients creatinine did increase to 3 61 due to diuresis  Nephrology was consulted and stated that patient's creatinine  Has progressively increased over the years due to hypertensive nephrosclerosis  Diabetic nephropathy, and age related loss  It was felt that slight increase in creatinine would need to be allowed to maintain euvolemia  I and O as well as weights were closely monitored  Creatinine improved on IVF and lasix was increased to 40mg TID with good results  Patient did have some hypokalemia as a result and was repleted as needed  He has since been transitioned to PO torsemide and is stable on this regimen  Patient received a dose of IV venofer due to iron saturation 14% with ferritin of 128  PT and OT have evaluated the patient and are recommending inpatient rehab as well as attending physician  He is medically clear for transfer today        Past Medical History:   Past Surgical History: Allergies:     Past Medical History:   Diagnosis Date    3-vessel coronary artery disease     last assessed: 08/15/2016    BPH (benign prostatic hyperplasia)     Chronic kidney disease     Diabetes mellitus ([facility] [address] )     Hyperlipidemia     Hypertension     Shingles 04/29/2019    SOB (shortness of breath)     Stroke [facility])     Past Surgical History:   Procedure Laterality Date    CARDIAC PACEMAKER PLACEMENT      CATARACT EXTRACTION      last assessed: 11/11/2015    CORONARY ARTERY BYPASS GRAFT      last assessed: 08/26/2015     No Known Allergies      Comorbidities: MAGUI of CKD 4, fluid overload, hypokalemia, DM, and iron deficiency anemia    CURRENT VITAL SIGNS:   Temp:  [97 7 °F (36 5 °C)-98 5 °F (36 9 °C)] 97 8 °F (36 6 °C)  HR:  [69-80] 80  Resp:  [18] 18  BP: (115-139)/(56-64) 139/63   Intake/Output Summary (Last 24 hours) at 8/26/2019 0934  Last data filed at 8/26/2019 0401  Gross per 24 hour   Intake 240 ml   Output 1600 ml   Net -1360 ml        LABORATORY RESULTS:      Lab Results   Component Value Date    HGB 9 4 (L) 08/23/2019    HGB 13 0 08/26/2015    HCT 30 1 (L) 08/23/2019    HCT 38 6 08/26/2015    WBC 7 36 08/23/2019    WBC 6 9 08/26/2015     Lab Results   Component Value Date    BUN 45 (H) 08/26/2019    BUN 26 (H) 01/05/2016     01/05/2016    K 4 6 08/26/2019    K 4 2 01/05/2016     08/26/2019     01/05/2016    GLUCOSE 161 (H) 01/05/2016    CREATININE 3 54 (H) 08/26/2019    CREATININE 2 31 (H) 01/05/2016     Lab Results   Component Value Date    PROTIME 35 9 (H) 08/26/2019    PROTIME 25 1 (H) 12/22/2015    INR 3 74 (H) 08/26/2019    INR 2 41 (H) 12/22/2015        DIAGNOSTIC STUDIES:  Xr Chest 2 Views    Result Date: 8/22/2019  Impression: 1  Cardiomegaly and small pleural effusions  2   Bilateral upper lobe pleural plaques  3   Increasing parenchymal density in the right upper lobe    Recommend CT scan of the chest to exclude a parenchymal abnormality versus pleural plaques  This is marked for notification in the Epic system   Workstation performed: [id number]       PRECAUTIONS/SPECIAL NEEDS:  Tobacco:   Social History     Tobacco Use   Smoking Status Never Smoker   Smokeless Tobacco Never Used   , Alcohol:    Social History     Substance and Sexual Activity   Alcohol Use No   , Anticoagulation:  aspirin 81 mg orally every day, Blood Sugar Management: as per MD, Edema Management, Safety Concerns, Pain Management, Dietary Restrictions: Cardiovascular, Sodium 2 gram, FR 1500mL and Fall Precautions    MEDICATIONS:     Current Facility-Administered Medications:     acetaminophen (TYLENOL) tablet 650 mg, 650 mg, Oral, Q6H PRN, Jordan C Holter, DO, 650 mg at 08/26/19 0740    amLODIPine (NORVASC) tablet 5 mg, 5 mg, Oral, Daily, MAURICE Rasheed, 5 mg at 08/26/19 0740    aspirin (ECOTRIN LOW STRENGTH) EC tablet 81 mg, 81 mg, Oral, Daily, Jordan C Holter, DO, 81 mg at 08/26/19 0740    atorvastatin (LIPITOR) tablet 40 mg, 40 mg, Oral, QPM, Jordan C Holter, DO, 40 mg at 08/25/19 1734    docusate sodium (COLACE) capsule 100 mg, 100 mg, Oral, BID, Jordan C Holter, DO, 100 mg at 08/26/19 0741    insulin glargine (LANTUS) subcutaneous injection 27 Units 0 27 mL, 27 Units, Subcutaneous, Daily With Breakfast, Jordan C Holter, DO, 27 Units at 08/26/19 0836    insulin lispro (HumaLOG) 100 units/mL subcutaneous injection 1-6 Units, 1-6 Units, Subcutaneous, TID AC, 1 Units at 08/26/19 0837 **AND** Fingerstick Glucose (POCT), , , TID AC, Jordan C Holter, DO    levothyroxine tablet 75 mcg, 75 mcg, Oral, Daily, Jordan C Holter, DO, 75 mcg at 08/26/19 0620    metoprolol tartrate (LOPRESSOR) tablet 50 mg, 50 mg, Oral, BID With Meals, MAURICE Rasheed, 50 mg at 08/26/19 0740    ondansetron (ZOFRAN) injection 4 mg, 4 mg, Intravenous, Q6H PRN, [place] C Holter, DO    ranolazine (RANEXA) 12 hr tablet 500 mg, 500 mg, Oral, Q12H [facility], Jordan C Holter, DO, 500 mg at 19 0741    tamsulosin (FLOMAX) capsule 0 4 mg, 0 4 mg, Oral, Daily, Jordan C Holter, DO, 0 4 mg at 19 0741    torsemide (DEMADEX) tablet 40 mg, 40 mg, Oral, Daily, Dick Fothergill, MD, 40 mg at 19 0740    SKIN INTEGRITY:   Bruising to the left side of the back and to back of bilateral hands  PRIOR LEVEL OF FUNCTION:  He lives in a(n) single family home  Flora Peer is  and lives with their spouse  Self Care: Needed some help, Indoor Mobility: Needed some help, Stairs (in/outdoor): Needed some help and Cognition: Independent     RECENT HISTORY OF FALLS: 1-4 falls in the last 6 months    HOME ENVIRONMENT:  The living area: can live on one level  There 2 steps to enter the home and a stair glide to the second floor  Patient does have 1st floor set up with a hospital bed     The patient will have 24 hour supervision/physical assistance available upon discharge  PREVIOUS DME:  Equipment in home (previous DME): Rolling Walker, Bilateral AFOs, Stair [place], Hospital Bed, and [facility]    FUNCTIONAL STATUS:  Physical Therapy Occupational Therapy Speech Therapy   As per PTA:         19 0808   Pain Assessment   Pain Assessment 0-10   Pain Score 9   Pain Location Back   Pain Orientation Right   Restrictions/Precautions   Weight Bearing Precautions Per Order No   Braces or Orthoses AFO   Other Precautions Chair Alarm; Bed Alarm;Telemetry; Fall Risk   General   Family/Caregiver Present No   Subjective   Subjective Patient supine in bed and is agreeable to participate in therapy session  Patient identifers obtained from name &   Bed Mobility   Supine to Sit 4  Minimal assistance   Additional items Assist x 1;HOB elevated; Bedrails; Increased time required;Verbal cues;LE management   Sit to Supine Unable to assess   Additional Comments Patient seated OOB in recliner post session with chair alarm engaged, call bell and belongings in reach     Transfers   Sit to Stand 4  Minimal assistance Additional items Assist x 1; Armrests; Increased time required;Verbal cues   Stand to Sit 4  Minimal assistance   Additional items Assist x 1; Armrests; Increased time required;Verbal cues   Ambulation/Elevation   Gait pattern Forward Flexion; Steppage   Gait Assistance 4  Minimal assist  (times of mod ax1)   Additional items Assist x 1;Verbal cues   Assistive Device Rolling walker   Distance 50' x1   Balance   Static Sitting Fair +   Dynamic Sitting Fair -   Static Standing Poor +   Ambulatory Poor +   Activity Tolerance   Activity Tolerance Patient limited by fatigue;Patient limited by pain   Nurse Made Aware Spoke to Tasia Maynard RN    Exercises   Knee AROM Long Arc Quad Sitting;10 reps;AROM; Bilateral   Ankle Pumps Sitting;10 reps;AROM; Bilateral   Marching Sitting;10 reps;AROM; Bilateral   Assessment   Prognosis Fair   Problem List Decreased strength;Decreased endurance;Decreased range of motion; Impaired balance;Decreased mobility; Decreased coordination;Decreased safety awareness  (LE edema)   Assessment Patient agreeable to participate in therapy session however expresses increase in back pain  Patient continues to require min ax1 with increased time and verbal instruction for technique  Multiple sit<>stand transfers with min ax1 and verbal instruction for hand placement  Patient ablet o ambulate consistent gait distance with min ax1 and periods of mod ax1  Requires assistance for steadying balance initally and with turns/directional changes  Patient with poor walker management require increased manual assistance  Patient fatigued post ambulation limiting further trials  Patient participated in seated B LE exercise program with input for pace and form  Continue to focus on OOB mobility with progression of transfers and ambulation as appropriate  As per OT:     08/26/19 0905   Restrictions/Precautions   Weight Bearing Precautions Per Order No   Braces or Orthoses AFO; Other (Comment)  (B AFO's)   Other Precautions Chair Alarm; Bed Alarm; Fall Risk;Pain   General   Response to Previous Treatment Patient with no complaints from previous session   Family/Caregiver Present Pt's wife preesent during session   Pain Assessment   Pain Assessment 0-10   Pain Score 4   Pain Type Acute pain;Chronic pain   Pain Location Back; Hip   Pain Orientation Right   Effect of Pain on Daily Activities limits activity and standing tolerance during bathing, dressing   Patient's Stated Pain Goal No pain   Hospital Pain Intervention(s) Repositioned; Ambulation/increased activity; Emotional support  (RNTank medicated pt prior to session)   Response to Interventions tolerated   ADL   Where Assessed Sitting at sink  (seated in reclner chair)   Eating Assistance 6  Modified independent   Eating Deficit Setup; Increased time to complete   Eating Comments seated in chair, finishing up breakfast upon therapist arrival   Grooming Assistance 5  Supervision/Setup   Grooming Deficit Setup; Increased time to complete   Grooming Comments seated in recliner chair to complete oral hygiene after bathing   UB Bathing Assistance 4  Minimal Assistance   UB Bathing Deficit Setup;Supervision/safety; Increased time to complete   UB Bathing Comments seated in chair at sink   LB Bathing Assistance 2  Maximal Assistance   LB Bathing Deficit Setup;Steadying; Increased time to complete; Buttocks; Perineal area   LB Bathing Comments standing at sink using RW   UB Dressing Assistance 3  Moderate Assistance   UB Dressing Deficit Setup;Pull around back;Verbal cueing; Increased time to complete   LB Dressing Assistance 3  Moderate Assistance   LB Dressing Deficit Setup; Thread RLE into pants; Thread LLE into pants;Pull up over hips; Requires assistive device for steadying;Steadying   LB Dressing Comments to don pants while seated in arm chair at bathroom sink   Pt required max A to don socks   Toileting Comments incontinent of urine prior to therapist arrival    Bed Mobility   Supine to Sit Unable to assess   Sit to Supine Unable to assess   Additional Comments Pt seated OOB in chair upon therapist arrival and OOB in chair at end of session w/ needs met, and chair alarm activated  Call bell in reach and wife present   Transfers   Sit to Stand 4  Minimal assistance   Additional items Assist x 1; Armrests; Increased time required;Verbal cues   Stand to Sit 4  Minimal assistance   Additional items Assist x 1; Armrests; Increased time required;Verbal cues   Additional Comments Pt performed sit <> stand three times during session (2X from arm chair in bathroom, 1 X from bedside recliner chair   Functional Mobility   Functional Mobility 4  Minimal assistance   Additional Comments min A using RW to / from bathroom on flat, level surfaces  Required mod A when negotiating obstacles turning wearing slipper socks  Additional items Rolling walker   Cognition   Overall Cognitive Status Impaired   Arousal/Participation Alert; Cooperative   Attention Difficulty attending to directions   Orientation Level Oriented to person;Oriented to place;Oriented to situation   Memory Decreased recall of recent events;Decreased recall of precautions   Following Commands Follows one step commands with increased time or repetition   Comments Identified pt by full namd and wristband  Pt able to follow one step directions w/ + time and cues  Benefits from prompts for walker mgmt, pacing  Pt deferred / asked wife for assistance w/ recall when talking about grandkids  Activity Tolerance   Activity Tolerance Patient limited by fatigue;Patient limited by pain   Medical Staff Made Aware spoke to RN, Raghav Doyle; PCA, and PTA, Nelson   Assessment   Assessment Pt seen for OT tx session day 1 this AM focusing on challenging activity tolerance during ADL performance   Pt engaged in bathing and dressing in bathroom after eating breakfast  Pt required min A for functional mobility using RW on straight, level susurfaces and mod A when negotiatinig turns, obstacles wearing slipper socks  Pt able to follow one step directions and communicate wants / needs  Pt agreeable, motivated to participate, and wanted to get washed up  Continue to recommend post acute rehab when medically stable for discharge from acute care to return to baseline level of I  Will continue to follow      N/A     CURRENT GAP IN FUNCTION   Prior to admission the patient was contact guard with rolling walker for transfers and ambulation, supervision with upper body ADLs and min assist with lower body ADLs  Patient's wife assists with dressing and bathing as well as a home health aid who comes into the home one a week for 30 minutes  Estimated length of stay: 10 to 14 days    Anticipated Post-Discharge Disposition/Treatment  Disposition: Return to previous home/apartment  Outpatient Services: Physical Therapy (PT) and Occupational Therapy (OT)    BARRIERS TO DISCHARGE  Weakness, Balance Difficulty, Fatigue, Home Accessibility, Caregiver Accessibility, Financial Resources, Equipment Needs and Resource Availability    INTERVENTIONS FOR DISCHARGE  Adaptive equipment, Patient/Family/Caregiver Education, [employer], Arrange DME needs, Medication Changes as per MD, Therapy exercises, Center of balance support  and Energy conservation education     REQUIRED THERAPY:  Patient will require PT and OT 90 minutes each per day, five days per week to achieve rehab goals  REQUIRED FUNCTIONAL AND MEDICAL MANAGEMENT FOR INPATIENT REHABILITATION:  Skin:  Bruising to left side of the back and back of bilateral hands  , Deep Vein Thrombosis (DVT) Prophylaxis:  SCD's while in bed, Diabetes Management: continue sliding scale insulin, patient to do finger sticks as ordered, SLIM to continue to manage diabetes, and additional medical conditions , nursing management for edcuation, PM&R to maximize function and provide medical oversight, PT/OT intervention, patient and family education and training, and any consults as needed  RECOMMENDED LEVEL OF CARE:  Shauna Guthrie presented to [facility] on 8/22/19 with increasing bilateral lower extremity edema extending up to patient's thighs  Creatinine was 3 49  BNP was 7,901, and troponins were 0 11  EKG showed normal sinus rhythm with 1st degree AV block that is unchanged from previous EKG  Chest x-ray was completed that showed cardiomegaly, small pleural effusions, and bilateral upper lobe pleural plaques  Per SLIM and conversation with SLIM patient has a history of asbestos exposure and plaques are known about  He was placed on telemetry and started on IV lasix  Cardiology was consulted and felt that patient was in fluid overload due to kidney function and not due to CHF as he was without shortness of breath  Elevated troponins were likely a response to increased creatinine and overload on heart  Cardiology recommended that he continue on IV lasix 40mg BID  Patients creatinine did increase to 3 61 due to diuresis  Nephrology was consulted and stated that patient's creatinine  Has progressively increased over the years due to hypertensive nephrosclerosis  Diabetic nephropathy, and age related loss  It was felt that slight increase in creatinine would need to be allowed to maintain euvolemia  I and O as well as weights were closely monitored  Creatinine improved on IVF and lasix was increased to 40mg TID with good results  Patient did have some hypokalemia as a result and was repleted as needed  He has since been transitioned to PO torsemide and is stable on this regimen  Patient received a dose of IV venofer due to iron saturation 14% with ferritin of 128  Prior to admission the patient was contact guard with rolling walker for transfers and ambulation, supervision with upper body ADLs and min assist with lower body ADLs    Patient's wife assists with dressing and bathing as well as a home health aid who comes into the home one a week for 30 minutes  Currently he is a min assist with transfers and ambulation with rolling walker as well as min to mod assist with upper body ADLs and mod to max assist with lower body ADLs  Nursing is recommended for education, internal medicine to monitor and manage medical conditions, PM&R to maximize function and provide medical oversight, and inpatient rehab to maximize self care and mobility upon discharge to home with the support of his wife

## 2019-08-27 ENCOUNTER — HOSPITAL ENCOUNTER (INPATIENT)
Facility: HOSPITAL | Age: 84
LOS: 18 days | Discharge: HOME HEALTH | DRG: 555 | End: 2019-09-14
Payer: MEDICARE

## 2019-08-27 VITALS
SYSTOLIC BLOOD PRESSURE: 128 MMHG | DIASTOLIC BLOOD PRESSURE: 75 MMHG | HEIGHT: 65 IN | RESPIRATION RATE: 18 BRPM | WEIGHT: 173.4 LBS | HEART RATE: 71 BPM | OXYGEN SATURATION: 95 % | BODY MASS INDEX: 28.89 KG/M2 | TEMPERATURE: 97.5 F

## 2019-08-27 DIAGNOSIS — E55.9 VITAMIN D INSUFFICIENCY: ICD-10-CM

## 2019-08-27 DIAGNOSIS — D64.9 ANEMIA: ICD-10-CM

## 2019-08-27 DIAGNOSIS — Z74.09 IMPAIRED MOBILITY AND ADLS: ICD-10-CM

## 2019-08-27 DIAGNOSIS — I50.32 CHRONIC DIASTOLIC HEART FAILURE (HCC): ICD-10-CM

## 2019-08-27 DIAGNOSIS — S31.809A BUTTOCK WOUND: ICD-10-CM

## 2019-08-27 DIAGNOSIS — R60.1 GENERALIZED EDEMA: ICD-10-CM

## 2019-08-27 DIAGNOSIS — E11.8 DIABETES MELLITUS WITH MULTIPLE COMPLICATIONS (HCC): Primary | ICD-10-CM

## 2019-08-27 DIAGNOSIS — Z87.19 HISTORY OF CONSTIPATION: ICD-10-CM

## 2019-08-27 DIAGNOSIS — Z78.9 IMPAIRED MOBILITY AND ADLS: ICD-10-CM

## 2019-08-27 DIAGNOSIS — N28.9 KIDNEY DISEASE WITH FLUID RETENTION: ICD-10-CM

## 2019-08-27 DIAGNOSIS — I48.0 PAROXYSMAL A-FIB (HCC): ICD-10-CM

## 2019-08-27 DIAGNOSIS — I10 ESSENTIAL HYPERTENSION: ICD-10-CM

## 2019-08-27 DIAGNOSIS — R52 PAIN: ICD-10-CM

## 2019-08-27 PROBLEM — R77.8 ELEVATED TROPONIN I LEVEL: Status: RESOLVED | Noted: 2019-08-22 | Resolved: 2019-08-27

## 2019-08-27 PROBLEM — M54.50 ACUTE RIGHT-SIDED LOW BACK PAIN: Status: RESOLVED | Noted: 2019-08-26 | Resolved: 2019-08-27

## 2019-08-27 LAB
ANION GAP SERPL CALCULATED.3IONS-SCNC: 9 MMOL/L (ref 4–13)
BUN SERPL-MCNC: 47 MG/DL (ref 5–25)
CALCIUM SERPL-MCNC: 8 MG/DL (ref 8.3–10.1)
CHLORIDE SERPL-SCNC: 100 MMOL/L (ref 100–108)
CO2 SERPL-SCNC: 29 MMOL/L (ref 21–32)
CREAT SERPL-MCNC: 3.85 MG/DL (ref 0.6–1.3)
GFR SERPL CREATININE-BSD FRML MDRD: 13 ML/MIN/1.73SQ M
GLUCOSE SERPL-MCNC: 243 MG/DL (ref 65–140)
GLUCOSE SERPL-MCNC: 48 MG/DL (ref 65–140)
GLUCOSE SERPL-MCNC: 50 MG/DL (ref 65–140)
GLUCOSE SERPL-MCNC: 60 MG/DL (ref 65–140)
GLUCOSE SERPL-MCNC: 68 MG/DL (ref 65–140)
GLUCOSE SERPL-MCNC: 71 MG/DL (ref 65–140)
GLUCOSE SERPL-MCNC: 93 MG/DL (ref 65–140)
GLUCOSE SERPL-MCNC: 98 MG/DL (ref 65–140)
INR PPP: 3.34 (ref 0.84–1.19)
POTASSIUM SERPL-SCNC: 4 MMOL/L (ref 3.5–5.3)
PROTHROMBIN TIME: 32.8 SECONDS (ref 11.6–14.5)
SODIUM SERPL-SCNC: 138 MMOL/L (ref 136–145)

## 2019-08-27 PROCEDURE — 99232 SBSQ HOSP IP/OBS MODERATE 35: CPT | Performed by: INTERNAL MEDICINE

## 2019-08-27 PROCEDURE — 85610 PROTHROMBIN TIME: CPT | Performed by: STUDENT IN AN ORGANIZED HEALTH CARE EDUCATION/TRAINING PROGRAM

## 2019-08-27 PROCEDURE — 82948 REAGENT STRIP/BLOOD GLUCOSE: CPT

## 2019-08-27 PROCEDURE — 99223 1ST HOSP IP/OBS HIGH 75: CPT

## 2019-08-27 PROCEDURE — 80048 BASIC METABOLIC PNL TOTAL CA: CPT | Performed by: STUDENT IN AN ORGANIZED HEALTH CARE EDUCATION/TRAINING PROGRAM

## 2019-08-27 PROCEDURE — 99239 HOSP IP/OBS DSCHRG MGMT >30: CPT | Performed by: INTERNAL MEDICINE

## 2019-08-27 PROCEDURE — 1123F ACP DISCUSS/DSCN MKR DOCD: CPT | Performed by: INTERNAL MEDICINE

## 2019-08-27 PROCEDURE — NC001 PR NO CHARGE

## 2019-08-27 RX ORDER — INSULIN GLARGINE 100 [IU]/ML
14 INJECTION, SOLUTION SUBCUTANEOUS
Status: DISCONTINUED | OUTPATIENT
Start: 2019-08-28 | End: 2019-08-28

## 2019-08-27 RX ORDER — ACETAMINOPHEN 325 MG/1
650 TABLET ORAL EVERY 6 HOURS PRN
Status: CANCELLED | OUTPATIENT
Start: 2019-08-27

## 2019-08-27 RX ORDER — ATORVASTATIN CALCIUM 40 MG/1
40 TABLET, FILM COATED ORAL EVERY EVENING
Status: CANCELLED | OUTPATIENT
Start: 2019-08-27

## 2019-08-27 RX ORDER — INSULIN GLARGINE 100 [IU]/ML
27 INJECTION, SOLUTION SUBCUTANEOUS
Status: CANCELLED | OUTPATIENT
Start: 2019-08-28

## 2019-08-27 RX ORDER — ONDANSETRON 2 MG/ML
4 INJECTION INTRAMUSCULAR; INTRAVENOUS EVERY 6 HOURS PRN
Status: CANCELLED | OUTPATIENT
Start: 2019-08-27

## 2019-08-27 RX ORDER — ASPIRIN 81 MG/1
81 TABLET ORAL DAILY
Status: CANCELLED | OUTPATIENT
Start: 2019-08-28

## 2019-08-27 RX ORDER — METOPROLOL TARTRATE 50 MG/1
50 TABLET, FILM COATED ORAL 2 TIMES DAILY WITH MEALS
Status: CANCELLED | OUTPATIENT
Start: 2019-08-27

## 2019-08-27 RX ORDER — TORSEMIDE 20 MG/1
40 TABLET ORAL DAILY
Status: CANCELLED | OUTPATIENT
Start: 2019-08-28

## 2019-08-27 RX ORDER — ASPIRIN 81 MG/1
81 TABLET ORAL DAILY
Status: DISCONTINUED | OUTPATIENT
Start: 2019-08-28 | End: 2019-09-14 | Stop reason: HOSPADM

## 2019-08-27 RX ORDER — FINASTERIDE 5 MG/1
5 TABLET, FILM COATED ORAL DAILY
Status: DISCONTINUED | OUTPATIENT
Start: 2019-08-28 | End: 2019-09-14 | Stop reason: HOSPADM

## 2019-08-27 RX ORDER — ACETAMINOPHEN 325 MG/1
650 TABLET ORAL EVERY 6 HOURS PRN
Status: DISCONTINUED | OUTPATIENT
Start: 2019-08-27 | End: 2019-09-14 | Stop reason: HOSPADM

## 2019-08-27 RX ORDER — TAMSULOSIN HYDROCHLORIDE 0.4 MG/1
0.4 CAPSULE ORAL DAILY
Status: CANCELLED | OUTPATIENT
Start: 2019-08-28

## 2019-08-27 RX ORDER — ATORVASTATIN CALCIUM 40 MG/1
40 TABLET, FILM COATED ORAL EVERY EVENING
Status: DISCONTINUED | OUTPATIENT
Start: 2019-08-27 | End: 2019-09-14 | Stop reason: HOSPADM

## 2019-08-27 RX ORDER — LIDOCAINE HYDROCHLORIDE 20 MG/ML
JELLY TOPICAL EVERY 4 HOURS PRN
Status: DISCONTINUED | OUTPATIENT
Start: 2019-08-27 | End: 2019-09-14 | Stop reason: HOSPADM

## 2019-08-27 RX ORDER — INSULIN GLARGINE 100 [IU]/ML
27 INJECTION, SOLUTION SUBCUTANEOUS
Status: DISCONTINUED | OUTPATIENT
Start: 2019-08-28 | End: 2019-08-27

## 2019-08-27 RX ORDER — LEVOTHYROXINE SODIUM 0.07 MG/1
75 TABLET ORAL DAILY
Status: CANCELLED | OUTPATIENT
Start: 2019-08-28

## 2019-08-27 RX ORDER — TAMSULOSIN HYDROCHLORIDE 0.4 MG/1
0.4 CAPSULE ORAL DAILY
Status: DISCONTINUED | OUTPATIENT
Start: 2019-08-28 | End: 2019-09-14 | Stop reason: HOSPADM

## 2019-08-27 RX ORDER — DOCUSATE SODIUM 100 MG/1
100 CAPSULE, LIQUID FILLED ORAL 2 TIMES DAILY
Status: DISCONTINUED | OUTPATIENT
Start: 2019-08-27 | End: 2019-09-04

## 2019-08-27 RX ORDER — METOPROLOL TARTRATE 50 MG/1
50 TABLET, FILM COATED ORAL 2 TIMES DAILY WITH MEALS
Status: DISCONTINUED | OUTPATIENT
Start: 2019-08-27 | End: 2019-09-02

## 2019-08-27 RX ORDER — LEVOTHYROXINE SODIUM 0.07 MG/1
75 TABLET ORAL DAILY
Status: DISCONTINUED | OUTPATIENT
Start: 2019-08-28 | End: 2019-09-14 | Stop reason: HOSPADM

## 2019-08-27 RX ORDER — AMLODIPINE BESYLATE 5 MG/1
5 TABLET ORAL DAILY
Status: DISCONTINUED | OUTPATIENT
Start: 2019-08-28 | End: 2019-08-31

## 2019-08-27 RX ORDER — DOCUSATE SODIUM 100 MG/1
100 CAPSULE, LIQUID FILLED ORAL 2 TIMES DAILY
Status: CANCELLED | OUTPATIENT
Start: 2019-08-27

## 2019-08-27 RX ORDER — LIDOCAINE 50 MG/G
1 PATCH TOPICAL DAILY
Status: CANCELLED | OUTPATIENT
Start: 2019-08-27

## 2019-08-27 RX ORDER — RANOLAZINE 500 MG/1
500 TABLET, EXTENDED RELEASE ORAL EVERY 12 HOURS SCHEDULED
Status: CANCELLED | OUTPATIENT
Start: 2019-08-27

## 2019-08-27 RX ORDER — AMLODIPINE BESYLATE 5 MG/1
5 TABLET ORAL DAILY
Status: CANCELLED | OUTPATIENT
Start: 2019-08-28

## 2019-08-27 RX ORDER — ONDANSETRON 4 MG/1
4 TABLET, ORALLY DISINTEGRATING ORAL EVERY 8 HOURS PRN
Status: DISCONTINUED | OUTPATIENT
Start: 2019-08-27 | End: 2019-08-27

## 2019-08-27 RX ORDER — BISACODYL 10 MG
10 SUPPOSITORY, RECTAL RECTAL DAILY PRN
Status: DISCONTINUED | OUTPATIENT
Start: 2019-08-27 | End: 2019-09-14 | Stop reason: HOSPADM

## 2019-08-27 RX ORDER — LIDOCAINE 50 MG/G
1 PATCH TOPICAL DAILY
Status: DISCONTINUED | OUTPATIENT
Start: 2019-08-27 | End: 2019-09-14 | Stop reason: HOSPADM

## 2019-08-27 RX ORDER — TORSEMIDE 20 MG/1
40 TABLET ORAL DAILY
Status: DISCONTINUED | OUTPATIENT
Start: 2019-08-28 | End: 2019-09-09

## 2019-08-27 RX ORDER — MUSCLE RUB CREAM 100; 150 MG/G; MG/G
CREAM TOPICAL 4 TIMES DAILY PRN
Status: DISCONTINUED | OUTPATIENT
Start: 2019-08-27 | End: 2019-09-14 | Stop reason: HOSPADM

## 2019-08-27 RX ORDER — POLYETHYLENE GLYCOL 3350 17 G/17G
17 POWDER, FOR SOLUTION ORAL DAILY PRN
Status: DISCONTINUED | OUTPATIENT
Start: 2019-08-27 | End: 2019-09-14 | Stop reason: HOSPADM

## 2019-08-27 RX ORDER — MUSCLE RUB CREAM 100; 150 MG/G; MG/G
CREAM TOPICAL 4 TIMES DAILY PRN
Status: CANCELLED | OUTPATIENT
Start: 2019-08-27

## 2019-08-27 RX ORDER — ONDANSETRON 2 MG/ML
4 INJECTION INTRAMUSCULAR; INTRAVENOUS EVERY 6 HOURS PRN
Status: DISCONTINUED | OUTPATIENT
Start: 2019-08-27 | End: 2019-09-14 | Stop reason: HOSPADM

## 2019-08-27 RX ORDER — RANOLAZINE 500 MG/1
500 TABLET, EXTENDED RELEASE ORAL EVERY 12 HOURS SCHEDULED
Status: DISCONTINUED | OUTPATIENT
Start: 2019-08-27 | End: 2019-09-14 | Stop reason: HOSPADM

## 2019-08-27 RX ADMIN — LEVOTHYROXINE SODIUM 75 MCG: 75 TABLET ORAL at 07:59

## 2019-08-27 RX ADMIN — METOPROLOL TARTRATE 50 MG: 50 TABLET, FILM COATED ORAL at 07:59

## 2019-08-27 RX ADMIN — DOCUSATE SODIUM 100 MG: 100 CAPSULE, LIQUID FILLED ORAL at 08:00

## 2019-08-27 RX ADMIN — RANOLAZINE 500 MG: 500 TABLET, FILM COATED, EXTENDED RELEASE ORAL at 08:00

## 2019-08-27 RX ADMIN — TAMSULOSIN HYDROCHLORIDE 0.4 MG: 0.4 CAPSULE ORAL at 08:00

## 2019-08-27 RX ADMIN — INSULIN LISPRO 3 UNITS: 100 INJECTION, SOLUTION INTRAVENOUS; SUBCUTANEOUS at 12:55

## 2019-08-27 RX ADMIN — RANOLAZINE 500 MG: 500 TABLET, FILM COATED, EXTENDED RELEASE ORAL at 22:01

## 2019-08-27 RX ADMIN — DOCUSATE SODIUM 100 MG: 100 CAPSULE, LIQUID FILLED ORAL at 17:26

## 2019-08-27 RX ADMIN — TORSEMIDE 40 MG: 20 TABLET ORAL at 08:00

## 2019-08-27 RX ADMIN — ATORVASTATIN CALCIUM 40 MG: 40 TABLET, FILM COATED ORAL at 17:27

## 2019-08-27 RX ADMIN — INSULIN GLARGINE 27 UNITS: 100 INJECTION, SOLUTION SUBCUTANEOUS at 07:59

## 2019-08-27 RX ADMIN — LIDOCAINE 1 PATCH: 50 PATCH CUTANEOUS at 17:27

## 2019-08-27 RX ADMIN — ASPIRIN 81 MG: 81 TABLET, COATED ORAL at 08:00

## 2019-08-27 RX ADMIN — METOPROLOL TARTRATE 50 MG: 50 TABLET, FILM COATED ORAL at 17:27

## 2019-08-27 RX ADMIN — AMLODIPINE BESYLATE 5 MG: 5 TABLET ORAL at 08:00

## 2019-08-27 NOTE — ASSESSMENT & PLAN NOTE
Currently Cr 3 8  approximate baseline 3 3-3 5  Marginal urine output over the last 24 hours, approx 31cc/h averaged  · Nephrology following-recommendation to continue outpatient nephrology management  Diuretics at discharge  · Bladder scan negative for retention, no urinary symptoms  · Continue torsemide 40 mg PO daily

## 2019-08-27 NOTE — ASSESSMENT & PLAN NOTE
Initial EKG demonstrated sinus rhythm and first-degree AV block, consistent with prior EKG  Managed by Dr Raj Sorenson (Cardiology)     · Continue metoprolol 50 mg   · Holding warfarin due to supratherapeutic INR

## 2019-08-27 NOTE — DISCHARGE SUMMARY
Discharge- Sweetie Vargas 1/29/1932, 80 y o  male MRN: [id number]    Unit/Bed#: -01 Encounter: [id number]    Primary Care Provider: Oni Matta MD   Date and time admitted to hospital: 8/22/2019  1:15 PM        * Chronic diastolic heart failure [facility])  Assessment & Plan  Wt Readings from Last 3 Encounters:   08/27/19 78 7 kg (173 lb 6 4 oz)   07/12/19 81 2 kg (179 lb 0 2 oz)   06/12/19 76 5 kg (168 lb 10 4 oz)     Likely cardiorenal syndrome given CXR with small pleural effusions, initial ProBNP 7,901  Previous echo (06/12/2019) demonstrated EF of 55% and grade 1 diastolic dysfunction  Weight down 11lb since admission   I/Os showing -7 7L since admission  · Cardiology following  No cardiac workup necessary  · Nephrology following- Continue torsemide 40 mg daily  · Continue to monitor I/Os and weight  · Continue Cardiac diet 2 g sodium and 1500mL restriction  · Continue home metoprolol 50 mg and amlodipine 5 mg  Kidney disease with fluid retention  Assessment & Plan  Currently Cr 3 8  approximate baseline 3 3-3 5  Marginal urine output over the last 24 hours, approx 31cc/h averaged  · Nephrology following-recommendation to continue outpatient nephrology management  Diuretics at discharge  · Bladder scan negative for retention, no urinary symptoms  · Continue torsemide 40 mg PO daily  Acute right-sided low back painresolved as of 8/27/2019  Assessment & Plan  Nontraumatic, likely musculoskeletal   · Lumbar and hip X-rays with no acute findings  · Lidocaine patch and bengay for pain relief in addition to prn tylenol    Diabetes mellitus with multiple complications [facility])  Assessment & Plan  Lab Results   Component Value Date    HGBA1C 5 5 08/22/2019       Recent Labs     08/26/19  1617 08/26/19  2028 08/27/19  0715 08/27/19  1101   POCGLU 93 105 98 243*       Blood Sugar Average: Last 72 hrs:  (P) 137 3125     Patient was having hypoglycemia this morning, glucose 50 on BMP  Patient was asymptomatic  Glucose normalized with juice  · Continue Lantus 27 units with breakfast   · Continue sliding scale insulin  · Continue to monitor blood glucose  · Diabetic diet    Elevated troponin I levelresolved as of 8/27/2019  Assessment & Plan  Initial Troponin 0 11  Decreased to 0 09 on repeat  · Per Cardiology, likely secondary to heart strain in presence of overload  Essential hypertension  Assessment & Plan  Blood pressure stable  · Continue to monitor vitals  · Continue home amlodipine 5 mg and metoprolol 50 mg with hold parameters  Warfarin-induced coagulopathy (HCC)  Assessment & Plan   INR  decreasing, currently 3 5   · Hold warfarin 2 mg; last dose 8/24 pm dose  · Continue monitor INR  Paroxysmal A-fib [facility])  Assessment & Plan  Initial EKG demonstrated sinus rhythm and first-degree AV block, consistent with prior EKG  Managed by Dr Miguel Talamantes (Cardiology)  · Continue metoprolol 50 mg   · Holding warfarin due to supratherapeutic INR    Hyperlipidemia  Assessment & Plan  Continue home Lipitor 40 mg daily    Benign prostatic hyperplasia with urinary hesitancy  Assessment & Plan  Continue home Flomax 0 4 mg daily  Patient showing no signs or symptoms of acute retention  Bladder scan negative  Hypothyroidism  Assessment & Plan  Continue home Synthroid 75 mcg daily    3-vessel coronary artery disease  Assessment & Plan  Status post CABG  · Continue home amlodipine, ranolazine, and metoprolol        Discharging Physician / Practitioner: Marcos Cuello MD  PCP: Devonte Leblanc MD  Admission Date:   Admission Orders (From admission, onward)     Ordered        08/22/19 1448  Inpatient Admission (expected length of stay for this patient Order details is greater than two midnights)  Once                   Discharge Date: 08/27/19    Consultations During Hospital Stay:  · Cardiology  · Nephrology  · Physical therapy  · Occupational therapy      Procedures Performed: · None  Significant Findings / Test Results:   · CBC-See below  · CMP-See below  · Pro BMP-See below  · Troponin-See below  · XR chest-See below  · XR hip/pelvis and lumbar spine-See below  Incidental Findings:   · None  Test Results Pending at Discharge (will require follow up): · None  Outpatient Tests Requested:  · None  Complications:  None  Reason for Admission:  Significant lower extremity edema/overload  Hospital Course:     Solo Kowalski is a 80 y o  male patient, pertinent history of chronic diastolic heart failure and paroxysmal A-fib, that originally presented to [address] [place] [address] ED on 8/22/2019 due to significant lower extremity edema in addition to worsening dyspnea on exertion  Patient previously discharged s/p rehab secondary to spinal surgery; previously prescribed torsemide held throughout rehab  Initial vitals in the ED:  Temperature 98 2°, heart rate 77, respirations 20, blood pressure 121/59, SpO2 99%  IV Lasix 40 mg administered  BNP 7,901  Troponin 0 11  Initial CMP remarkable for creatinine 3 49 (baseline approximately 3 5-3 6) and BUN 37  Initial CBC remarkable for hemoglobin 9 5 and hematocrit 30 6  EKG demonstrated sinus rhythm, rate of 69 and first-degree AV block; no acute ischemic changes  XR chest demonstrated small pleural effusions and increasing parenchymal density of the right upper lobe; patient aware finding in 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  Patient admitted to [facility]  Cardiology consulted  I/Os and weights trended  Repeat troponins trended downward; likely elevated in presence of overload  Per Cardiology, overload likely secondary to worsening renal function and retention  Recommendation to continue diuresis per nephrology  No further cardiac evaluation necessary  Nephrology consulted  IV Lasix 40 mg t i d  recommended by Nephrology  Hypokalemia; 3 3-repleted using 40 mEq    Iron deficiency anemia; IV Venofer 200 mg administered  Approximately 8 L and 11 lb diuresed  Nephrology transition IV Lasix to p o  Torsemide 40 mg  Patient had an acute episode of low back pain  Hip/pelvis and lumbar Xrays showed chronic findings with no acute changes  Physical therapy and occupational therapy recommendation for short-term skilled rehab  Discussed with patient and patient's spouse discharge plan to [facility] acute rehab, congestive heart failure education and continuation of oral diuretic-patient is amenable  Please see above list of diagnoses and related plan for additional information  Condition at Discharge: stable     Discharge Day Visit / Exam:     Subjective:  Patient had an episode of hypoglycemia this morning relieved with juice  Patient denied any hypoglycemic symptoms  Patient states that his right low back pain/hip pain has not recurred since yesterday morning  Patient reports that he feels ready for discharge to acute rehab  Discussed with patient's wife at bedside  Vitals: Blood Pressure: 128/75 (08/27/19 0700)  Pulse: 71 (08/27/19 0700)  Temperature: 97 5 °F (36 4 °C) (08/27/19 0700)  Temp Source: Oral (08/27/19 0700)  Respirations: 18 (08/27/19 0700)  Height: 5' 5" (165 1 cm) (08/22/19 1647)  Weight - Scale: 78 7 kg (173 lb 6 4 oz) (08/27/19 0532)  SpO2: 95 % (08/27/19 0700)  Exam:   Physical Exam   Constitutional: He is oriented to person, place, and time  Vital signs are normal  He appears well-developed and well-nourished  He is cooperative  He does not appear ill  No distress  HENT:   Head: Normocephalic and atraumatic  Right Ear: External ear normal    Left Ear: External ear normal    Eyes: Pupils are equal, round, and reactive to light  Conjunctivae and EOM are normal  No scleral icterus  Neck: Normal range of motion  Neck supple  No JVD present  No tracheal deviation present     Cardiovascular: Normal rate, regular rhythm, normal heart sounds, intact distal pulses and normal pulses  Exam reveals no gallop and no friction rub  No murmur heard  Pulmonary/Chest: Effort normal  No respiratory distress  He has decreased breath sounds in the right lower field and the left lower field  He has no wheezes  He has no rales  Abdominal: Soft  Normal appearance and bowel sounds are normal  He exhibits no distension  There is no tenderness  There is no rigidity, no rebound and no guarding  Musculoskeletal: Normal range of motion  He exhibits no edema or deformity  1 to 2+ pedal edema noted bilaterally, extending to the knee  Neurological: He is alert and oriented to person, place, and time  He exhibits normal muscle tone  Skin: Skin is warm, dry and intact  No rash noted  He is not diaphoretic  No erythema  No pallor  Psychiatric: He has a normal mood and affect  His speech is normal and behavior is normal  Judgment and thought content normal    Nursing note and vitals reviewed  Discharge instructions/Information to patient and family:   See after visit summary for information provided to patient and family  Provisions for Follow-Up Care:  See after visit summary for information related to follow-up care and any pertinent home health orders  Disposition:     Acute Rehab at [facility]  For Discharges to [facility]:   · Not Applicable to this Patient - Not Applicable to this Patient    Planned Readmission:  None       Discharge Medications:  See after visit summary for reconciled discharge medications provided to patient and family        ** Please Note: This note has been constructed using a voice recognition system **

## 2019-08-27 NOTE — CONSULTS
Consultation - Sharyn Gilford 80 y o  male MRN: [id number]    Unit/Bed#: -18 Encounter: [id number]        History of Present Illness     HPI: Sharyn Gilford is a 80y o  year old male with history of history of chronic diastolic CHF, 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, PAF, dual chamber PPM, CVA (2014), CAD/CABG (2013), DM2, hypothyroidism, left L2-3 microdiskectomy 7/181/9 [facility]) and BPH who was admitted with volume overload  He has had 3 recent hospitalizations  He was here at [facility] from 6/12-6/16/19 for chest pain  He was seen by Cardiology and had a nuclear stress test showing a small, severe, reversible myocardial perfusion defect of the basal inferior wall  He was started on Ranexa 500mg BID but no further plans for intervention were recommended  He was also found to have a Klebsiella UTI treated with Rocephin first, then transitioned to Keflex  He was here again 7/4/19 to 7/12/19 with sepsis  Blood and urine cultures were positive for Klebsiella  Urology saw him in consult and added Finasteride  Consideration was to be given to starting suppressive therapy with Nitrofurantion 100mg qhs = not started?    At discharge, he was sent to [facility]  Most recently, he underwent a L2-3 microdiskectomy at [facility] on 7/18/19 for lumbar stenosis  Following discharge there, he went to rehab at [facility]       He presented to [facility] on 8/22/19 with symptoms of volume overload  He was seen by renal and cardiology  Volume overload was felt to be 2/2 worsening renal function  After leaving rehab last time, he had not been put back on his Torsemide  He was initially given Lasix IV and then transitioned to Torsemide but at 40mg daily instead of his previous home dose of 20mg daily  His weight dropped 11 pounds due to diuresis  He has iron deficiency anemia and was given 3 doses of Venofer  He complained of low back pain    Xrays of the hip/pelvis/lumbar spine done showed no acute changes  INR has been supratherapeutic so Coumadin has been held  Denies CP, SOB, dizziness, headache, blurry vision/vision changes, N/V/D  Last BM was today      ROS:  As in HPI, otherwise negative 12 point ROS       Historical Information   Past Medical History:   Diagnosis Date    3-vessel coronary artery disease     last assessed: 08/15/2016    BPH (benign prostatic hyperplasia)     Chronic kidney disease     Diabetes mellitus ([address] )     Hyperlipidemia     Hypertension     Shingles 04/29/2019    SOB (shortness of breath)     Stroke [facility])      Past Surgical History:   Procedure Laterality Date    CARDIAC PACEMAKER PLACEMENT      CATARACT EXTRACTION      last assessed: 11/11/2015    CORONARY ARTERY BYPASS GRAFT      last assessed: 08/26/2015     Social History   Social History     Substance and Sexual Activity   Alcohol Use No     Social History     Substance and Sexual Activity   Drug Use No     Social History     Tobacco Use   Smoking Status Never Smoker   Smokeless Tobacco Never Used     Family History   Problem Relation Age of Onset    Heart disease Mother     Cancer Father         stomach    No Known Problems Sister     No Known Problems Brother     No Known Problems Brother        Meds/Allergies   current meds:  Current Facility-Administered Medications   Medication Dose Route Frequency    acetaminophen (TYLENOL) tablet 650 mg  650 mg Oral Q6H PRN    [START ON 8/28/2019] amLODIPine (NORVASC) tablet 5 mg  5 mg Oral Daily    [START ON 8/28/2019] aspirin (ECOTRIN LOW STRENGTH) EC tablet 81 mg  81 mg Oral Daily    atorvastatin (LIPITOR) tablet 40 mg  40 mg Oral QPM    bisacodyl (DULCOLAX) rectal suppository 10 mg  10 mg Rectal Daily PRN    docusate sodium (COLACE) capsule 100 mg  100 mg Oral BID    [START ON 8/28/2019] insulin glargine (LANTUS) subcutaneous injection 27 Units 0 27 mL  27 Units Subcutaneous Daily With Breakfast    insulin lispro (HumaLOG) 100 units/mL subcutaneous injection 1-6 Units  1-6 Units Subcutaneous TID AC    [START ON 8/28/2019] levothyroxine tablet 75 mcg  75 mcg Oral Daily    lidocaine (LIDODERM) 5 % patch 1 patch  1 patch Topical Daily    lidocaine (URO-JET) 2 % urethral/mucosal gel   Topical Q4H PRN    menthol-methyl salicylate (BENGAY) 69-29 % cream   Apply externally 4x Daily PRN    metoprolol tartrate (LOPRESSOR) tablet 50 mg  50 mg Oral BID With Meals    ondansetron (ZOFRAN) injection 4 mg  4 mg Intravenous Q6H PRN    polyethylene glycol (MIRALAX) packet 17 g  17 g Oral Daily PRN    ranolazine (RANEXA) 12 hr tablet 500 mg  500 mg Oral Q12H [address]    [START ON 8/28/2019] tamsulosin (FLOMAX) capsule 0 4 mg  0 4 mg Oral Daily    [START ON 8/28/2019] torsemide (DEMADEX) tablet 40 mg  40 mg Oral Daily       PTA meds:   Medications Prior to Admission   Medication    amLODIPine (NORVASC) 5 mg tablet    aspirin (ECOTRIN LOW STRENGTH) 81 mg EC tablet    atorvastatin (LIPITOR) 40 mg tablet    bisacodyl (DULCOLAX) 10 mg suppository    docusate sodium (COLACE) 100 mg capsule    finasteride (PROSCAR) 5 mg tablet    glucose blood test strip    levothyroxine 75 mcg tablet    metoprolol tartrate (LOPRESSOR) 50 mg tablet    ranolazine (RANEXA) 500 mg 12 hr tablet    senna (SENOKOT) 8 6 mg    tamsulosin (FLOMAX) 0 4 mg    torsemide (DEMADEX) 20 mg tablet    TRADJENTA 5 MG TABS    warfarin (COUMADIN) 3 mg tablet     No Known Allergies    Objective   Vitals: Blood pressure 124/59, pulse 69, temperature 97 6 °F (36 4 °C), temperature source Oral, resp  rate 16  Physical Exam      General Appearance: NAD, conversive  Eyes: No icterus; conjunctiva normal, PERRLA  HENT: oropharynx clear; mucous membranes moist  Neck: trachea midline, range of motion full     Lungs: CTA, normal respiratory effort, no retractions, expiratory effort normal  CV: regular rate, no rubs/gallops; +murmur  ABD: soft; sl distended,NT; +BS  EXT: +moderate edema legs/feet  Skin: normal turgor, normal texture, no rashes   Psych: affect normal, No depression/anxiety   Neuro: AAOx3      Lab Results:   Results from last 7 days   Lab Units 08/23/19  0459 08/22/19  1346   WBC Thousand/uL 7 36 7 35   HEMOGLOBIN g/dL 9 4* 9 5*   HEMATOCRIT % 30 1* 30 6*   PLATELETS Thousands/uL 217 230     Results from last 7 days   Lab Units 08/27/19  0442 08/26/19  0520   SODIUM mmol/L 138 138   POTASSIUM mmol/L 4 0 4 6   CHLORIDE mmol/L 100 100   CO2 mmol/L 29 29   BUN mg/dL 47* 45*   CREATININE mg/dL 3 85* 3 54*   CALCIUM mg/dL 8 0* 8 2*     Results from last 7 days   Lab Units 08/22/19  1958   HEMOGLOBIN A1C % 5 5     Results from last 7 days   Lab Units 08/27/19  0442 08/26/19  0520   INR  3 34* 3 74*         Results from last 7 days   Lab Units 08/27/19  1101 08/27/19  0715 08/26/19 2028   POC GLUCOSE mg/dl 243* 98 105       Labs reviewed    Imaging: reviewed  EKG, Pathology, and Other Studies: I have personally reviewed pertinent reports  VTE Prophylaxis: Sequential compression device (Venodyne)  and Coumadin    Code Status: Level 3 - DNAR and DNI   Advance Directive and Living Will: Yes      Reviewed with patient their advanced directive wishes while being in hospital during this encounter  Patient demonstrates understanding of the directives they wish and these are in line with what is noted in the current hospital record  Level 3: DNAR and DNI    Assessment/Plan      Volume overload/chronic diastolic CHF:  s/p Lasix IV, weight dropped 11 lbs = per renal, target weight is 168-170 lbs  Renal will be managing his diuretic tx which is currently Torsemide 40mg qd      Abnormal Troponin: felt to be 2/2 creatinine/vol overload putting stress on the heart; no further testing etc was recommended    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: today was 3 85 and renal feels he will need to have a higher creat in order to maintain euvolemia    Iron deficiency anemia: renal gave 3 doses Venofer    Hx urine retention/BPH; Klebsiella UTI 6/2019 and Klebsiella urosepsis 7/2019:  Uro saw in 7/2019 and added added Finasteride  Consideration was to be given to starting suppressive therapy with Nitrofurantion 100mg qhs  Currently, he is on Flomax but not the Finasteride  = ?why since was on it after his surgery  Will restart  Will need to watch closely    PAF:  continue Lopressor; Coumadin on hold until INR drops to an acceptable level then will need to order  Hx Dual chamber PPM:  sees Dr Tammy Reddy as an OP    CVA (2014):  continue ASA/Lipitor    CAD/CABG (2013):   was here at [facility] from 6/12-6/16/19 for chest pain  He was seen by Cardiology and had a nuclear stress test showing a small, severe, reversible myocardial perfusion defect of the basal inferior wall  He was started on Ranexa 500mg BID but no further plans for intervention were recommended  Continue Lopressor, Lipitor and ASA as well    DM2: takes Trajenta at home = will have someone bring in and start when sure he is eating well; continue Lantus for now but cut back to 14U from 27U qam since dropping too low at dinner  Hypothyroidism:  continue current Levothyroxine    Left L2-3 microdiskectomy 7/18/19 at [address] [place] [address] [employer] / Coordination of Care  Total time spent: At least 60 minutes, with more than 50% spent counseling/coordinating care  Counseling includes discussion with patient re: progress  and discussion with patient of his/her current medical state/information  Coordination of patient's care was performed in conjunction with primary service  Time invested included review of patient's labs, vitals, and management of their comorbidities with continued monitoring  In addition, this patient was discussed with medical team including physician and advanced extenders  The care of the patient was extensively discussed and appropriate treatment plan was formulated unique for this patient     ** Please Note: Dragon 360 Dictation voice to text software may have been used in the creation of this document   **

## 2019-08-27 NOTE — PROGRESS NOTES
[facility] PROGRESS NOTE   Tammy Ireland 80 y o  male MRN: [id number]  Unit/Bed#: -01 Encounter: [id number]  Reason for Consult:  CKD    ASSESSMENT and PLAN:  The patient is an 42-year-old gentleman with a history of CKD, BPH and hypertension who presented with lower extremity edema which has been gradually worsening  He had been prescribed diuretics day before admission but had never started them  Nephrology was consulted for management of chronic kidney disease and fluid overload  1  Chronic kidney disease, stage IV:  · Creatinine 3 8  Baseline creatinine 3 3-3 5  ? Slight increase in creatinine-etiology?    Blood pressure acceptable  Lower extremity edema but no evidence volume overload  ? Check PVR- patient having increasing back pain, less mobile, history of BPH and urinary retention  · Etiology hypertensive nephrosclerosis, diabetic nephropathy as well as age-related nephron loss  · Will likely need to accept a slightly higher creatinine to maintain euvolemia  · Urinalysis 1-2 RBCs, 10-20 WBCs  · Still has lower extremity edema knees to feet  ? Encouraged leg elevation  ? Brent stockings  ? Continue diuretic  ? Monitor daily weight  2  Fluid overload/acute on chronic diastolic CHF:    · Ejection fraction 84%, grade 1 diastolic dysfunction  · Chest x-ray on admission suggestive of volume overload  · Patient switch to oral diuretics 8/25  ? On torsemide 40 mg daily  ? Response appears to be adequate  ? Weight has decline by 11 lb since admission  current weight 173 lb  Target weight 168-170 lbs  ? Modest fluid restriction, salt restricted diet  3  Hypokalemia:  Resolved  Potassium except  4  Essential hypertension:    · Blood pressure acceptable continue current medications  · On amlodipine 5 mg daily, metoprolol 50 mg b i d  And diuretic  Flomax for BPH  5  BPH with urinary retention:  Continue Flomax  6  Anemia:    · Iron deficiency  Iron saturation 14%, ferritin 128  Status post 3 doses of Venofer, 600 mg total  7  Diabetes mellitus type 2: management per primary team  8  Back pain, leg pain:  X-rays of the spine showed degenerative changes, no acute changes noted hip x-ray       SUBJECTIVE / INTERVAL HISTORY:  Comfortable at rest   No shortness of breath    Asking questions concerning results of x-rays due to increased back pain, right leg pain    OBJECTIVE:  Current Weight: Weight - Scale: 78 7 kg (173 lb 6 4 oz)  Vitals:    08/27/19 0049 08/27/19 0100 08/27/19 0532 08/27/19 0700   BP: 121/59 126/54  128/75   BP Location: Right arm Right arm  Right arm   Pulse:    71   Resp:    18   Temp:    97 5 °F (36 4 °C)   TempSrc:    Oral   SpO2:    95%   Weight:   78 7 kg (173 lb 6 4 oz)    Height:           Intake/Output Summary (Last 24 hours) at 8/27/2019 0825  Last data filed at 8/27/2019 0804  Gross per 24 hour   Intake 240 ml   Output 1000 ml   Net -760 ml     General: NAD  Skin: no rash  Eyes: anicteric sclera  ENT: moist mucous membrane  Neck: supple, no JVD  Chest: CTA b/l, no ronchii, no wheeze, no rubs, no rales  CVS: S9F1, systolic murmur, no gallop, no rub  Abdomen: soft, nontender, nl sounds  Extremities:  +2 edema pretibial, pedal  : no [place]  Neuro: AAOX3  Psych: normal affect  Medications:    Current Facility-Administered Medications:     acetaminophen (TYLENOL) tablet 650 mg, 650 mg, Oral, Q6H PRN, Jordan C Holter, DO, 650 mg at 08/26/19 0740    amLODIPine (NORVASC) tablet 5 mg, 5 mg, Oral, Daily, Dante Miller, CRNP, 5 mg at 08/27/19 0800    aspirin (ECOTRIN LOW STRENGTH) EC tablet 81 mg, 81 mg, Oral, Daily, Jordan C Holter, DO, 81 mg at 08/27/19 0800    atorvastatin (LIPITOR) tablet 40 mg, 40 mg, Oral, QPM, Jordan C Holter, DO, 40 mg at 08/26/19 1728    docusate sodium (COLACE) capsule 100 mg, 100 mg, Oral, BID, Jordan C Holter, DO, 100 mg at 08/27/19 0800    insulin glargine (LANTUS) subcutaneous injection 27 Units 0 27 mL, 27 Units, Subcutaneous, Daily With Breakfast, [place] C Holter, DO, 27 Units at 08/27/19 0759    insulin lispro (HumaLOG) 100 units/mL subcutaneous injection 1-6 Units, 1-6 Units, Subcutaneous, TID AC, 4 Units at 08/26/19 1221 **AND** Fingerstick Glucose (POCT), , , TID AC, Jordan C Holter, DO    levothyroxine tablet 75 mcg, 75 mcg, Oral, Daily, Jordan C Holter, DO, 75 mcg at 08/27/19 0759    lidocaine (LIDODERM) 5 % patch 1 patch, 1 patch, Topical, Daily, Blas Nicole MD, 1 patch at 08/26/19 1729    menthol-methyl salicylate (BENGAY) 91-48 % cream, , Apply externally, 4x Daily PRN, Blas Nicole MD    metoprolol tartrate (LOPRESSOR) tablet 50 mg, 50 mg, Oral, BID With Meals, [place], CRNP, 50 mg at 08/27/19 0759    ondansetron (ZOFRAN) injection 4 mg, 4 mg, Intravenous, Q6H PRN, [place] C Holter, DO    ranolazine (RANEXA) 12 hr tablet 500 mg, 500 mg, Oral, Q12H [address], Jordan C Holter, DO, 500 mg at 08/27/19 0800    tamsulosin (FLOMAX) capsule 0 4 mg, 0 4 mg, Oral, Daily, [facility] Hol[facility], DO, 0 4 mg at 08/27/19 0800    torsemide (DEMADEX) tablet 40 mg, 40 mg, Oral, Daily, Herbie Perez MD, 40 mg at 08/27/19 0800    Laboratory Results:  Results from last 7 days   Lab Units 08/27/19  0442 08/26/19  0520 08/25/19  0624 08/24/19  0601 08/23/19  1058 08/23/19  0459 08/22/19  2337 08/22/19  1347 08/22/19  1346   WBC Thousand/uL  --   --   --   --   --  7 36  --   --  7 35   HEMOGLOBIN g/dL  --   --   --   --   --  9 4*  --   --  9 5*   HEMATOCRIT %  --   --   --   --   --  30 1*  --   --  30 6*   PLATELETS Thousands/uL  --   --   --   --   --  217  --   --  230   POTASSIUM mmol/L 4 0 4 6 3 3* 3 3* 3 8  --  4 1 4 7  --    CHLORIDE mmol/L 100 100 102 101 102  --  101 102  --    CO2 mmol/L 29 29 27 28 28  --  28 24  --    BUN mg/dL 47* 45* 44* 46* 38*  --  40* 37*  --    CREATININE mg/dL 3 85* 3 54* 3 60* 3 49* 3 61*  --  3 68* 3 49*  --    CALCIUM mg/dL 8 0* 8 2* 7 9* 8 1* 8 3  --  8 0* 8 3  --    MAGNESIUM mg/dL  --   --  1 9 1 9  -- --  1 9  --   --    PHOSPHORUS mg/dL  --   --   --  3 7  --   --   --   --   --

## 2019-08-27 NOTE — PLAN OF CARE
Problem: PAIN - ADULT  Goal: Verbalizes/displays adequate comfort level or baseline comfort level  Description  Interventions:  - Encourage patient to monitor pain and request assistance  - Assess pain using appropriate pain scale  - Administer analgesics based on type and severity of pain and evaluate response  - Implement non-pharmacological measures as appropriate and evaluate response  - Consider cultural and social influences on pain and pain management  - Notify physician/advanced practitioner if interventions unsuccessful or patient reports new pain  Outcome: Progressing     Problem: INFECTION - ADULT  Goal: Absence or prevention of progression during hospitalization  Description  INTERVENTIONS:  - Assess and monitor for signs and symptoms of infection  - Monitor lab/diagnostic results  - Monitor all insertion sites, i e  indwelling lines, tubes, and drains  - Monitor endotracheal if appropriate and nasal secretions for changes in amount and color  - [place] appropriate cooling/warming therapies per order  - Administer medications as ordered  - Instruct and encourage patient and family to use good hand hygiene technique  - Identify and instruct in appropriate isolation precautions for identified infection/condition  Outcome: Progressing     Problem: SAFETY ADULT  Goal: Patient will remain free of falls  Description  INTERVENTIONS:  - Assess patient frequently for physical needs  -  Identify cognitive and physical deficits and behaviors that affect risk of falls    -  [place] fall precautions as indicated by assessment   - Educate patient/family on patient safety including physical limitations  - Instruct patient to call for assistance with activity based on assessment  - Modify environment to reduce risk of injury  - Consider OT/PT consult to assist with strengthening/mobility  Outcome: Progressing  Goal: Maintain or return to baseline ADL function  Description  INTERVENTIONS:  -  Assess patient's ability to carry out ADLs; assess patient's baseline for ADL function and identify physical deficits which impact ability to perform ADLs (bathing, care of mouth/teeth, toileting, grooming, dressing, etc )  - Assess/evaluate cause of self-care deficits   - Assess range of motion  - Assess patient's mobility; develop plan if impaired  - Assess patient's need for assistive devices and provide as appropriate  - Encourage maximum independence but intervene and supervise when necessary  - Involve family in performance of ADLs  - Assess for home care needs following discharge   - Consider OT consult to assist with ADL evaluation and planning for discharge  - Provide patient education as appropriate  Outcome: Progressing  Goal: Maintain or return mobility status to optimal level  Description  INTERVENTIONS:  - Assess patient's baseline mobility status (ambulation, transfers, stairs, etc )    - Identify cognitive and physical deficits and behaviors that affect mobility  - Identify mobility aids required to assist with transfers and/or ambulation (gait belt, sit-to-stand, lift, walker, cane, etc )  - [place] fall precautions as indicated by assessment  - Record patient progress and toleration of activity level on Mobility SBAR; progress patient to next Phase/Stage  - Instruct patient to call for assistance with activity based on assessment  - Consider rehabilitation consult to assist with strengthening/weightbearing, etc   Outcome: Progressing     Problem: DISCHARGE PLANNING  Goal: Discharge to home or other facility with appropriate resources  Description  INTERVENTIONS:  - Identify barriers to discharge w/patient and caregiver  - Arrange for needed discharge resources and transportation as appropriate  - Identify discharge learning needs (meds, wound care, etc )  - Arrange for interpretive services to assist at discharge as needed  - Refer to Case Management Department for coordinating discharge planning if the patient needs post-hospital services based on physician/advanced practitioner order or complex needs related to functional status, cognitive ability, or social support system  Outcome: Progressing

## 2019-08-27 NOTE — ASSESSMENT & PLAN NOTE
Lab Results   Component Value Date    HGBA1C 5 5 08/22/2019       Recent Labs     08/26/19  1617 08/26/19 2028 08/27/19  0715 08/27/19  1101   POCGLU 93 105 98 243*       Blood Sugar Average: Last 72 hrs:  (P) 137 3124     Patient was having hypoglycemia this morning, glucose 50 on BMP  Patient was asymptomatic  Glucose normalized with juice  · Continue Lantus 27 units with breakfast   · Continue sliding scale insulin  · Continue to monitor blood glucose    · Diabetic diet

## 2019-08-27 NOTE — ASSESSMENT & PLAN NOTE
Wt Readings from Last 3 Encounters:   08/27/19 78 7 kg (173 lb 6 4 oz)   07/12/19 81 2 kg (179 lb 0 2 oz)   06/12/19 76 5 kg (168 lb 10 4 oz)     Likely cardiorenal syndrome given CXR with small pleural effusions, initial ProBNP 7,901  Previous echo (06/12/2019) demonstrated EF of 55% and grade 1 diastolic dysfunction  Weight down 11lb since admission   I/Os showing -7 7L since admission  · Cardiology following  No cardiac workup necessary  · Nephrology following- Continue torsemide 40 mg daily  · Continue to monitor I/Os and weight  · Continue Cardiac diet 2 g sodium and 1500mL restriction  · Continue home metoprolol 50 mg and amlodipine 5 mg

## 2019-08-27 NOTE — ASSESSMENT & PLAN NOTE
Blood pressure stable  · Continue to monitor vitals  · Continue home amlodipine 5 mg and metoprolol 50 mg with hold parameters

## 2019-08-27 NOTE — PROGRESS NOTES
PHYSICAL MEDICINE AND REHABILITATION   PREADMISSION ASSESSMENT     Projected [facility] Diagnoses:  Impairment of mobility, safety and Activities of Daily Living (ADLs) due to Debility:  16  Debility (Non-cardiac/Non-pulmonary)   Etiology: Ambulatory Dysfunction due to Fluid Overload  Date of Onset: 8/22/19   Date of surgery: N/A    PATIENT INFORMATION  Name: Flora Wan Phone #: [phone] (home)   Address: [facility] [address]  YOB: 1932 Age: 80 y o  SS#   Marital Status: /Civil Union  Ethnicity:   Employment Status: retired  Extended Emergency Contact Information  Primary Emergency Contact: [address] [place] [address]  Address: [address] Phone: [phone]  Mobile Phone: [phone]  Relation: Spouse  Secondary Emergency Contact: [facility]  Mobile Phone: [phone]  Relation: Child  Advance Directive: Level 3 DNAR/ DNI, AD Unknown    INSURANCE/COVERAGE:     Primary Payor: MEDICARE / Plan: MEDICARE A AND B / Product Type: Medicare A & B Fee for Service /   Secondary Payer: [facility]    Payer Contact:  Payer Contact:   Contact Phone:  Contact Phone:   Authorization #:   Coverage Dates:  LCD:   Medicare ID #: [id number]  Medicare Days: 44/30/60  Medical Record #: [id number]    REFERRAL SOURCE:   Referring provider: Caesar Stevens MD  Referring facility: [address] [place] [address]  Room: /[place] [address]  PCP: Amalia Crowell MD PCP phone number: [phone]    MEDICAL INFORMATION  HPI: Flora Wan is an 80year old male with PMH of CAD s/p CABG, DN, HTN, HLD, CHF, HTN, CKD 4, BPH, A-fib s/p PPM, stroke, and left torn bicep who presented to [address] [place] [address] on 8/22/19 with increasing bilateral lower extremity edema  On presentation edema was up to patient's thighs    Per family patient was recently at rehab and rehab doctor was not comfortable prescribing patient diuretics due to his kidney function  Patient had been discharged home from rehab and was cleared by his primary nephrologist to start back on torsemide the day before presentation but patient had not yet restarted the medication  On presentation creatinine was 3 49  Patient's baseline creatine is 3 4-3 5  BNP was 7,901, and troponins were 0 11  EKG showed normal sinus rhythm with 1st degree AV block that is unchanged from previous EKG  Chest x-ray was completed that showed cardiomegaly, small pleural effusions, and bilateral upper lobe pleural plaques  Per SLIM and conversation with SLIM patient has a history of asbestos exposure and plaques are known about  He was placed on telemetry and started on IV lasix  Cardiology was consulted and felt that patient was in fluid overload due to kidney function and not due to CHF as he was without shortness of breath  Elevated troponins were likely a response to increased creatinine and overload on heart  Cardiology recommended that he continue on IV lasix 40mg BID  Patients creatinine did increase to 3 61 due to diuresis  Nephrology was consulted and stated that patient's creatinine  Has progressively increased over the years due to hypertensive nephrosclerosis  Diabetic nephropathy, and age related loss  It was felt that slight increase in creatinine would need to be allowed to maintain euvolemia  I and O as well as weights were closely monitored  Creatinine improved on IVF and lasix was increased to 40mg TID with good results  Patient did have some hypokalemia as a result and was repleted as needed  He has since been transitioned to PO torsemide and is stable on this regimen  Patient received a dose of IV venofer due to iron saturation 14% with ferritin of 128  The patient did offer complaints of both back pain and hip pain  X-rays were completed     X-ray of the hip/pelvis were done which revealed no acute osseous abnormality  No acute fracture or dislocation  There is however noted moderate right hip osteoarthritis that was seen  There was an x-ray done of the lumbar spine which also noted no acute osseous abnormality  There was no evidence of acute fracture or destructive osseous lesion  At this time both PT and OT have evaluated the patient and are recommending inpatient rehab as well as his attending physician  He is medically clear for transfer today and will transfer to [address] [place] [address] in [address] [place] [address]  Past Medical History:   Past Surgical History:    Allergies:     Past Medical History:   Diagnosis Date    3-vessel coronary artery disease     last assessed: 08/15/2016    BPH (benign prostatic hyperplasia)     Chronic kidney disease     Diabetes mellitus ([address] )     Hyperlipidemia     Hypertension     Shingles 04/29/2019    SOB (shortness of breath)     Stroke [facility])     Past Surgical History:   Procedure Laterality Date    CARDIAC PACEMAKER PLACEMENT      CATARACT EXTRACTION      last assessed: 11/11/2015    CORONARY ARTERY BYPASS GRAFT      last assessed: 08/26/2015     No Known Allergies      Comorbidities: MAGUI of CKD 4, fluid overload, hypokalemia, DM, and iron deficiency anemia    CURRENT VITAL SIGNS:   Temp:  [97 5 °F (36 4 °C)-98 °F (36 7 °C)] 97 5 °F (36 4 °C)  HR:  [69-71] 71  Resp:  [17-18] 18  BP: (121-158)/(54-75) 128/75   Intake/Output Summary (Last 24 hours) at 8/27/2019 1109  Last data filed at 8/27/2019 1015  Gross per 24 hour   Intake 600 ml   Output 1000 ml   Net -400 ml        LABORATORY RESULTS:      Lab Results   Component Value Date    HGB 9 4 (L) 08/23/2019    HGB 13 0 08/26/2015    HCT 30 1 (L) 08/23/2019    HCT 38 6 08/26/2015    WBC 7 36 08/23/2019    WBC 6 9 08/26/2015     Lab Results   Component Value Date    BUN 47 (H) 08/27/2019    BUN 26 (H) 01/05/2016     01/05/2016    K 4 0 08/27/2019    K 4 2 01/05/2016     08/27/2019     01/05/2016 GLUCOSE 161 (H) 01/05/2016    CREATININE 3 85 (H) 08/27/2019    CREATININE 2 31 (H) 01/05/2016     Lab Results   Component Value Date    PROTIME 32 8 (H) 08/27/2019    PROTIME 25 1 (H) 12/22/2015    INR 3 34 (H) 08/27/2019    INR 2 41 (H) 12/22/2015        DIAGNOSTIC STUDIES:  Xr Chest 2 Views    Result Date: 8/22/2019  Impression: 1  Cardiomegaly and small pleural effusions  2   Bilateral upper lobe pleural plaques  3   Increasing parenchymal density in the right upper lobe  Recommend CT scan of the chest to exclude a parenchymal abnormality versus pleural plaques  This is marked for notification in the Epic system   Workstation performed: [id number]       PRECAUTIONS/SPECIAL NEEDS:  Tobacco:   Social History     Tobacco Use   Smoking Status Never Smoker   Smokeless Tobacco Never Used   , Alcohol:    Social History     Substance and Sexual Activity   Alcohol Use No   , Anticoagulation:  aspirin 81 mg orally every day, Blood Sugar Management: as per MD, Edema Management, Safety Concerns, Pain Management, Dietary Restrictions: Cardiovascular, Sodium 2 gram, FR 1500mL and Fall Precautions    MEDICATIONS:     Current Facility-Administered Medications:     acetaminophen (TYLENOL) tablet 650 mg, 650 mg, Oral, Q6H PRN, Jordan C Holter, DO, 650 mg at 08/26/19 0740    amLODIPine (NORVASC) tablet 5 mg, 5 mg, Oral, Daily, Hannah Bucker, CRNP, 5 mg at 08/27/19 0800    aspirin (ECOTRIN LOW STRENGTH) EC tablet 81 mg, 81 mg, Oral, Daily, Jordan C Holter, DO, 81 mg at 08/27/19 0800    atorvastatin (LIPITOR) tablet 40 mg, 40 mg, Oral, QPM, Jordan C Holter, DO, 40 mg at 08/26/19 1728    docusate sodium (COLACE) capsule 100 mg, 100 mg, Oral, BID, Jordan C Holter, DO, 100 mg at 08/27/19 0800    insulin glargine (LANTUS) subcutaneous injection 27 Units 0 27 mL, 27 Units, Subcutaneous, Daily With Breakfast, Jordan C Holter, DO, 27 Units at 08/27/19 0759    insulin lispro (HumaLOG) 100 units/mL subcutaneous injection 1-6 Units, 1-6 Units, Subcutaneous, TID AC, 4 Units at 08/26/19 1221 **AND** Fingerstick Glucose (POCT), , , TID AC, Jordan C Holter, DO    levothyroxine tablet 75 mcg, 75 mcg, Oral, Daily, Jordan C Holter, DO, 75 mcg at 08/27/19 0759    lidocaine (LIDODERM) 5 % patch 1 patch, 1 patch, Topical, Daily, Marcos Cuello MD, 1 patch at 08/26/19 1729    menthol-methyl salicylate (BENGAY) 15-07 % cream, , Apply externally, 4x Daily PRN, Marcos Cuello MD    metoprolol tartrate (LOPRESSOR) tablet 50 mg, 50 mg, Oral, BID With Meals, MAURICE Guerrero, 50 mg at 08/27/19 0759    ondansetron (ZOFRAN) injection 4 mg, 4 mg, Intravenous, Q6H PRN, [place] C Holter, DO    ranolazine (RANEXA) 12 hr tablet 500 mg, 500 mg, Oral, Q12H [address], Jordan C Holter, DO, 500 mg at 08/27/19 0800    tamsulosin (FLOMAX) capsule 0 4 mg, 0 4 mg, Oral, Daily, [place] C Holter, DO, 0 4 mg at 08/27/19 0800    torsemide (DEMADEX) tablet 40 mg, 40 mg, Oral, Daily, Lise Arenas MD, 40 mg at 08/27/19 0800    SKIN INTEGRITY:   Bruising to the left side of the back and to back of bilateral hands  PRIOR LEVEL OF FUNCTION:  He lives in a(n) single family home  Anishamoncho Watson is  and lives with their spouse  Self Care: Needed some help, Indoor Mobility: Needed some help, Stairs (in/outdoor): Needed some help and Cognition: Independent     RECENT HISTORY OF FALLS: 1-4 falls in the last 6 months    HOME ENVIRONMENT:  The living area: can live on one level  There 2 steps to enter the home and a stair glide to the second floor  Patient does have 1st floor set up with a hospital bed     The patient will have 24 hour supervision/physical assistance available upon discharge      PREVIOUS DME:  Equipment in home (previous DME): Rolling Walker, Bilateral AFOs, Stair [place], Hospital Bed, and [facility]    FUNCTIONAL STATUS:  Physical Therapy Occupational Therapy Speech Therapy   As per PTA:         08/26/19 08   Pain Assessment   Pain Assessment 0-10   Pain Score 9   Pain Location Back   Pain Orientation Right   Restrictions/Precautions   Weight Bearing Precautions Per Order No   Braces or Orthoses AFO   Other Precautions Chair Alarm; Bed Alarm;Telemetry; Fall Risk   General   Family/Caregiver Present No   Subjective   Subjective Patient supine in bed and is agreeable to participate in therapy session  Patient identifers obtained from name &   Bed Mobility   Supine to Sit 4  Minimal assistance   Additional items Assist x 1;HOB elevated; Bedrails; Increased time required;Verbal cues;LE management   Sit to Supine Unable to assess   Additional Comments Patient seated OOB in recliner post session with chair alarm engaged, call bell and belongings in reach  Transfers   Sit to Stand 4  Minimal assistance   Additional items Assist x 1; Armrests; Increased time required;Verbal cues   Stand to Sit 4  Minimal assistance   Additional items Assist x 1; Armrests; Increased time required;Verbal cues   Ambulation/Elevation   Gait pattern Forward Flexion; Steppage   Gait Assistance 4  Minimal assist  (times of mod ax1)   Additional items Assist x 1;Verbal cues   Assistive Device Rolling walker   Distance 50' x1   Balance   Static Sitting Fair +   Dynamic Sitting Fair -   Static Standing Poor +   Ambulatory Poor +   Activity Tolerance   Activity Tolerance Patient limited by fatigue;Patient limited by pain   Nurse Made Aware Spoke to Jess Orona RN    Exercises   Knee AROM Long Arc Quad Sitting;10 reps;AROM; Bilateral   Ankle Pumps Sitting;10 reps;AROM; Bilateral   Marching Sitting;10 reps;AROM; Bilateral   Assessment   Prognosis Fair   Problem List Decreased strength;Decreased endurance;Decreased range of motion; Impaired balance;Decreased mobility; Decreased coordination;Decreased safety awareness  (LE edema)   Assessment Patient agreeable to participate in therapy session however expresses increase in back pain   Patient continues to require min ax1 with increased time and verbal instruction for technique  Multiple sit<>stand transfers with min ax1 and verbal instruction for hand placement  Patient ablet o ambulate consistent gait distance with min ax1 and periods of mod ax1  Requires assistance for steadying balance initally and with turns/directional changes  Patient with poor walker management require increased manual assistance  Patient fatigued post ambulation limiting further trials  Patient participated in seated B LE exercise program with input for pace and form  Continue to focus on OOB mobility with progression of transfers and ambulation as appropriate  As per OT:     08/26/19 0905   Restrictions/Precautions   Weight Bearing Precautions Per Order No   Braces or Orthoses AFO; Other (Comment)  (B AFO's)   Other Precautions Chair Alarm; Bed Alarm; Fall Risk;Pain   General   Response to Previous Treatment Patient with no complaints from previous session   Family/Caregiver Present Pt's wife preesent during session   Pain Assessment   Pain Assessment 0-10   Pain Score 4   Pain Type Acute pain;Chronic pain   Pain Location Back; Hip   Pain Orientation Right   Effect of Pain on Daily Activities limits activity and standing tolerance during bathing, dressing   Patient's Stated Pain Goal No pain   Hospital Pain Intervention(s) Repositioned; Ambulation/increased activity; Emotional support  (RNIsaiah medicated pt prior to session)   Response to Interventions tolerated   ADL   Where Assessed Sitting at sink  (seated in reclner chair)   Eating Assistance 6  Modified independent   Eating Deficit Setup; Increased time to complete   Eating Comments seated in chair, finishing up breakfast upon therapist arrival   Grooming Assistance 5  Supervision/Setup   Grooming Deficit Setup; Increased time to complete   Grooming Comments seated in recliner chair to complete oral hygiene after bathing   UB Bathing Assistance 4  Minimal Assistance   UB Bathing Deficit Setup;Supervision/safety; Increased time to complete   UB Bathing Comments seated in chair at sink   LB Bathing Assistance 2  Maximal Assistance   LB Bathing Deficit Setup;Steadying; Increased time to complete; Buttocks; Perineal area   LB Bathing Comments standing at sink using RW   UB Dressing Assistance 3  Moderate Assistance   UB Dressing Deficit Setup;Pull around back;Verbal cueing; Increased time to complete   LB Dressing Assistance 3  Moderate Assistance   LB Dressing Deficit Setup; Thread RLE into pants; Thread LLE into pants;Pull up over hips; Requires assistive device for steadying;Steadying   LB Dressing Comments to don pants while seated in arm chair at bathroom sink  Pt required max A to don socks   Toileting Comments incontinent of urine prior to therapist arrival    Bed Mobility   Supine to Sit Unable to assess   Sit to Supine Unable to assess   Additional Comments Pt seated OOB in chair upon therapist arrival and OOB in chair at end of session w/ needs met, and chair alarm activated  Call bell in reach and wife present   Transfers   Sit to Stand 4  Minimal assistance   Additional items Assist x 1; Armrests; Increased time required;Verbal cues   Stand to Sit 4  Minimal assistance   Additional items Assist x 1; Armrests; Increased time required;Verbal cues   Additional Comments Pt performed sit <> stand three times during session (2X from arm chair in bathroom, 1 X from bedside recliner chair   Functional Mobility   Functional Mobility 4  Minimal assistance   Additional Comments min A using RW to / from bathroom on flat, level surfaces  Required mod A when negotiating obstacles turning wearing slipper socks  Additional items Rolling walker   Cognition   Overall Cognitive Status Impaired   Arousal/Participation Alert; Cooperative   Attention Difficulty attending to directions   Orientation Level Oriented to person;Oriented to place;Oriented to situation   Memory Decreased recall of recent events;Decreased recall of precautions   Following Commands Follows one step commands with increased time or repetition   Comments Identified pt by full namd and wristband  Pt able to follow one step directions w/ + time and cues  Benefits from prompts for walker mgmt, pacing  Pt deferred / asked wife for assistance w/ recall when talking about grandkids  Activity Tolerance   Activity Tolerance Patient limited by fatigue;Patient limited by pain   Medical Staff Made Aware spoke to RN, Ismael Landrum; PCA, and PTA, Patricia Monroe   Assessment   Assessment Pt seen for OT tx session day 1 this AM focusing on challenging activity tolerance during ADL performance  Pt engaged in bathing and dressing in bathroom after eating breakfast  Pt required min A for functional mobility using RW on straight, level susurfaces and mod A when negotiatinig turns, obstacles wearing slipper socks  Pt able to follow one step directions and communicate wants / needs  Pt agreeable, motivated to participate, and wanted to get washed up  Continue to recommend post acute rehab when medically stable for discharge from acute care to return to baseline level of I  Will continue to follow      N/A     CURRENT GAP IN FUNCTION   Prior to admission the patient was contact guard with rolling walker for transfers and ambulation, supervision with upper body ADLs and min assist with lower body ADLs  Patient's wife assists with dressing and bathing as well as a home health aid who comes into the home one a week for 30 minutes  Estimated length of stay: 10 to 14 days    Anticipated Post-Discharge Disposition/Treatment  Disposition: Return to previous home/apartment    Outpatient Services: Physical Therapy (PT) and Occupational Therapy (OT)    BARRIERS TO DISCHARGE  Weakness, Balance Difficulty, Fatigue, Home Accessibility, Caregiver Accessibility, Financial Resources, Equipment Needs and Resource Availability    INTERVENTIONS FOR DISCHARGE  Adaptive equipment, Patient/Family/Caregiver Education, [employer], Arrange DME needs, Medication Changes as per MD, Therapy exercises, Center of balance support  and Energy conservation education     REQUIRED THERAPY:  Patient will require PT and OT 90 minutes each per day, five days per week to achieve rehab goals  REQUIRED FUNCTIONAL AND MEDICAL MANAGEMENT FOR INPATIENT REHABILITATION:  Skin:  Bruising to left side of the back and back of bilateral hands  , Deep Vein Thrombosis (DVT) Prophylaxis:  SCD's while in bed, Diabetes Management: continue sliding scale insulin, patient to do finger sticks as ordered, SLIM to continue to manage diabetes, and additional medical conditions , nursing management for edcuation, PM&R to maximize function and provide medical oversight, PT/OT intervention, patient and family education and training, and any consults as needed  RECOMMENDED LEVEL OF CARE:  Chiquita Rudd presented to [address] [place] [address] on 8/22/19 with increasing bilateral lower extremity edema extending up to patient's thighs  Creatinine was 3 49  BNP was 7,901, and troponins were 0 11  EKG showed normal sinus rhythm with 1st degree AV block that is unchanged from previous EKG  Chest x-ray was completed that showed cardiomegaly, small pleural effusions, and bilateral upper lobe pleural plaques  Per SLIM and conversation with SLIM patient has a history of asbestos exposure and plaques are known about  He was placed on telemetry and started on IV lasix  Cardiology was consulted and felt that patient was in fluid overload due to kidney function and not due to CHF as he was without shortness of breath  Elevated troponins were likely a response to increased creatinine and overload on heart  Cardiology recommended that he continue on IV lasix 40mg BID  Patients creatinine did increase to 3 61 due to diuresis  Nephrology was consulted and stated that patient's creatinine  Has progressively increased over the years due to hypertensive nephrosclerosis  Diabetic nephropathy, and age related loss  It was felt that slight increase in creatinine would need to be allowed to maintain euvolemia  I and O as well as weights were closely monitored  Creatinine improved on IVF and lasix was increased to 40mg TID with good results  Patient did have some hypokalemia as a result and was repleted as needed  He has since been transitioned to PO torsemide and is stable on this regimen  Patient received a dose of IV venofer due to iron saturation 14% with ferritin of 128  Prior to admission the patient was contact guard with rolling walker for transfers and ambulation, supervision with upper body ADLs and min assist with lower body ADLs  Patient's wife assists with dressing and bathing as well as a home health aid who comes into the home one a week for 30 minutes  Currently he is a min assist with transfers and ambulation with rolling walker as well as min to mod assist with upper body ADLs and mod to max assist with lower body ADLs  Nursing is recommended for education, internal medicine to monitor and manage medical conditions, PM&R to maximize function and provide medical oversight, and inpatient rehab to maximize self care and mobility upon discharge to home with the support of his wife

## 2019-08-27 NOTE — SOCIAL WORK
CM informed by SLIM that patient is medically clear to dc to [facility] this afternoon after his bladder scan  CM called Jonas at [facility] to arrange transport  [facility] arranged with [facility] for 1 pm  SLIM, RN, patient and patient's spouse as well as [facility] aware of transport plan  IMM reviewed and copy provided

## 2019-08-27 NOTE — ASSESSMENT & PLAN NOTE
Nontraumatic, likely musculoskeletal   · Lumbar and hip X-rays with no acute findings  · Lidocaine patch and bengay for pain relief in addition to prn tylenol

## 2019-08-27 NOTE — PLAN OF CARE
Problem: Potential for Falls  Goal: Patient will remain free of falls  Description  INTERVENTIONS:  - Assess patient frequently for physical needs  -  Identify cognitive and physical deficits and behaviors that affect risk of falls    -  [place] fall precautions as indicated by assessment   - Educate patient/family on patient safety including physical limitations  - Instruct patient to call for assistance with activity based on assessment  - Modify environment to reduce risk of injury  - Consider OT/PT consult to assist with strengthening/mobility  Outcome: Progressing     Problem: Prexisting or High Potential for Compromised Skin Integrity  Goal: Skin integrity is maintained or improved  Description  INTERVENTIONS:  - Identify patients at risk for skin breakdown  - Assess and monitor skin integrity  - Assess and monitor nutrition and hydration status  - Monitor labs   - Assess for incontinence   - Turn and reposition patient  - Assist with mobility/ambulation  - Relieve pressure over bony prominences  - Avoid friction and shearing  - Provide appropriate hygiene as needed including keeping skin clean and dry  - Evaluate need for skin moisturizer/barrier cream  - Collaborate with interdisciplinary team   - Patient/family teaching  - Consider wound care consult   Outcome: Progressing     Problem: CARDIOVASCULAR - ADULT  Goal: Maintains optimal cardiac output and hemodynamic stability  Description  INTERVENTIONS:  - Monitor I/O, vital signs and rhythm  - Monitor for S/S and trends of decreased cardiac output  - Administer and titrate ordered vasoactive medications to optimize hemodynamic stability  - Assess quality of pulses, skin color and temperature  - Assess for signs of decreased coronary artery perfusion  - Instruct patient to report change in severity of symptoms  Outcome: Progressing  Goal: Absence of cardiac dysrhythmias or at baseline rhythm  Description  INTERVENTIONS:  - Continuous cardiac monitoring, vital signs, obtain 12 lead EKG if ordered  - Administer antiarrhythmic and heart rate control medications as ordered  - Monitor electrolytes and administer replacement therapy as ordered  Outcome: Progressing     Problem: RESPIRATORY - ADULT  Goal: Achieves optimal ventilation and oxygenation  Description  INTERVENTIONS:  - Assess for changes in respiratory status  - Assess for changes in mentation and behavior  - Position to facilitate oxygenation and minimize respiratory effort  - Oxygen administered by appropriate delivery if ordered  - Initiate smoking cessation education as indicated  - Encourage broncho-pulmonary hygiene including cough, deep breathe, Incentive Spirometry  - Assess the need for suctioning and aspirate as needed  - Assess and instruct to report SOB or any respiratory difficulty  - Respiratory Therapy support as indicated  Outcome: Progressing     Problem: METABOLIC, FLUID AND ELECTROLYTES - ADULT  Goal: Electrolytes maintained within normal limits  Description  INTERVENTIONS:  - Monitor labs and assess patient for signs and symptoms of electrolyte imbalances  - Administer electrolyte replacement as ordered  - Monitor response to electrolyte replacements, including repeat lab results as appropriate  - Instruct patient on fluid and nutrition as appropriate  Outcome: Progressing  Goal: Fluid balance maintained  Description  INTERVENTIONS:  - Monitor labs   - Monitor I/O and WT  - Instruct patient on fluid and nutrition as appropriate  - Assess for signs & symptoms of volume excess or deficit  Outcome: Progressing  Goal: Glucose maintained within target range  Description  INTERVENTIONS:  - Monitor Blood Glucose as ordered  - Assess for signs and symptoms of hyperglycemia and hypoglycemia  - Administer ordered medications to maintain glucose within target range  - Assess nutritional intake and initiate nutrition service referral as needed  Outcome: Progressing     Problem: PAIN - ADULT  Goal: Verbalizes/displays adequate comfort level or baseline comfort level  Description  Interventions:  - Encourage patient to monitor pain and request assistance  - Assess pain using appropriate pain scale  - Administer analgesics based on type and severity of pain and evaluate response  - Implement non-pharmacological measures as appropriate and evaluate response  - Consider cultural and social influences on pain and pain management  - Notify physician/advanced practitioner if interventions unsuccessful or patient reports new pain  Outcome: Progressing     Problem: SAFETY ADULT  Goal: Maintain or return to baseline ADL function  Description  INTERVENTIONS:  -  Assess patient's ability to carry out ADLs; assess patient's baseline for ADL function and identify physical deficits which impact ability to perform ADLs (bathing, care of mouth/teeth, toileting, grooming, dressing, etc )  - Assess/evaluate cause of self-care deficits   - Assess range of motion  - Assess patient's mobility; develop plan if impaired  - Assess patient's need for assistive devices and provide as appropriate  - Encourage maximum independence but intervene and supervise when necessary  - Involve family in performance of ADLs  - Assess for home care needs following discharge   - Consider OT consult to assist with ADL evaluation and planning for discharge  - Provide patient education as appropriate  Outcome: Progressing  Goal: Maintain or return mobility status to optimal level  Description  INTERVENTIONS:  - Assess patient's baseline mobility status (ambulation, transfers, stairs, etc )    - Identify cognitive and physical deficits and behaviors that affect mobility  - Identify mobility aids required to assist with transfers and/or ambulation (gait belt, sit-to-stand, lift, walker, cane, etc )  - [place] fall precautions as indicated by assessment  - Record patient progress and toleration of activity level on Mobility SBAR; progress patient to next Phase/Stage  - Instruct patient to call for assistance with activity based on assessment  - Consider rehabilitation consult to assist with strengthening/weightbearing, etc   Outcome: Progressing     Problem: DISCHARGE PLANNING  Goal: Discharge to home or other facility with appropriate resources  Description  INTERVENTIONS:  - Identify barriers to discharge w/patient and caregiver  - Arrange for needed discharge resources and transportation as appropriate  - Identify discharge learning needs (meds, wound care, etc )  - Arrange for interpretive services to assist at discharge as needed  - Refer to Case Management Department for coordinating discharge planning if the patient needs post-hospital services based on physician/advanced practitioner order or complex needs related to functional status, cognitive ability, or social support system  Outcome: Progressing

## 2019-08-27 NOTE — DISCHARGE INSTRUCTIONS
Take your medications as directed, and keep your follow up appointments  Adhere to a heart healthy lifestyle, maintaining a low sodium diet  Daily weight and record  If your weight increases 2-3 lbs in one day, or 5 lbs in 2 days, you are short of breath or have lower extremity swelling, please call the heart failure team at  [facility] at [phone]      Discharge pt to [facility]

## 2019-08-27 NOTE — ASSESSMENT & PLAN NOTE
Continue home Flomax 0 4 mg daily  Patient showing no signs or symptoms of acute retention  Bladder scan negative

## 2019-08-27 NOTE — ASSESSMENT & PLAN NOTE
INR decreasing, currently 3 5   · Hold warfarin 2 mg; last dose 8/24 pm dose  · Continue monitor INR

## 2019-08-28 PROBLEM — Z95.0 PRESENCE OF PERMANENT CARDIAC PACEMAKER: Status: ACTIVE | Noted: 2019-08-28

## 2019-08-28 PROBLEM — Z79.01 ANTICOAGULATED ON WARFARIN: Status: ACTIVE | Noted: 2019-08-28

## 2019-08-28 PROBLEM — M54.41 CHRONIC LOW BACK PAIN WITH RIGHT-SIDED SCIATICA: Status: ACTIVE | Noted: 2019-06-13

## 2019-08-28 PROBLEM — S31.809A BUTTOCK WOUND: Status: ACTIVE | Noted: 2019-08-28

## 2019-08-28 PROBLEM — D64.9 ANEMIA: Status: ACTIVE | Noted: 2019-08-28

## 2019-08-28 PROBLEM — Z00.00 HEALTHCARE MAINTENANCE: Status: ACTIVE | Noted: 2019-08-28

## 2019-08-28 PROBLEM — Z87.440 HISTORY OF RECURRENT UTIS: Status: ACTIVE | Noted: 2019-08-28

## 2019-08-28 PROBLEM — R52 PAIN: Status: ACTIVE | Noted: 2019-08-28

## 2019-08-28 LAB
ANION GAP SERPL CALCULATED.3IONS-SCNC: 8 MMOL/L (ref 4–13)
BUN SERPL-MCNC: 53 MG/DL (ref 5–25)
CALCIUM SERPL-MCNC: 8.4 MG/DL (ref 8.3–10.1)
CHLORIDE SERPL-SCNC: 103 MMOL/L (ref 100–108)
CO2 SERPL-SCNC: 25 MMOL/L (ref 21–32)
CREAT SERPL-MCNC: 3.56 MG/DL (ref 0.6–1.3)
GFR SERPL CREATININE-BSD FRML MDRD: 15 ML/MIN/1.73SQ M
GLUCOSE P FAST SERPL-MCNC: 48 MG/DL (ref 65–99)
GLUCOSE SERPL-MCNC: 109 MG/DL (ref 65–140)
GLUCOSE SERPL-MCNC: 127 MG/DL (ref 65–140)
GLUCOSE SERPL-MCNC: 166 MG/DL (ref 65–140)
GLUCOSE SERPL-MCNC: 179 MG/DL (ref 65–140)
GLUCOSE SERPL-MCNC: 45 MG/DL (ref 65–140)
GLUCOSE SERPL-MCNC: 48 MG/DL (ref 65–140)
GLUCOSE SERPL-MCNC: 78 MG/DL (ref 65–140)
GLUCOSE SERPL-MCNC: 96 MG/DL (ref 65–140)
INR PPP: 2.34 (ref 0.84–1.19)
POTASSIUM SERPL-SCNC: 4.1 MMOL/L (ref 3.5–5.3)
PROTHROMBIN TIME: 25.1 SECONDS (ref 11.6–14.5)
SODIUM SERPL-SCNC: 136 MMOL/L (ref 136–145)

## 2019-08-28 PROCEDURE — 80048 BASIC METABOLIC PNL TOTAL CA: CPT

## 2019-08-28 PROCEDURE — 97161 PT EVAL LOW COMPLEX 20 MIN: CPT

## 2019-08-28 PROCEDURE — 99232 SBSQ HOSP IP/OBS MODERATE 35: CPT | Performed by: INTERNAL MEDICINE

## 2019-08-28 PROCEDURE — 85610 PROTHROMBIN TIME: CPT | Performed by: NURSE PRACTITIONER

## 2019-08-28 PROCEDURE — 97116 GAIT TRAINING THERAPY: CPT

## 2019-08-28 PROCEDURE — 97535 SELF CARE MNGMENT TRAINING: CPT

## 2019-08-28 PROCEDURE — 99232 SBSQ HOSP IP/OBS MODERATE 35: CPT

## 2019-08-28 PROCEDURE — 97530 THERAPEUTIC ACTIVITIES: CPT

## 2019-08-28 PROCEDURE — 97112 NEUROMUSCULAR REEDUCATION: CPT

## 2019-08-28 PROCEDURE — 97166 OT EVAL MOD COMPLEX 45 MIN: CPT

## 2019-08-28 PROCEDURE — 82948 REAGENT STRIP/BLOOD GLUCOSE: CPT

## 2019-08-28 RX ORDER — WARFARIN SODIUM 1 MG/1
1.5 TABLET ORAL
Status: DISCONTINUED | OUTPATIENT
Start: 2019-08-28 | End: 2019-08-29

## 2019-08-28 RX ADMIN — DOCUSATE SODIUM 100 MG: 100 CAPSULE, LIQUID FILLED ORAL at 08:31

## 2019-08-28 RX ADMIN — FINASTERIDE 5 MG: 5 TABLET, FILM COATED ORAL at 08:31

## 2019-08-28 RX ADMIN — TORSEMIDE 40 MG: 20 TABLET ORAL at 08:31

## 2019-08-28 RX ADMIN — LIDOCAINE 1 PATCH: 50 PATCH CUTANEOUS at 17:13

## 2019-08-28 RX ADMIN — ATORVASTATIN CALCIUM 40 MG: 40 TABLET, FILM COATED ORAL at 17:12

## 2019-08-28 RX ADMIN — METOPROLOL TARTRATE 50 MG: 50 TABLET, FILM COATED ORAL at 07:34

## 2019-08-28 RX ADMIN — LEVOTHYROXINE SODIUM 75 MCG: 75 TABLET ORAL at 05:50

## 2019-08-28 RX ADMIN — ASPIRIN 81 MG: 81 TABLET, COATED ORAL at 08:31

## 2019-08-28 RX ADMIN — DOCUSATE SODIUM 100 MG: 100 CAPSULE, LIQUID FILLED ORAL at 17:12

## 2019-08-28 RX ADMIN — INSULIN LISPRO 1 UNITS: 100 INJECTION, SOLUTION INTRAVENOUS; SUBCUTANEOUS at 11:23

## 2019-08-28 RX ADMIN — RANOLAZINE 500 MG: 500 TABLET, FILM COATED, EXTENDED RELEASE ORAL at 08:32

## 2019-08-28 RX ADMIN — WARFARIN SODIUM 1.5 MG: 1 TABLET ORAL at 17:13

## 2019-08-28 RX ADMIN — METOPROLOL TARTRATE 50 MG: 50 TABLET, FILM COATED ORAL at 17:13

## 2019-08-28 RX ADMIN — TAMSULOSIN HYDROCHLORIDE 0.4 MG: 0.4 CAPSULE ORAL at 08:31

## 2019-08-28 RX ADMIN — INSULIN GLARGINE 14 UNITS: 100 INJECTION, SOLUTION SUBCUTANEOUS at 08:30

## 2019-08-28 RX ADMIN — RANOLAZINE 500 MG: 500 TABLET, FILM COATED, EXTENDED RELEASE ORAL at 21:51

## 2019-08-28 NOTE — ASSESSMENT & PLAN NOTE
Management per Medicine during [facility] course; mgmt per outpt providers after d/c  Wafarin 3mg M&Thu, 1 5mg other days   See above

## 2019-08-28 NOTE — H&P
PHYSICAL MEDICINE AND REHABILITATION H&P/ADMISSION NOTE  Isaiah Victor 80 y o  male MRN: [id number]  Unit/Bed#: [facility] 418-78 Encounter: [id number]     Rehab Diagnosis: Debility:  12  Debility (Non-cardiac/Non-pulmonary)    Etiologic Diagnosis:  Volume overload, lumbar spinal stenosis, weakness    Overall Assessment & Plan with Risks of Comorbidities:  [age]-year-old male with past medical history of chronic diastolic CHF, chronic kidney disease stage 4, paroxysmal atrial fibrillation, dual chamber permanent pacemaker, coronary artery disease, CABG, diabetes mellitus 2, hypothyroidism, BPH, recurrent UTIs, lumbar spinal stenosis, chronic low back pain, foot drop, history of left L2-L3 microdiskectomy in July of 2019 the developed worsening swelling with associated decline in ADLs and ambulation  Patient splits seen by Cardiology and Nephrology with noted worsening of renal function and volume overload believed to be related to this  Patient required initial IV and then more recently p o  Increase in diuretics  Patient's volume status has been improving although his functional status remains below his baseline  Patient was evaluated by skilled therapies and was found to have significant decline in ADLs and ambulation appropriate for admission to [facility]      * Impaired mobility and ADLs  Assessment & Plan  Multifactorial:  Recent volume overload related to acute on chronic kidney disease, history of diastolic CHF, generalized weakness including proximal lower extremity weakness possibly related to some chronic illness/critical illness deconditioning, low back pain and radiating leg pain with right foot drop from lumbar spinal stenosis possible nerve impingement status post surgical intervention recently, 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, left adhesive capsulitis, query component cognitive impairment,   - Recommend acute comprehensive interdisciplinary inpatient rehabilitation to include intensive skilled therapies (PT, OT) as outlined with oversight and management by rehabilitation physician as well as inpatient rehab level nursing, case management and weekly interdisciplinary team meetings     - Obtain MOCA to eval cog status   - optimal management of each  - Follow-up with PMR after discharge      Kidney disease with fluid retention  Assessment & Plan  Diuretics at their discretion  Internal medicine consult and management during ARC course  Nephrology c/s to assist with mgmt  Cr  stable recently; monitor intermittently  Limit nephrotoxic agents when possible      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  Assessment & Plan  Complaining of some chronic right low back pain with intermittent radiating pain down the leg; also with right foot drop  Status post recent lumbar surgery - left L2-3 microdiskectomy 7/181/9 [facility])   Try to limit sedating medications  Acetaminophen as needed  Lidocaine patch for now patient prefers    Frozen shoulder  Assessment & Plan  Skilled therapies, modalities as indicated    History of recurrent UTIs  Assessment & Plan  Internal medicine consult and management during ARC course  Currently on flomax and proscar  Follow-up with Urology after discharge    Benign prostatic hyperplasia with urinary hesitancy  Assessment & Plan  Continue flomax and Proscar; monitor vitals  - monitor for retention, incontinence, signs/symptoms of UTI  - recommend voiding trial; nursing prompt to void followed by bladder scan starting Q4-6H or after each patient initiated void; nursing to record voided output and bladder scan totals; straight cath PRN >350-400 cc; if post void residual bladder scans are <150 cc x3 consecutive voids, can stop scans       Anticoagulated on warfarin  Assessment & Plan  Management per Medicine with recent supratherapeutic INR    Chronic diastolic heart failure [facility])  Assessment & Plan  Internal medicine consult and management during ARC course  Anti thrombotics per Medicine, blood pressure medications at their discretion     Paroxysmal A-fib [facility])  Assessment & Plan  Internal medicine consult and management during ARC course  Anticoagulation at their discretion  Monitor rate    Diabetes mellitus with multiple complications [facility])  Assessment & Plan  Lab Results   Component Value Date    HGBA1C 5 5 08/22/2019     Internal medicine consulted and management at their discretion  Monitor for signs and symptoms of hypoglycemia   Current meds: lantus, lispro       Essential hypertension  Assessment & Plan  BP acceptable  Internal medicine consulted and management at their discretion  Monitor vitals with and without activity; monitor for orthostasis  Monitor hemoglobin, electrolytes, kidney function, hydration status   Current meds:  Metoprolol, amlodipine      3-vessel coronary artery disease  Assessment & Plan  Antiplatelet, optimal blood pressure control, statin  Ranexa    Hyperlipidemia  Assessment & Plan  Statin    Hypothyroidism  Assessment & Plan  Levothyroxine      # Bowel care:    - monitor for constipation, incontinence, and diarrhea  - goal 1 appropriate BM every 1-2 days  - recommend colace +/- senna and PRN bowel protocol     # At risk for venous thromboembolism:  - Recommend SCDs and mobilization  - A/C     # Diet/Hydration:    Diabetic/cardiac    Disposition:   Home with family with [facility] 2 weeks from admission     Follow-up:   PCP, PMR, Nephrology, Cardiology, urology    CODE: Level 3: DNAR and DNI    Restrictions include:   Fall precautions     ---------------------------------------------------------------------------------------------------------------------    Chief Complaint:  Decline in function    History of Present Illness:   [age]-year-old male with past medical history of chronic diastolic CHF, chronic kidney disease stage 4, paroxysmal atrial fibrillation, dual chamber permanent pacemaker, coronary artery disease, CABG, diabetes mellitus 2, hypothyroidism, BPH, recurrent UTIs, lumbar spinal stenosis, chronic low back pain, foot drop, history of left L2-L3 microdiskectomy in July of 2019 the developed worsening swelling with associated decline in ADLs and ambulation  Patient splits seen by Cardiology and Nephrology with noted worsening of renal function and volume overload believed to be related to this  Patient required initial IV and then more recently p o  Increase in diuretics  Patient's volume status has been improving although his functional status remains below his baseline  Patient was evaluated by skilled therapies and was found to have significant decline in ADLs and ambulation appropriate for admission to [place] [address]  On evaluation, patient reports improving swelling throughout his body  Patient notes still some generalized fatigue and difficulty mobilizing  Patient reports intermittent right low back pain with radiation down the leg which feels electrical in nature which is stable recently  Patient reports some chronic right foot drop which is also stable  Patient noted some chronic left-sided weakness after last stroke but this is largely been as baseline recently  He reports urinating adequately without dysuria  Patient denies radiating groin pain  Patient denies constipation  Patient denies lightheadedness, fever, chills, sweats, shortness of breath, chest pain, abdominal pain, significant difficulty sleeping or with mood, or other complaints  Review of Systems:     Complete review of systems obtained  Please see HPI for details with other significant symptoms or history listed here: Otherwise, 14 point review of systems completed and was otherwise unremarkable      OBJECTIVE:    Physical Exam:  Temperature 97 6° pulse 69 respiratory rate 16 blood pressure 124/59 SpO2 95% room air  General: Awake, alert in NAD  HENT: NCAT, MMM  Neck: Supple, no lymphadenopathy  Respiratory: Unlabored breathing, breath sounds equal, Lungs CTA, no wheezes, rales, or rhonchi  Cardiovascular: Regular rate and rhythm, no murmurs, rubs, or gallops  Gastrointestinal: Soft, non-tender, mildly-distended, normoactive bowel sounds  Genitourinary: No camargo  SkiN/MSK/Extremities:   1+ bilateral lower extremity edema and less so left upper extremity distal edema  No calf tenderness to palpation, no cyanosis  Left upper extremity with decreased shoulder range of motion in abduction and forward flexion to about 45° at most  Neurologic/Psych:   MENTAL STATUS: awake, oriented to person, place, time, and situation  Affect: Euthymic to slightly flattened  CN II-XII: grossly intact   Strength/MMT:    Right upper extremity strength intact, left upper extremity proximal strength appears fairly intact but again limited range of motion significantly, left distal upper extremity strength slightly decreased compared to right, right proximal lower extremity strength 2+ out of 5, left proximal lower extremity strength 3-out of 5, left distal lower extremity strength 4/5, right plantar flexion 4/5, right dorsiflexion 2-/5  No obvious dysmetria    Laboratory:    Results from last 7 days   Lab Units 08/23/19  0459 08/22/19  1346   HEMOGLOBIN g/dL 9 4* 9 5*   HEMATOCRIT % 30 1* 30 6*   WBC Thousand/uL 7 36 7 35     Results from last 7 days   Lab Units 08/27/19  0442 08/26/19  0520  08/22/19  1347   BUN mg/dL 47* 45*   < > 37*   POTASSIUM mmol/L 4 0 4 6   < > 4 7   CHLORIDE mmol/L 100 100   < > 102   CREATININE mg/dL 3 85* 3 54*   < > 3 49*   AST U/L  --   --   --  24   ALT U/L  --   --   --  14    < > = values in this interval not displayed       Results from last 7 days   Lab Units 08/27/19 0442 08/26/19  0520   PROTIME seconds 32 8* 35 9*   INR  3 34* 3 74*        Wt Readings from Last 1 Encounters:   08/28/19 78 8 kg (173 lb 11 6 oz)     Estimated body mass index is 28 91 kg/m² as calculated from the following:    Height as of 8/22/19: 5' 5" (1 651 m)  Weight as of this encounter: 78 8 kg (173 lb 11 6 oz)      Imaging: reviewed    Per EMR:  Past Medical History:   Past Surgical History:   Family History:   Social history:   Past Medical History:   Diagnosis Date    3-vessel coronary artery disease     last assessed: 08/15/2016    BPH (benign prostatic hyperplasia)     Chronic kidney disease     Diabetes mellitus ([facility] [address] )     Hyperlipidemia     Hypertension     Shingles 04/29/2019    SOB (shortness of breath)     Stroke [facility])     Past Surgical History:   Procedure Laterality Date    CARDIAC PACEMAKER PLACEMENT      CATARACT EXTRACTION      last assessed: 11/11/2015    CORONARY ARTERY BYPASS GRAFT      last assessed: 08/26/2015     Family History   Problem Relation Age of Onset    Heart disease Mother     Cancer Father         stomach    No Known Problems Sister     No Known Problems Brother     No Known Problems Brother       Social History     Socioeconomic History    Marital status: /Civil Union     Spouse name: None    Number of children: None    Years of education: None    Highest education level: None   Occupational History    None   Social Needs    Financial resource strain: None    Food insecurity:     Worry: None     Inability: None    Transportation needs:     Medical: None     Non-medical: None   Tobacco Use    Smoking status: Never Smoker    Smokeless tobacco: Never Used   Substance and Sexual Activity    Alcohol use: No    Drug use: No    Sexual activity: Never   Lifestyle    Physical activity:     Days per week: None     Minutes per session: None    Stress: None   Relationships    Social connections:     Talks on phone: None     Gets together: None     Attends Yarsani service: None     Active member of club or organization: None     Attends meetings of clubs or organizations: None     Relationship status: None    Intimate partner violence:     Fear of current or ex partner: None     Emotionally abused: None     Physically abused: None     Forced sexual activity: None   Other Topics Concern    None   Social History Narrative    No advance directive          Current Medical Diagnosis Medications Allergies   Patient Active Problem List   Diagnosis    3-vessel coronary artery disease    Asbestosis ([facility] )    Kidney disease with fluid retention    Diabetes mellitus with multiple complications ([facility] )    Elevated serum alkaline phosphatase level    Hyperlipidemia    Essential hypertension    Hypothyroidism    Paroxysmal A-fib (HCC)    Seasonal allergies    Increased frequency of urination    Frozen shoulder    Herpes zoster without complication    Depressed mood    Chest pain    Ambulatory dysfunction/generalized weakness    Leg edema, left    Shortness of breath    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    UTI (urinary tract infection)    Tibial artery occlusion, left (HCC)    Hyponatremia    Chronic diastolic heart failure (HCC)    Benign prostatic hyperplasia with urinary hesitancy    Abnormal chest x-ray    Warfarin-induced coagulopathy (HCC)    Impaired mobility and ADLs    Anticoagulated on warfarin    History of recurrent UTIs      Current Facility-Administered Medications:     acetaminophen (TYLENOL) tablet 650 mg, 650 mg, Oral, Q6H PRN, Swapnil Mueller MD    amLODIPine (NORVASC) tablet 5 mg, 5 mg, Oral, Daily, Swapnil Mueller MD    aspirin (ECOTRIN LOW STRENGTH) EC tablet 81 mg, 81 mg, Oral, Daily, Swapnil Mueller MD, 81 mg at 08/28/19 0831    atorvastatin (LIPITOR) tablet 40 mg, 40 mg, Oral, QPM, Swapnil Mueller MD, 40 mg at 08/27/19 1727    bisacodyl (DULCOLAX) rectal suppository 10 mg, 10 mg, Rectal, Daily PRN, Swapnil Mueller MD    docusate sodium (COLACE) capsule 100 mg, 100 mg, Oral, BID, Swapnil Mueller MD, 100 mg at 08/28/19 0831    finasteride (PROSCAR) tablet 5 mg, 5 mg, Oral, Daily, MAURICE Pisano, 5 mg at 08/28/19 0831    insulin glargine (LANTUS) subcutaneous injection 14 Units 0 14 mL, 14 Units, Subcutaneous, Daily With Breakfast, MAURICE Valenzuela, 14 Units at 08/28/19 0830    insulin lispro (HumaLOG) 100 units/mL subcutaneous injection 1-5 Units, 1-5 Units, Subcutaneous, TID AC, 1 Units at 08/28/19 1123 **AND** Fingerstick Glucose (POCT), , , TID AC, MAURICE Patel    insulin lispro (HumaLOG) 100 units/mL subcutaneous injection 1-5 Units, 1-5 Units, Subcutaneous, HS, MAURICE Valenzuela    levothyroxine tablet 75 mcg, 75 mcg, Oral, Daily, Saeid Knox MD, 75 mcg at 08/28/19 0550    lidocaine (LIDODERM) 5 % patch 1 patch, 1 patch, Topical, Daily, Saeid Knox MD, 1 patch at 08/27/19 1727    lidocaine (URO-JET) 2 % urethral/mucosal gel, , Topical, Q4H PRN, Saeid Knox MD    menthol-methyl salicylate (BENGAY) 54-96 % cream, , Apply externally, 4x Daily PRN, Saeid Knox MD    metoprolol tartrate (LOPRESSOR) tablet 50 mg, 50 mg, Oral, BID With Meals, Saeid Knox MD, 50 mg at 08/28/19 0734    ondansetron (ZOFRAN) injection 4 mg, 4 mg, Intravenous, Q6H PRN, Saeid Knox MD    polyethylene glycol [facility]) packet 17 g, 17 g, Oral, Daily PRN, Saeid Knox MD    ranolazine [facility]) 12 hr tablet 500 mg, 500 mg, Oral, Q12H [facility], Saeid Knox MD, 500 mg at 08/28/19 5418    tamsulosin [facility]) capsule 0 4 mg, 0 4 mg, Oral, Daily, Saeid Knox MD, 0 4 mg at 08/28/19 0831    torsemide [facility]) tablet 40 mg, 40 mg, Oral, Daily, Saeid Knox MD, 40 mg at 08/28/19 0831 No Known Allergies        Social History per encounter and chart review:  Jay Mcwilliams lives with wife in 2 story with 2 steps to enter the home as stair glide is 2nd floor  First floor is ramped  Patient apparently does have hospital bed  Patient can have some assistance supervision around discharge  Prior Level of Function:  Modified Independent with some ADLs, ambulation    Iris Gutierrez assist at times for some lower body dressing, bathing; transfers supervision     Current Level of Function:  Bed mobility, transfers, ambulation 50 ft Min assist, lower body dressing max assist, upper body dressing Min assist    Potential Barriers to Discharge:  Co-morbidities (see above), new functional deficits, weakness, pain, deconditioning    Tolerance for three hours of therapy per therapy day: adequate     Rehabilitation Prognosis: good       Rehabilitation Plan/Therapy Interventions: This patient will have close medical and rehabilitation oversight from a physical medicine and rehabilitation physician and if needed an internal medicine physician to manage the complexity of medical issues to optimize health, ability to participate in therapy, quality of life, and functional outcomes  This patient requires 24 hour rehabilitation nursing to address bowel and bladder management, (pain management if listed below), medication administration, positioning/skin monitoring, fall/injury prevention, and VTE prophylaxis  Physical and occupational therapy: Patient requires PT, OT to improve functional mobility, transfers, upper and lower body strengthening, conditioning, balance, and gait training with appropriate assistive device  PT and OT will be provided approximately 5 times per week for 90 minutes per day  Skilled therapists and nursing will also provide patient/family safety education and training  / will work to ensure proper communication between patient (+/- family) and staff regarding the overall rehabilitation and medical process while patient is in the acute rehabilitation center  / will work with patient (and if applicable family and community resources) to optimize safe discharge  Discharge Planning:    Estimated length of stay:   14 days  Family/Patient Goals:  Patient/family's goals: Return to previous home/apartment      Mobility Goals:   Supervision modified independent    Activities of Daily Living (ADLs) Goals:  Supervision modified independent    Rehabilitation and discharge goals discussed with the patient and/or family  Case Managment and Social Work to review patient/family resources and to coordinate Discharge Planning  Patient and Family Education and Training:  Rehabilitation and discharge goals discussed with the patient and/or family  Patient/family education/training needs to be discussed in weekly team meeting  Other equipment: To be determined    Medical Necessity Criteria for ARC Admission:  The preadmission screen, post-admission physical evaluation, overall plan of care and admissions order demonstrate a reasonable expectation that the following criteria were met at the time of admission to the [facility]  (See "Specific areas of management and oversight in ARC setting" for additional details on medical necessity as outlined below)  1  The patient requires active and ongoing therapeutic intervention of multiple therapy disciplines (physical therapy, occupational therapy, speech-language pathology, or prosthetics/orthotics), one of which is physical or occupational therapy  2  Patient requires an intensive rehabilitation therapy program, as defined in Chapter 1, section 110 2 2 of the CMS Medicare Policy Manual  This intensive rehabilitation therapy program will consist of at least 3 hours of therapy per day at least 5 days per week or at least 15 hours of intensive rehabilitation therapy within a 7 consecutive day period, beginning with the date of admission to the [facility]  3  The patient is reasonably expected to actively participate in, and benefit significantly from, the intensive rehabilitation therapy program as defined in Chapter 1, section 110 2 2 of the CMS Medicare Policy Manual at this time of admission to the [facility]   He can reasonably be expected to make measurable improvement (that will be of practical value to improve the patients functional capacity or adaptation to impairments) as a result of the rehabilitation treatment, as defined in section 110 3, and such improvement can be expected to be made within the prescribed period of time  As noted in the CMS Medicare Policy Manual, the patient need not be expected to achieve complete independence in the domain of self-care nor be expected to return to his or her prior level of functioning in order to meet this standard  4  The patient must require physician supervision by a rehabilitation physician  As such, a rehabilitation physician will conduct face-to-face visits with the patient at least 3 days per week throughout the patients stay in the [facility] to assess the patient both medically and functionally, as well as to modify the course of treatment as needed to maximize the patients capacity to benefit from the rehabilitation process  5  The patient requires an intensive and coordinated interdisciplinary approach to providing rehabilitation, as defined in Chapter 1, section 110 2 5 of the CMS Medicare Policy Manual  This will be achieved through periodic team conferences, conducted at least once in a 7-day period, and comprising of an interdisciplinary team of medical professionals consisting of: a rehabilitation physician, registered nurse,  and/or , and a licensed/certified therapist from each therapy discipline involved in treating the patient  Changes Since Pre-admission Assessment: None -This patient's participation in rehab continues to be reasonable, necessary and appropriate  CMS Required Post-Admission Physician Evaluation Elements  History and Physical, including medical history, functional history and active comorbidities as in above text  PostAdmission Physician Evaluation:  The patient has the potential to make improvement and is in need of physical, occupational, and/or therapy services   The patient may also need nutritional services  Given the patient's complex medical condition and risk of further medical complications, rehabilitative services cannot be safely provided at a lower level of care, such as a skilled nursing facility  I have reviewed the patient's functional and medical status at the time of the preadmission screening and they are the same as on the day of this admission  I acknowledge that I have personally performed a full physical examination on this patient within 24 hours of admission  The patient demonstrated understanding the rehabilitation program and the discharge process after we discussed them  Agree in entirety: yes  Minor adaptions: none    Major changes: none    Specific areas of management and oversight in ARC setting:    CHF/Cardiopulmonary function management: Ensure cardiopulmonary stability and optimize cardiopulmonary function not only at rest but with activity as patient's activity level significantly increases in acute rehab compared with prior to transfer in preparation for safe discharge from [facility]  Must closely and frequently monitor blood pressure and HR and when indicated monitor urinary output and weights with adjustments in diuretics and fluid management to ensure adequate cardiac output during ADLs and ambulation  Patient is at increased risk for orthostatic hypotension/syncope and potential injury if not monitored for and managed adequately  Blood pressure management:    Frequent monitoring of blood pressure with appropriate adjustments in blood pressure medication management to optimize blood pressure control and prevent/limit renal complications  Monitoring impact of blood pressure and side-effects of blood pressure medications at rest and with activity    Hypoxia prevention: Ensure appropriate level of oxygenation at rest and with activity to avoid symptomatic hypoxia, maximize functional performance, and decrease risk of atelectasis/pneumonia through close and frequent monitoring, providing appropriate respiratory treatments (such as incentive spirometry), and when necessary provide/adjust respiratory medications  DM: Patient will improve/maintain blood sugar control to ensure optimal healing and decrease risks of complications associated with DM through medication monitoring, adjustments as indicated, and optimal dietary intake and education  Pain management:  Pain will improve with frequent evaluation of pain, careful adjustments in medications, frequent re-evaluation of patient's pain and medical/neurologic status to ensure optimal pain control, avoidance of potential serious and even life-threatening side-effects and drug interactions, as well as weaning pain medications as soon as possible to decrease risk of short and long-term use  Neurologic Disorder: foot drop, muscular weakness causing impaired mobility, ADLs, and gait:  intensive skilled therapies with physical therapy and occupational therapy with close oversight and management by rehab specialized physician in acute rehabilitation setting to most expeditiously and effectively improve functional mobility, transfers, upper and lower body strengthening, conditioning, balance, and gait training with appropriate assistive device  Patient will have optimal supervision and management of patient's underlying neurologic disorder with specialized rehabilitation physician during this period of recovery to ensure most expeditious and optimal recovery with decreased risks of fall/injury and other complications including acute worsening of neuro disorder, decrease risk of VTE, PNA, and skin ulceration  Inpatient rehabilitation education/teaching:   To be provided to patient and typically family/caregiver (if able to be identified) by all skilled therapists, rehab nursing, case management, and rehab specialized physician to ensure optimal recovery and decrease risks of complications in both acute rehabilitation setting as well as after discharge  Kidney failure management and blood pressure management: Frequent measurements and evaluation of urinary intake, urinary output, and labs which include BUN/Cr and electrolytes with appropriate adjustments in medication and when necessary order additional testing  Frequent monitoring of blood pressure with appropriate adjustments in blood pressure medication management to optimize blood pressure control and prevent/limit renal complications  Skin wounds: Appropriate skin checks for wound/skin evaluation including evaluation of healing, worsening of wounds, or signs of infection  Wound care management from rehab nursing, wound care nursing, physicians  Ensure frequent appropriate turning, positioning in bed, in chair, when mobilizing, and when appropriate with use of appropriate devices to optimize healing and decrease risk of worsening or new skin breakdown  Ivette Smith MD, [address] [facility]  Physical Medicine and Rehabilitation  Brain Injury Medicine    ** Please Note: Fluency Direct voice to text software may have been used in the creation of this document   **

## 2019-08-28 NOTE — ASSESSMENT & PLAN NOTE
No s/s of UTI > monitor   Internal medicine consult and management during ARC course  Currently on flomax and proscar  Follow-up with Urology after discharge

## 2019-08-28 NOTE — ASSESSMENT & PLAN NOTE
Internal medicine consult and management during ARC course  Anti thrombotics per Medicine, blood pressure medications at their discretion   Med/nephrology recommend torsemide 20mg qday   Per nephro " Would discharge at torsemide 20 mg daily, discussed about daily monitoring of weight at home and to call office if any weight gain more than 3-4 lb from current weight in which case we may have to increase the dose of torsemide"  F/U PCP, nephro, cards after d/c

## 2019-08-28 NOTE — SOCIAL WORK
Met with Pt and his wife to review rehab routine, and CM role  Pt and his wife shared that there are 2STE their home, however family built/installed a ramp  Pts home is two stories, and a stair lift was recently installed, however Pt has a hospital bed on the first floor  Additionally, Pt has the following DME: a rollator, walker, single pt canesx4, a transport w/c, and w/c  Pt has worked with [facility] in the past, and would like to continue services at d/c with them  Pt has not been involved with outpt services  Pt and his wife utilize [facility] [place], and are interested in the [facility] meds to beds program, assuming it is cost effective  Pt and his wife understand the team meeting process, as well as potential LOS  Following to assist with d/c planning needs

## 2019-08-28 NOTE — ASSESSMENT & PLAN NOTE
Urinating adequately, continue to monitor  Continue flomax and Proscar  Follow-up with Urology after d/c

## 2019-08-28 NOTE — CONSULTS
Consult done 8/23 at [facility] by Dr Fili Sanchez   Patient was transferred to [facility]

## 2019-08-28 NOTE — PROGRESS NOTES
CONTINUED PT EVAL       08/28/19 0971   QI: Picking Up Object   Reason if not Attempted Safety concerns   Picking Up Object CARE Score 88   QI: Roll Left and Right   Assistance Needed Physical assistance   Assistance Provided by Britt 50%-74%   Roll Left and Right CARE Score 2   Bed Mobility   Able to Roll Left to Right;Right to Left   Adaptive Equipment Side Rails   QI: Lying to Sitting on Side of Bed   Assistance Needed Physical assistance   Assistance Provided by Britt 50%-74%   Comment has hopsital bed at home    Lying to Sitting on Side of Bed CARE Score 2   QI: Sit to Stand   Assistance Needed Physical assistance   Assistance Provided by Britt 75% or more   Comment max A from bed; mod A from wheelchair    Sit to Stand CARE Score 2   QI: Chair/Bed-to-Chair Transfer   Assistance Needed Physical assistance   Assistance Provided by Britt 50%-74%   Comment mod A as patient is retropulsive in stance    Chair/Bed-to-Chair Transfer CARE Score 2   QI: Car Transfer   Reason if not Attempted Safety concerns   Car Transfer CARE Score 88   Transfer Bed/Chair/Wheelchair   Positioning Concerns Other  (balance)   Limitations Noted In Balance; Endurance;UE Strength;LE Strength;Problem Solving   Adaptive Equipment Roller Walker   Stand Pivot Moderate Assist   Sit to Stand Moderate Assist;Maximum Assist   Stand to Sit Moderate Assist;Maximum Assist   Supine to Sit Moderate Assist;Maximum Assist   Bed, Chair, Wheelchair Transfer (FIM) 2 - Britt needs to lift or boost to rise AND assist to sit   QI: Walk 10 Feet   Assistance Needed Physical assistance   Assistance Provided by Britt Total assistance   Comment modA x1 with chair follow    Walk 10 Feet CARE Score 1   QI: Walk 50 Feet with Two Turns   Assistance Needed Physical assistance   Assistance Provided by Britt Total assistance   Comment modA x1 with chair follow    Walk 50 Feet with Two Turns CARE Score 1   QI: Walk 150 Feet   Assistance Needed Physical assistance Assistance Provided by [place] Total assistance   Comment modA x1 with chair follow    Walk 150 Feet CARE Score 1   QI: Walking 10 Feet on Uneven Surfaces   Reason if not Attempted Safety concerns   Walking 10 Feet on Uneven Surfaces CARE Score 88   Ambulation   Does the patient walk? 2  Yes   Primary Discharge Mode of Locomotion Walk   Walk Assist Level Minimum Assist;Moderate Assist   Gait Pattern Inconsistant Lois;Decreased foot clearance; Improper weight shift; Forward Flexion   Assist Device Roller Gabrielle Garcia Walked (feet) 150 ft   Limitations Noted In Balance; Coordination; Endurance; Heel Strike;Speed;Strength;Swing   Findings with BL MAFOs; wheezing/SOB with activity  reading 111/65, 98%, 74bpm    Walking (FIM) 3 - Patient completes 50 - 74% of all tasks, needs more than steadying or light touch AND distance 150 feet or more, no rest   QI: Wheel 50 Feet with Two Turns   Reason if not Attempted Safety concerns   Wheel 50 Feet with Two Turns CARE Score 88   QI: Wheel 150 Feet   Reason if not Attempted Safety concerns   Wheel 150 Feet CARE Score 88   Wheelchair mobility   QI: Does the patient use a wheelchair? 1  Yes   QI: Indicate the type of wheelchair 1  Manual   Wheelchair Assist Level Moderate Assist   Method Right upper extremity; Left upper extremity   Needs Assist With Locking Brakes;Obstacles;Remove Leg Rest;Replace Leg Rest   Distance Level Surface (feet) 15 ft   Findings impaired by impaired coordination and processing    Wheelchair (FIM) 1 - Patient wheels less than 49 feet regardless of assist/set up   QI: 1 Step (Curb)   Reason if not Attempted Safety concerns   1 Step (Curb) CARE Score 88   QI: 4 Steps   Reason if not Attempted Safety concerns   4 Steps CARE Score 88   QI: 12 Steps   Reason if not Attempted Activity not applicable   12 Steps CARE Score 9   Comprehension   QI: Comprehension 4   Undestands: Clear comprehension without cues or repetitions   Comprehension (FIM) 5 - Understands basic directions and conversation   Expression   QI: Expression 4  Express complex messages without difficulty and with speech that is clear and easy to [place]   Expression (FIM) 5 - Needs help/cues only RARELY (< 10% of the time)   Social Interaction   Social Interaction (FIM) 5 - Interacts appropriately with others 90% of time   Problem Solving   Problem solving (FIM) 5 - Solves basic problems 90% of time   Memory   Memory (FIM) 5 - [place] cues patient   Cognition   Overall Cognitive Status Impaired   Arousal/Participation Cooperative; Alert   Attention Attends with cues to redirect   Orientation Level Oriented X4   Memory Decreased short term memory;Decreased recall of recent events   Following Commands Follows multistep commands with increased time or repetition   Discharge Information   Impressions Patient engaged in continued PT evaluation for 60 minutes  He requires increased time to perform all activity and requires overall moderate A to complete bed mobility, transfers and gait  During stance, patient with improper weight shift and presents retropulsive initially; tactile facilitation required to correct  During standing balance without UE support, patient with LOB in which he requires assistance to maintain balance  He is at a risk for falls and will require continued skilled PT intervention to maximize function and reduce caregiver burden      PT Therapy Minutes   PT Time In 0930   PT Time Out 1040   PT Total Time (minutes) 70   PT Mode of treatment - Individual (minutes) 70   PT Mode of treatment - Concurrent (minutes) 0   PT Mode of treatment - Group (minutes) 0   PT Mode of treatment - Co-treat (minutes) 0   PT Mode of Teatment - Total time(minutes) 70 minutes

## 2019-08-28 NOTE — ASSESSMENT & PLAN NOTE
Hemoglobin 8 4 on 09/12  Recently received Venofer  Iron supplementation per Nephrology  F/U labs in about 1 week

## 2019-08-28 NOTE — ASSESSMENT & PLAN NOTE
Lab Results   Component Value Date    HGBA1C 5 5 08/22/2019     Internal medicine consulted and management at their discretion  Monitor for signs and symptoms of hypoglycemia   Current meds: lispro and Tradjenta per Medicine  >Provide new glucometer Rx at d/c

## 2019-08-28 NOTE — PROGRESS NOTES
OT EVALUATION       08/28/19 1230     Patient Data   Rehab Impairment  Impairment of mobility, safety and Activities of Daily Living (ADLs) due to Debility:  16  Debility (Non-cardiac/Non-pulmonary)    Etiologic Diagnosis Ambulatory Dysfunction due to Fluid Overload   Date of Onset 08/22/19   Support System   Name Pat   Home Setup   Type of Home Multi Level   Method of Entry Ramp   Number of Stairs 0   Number of Stairs in Home 0  (FF has stairglide to 2nd floor )   First Floor Bathroom Half   First Floor Bathroom Accessibility Commode  (over toilet)   Second Floor Bathroom Full; Shower;Built-in shower seat   First Floor Setup Available Yes  (has hospital bed on 1st floor- current set up with 1/2 bath)   Home Modifications Necessary? No   Available Equipment Wheelchair;Roller Walker;Single Point Cane;Bedside Commode  (b/l MAFOs, hospital bed, stair glide)   Baseline [address] prior to Admission Occupational Therapy; Physical Therapy;Home Health Aide  (HHA 2 hours in AM 5x/week (available in PM prn))   Vocation Other  (retired- )   Transportation Family/friends drive   Prior Device(s) Used Paula Leventhal; Wheelchair;Bedside Commode   Prior IADL Participation   Money Management   (wife manages)   Meal Preparation Other  (wife manages)   Laundry Other  (wife manages)   Home Cleaning Other  (wife manages)   Prior Level of Function   Self-Care 2  Needed Some Help - Patient needed a partial assistance from another person to complete activities  Indoor-Mobility (Ambulation) 2  Needed Some Help - Patient needed a partial assistance from another person to complete activities  Stairs 9  Not Applicable   Functional Cognition 2  Needed Some Help - Patient needed a partial assistance from another person to complete activities  Prior Assistance Needed for Driving;Meal Preparation;Household Chores/Cleaning;Medication Management;Money Management; Shopping   Prior Device Used A   Manual wheelchair;D  Walker   Patient Preference   Nickname (Patient Preference) Екатерина   Psychosocial   Psychosocial (WDL) WDL   Length of Time/Family Visitation 2-4 hrs   Restrictions/Precautions   Precautions Bed/chair alarms; Fall Risk;Cognitive;Fluid restriction;Supervision on toilet/commode  (?? if back precautions from sx 7/18/2019?m 1500ml F R )   Braces or Orthoses AFO  (b/la )   Pain Assessment   Pain Assessment No/denies pain   Pain Score No Pain   QI: Eating   Comment not seen during meal time   QI: Oral Hygiene   Comment did not assess   Grooming   Able To Wash/Dry Face;Wash/Dry Hands   Grooming (FIM) 5 - Patient requires supervision/monitoring   QI: Shower/Bathe Self   Assistance Needed Physical assistance   Assistance Provided by [place] 50%-74%   Comment Pt requires assist to wash b/l LE's below knee  Pt unable to wash back of upper thighs  While in stance pt required mod/min A for balance support and assist to wash buttock and tess area thoroughly   Shower/Bathe Self CARE Score 2   Bathing   Assessed Bath Style Shower   Anticipated D/C Bath Style Sponge Bath   Able to Gather/Transport No   Able to [employer] Temperature No   Able to Wash/Rinse/Dry (body part) Left Arm;Right Arm; Chest;Abdomen   Limitations Noted in Balance; Endurance;Strength; Safety   Positioning Seated;Standing   Bathing (FIM) 2 - Patient completes 3/10 or 4/10 parts   QI: Upper Body Dressing   Assistance Needed Physical assistance   Assistance Provided by Fulda 25%-49%   Comment Pt able to remove undershirt and long sleeve shirt with assist to get over R wrist only  Pt able to thread b/l UE's into shirt however due to limited shoudler ROM on LUE pt required therapist to bring shirt up onto shoulders  Pt then required assist to bring shirt overhead  Pt requried cues to pull shirt down in back     Upper Body Dressing CARE Score 3   QI: Lower Body Dressing   Assistance Needed Physical assistance   Assistance Provided by Fulda Total assistance   Comment Pt attempted to thread LE's into pants and underwear however unable due to limited ROM of b/l LE's  Pt also with questionable back precautions from L 2-L3 microdisketomy  Pt requires mod A fo rbalance support in stance and assist to pull pants up over hips  Pt limited by decreased dynamic standing balance and inability to complete unilateral hand release while in stance  Lower Body Dressing CARE Score 1   QI: Putting On/Taking Off Footwear   Assistance Needed Physical assistance   Assistance Provided by [place] 75% or more   Comment Pt requires assist to don/doff b/l socks, AFOs and shoes  Pt attempted to don socks with sock aid (as he has one at home) pt unable to sequence and requires assist from therapist     Putting On/Taking Off Footwear CARE Score 2   QI: Picking Up Object   Reason if not Attempted Activity not applicable   Picking Up Object CARE Score 9   Dressing/Undressing Clothing   Remove UB Clothes Pullover Shirt; Undershirt   Remove LB Clothes Undergarment;Pants;Socks; Shoes;TEDs  (b/l AFOs)   Don UB Clothes Undershirt   Don LB Clothes Pants; Undergarment; Shoes;Socks;TEDs  (b/l AFO's)   Limitations Noted In Balance; Endurance;Problem Solving;Strength;ROM; Timeliness   Adaptive Equipment Sock Aide   Positioning Supported Sit;Standing   UB Dressing (FIM) 3 - Patient completes  50-74% of all tasks   LB Dressing (FIM) 1 - Patient completes less than 25% of all tasks   QI: [address] Needed Physical assistance   Assistance Provided by [place] Total assistance   Comment Pt requires assist for all parts of toleting hygiene  Pt with two episodes of small bowel incontinence  Requires assist for clean up and clothing change  Toileting Hygiene CARE Score 1   Toileting   Able to Pull Clothing down no, up no  Able to Manage Clothing Closures No   Manage Hygiene Bowel   Limitations Noted In Balance; Coordination;LE Strength   Toileting (FIM) 1 - Patient completes less than 25% of all tasks   Bowel/Bladder Management   Accident Yes   Required assist to clean up after accident? Yes   Factors for Accident Incontinence   QI: Bowel Continence 2  Frequently incontinent (2 or more episodes of bowel incontinence, but at least one continent bowel movement)   Bowel Management (FIM) 1 - Patient requires assist for all tasks with bowel accident (dirty linen or clothing)   QI: Sit to Stand   Assistance Needed Physical assistance   Assistance Provided by [place] 75% or more   Comment Pt mod/max A for sit to stand transfers with RW  Pt noted with posterior weight shift   Sit to Stand CARE Score 2   QI: Chair/Bed-to-Chair Transfer   Assistance Needed Physical assistance   Assistance Provided by [place] 50%-74%   Comment Pt is mod A for stand pivot transfers with RW   Chair/Bed-to-Chair Transfer CARE Score 2   Transfer Bed/Chair/Wheelchair   Limitations Noted In Balance; Endurance;LE Strength;UE Strength;Problem Solving   Stand Pivot Moderate Assist   Sit to Stand Moderate Assist;Maximum Assist   Stand to Sit Moderate Assist   Bed, Chair, Wheelchair Transfer (FIM) 2 - Johnston needs to lift or boost to rise AND assist to sit   QI: Toilet Transfer   Assistance Needed Physical assistance   Assistance Provided by [place] 75% or more   Comment Pt is max A for stand pivot transfer with RW  Toilet Transfer CARE Score 2   Toilet Transfer   Surface Assessed Standard Commode   Transfer Technique   (walking)   Limitations Noted In Balance; Endurance;Problem Solving; Safety; Sequencing;LE Strength   Toilet Transfer (FIM) 2 - Johnston needs to lift or boost to rise AND assist to sit   Tub/Shower Transfer   Limitations Noted In Balance; Endurance;Problem Solving; Sequencing;LE Strength   Assessed Shower   Findings Pt completed stand pivot transfer wtih use of grab bars   Pt requires cueing due to RLE hitting foot of shower bench   Shower Transfer (FIM) 2 - [place] needs to lift or boost to rise AND assist to sit   Comprehension Auditory Complex   Visual Complex   QI: Comprehension 4  Undestands: Clear comprehension without cues or repetitions   Comprehension (FIM) 6 - Understands complex/abstract but requires more time   Expression   Verbal Complex   Non-Verbal Complex   QI: Expression 4  Express complex messages without difficulty and with speech that is clear and easy to [place]   Expression (FIM) 6 - Expresses complex/abstract but requires:  more time   Social Interaction   Cooperation with staff;with family   Participation Small Group   Social Interaction (FIM) 6 - Interacts appropriately with others BUT requires extra  time   Problem Solving   Routine Manages call bell;Manages ADL; [place] precautions   Problem solving (FIM) 5 - Solves complex problems But requires cues from helper   Memory   Recognize People Yes   Remember Routine Yes   Initiates Tasks Yes   Short-Term Impaired   Long Term Intact   Recalls Precaution Yes   Memory (FIM) 5 - Recalls/performs request 90% of time   RUE Assessment   RUE Assessment WFL   LUE Assessment   LUE Assessment X  (baseline shoulder deficits torn bicep)   Coordination   Movements are Fluid and Coordinated 0   Coordination and Movement Description impaired coordination UE/LEs   Cognition   Overall Cognitive Status Impaired   Arousal/Participation Alert; Cooperative   Attention Attends with cues to redirect   Orientation Level Oriented X4   Memory Decreased short term memory   Following Commands Follows multistep commands with increased time or repetition   Comments increased time to process information and increased time for answering questions  Please complete MOCA assessment upon next session   Discharge Information   Patient's Discharge Plan return home with wife and HHA 2 hours/day 5x/week   Patient's Rehab Expectations to get home and regain function   Barriers to Discharge Home Decreased Strength;Decreased Endurance; Safety Considerations   Impressions Pt presents s/p debility 2* fluid overload  Pt had decline for past year after dx shingles and multiple readmissions to hospital  Pt had recent L2-L3 mini diskectomy on 7/18/19  Pt was recently d/c from rehab approximately 10 days prior to readmission to [facility] for current hospital stay  Pt presented to [facility] on 8/22 with increased b/l LE edema including thighs  Pt dx with fluid overload 2* decreased kidney function  EKG normal  Pt currently on C C  Diet and fluid restriction of 1500ml  Co morbidities include LUE torn bicep and recent L2-L3 micro diskectomy  Will have to clarify if patient has back precautions  At baseline pt was using w/c and RW for stand pivot transfers only  Pt was receiving home OT/PT and walking with therapy only  Pt had HHA 2 hrs/day 5x/week  HHA was available to come in evening prn  Pt dtr was assisting pt and wife for first week that he was home post rehab  Pt required min A for bathing and dressing at baseline  Pt currently functioning well below baseline and requires total A for LB dressing, mod A for UB dressing, and total A for toileting  Pt currently limited by decreased balance, posterior weight shift in stance, decreased standing tolerance, decreased dynamic reach below waist, and SOB  Pt would benefit from 10 day LOS to decrease caregiver burden, complete family training, and maximize patient's independence      OT Therapy Minutes   OT Time In 1230   OT Time Out 1400   OT Total Time (minutes) 90   OT Mode of treatment - Individual (minutes) 90   OT Mode of treatment - Concurrent (minutes) 0   OT Mode of treatment - Group (minutes) 0   OT Mode of treatment - Co-treat (minutes) 0   OT Mode of Teatment - Total time(minutes) 90 minutes

## 2019-08-28 NOTE — ASSESSMENT & PLAN NOTE
BP acceptable  Internal medicine consulted and management at their discretion  Monitor vitals with and without activity; monitor for orthostasis  Monitor hemoglobin, electrolytes, kidney function, hydration status   Current meds:  Metoprolol, Torsemide

## 2019-08-28 NOTE — PCC PHYSICAL THERAPY
Patient is an 80year old male presenting with debility 2* fluid overload  Patient with multiple hospital admissions: 6/12-16 for chest pain, 7/4-12 for sepsis with d/c to [facility] for rehab, 7/18 for microdiskectomy in [place] with d/c to rehab  Patient only home for a few days when he presented with above complaints  Patient with an additional problem list which includes CAD, DM, HTN, a-fib, h/o shingles, h/o CVA, and CKD  PTA, patient living at home with his wife where she assisted him with (I)ADLs and ADLs  Post d/c from rehab in [place], patient reports needing assist to get in and out of bed, dress and bathe and walk  He was primarily using a w/c in his home and utilizing a RW with therapy, caregiver, or wife providing a chair follow  He does admit to 1 fall appx 3 weeks ago  Local children assist with needs such as driving  Patient reports seeing son daily  On evaluation, pt presenting with gross limitations BLE in strength and ROM, LUE strength and ROM, core stability, balance and righting reactions and coordination  Patient required overall moderate A for functional mobility  He is a good rehab candidate and will require skilled PT intervention to achieve S goals in appx 10 days  Pt making slow progress towards d/c goals  Pt functioning at min/modA with RW  Pt with impaired balance with txs and turns during mobility increasing fall risk  Due to decrease cognition pt requires VCs to improve safety awareness with txs  Pt does not have stairs to perform and ramp installed for entry into home  Family has equipment at home for d/c   Due to impaired balance and decrease strength, pt remains a fall risk and making slow progress  Will cont to work on deficits to progress with functional mobility and decrease burden of care for home  D/c pending at this time based on support and A needed for home  Pt making gains towards d/c goals    Pt and pt's spouse have completed some FT with functional txs with RW  Pt occasional with LOB with txs requiring more A, especially when fatigued  Will cont to work on FT and safety with txs for d/c home with spouse  Amb recommended for therapy only to decrease fall risk at home

## 2019-08-28 NOTE — PROGRESS NOTES
08/28/19 0830   Patient Data   Rehab Impairment Impairment of mobility, safety and Activities of Daily Living (ADLs) due to Debility:   Debility (Non-cardiac/Non-pulmonary)    Etiologic Diagnosis Ambulatory dysfunction due to fluid overload   Date of Onset 08/22/19   Support System   Name Patient lives at home with his wife, Kwame Carmichael  At baseline she assists patient with ADLs and (I)ADLs  They also have a caregiver, Aurelia Delgado, 2 hours in the AM for 5 days/week and more PRN  Local children also assist    Relationship Spouse    Able to provide 24 hour supervision Yes   Able to provide physical help? Yes   Home Setup   Type of Home Multi Level   Method of Entry Ramp  (ramp to enter home recently built over 2 JYOTSNA )   Number of Stairs in Home   (FF however, patient has a stair glide )   First Floor Bathroom Half  (with BSC; w/c only fits to doorway)   First Floor Bathroom Accessibility Commode   Second Floor Bathroom Built-in shower seat; Shower; Door  (walk in shower )   Second Floor Bathroom Accessibility   (wife wants information on shower chair )   First Floor Setup Available Yes  (with 1/2 bath )   Home Modifications Necessary?   (pending progress)   Available Equipment Wheelchair;Roller Walker;Single Point Cane;Bedside Commode  (hospital bed )   Baseline [address] prior to Admission Occupational Therapy; Physical Therapy;Home Health Aide  (HHA 2 hours in AM for 5 days/wk )   Home Care Provider unknown -recent d/c from [facility] in [place] Other  (retired )   Transportation Family/friends drive  (wife (locally) or DIL )   Prior Device(s) Used Roller Walker; Wheelchair;Bedside Commode   Prior IADL Participation   Meal Preparation Other  (wife performs)   Laundry Other  (wife performs)   Home Cleaning Other  (wife performs; prior cleaning person every 3 weeks )   Prior Level of Function   Self-Care 2   Needed Some Help - Patient needed a partial assistance from another person to complete activities  Indoor-Mobility (Ambulation) 2  Needed Some Help - Patient needed a partial assistance from another person to complete activities  Stairs 9  Not Applicable   Functional Cognition 2  Needed Some Help - Patient needed a partial assistance from another person to complete activities  Prior Assistance Needed for Driving;Household Chores/Cleaning;Meal Preparation;Medication Management;Money Management; Shopping   Prior Device Used A  Manual wheelchair;D  Walker   Patient Preference   Nickname (Patient Preference) Екатерина    Restrictions/Precautions   Precautions Bed/chair alarms;Cognitive; Fall Risk;Pressure Ulcer;Supervision on toilet/commode  (? incontinence; use ROHO cushion )   Weight Bearing Restrictions No   ROM Restrictions No   Braces or Orthoses AFO  (BLE)   Pain Assessment   Pain Assessment No/denies pain   Pain Score No Pain   QI: Picking Up Object   Reason if not Attempted Safety concerns   Picking Up Object CARE Score 88   QI: Roll Left and Right   Assistance Needed Physical assistance   Assistance Provided by [place] 50%-74%   Roll Left and Right CARE Score 2   RLE Assessment   RLE Assessment X   Strength RLE   R Hip Flexion 3/5   R Hip ABduction 3/5   R Hip ADduction 3/5   R Knee Flexion 3/5   R Knee Extension 3/5   R Ankle Dorsiflexion 2-/5   R Ankle Plantar Flexion 3+/5   LLE Assessment   LLE Assessment X   Strength LLE   L Hip Flexion 3-/5   L Hip ABduction 3-/5   L Hip ADduction 3-/5   L Knee Flexion 3-/5   L Knee Extension 3-/5   L Ankle Dorsiflexion 2-/5   L Ankle Plantar Flexion 3+/5   RUE Assessment   RUE Assessment WFL   LUE Assessment   LUE Assessment X  (baseline deficits; torn biceps)   Coordination   Movements are Fluid and Coordinated 0   Sensation   Light Touch No apparent deficits   Sharp/Dull No apparent deficits   Propioception Partial deficits in the LLE;Partial deficits in the RLE   Additional Comments improved edema noted from acute eval; able to don AFOs Cognition   Overall Cognitive Status Impaired   Arousal/Participation Alert; Cooperative   Attention Attends with cues to redirect   Orientation Level Oriented X4   Memory Decreased short term memory;Decreased recall of recent events   Following Commands Follows multistep commands with increased time or repetition   Comments slow to process; reports h/o 2 CVA's in 2014   Vision   Vision Comments wears glasses to read    Discharge [address] Retired/not working   Patient's Discharge Plan "return home at [facility] using least restrictive AD"    Patient's Rehab Expectations "to walk again (I) with or without a SPC"    Barriers to Discharge Home Decreased Strength;Decreased Endurance; Safety Considerations   Impressions Patient is an 80year old male presenting with debility 2* fluid overload  Patient with multiple hospital admissions: 6/12-16 for chest pain, 7/4-12 for sepsis with d/c to [facility] for rehab, 7/18 for microdiskectomy in [place] with d/c to rehab  Patient only home for a few days when he presented with above complaints  Patient with an additional problem list which includes CAD, DM, HTN, a-fib, h/o shingles, h/o CVA, and CKD  Please refer to chart for a more comprehensive list  PTA, patient living at home with his wife where she assisted him with (I)ADLs and ADLs  Post d/c from rehab in [place], patient reports needing assist to get in and out of bed, dress and bathe and walk  He was primarily using a w/c in his home and utilizing a RW with therapy, caregiver, or wife providing a chair follow  He does admit to 1 fall appx 3 weeks ago  Local children assist with needs such as driving  Patient reports seeing son daily  On evaluation, time constraints limited patient's performance; will continue assessment during next session  Patient however presents with gross limitations BLE in strength and ROM, LUE strength and ROM, core stability and coordination   He is a good rehab candidate and will require skilled PT intervention to achieve S goals in appx 10 days      PT Therapy Minutes   PT Time In 0830   PT Time Out 0900   PT Total Time (minutes) 30   PT Mode of treatment - Individual (minutes) 30   PT Mode of treatment - Concurrent (minutes) 0   PT Mode of treatment - Group (minutes) 0   PT Mode of treatment - Co-treat (minutes) 0   PT Mode of Teatment - Total time(minutes) 30 minutes

## 2019-08-28 NOTE — PROGRESS NOTES
Internal Medicine Progress Note  Patient: Milton Taylor  Age/sex: 80 y o  male  Medical Record #: [id number]      ASSESSMENT/PLAN: (Interval History)  Milton Taylor is seen and examined and management for following issues:    Volume overload/chronic diastolic CHF:  s/p Lasix IV, weight dropped 11 lbs = per renal, target weight is 168-170 lbs  Renal will be managing his diuretic tx = currently Torsemide 40mg qd     Abnormal Troponin: felt to be 2/2 creatinine/vol overload putting stress on the heart; no further testing etc was recommended     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: today was 3 85 and renal feels he will need to have a higher creat in order to maintain euvolemia     Iron deficiency anemia: renal gave 2 doses Venofer while in hospital     Hx urine retention/BPH; Klebsiella UTI 6/2019 and Klebsiella urosepsis 7/2019:  Uro saw in 7/2019 and added added Finasteride  Consideration was to be given to starting suppressive therapy with Nitrofurantion 100mg qhs  Currently, he is on Flomax but not the Finasteride  = ?why since had been on it after his surgery  Will restart  Will need to watch closely      PAF:  continue Lopressor; Coumadin was on hold until INR dropped to an acceptable level --> will start 1 5mg qd as at home = INR today is 2 34      Hx Dual chamber PPM:  sees Dr Theodore Guzamn as an OP     CVA (2014):  continue ASA/Lipitor     CAD/CABG (2013):   was here at [facility] from 6/12-6/16/19 for chest pain  He was seen by Cardiology and had a nuclear stress test showing a small, severe, reversible myocardial perfusion defect of the basal inferior wall  He was started on Ranexa 500mg BID but no further plans for intervention were recommended    Continue Lopressor, Lipitor and ASA as well     DM2: takes Trajenta at home = will have someone bring in and start when sure he is eating well; will discontinue Lantus for now since FBS this AM was 45 --> he did get the reduced dose Lantus this AM after BS came up and he ate breakfast --> (I had cut back to 14U from 27U qam since dropping too low at dinner last night and was 50 yesterday AM as well)     Hypothyroidism:  continue current Levothyroxine     Left L2-3 microdiskectomy 7/18/19 at [address] HPI: Patients overnight issues or events were reviewed with nursing or staff during rounds or morning huddle session  BS low last night for dinner and this AM   DM2: since still running low, stopping Lantus  HTN: stable on current tx  CAD:  Stable on current tx    Offers no complaints    ROS:     GI: denies abdominal pain, change bowel habits or reflux symptoms  Neuro: Denies any headache, new vision changes, new neuropathies,new weaknesses   Respiratory: No Cough, SOB, denies wheeze  Cardiovascular: No CP, palpitations , denies perception of rapid heartbeat  : denies any new urinary burning or frequency    Review of Scheduled Meds:    Current Facility-Administered Medications:  acetaminophen 650 mg Oral Q6H PRN Xochitl Mckenzie MD   amLODIPine 5 mg Oral Daily Xochitl Mckenzie MD   aspirin 81 mg Oral Daily Xochitl Mckenzie MD   atorvastatin 40 mg Oral QPM Xochitl Mckenzie MD   bisacodyl 10 mg Rectal Daily PRN Xochitl Mckenzie MD   docusate sodium 100 mg Oral BID Xochitl Mckenzie MD   finasteride 5 mg Oral Daily MAURICE Huerta   insulin glargine 14 Units Subcutaneous Daily With Breakfast MAURICE Patel   insulin lispro 1-5 Units Subcutaneous TID AC MAURICE Patel   insulin lispro 1-5 Units Subcutaneous HS MAURICE Huerta   levothyroxine 75 mcg Oral Daily Xochitl Mckenzie MD   lidocaine 1 patch Topical Daily Xochitl Mckenzie MD   lidocaine  Topical Q4H PRN Xochitl Mckenzie MD   menthol-methyl salicylate  Apply externally 4x Daily PRN Xochitl Mckenzie MD   metoprolol tartrate 50 mg Oral BID With Meals Xochitl Mckenzie MD   ondansetron 4 mg Intravenous Q6H PRN Xochitl Mckenzie MD   polyethylene glycol 17 g Oral Daily PRN Xochitl Mckenzie MD   ranolazine 500 mg Oral Q12H [address] Cherise Zamora MD   tamsulosin 0 4 mg Oral Daily Cherise Zamora MD   torsemide 40 mg Oral Daily Cherise Zamora MD       Labs:     Results from last 7 days   Lab Units 08/23/19  0459 08/22/19  1346   WBC Thousand/uL 7 36 7 35   HEMOGLOBIN g/dL 9 4* 9 5*   HEMATOCRIT % 30 1* 30 6*   PLATELETS Thousands/uL 217 230     Results from last 7 days   Lab Units 08/28/19  0559 08/27/19  0442   SODIUM mmol/L 136 138   POTASSIUM mmol/L 4 1 4 0   CHLORIDE mmol/L 103 100   CO2 mmol/L 25 29   BUN mg/dL 53* 47*   CREATININE mg/dL 3 56* 3 85*   CALCIUM mg/dL 8 4 8 0*     Results from last 7 days   Lab Units 08/22/19  1958   HEMOGLOBIN A1C % 5 5     Results from last 7 days   Lab Units 08/28/19  0637 08/27/19  0442   INR  2 34* 3 34*        Results from last 7 days   Lab Units 08/28/19  1041 08/28/19  0830 08/28/19  0711   POC GLUCOSE mg/dl 179* 166* 78       Imaging:     No orders to display       *Labs reviewed  *Radiology studies reviewed  *Medications reviewed and reconciled as needed  *Please refer to order section for additional ordered labs studies  *Case discussed with primary attending during morning huddle case rounds    Physical Examination:  Vitals:   Vitals:    08/28/19 0600 08/28/19 0626 08/28/19 0734 08/28/19 1249   BP:  106/53 124/62    BP Location:  Right arm     Pulse:  78 71    Resp:  18     Temp:  97 9 °F (36 6 °C)     TempSrc:  Oral     SpO2:  91%     Weight: 78 8 kg (173 lb 11 6 oz)   81 5 kg (179 lb 11 2 oz)       General Appearance: NAD, conversive  Eyes: No icterus; conjunctiva normal, PERRLA  HENT: oropharynx clear; mucous membranes moist  Neck: trachea midline, range of motion full   Supple w/o pain  Lungs: CTA, normal respiratory effort, no retractions, expiratory effort normal  CV: regular rate, no rubs/gallops; +murmur  ABD: soft; NT/ND; +BS  EXT: 1+ edema LE = improved since yesterday  Skin: normal turgor, normal texture, no rashes  Psych: affect normal, no overt anxiety/depression during exam today   Neuro: AAOx3            Total time spent: At least 40 minutes, with more than 50% spent counseling/coordinating care  Counseling includes discussion with patient re: progress  and discussion with patient of his/her current medical state/information  Coordination of patient's care was performed in conjunction with primary service  Time invested included review of patient's labs, vitals, and management of their comorbidities with continued monitoring  In addition, this patient was discussed with medical team including physician and advanced extenders  The care of the patient was extensively discussed and appropriate treatment plan was formulated unique for this patient  ** Please Note: Dragon 360 Dictation voice to text software may have been used in the creation of this document   **

## 2019-08-28 NOTE — ASSESSMENT & PLAN NOTE
Function improving; BLE strength improving; caregiver training completed  >Multifactorial:  Recent volume overload related to acute on chronic kidney disease, history of diastolic CHF, generalized weakness including proximal lower extremity weakness possibly related to some chronic illness/critical illness deconditioning, low back pain and radiating leg pain with right foot drop from lumbar spinal stenosis possible nerve impingement status post surgical intervention recently, 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, left adhesive capsulitis, query component cognitive impairment,   >Completed acute comprehensive interdisciplinary inpatient rehabilitation include intensive skilled therapies (PT, OT, ST) as previously outlined with oversight and management by rehabilitation physician, inpatient rehab level nursing, case management and weekly interdisciplinary team meetings     - [address] [place] [address] 16/30 with greatest deficits in memory and executive functioning   - Follow-up with PMR after discharge

## 2019-08-28 NOTE — ASSESSMENT & PLAN NOTE
Internal medicine consult and management during ARC course  Anticoagulation at their discretion   >Medicine recommends d/c on warfarin 3mg Mon, Thurs, 1 5mg Sun, Tue, Wed, Fri, Sat   >PT/INR starting Monday and follow-up labs and adjustments per outpt providers   >Discussed with wife  F/U PCP, Cards

## 2019-08-28 NOTE — PLAN OF CARE
Problem: PAIN - ADULT  Goal: Verbalizes/displays adequate comfort level or baseline comfort level  Description  Interventions:  - Encourage patient to monitor pain and request assistance  - Assess pain using appropriate pain scale  - Administer analgesics based on type and severity of pain and evaluate response  - Implement non-pharmacological measures as appropriate and evaluate response  - Consider cultural and social influences on pain and pain management  - Notify physician/advanced practitioner if interventions unsuccessful or patient reports new pain  Outcome: Progressing     Problem: INFECTION - ADULT  Goal: Absence or prevention of progression during hospitalization  Description  INTERVENTIONS:  - Assess and monitor for signs and symptoms of infection  - Monitor lab/diagnostic results  - Monitor all insertion sites, i e  indwelling lines, tubes, and drains  - Monitor endotracheal if appropriate and nasal secretions for changes in amount and color  - [place] appropriate cooling/warming therapies per order  - Administer medications as ordered  - Instruct and encourage patient and family to use good hand hygiene technique  - Identify and instruct in appropriate isolation precautions for identified infection/condition  Outcome: Progressing     Problem: SAFETY ADULT  Goal: Patient will remain free of falls  Description  INTERVENTIONS:  - Assess patient frequently for physical needs  -  Identify cognitive and physical deficits and behaviors that affect risk of falls    -  [place] fall precautions as indicated by assessment   - Educate patient/family on patient safety including physical limitations  - Instruct patient to call for assistance with activity based on assessment  - Modify environment to reduce risk of injury  - Consider OT/PT consult to assist with strengthening/mobility  Outcome: Progressing  Goal: Maintain or return to baseline ADL function  Description  INTERVENTIONS:  -  Assess patient's ability to carry out ADLs; assess patient's baseline for ADL function and identify physical deficits which impact ability to perform ADLs (bathing, care of mouth/teeth, toileting, grooming, dressing, etc )  - Assess/evaluate cause of self-care deficits   - Assess range of motion  - Assess patient's mobility; develop plan if impaired  - Assess patient's need for assistive devices and provide as appropriate  - Encourage maximum independence but intervene and supervise when necessary  - Involve family in performance of ADLs  - Assess for home care needs following discharge   - Consider OT consult to assist with ADL evaluation and planning for discharge  - Provide patient education as appropriate  Outcome: Progressing  Goal: Maintain or return mobility status to optimal level  Description  INTERVENTIONS:  - Assess patient's baseline mobility status (ambulation, transfers, stairs, etc )    - Identify cognitive and physical deficits and behaviors that affect mobility  - Identify mobility aids required to assist with transfers and/or ambulation (gait belt, sit-to-stand, lift, walker, cane, etc )  - [place] fall precautions as indicated by assessment  - Record patient progress and toleration of activity level on Mobility SBAR; progress patient to next Phase/Stage  - Instruct patient to call for assistance with activity based on assessment  - Consider rehabilitation consult to assist with strengthening/weightbearing, etc   Outcome: Progressing     Problem: DISCHARGE PLANNING  Goal: Discharge to home or other facility with appropriate resources  Description  INTERVENTIONS:  - Identify barriers to discharge w/patient and caregiver  - Arrange for needed discharge resources and transportation as appropriate  - Identify discharge learning needs (meds, wound care, etc )  - Arrange for interpretive services to assist at discharge as needed  - Refer to Case Management Department for coordinating discharge planning if the patient needs post-hospital services based on physician/advanced practitioner order or complex needs related to functional status, cognitive ability, or social support system  Outcome: Progressing     Problem: Prexisting or High Potential for Compromised Skin Integrity  Goal: Skin integrity is maintained or improved  Description  INTERVENTIONS:  - Identify patients at risk for skin breakdown  - Assess and monitor skin integrity  - Assess and monitor nutrition and hydration status  - Monitor labs   - Assess for incontinence   - Turn and reposition patient  - Assist with mobility/ambulation  - Relieve pressure over bony prominences  - Avoid friction and shearing  - Provide appropriate hygiene as needed including keeping skin clean and dry  - Evaluate need for skin moisturizer/barrier cream  - Collaborate with interdisciplinary team   - Patient/family teaching  - Consider wound care consult   Outcome: Progressing     Problem: Potential for Falls  Goal: Patient will remain free of falls  Description  INTERVENTIONS:  - Assess patient frequently for physical needs  -  Identify cognitive and physical deficits and behaviors that affect risk of falls    -  [place] fall precautions as indicated by assessment   - Educate patient/family on patient safety including physical limitations  - Instruct patient to call for assistance with activity based on assessment  - Modify environment to reduce risk of injury  - Consider OT/PT consult to assist with strengthening/mobility  Outcome: Progressing

## 2019-08-28 NOTE — ASSESSMENT & PLAN NOTE
Creatinine 3 78 on 9/13   Per Nephrology who assisted with management during course  "decrease torsemide to 20 mg daily starting today  Would discharge at torsemide 20 mg daily, discussed about daily monitoring of weight at home and to call office if any weight gain more than 3-4 lb from current weight in which case we may have to increase the dose of torsemide  The weight measured at the rehab unit has been fluctuating ?  Accuracy"  Monitor labs overall management per Nephrology and Internal Medicine  Limit nephrotoxic agents when possible  WENDY hose   >Follow-up BMP in about 1 week  >Follow-up with nephrology and PCP after d/c

## 2019-08-28 NOTE — PCC NURSING
Pt is 79 yo male with PMH chronic CHF, CKD IV, PAF,  dual chamber PPM, CVA, CAD/CABG, DM II, Hypothyroidism, left L2L3 microdiskectomy, & BPH who was admitted with volume overload 8/22 to [facility] access hospital - [facility] & to [facility] 8/27  CHF & volume overload managed by renal   Pt with positive orthostatics and diuretics adjusted  Fl restriction of 1500 ml and low sodium diet  Baseline Cr 3 3-3 6  Pt wears Teds during the day  BPH managed with Flomax daily  Previous CVA managed with ASA & Lipitor, PAF lopressor & coumadin when within acceptable limits  DM II- Tradjenta restarted and lantus D/C  On BS checks QID w coverage  Pt has Fe def anemia and is on niferex  Hypothyroidism with synthroid  Pt came to [facility] with a small stage II on upper R side of his sacrum which is healed--  alleyvn in place  Pt requires alarms for safety  Pt is continent of bowel and bladder  This week we will be monitoring pt's vital signs, lab results & daily weight  We will manage his DM II with diet, corrective doses of insulin according to qid blood sugars,& lantus  We will encourage strength & endurance to promote independence with ADL's  We will teach & maintain T/R & offloading to prevent further skin breakdown  We will perform routine skin checks  We will monitor for constipation and medicate as per bowel regimen  We will increase safety awareness and keep pt free from falls

## 2019-08-28 NOTE — PCC OCCUPATIONAL THERAPY
Pt making slow progress towards LTGs  At baseline pt was using w/c and RW for stand pivot transfers only  Pt was receiving home OT/PT and walking with therapy only  Pt had HHA 2 hrs/day 5x/week  HHA was available to come in evening prn  Pt dtr was assisting pt and wife for first week that he was home post rehab  Pt continues to require Benny for UB bathing due to dec UE ROM, Benny for LB in stance to assist with balance, and min/modA for toileting  Pt is demo improvements in overall standing tolerance and balance for mobility and completes with CGA when cued to position LEs and sequence transfer correctly  Pt continues to be limited by occasional posterior weight shift in stance, decreased dynamic reach below waist, and dec UE ROM req cont assistance for functional ADL tasks    Pt will continue to benefit from skilled OT services following POC with focus on above mentioned deficits to increase safety and independence with ADL tasks and reduce burden of care

## 2019-08-28 NOTE — ASSESSMENT & PLAN NOTE
Skin breakdown buttock improved > wife discussed appropriate skin mgmt   - Turn patient Q2H   - Hydragaurd to buttocks and sacrum BID  - EHOB waffle cushion to chair/WC when OOB  - Maximum time in chair 2 hours at a time

## 2019-08-28 NOTE — ASSESSMENT & PLAN NOTE
Pain well controlled, leg strength improved  On admission was complaining of some chronic right low back pain with intermittent radiating pain down the leg; also with right foot drop  Status post recent lumbar surgery - left L2-3 microdiskectomy 7/181/9 [facility])   Acetaminophen as needed  Follow-up with spine sx/PMR after d/c

## 2019-08-28 NOTE — TREATMENT PLAN
Individualized Plan of [address]  Shauna Cassette 80 y o  male MRN: [id number]  Unit/Bed#: -01 Encounter: [id number]     PATIENT INFORMATION  ADMISSION DATE: 8/27/2019  2:51 PM BRYCE CATEGORY:Debility:  16  Debility (Non-cardiac/Non-pulmonary)   ADMISSION DIAGNOSIS: Volume overload, lumbar spinal stenosis, weakness EXPECTED LOS: 14 days     MEDICAL/FUNCTIONAL PROGNOSIS  Pre-admit screens and post-admit evaluations reviewed and are consistent  Based on my assessment of the patient's medical conditions and current functional status, the prognosis for attaining medical and functional goals or the IRF stay is:  Good  Patient is appropriate for acute rehabilitation  Medical Goals:   Patient will be medically stable for discharge back to community setting upon completion or acute rehab program and patient/family will be able to manage medical conditions and comorbid conditions with medications and appropriate follow up upon completion of rehab program     CHF/Cardiopulmonary function management: Ensure cardiopulmonary stability and optimize cardiopulmonary function not only at rest but with activity as patient's activity level significantly increases in acute rehab compared with prior to transfer in preparation for safe discharge from [facility]  Must closely and frequently monitor blood pressure and HR and when indicated monitor urinary output and weights with adjustments in diuretics and fluid management to ensure adequate cardiac output during ADLs and ambulation  Patient is at increased risk for orthostatic hypotension/syncope and potential injury if not monitored for and managed adequately  Blood pressure management:    Frequent monitoring of blood pressure with appropriate adjustments in blood pressure medication management to optimize blood pressure control and prevent/limit renal complications     Monitoring impact of blood pressure and side-effects of blood pressure medications at rest and with activity  Hypoxia prevention: Ensure appropriate level of oxygenation at rest and with activity to avoid symptomatic hypoxia, maximize functional performance, and decrease risk of atelectasis/pneumonia through close and frequent monitoring, providing appropriate respiratory treatments (such as incentive spirometry), and when necessary provide/adjust respiratory medications  DM: Patient will improve/maintain blood sugar control to ensure optimal healing and decrease risks of complications associated with DM through medication monitoring, adjustments as indicated, and optimal dietary intake and education  Pain management:  Pain will improve with frequent evaluation of pain, careful adjustments in medications, frequent re-evaluation of patient's pain and medical/neurologic status to ensure optimal pain control, avoidance of potential serious and even life-threatening side-effects and drug interactions, as well as weaning pain medications as soon as possible to decrease risk of short and long-term use  Neurologic Disorder: foot drop, muscular weakness causing impaired mobility, ADLs, and gait:  intensive skilled therapies with physical therapy and occupational therapy with close oversight and management by rehab specialized physician in acute rehabilitation setting to most expeditiously and effectively improve functional mobility, transfers, upper and lower body strengthening, conditioning, balance, and gait training with appropriate assistive device  Patient will have optimal supervision and management of patient's underlying neurologic disorder with specialized rehabilitation physician during this period of recovery to ensure most expeditious and optimal recovery with decreased risks of fall/injury and other complications including acute worsening of neuro disorder, decrease risk of VTE, PNA, and skin ulceration  Inpatient rehabilitation education/teaching:   To be provided to patient and typically family/caregiver (if able to be identified) by all skilled therapists, rehab nursing, case management, and rehab specialized physician to ensure optimal recovery and decrease risks of complications in both acute rehabilitation setting as well as after discharge  Kidney failure management and blood pressure management: Frequent measurements and evaluation of urinary intake, urinary output, and labs which include BUN/Cr and electrolytes with appropriate adjustments in medication and when necessary order additional testing  Frequent monitoring of blood pressure with appropriate adjustments in blood pressure medication management to optimize blood pressure control and prevent/limit renal complications  Skin wounds: Appropriate skin checks for wound/skin evaluation including evaluation of healing, worsening of wounds, or signs of infection  Wound care management from rehab nursing, wound care nursing, physicians  Ensure frequent appropriate turning, positioning in bed, in chair, when mobilizing, and when appropriate with use of appropriate devices to optimize healing and decrease risk of worsening or new skin breakdown  ANTICIPATED DISCHARGE DISPOSITION AND SERVICES  COMMUNITY SETTING:    Previous community setting with family  ANTICIPATED FOLLOW-UP SERVICE:   Outpatient Therapy PT, OT     DISCIPLINE SPECIFIC PLANS:  Required Disciplines & Services: PT, OT, Rehabilitation Physician, Rehabillitation Nursing, Case Management, Dietary    REQUIRED THERAPY (expected): Therapy Hours per Day Days per Week Tx Days (Days in ARF)   Physical Therapy 1 5 5 14   Occupational Therapy 1 5 5 14   NOTE: Additional therapy time(s) may be added as appropriate to meet patient needs and to achieve functional goals      ANTICIPATED FUNCTIONAL OUTCOMES:  ADL: Patient will be suprvision-modified independent with ADLs upon completion of rehab program   Bladder/Bowel: Patient will be modified independent with bladder/bowel management upon completion of rehab program   Transfers: Patient will be supervision-modified independent with transfers upon completion of rehab program   Locomotion: Patient will be supervision with locomotion upon completion of rehab program     DISCHARGE PLANNING NEEDS  Equipment needs: To be determined at team conference  REHAB ANTICIPATED PARTICIPATION RESTRICTIONS:  Uncertain at this time  To be determined closer to discharge

## 2019-08-28 NOTE — PROGRESS NOTES
NEPHROLOGY PROGRESS NOTE   Edith Perez 80 y o  male MRN: [id number]  Unit/Bed#: -65 Encounter: [id number]  Reason for Consult: CKD    ASSESSMENT and PLAN:    22-year-old male with a past medical history of CKD stage 4, AFib, CAD status post prior CABG, diastolic heart failure, diabetes, BPH who initially presents on 08/22 with worsening edema  Nephrology is consulted for CKD and rising creatinine  Nephrology is consulted for CKD  There is initial concern for decompensated diastolic heart failure  Patient was recently discharged over a month ago and then was again admitted at a hospital in [place] and after this admission, it does not appear that the patient was restarted on diuretics As there is concern for worsening of renal function  Now at [facility] for rehab     1 ) Chronic kidney disease stage IV  -creatinine 3 3-3 6 mg/dL  -stable at baseline  -Native disease likely hypertensive / diabetic nephropathy   -Prior renal ultrasound in May with right kidney 9 cm, left kidney 9 cm     2 ) Blood pressure/volume status  -history diastolic heart failure   -Does not appear to be decompensated heart failure   -Target weight 168-170 lb   -continue Torsemide 40 mg daily   -normotensive    3 ) Anemia  - iron saturation 14%   - Ferritin 128   - received intravenous iron on 08/23 and 2nd dose on 08/25       SUBJECTIVE / INTERVAL HISTORY:    No overnight events    No shortness of breath    OBJECTIVE:  Current Weight: Weight - Scale: 78 8 kg (173 lb 11 6 oz)  Vitals:    08/27/19 2225 08/28/19 0600 08/28/19 0626 08/28/19 0734   BP: 137/63  106/53 124/62   BP Location: Right arm  Right arm    Pulse: 70  78 71   Resp: 20  18    Temp: 97 9 °F (36 6 °C)  97 9 °F (36 6 °C)    TempSrc: Oral  Oral    SpO2: 95%  91%    Weight:  78 8 kg (173 lb 11 6 oz)         Intake/Output Summary (Last 24 hours) at 8/28/2019 1221  Last data filed at 8/28/2019 0848  Gross per 24 hour   Intake 660 ml   Output 800 ml   Net -140 ml       Review of Systems:    12 point ROS has been reviewed  Physical Exam   Constitutional: He is oriented to person, place, and time  He appears well-developed and well-nourished  No distress  HENT:   Head: Normocephalic and atraumatic  Eyes: Pupils are equal, round, and reactive to light  No scleral icterus  Neck: Normal range of motion  Neck supple  Cardiovascular: Normal rate, regular rhythm and normal heart sounds  Exam reveals no gallop and no friction rub  No murmur heard  Pulmonary/Chest: Effort normal and breath sounds normal  No respiratory distress  He has no wheezes  He has no rales  He exhibits no tenderness  Abdominal: Soft  Bowel sounds are normal  He exhibits no distension  There is no tenderness  There is no rebound  Musculoskeletal: Normal range of motion  He exhibits no edema  Neurological: He is alert and oriented to person, place, and time  Skin: No rash noted  He is not diaphoretic  Psychiatric: He has a normal mood and affect  Nursing note and vitals reviewed        Medications:    Current Facility-Administered Medications:     acetaminophen (TYLENOL) tablet 650 mg, 650 mg, Oral, Q6H PRN, Vinod Rojas MD    amLODIPine (NORVASC) tablet 5 mg, 5 mg, Oral, Daily, Vinod Rojas MD    aspirin (ECOTRIN LOW STRENGTH) EC tablet 81 mg, 81 mg, Oral, Daily, Vinod Rojas MD, 81 mg at 08/28/19 0831    atorvastatin (LIPITOR) tablet 40 mg, 40 mg, Oral, QPM, Vinod Rojas MD, 40 mg at 08/27/19 1727    bisacodyl (DULCOLAX) rectal suppository 10 mg, 10 mg, Rectal, Daily PRN, Vinod Rojas MD    docusate sodium (COLACE) capsule 100 mg, 100 mg, Oral, BID, Vinod Rojas MD, 100 mg at 08/28/19 0831    finasteride (PROSCAR) tablet 5 mg, 5 mg, Oral, Daily, MAURICE Chavez, 5 mg at 08/28/19 0831    insulin glargine (LANTUS) subcutaneous injection 14 Units 0 14 mL, 14 Units, Subcutaneous, Daily With Breakfast, MAURICE Chavez, 14 Units at 08/28/19 0830    insulin lispro (HumaLOG) 100 units/mL subcutaneous injection 1-5 Units, 1-5 Units, Subcutaneous, TID AC, 1 Units at 08/28/19 1123 **AND** Fingerstick Glucose (POCT), , , TID AC, MAURICE Patel    insulin lispro (HumaLOG) 100 units/mL subcutaneous injection 1-5 Units, 1-5 Units, Subcutaneous, HS, Maryjo Market, CRNP    levothyroxine tablet 75 mcg, 75 mcg, Oral, Daily, Cherylene Deans, MD, 75 mcg at 08/28/19 0550    lidocaine (LIDODERM) 5 % patch 1 patch, 1 patch, Topical, Daily, Cherylene Deans, MD, 1 patch at 08/27/19 1727    lidocaine (URO-JET) 2 % urethral/mucosal gel, , Topical, Q4H PRN, Cherylene Deans, MD    menthol-methyl salicylate (BENGAY) 10-87 % cream, , Apply externally, 4x Daily PRN, Cherylene Deans, MD    metoprolol tartrate (LOPRESSOR) tablet 50 mg, 50 mg, Oral, BID With Meals, Cherylene Deans, MD, 50 mg at 08/28/19 0734    ondansetron (ZOFRAN) injection 4 mg, 4 mg, Intravenous, Q6H PRN, Cherylene Deans, MD    polyethylene glycol [facility]) packet 17 g, 17 g, Oral, Daily PRN, Cherylene Deans, MD    ranolazine [facility]) 12 hr tablet 500 mg, 500 mg, Oral, Q12H [address], Cherylene Deans, MD, 500 mg at 08/28/19 1914    tamsulosin [facility]) capsule 0 4 mg, 0 4 mg, Oral, Daily, Cherylene Deans, MD, 0 4 mg at 08/28/19 0831    torsemide [facility]) tablet 40 mg, 40 mg, Oral, Daily, Cherylene Deans, MD, 40 mg at 08/28/19 0831    Laboratory Results:  Results from last 7 days   Lab Units 08/28/19  0559 08/27/19  0442 08/26/19  0520 08/25/19  0624 08/24/19  0601 08/23/19  1058 08/23/19  0459 08/22/19  2337  08/22/19  1346   WBC Thousand/uL  --   --   --   --   --   --  7 36  --   --  7 35   HEMOGLOBIN g/dL  --   --   --   --   --   --  9 4*  --   --  9 5*   HEMATOCRIT %  --   --   --   --   --   --  30 1*  --   --  30 6*   PLATELETS Thousands/uL  --   --   --   --   --   --  217  --   --  230   POTASSIUM mmol/L 4 1 4 0 4 6 3 3* 3 3* 3 8  --  4 1   < >  --    CHLORIDE mmol/L 103 100 100 102 101 102  -- 101   < >  --    CO2 mmol/L 25 29 29 27 28 28  --  28   < >  --    BUN mg/dL 53* 47* 45* 44* 46* 38*  --  40*   < >  --    CREATININE mg/dL 3 56* 3 85* 3 54* 3 60* 3 49* 3 61*  --  3 68*   < >  --    CALCIUM mg/dL 8 4 8 0* 8 2* 7 9* 8 1* 8 3  --  8 0*   < >  --    MAGNESIUM mg/dL  --   --   --  1 9 1 9  --   --  1 9  --   --    PHOSPHORUS mg/dL  --   --   --   --  3 7  --   --   --   --   --     < > = values in this interval not displayed

## 2019-08-28 NOTE — PLAN OF CARE
Problem: PAIN - ADULT  Goal: Verbalizes/displays adequate comfort level or baseline comfort level  Description  Interventions:  - Encourage patient to monitor pain and request assistance  - Assess pain using appropriate pain scale  - Administer analgesics based on type and severity of pain and evaluate response  - Implement non-pharmacological measures as appropriate and evaluate response  - Consider cultural and social influences on pain and pain management  - Notify physician/advanced practitioner if interventions unsuccessful or patient reports new pain  Outcome: Progressing     Problem: INFECTION - ADULT  Goal: Absence or prevention of progression during hospitalization  Description  INTERVENTIONS:  - Assess and monitor for signs and symptoms of infection  - Monitor lab/diagnostic results  - Monitor all insertion sites, i e  indwelling lines, tubes, and drains  - Monitor endotracheal if appropriate and nasal secretions for changes in amount and color  - [place] appropriate cooling/warming therapies per order  - Administer medications as ordered  - Instruct and encourage patient and family to use good hand hygiene technique  - Identify and instruct in appropriate isolation precautions for identified infection/condition  Outcome: Progressing     Problem: SAFETY ADULT  Goal: Patient will remain free of falls  Description  INTERVENTIONS:  - Assess patient frequently for physical needs  -  Identify cognitive and physical deficits and behaviors that affect risk of falls    -  [place] fall precautions as indicated by assessment   - Educate patient/family on patient safety including physical limitations  - Instruct patient to call for assistance with activity based on assessment  - Modify environment to reduce risk of injury  - Consider OT/PT consult to assist with strengthening/mobility  Outcome: Progressing     Problem: SAFETY ADULT  Goal: Maintain or return to baseline ADL function  Description  INTERVENTIONS:  -  Assess patient's ability to carry out ADLs; assess patient's baseline for ADL function and identify physical deficits which impact ability to perform ADLs (bathing, care of mouth/teeth, toileting, grooming, dressing, etc )  - Assess/evaluate cause of self-care deficits   - Assess range of motion  - Assess patient's mobility; develop plan if impaired  - Assess patient's need for assistive devices and provide as appropriate  - Encourage maximum independence but intervene and supervise when necessary  - Involve family in performance of ADLs  - Assess for home care needs following discharge   - Consider OT consult to assist with ADL evaluation and planning for discharge  - Provide patient education as appropriate  Outcome: Progressing     Problem: Prexisting or High Potential for Compromised Skin Integrity  Goal: Skin integrity is maintained or improved  Description  INTERVENTIONS:  - Identify patients at risk for skin breakdown  - Assess and monitor skin integrity  - Assess and monitor nutrition and hydration status  - Monitor labs   - Assess for incontinence   - Turn and reposition patient  - Assist with mobility/ambulation  - Relieve pressure over bony prominences  - Avoid friction and shearing  - Provide appropriate hygiene as needed including keeping skin clean and dry  - Evaluate need for skin moisturizer/barrier cream  - Collaborate with interdisciplinary team   - Patient/family teaching  - Consider wound care consult   Outcome: Progressing     Problem: Potential for Falls  Goal: Patient will remain free of falls  Description  INTERVENTIONS:  - Assess patient frequently for physical needs  -  Identify cognitive and physical deficits and behaviors that affect risk of falls    -  [place] fall precautions as indicated by assessment   - Educate patient/family on patient safety including physical limitations  - Instruct patient to call for assistance with activity based on assessment  - Modify environment to reduce risk of injury  - Consider OT/PT consult to assist with strengthening/mobility  Outcome: Progressing

## 2019-08-29 PROBLEM — E55.9 VITAMIN D INSUFFICIENCY: Status: ACTIVE | Noted: 2019-08-29

## 2019-08-29 PROBLEM — R41.89 COGNITIVE IMPAIRMENT: Status: ACTIVE | Noted: 2019-08-29

## 2019-08-29 LAB
25(OH)D3 SERPL-MCNC: 22.6 NG/ML (ref 30–100)
ALBUMIN SERPL BCP-MCNC: 3.3 G/DL (ref 3.5–5)
ALP SERPL-CCNC: 97 U/L (ref 46–116)
ALT SERPL W P-5'-P-CCNC: 12 U/L (ref 12–78)
ANION GAP SERPL CALCULATED.3IONS-SCNC: 8 MMOL/L (ref 4–13)
AST SERPL W P-5'-P-CCNC: 15 U/L (ref 5–45)
BASOPHILS # BLD AUTO: 0.03 THOUSANDS/ΜL (ref 0–0.1)
BASOPHILS NFR BLD AUTO: 0 % (ref 0–1)
BILIRUB SERPL-MCNC: 0.7 MG/DL (ref 0.2–1)
BUN SERPL-MCNC: 56 MG/DL (ref 5–25)
CALCIUM SERPL-MCNC: 8.6 MG/DL (ref 8.3–10.1)
CHLORIDE SERPL-SCNC: 100 MMOL/L (ref 100–108)
CO2 SERPL-SCNC: 28 MMOL/L (ref 21–32)
CREAT SERPL-MCNC: 3.64 MG/DL (ref 0.6–1.3)
EOSINOPHIL # BLD AUTO: 0.26 THOUSAND/ΜL (ref 0–0.61)
EOSINOPHIL NFR BLD AUTO: 3 % (ref 0–6)
ERYTHROCYTE [DISTWIDTH] IN BLOOD BY AUTOMATED COUNT: 14.6 % (ref 11.6–15.1)
GFR SERPL CREATININE-BSD FRML MDRD: 14 ML/MIN/1.73SQ M
GLUCOSE SERPL-MCNC: 102 MG/DL (ref 65–140)
GLUCOSE SERPL-MCNC: 130 MG/DL (ref 65–140)
GLUCOSE SERPL-MCNC: 179 MG/DL (ref 65–140)
GLUCOSE SERPL-MCNC: 198 MG/DL (ref 65–140)
GLUCOSE SERPL-MCNC: 75 MG/DL (ref 65–140)
GLUCOSE SERPL-MCNC: 85 MG/DL (ref 65–140)
HCT VFR BLD AUTO: 29 % (ref 36.5–49.3)
HGB BLD-MCNC: 9.1 G/DL (ref 12–17)
IMM GRANULOCYTES # BLD AUTO: 0.03 THOUSAND/UL (ref 0–0.2)
IMM GRANULOCYTES NFR BLD AUTO: 0 % (ref 0–2)
INR PPP: 1.79 (ref 0.84–1.19)
LYMPHOCYTES # BLD AUTO: 2.14 THOUSANDS/ΜL (ref 0.6–4.47)
LYMPHOCYTES NFR BLD AUTO: 27 % (ref 14–44)
MCH RBC QN AUTO: 30.8 PG (ref 26.8–34.3)
MCHC RBC AUTO-ENTMCNC: 31.4 G/DL (ref 31.4–37.4)
MCV RBC AUTO: 98 FL (ref 82–98)
MONOCYTES # BLD AUTO: 0.79 THOUSAND/ΜL (ref 0.17–1.22)
MONOCYTES NFR BLD AUTO: 10 % (ref 4–12)
NEUTROPHILS # BLD AUTO: 4.66 THOUSANDS/ΜL (ref 1.85–7.62)
NEUTS SEG NFR BLD AUTO: 60 % (ref 43–75)
NRBC BLD AUTO-RTO: 0 /100 WBCS
PLATELET # BLD AUTO: 215 THOUSANDS/UL (ref 149–390)
PMV BLD AUTO: 10.2 FL (ref 8.9–12.7)
POTASSIUM SERPL-SCNC: 4.2 MMOL/L (ref 3.5–5.3)
PROT SERPL-MCNC: 6.3 G/DL (ref 6.4–8.2)
PROTHROMBIN TIME: 20.3 SECONDS (ref 11.6–14.5)
RBC # BLD AUTO: 2.95 MILLION/UL (ref 3.88–5.62)
SODIUM SERPL-SCNC: 136 MMOL/L (ref 136–145)
WBC # BLD AUTO: 7.91 THOUSAND/UL (ref 4.31–10.16)

## 2019-08-29 PROCEDURE — 80053 COMPREHEN METABOLIC PANEL: CPT

## 2019-08-29 PROCEDURE — 99233 SBSQ HOSP IP/OBS HIGH 50: CPT

## 2019-08-29 PROCEDURE — 97110 THERAPEUTIC EXERCISES: CPT

## 2019-08-29 PROCEDURE — 97535 SELF CARE MNGMENT TRAINING: CPT

## 2019-08-29 PROCEDURE — 82948 REAGENT STRIP/BLOOD GLUCOSE: CPT

## 2019-08-29 PROCEDURE — 99232 SBSQ HOSP IP/OBS MODERATE 35: CPT | Performed by: INTERNAL MEDICINE

## 2019-08-29 PROCEDURE — 85610 PROTHROMBIN TIME: CPT | Performed by: NURSE PRACTITIONER

## 2019-08-29 PROCEDURE — G0515 COGNITIVE SKILLS DEVELOPMENT: HCPCS

## 2019-08-29 PROCEDURE — 85025 COMPLETE CBC W/AUTO DIFF WBC: CPT

## 2019-08-29 PROCEDURE — 97530 THERAPEUTIC ACTIVITIES: CPT

## 2019-08-29 PROCEDURE — 82306 VITAMIN D 25 HYDROXY: CPT

## 2019-08-29 RX ORDER — WARFARIN SODIUM 1 MG/1
1.5 TABLET ORAL
Status: DISCONTINUED | OUTPATIENT
Start: 2019-08-30 | End: 2019-08-30

## 2019-08-29 RX ORDER — MELATONIN
2000 DAILY
Status: DISCONTINUED | OUTPATIENT
Start: 2019-08-29 | End: 2019-09-14 | Stop reason: HOSPADM

## 2019-08-29 RX ORDER — IRON POLYSACCHARIDE COMPLEX 150 MG
150 CAPSULE ORAL DAILY
Status: DISCONTINUED | OUTPATIENT
Start: 2019-08-29 | End: 2019-09-14 | Stop reason: HOSPADM

## 2019-08-29 RX ORDER — WARFARIN SODIUM 1 MG/1
3 TABLET ORAL
Status: COMPLETED | OUTPATIENT
Start: 2019-08-29 | End: 2019-08-29

## 2019-08-29 RX ADMIN — RANOLAZINE 500 MG: 500 TABLET, FILM COATED, EXTENDED RELEASE ORAL at 09:21

## 2019-08-29 RX ADMIN — LIDOCAINE 1 PATCH: 50 PATCH CUTANEOUS at 16:40

## 2019-08-29 RX ADMIN — DOCUSATE SODIUM 100 MG: 100 CAPSULE, LIQUID FILLED ORAL at 16:35

## 2019-08-29 RX ADMIN — TAMSULOSIN HYDROCHLORIDE 0.4 MG: 0.4 CAPSULE ORAL at 09:19

## 2019-08-29 RX ADMIN — METOPROLOL TARTRATE 50 MG: 50 TABLET, FILM COATED ORAL at 07:23

## 2019-08-29 RX ADMIN — WARFARIN SODIUM 3 MG: 1 TABLET ORAL at 17:49

## 2019-08-29 RX ADMIN — ASPIRIN 81 MG: 81 TABLET, COATED ORAL at 09:18

## 2019-08-29 RX ADMIN — LEVOTHYROXINE SODIUM 75 MCG: 75 TABLET ORAL at 05:52

## 2019-08-29 RX ADMIN — METOPROLOL TARTRATE 50 MG: 50 TABLET, FILM COATED ORAL at 16:38

## 2019-08-29 RX ADMIN — INSULIN LISPRO 1 UNITS: 100 INJECTION, SOLUTION INTRAVENOUS; SUBCUTANEOUS at 11:36

## 2019-08-29 RX ADMIN — POLYSACCHARIDE-IRON COMPLEX 150 MG: 150 CAPSULE ORAL at 11:36

## 2019-08-29 RX ADMIN — INSULIN LISPRO 1 UNITS: 100 INJECTION, SOLUTION INTRAVENOUS; SUBCUTANEOUS at 21:19

## 2019-08-29 RX ADMIN — FINASTERIDE 5 MG: 5 TABLET, FILM COATED ORAL at 09:19

## 2019-08-29 RX ADMIN — ATORVASTATIN CALCIUM 40 MG: 40 TABLET, FILM COATED ORAL at 16:34

## 2019-08-29 RX ADMIN — RANOLAZINE 500 MG: 500 TABLET, FILM COATED, EXTENDED RELEASE ORAL at 21:20

## 2019-08-29 RX ADMIN — DOCUSATE SODIUM 100 MG: 100 CAPSULE, LIQUID FILLED ORAL at 09:19

## 2019-08-29 RX ADMIN — VITAMIN D, TAB 1000IU (100/BT) 2000 UNITS: 25 TAB at 16:41

## 2019-08-29 RX ADMIN — TORSEMIDE 40 MG: 20 TABLET ORAL at 09:21

## 2019-08-29 NOTE — PLAN OF CARE
Problem: PAIN - ADULT  Goal: Verbalizes/displays adequate comfort level or baseline comfort level  Description  Interventions:  - Encourage patient to monitor pain and request assistance  - Assess pain using appropriate pain scale  - Administer analgesics based on type and severity of pain and evaluate response  - Implement non-pharmacological measures as appropriate and evaluate response  - Consider cultural and social influences on pain and pain management  - Notify physician/advanced practitioner if interventions unsuccessful or patient reports new pain  Outcome: Progressing     Problem: INFECTION - ADULT  Goal: Absence or prevention of progression during hospitalization  Description  INTERVENTIONS:  - Assess and monitor for signs and symptoms of infection  - Monitor lab/diagnostic results  - Monitor all insertion sites, i e  indwelling lines, tubes, and drains  - Monitor endotracheal if appropriate and nasal secretions for changes in amount and color  - [place] appropriate cooling/warming therapies per order  - Administer medications as ordered  - Instruct and encourage patient and family to use good hand hygiene technique  - Identify and instruct in appropriate isolation precautions for identified infection/condition  Outcome: Progressing     Problem: SAFETY ADULT  Goal: Patient will remain free of falls  Description  INTERVENTIONS:  - Assess patient frequently for physical needs  -  Identify cognitive and physical deficits and behaviors that affect risk of falls    -  [place] fall precautions as indicated by assessment   - Educate patient/family on patient safety including physical limitations  - Instruct patient to call for assistance with activity based on assessment  - Modify environment to reduce risk of injury  - Consider OT/PT consult to assist with strengthening/mobility  Outcome: Progressing     Problem: Prexisting or High Potential for Compromised Skin Integrity  Goal: Skin integrity is maintained or improved  Description  INTERVENTIONS:  - Identify patients at risk for skin breakdown  - Assess and monitor skin integrity  - Assess and monitor nutrition and hydration status  - Monitor labs   - Assess for incontinence   - Turn and reposition patient  - Assist with mobility/ambulation  - Relieve pressure over bony prominences  - Avoid friction and shearing  - Provide appropriate hygiene as needed including keeping skin clean and dry  - Evaluate need for skin moisturizer/barrier cream  - Collaborate with interdisciplinary team   - Patient/family teaching  - Consider wound care consult   Outcome: Progressing

## 2019-08-29 NOTE — PLAN OF CARE
Problem: PAIN - ADULT  Goal: Verbalizes/displays adequate comfort level or baseline comfort level  Description  Interventions:  - Encourage patient to monitor pain and request assistance  - Assess pain using appropriate pain scale  - Administer analgesics based on type and severity of pain and evaluate response  - Implement non-pharmacological measures as appropriate and evaluate response  - Consider cultural and social influences on pain and pain management  - Notify physician/advanced practitioner if interventions unsuccessful or patient reports new pain  Outcome: Progressing     Problem: INFECTION - ADULT  Goal: Absence or prevention of progression during hospitalization  Description  INTERVENTIONS:  - Assess and monitor for signs and symptoms of infection  - Monitor lab/diagnostic results  - Monitor all insertion sites, i e  indwelling lines, tubes, and drains  - Monitor endotracheal if appropriate and nasal secretions for changes in amount and color  - [place] appropriate cooling/warming therapies per order  - Administer medications as ordered  - Instruct and encourage patient and family to use good hand hygiene technique  - Identify and instruct in appropriate isolation precautions for identified infection/condition  Outcome: Progressing     Problem: SAFETY ADULT  Goal: Patient will remain free of falls  Description  INTERVENTIONS:  - Assess patient frequently for physical needs  -  Identify cognitive and physical deficits and behaviors that affect risk of falls    -  [place] fall precautions as indicated by assessment   - Educate patient/family on patient safety including physical limitations  - Instruct patient to call for assistance with activity based on assessment  - Modify environment to reduce risk of injury  - Consider OT/PT consult to assist with strengthening/mobility  Outcome: Progressing  Goal: Maintain or return to baseline ADL function  Description  INTERVENTIONS:  -  Assess patient's ability to carry out ADLs; assess patient's baseline for ADL function and identify physical deficits which impact ability to perform ADLs (bathing, care of mouth/teeth, toileting, grooming, dressing, etc )  - Assess/evaluate cause of self-care deficits   - Assess range of motion  - Assess patient's mobility; develop plan if impaired  - Assess patient's need for assistive devices and provide as appropriate  - Encourage maximum independence but intervene and supervise when necessary  - Involve family in performance of ADLs  - Assess for home care needs following discharge   - Consider OT consult to assist with ADL evaluation and planning for discharge  - Provide patient education as appropriate  Outcome: Progressing  Goal: Maintain or return mobility status to optimal level  Description  INTERVENTIONS:  - Assess patient's baseline mobility status (ambulation, transfers, stairs, etc )    - Identify cognitive and physical deficits and behaviors that affect mobility  - Identify mobility aids required to assist with transfers and/or ambulation (gait belt, sit-to-stand, lift, walker, cane, etc )  - [place] fall precautions as indicated by assessment  - Record patient progress and toleration of activity level on Mobility SBAR; progress patient to next Phase/Stage  - Instruct patient to call for assistance with activity based on assessment  - Consider rehabilitation consult to assist with strengthening/weightbearing, etc   Outcome: Progressing     Problem: DISCHARGE PLANNING  Goal: Discharge to home or other facility with appropriate resources  Description  INTERVENTIONS:  - Identify barriers to discharge w/patient and caregiver  - Arrange for needed discharge resources and transportation as appropriate  - Identify discharge learning needs (meds, wound care, etc )  - Arrange for interpretive services to assist at discharge as needed  - Refer to Case Management Department for coordinating discharge planning if the patient needs post-hospital services based on physician/advanced practitioner order or complex needs related to functional status, cognitive ability, or social support system  Outcome: Progressing     Problem: Prexisting or High Potential for Compromised Skin Integrity  Goal: Skin integrity is maintained or improved  Description  INTERVENTIONS:  - Identify patients at risk for skin breakdown  - Assess and monitor skin integrity  - Assess and monitor nutrition and hydration status  - Monitor labs   - Assess for incontinence   - Turn and reposition patient  - Assist with mobility/ambulation  - Relieve pressure over bony prominences  - Avoid friction and shearing  - Provide appropriate hygiene as needed including keeping skin clean and dry  - Evaluate need for skin moisturizer/barrier cream  - Collaborate with interdisciplinary team   - Patient/family teaching  - Consider wound care consult   Outcome: Progressing     Problem: Potential for Falls  Goal: Patient will remain free of falls  Description  INTERVENTIONS:  - Assess patient frequently for physical needs  -  Identify cognitive and physical deficits and behaviors that affect risk of falls    -  [place] fall precautions as indicated by assessment   - Educate patient/family on patient safety including physical limitations  - Instruct patient to call for assistance with activity based on assessment  - Modify environment to reduce risk of injury  - Consider OT/PT consult to assist with strengthening/mobility  Outcome: Progressing

## 2019-08-29 NOTE — PROGRESS NOTES
Physical Medicine and Rehabilitation Progress Note  Clif Ferreira 80 y o  male MRN: [id number]  Unit/Bed#: [facility] 704-60 Encounter: [id number]    Chief Complaints:  Decline in function    Subjective/Interval Events:   Patient reports sleeping adequately overnight  He reports overall chronic back pain is stable  Patient denies increased shortness of breath, lightheadedness, fever, chills, sweats, worsening strength or sensation, bowel or bladder problems, calf pain, or other complaints  ROS: A 10-point ROS was performed  Negative except as listed above  Overall Assessment/relevant history:  [age]-year-old male with past medical history of chronic diastolic CHF, chronic kidney disease stage 4, paroxysmal atrial fibrillation, dual chamber permanent pacemaker, coronary artery disease, CABG, diabetes mellitus 2, hypothyroidism, BPH, recurrent UTIs, lumbar spinal stenosis, chronic low back pain, foot drop, history of left L2-L3 microdiskectomy in July of 2019 the developed worsening swelling with associated decline in ADLs and ambulation  Patient splits seen by Cardiology and Nephrology with noted worsening of renal function and volume overload believed to be related to this  Patient required initial IV and then more recently p o  Increase in diuretics  Patient's volume status has been improving although his functional status remains below his baseline    Patient was evaluated by skilled therapies and was found to have significant decline in ADLs and ambulation appropriate for admission to [facility]        Functional status on admission to [facility]:  PT:  Transfers max assist, ambulation moderate assist 150 feet  OT:  Lower body dressing, toileting total assist, bathing, transfers max assist, upper body dressing moderate assist    Functional status (recent):    Transfers max assist    Functional status goal:  Supervision to Modified independent for ADLs and ambulation     * Impaired mobility and ADLs  Assessment & Plan  Improving and we will continue to monitor daily  Multifactorial:  Recent volume overload related to acute on chronic kidney disease, history of diastolic CHF, generalized weakness including proximal lower extremity weakness possibly related to some chronic illness/critical illness deconditioning, low back pain and radiating leg pain with right foot drop from lumbar spinal stenosis possible nerve impingement status post surgical intervention recently, 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, left adhesive capsulitis, query component cognitive impairment,   - Recommend acute comprehensive interdisciplinary inpatient rehabilitation to include intensive skilled therapies (PT, OT) as outlined with oversight and management by rehabilitation physician as well as inpatient rehab level nursing, case management and weekly interdisciplinary team meetings  - [address] [place] [address] 16/30 with greatest deficits in memory and executive functioning   - optimal management of each  - Follow-up with PMR after discharge      Buttock wound  Assessment & Plan  Appreciated on admission to [address] [place] [address] care nurse c/s and assistance with management  - Notify MD of new or increasing drainage, development of purulent drainage, increased size/depth of wound, lack of healing, inability to maintain wound integrity due to degree of drainage, wound product, dressing, or currently ordered frequency of wound management  In general dressings should be changed PRN if soiled unless specifically noted otherwise  Do not allow soiled dressing on patient for extended period of time    - Turn patient Q2H   - Hydragaurd to buttocks and sacrum BID & PRN v Alleyvn >  - EHOB waffle cushion to chair/WC when OOB  - Maximum time in chair 2 hours at a time  - OOB minimum 3 times per day  Notify MD if unable to get patient OOB 3 times per day  - Elevate heels with pillows to offload  - Moisturizer daily and PRN to dry skin; do not use moisturizer on areas of redness or skin breakdown unless otherwise documented  - Nursing to document in chart and Notify MD if buttock, sacrum, heel, or other skin site develops erythema or skin breakdown as soon as possible  If patient is soiling themselves with urine or stool notify MD   If you are unable to maintain skin integrity and prevent erythema due to frequency of soiling notify MD as soon as possible  Kidney disease with fluid retention  Assessment & Plan  Kidney function stable continue management as outlined  Diuretics at their discretion  Internal medicine consult and management during [facility] course  Nephrology c/s to assist with mgmt  Cr  stable recently; monitor intermittently  Limit nephrotoxic agents when possible  WENDY hose       Cognitive impairment  Assessment & Plan  > MOCA 16/30 indicating moderate cog impairment with deficits greatest in domains of short term memory and executive functioning  > Wife states patients cognitive status has been largely stable for some time  > CT head 6/2019 - Encephalomalacia within the right frontal and parietal lobe extending inferiorly into the posterior superior temporal lobe  Chronic microangiopathic change within both cerebral hemispheres  Brainstem and cerebellum demonstrate normal density  No mass or hemorrhage  Mild cerebral volume loss  VENTRICLES AND EXTRA-AXIAL SPACES:  Asymmetry with mild ex vacuo dilatation of the posterior body, atria and occipital horn of the right lateral ventricle  Mild chronic microangiopathic change and age-appropriate cerebral volume loss    Right hemispheric encephalomalacia and gliosis suggestive of old infarction   > Increased risk of dementia and falls  > Compensatory strategies and adjustments in PT/OT as indicated  > Follow-up with neurology after d/c and PMR          Pain  Assessment & Plan  Controlled, continue management  Try to limit sedating meds   APAP PRN   LD patch  Counseled on and continue to encourage deep breathing/relaxation/behavioral pain management techniques:     Deep breathin seconds in, 5 seconds out, 5 times per hour when awake and PRN when in pain or anticipate pain; avoid holding breath and tightening muscles and instead breathe slowly and deeply  Monitor for oversedation, AMS/delirium, and respiratory depression   If being administered - hold opiates, muscle relaxants, benzodiazepines, and gabapentin for oversedation, AMS, or RR<12        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  Assessment & Plan  Pain remains adequately control continue management as outlined  Complaining of some chronic right low back pain with intermittent radiating pain down the leg; also with right foot drop  Status post recent lumbar surgery - left L2-3 microdiskectomy  [facility])   Try to limit sedating medications  Acetaminophen as needed  Lidocaine patch for now patient prefers    Frozen shoulder  Assessment & Plan  Skilled therapies, modalities as indicated    History of recurrent UTIs  Assessment & Plan  Internal medicine consult and management during [facility] course  Currently on flomax and proscar  Follow-up with Urology after discharge    Benign prostatic hyperplasia with urinary hesitancy  Assessment & Plan  Continue flomax and Proscar; monitor vitals  - monitor for retention, incontinence, signs/symptoms of UTI  - recommend voiding trial; nursing prompt to void followed by bladder scan starting Q4-6H or after each patient initiated void; nursing to record voided output and bladder scan totals; straight cath PRN >350-400 cc; if post void residual bladder scans are <150 cc x3 consecutive voids, can stop scans       Presence of permanent cardiac pacemaker  Assessment & Plan  +    Anticoagulated on warfarin  Assessment & Plan  Management per Medicine with recent supratherapeutic INR    Chronic diastolic heart failure [facility])  Assessment & Plan  Internal medicine consult and management during [facility] course  Anti thrombotics per Medicine, blood pressure medications at their discretion     Paroxysmal A-fib [facility])  Assessment & Plan  Internal medicine consult and management during [facility] course  Anticoagulation at their discretion  Monitor rate    Diabetes mellitus with multiple complications [facility])  Assessment & Plan  Lab Results   Component Value Date    HGBA1C 5 5 08/22/2019     Internal medicine consulted and management at their discretion  Monitor for signs and symptoms of hypoglycemia   Current meds: lantus, lispro       Essential hypertension  Assessment & Plan  BP acceptable  Internal medicine consulted and management at their discretion  Monitor vitals with and without activity; monitor for orthostasis  Monitor hemoglobin, electrolytes, kidney function, hydration status   Current meds:  Metoprolol, amlodipine      3-vessel coronary artery disease  Assessment & Plan  Antiplatelet, optimal blood pressure control, statin  Ranexa    Hyperlipidemia  Assessment & Plan  Statin    Anemia  Assessment & Plan  Hemoglobin monitor   Iron supplementation per Nephrology  IM consulted and assistance with management during [facility] course  Monitor H/H, vitals, signs/symptoms of acute bleeding        Vitamin D insufficiency  Assessment & Plan  Cholecalciferol Vit D3 2000 units daily   Follow-up with PCP      Healthcare maintenance  Assessment & Plan  Obtain vitamin-D level    Hypothyroidism  Assessment & Plan  Levothyroxine    Elevated serum alkaline phosphatase level  Assessment & Plan  Repeat CMP      # Bowel care:    - monitor for constipation, incontinence, and diarrhea  - goal 1 appropriate BM every 1-2 days  - recommend colace +/- senna and PRN bowel protocol     # At risk for venous thromboembolism:  - Recommend SCDs and mobilization  - A/C     # Diet/Hydration:    Diabetic/cardiac     Disposition:   Home with family with [facility] 2 weeks from admission      Follow-up:   PCP, PMR, Nephrology, Cardiology, urology     CODE: Level 3: DNAR and DNI     Restrictions include: Fall precautions   ---------------------------------------------------------------------------------------------------------------------    Objective: Allergies and Medications per EMR    Physical Exam:  Temp:  [98 °F (36 7 °C)-98 3 °F (36 8 °C)] 98 °F (36 7 °C)  HR:  [68-72] 72  Resp:  [18-20] 20  BP: (104-150)/(57-67) 104/57  General: Awake, alert in NAD  HENT:  MMM  Respiratory: Unlabored breathing, breath sounds equal, Lungs CTA, no wheezes, rales, or rhonchi  Cardiovascular: Regular rate and rhythm, no murmurs, rubs, or gallops  Gastrointestinal: Soft, non-tender,  minimally-distended, normoactive bowel sounds  Genitourinary: No camargo  SkiN/MSK/Extremities:   Stable 1+ bilateral lower extremity edema and less so left upper extremity distal edema  No calf tenderness to palpation  Neurologic/Psych:   MENTAL STATUS: awake, orientation intact  Affect:  Euthymic    Physical exam performed, documentation above reviewed and updated if appropriate on relevant date of encounter:   08/29/2019    Diagnostic Studies: reviewed, no new imaging  No results found      Laboratory:    Results from last 7 days   Lab Units 08/29/19 0529 08/23/19  0459   HEMOGLOBIN g/dL 9 1* 9 4*   HEMATOCRIT % 29 0* 30 1*   WBC Thousand/uL 7 91 7 36     Results from last 7 days   Lab Units 08/29/19 0529 08/28/19  0559 08/27/19  0442   BUN mg/dL 56* 53* 47*   POTASSIUM mmol/L 4 2 4 1 4 0   CHLORIDE mmol/L 100 103 100   CREATININE mg/dL 3 64* 3 56* 3 85*   AST U/L 15  --   --    ALT U/L 12  --   --      Results from last 7 days   Lab Units 08/29/19  0529 08/28/19  0637 08/27/19  0442   PROTIME seconds 20 3* 25 1* 32 8*   INR  1 79* 2 34* 3 34*        Patient Active Problem List   Diagnosis    3-vessel coronary artery disease    Asbestosis ([facility] )    Kidney disease with fluid retention    Diabetes mellitus with multiple complications ([facility] )    Elevated serum alkaline phosphatase level    Hyperlipidemia    Essential hypertension    Hypothyroidism    Paroxysmal A-fib (HCC)    Seasonal allergies    Increased frequency of urination    Frozen shoulder    Herpes zoster without complication    Depressed mood    Chest pain    Ambulatory dysfunction/generalized weakness    Leg edema, left    Shortness of breath    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    UTI (urinary tract infection)    Tibial artery occlusion, left (HCC)    Hyponatremia    Chronic diastolic heart failure (HCC)    Benign prostatic hyperplasia with urinary hesitancy    Abnormal chest x-ray    Warfarin-induced coagulopathy (HCC)    Impaired mobility and ADLs    Anticoagulated on warfarin    History of recurrent UTIs    Buttock wound    Presence of permanent cardiac pacemaker    Pain    Anemia    Healthcare maintenance    Cognitive impairment    Vitamin D insufficiency       ** Please Note: Fluency Direct voice to text software may have been used in the creation of this document  **    Total time spent: At least 35 minutes, with more than 50% spent counseling/coordinating care  Counseling includes discussion with patient re: progress in therapies, functional issues observed by therapy staff, and discussion with patient his/her current medical state/wellbeing  Coordination of patient's care was performed in conjunction with Internal Medicine service to monitor patient's labs, vitals, and management of their comorbidities  In addition, this patient was discussed by the interdisciplinary team in weekly case conference today  The care of the patient was extensively discussed with all care providers and an appropriate rehabilitation plan was formulated unique for this patient  Barriers were identified preventing progression of therapy and appropriate interventions were discussed with each discipline   Please see the team note for input from all disciplines regarding barriers, intervention, and discharge planning      Ruben Chacon MD, [facility] [facility]  Physical Medicine and Rehabilitation  Brain Injury Medicine

## 2019-08-29 NOTE — PROGRESS NOTES
Internal Medicine Progress Note  Patient: Ovidio Martin  Age/sex: 80 y o  male  Medical Record #: [id number]      ASSESSMENT/PLAN: (Interval History)  Ovidio Martin is seen and examined and management for following issues:    Volume overload/chronic diastolic CHF:  s/p Lasix IV, weight dropped 11 lbs = per renal, target weight is 168-170 lbs  Renal managing his diuretic tx = currently Torsemide 40mg qd     Abnormal Troponin: felt to be 2/2 creatinine/vol overload putting stress on the heart; no further testing etc was recommended     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: today was 3 64 and renal feels he will need to have a higher creat in order to maintain euvolemia     Iron deficiency anemia: renal gave 2 doses Venofer while in hospital and now started on Niferex 150mg qd     Hx urine retention/BPH; Klebsiella UTI 6/2019 and Klebsiella urosepsis 7/2019:  Uro saw in 7/2019 and added added Finasteride  Consideration was to be given to starting suppressive therapy with Nitrofurantion 100mg qhs  Currently, he is on Flomax/Finasteride  Will need to watch closely UTI      PAF:  continue Lopressor; Coumadin was on hold until INR dropped to an acceptable level --> yesterday, INR dropped below 3 0 so started 1 5mg qd as at home = INR today is 1 79 so will give 3mg tonight and then back to 1 5mg qd     Hx Dual chamber PPM:  sees Dr Yasemin Foreman as an OP     CVA (2014):  continue ASA/Lipitor     CAD/CABG (2013):   was here at [address] [place] [address] from 6/12-6/16/19 for chest pain  He was seen by Cardiology and had a nuclear stress test showing a small, severe, reversible myocardial perfusion defect of the basal inferior wall  He was started on Ranexa 500mg BID but no further plans for intervention were recommended    Continue Lopressor, Lipitor and ASA as well     DM2: takes Trajenta at home = will have someone bring in and start when sure he is eating well; yesterday, discontinued Lantus since FBS was 45 (he did get the reduced dose Lantus yesterday AM however)  Will watch today and then decide about restarting small dose of Lantus vs home Trajenta       Hypothyroidism:  continue current Levothyroxine     Left L2-3 microdiskectomy 7/18/19 at [facility]    Vitamin D deficiency:  Level is 22 6; will d/w renal      Subjective/ HPI: Patients overnight issues or events were reviewed with nursing or staff during rounds or morning huddle session  No new or overnight events  DM2: watching today to decide tx  Anemia: s/p venofer, now on iron PO   CAD:  Stable on current tx    Offers no complaints    ROS:     GI: denies abdominal pain, change bowel habits or reflux symptoms  Neuro: Denies any headache, new vision changes, new neuropathies,new weaknesses   Respiratory: No Cough, SOB, denies wheeze  Cardiovascular: No CP, palpitations , denies perception of rapid heartbeat  : denies any new urinary burning or frequency    Review of Scheduled Meds:    Current Facility-Administered Medications:  acetaminophen 650 mg Oral Q6H PRN Mati Pill, MD   amLODIPine 5 mg Oral Daily Mati Pill, MD   aspirin 81 mg Oral Daily Mati Pill, MD   atorvastatin 40 mg Oral QPM Mati Pill, MD   bisacodyl 10 mg Rectal Daily PRN Mati Pill, MD   docusate sodium 100 mg Oral BID Mati Pill, MD   finasteride 5 mg Oral Daily MAURICE Mosley   insulin lispro 1-5 Units Subcutaneous TID AC MAURICE Patel   insulin lispro 1-5 Units Subcutaneous HS MAURICE Mosley   iron polysaccharides 150 mg Oral Daily Ze Jason MD   levothyroxine 75 mcg Oral Daily Mati Pill, MD   lidocaine 1 patch Topical Daily Mati Pill, MD   lidocaine  Topical Q4H PRN Mati Pill, MD   menthol-methyl salicylate  Apply externally 4x Daily PRN Mati Pill, MD   metoprolol tartrate 50 mg Oral BID With Meals Mati Pill, MD   ondansetron 4 mg Intravenous Q6H PRN Mati Pill, MD   polyethylene glycol 17 g Oral Daily PRN Mati Pill, MD ranolazine 500 mg Oral Q12H [facility] Swapnil Mueller MD   tamsulosin 0 4 mg Oral Daily Swapnil Mueller MD   torsemide 40 mg Oral Daily Swapnil Mueller MD   warfarin 1 5 mg Oral Daily (warfarin) MAURICE Pisano       Labs:     Results from last 7 days   Lab Units 08/29/19  0529 08/23/19  0459   WBC Thousand/uL 7 91 7 36   HEMOGLOBIN g/dL 9 1* 9 4*   HEMATOCRIT % 29 0* 30 1*   PLATELETS Thousands/uL 215 217     Results from last 7 days   Lab Units 08/29/19  0529 08/28/19  0559   SODIUM mmol/L 136 136   POTASSIUM mmol/L 4 2 4 1   CHLORIDE mmol/L 100 103   CO2 mmol/L 28 25   BUN mg/dL 56* 53*   CREATININE mg/dL 3 64* 3 56*   CALCIUM mg/dL 8 6 8 4     Results from last 7 days   Lab Units 08/22/19  1958   HEMOGLOBIN A1C % 5 5     Results from last 7 days   Lab Units 08/29/19  0529 08/28/19  0637   INR  1 79* 2 34*          Results from last 7 days   Lab Units 08/29/19  1055 08/29/19  0634 08/29/19  0239   POC GLUCOSE mg/dl 179* 85 102       Imaging:     No orders to display       *Labs reviewed  *Radiology studies reviewed  *Medications reviewed and reconciled as needed  *Please refer to order section for additional ordered labs studies  *Case discussed with primary attending during morning huddle case rounds    Physical Examination:  Vitals:   Vitals:    08/29/19 0530 08/29/19 0600 08/29/19 0723 08/29/19 0920   BP: 144/65  150/67 110/62   BP Location: Right arm      Pulse: 68  69    Resp: 18      Temp: 98 3 °F (36 8 °C)      TempSrc: Oral      SpO2: 93%      Weight:  79 9 kg (176 lb 2 4 oz)         General Appearance: NAD, conversive  Eyes: No icterus; conjunctiva normal, PERRLA  HENT: oropharynx clear; mucous membranes moist  Neck: trachea midline, range of motion full   Supple w/o pain  Lungs: CTA, normal respiratory effort, no retractions, expiratory effort normal  CV: regular rate, no rubs/gallops; +murmur  ABD: soft; NT/ND; +BS  EXT: 1+ edema LE = improved since yesterday  Skin: normal turgor, normal texture, no rashes  Psych: affect normal, no overt anxiety/depression during exam today   Neuro: AAOx3            Total time spent: At least 40 minutes, with more than 50% spent counseling/coordinating care  Counseling includes discussion with patient re: progress  and discussion with patient of his/her current medical state/information  Coordination of patient's care was performed in conjunction with primary service  Time invested included review of patient's labs, vitals, and management of their comorbidities with continued monitoring  In addition, this patient was discussed with medical team including physician and advanced extenders  The care of the patient was extensively discussed and appropriate treatment plan was formulated unique for this patient  ** Please Note: Dragon 360 Dictation voice to text software may have been used in the creation of this document   **

## 2019-08-29 NOTE — TEAM CONFERENCE
Acute RehabilitationTeam Conference Note  Date: 8/29/2019   Time: 10:50 AM       Patient Name:  Nova Abernathy       Medical Record Number: [id number]   YOB: 1932  Sex:  Male          Room/Bed:  [facility] 969-01  Payor Info:  Payor: Renita Ganser / Plan: MEDICARE A AND B / Product Type: Medicare A & B Fee for Service /      Admitting Diagnosis: Ambulatory dysfunction [R26 2]   Admit Date/Time:  8/27/2019  2:51 PM  Admission Comments: No comment available     Primary Diagnosis:  Impaired mobility and ADLs  Principal Problem: Impaired mobility and ADLs    Patient Active Problem List    Diagnosis Date Noted    Anticoagulated on warfarin 08/28/2019    History of recurrent UTIs 08/28/2019    Buttock wound 08/28/2019    Presence of permanent cardiac pacemaker 08/28/2019    Pain 08/28/2019    Anemia 08/28/2019    Healthcare maintenance 08/28/2019    Impaired mobility and ADLs 08/27/2019    Warfarin-induced coagulopathy ([facility] [address] ) 08/26/2019    Benign prostatic hyperplasia with urinary hesitancy 08/22/2019    Abnormal chest x-ray 08/22/2019    Hyponatremia 07/04/2019    Chronic diastolic heart failure ([address] ) 07/04/2019    Leg edema, left 06/13/2019    Shortness of breath 06/13/2019    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 06/13/2019    UTI (urinary tract infection) 06/13/2019    Tibial artery occlusion, left ([facility] [address] ) 06/13/2019    Chest pain 06/12/2019    Ambulatory dysfunction/generalized weakness 06/12/2019    Herpes zoster without complication 81/30/4566    Depressed mood 04/29/2019    Frozen shoulder 05/30/2018    Paroxysmal A-fib ([address] ) 04/13/2016    Kidney disease with fluid retention 01/12/2016    Hyperlipidemia 11/11/2015    Elevated serum alkaline phosphatase level 10/20/2015    Increased frequency of urination 10/20/2015    3-vessel coronary artery disease 08/26/2015    Diabetes mellitus with multiple complications ([facility] [address] ) 17/16/8730    Hypothyroidism 08/26/2015    Asbestosis ([facility] [address] ) 05/06/2014    Essential hypertension 05/06/2014    Seasonal allergies 05/06/2014       Physical Therapy:    Weight Bearing Status: Full Weight Bearing  Transfers: Moderate Assistance  Bed Mobility: Moderate Assistance  Amulation Distance (ft): 150 feet  Ambulation: Minimal Assistance  Assistive Device for Ambulation: Roller Walker  Wheelchair Mobility Distance: 20 ft  Wheelchair Mobility: Moderate Assistance  Discharge Recommendations: Home with:  DC Home with[de-identified] 25 Hour Assisteance, Family Support, Home Physical Therapy    Patient is an 80year old male presenting with debility 2* fluid overload  Patient with multiple hospital admissions: 6/12-16 for chest pain, 7/4-12 for sepsis with d/c to [facility] for rehab, 7/18 for microdiskectomy in [place] with d/c to rehab  Patient only home for a few days when he presented with above complaints  Patient with an additional problem list which includes CAD, DM, HTN, a-fib, h/o shingles, h/o CVA, and CKD  PTA, patient living at home with his wife where she assisted him with (I)ADLs and ADLs  Post d/c from rehab in [place], patient reports needing assist to get in and out of bed, dress and bathe and walk  He was primarily using a w/c in his home and utilizing a RW with therapy, caregiver, or wife providing a chair follow  He does admit to 1 fall appx 3 weeks ago  Local children assist with needs such as driving  Patient reports seeing son daily  On evaluation, pt presenting with gross limitations BLE in strength and ROM, LUE strength and ROM, core stability, balance and righting reactions and coordination  Patient required overall moderate A for functional mobility  He is a good rehab candidate and will require skilled PT intervention to achieve S goals in appx 10 days  Occupational Therapy:  Grooming: Supervision  Bathing: Moderate Assistance  Bathing: Moderate Assistance  Upper Body Dressing:  Moderate Assistance  Lower Body Dressing: Total Assistance  Toileting: Total Assistance  Tub/Shower Transfer: Maximum Assistance  Toilet Transfer: Maximum Assistance  Cognition: Exceptions to WNL  Cognition: Decreased Memory, Decreased Executive Functions  Orientation: Person, Place, Time, Situation  Discharge Recommendations: Home with:  [address] with[de-identified] Family Support(home health aid)       Pt presents s/p debility 2* fluid overload  Pt had decline for past year after dx shingles and multiple readmissions to hospital  Pt had recent L2-L3 mini diskectomy on 7/18/19  Pt was recently d/c from rehab approximately 10 days prior to readmission to [facility] for current hospital stay  Pt presented to [facility] on 8/22 with increased b/l LE edema including thighs  Pt dx with fluid overload 2* decreased kidney function  EKG normal  Pt currently on C C  Diet and fluid restriction of 1500ml  Co morbidities include LUE torn bicep and recent L2-L3 micro diskectomy  Will have to clarify if patient has back precautions  At baseline pt was using w/c and RW for stand pivot transfers only  Pt was receiving home OT/PT and walking with therapy only  Pt had HHA 2 hrs/day 5x/week  HHA was available to come in evening prn  Pt dtr was assisting pt and wife for first week that he was home post rehab  Pt required min A for bathing and dressing at baseline  Pt currently functioning well below baseline and requires total A for LB dressing, mod A for UB dressing, and total A for toileting  Pt currently limited by decreased balance, posterior weight shift in stance, decreased standing tolerance, decreased dynamic reach below waist, and SOB  Pt would benefit from 10 day LOS to decrease caregiver burden, complete family training, and maximize patient's independence  Speech Therapy:           No notes on file    Nursing Notes:  Appetite: Good  Diet Type: Diabetic, 2 gram sodium diet, Cardiac(1500cc  fluid restriction)                           Type of Wound (LDA):  Wound(stage II R buttock ) Type of Wound Patient/Family Education: Yes  Bladder: 5 - Supervision        Bowel: 1 - Total Assistance              Pain Score: 0                                  Pt is 81 yo male with PMH chronic CHF, CKD IV, PAF,  dual chamber PPM, CVA, CAD/CABG, DM II, Hypothyroidism, left L2L3 microdiskectomy, & BPH who was admitted with volume overload 8/22 to [address] [place] [address] & to [facility] 8/27  CHF & volume overload managed by renal on Demadex 40 mg daily  BPH managed with Flomax daily  Previous CVA managed with ASA & Lipitor, PAF lopressor & coumadin when within acceptable limits  DM II with lantus for now  Hypothyroidism with synthroid  Pt came to [facility] with a small stage II on upper R side of his sacrum (allevyn removed very moist underneath) & replaced with calazime  This week we will be monitoring pt's vital signs, lab results & daily weight  We will manage his DM II with diet, corrective doses of insulin according to qid blood sugars,& lantus  We will encourage strength & endurance to promote independence with ADL's  We will teach & maintain T/R & offloading to prevent further skin breakdown  We will have enough assistance for safe transfers & keep pt free from falls  Case Management:        Pt is participating with therapy and is expected to return home w/family and contd therapy services  Pt was receiving nursing and therapy services through [facility] prior to admission  Following to assist w/dc planning needs  Is the patient actively participating in therapies? yes  List any modifications to the treatment plan:     Barriers Interventions   Cognition, memory Family education, safety education techniques   Radiating leg pain,  Team follow up with surgery   Weakness, balance Therapy exercises   deconditioning Energy conservation education         Is the patient making expected progress toward goals?  yes  List any cupdate or changes to goals:     Medical Goals: Patient will be medically stable for discharge to leaset restrictive envrionment upon completion of rehab program and Patient will be able to manage medical conditions and comorbid conditions with medications and follow up upon completion of rehab program    Weekly Team Goals:   Rehab Team Goals  ADL Team Goal: Patient will require assist with ADLs with least restrictive device upon completion of rehab program  Transfer Team Goal: Patient will require supervision with transfers with least restrictive device upon completion of rehab program  Locomotion Team Goal: Patient will require supervision with locomotion with least restrictive device upon completion of rehab program  Cognitive Team Goal: Patient will require supervision for basic and complex tasks upon completion of rehab program    Discussion: pt presents with the above barriers and has multiple medical barriers being worked on  Pt is max a with transfers, mod a ambulation and does not have stairs iin the home  adls are overall total lb, mod ub, mod bathing  Family would like pt to maximize time in rehab to improve overall function  Team anticipates 10 day los  Anticipated Discharge Date:  reteam [facility] Team Members Present: The following team members are supervising care for this patient and were present during this Weekly Team Conference      Physician: Dr Sisto Brunner, MD  : Serafin Galaviz MSW  Registered Nurse: Cecy Clifford RN  Physical Therapist: Latoya Monique DPT  Occupational Therapist: Carolin Gibson, MS, OTR/L  Speech Therapist: Abel White, CCC-SLP  Other: Giselle Swenson, RN, BSN  [facility]

## 2019-08-29 NOTE — PROGRESS NOTES
08/29/19 1430   Pain Assessment   Pain Assessment No/denies pain   Pain Score No Pain   Restrictions/Precautions   Precautions Bed/chair alarms;Cognitive; Fall Risk;Supervision on toilet/commode  (impaired skin integrity- ROHO cushion, FR 1500ml)   Weight Bearing Restrictions No   ROM Restrictions No   Braces or Orthoses AFO  (BLE )   Cognition   Overall Cognitive Status Impaired   Arousal/Participation Alert; Cooperative   Attention Attends with cues to redirect   Orientation Level Oriented X4   Memory Decreased short term memory;Decreased recall of recent events   Following Commands Follows one step commands with increased time or repetition   Subjective   Subjective Patient agreeable to particiapte  Patient expressing frustration with inability to stand to toilet by himself  Patient and staff educated on safety awareness at this time with having staff present   Patient also requesting use of Rolator- educated on reasons why this choice of DME would not be appropriate at this time; will follow up when appropriate    QI: Sit to Lying   Assistance Needed Physical assistance   Assistance Provided by [place] 50%-74%   Comment with HOB elevated; patient with hospital bed at home    Sit to Lying CARE Score 2   QI: Lying to Sitting on Side of Bed   Assistance Needed Physical assistance   Assistance Provided by [place] 50%-74%   Comment able to utilize grab bar to position self nearly at edge of bed however required boosting forward as patient with difficulty shifting weight    Lying to Sitting on Side of Bed CARE Score 2   QI: Sit to Stand   Assistance Needed Physical assistance   Assistance Provided by [place] 50%-74%   Comment mod A to Benny depending on height of surface; verbal instruction for proper hand placement and approximation to chair to safely sit    Sit to Stand CARE Score 2   QI: Chair/Bed-to-Chair Transfer   Assistance Needed Physical assistance   Assistance Provided by [place] 50%-74%   Comment mod A 2* balance disturbances    Chair/Bed-to-Chair Transfer CARE Score 2   Transfer Bed/Chair/Wheelchair   Limitations Noted In Balance; Coordination;UE Strength;LE Strength; Sequencing; Endurance   Adaptive Equipment Walker   Stand Pivot Moderate Assist;Minimal Assist   Sit to Stand Moderate Assist;Minimal Assist   Stand to Sit Contact Guard   Supine to Sit Moderate Assist   Sit to Supine Moderate Assist   Bed, Chair, Wheelchair Transfer (FIM) 3 - [place] lifts two limbs during transfer   QI: Walk 10 Feet   Assistance Needed Physical assistance   Assistance Provided by [place] 50%-74%   Comment overall min however requires moderate A during turns    Walk 10 Feet CARE Score 2   QI: Walk 50 Feet with Two Turns   Assistance Needed Physical assistance   Assistance Provided by [place] 50%-74%   Comment overall min however requires moderate A during turns; patient attempts to prematurely sit    Walk 50 Feet with Two Turns CARE Score 2   QI: Walking 10 Feet on Uneven Surfaces   Reason if not Attempted Safety concerns   Walking 10 Feet on Uneven Surfaces CARE Score 88   Ambulation   Does the patient walk? 2  Yes   Primary Discharge Mode of Locomotion Walk   Walk Assist Level Minimum Assist;Moderate Assist   Gait Pattern Inconsistant Lois;Decreased foot clearance; Improper weight shift;Decreased R stance;Decreased L stance   Assist Device Roller Walker   Distance Walked (feet) 60 ft  (x 3 + 150)   Limitations Noted In Balance; Coordination;Device Management; Sequencing;Speed;Strength;Swing   Walking (FIM) 2 - Patient ambulates between 50 - 149 feet regardless of assist/device/set up   QI: Wheel 50 Feet with Two Turns   Reason if not Attempted Safety concerns   Wheel 50 Feet with Two Turns CARE Score 88   QI: Wheel 150 Feet   Reason if not Attempted Safety concerns   Wheel 150 Feet CARE Score 88   Wheelchair mobility   QI: Does the patient use a wheelchair? 1  Yes   QI: Indicate the type of wheelchair 1   Manual   Wheelchair Assist Level Minimum Assist;Moderate Assist   Method Right upper extremity; Left upper extremity   Needs Assist With Locking Brakes;Obstacles;Remove Leg Rest;Replace Leg Rest   Distance Level Surface (feet) 20 ft   Findings wearing gloves and performing on laminate dragan; slight improvement noted    Wheelchair (FIM) 1 - Patient wheels less than 49 feet regardless of assist/set up   QI: 1 Step (Curb)   Reason if not Attempted Safety concerns   1 Step (Curb) CARE Score 88   QI: 4 Steps   Reason if not Attempted Safety concerns   4 Steps CARE Score 88   QI: 12 Steps   Reason if not Attempted Activity not applicable   12 Steps CARE Score 9   Stairs   Findings performed 4" stairs- see strengthening section    QI: Picking Up Object   Reason if not Attempted Safety concerns   Picking Up Object CARE Score 88   Therapeutic Interventions   Strengthening 10x2 on 4" step: forward step taps, forward up back down steps; 3 stepsx2   Other HEP of DF/PF, LAQ, hip flexion, hip ABD/ADD   Assessment   Treatment Assessment Patient engaged in PT treatment session focusing on improving overall functional mobility for safe d/c planning  Patient continues to demonstrate impaired balance and righting reactions during STS and turning  Grossly, patient retropulsive and responds to cues to "keep toes on floor" however, patient did demonstrate one instance of forward LOB which he was able to self correct  Patient attempts to rush through tasks in which conversation was had about safety awareness and reducing risk for falls and future injury  Patient continues to demonstrate difficulty with w/c mobility despite patient and wife reports patient able to self mobilize in chair (I) at home  Trailed hardwood and gloves this session however patient slow to process and motor plan  Initiated 4" stairs this session however patient with BL hip flexor weakness  Able to perform 3x2 with no overt buckling or LOB   Future sessions to focus on stairs to strengthening and to enter home (per wife's request although they also report ramp is available), STS from varied surfaces, and dynamic balance with 1 UE vs no UE support  Reviewed with patient and wife HEP which was provided to them in room with orange TB  Will continue to progress functional mobility (I) and safety  Family/Caregiver Present spouse   PT Family training done with: briefly reviewed body mechanics with spouse    Problem List Decreased strength;Decreased range of motion;Decreased endurance; Impaired balance;Decreased mobility; Decreased coordination;Decreased cognition; Impaired sensation;Decreased skin integrity;Orthopedic restrictions   Barriers to Discharge Decreased caregiver support   PT Barriers   Functional Limitation Car transfers;Stair negotiation;Standing;Transfers; Walking; Wheelchair management   Plan   Treatment/Interventions Functional transfer training;LE strengthening/ROM; Elevations; Therapeutic exercise; Endurance training;Cognitive reorientation;Patient/family training;Equipment eval/education; Bed mobility;Gait training; Compensatory technique education   Progress Progressing toward goals   Recommendation   Recommendation Other (Comment)  (TBD pending progress)   Equipment Recommended Wheelchair;Walker   PT Therapy Minutes   PT Time In 1430   PT Time Out 1545   PT Total Time (minutes) 75   PT Mode of treatment - Individual (minutes) 75   PT Mode of treatment - Concurrent (minutes) 0   PT Mode of treatment - Group (minutes) 0   PT Mode of treatment - Co-treat (minutes) 0   PT Mode of Teatment - Total time(minutes) 75 minutes   Therapy Time missed   Time missed?  No

## 2019-08-29 NOTE — ASSESSMENT & PLAN NOTE
> MOCA 16/30 indicating moderate cog impairment with deficits greatest in domains of short term memory and executive functioning  > Wife states patients cognitive status has been largely stable for some time  > CT head 6/2019 - Encephalomalacia within the right frontal and parietal lobe extending inferiorly into the posterior superior temporal lobe  Chronic microangiopathic change within both cerebral hemispheres  Brainstem and cerebellum demonstrate normal density  No mass or hemorrhage  Mild cerebral volume loss  VENTRICLES AND EXTRA-AXIAL SPACES:  Asymmetry with mild ex vacuo dilatation of the posterior body, atria and occipital horn of the right lateral ventricle  Mild chronic microangiopathic change and age-appropriate cerebral volume loss    Right hemispheric encephalomalacia and gliosis suggestive of old infarction   > Increased risk of dementia and falls  > Compensatory strategies and adjustments in PT/OT as indicated  > Follow-up with PCP and PMR after d/c > consider follow-up with neuro in future

## 2019-08-29 NOTE — PROGRESS NOTES
08/29/19 0930   Pain Assessment   Pain Assessment No/denies pain   Pain Score No Pain   Restrictions/Precautions   Precautions Bed/chair alarms;Cognitive; Fall Risk;Supervision on toilet/commode;Fluid restriction  (ROHO cushion, )   Weight Bearing Restrictions No   ROM Restrictions No   Braces or Orthoses AFO  (BLE)   QI: Putting On/Taking Off Footwear   Assistance Needed Physical assistance   Assistance Provided by [place] Total assistance   Comment dependent for AFO and shoe donning/doffing  Doffed at end of session and skin inspected to ensure no redness and irrition  None present  Putting On/Taking Off Footwear CARE Score 1   QI: Sit to Stand   Assistance Needed Physical assistance   Assistance Provided by [place] 75% or more   Sit to Stand CARE Score 2   QI: Chair/Bed-to-Chair Transfer   Assistance Needed Physical assistance   Assistance Provided by [place] 75% or more   Comment initial boost to stand, cuing for safety while turning, assist to lower   Chair/Bed-to-Chair Transfer CARE Score 2   Transfer Bed/Chair/Wheelchair   Limitations Noted In Balance; Endurance; Sequencing;LE Strength   Adaptive Equipment Roller Walker   Findings noted retropulsion   Bed, Chair, Wheelchair Transfer (FIM) 2 - [place] needs to lift or boost to rise AND assist to sit   Functional Standing Tolerance   Time 5min, 3min, 5min   Activity Pt engaged in preferred leisure task of cards in stance to promote standing tolerance and standing balance with unilateral hand release  Pt req maxA for initial sit>stand but then able to maintain stance with CGA/Benny with unilateral hand release for task  When attempting to release BUE, pt with retropulsion req inc A to maintain balance  Pt with no reports of fatigue but demo's SOB and inc knee flexion therefore demo P insight into endurance deficits  Cognition   Overall Cognitive Status Impaired   Arousal/Participation Alert; Cooperative   Attention Attends with cues to redirect   Orientation Level Oriented X4   Memory Decreased recall of recent events;Decreased short term memory   Following Commands Follows multistep commands with increased time or repetition   Comments MOCA Version 8 1 completed this session with pt scoring 16/30 with correlates to moderate cognitive impairment  Refer below for more details regarding assessment  Activity Tolerance   Activity Tolerance Patient tolerated treatment well   Assessment   Treatment Assessment Pt participated in skilled OT session with focus on functional transfers, standing tolerance, standing balance and cognitive assessment  Pt completed transfer with RW req cuing for sequence of task and maxA for boost/lowering  MD Corrie Kay reporting questionable L neglect; however, pt not demo'ing neglect during card activity  Will cont to assess during next therapy session  Pt would benefit from cont therapy with focus on functional transfers, standing tolerance, dynamic balance, assessing vision  Cont with POC  Prognosis Fair   Problem List Decreased strength;Decreased range of motion;Decreased endurance; Impaired balance;Decreased mobility; Decreased coordination; Impaired sensation;Decreased cognition   Plan   Treatment/Interventions Functional transfer training; Therapeutic exercise; Endurance training   Progress Progressing toward goals   Recommendation   OT Discharge Recommendation 24 hour supervision/assist  (family and HHA)   OT Therapy Minutes   OT Time In 0930   OT Time Out 1030   OT Total Time (minutes) 60   OT Mode of treatment - Individual (minutes) 60   OT Mode of treatment - Concurrent (minutes) 0   OT Mode of treatment - Group (minutes) 0   OT Mode of treatment - Co-treat (minutes) 0   OT Mode of Teatment - Total time(minutes) 60 minutes   Therapy Time missed   Time missed? No     Pt completed [place] Cognitive Assessment (MOCA) version 8 1  Pt scored [id number] / 30  indicating a moderate cognitive deficit     Visuospatial/executive: 0 /5 - Pt with difficulty trailmaking first connecting numbers and then letters even with repetition of direction  Naming: 3/3   Memory:    (worth no points) - only recalling 4/5 with immediate recall  Attention:  4/ 6  - attended to only 2 out of 10 As during letter listing task  Language: 0 / 3 - skipped 1-2 words in each sentence repetition  Pt able to recall only 9 F words  Abstraction: 2 / 2   Delayed recall:  1 / 5 (MIS = 5/15) -  Pt only able to recall 1 word  Pt provided with category cues but appears that pt guessing correct words instead of recalling with cues  Orientation: 6 / 6       Total score 16/30, indicating a moderate cognitive deficit  Pt completed MOCA in room with door closed and TV off to limit distraction  Pt with reading glasses  Pt reporting that he completed up to college education and had career as   Pt self reporting that it "takes time for things to sink in right now " Discussed results of MOCA with MD Kenia Chapman and OTR/IHSAN Mahan who will determine rehab implications and POC for therapy on ARC regarding functional cognition

## 2019-08-29 NOTE — PROGRESS NOTES
08/29/19 1230   Pain Assessment   Pain Assessment No/denies pain   Pain Score No Pain   Restrictions/Precautions   Precautions Bed/chair alarms; Fall Risk  (fluid restriction 1500, AFO)   Braces or Orthoses AFO   QI: Lower Body Dressing   Comment Pt attempted to don pants over b/l feet  Pt used reacher to attempt to don pants however pt with poor problem solving, and poor direction following for positioning of reacher to pull up pants  Pt attempting to don LLE in pants and began to slide off chair  Therapist terminated task to safely reposition patient in order to prevent fall  QI: Putting On/Taking Off Footwear   Assistance Needed Physical assistance   Assistance Provided by [place] 75% or more   Comment Pt attempted to don/doff socks with use of dressing stick, sock aid  Pt able to doff R sock with max verbal cueing for technique  Pt required assist to doff L sock due to decreased problem solving with use of dressing stick  Pt able to set up socks on sock aid with min cues for problem solving  Pt required assist to adjust b/l socks once donned with sock aid  Pt rqeuired assist to manage AFO braces and to don shoes  Putting On/Taking Off Footwear CARE Score 2   Cognition   Overall Cognitive Status Impaired   Arousal/Participation Alert; Cooperative   Attention Difficulty attending to directions   Orientation Level Oriented X4   Memory Decreased short term memory   Following Commands Follows one step commands inconsistently   Assessment   Treatment Assessment Pt engaged in OT treatment session [facility] focus on LHAE trainign for LB dressing and fall safety/recovery for family training  Pt overall with improved problem solving with positioning sock on sock aid Pt however does demonstrate decreased problem solving and dec command following for use of LH reacher for LB dressing  Pt overall would benefit from repeptitive training with [address] [place] [address] to determine if appropriate to use upon d/c   Pt is limited by decreased attention, impaired memory, and decreased carryover and thus will need repetitive training  OT Family training done with: wife, Ilsa Ochoa   Assessment of family training Pt wife present for session and engaged in family training with focus on instruction of safe body mechanics for repositioning pt and fall recovery/safety  Demonstrated to patient's wife Ilsa Ochoa how to reposition pt if he begins to slide off chair  Wife instructed on how to position patient's feet, how to encourage forward bending and how to successfully boost patient back into chair  Educated wife to not attempt to lift patient but to assist pt in repositioning to allow him to boost back in chair  Wife educated on positioning of patient's feet and trunk to assist  Wife inferring about how to safely lower patient to floor if she is unable to reposition  Wife instructed on body mechanics for her,nelson nd how to keep patient safe from injury during fall  Also discussed calling 911 in order to get patient off floor  Wife educated to not attempt to lift patient off floor or have patient attempt to get up without assistance  Wife also reports son was able to get him off of floor in past  Will complete family training with wife if necessary  Problem List Decreased strength;Decreased endurance; Impaired balance;Decreased mobility; Decreased coordination; Impaired judgement;Decreased safety awareness;Decreased cognition   Barriers to Discharge Other (Comment)  (wife unable to provide physical lifting assistance)   Plan   Treatment/Interventions ADL retraining;Functional transfer training;LE strengthening/ROM; Therapeutic exercise;Cognitive reorientation; Endurance training;Patient/family training;Gait training; Compensatory technique education   Progress Progressing toward goals   Recommendation   OT Discharge Recommendation Home with family support  (and HHA)   OT Therapy Minutes   OT Time In 1230   OT Time Out 1315   OT Total Time (minutes) 45   OT Mode of treatment - Individual (minutes) 45   OT Mode of treatment - Concurrent (minutes) 0   OT Mode of treatment - Group (minutes) 0   OT Mode of treatment - Co-treat (minutes) 0   OT Mode of Teatment - Total time(minutes) 45 minutes   Therapy Time missed   Time missed?  No

## 2019-08-29 NOTE — PROGRESS NOTES
NEPHROLOGY PROGRESS NOTE   Reena Kauffman 80 y o  male MRN: [id number]  Unit/Bed#: -56 Encounter: [id number]  Reason for Consult: CKD    ASSESSMENT and PLAN:    70-year-old male with a past medical history of CKD stage 4, AFib, CAD status post prior CABG, diastolic heart failure, diabetes, BPH who initially presents on 08/22 with worsening edema  Nephrology is consulted for CKD and rising creatinine  Nephrology is consulted for CKD  There is initial concern for decompensated diastolic heart failure  Patient was recently discharged over a month ago and then was again admitted at a hospital in [place] and after this admission, it does not appear that the patient was restarted on diuretics As there is concern for worsening of renal function  Now at [facility] for rehab      1 ) Chronic kidney disease stage IV  -creatinine 3 3-3 6 mg/dL  -stable at baseline  -Native disease likely hypertensive / diabetic nephropathy   -Prior renal ultrasound in May with right kidney 9 cm, left kidney 9 cm   -stable from a renal standpoint for discharge     2 ) Blood pressure/volume status  -history diastolic heart failure   -Does not appear to be decompensated heart failure   -Target weight 168-170 lb   -continue Torsemide 40 mg daily   -normotensive     3 ) Anemia  - iron saturation 14%   - Ferritin 128   - received intravenous iron on 08/23 and 2nd dose on 08/25  -hemoglobin stable   -start Niferex         SUBJECTIVE / INTERVAL HISTORY:    No dizziness no chest pain or shortness of Breath      OBJECTIVE:  Current Weight: Weight - Scale: 79 9 kg (176 lb 2 4 oz)  Vitals:    08/29/19 0530 08/29/19 0600 08/29/19 0723 08/29/19 0920   BP: 144/65  150/67 110/62   BP Location: Right arm      Pulse: 68  69    Resp: 18      Temp: 98 3 °F (36 8 °C)      TempSrc: Oral      SpO2: 93%      Weight:  79 9 kg (176 lb 2 4 oz)         Intake/Output Summary (Last 24 hours) at 8/29/2019 1052  Last data filed at 8/29/2019 5433  Gross per 24 hour   Intake 700 ml   Output 1275 ml   Net -575 ml       Review of Systems:    12 point ROS has been reviewed  Physical Exam   Constitutional: He is oriented to person, place, and time  He appears well-developed and well-nourished  No distress  HENT:   Head: Normocephalic and atraumatic  Eyes: Pupils are equal, round, and reactive to light  No scleral icterus  Neck: Normal range of motion  Neck supple  Cardiovascular: Normal rate, regular rhythm and normal heart sounds  Exam reveals no gallop and no friction rub  No murmur heard  Pulmonary/Chest: Effort normal and breath sounds normal  No respiratory distress  He has no wheezes  He has no rales  He exhibits no tenderness  Abdominal: Soft  Bowel sounds are normal  He exhibits no distension  There is no tenderness  There is no rebound  Musculoskeletal: Normal range of motion  He exhibits no edema  Neurological: He is alert and oriented to person, place, and time  Skin: No rash noted  He is not diaphoretic  Psychiatric: He has a normal mood and affect  Nursing note and vitals reviewed        Medications:    Current Facility-Administered Medications:     acetaminophen (TYLENOL) tablet 650 mg, 650 mg, Oral, Q6H PRN, Cherylene Deans, MD    amLODIPine (NORVASC) tablet 5 mg, 5 mg, Oral, Daily, Cherylene Deans, MD    aspirin (ECOTRIN LOW STRENGTH) EC tablet 81 mg, 81 mg, Oral, Daily, Cherylene Deans, MD, 81 mg at 08/29/19 3229    atorvastatin (LIPITOR) tablet 40 mg, 40 mg, Oral, QPM, Cherylene Deans, MD, 40 mg at 08/28/19 1712    bisacodyl (DULCOLAX) rectal suppository 10 mg, 10 mg, Rectal, Daily PRN, Cherylene Deans, MD    docusate sodium (COLACE) capsule 100 mg, 100 mg, Oral, BID, Cherylene Deans, MD, 100 mg at 08/29/19 0919    finasteride (PROSCAR) tablet 5 mg, 5 mg, Oral, Daily, Maryjo Zheng, CRNP, 5 mg at 08/29/19 0919    insulin lispro (HumaLOG) 100 units/mL subcutaneous injection 1-5 Units, 1-5 Units, Subcutaneous, TID AC, 1 Units at 08/28/19 1123 **AND** Fingerstick Glucose (POCT), , , TID AC, MAURICE Patel    insulin lispro (HumaLOG) 100 units/mL subcutaneous injection 1-5 Units, 1-5 Units, Subcutaneous, HS, MAURICE Dailey    levothyroxine tablet 75 mcg, 75 mcg, Oral, Daily, Alley Sandhu MD, 75 mcg at 08/29/19 0552    lidocaine (LIDODERM) 5 % patch 1 patch, 1 patch, Topical, Daily, Alley Sandhu MD, 1 patch at 08/28/19 1713    lidocaine (URO-JET) 2 % urethral/mucosal gel, , Topical, Q4H PRN, Alley Sandhu MD    menthol-methyl salicylate (BENGAY) 76-96 % cream, , Apply externally, 4x Daily PRN, Alley Sandhu MD    metoprolol tartrate (LOPRESSOR) tablet 50 mg, 50 mg, Oral, BID With Meals, Alley Sandhu MD, 50 mg at 08/29/19 0723    ondansetron (ZOFRAN) injection 4 mg, 4 mg, Intravenous, Q6H PRN, Alley Sandhu MD    polyethylene glycol [facility]) packet 17 g, 17 g, Oral, Daily PRN, Alley Sandhu MD    ranolazine [facility]) 12 hr tablet 500 mg, 500 mg, Oral, Q12H [facility], Alley Sandhu MD, 500 mg at 08/29/19 8306    tamsulosin [facility]) capsule 0 4 mg, 0 4 mg, Oral, Daily, Alley Sandhu MD, 0 4 mg at 08/29/19 0919    torsemide (DEMADEX) tablet 40 mg, 40 mg, Oral, Daily, Alley Sandhu MD, 40 mg at 08/29/19 0302    warfarin (COUMADIN) tablet 1 5 mg, 1 5 mg, Oral, Daily (warfarin), MAURICE Dailey, 1 5 mg at 08/28/19 1713    Laboratory Results:  Results from last 7 days   Lab Units 08/29/19  0529 08/28/19  0559 08/27/19  0442 08/26/19  0520 08/25/19  0624 08/24/19  0601 08/23/19  1058 08/23/19  0459 08/22/19  2337  08/22/19  1346   WBC Thousand/uL 7 91  --   --   --   --   --   --  7 36  --   --  7 35   HEMOGLOBIN g/dL 9 1*  --   --   --   --   --   --  9 4*  --   --  9 5*   HEMATOCRIT % 29 0*  --   --   --   --   --   --  30 1*  --   --  30 6*   PLATELETS Thousands/uL 215  --   --   --   --   --   --  217  --   --  230   POTASSIUM mmol/L 4 2 4 1 4 0 4 6 3 3* 3 3* 3 8  --  4 1   < > --    CHLORIDE mmol/L 100 103 100 100 102 101 102  --  101   < >  --    CO2 mmol/L 28 25 29 29 27 28 28  --  28   < >  --    BUN mg/dL 56* 53* 47* 45* 44* 46* 38*  --  40*   < >  --    CREATININE mg/dL 3 64* 3 56* 3 85* 3 54* 3 60* 3 49* 3 61*  --  3 68*   < >  --    CALCIUM mg/dL 8 6 8 4 8 0* 8 2* 7 9* 8 1* 8 3  --  8 0*   < >  --    MAGNESIUM mg/dL  --   --   --   --  1 9 1 9  --   --  1 9  --   --    PHOSPHORUS mg/dL  --   --   --   --   --  3 7  --   --   --   --   --     < > = values in this interval not displayed

## 2019-08-29 NOTE — PROGRESS NOTES
08/29/19 0830   Pain Assessment   Pain Assessment No/denies pain   Pain Score No Pain   Restrictions/Precautions   Precautions Bed/chair alarms;Cognitive; Fall Risk;Supervision on toilet/commode  (possible incontinence; JOLANTAO cushion, FR )   Weight Bearing Restrictions No   ROM Restrictions No   Braces or Orthoses AFO  (BLE AFO )   Cognition   Overall Cognitive Status Impaired   Arousal/Participation Alert; Cooperative   Attention Attends with cues to redirect   Orientation Level Oriented X4   Memory Decreased recall of recent events;Decreased short term memory   Following Commands Follows multistep commands with increased time or repetition   Subjective   Subjective Patient agreeable to particiapte in PT session    QI: Sit to Stand   Assistance Needed Physical assistance   Assistance Provided by [place] 75% or more   Comment requires boost to stand and lower    Sit to Stand CARE Score 2   QI: Chair/Bed-to-Chair Transfer   Assistance Needed Physical assistance   Assistance Provided by [place] 50%-74%   Comment moderate A; occasional need to steady for retropulsivity    Chair/Bed-to-Chair Transfer CARE Score 2   Transfer Bed/Chair/Wheelchair   Limitations Noted In Balance; Coordination; Sequencing;UE Strength;LE Strength   Adaptive Equipment Walker   Stand Pivot Moderate Assist   Sit to Stand Maximum Assist   Stand to Sit Maximum Assist   Bed, Chair, Wheelchair Transfer (FIM) 2 - [place] needs to lift or boost to rise AND assist to sit   QI: Car Transfer   Assistance Needed Physical assistance   Assistance Provided by [place] Total assistance   Comment max A to stand from low surface    Car Transfer CARE Score 1   QI: Walk 10 Feet   Assistance Needed Physical assistance   Assistance Provided by [place] 50%-74%   Comment mod Ax1    Walk 10 Feet CARE Score 2   QI: Walk 50 Feet with Two Turns   Assistance Needed Physical assistance   Assistance Provided by [place] 50%-74%   Comment mod Ax1    Walk 50 Feet with Two Turns CARE Score 2   QI: Walk 150 Feet   Assistance Needed Physical assistance   Assistance Provided by [place] 50%-74%   Comment mod Ax1    Walk 150 Feet CARE Score 2   QI: Walking 10 Feet on Uneven Surfaces   Reason if not Attempted Safety concerns   Walking 10 Feet on Uneven Surfaces CARE Score 88   Ambulation   Does the patient walk? 2  Yes   Primary Discharge Mode of Locomotion Walk   Walk Assist Level Minimum Assist;Moderate Assist   Gait Pattern Inconsistant Lois;Decreased foot clearance; Improper weight shift   Assist Device Roller Gabrielle Garcia Walked (feet) 150 ft   Limitations Noted In Balance; Coordination;Sensation; Sequencing;Speed;Strength;Swing   Walking (FIM) 3 - Patient completes 50 - 74% of all tasks, needs more than steadying or light touch AND distance 150 feet or more, no rest   QI: Wheel 50 Feet with Two Turns   Reason if not Attempted Safety concerns   Wheel 50 Feet with Two Turns CARE Score 88   QI: Wheel 150 Feet   Reason if not Attempted Safety concerns   Wheel 150 Feet CARE Score 88   Wheelchair mobility   QI: Does the patient use a wheelchair? 1  Yes   QI: Indicate the type of wheelchair 1  Manual   Wheelchair Assist Level Moderate Assist   Method Right upper extremity; Left upper extremity   Needs Assist With Locking Brakes;Obstacles;Remove Leg Rest;Replace Leg Rest   Distance Level Surface (feet) 15 ft   Findings difficulty noted on carpet; reports "it would be easier on hardwood"; will trial this PM   Wheelchair (FIM) 1 - Patient completes less than 25% of all tasks   QI: 1 Step (Curb)   Reason if not Attempted Safety concerns   1 Step (Curb) CARE Score 88   QI: 4 Steps   Reason if not Attempted Safety concerns   4 Steps CARE Score 88   QI: 12 Steps   Reason if not Attempted Activity not applicable   12 Steps CARE Score 9   QI: Picking Up Object   Reason if not Attempted Safety concerns   Picking Up Object CARE Score 88   Assessment   Treatment Assessment Patient engaged in PT treatment session focusing on improving overall functional mobility  Patient struggling with sit->stands from low surface and requires max A to boost  Patient limited during this task 2* difficulty with anterior alessio shift, BLE weakness and dec ROM B knees  Patient reports owning a lift chair at home and having raised toilet seats  Patient able to ambulate > household distances but is limited by WHALEY with wheezing also noted; patient reports this is baseline  Patient requires continued skilled PT intervention to maximize function and reduce caregiver burden  Problem List Decreased strength;Decreased range of motion;Decreased endurance; Impaired balance;Decreased mobility; Decreased coordination; Impaired sensation;Decreased cognition   PT Barriers   Functional Limitation Car transfers; Ramp negotiation;Stair negotiation;Standing;Transfers; Walking; Wheelchair management   Plan   Treatment/Interventions Functional transfer training;LE strengthening/ROM; Elevations; Therapeutic exercise; Endurance training;Cognitive reorientation;Patient/family training;Equipment eval/education; Bed mobility;Gait training; Compensatory technique education   Progress Progressing toward goals   Recommendation   Recommendation Other (Comment)  (TBD pending progress)   Equipment Recommended Wheelchair;Walker   PT Therapy Minutes   PT Time In 0830   PT Time Out 0900   PT Total Time (minutes) 30   PT Mode of treatment - Individual (minutes) 30   PT Mode of treatment - Concurrent (minutes) 0   PT Mode of treatment - Group (minutes) 0   PT Mode of treatment - Co-treat (minutes) 0   PT Mode of Teatment - Total time(minutes) 30 minutes   Therapy Time missed   Time missed?  No

## 2019-08-29 NOTE — PCC CARE MANAGEMENT
Pt is participating with therapy and is expected to return home w/family and contd therapy services  Pt was receiving nursing and therapy services through [facility] prior to admission  Following to assist w/dc planning needs

## 2019-08-29 NOTE — CONSULTS
Consult Note - Wound   Walters Oj 80 y o  male MRN: [id number]  Unit/Bed#: -03 Encounter: [id number]      History and Present Illness:  80year old male presented [address] with decline in ADL status and ambulation status post L2-L3 microdiskectomy in July of 2019  Patient's history significant for DM, CHF, foot drop, chronic kidney disease  Assessment Findings:   Patient seen per physician consult  Patient sitting up in recliner, denies pain  Able to stand with minimal assist x 1 and walker  Patient using bilateral lower extremity braces for ambulation  Bilateral lower extremity swelling with thigh high compression stockings in place  Occasional bowel incontinence  Nutrition team following  Blanchable redness to sacrum and right medial hallux over bunion  Recently healed area to left sacrum/buttock with dry desquamation  Bilateral heels and skin folds intact  Patient using roho cushion while in chair  Plan:   1-Hydraguard lotion to bilateral sacrum and buttocks BID and PRN  2-Elevate heels to offload pressure  3-Offloading cushion in chair when out of bed  4-Moisturize skin daily with skin nourishing cream   5-Turn/reposition q2h or when medically stable for pressure re-distribution on skin  6-Skin checks every shift  Wound care team will sign-off--please contact with any questions or concerns [phone]             Saad TOMAS, RN, [employer]

## 2019-08-29 NOTE — SOCIAL WORK
Met with Pt and his wife to review the team meeting  They understand the recommendation to reteam, as well as the potential LOS, based on Pts prior functioning

## 2019-08-30 LAB
GLUCOSE SERPL-MCNC: 147 MG/DL (ref 65–140)
GLUCOSE SERPL-MCNC: 160 MG/DL (ref 65–140)
GLUCOSE SERPL-MCNC: 169 MG/DL (ref 65–140)
GLUCOSE SERPL-MCNC: 99 MG/DL (ref 65–140)
INR PPP: 1.81 (ref 0.84–1.19)
PROTHROMBIN TIME: 20.5 SECONDS (ref 11.6–14.5)

## 2019-08-30 PROCEDURE — 97110 THERAPEUTIC EXERCISES: CPT

## 2019-08-30 PROCEDURE — 85610 PROTHROMBIN TIME: CPT | Performed by: NURSE PRACTITIONER

## 2019-08-30 PROCEDURE — 97116 GAIT TRAINING THERAPY: CPT

## 2019-08-30 PROCEDURE — 82948 REAGENT STRIP/BLOOD GLUCOSE: CPT

## 2019-08-30 PROCEDURE — 97530 THERAPEUTIC ACTIVITIES: CPT

## 2019-08-30 PROCEDURE — 97535 SELF CARE MNGMENT TRAINING: CPT

## 2019-08-30 PROCEDURE — 99232 SBSQ HOSP IP/OBS MODERATE 35: CPT

## 2019-08-30 RX ORDER — INSULIN GLARGINE 100 [IU]/ML
8 INJECTION, SOLUTION SUBCUTANEOUS
Status: DISCONTINUED | OUTPATIENT
Start: 2019-08-31 | End: 2019-09-06

## 2019-08-30 RX ORDER — WARFARIN SODIUM 1 MG/1
1.5 TABLET ORAL
Status: DISCONTINUED | OUTPATIENT
Start: 2019-08-31 | End: 2019-09-02

## 2019-08-30 RX ORDER — WARFARIN SODIUM 1 MG/1
3 TABLET ORAL
Status: COMPLETED | OUTPATIENT
Start: 2019-08-30 | End: 2019-08-30

## 2019-08-30 RX ADMIN — TORSEMIDE 40 MG: 20 TABLET ORAL at 08:34

## 2019-08-30 RX ADMIN — WARFARIN SODIUM 3 MG: 1 TABLET ORAL at 17:26

## 2019-08-30 RX ADMIN — INSULIN LISPRO 1 UNITS: 100 INJECTION, SOLUTION INTRAVENOUS; SUBCUTANEOUS at 17:25

## 2019-08-30 RX ADMIN — LEVOTHYROXINE SODIUM 75 MCG: 75 TABLET ORAL at 06:02

## 2019-08-30 RX ADMIN — POLYSACCHARIDE-IRON COMPLEX 150 MG: 150 CAPSULE ORAL at 08:34

## 2019-08-30 RX ADMIN — TAMSULOSIN HYDROCHLORIDE 0.4 MG: 0.4 CAPSULE ORAL at 08:34

## 2019-08-30 RX ADMIN — LIDOCAINE 1 PATCH: 50 PATCH CUTANEOUS at 17:26

## 2019-08-30 RX ADMIN — ATORVASTATIN CALCIUM 40 MG: 40 TABLET, FILM COATED ORAL at 17:26

## 2019-08-30 RX ADMIN — VITAMIN D, TAB 1000IU (100/BT) 2000 UNITS: 25 TAB at 08:34

## 2019-08-30 RX ADMIN — ASPIRIN 81 MG: 81 TABLET, COATED ORAL at 08:34

## 2019-08-30 RX ADMIN — FINASTERIDE 5 MG: 5 TABLET, FILM COATED ORAL at 08:34

## 2019-08-30 RX ADMIN — METOPROLOL TARTRATE 50 MG: 50 TABLET, FILM COATED ORAL at 07:24

## 2019-08-30 RX ADMIN — DOCUSATE SODIUM 100 MG: 100 CAPSULE, LIQUID FILLED ORAL at 17:26

## 2019-08-30 RX ADMIN — METOPROLOL TARTRATE 50 MG: 50 TABLET, FILM COATED ORAL at 17:25

## 2019-08-30 RX ADMIN — RANOLAZINE 500 MG: 500 TABLET, FILM COATED, EXTENDED RELEASE ORAL at 08:34

## 2019-08-30 RX ADMIN — RANOLAZINE 500 MG: 500 TABLET, FILM COATED, EXTENDED RELEASE ORAL at 21:25

## 2019-08-30 RX ADMIN — DOCUSATE SODIUM 100 MG: 100 CAPSULE, LIQUID FILLED ORAL at 08:34

## 2019-08-30 RX ADMIN — INSULIN LISPRO 1 UNITS: 100 INJECTION, SOLUTION INTRAVENOUS; SUBCUTANEOUS at 11:42

## 2019-08-30 NOTE — TREATMENT PLAN
Nephrology    Renal function had been stable  No labs today to comment on  Will check labs tomorrow  Vitamin-D started as discussed with the primary team     Stable from a renal standpoint for discharge  Will plan to monitor over the weekend and plan for next follow-up on Monday if still remains inpatient

## 2019-08-30 NOTE — PROGRESS NOTES
Internal Medicine Progress Note  Patient: Fara Skiff  Age/sex: 80 y o  male  Medical Record #: [id number]      ASSESSMENT/PLAN: (Interval History)  Fara Skiff is seen and examined and management for following issues:    Volume overload/chronic diastolic CHF:  s/p Lasix IV, weight dropped 11 lbs = per renal, target weight is 168-170 lbs  Renal managing his diuretic tx = currently Torsemide 40mg qd     Abnormal Troponin: felt to be 2/2 creatinine/vol overload putting stress on the heart; no further testing etc was recommended     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: yesterday was 3 64; stable     Iron deficiency anemia: renal gave 2 doses Venofer while in hospital and now started on Niferex 150mg qd     Hx urine retention/BPH; Klebsiella UTI 6/2019 and Klebsiella urosepsis 7/2019:  Uro saw in 7/2019 and added added Finasteride  Consideration was to be given to starting suppressive therapy with Nitrofurantion 100mg qhs  Currently, he is on Flomax/Finasteride  Will need to watch closely UTI      PAF:  continue Lopressor; Coumadin was on hold until INR dropped to an acceptable level --> 8/28/19, INR dropped below 3 0 so started 1 5mg qd as at home = INR yesterday was 1 79 so gave 3mg last night; give 3mg tonight and then back to 1 5mg qd     Hx Dual chamber PPM:  sees Dr Regino Urena as an OP     CVA (2014):  continue ASA/Lipitor     CAD/CABG (2013):   was here at [facility] from 6/12-6/16/19 for chest pain  He was seen by Cardiology and had a nuclear stress test showing a small, severe, reversible myocardial perfusion defect of the basal inferior wall  He was started on Ranexa 500mg BID but no further plans for intervention were recommended  Continue Lopressor, Lipitor and ASA as well     DM2: takes Trajenta at home = will have someone bring in and start when sure he is eating well; on 8/28, discontinued Lantus since FBS was 45    Will restart small dose of Lantus qam not HS at 8 U and next week switch over to home Trajenta       Hypothyroidism:  continue current Levothyroxine     Left L2-3 microdiskectomy 7/18/19 at [facility]    Vitamin D deficiency:  Level is 22 6; will d/w renal      Subjective/ HPI: Patients overnight issues or events were reviewed with nursing or staff during rounds or morning huddle session  No new or overnight events  DM2: adding back an AM dose of Lantus with intention of restarting home Trajenta next week  Anemia: s/p venofer, now on iron PO   CAD:  Stable on current tx    Offers no complaints    ROS:     GI: denies abdominal pain, change bowel habits or reflux symptoms  Neuro: Denies any headache, new vision changes, new neuropathies,new weaknesses   Respiratory: No Cough, SOB, denies wheeze  Cardiovascular: No CP, palpitations , denies perception of rapid heartbeat  : denies any new urinary burning or frequency    Review of Scheduled Meds:    Current Facility-Administered Medications:  acetaminophen 650 mg Oral Q6H PRN Danielito Figueroa MD   amLODIPine 5 mg Oral Daily Danielito Figueroa MD   aspirin 81 mg Oral Daily Danielito Figueroa MD   atorvastatin 40 mg Oral QPM Danielito Figueroa MD   bisacodyl 10 mg Rectal Daily PRN Danielito Figueroa MD   cholecalciferol 2,000 Units Oral Daily Danielito Figueroa MD   docusate sodium 100 mg Oral BID Danielito Figueroa MD   finasteride 5 mg Oral Daily MAURICE Meadows   insulin lispro 1-5 Units Subcutaneous TID AC MAURICE Patel   insulin lispro 1-5 Units Subcutaneous HS MAURICE Meadows   iron polysaccharides 150 mg Oral Daily Virginie Chritsiansen MD   levothyroxine 75 mcg Oral Daily Danielito Figueroa MD   lidocaine 1 patch Topical Daily Danielito Figueroa MD   lidocaine  Topical Q4H PRN Danielito Figueroa MD   menthol-methyl salicylate  Apply externally 4x Daily PRN Danielito Figueroa MD   metoprolol tartrate 50 mg Oral BID With Meals Danielito Figueroa MD   ondansetron 4 mg Intravenous Q6H PRN Danielito Figueroa MD   polyethylene glycol 17 g Oral Daily PRN Velma Peña Vina Fleischer, MD   ranolazine 500 mg Oral Q12H [address] Rajendra Barnes MD   tamsulosin 0 4 mg Oral Daily Rajendra Barnes MD   torsemide 40 mg Oral Daily Rajednra Barnes MD   warfarin 1 5 mg Oral Daily (warfarin) MAURICE Antony       Labs:     Results from last 7 days   Lab Units 08/29/19  0529   WBC Thousand/uL 7 91   HEMOGLOBIN g/dL 9 1*   HEMATOCRIT % 29 0*   PLATELETS Thousands/uL 215     Results from last 7 days   Lab Units 08/29/19  0529 08/28/19  0559   SODIUM mmol/L 136 136   POTASSIUM mmol/L 4 2 4 1   CHLORIDE mmol/L 100 103   CO2 mmol/L 28 25   BUN mg/dL 56* 53*   CREATININE mg/dL 3 64* 3 56*   CALCIUM mg/dL 8 6 8 4         Results from last 7 days   Lab Units 08/30/19  0615 08/29/19  0529   INR  1 81* 1 79*          Results from last 7 days   Lab Units 08/30/19  1051 08/30/19  0654 08/29/19  2019   POC GLUCOSE mg/dl 169* 99 198*       Imaging:     No orders to display       *Labs reviewed  *Radiology studies reviewed  *Medications reviewed and reconciled as needed  *Please refer to order section for additional ordered labs studies  *Case discussed with primary attending during morning huddle case rounds    Physical Examination:  Vitals:   Vitals:    08/30/19 0600 08/30/19 0612 08/30/19 0724 08/30/19 0834   BP:  128/62 134/65 112/64   BP Location:  Left arm     Pulse:  65 68    Resp:  16     Temp:  98 4 °F (36 9 °C)     TempSrc:  Oral     SpO2:  91%     Weight: 70 9 kg (156 lb 4 9 oz)          General Appearance: NAD, conversive  Eyes: No icterus; conjunctiva normal, PERRLA  HENT: oropharynx clear; mucous membranes moist  Neck: trachea midline, range of motion full   Supple w/o pain  Lungs: CTA, normal respiratory effort, no retractions, expiratory effort normal  CV: regular rate, no rubs/gallops; +murmur  ABD: soft; NT/ND; +BS  EXT: 1+ edema LE = improved since yesterday  Skin: normal turgor, normal texture, no rashes  Psych: affect normal, no overt anxiety/depression during exam today   Neuro: AAOx3 Total time spent: At least 40 minutes, with more than 50% spent counseling/coordinating care  Counseling includes discussion with patient re: progress  and discussion with patient of his/her current medical state/information  Coordination of patient's care was performed in conjunction with primary service  Time invested included review of patient's labs, vitals, and management of their comorbidities with continued monitoring  In addition, this patient was discussed with medical team including physician and advanced extenders  The care of the patient was extensively discussed and appropriate treatment plan was formulated unique for this patient  ** Please Note: Dragon 360 Dictation voice to text software may have been used in the creation of this document   **

## 2019-08-30 NOTE — PLAN OF CARE
Problem: PAIN - ADULT  Goal: Verbalizes/displays adequate comfort level or baseline comfort level  Description  Interventions:  - Encourage patient to monitor pain and request assistance  - Assess pain using appropriate pain scale  - Administer analgesics based on type and severity of pain and evaluate response  - Implement non-pharmacological measures as appropriate and evaluate response  - Consider cultural and social influences on pain and pain management  - Notify physician/advanced practitioner if interventions unsuccessful or patient reports new pain  Outcome: Progressing     Problem: INFECTION - ADULT  Goal: Absence or prevention of progression during hospitalization  Description  INTERVENTIONS:  - Assess and monitor for signs and symptoms of infection  - Monitor lab/diagnostic results  - Monitor all insertion sites, i e  indwelling lines, tubes, and drains  - Monitor endotracheal if appropriate and nasal secretions for changes in amount and color  - [place] appropriate cooling/warming therapies per order  - Administer medications as ordered  - Instruct and encourage patient and family to use good hand hygiene technique  - Identify and instruct in appropriate isolation precautions for identified infection/condition  Outcome: Progressing     Problem: SAFETY ADULT  Goal: Patient will remain free of falls  Description  INTERVENTIONS:  - Assess patient frequently for physical needs  -  Identify cognitive and physical deficits and behaviors that affect risk of falls    -  [place] fall precautions as indicated by assessment   - Educate patient/family on patient safety including physical limitations  - Instruct patient to call for assistance with activity based on assessment  - Modify environment to reduce risk of injury  - Consider OT/PT consult to assist with strengthening/mobility  Outcome: Progressing     Problem: SAFETY ADULT  Goal: Maintain or return to baseline ADL function  Description  INTERVENTIONS:  -  Assess patient's ability to carry out ADLs; assess patient's baseline for ADL function and identify physical deficits which impact ability to perform ADLs (bathing, care of mouth/teeth, toileting, grooming, dressing, etc )  - Assess/evaluate cause of self-care deficits   - Assess range of motion  - Assess patient's mobility; develop plan if impaired  - Assess patient's need for assistive devices and provide as appropriate  - Encourage maximum independence but intervene and supervise when necessary  - Involve family in performance of ADLs  - Assess for home care needs following discharge   - Consider OT consult to assist with ADL evaluation and planning for discharge  - Provide patient education as appropriate  Outcome: Progressing     Problem: SAFETY ADULT  Goal: Maintain or return mobility status to optimal level  Description  INTERVENTIONS:  - Assess patient's baseline mobility status (ambulation, transfers, stairs, etc )    - Identify cognitive and physical deficits and behaviors that affect mobility  - Identify mobility aids required to assist with transfers and/or ambulation (gait belt, sit-to-stand, lift, walker, cane, etc )  - [place] fall precautions as indicated by assessment  - Record patient progress and toleration of activity level on Mobility SBAR; progress patient to next Phase/Stage  - Instruct patient to call for assistance with activity based on assessment  - Consider rehabilitation consult to assist with strengthening/weightbearing, etc   Outcome: Progressing     Problem: Prexisting or High Potential for Compromised Skin Integrity  Goal: Skin integrity is maintained or improved  Description  INTERVENTIONS:  - Identify patients at risk for skin breakdown  - Assess and monitor skin integrity  - Assess and monitor nutrition and hydration status  - Monitor labs   - Assess for incontinence   - Turn and reposition patient  - Assist with mobility/ambulation  - Relieve pressure over bony prominences  - Avoid friction and shearing  - Provide appropriate hygiene as needed including keeping skin clean and dry  - Evaluate need for skin moisturizer/barrier cream  - Collaborate with interdisciplinary team   - Patient/family teaching  - Consider wound care consult   Outcome: Progressing     Problem: Potential for Falls  Goal: Patient will remain free of falls  Description  INTERVENTIONS:  - Assess patient frequently for physical needs  -  Identify cognitive and physical deficits and behaviors that affect risk of falls    -  [place] fall precautions as indicated by assessment   - Educate patient/family on patient safety including physical limitations  - Instruct patient to call for assistance with activity based on assessment  - Modify environment to reduce risk of injury  - Consider OT/PT consult to assist with strengthening/mobility  Outcome: Progressing

## 2019-08-30 NOTE — PLAN OF CARE
Problem: PAIN - ADULT  Goal: Verbalizes/displays adequate comfort level or baseline comfort level  Description  Interventions:  - Encourage patient to monitor pain and request assistance  - Assess pain using appropriate pain scale  - Administer analgesics based on type and severity of pain and evaluate response  - Implement non-pharmacological measures as appropriate and evaluate response  - Consider cultural and social influences on pain and pain management  - Notify physician/advanced practitioner if interventions unsuccessful or patient reports new pain  Outcome: Progressing     Problem: INFECTION - ADULT  Goal: Absence or prevention of progression during hospitalization  Description  INTERVENTIONS:  - Assess and monitor for signs and symptoms of infection  - Monitor lab/diagnostic results  - Monitor all insertion sites, i e  indwelling lines, tubes, and drains  - Monitor endotracheal if appropriate and nasal secretions for changes in amount and color  - [place] appropriate cooling/warming therapies per order  - Administer medications as ordered  - Instruct and encourage patient and family to use good hand hygiene technique  - Identify and instruct in appropriate isolation precautions for identified infection/condition  Outcome: Progressing     Problem: SAFETY ADULT  Goal: Patient will remain free of falls  Description  INTERVENTIONS:  - Assess patient frequently for physical needs  -  Identify cognitive and physical deficits and behaviors that affect risk of falls    -  [place] fall precautions as indicated by assessment   - Educate patient/family on patient safety including physical limitations  - Instruct patient to call for assistance with activity based on assessment  - Modify environment to reduce risk of injury  - Consider OT/PT consult to assist with strengthening/mobility  Outcome: Progressing  Goal: Maintain or return to baseline ADL function  Description  INTERVENTIONS:  -  Assess patient's ability to carry out ADLs; assess patient's baseline for ADL function and identify physical deficits which impact ability to perform ADLs (bathing, care of mouth/teeth, toileting, grooming, dressing, etc )  - Assess/evaluate cause of self-care deficits   - Assess range of motion  - Assess patient's mobility; develop plan if impaired  - Assess patient's need for assistive devices and provide as appropriate  - Encourage maximum independence but intervene and supervise when necessary  - Involve family in performance of ADLs  - Assess for home care needs following discharge   - Consider OT consult to assist with ADL evaluation and planning for discharge  - Provide patient education as appropriate  Outcome: Progressing  Goal: Maintain or return mobility status to optimal level  Description  INTERVENTIONS:  - Assess patient's baseline mobility status (ambulation, transfers, stairs, etc )    - Identify cognitive and physical deficits and behaviors that affect mobility  - Identify mobility aids required to assist with transfers and/or ambulation (gait belt, sit-to-stand, lift, walker, cane, etc )  - [place] fall precautions as indicated by assessment  - Record patient progress and toleration of activity level on Mobility SBAR; progress patient to next Phase/Stage  - Instruct patient to call for assistance with activity based on assessment  - Consider rehabilitation consult to assist with strengthening/weightbearing, etc   Outcome: Progressing     Problem: DISCHARGE PLANNING  Goal: Discharge to home or other facility with appropriate resources  Description  INTERVENTIONS:  - Identify barriers to discharge w/patient and caregiver  - Arrange for needed discharge resources and transportation as appropriate  - Identify discharge learning needs (meds, wound care, etc )  - Arrange for interpretive services to assist at discharge as needed  - Refer to Case Management Department for coordinating discharge planning if the patient needs post-hospital services based on physician/advanced practitioner order or complex needs related to functional status, cognitive ability, or social support system  Outcome: Progressing     Problem: Prexisting or High Potential for Compromised Skin Integrity  Goal: Skin integrity is maintained or improved  Description  INTERVENTIONS:  - Identify patients at risk for skin breakdown  - Assess and monitor skin integrity  - Assess and monitor nutrition and hydration status  - Monitor labs   - Assess for incontinence   - Turn and reposition patient  - Assist with mobility/ambulation  - Relieve pressure over bony prominences  - Avoid friction and shearing  - Provide appropriate hygiene as needed including keeping skin clean and dry  - Evaluate need for skin moisturizer/barrier cream  - Collaborate with interdisciplinary team   - Patient/family teaching  - Consider wound care consult   Outcome: Progressing     Problem: Potential for Falls  Goal: Patient will remain free of falls  Description  INTERVENTIONS:  - Assess patient frequently for physical needs  -  Identify cognitive and physical deficits and behaviors that affect risk of falls    -  [place] fall precautions as indicated by assessment   - Educate patient/family on patient safety including physical limitations  - Instruct patient to call for assistance with activity based on assessment  - Modify environment to reduce risk of injury  - Consider OT/PT consult to assist with strengthening/mobility  Outcome: Progressing

## 2019-08-30 NOTE — PROGRESS NOTES
08/30/19 1245   Pain Assessment   Pain Assessment No/denies pain   Restrictions/Precautions   Precautions Bed/chair alarms;Cognitive; Fall Risk;Supervision on toilet/commode   Braces or Orthoses AFO  (B LEs)   Subjective   Subjective no changes at this time from previous session   QI: Sit to Stand   Assistance Needed Physical assistance   Assistance Provided by [place] 75% or more   Sit to Stand CARE Score 2   QI: Chair/Bed-to-Chair Transfer   Assistance Needed Physical assistance   Assistance Provided by [place] 50%-74%   Chair/Bed-to-Chair Transfer CARE Score 2   Transfer Bed/Chair/Wheelchair   Limitations Noted In Balance; Coordination;Problem Solving; Sequencing;UE Strength;LE Strength   Adaptive Equipment Roller Walker   Stand Pivot Minimal Assist;Moderate Assist   Sit to Stand Moderate Assist;Maximum Assist   Stand to Sit Minimal Assist   Bed, Chair, Wheelchair Transfer (FIM) 2 - [place] needs to lift or boost to rise AND assist to sit   QI: Car Transfer   Reason if not Attempted Safety concerns   Car Transfer CARE Score 88   QI: Walk 10 Feet   Assistance Needed Physical assistance; Adaptive equipment   Assistance Provided by [place] 25%-49%   Walk 10 Feet CARE Score 3   QI: Walk 50 Feet with Two Turns   Assistance Needed Physical assistance; Adaptive equipment   Assistance Provided by [place] 25%-49%   Walk 50 Feet with Two Turns CARE Score 3   QI: Walk 150 Feet   Assistance Needed Physical assistance; Adaptive equipment   Assistance Provided by [place] 25%-49%   Walk 150 Feet CARE Score 3   QI: Walking 10 Feet on Uneven Surfaces   Reason if not Attempted Safety concerns   Walking 10 Feet on Uneven Surfaces CARE Score 88   Ambulation   Does the patient walk? 2  Yes   Primary Discharge Mode of Locomotion Walk   Walk Assist Level Minimum Assist   Gait Pattern Inconsistant Lois; Slow Lois;Decreased foot clearance; Forward Flexion; Improper weight shift   Assist Device Estela Garcia Walked (feet) 150 ft  (x2)   Limitations Noted In Balance; Coordination; Endurance; Heel Strike; Sequencing;Speed;Strength;Swing   Walking (FIM) 3 - Patient completes 50 - 74% of all tasks, needs more than steadying or light touch AND distance 150 feet or more, no rest   QI: Wheel 50 Feet with Two Turns   Reason if not Attempted Safety concerns   Wheel 50 Feet with Two Turns CARE Score 88   QI: Wheel 150 Feet   Reason if not Attempted Safety concerns   Wheel 150 Feet CARE Score 88   Wheelchair mobility   QI: Does the patient use a wheelchair? 0  No   QI: 1 Step (Curb)   Assistance Needed Physical assistance; Adaptive equipment   Assistance Provided by [place] Total assistance   Comment Ax2 for safety   1 Step (Curb) CARE Score 1   QI: 4 Steps   Assistance Needed Physical assistance; Adaptive equipment   Assistance Provided by [place] Total assistance   Comment Ax2 for safety   4 Steps CARE Score 1   QI: 12 Steps   Reason if not Attempted Safety concerns   12 Steps CARE Score 88   Stairs   Type Stairs   # of Steps 4   Weight Bearing Precautions Fall Risk   Assist Devices Bilateral Rail   Findings ascend 6"  steps up fwd/down bwds  pt quick and needs cues for safety  second person standby for safety   Stairs (FIM) 1 - Patient requires assist of two people   QI: Toilet Transfer   Assistance Needed Physical assistance; Adaptive equipment   Assistance Provided by [place] Total assistance   Comment Ax2 for safety   Toilet Transfer CARE Score 1   Toilet Transfer   Findings 2 people for safety   Toilet Transfer (FIM) 1 - Patient requires assist of two people   Therapeutic Interventions   Strengthening standing hip flex with RW  Balance short distance fwd/bwd with RW   standing at sink to wash hands and apply lotion  forearms on sink for support, encourage to try and stand up for balance training   Equipment Use   [place] 10mins   Assessment   Treatment Assessment pt cont to work on improving safety and activity tolerance    pt able to negotiate steps but demonstrates decrease safety  pt has ramp in place for at d/c   will practice with RW next session  pt cont to have impaired standing balance and relies heavily on UE support  will cont to work on standing balance and tolerance and strengthening to progress with functional mobility and ind    Family/Caregiver Present wife, Arie Cruz   Problem List Decreased strength;Decreased range of motion;Decreased endurance; Impaired balance;Decreased mobility; Decreased coordination;Decreased cognition; Impaired sensation;Decreased skin integrity;Orthopedic restrictions   Barriers to Discharge Decreased caregiver support   PT Barriers   Physical Impairment Decreased strength;Decreased endurance; Impaired balance;Decreased mobility; Decreased safety awareness;Decreased cognition   Functional Limitation Walking;Transfers;Standing;Ramp negotiation;Car transfers; Wheelchair management   Plan   Treatment/Interventions Functional transfer training;LE strengthening/ROM; Endurance training;Patient/family training;Bed mobility;Gait training   Progress Progressing toward goals   Recommendation   Recommendation 24 hour supervision/assist;Home PT;Outpatient PT; Home with family support   PT Therapy Minutes   PT Time In [id number]   PT Time Out 1345   PT Total Time (minutes) 60   PT Mode of treatment - Individual (minutes) 60   PT Mode of treatment - Concurrent (minutes) 0   PT Mode of treatment - Group (minutes) 0   PT Mode of treatment - Co-treat (minutes) 0   PT Mode of Teatment - Total time(minutes) 60 minutes   Therapy Time missed   Time missed?  No

## 2019-08-30 NOTE — PROGRESS NOTES
08/30/19 0830   Pain Assessment   Pain Assessment No/denies pain   Pain Score No Pain   Restrictions/Precautions   Precautions Bed/chair alarms;Cognitive; Fall Risk;Supervision on toilet/commode   Weight Bearing Restrictions No   ROM Restrictions No   Braces or Orthoses AFO   QI: Sit to Stand   Assistance Needed Physical assistance   Assistance Provided by [place] 50%-74%   Comment Pt continues to require min/modA depending on level of surace  Sit to Stand CARE Score 2   QI: Chair/Bed-to-Chair Transfer   Assistance Needed Physical assistance   Assistance Provided by [place] 50%-74%   Chair/Bed-to-Chair Transfer CARE Score 2   Transfer Bed/Chair/Wheelchair   Limitations Noted In Balance; Endurance;Problem Solving;UE Strength;LE Strength   Adaptive Equipment Roller Walker   Findings Continues to be retropulsive while in stance  Engaged in repetitive transfer training to reinforce carryover of proper technique with improvement noted by end of sessionl  Bed, Chair, Wheelchair Transfer (FIM) 3 - [place] needs to lift, boost or assist to stand OR sit   QI: [address] Needed Physical assistance   Assistance Provided by [place] Total assistance   Toileting Hygiene CARE Score 1   Toileting   Able to [address] down no, up no  Limitations Noted In Balance; Coordination;Problem Solving;LE Strength;UE Strength   Toileting (FIM) 1 - Patient requires two helpers   QI: Toilet Transfer   Assistance Needed Physical assistance   Assistance Provided by [facility] CARE Score 1   Toilet Transfer   Surface Assessed Standard Commode   Limitations Noted In Balance; Endurance;LE Strength;UE Strength; Safety;Problem Solving   Toilet Transfer (FIM) 1 - Patient requires assist of two people   Functional Standing Tolerance   Time 4 mins, 3 mins   Activity Pt engaged in static standing activity with focus on unilateral UE release and crossing midline to increase safety and independence with ADL tasks  Pt engaged in parquetry puzzle with noted deficits in depth perception as pt was noted to stack multiple shapes in spots which already were filled in  Pt noted to compensate by touch to ensure if space was empty  Pt overall [place] while in stance to ensure balance  Exercise Tools   Other Exercise Tool 1 Pt engaged in towel glides with LUE with slow stretch completed at end range to promote increased ROM and reduce stiffness for increased independence with ADL tasks  Pt tolerates well and reports increased comfort  Completes 3 x 10 in all planes  Other Exercise Tool 2 Pt completes RUE strengthening with use of 3# weight for bicep curls, and 2# weight for chest press and circles  Pt tolerates well completing 3 x 10 each to increase UB strength for increased independence with functional transfers  Cognition   Overall Cognitive Status Impaired   Arousal/Participation Alert; Cooperative   Attention Attends with cues to redirect   Orientation Level Oriented X4   Memory Decreased short term memory;Decreased recall of recent events   Following Commands Follows one step commands with increased time or repetition   Activity Tolerance   Activity Tolerance Patient tolerated treatment well   Assessment   Treatment Assessment Pt participated in skilled OT services with focus on functional transfers, standing tolerance, endurance, strengthening and standing balance  Pt overall tolerated session well  Pleasant and cooperative  Motivated to meet goals  Pt demos decreased carryover of new learning and requires repetition for reinforcement  Pt continues to be retropulsive initially while in stance  Pt will continue to benefit from skilled OT services with focus on functional transfers, standing balance/tolerance, strengthening, endurance, and activity tolerance  OT Family training done with: Wife, Jean Paul Trivedi  Assessment of family training Pt's wife, Jean Paul Trivedi, present at end of session   She observed functional transfers and is receptive to all education  Prognosis Fair   Problem List Decreased strength;Decreased range of motion;Decreased endurance; Impaired balance;Decreased mobility; Decreased coordination;Decreased cognition; Impaired sensation;Decreased skin integrity;Orthopedic restrictions   Plan   Treatment/Interventions ADL retraining;Functional transfer training; Therapeutic exercise; Endurance training;Cognitive reorientation;Patient/family training;Equipment eval/education; Bed mobility; Compensatory technique education   Progress Progressing toward goals   Recommendation   OT Discharge Recommendation Home with family support   OT Therapy Minutes   OT Time In 0830   OT Time Out 1000   OT Total Time (minutes) 90   OT Mode of treatment - Individual (minutes) 90   OT Mode of treatment - Concurrent (minutes) 0   OT Mode of treatment - Group (minutes) 0   OT Mode of treatment - Co-treat (minutes) 0   OT Mode of Teatment - Total time(minutes) 90 minutes   Therapy Time missed   Time missed?  No

## 2019-08-30 NOTE — PROGRESS NOTES
08/30/19 1030   Pain Assessment   Pain Assessment No/denies pain   Restrictions/Precautions   Precautions Bed/chair alarms;Cognitive; Fall Risk;Supervision on toilet/commode   Braces or Orthoses AFO  (B LEs)   Cognition   Overall Cognitive Status Impaired   Arousal/Participation Cooperative   Attention Attends with cues to redirect   Memory Decreased short term memory;Decreased recall of recent events;Decreased recall of precautions   Following Commands Follows one step commands with increased time or repetition   Subjective   Subjective pt with no complaints at this time; pt ready for therapy   QI: Sit to Stand   Assistance Needed Physical assistance   Assistance Provided by [place] 75% or more   Comment fluctuates based on surface height   Sit to Stand CARE Score 2   QI: Chair/Bed-to-Chair Transfer   Assistance Needed Physical assistance; Adaptive equipment   Assistance Provided by [place] 50%-74%   Comment retropulsive approaching the chair   Chair/Bed-to-Chair Transfer CARE Score 2   Transfer Bed/Chair/Wheelchair   Limitations Noted In Balance; Coordination;Problem Solving;UE Strength;LE Strength   Adaptive Equipment Roller Walker   Stand Pivot Minimal Assist;Moderate Assist   Sit to Stand Moderate Assist;Maximum Assist   Stand to Sit Moderate Assist   Findings A for STS fluctuates based on height of surface  elevated mat pt Benny  cues for hand and foot placement   min/modA for SPT due to retropulsive approaching the chair and not maintaining balance when sitting   Bed, Chair, Wheelchair Transfer (FIM) 2 - [place] needs to lift or boost to rise AND assist to sit   QI: Car Transfer   Reason if not Attempted Safety concerns   Car Transfer CARE Score 88   QI: Walk 10 Feet   Assistance Needed Physical assistance; Adaptive equipment   Assistance Provided by [place] 25%-49%   Walk 10 Feet CARE Score 3   QI: Walk 50 Feet with Two Turns   Assistance Needed Physical assistance; Adaptive equipment   Assistance Provided by [place] 25%-49%   Walk 50 Feet with Two Turns CARE Score 3   QI: Walk 150 Feet   Reason if not Attempted Safety concerns   Walk 150 Feet CARE Score 88   QI: Walking 10 Feet on Uneven Surfaces   Reason if not Attempted Safety concerns   Walking 10 Feet on Uneven Surfaces CARE Score 88   Ambulation   Does the patient walk? 2  Yes   Primary Discharge Mode of Locomotion Walk   Walk Assist Level Minimum Assist   Gait Pattern Inconsistant Lois; Slow Lois;Decreased foot clearance; Improper weight shift   Assist Device Roller Gabrielle Garcia Walked (feet) 75 ft  (x2)   Limitations Noted In Balance; Coordination; Endurance; Heel Strike; Sequencing;Speed;Strength;Swing   Findings becomes SOB, O2 WFL  Walking (FIM) 2 - Patient ambulates between 50 - 149 feet regardless of assist/device/set up   QI: Wheel 50 Feet with Two Turns   Reason if not Attempted Safety concerns   Wheel 50 Feet with Two Turns CARE Score 88   QI: Wheel 150 Feet   Reason if not Attempted Safety concerns   Wheel 150 Feet CARE Score 88   Wheelchair mobility   QI: Does the patient use a wheelchair? 0  No   QI: 1 Step (Curb)   Reason if not Attempted Safety concerns   1 Step (Curb) CARE Score 88   QI: 4 Steps   Reason if not Attempted Safety concerns   4 Steps CARE Score 88   QI: 12 Steps   Reason if not Attempted Safety concerns   12 Steps CARE Score 88   Assessment   Treatment Assessment pt focused on repeated txs and short distance amb to work on turns and controlling balance when approaching a chair  pt cont to be retropulsive and leans into chair/mat when in front of it to stabilize and does not maintain balance before sitting  pt requires VCs more than half the time for safe hand placement and foot placement with txs  pt maxA from low surfaces and Benny from elevated mat table  will cont to work on strength and balance to progress with functional txs and amb      Family/Caregiver Present wife, Saintclair Peaches   Problem List Decreased strength;Decreased range of motion;Decreased endurance; Impaired balance;Decreased mobility; Decreased coordination;Decreased cognition; Impaired sensation;Decreased skin integrity;Orthopedic restrictions   Barriers to Discharge Decreased caregiver support   PT Barriers   Physical Impairment Decreased strength;Decreased endurance; Impaired balance;Decreased mobility; Decreased safety awareness;Decreased cognition   Functional Limitation Walking;Transfers;Standing;Ramp negotiation;Car transfers; Wheelchair management   Plan   Treatment/Interventions Functional transfer training;LE strengthening/ROM; Endurance training;Patient/family training;Bed mobility;Gait training   Progress Progressing toward goals   Recommendation   Recommendation 24 hour supervision/assist;Home PT; Home with family support   PT Therapy Minutes   PT Time In 1030   PT Time Out 1130   PT Total Time (minutes) 60   PT Mode of treatment - Individual (minutes) 60   PT Mode of treatment - Concurrent (minutes) 0   PT Mode of treatment - Group (minutes) 0   PT Mode of treatment - Co-treat (minutes) 0   PT Mode of Teatment - Total time(minutes) 60 minutes   Therapy Time missed   Time missed?  No

## 2019-08-31 LAB
GLUCOSE SERPL-MCNC: 101 MG/DL (ref 65–140)
GLUCOSE SERPL-MCNC: 156 MG/DL (ref 65–140)
GLUCOSE SERPL-MCNC: 168 MG/DL (ref 65–140)
GLUCOSE SERPL-MCNC: 184 MG/DL (ref 65–140)
INR PPP: 2.09 (ref 0.84–1.19)
PROTHROMBIN TIME: 23 SECONDS (ref 11.6–14.5)

## 2019-08-31 PROCEDURE — 97116 GAIT TRAINING THERAPY: CPT

## 2019-08-31 PROCEDURE — 97110 THERAPEUTIC EXERCISES: CPT

## 2019-08-31 PROCEDURE — 97530 THERAPEUTIC ACTIVITIES: CPT

## 2019-08-31 PROCEDURE — 85610 PROTHROMBIN TIME: CPT | Performed by: NURSE PRACTITIONER

## 2019-08-31 PROCEDURE — 82948 REAGENT STRIP/BLOOD GLUCOSE: CPT

## 2019-08-31 RX ADMIN — ASPIRIN 81 MG: 81 TABLET, COATED ORAL at 09:43

## 2019-08-31 RX ADMIN — INSULIN GLARGINE 8 UNITS: 100 INJECTION, SOLUTION SUBCUTANEOUS at 22:24

## 2019-08-31 RX ADMIN — ATORVASTATIN CALCIUM 40 MG: 40 TABLET, FILM COATED ORAL at 17:12

## 2019-08-31 RX ADMIN — TAMSULOSIN HYDROCHLORIDE 0.4 MG: 0.4 CAPSULE ORAL at 09:43

## 2019-08-31 RX ADMIN — INSULIN LISPRO 1 UNITS: 100 INJECTION, SOLUTION INTRAVENOUS; SUBCUTANEOUS at 22:25

## 2019-08-31 RX ADMIN — DOCUSATE SODIUM 100 MG: 100 CAPSULE, LIQUID FILLED ORAL at 09:43

## 2019-08-31 RX ADMIN — RANOLAZINE 500 MG: 500 TABLET, FILM COATED, EXTENDED RELEASE ORAL at 22:25

## 2019-08-31 RX ADMIN — FINASTERIDE 5 MG: 5 TABLET, FILM COATED ORAL at 09:43

## 2019-08-31 RX ADMIN — WARFARIN SODIUM 1.5 MG: 1 TABLET ORAL at 17:10

## 2019-08-31 RX ADMIN — RANOLAZINE 500 MG: 500 TABLET, FILM COATED, EXTENDED RELEASE ORAL at 09:43

## 2019-08-31 RX ADMIN — LIDOCAINE 1 PATCH: 50 PATCH CUTANEOUS at 17:11

## 2019-08-31 RX ADMIN — POLYSACCHARIDE-IRON COMPLEX 150 MG: 150 CAPSULE ORAL at 09:43

## 2019-08-31 RX ADMIN — LEVOTHYROXINE SODIUM 75 MCG: 75 TABLET ORAL at 05:25

## 2019-08-31 RX ADMIN — INSULIN LISPRO 1 UNITS: 100 INJECTION, SOLUTION INTRAVENOUS; SUBCUTANEOUS at 11:38

## 2019-08-31 RX ADMIN — VITAMIN D, TAB 1000IU (100/BT) 2000 UNITS: 25 TAB at 09:43

## 2019-08-31 RX ADMIN — DOCUSATE SODIUM 100 MG: 100 CAPSULE, LIQUID FILLED ORAL at 17:10

## 2019-08-31 RX ADMIN — INSULIN LISPRO 1 UNITS: 100 INJECTION, SOLUTION INTRAVENOUS; SUBCUTANEOUS at 17:12

## 2019-08-31 RX ADMIN — METOPROLOL TARTRATE 50 MG: 50 TABLET, FILM COATED ORAL at 17:10

## 2019-08-31 RX ADMIN — METOPROLOL TARTRATE 50 MG: 50 TABLET, FILM COATED ORAL at 07:34

## 2019-08-31 NOTE — PLAN OF CARE
Problem: PAIN - ADULT  Goal: Verbalizes/displays adequate comfort level or baseline comfort level  Description  Interventions:  - Encourage patient to monitor pain and request assistance  - Assess pain using appropriate pain scale  - Administer analgesics based on type and severity of pain and evaluate response  - Implement non-pharmacological measures as appropriate and evaluate response  - Consider cultural and social influences on pain and pain management  - Notify physician/advanced practitioner if interventions unsuccessful or patient reports new pain  Outcome: Progressing     Problem: INFECTION - ADULT  Goal: Absence or prevention of progression during hospitalization  Description  INTERVENTIONS:  - Assess and monitor for signs and symptoms of infection  - Monitor lab/diagnostic results  - Monitor all insertion sites, i e  indwelling lines, tubes, and drains  - Monitor endotracheal if appropriate and nasal secretions for changes in amount and color  - [place] appropriate cooling/warming therapies per order  - Administer medications as ordered  - Instruct and encourage patient and family to use good hand hygiene technique  - Identify and instruct in appropriate isolation precautions for identified infection/condition  Outcome: Progressing     Problem: SAFETY ADULT  Goal: Patient will remain free of falls  Description  INTERVENTIONS:  - Assess patient frequently for physical needs  -  Identify cognitive and physical deficits and behaviors that affect risk of falls    -  [place] fall precautions as indicated by assessment   - Educate patient/family on patient safety including physical limitations  - Instruct patient to call for assistance with activity based on assessment  - Modify environment to reduce risk of injury  - Consider OT/PT consult to assist with strengthening/mobility  Outcome: Progressing     Problem: SAFETY ADULT  Goal: Maintain or return to baseline ADL function  Description  INTERVENTIONS:  -  Assess patient's ability to carry out ADLs; assess patient's baseline for ADL function and identify physical deficits which impact ability to perform ADLs (bathing, care of mouth/teeth, toileting, grooming, dressing, etc )  - Assess/evaluate cause of self-care deficits   - Assess range of motion  - Assess patient's mobility; develop plan if impaired  - Assess patient's need for assistive devices and provide as appropriate  - Encourage maximum independence but intervene and supervise when necessary  - Involve family in performance of ADLs  - Assess for home care needs following discharge   - Consider OT consult to assist with ADL evaluation and planning for discharge  - Provide patient education as appropriate  Outcome: Progressing     Problem: SAFETY ADULT  Goal: Maintain or return mobility status to optimal level  Description  INTERVENTIONS:  - Assess patient's baseline mobility status (ambulation, transfers, stairs, etc )    - Identify cognitive and physical deficits and behaviors that affect mobility  - Identify mobility aids required to assist with transfers and/or ambulation (gait belt, sit-to-stand, lift, walker, cane, etc )  - [place] fall precautions as indicated by assessment  - Record patient progress and toleration of activity level on Mobility SBAR; progress patient to next Phase/Stage  - Instruct patient to call for assistance with activity based on assessment  - Consider rehabilitation consult to assist with strengthening/weightbearing, etc   Outcome: Progressing     Problem: Prexisting or High Potential for Compromised Skin Integrity  Goal: Skin integrity is maintained or improved  Description  INTERVENTIONS:  - Identify patients at risk for skin breakdown  - Assess and monitor skin integrity  - Assess and monitor nutrition and hydration status  - Monitor labs   - Assess for incontinence   - Turn and reposition patient  - Assist with mobility/ambulation  - Relieve pressure over bony prominences  - Avoid friction and shearing  - Provide appropriate hygiene as needed including keeping skin clean and dry  - Evaluate need for skin moisturizer/barrier cream  - Collaborate with interdisciplinary team   - Patient/family teaching  - Consider wound care consult   Outcome: Progressing     Problem: Potential for Falls  Goal: Patient will remain free of falls  Description  INTERVENTIONS:  - Assess patient frequently for physical needs  -  Identify cognitive and physical deficits and behaviors that affect risk of falls    -  [place] fall precautions as indicated by assessment   - Educate patient/family on patient safety including physical limitations  - Instruct patient to call for assistance with activity based on assessment  - Modify environment to reduce risk of injury  - Consider OT/PT consult to assist with strengthening/mobility  Outcome: Progressing

## 2019-08-31 NOTE — PROGRESS NOTES
08/31/19 0830   Pain Assessment   Pain Assessment No/denies pain   Pain Score No Pain   Restrictions/Precautions   Precautions Bed/chair alarms;Cognitive; Fall Risk;Supervision on toilet/commode   Braces or Orthoses AFO  (B LEs)   Cognition   Overall Cognitive Status Impaired   Arousal/Participation Cooperative   Attention Attends with cues to redirect   Memory Decreased short term memory;Decreased recall of recent events;Decreased recall of precautions   Following Commands Follows one step commands with increased time or repetition   Subjective   Subjective pt with no complaints at this time   QI: Roll Left and Right   Assistance Needed Physical assistance   Assistance Provided by [place] 25%-49%   Comment pt has hospital bed at home   Roll Left and Right CARE Score 3   QI: Sit to Lying   Assistance Needed Physical assistance   Assistance Provided by [place] Less than 25%   Sit to Lying CARE Score 3   QI: Lying to Sitting on Side of Bed   Assistance Needed Physical assistance   Assistance Provided by [place] 50%-74%   Lying to Sitting on Side of Bed CARE Score 2   QI: Sit to Stand   Assistance Needed Physical assistance   Assistance Provided by [place] 50%-74%   Sit to Stand CARE Score 2   QI: Chair/Bed-to-Chair Transfer   Assistance Needed Physical assistance; Adaptive equipment   Assistance Provided by [place] 25%-49%   Chair/Bed-to-Chair Transfer CARE Score 3   Transfer Bed/Chair/Wheelchair   Limitations Noted In Balance; Coordination; Endurance;Problem Solving; Sequencing;UE Strength;LE Strength   Adaptive Equipment Roller Walker   Stand Pivot Contact Guard;Minimal Assist   Sit to Stand Moderate Assist   Stand to Sit Minimal Assist   Supine to Sit Moderate Assist   Sit to Supine Contact Guard;Minimal Assist   Findings VCs to improve descend into chair to avoid retropulsion       Bed, Chair, Wheelchair Transfer (FIM) 2 - [place] needs to lift or boost to rise AND assist to sit   QI: Car Transfer   Reason if not Attempted Safety concerns   Car Transfer CARE Score 88   QI: Walk 10 Feet   Assistance Needed Physical assistance; Adaptive equipment   Assistance Provided by [place] Less than 25%   Walk 10 Feet CARE Score 3   QI: Walk 50 Feet with Two Turns   Assistance Needed Physical assistance; Adaptive equipment   Assistance Provided by [place] Less than 25%   Walk 50 Feet with Two Turns CARE Score 3   QI: Walk 150 Feet   Assistance Needed Physical assistance; Adaptive equipment   Assistance Provided by [place] Less than 25%   Walk 150 Feet CARE Score 3   QI: Walking 10 Feet on Uneven Surfaces   Reason if not Attempted Safety concerns   Walking 10 Feet on Uneven Surfaces CARE Score 88   Ambulation   Does the patient walk? 2  Yes   Primary Discharge Mode of Locomotion Walk   Walk Assist Level Contact Guard;Minimum Assist   Gait Pattern Inconsistant Lois; Slow Lois;Decreased foot clearance; Improper weight shift; Forward Flexion   Assist Device Roller Gabrielle Garcia Walked (feet) 150 ft  (x2, 165x2)   Limitations Noted In Balance; Endurance; Heel Strike;Speed; Sequencing;Strength;Swing   Findings limited by SOB and fatigue  O2 96%  good PLB technique   Walking (FIM) 4 - Patient requires steadying assist or light touching AND distance 150 feet or more, no rest   Wheelchair mobility   QI: Does the patient use a wheelchair? 0  No   QI: 1 Step (Curb)   Assistance Needed Physical assistance; Adaptive equipment   Assistance Provided by [place] Less than 25%   1 Step (Curb) CARE Score 3   QI: 4 Steps   Assistance Needed Physical assistance; Adaptive equipment   Assistance Provided by [place] Less than 25%   4 Steps CARE Score 3   QI: 12 Steps   Reason if not Attempted Safety concerns   12 Steps CARE Score 88   Stairs   Type Stairs   # of Steps 6   Weight Bearing Precautions Fall Risk   Assist Devices Bilateral Rail   Findings 6"  steps, ascend fwd/descend bwd   decrease foot clearance with LLE descending   Stairs (FIM) 2 - Patient goes up and down 4 - 11 stairs regardless of assist/device/setup   Therapeutic Interventions   Strengthening STS x10 for functional strenghtening and improve sequencing  supine KTC x20, B/L, SAQ x20 B/L   Equipment Use   NuStep L1 10mins   Assessment   Treatment Assessment pt cont to work on improving endurance and safety with functional mobility  pt with improved txs but based on height of chair  requires VCs more than half the time to perform txs safely  pt has hospital bed at home and used rails for bed mobility  wife present during session and states she can put AFOs on but needed more education to be more comfortable with putting them on   pt cont to be deconditioned and limited by SOB  pt with good breathing technique and O2 stats WNL  will cont to focus on balance and activity tolerance to decrease burden of care for d/c home with wife  Family/Caregiver Present wife and son   Problem List Decreased strength;Decreased range of motion;Decreased endurance; Impaired balance;Decreased mobility; Decreased coordination;Decreased cognition; Impaired sensation;Decreased skin integrity;Orthopedic restrictions   Barriers to Discharge Decreased caregiver support   PT Barriers   Physical Impairment Decreased strength;Decreased endurance; Impaired balance;Decreased mobility; Decreased safety awareness;Decreased cognition   Functional Limitation Walking;Transfers;Standing;Ramp negotiation;Car transfers; Wheelchair management   Plan   Treatment/Interventions Functional transfer training;LE strengthening/ROM; Endurance training;Patient/family training;Bed mobility;Gait training   Progress Progressing toward goals   Recommendation   Recommendation 24 hour supervision/assist;Home PT;Outpatient PT; Home with family support   Equipment Recommended Wheelchair;Walker   PT Therapy Minutes   PT Time In 0830   PT Time Out 1000   PT Total Time (minutes) 90   PT Mode of treatment - Individual (minutes) 90   PT Mode of treatment - Concurrent (minutes) 0   PT Mode of treatment - Group (minutes) 0   PT Mode of treatment - Co-treat (minutes) 0   PT Mode of Teatment - Total time(minutes) 90 minutes   Therapy Time missed   Time missed?  No

## 2019-08-31 NOTE — PROGRESS NOTES
Internal Medicine Progress Note  Patient: Flora Wan  Age/sex: 80 y o  male  Medical Record #: [id number]      ASSESSMENT/PLAN: (Interval History)  Flora Wan is seen and examined and management for following issues:    Volume overload/chronic diastolic CHF:  s/p Lasix IV, weight dropped 11 lbs = per renal, target weight is 168-170 lbs  Renal managing his diuretic tx = currently Torsemide 40mg qd     Abnormal Troponin: felt to be 2/2 creatinine/vol overload putting stress on the heart; no further testing etc was recommended     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:  stable     Iron deficiency anemia: renal gave 2 doses Venofer while in hospital and now started on Niferex 150mg qd     Hx urine retention/BPH; Klebsiella UTI 6/2019 and Klebsiella urosepsis 7/2019:  Uro saw in 7/2019 and added added Finasteride  Consideration was to be given to starting suppressive therapy with Nitrofurantion 100mg qhs  Currently, he is on Flomax/Finasteride  Will need to watch closely UTI      PAF:  continue Lopressor; Coumadin had been on hold until INR dropped to an acceptable level -->  3mg last 2 nights and then back to 1 5mg qd = will continue same      Hx Dual chamber PPM:  sees Dr Jefe Whittington as an OP     CVA (2014):  continue ASA/Lipitor     CAD/CABG (2013):   was here at [facility] from 6/12-6/16/19 for chest pain  He was seen by Cardiology and had a nuclear stress test showing a small, severe, reversible myocardial perfusion defect of the basal inferior wall  He was started on Ranexa 500mg BID but no further plans for intervention were recommended  Continue Lopressor, Lipitor and ASA as well     DM2: takes Trajenta at home = will have someone bring in and start when sure he is eating well; on 8/28, discontinued Lantus since FBS was 45   Last night, restarted  Lantus 8U qam and next week switch over to home Trajenta       Hypothyroidism:  continue current Levothyroxine     Left L2-3 microdiskectomy 7/18/19 at [facility]    Vitamin D deficiency:  Level is 22 6; will d/w renal      Subjective/ HPI: Patients overnight issues or events were reviewed with nursing or staff during rounds or morning huddle session  No new or overnight events  DM2: adding back an AM dose of Lantus with intention of restarting home Trajenta next week  Anemia: s/p venofer, now on iron PO   CAD:  Stable on current tx    Offers no complaints    ROS:     GI: denies abdominal pain, change bowel habits or reflux symptoms  Neuro: Denies any headache, new vision changes, new neuropathies,new weaknesses   Respiratory: No Cough, SOB, denies wheeze  Cardiovascular: No CP, palpitations , denies perception of rapid heartbeat  : denies any new urinary burning or frequency    Review of Scheduled Meds:    Current Facility-Administered Medications:  acetaminophen 650 mg Oral Q6H PRN Eliecer Sellers MD   aspirin 81 mg Oral Daily Eliecer Sellers MD   atorvastatin 40 mg Oral QPM Eliecer Sellers MD   bisacodyl 10 mg Rectal Daily PRN Eliecer Sellers MD   cholecalciferol 2,000 Units Oral Daily Eliecer Sellers MD   docusate sodium 100 mg Oral BID Eliecer Sellers MD   finasteride 5 mg Oral Daily Thedore Null, CRNP   insulin glargine 8 Units Subcutaneous HS Thedore Null, CRNP   insulin lispro 1-5 Units Subcutaneous TID MAURICE Bender   insulin lispro 1-5 Units Subcutaneous HS Thedore Null, CRNP   iron polysaccharides 150 mg Oral Daily Colette Bryant MD   levothyroxine 75 mcg Oral Daily Eliecer Sellers MD   lidocaine 1 patch Topical Daily Eliecer Sellers MD   lidocaine  Topical Q4H PRN Eliecer Sellers MD   menthol-methyl salicylate  Apply externally 4x Daily PRN Eliecer Sellers MD   metoprolol tartrate 50 mg Oral BID With Meals Eliecer Sellers MD   ondansetron 4 mg Intravenous Q6H PRN Eliecer Sellers MD   polyethylene glycol 17 g Oral Daily PRN Eliecer Sellers MD   ranolazine 500 mg Oral Q12H [facility] Eliecer Sellers MD   tamsulosin 0 4 mg Oral Daily Petra Brice MD   torsemide 40 mg Oral Daily Petra Brice MD   warfarin 1 5 mg Oral Daily (warfarin) MAURICE Mosquera       Labs:     Results from last 7 days   Lab Units 08/29/19  0529   WBC Thousand/uL 7 91   HEMOGLOBIN g/dL 9 1*   HEMATOCRIT % 29 0*   PLATELETS Thousands/uL 215     Results from last 7 days   Lab Units 08/29/19  0529 08/28/19  0559   SODIUM mmol/L 136 136   POTASSIUM mmol/L 4 2 4 1   CHLORIDE mmol/L 100 103   CO2 mmol/L 28 25   BUN mg/dL 56* 53*   CREATININE mg/dL 3 64* 3 56*   CALCIUM mg/dL 8 6 8 4         Results from last 7 days   Lab Units 08/31/19  0623 08/30/19  0615   INR  2 09* 1 81*          Results from last 7 days   Lab Units 08/31/19  0638 08/30/19  2037 08/30/19  1607   POC GLUCOSE mg/dl 101 147* 160*       Imaging:     No orders to display       *Labs reviewed  *Radiology studies reviewed  *Medications reviewed and reconciled as needed  *Please refer to order section for additional ordered labs studies  *Case discussed with primary attending during morning huddle case rounds    Physical Examination:  Vitals:   Vitals:    08/31/19 0820 08/31/19 0821 08/31/19 0822 08/31/19 0942   BP: 134/68 132/66 136/62 100/70   BP Location: Left arm Left arm Left arm    Pulse: 68 72 66    Resp:       Temp:       TempSrc:       SpO2:       Weight:           General Appearance: NAD, conversive  Eyes: No icterus; conjunctiva normal, PERRLA  HENT: oropharynx clear; mucous membranes moist  Neck: trachea midline, range of motion full  Supple w/o pain  Lungs: CTA, normal respiratory effort, no retractions, expiratory effort normal  CV: regular rate, no rubs/gallops; +murmur  ABD: soft; NT/ND; +BS  EXT: 1+ edema LE = improving  Skin: normal turgor, normal texture, no rashes  Psych: affect normal, no overt anxiety/depression during exam today   Neuro: AAOx3            Total time spent: At least 40 minutes, with more than 50% spent counseling/coordinating care   Counseling includes discussion with patient re: progress  and discussion with patient of his/her current medical state/information  Coordination of patient's care was performed in conjunction with primary service  Time invested included review of patient's labs, vitals, and management of their comorbidities with continued monitoring  In addition, this patient was discussed with medical team including physician and advanced extenders  The care of the patient was extensively discussed and appropriate treatment plan was formulated unique for this patient  ** Please Note: Dragon 360 Dictation voice to text software may have been used in the creation of this document   **

## 2019-09-01 LAB
GLUCOSE SERPL-MCNC: 127 MG/DL (ref 65–140)
GLUCOSE SERPL-MCNC: 129 MG/DL (ref 65–140)
GLUCOSE SERPL-MCNC: 218 MG/DL (ref 65–140)
GLUCOSE SERPL-MCNC: 78 MG/DL (ref 65–140)
INR PPP: 2.36 (ref 0.84–1.19)
PROTHROMBIN TIME: 25.3 SECONDS (ref 11.6–14.5)

## 2019-09-01 PROCEDURE — 97110 THERAPEUTIC EXERCISES: CPT

## 2019-09-01 PROCEDURE — 97530 THERAPEUTIC ACTIVITIES: CPT

## 2019-09-01 PROCEDURE — 97116 GAIT TRAINING THERAPY: CPT

## 2019-09-01 PROCEDURE — G0515 COGNITIVE SKILLS DEVELOPMENT: HCPCS

## 2019-09-01 PROCEDURE — 85610 PROTHROMBIN TIME: CPT | Performed by: NURSE PRACTITIONER

## 2019-09-01 PROCEDURE — 82948 REAGENT STRIP/BLOOD GLUCOSE: CPT

## 2019-09-01 RX ADMIN — RANOLAZINE 500 MG: 500 TABLET, FILM COATED, EXTENDED RELEASE ORAL at 08:09

## 2019-09-01 RX ADMIN — ATORVASTATIN CALCIUM 40 MG: 40 TABLET, FILM COATED ORAL at 17:10

## 2019-09-01 RX ADMIN — LIDOCAINE 1 PATCH: 50 PATCH CUTANEOUS at 17:21

## 2019-09-01 RX ADMIN — FINASTERIDE 5 MG: 5 TABLET, FILM COATED ORAL at 08:10

## 2019-09-01 RX ADMIN — DOCUSATE SODIUM 100 MG: 100 CAPSULE, LIQUID FILLED ORAL at 08:10

## 2019-09-01 RX ADMIN — DOCUSATE SODIUM 100 MG: 100 CAPSULE, LIQUID FILLED ORAL at 17:10

## 2019-09-01 RX ADMIN — METOPROLOL TARTRATE 50 MG: 50 TABLET, FILM COATED ORAL at 07:22

## 2019-09-01 RX ADMIN — VITAMIN D, TAB 1000IU (100/BT) 2000 UNITS: 25 TAB at 08:10

## 2019-09-01 RX ADMIN — INSULIN LISPRO 1 UNITS: 100 INJECTION, SOLUTION INTRAVENOUS; SUBCUTANEOUS at 21:37

## 2019-09-01 RX ADMIN — ASPIRIN 81 MG: 81 TABLET, COATED ORAL at 08:10

## 2019-09-01 RX ADMIN — TAMSULOSIN HYDROCHLORIDE 0.4 MG: 0.4 CAPSULE ORAL at 08:10

## 2019-09-01 RX ADMIN — WARFARIN SODIUM 1.5 MG: 1 TABLET ORAL at 17:11

## 2019-09-01 RX ADMIN — RANOLAZINE 500 MG: 500 TABLET, FILM COATED, EXTENDED RELEASE ORAL at 21:37

## 2019-09-01 RX ADMIN — POLYSACCHARIDE-IRON COMPLEX 150 MG: 150 CAPSULE ORAL at 08:09

## 2019-09-01 RX ADMIN — LEVOTHYROXINE SODIUM 75 MCG: 75 TABLET ORAL at 06:00

## 2019-09-01 RX ADMIN — TORSEMIDE 40 MG: 20 TABLET ORAL at 08:10

## 2019-09-01 RX ADMIN — INSULIN GLARGINE 8 UNITS: 100 INJECTION, SOLUTION SUBCUTANEOUS at 21:36

## 2019-09-01 NOTE — PROGRESS NOTES
Internal Medicine Progress Note  Patient: Odalys Malodnado  Age/sex: 80 y o  male  Medical Record #: [id number]      ASSESSMENT/PLAN: (Interval History)  Odalys Maldonado is seen and examined and management for following issues:    Volume overload/chronic diastolic CHF:  s/p Lasix IV, weight dropped 11 lbs = per renal, target weight is 168-170 lbs  Renal managing his diuretic tx = currently Torsemide 40mg qd  FR 1500ml; 2Gm NA diet     Abnormal Troponin: felt to be 2/2 creatinine/vol overload putting stress on the heart; no further testing etc was recommended     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:  stable     Iron deficiency anemia: renal gave 2 doses Venofer while in hospital and now started on Niferex 150mg qd     Hx urine retention/BPH; Klebsiella UTI 6/2019 and Klebsiella urosepsis 7/2019:  Uro saw in 7/2019 and added added Finasteride  Consideration was to be given to starting suppressive therapy with Nitrofurantion 100mg qhs  Currently, he is on Flomax/Finasteride  Will need to watch closely UTI  HTN:  BPs running a bit higher = will watch; on Lopressor 50mg BID     PAF:  continue Lopressor; Coumadin had been on hold until INR dropped to an acceptable level -->  3mg x 2 nights and then back to 1 5mg qd = will continue same      Hx Dual chamber PPM:  sees Dr Arben Parikh as an OP     CVA (2014):  continue ASA/Lipitor     CAD/CABG (2013):   was [facility] at [facility] from 6/12-6/16/19 for chest pain  He was seen by Cardiology and had a nuclear stress test showing a small, severe, reversible myocardial perfusion defect of the basal inferior wall  He was started on Ranexa 500mg BID but no further plans for intervention were recommended  Continue Lopressor, Lipitor and ASA as well     DM2: takes Trajenta at home = will have someone bring in and start when sure he is eating well; on 8/28, discontinued Lantus since FBS was 45  Two nights ago, restarted  Lantus 8U qam and next week switch over to home Trajenta   No changes today     Hypothyroidism:  continue current Levothyroxine     Left L2-3 microdiskectomy 7/18/19 at [facility]    Vitamin D deficiency:  Level is 22 6; will d/w renal      Subjective/ HPI: Patients overnight issues or events were reviewed with nursing or staff during rounds or morning huddle session  No new or overnight events  DM2: better with adding back an AM dose of Lantus  Anemia: s/p venofer, now on iron PO   CAD:  Stable on current tx    Offers no complaints    ROS:     GI: denies abdominal pain, change bowel habits or reflux symptoms  Neuro: Denies any headache, new vision changes, new neuropathies,new weaknesses   Respiratory: No Cough, SOB, denies wheeze  Cardiovascular: No CP, palpitations , denies perception of rapid heartbeat  : denies any new urinary burning or frequency    Review of Scheduled Meds:    Current Facility-Administered Medications:  acetaminophen 650 mg Oral Q6H PRN Alley Sandhu MD   aspirin 81 mg Oral Daily Alley Sandhu MD   atorvastatin 40 mg Oral QPM Alley Sandhu MD   bisacodyl 10 mg Rectal Daily PRN Alley Sandhu MD   cholecalciferol 2,000 Units Oral Daily Alley Sandhu MD   docusate sodium 100 mg Oral BID Alley Sandhu MD   finasteride 5 mg Oral Daily MAURICE Dailey   insulin glargine 8 Units Subcutaneous HS MAURICE Dailey   insulin lispro 1-5 Units Subcutaneous TID MAURICE Bender   insulin lispro 1-5 Units Subcutaneous HS MAURICE Dailey   iron polysaccharides 150 mg Oral Daily Olivia Ngo MD   levothyroxine 75 mcg Oral Daily Alley Sandhu MD   lidocaine 1 patch Topical Daily Alley Sandhu MD   lidocaine  Topical Q4H PRN Alley MD Edson   menthol-methyl salicylate  Apply externally 4x Daily PRN Alley Sandhu MD   metoprolol tartrate 50 mg Oral BID With Meals Alley Sandhu MD   ondansetron 4 mg Intravenous Q6H PRN Alley Sandhu MD   polyethylene glycol 17 g Oral Daily PRN MD alejandra Joseph 500 mg Oral Q12H [facility] Christine Earl MD   tamsulosin 0 4 mg Oral Daily Christine Earl MD   torsemide 40 mg Oral Daily Christine Earl MD   warfarin 1 5 mg Oral Daily (warfarin) MAURICE Leone       Labs:     Results from last 7 days   Lab Units 08/29/19  0529   WBC Thousand/uL 7 91   HEMOGLOBIN g/dL 9 1*   HEMATOCRIT % 29 0*   PLATELETS Thousands/uL 215     Results from last 7 days   Lab Units 08/29/19  0529 08/28/19  0559   SODIUM mmol/L 136 136   POTASSIUM mmol/L 4 2 4 1   CHLORIDE mmol/L 100 103   CO2 mmol/L 28 25   BUN mg/dL 56* 53*   CREATININE mg/dL 3 64* 3 56*   CALCIUM mg/dL 8 6 8 4         Results from last 7 days   Lab Units 09/01/19  0533 08/31/19  0623   INR  2 36* 2 09*          Results from last 7 days   Lab Units 09/01/19  1038 09/01/19  0650 08/31/19  2118   POC GLUCOSE mg/dl 129 78 168*       Imaging:     No orders to display       *Labs reviewed  *Radiology studies reviewed  *Medications reviewed and reconciled as needed  *Please refer to order section for additional ordered labs studies  *Case discussed with primary attending during morning huddle case rounds    Physical Examination:  Vitals:   Vitals:    08/31/19 1710 08/31/19 1954 09/01/19 0646 09/01/19 0722   BP: 139/67 152/68 148/62 157/66   BP Location: Right arm Right arm Right arm    Pulse: 71 69 70 68   Resp:  18 16    Temp:  98 3 °F (36 8 °C) 98 7 °F (37 1 °C)    TempSrc:  Oral Oral    SpO2:  97% 90%    Weight:           General Appearance: NAD, conversive  Eyes: No icterus; conjunctiva normal, PERRLA  HENT: oropharynx clear; mucous membranes moist  Neck: trachea midline, range of motion full   Supple w/o pain  Lungs: CTA, normal respiratory effort, no retractions, expiratory effort normal  CV: regular rate, no rubs/gallops; +murmur  ABD: soft; NT/ND; +BS  EXT: 1+ edema LE = improving  Skin: normal turgor, normal texture, no rashes  Psych: affect normal, no overt anxiety/depression during exam today   Neuro: AAOx3 Total time spent: At least 40 minutes, with more than 50% spent counseling/coordinating care  Counseling includes discussion with patient re: progress  and discussion with patient of his/her current medical state/information  Coordination of patient's care was performed in conjunction with primary service  Time invested included review of patient's labs, vitals, and management of their comorbidities with continued monitoring  In addition, this patient was discussed with medical team including physician and advanced extenders  The care of the patient was extensively discussed and appropriate treatment plan was formulated unique for this patient  ** Please Note: Dragon 360 Dictation voice to text software may have been used in the creation of this document   **

## 2019-09-01 NOTE — PROGRESS NOTES
Physical Medicine and Rehabilitation Progress Note  Odalys Maldonado 80 y o  male MRN: [id number]  Unit/Bed#: [facility] 021-75 Encounter: [id number]    Physical Medicine and Rehabilitation Progress Note  Odalys Maldonado 80 y o  male MRN: [id number]  Unit/Bed#: [facility] 969-01 Encounter: [id number]    Chief Complaints:  Decline in function    Subjective/Interval Events:   Patient reports back pain not significant without significant radiating pain down legs recently  He and his family are pleased with his functional progress and the care he is receiving thus far at [facility]  Patient denies fever, dysuria, bladder problems, constipation, calf pain, SOB, increased swelling, or other complaints  ROS: A 10-point ROS was performed  Negative except as listed above  Overall Assessment/relevant history:  [age]-year-old male with past medical history of chronic diastolic CHF, chronic kidney disease stage 4, paroxysmal atrial fibrillation, dual chamber permanent pacemaker, coronary artery disease, CABG, diabetes mellitus 2, hypothyroidism, BPH, recurrent UTIs, lumbar spinal stenosis, chronic low back pain, foot drop, history of left L2-L3 microdiskectomy in July of 2019 the developed worsening swelling with associated decline in ADLs and ambulation  Patient splits seen by Cardiology and Nephrology with noted worsening of renal function and volume overload believed to be related to this  Patient required initial IV and then more recently p o  Increase in diuretics  Patient's volume status has been improving although his functional status remains below his baseline    Patient was evaluated by skilled therapies and was found to have significant decline in ADLs and ambulation appropriate for admission to [facility]        Functional status on admission to [facility]:  PT:  Transfers max assist, ambulation moderate assist 150 feet  OT:  Lower body dressing, toileting total assist, bathing, transfers max assist, upper body dressing moderate assist    Functional status (recent):    Transfers mod assist; ambulation 60 ft - max assist    Functional status goal:  Supervision to Modified independent for ADLs and ambulation     * Impaired mobility and ADLs  Assessment & Plan  Stable functional gain; continue mgmt as outlined   Multifactorial:  Recent volume overload related to acute on chronic kidney disease, history of diastolic CHF, generalized weakness including proximal lower extremity weakness possibly related to some chronic illness/critical illness deconditioning, low back pain and radiating leg pain with right foot drop from lumbar spinal stenosis possible nerve impingement status post surgical intervention recently, 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, left adhesive capsulitis, query component cognitive impairment,   - Recommend acute comprehensive interdisciplinary inpatient rehabilitation to include intensive skilled therapies (PT, OT) as outlined with oversight and management by rehabilitation physician as well as inpatient rehab level nursing, case management and weekly interdisciplinary team meetings  - [address] [place] [address] 16/30 with greatest deficits in memory and executive functioning   - optimal management of each  - Follow-up with PMR after discharge      Buttock wound  Assessment & Plan  Appreciated on admission to [address] [place] [address] care nurse c/s and assistance with management  - Notify MD of new or increasing drainage, development of purulent drainage, increased size/depth of wound, lack of healing, inability to maintain wound integrity due to degree of drainage, wound product, dressing, or currently ordered frequency of wound management  In general dressings should be changed PRN if soiled unless specifically noted otherwise  Do not allow soiled dressing on patient for extended period of time    - Turn patient Q2H   - Hydragaurd to buttocks and sacrum BID & PRN v Alleyvn >  - EHOB waffle cushion to chair/WC when OOB  - Maximum time in chair 2 hours at a time  - OOB minimum 3 times per day  Notify MD if unable to get patient OOB 3 times per day  - Elevate heels with pillows to offload  - Moisturizer daily and PRN to dry skin; do not use moisturizer on areas of redness or skin breakdown unless otherwise documented  - Nursing to document in chart and Notify MD if buttock, sacrum, heel, or other skin site develops erythema or skin breakdown as soon as possible  If patient is soiling themselves with urine or stool notify MD   If you are unable to maintain skin integrity and prevent erythema due to frequency of soiling notify MD as soon as possible  Kidney disease with fluid retention  Assessment & Plan  Kidney function stable continue management as outlined  Diuretics at their discretion  Internal medicine consult and management during [facility] course  Nephrology c/s to assist with mgmt  Cr  stable recently; monitor intermittently  Limit nephrotoxic agents when possible  WENDY hose       Cognitive impairment  Assessment & Plan  > MOCA 16/30 indicating moderate cog impairment with deficits greatest in domains of short term memory and executive functioning  > Wife states patients cognitive status has been largely stable for some time  > CT head 6/2019 - Encephalomalacia within the right frontal and parietal lobe extending inferiorly into the posterior superior temporal lobe  Chronic microangiopathic change within both cerebral hemispheres  Brainstem and cerebellum demonstrate normal density  No mass or hemorrhage  Mild cerebral volume loss  VENTRICLES AND EXTRA-AXIAL SPACES:  Asymmetry with mild ex vacuo dilatation of the posterior body, atria and occipital horn of the right lateral ventricle  Mild chronic microangiopathic change and age-appropriate cerebral volume loss    Right hemispheric encephalomalacia and gliosis suggestive of old infarction   > Increased risk of dementia and falls  > Compensatory strategies and adjustments in PT/OT as indicated  > Follow-up with neurology after d/c and PMR          Pain  Assessment & Plan  Controlled, continue management  Try to limit sedating meds   APAP PRN   LD patch  Counseled on and continue to encourage deep breathing/relaxation/behavioral pain management techniques:     Deep breathin seconds in, 5 seconds out, 5 times per hour when awake and PRN when in pain or anticipate pain; avoid holding breath and tightening muscles and instead breathe slowly and deeply  Monitor for oversedation, AMS/delirium, and respiratory depression   If being administered - hold opiates, muscle relaxants, benzodiazepines, and gabapentin for oversedation, AMS, or RR<12        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  Assessment & Plan  Pain improved from baseline > continue mgmt as outlined  Complaining of some chronic right low back pain with intermittent radiating pain down the leg; also with right foot drop  Status post recent lumbar surgery - left L2-3 microdiskectomy  [facility])   Try to limit sedating medications  Acetaminophen as needed  Lidocaine patch for now patient prefers    Frozen shoulder  Assessment & Plan  Skilled therapies, modalities as indicated    History of recurrent UTIs  Assessment & Plan  No s/s of UTI > monitor   Internal medicine consult and management during [facility] course  Currently on flomax and proscar  Follow-up with Urology after discharge    Benign prostatic hyperplasia with urinary hesitancy  Assessment & Plan  Continue flomax and Proscar; monitor vitals  - monitor for retention, incontinence, signs/symptoms of UTI  - recommend voiding trial; nursing prompt to void followed by bladder scan starting Q4-6H or after each patient initiated void; nursing to record voided output and bladder scan totals; straight cath PRN >350-400 cc; if post void residual bladder scans are <150 cc x3 consecutive voids, can stop scans       Presence of permanent cardiac pacemaker  Assessment & Plan  +    Anticoagulated on warfarin  Assessment & Plan  Management per Medicine with recent supratherapeutic INR    Chronic diastolic heart failure [facility])  Assessment & Plan  Internal medicine consult and management during [facility] course  Anti thrombotics per Medicine, blood pressure medications at their discretion     Paroxysmal A-fib [facility])  Assessment & Plan  Internal medicine consult and management during [facility] course  Anticoagulation at their discretion  Monitor rate    Diabetes mellitus with multiple complications [facility])  Assessment & Plan  Lab Results   Component Value Date    HGBA1C 5 5 08/22/2019     Internal medicine consulted and management at their discretion  Monitor for signs and symptoms of hypoglycemia   Current meds: lantus, lispro       Essential hypertension  Assessment & Plan  BP acceptable  Internal medicine consulted and management at their discretion  Monitor vitals with and without activity; monitor for orthostasis  Monitor hemoglobin, electrolytes, kidney function, hydration status   Current meds:  Metoprolol, amlodipine      3-vessel coronary artery disease  Assessment & Plan  Antiplatelet, optimal blood pressure control, statin  Ranexa    Hyperlipidemia  Assessment & Plan  Statin    Anemia  Assessment & Plan  Hemoglobin monitor   Iron supplementation per Nephrology  IM consulted and assistance with management during [facility] course  Monitor H/H, vitals, signs/symptoms of acute bleeding        Vitamin D insufficiency  Assessment & Plan  Cholecalciferol Vit D3 2000 units daily   Follow-up with PCP      Healthcare maintenance  Assessment & Plan  Obtain vitamin-D level    Hypothyroidism  Assessment & Plan  Levothyroxine    Elevated serum alkaline phosphatase level  Assessment & Plan  Repeat CMP      # Bowel care:    - monitor for constipation, incontinence, and diarrhea  - goal 1 appropriate BM every 1-2 days  - recommend colace +/- senna and PRN bowel protocol     # At risk for venous thromboembolism:  - Recommend SCDs and mobilization  - A/C     # Diet/Hydration:    Diabetic/cardiac     Disposition:   Home with family with [facility] 2 weeks from admission      Follow-up:   PCP, PMR, Nephrology, Cardiology, urology     CODE: Level 3: DNAR and DNI     Restrictions include: Fall precautions   ---------------------------------------------------------------------------------------------------------------------    Objective: Allergies and Medications per EMR    Physical Exam:  T afebrile P 98 5 /67  General: Awake, alert in NAD, lying in bed   HENT:  MMM  Respiratory: Unlabored breathing, breath sounds equal, Lungs CTA, no wheezes, rales, or rhonchi  Cardiovascular: Regular rate and rhythm, no murmurs, rubs, or gallops  Gastrointestinal: Soft, non-tender,  mildly-distended, normoactive bowel sounds  Genitourinary: No camargo  SkiN/MSK/Extremities:   Less 1+ bilateral lower extremity edema and improving left upper extremity distal edema  No calf tenderness to palpation  Neurologic/Psych:   MENTAL STATUS: awake, orientation intact  Affect:  Euthymic  Strength: R prox LE 2/5, R DF 3/5, PF 4/5, L prox LE 3+/5, L distal LE 5-/5    Physical exam performed, documentation above reviewed and updated if appropriate on relevant date of encounter:   8/30/19    Diagnostic Studies: reviewed, no new imaging  No results found      Laboratory:    Results from last 7 days   Lab Units 08/29/19  0529   HEMOGLOBIN g/dL 9 1*   HEMATOCRIT % 29 0*   WBC Thousand/uL 7 91     Results from last 7 days   Lab Units 08/29/19  0529 08/28/19  0559 08/27/19  0442   BUN mg/dL 56* 53* 47*   POTASSIUM mmol/L 4 2 4 1 4 0   CHLORIDE mmol/L 100 103 100   CREATININE mg/dL 3 64* 3 56* 3 85*   AST U/L 15  --   --    ALT U/L 12  --   --      Results from last 7 days   Lab Units 09/01/19  0533 08/31/19  0623 08/30/19  0615   PROTIME seconds 25 3* 23 0* 20 5*   INR  2 36* 2 09* 1 81*        Patient Active Problem List   Diagnosis    3-vessel coronary artery disease    Asbestosis ([facility] [address] )    Kidney disease with fluid retention    [facility]    Elevated serum alkaline phosphatase level    Hyperlipidemia    Essential hypertension    Hypothyroidism    Paroxysmal A-fib (HCC)    Seasonal allergies    Increased frequency of urination    Frozen shoulder    Herpes zoster without complication    Depressed mood    Chest pain    Ambulatory dysfunction/generalized weakness    Leg edema, left    Shortness of breath    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    UTI (urinary tract infection)    Tibial artery occlusion, left (HCC)    Hyponatremia    Chronic diastolic heart failure (HCC)    Benign prostatic hyperplasia with urinary hesitancy    Abnormal chest x-ray    Warfarin-induced coagulopathy (HCC)    Impaired mobility and ADLs    Anticoagulated on warfarin    History of recurrent UTIs    Buttock wound    Presence of permanent cardiac pacemaker    Pain    Anemia    Healthcare maintenance    Cognitive impairment    Vitamin D insufficiency       ** Please Note: Fluency Direct voice to text software may have been used in the creation of this document  **    Total visit time: At least 25 minutes, with more than 50% spent counseling/coordinating care  Counseling includes discussion with patient re: progress in therapies, functional issues observed by therapy staff, and discussion with patient regarding their current medical state and wellbeing  Coordination of patient's care was performed in conjunction with Internal Medicine service to monitor patient's labs, vitals, and management of their comorbidities      Miguel Rogers MD, [address] [place] [address]  Physical Medicine and Rehabilitation  Brain Injury Medicine

## 2019-09-01 NOTE — PLAN OF CARE
Problem: PAIN - ADULT  Goal: Verbalizes/displays adequate comfort level or baseline comfort level  Description  Interventions:  - Encourage patient to monitor pain and request assistance  - Assess pain using appropriate pain scale  - Administer analgesics based on type and severity of pain and evaluate response  - Implement non-pharmacological measures as appropriate and evaluate response  - Consider cultural and social influences on pain and pain management  - Notify physician/advanced practitioner if interventions unsuccessful or patient reports new pain  Outcome: Progressing     Problem: INFECTION - ADULT  Goal: Absence or prevention of progression during hospitalization  Description  INTERVENTIONS:  - Assess and monitor for signs and symptoms of infection  - Monitor lab/diagnostic results  - Monitor all insertion sites, i e  indwelling lines, tubes, and drains  - Monitor endotracheal if appropriate and nasal secretions for changes in amount and color  - [place] appropriate cooling/warming therapies per order  - Administer medications as ordered  - Instruct and encourage patient and family to use good hand hygiene technique  - Identify and instruct in appropriate isolation precautions for identified infection/condition  Outcome: Progressing     Problem: SAFETY ADULT  Goal: Patient will remain free of falls  Description  INTERVENTIONS:  - Assess patient frequently for physical needs  -  Identify cognitive and physical deficits and behaviors that affect risk of falls    -  [place] fall precautions as indicated by assessment   - Educate patient/family on patient safety including physical limitations  - Instruct patient to call for assistance with activity based on assessment  - Modify environment to reduce risk of injury  - Consider OT/PT consult to assist with strengthening/mobility  Outcome: Progressing     Problem: SAFETY ADULT  Goal: Maintain or return to baseline ADL function  Description  INTERVENTIONS:  -  Assess patient's ability to carry out ADLs; assess patient's baseline for ADL function and identify physical deficits which impact ability to perform ADLs (bathing, care of mouth/teeth, toileting, grooming, dressing, etc )  - Assess/evaluate cause of self-care deficits   - Assess range of motion  - Assess patient's mobility; develop plan if impaired  - Assess patient's need for assistive devices and provide as appropriate  - Encourage maximum independence but intervene and supervise when necessary  - Involve family in performance of ADLs  - Assess for home care needs following discharge   - Consider OT consult to assist with ADL evaluation and planning for discharge  - Provide patient education as appropriate  Outcome: Progressing     Problem: SAFETY ADULT  Goal: Maintain or return mobility status to optimal level  Description  INTERVENTIONS:  - Assess patient's baseline mobility status (ambulation, transfers, stairs, etc )    - Identify cognitive and physical deficits and behaviors that affect mobility  - Identify mobility aids required to assist with transfers and/or ambulation (gait belt, sit-to-stand, lift, walker, cane, etc )  - [place] fall precautions as indicated by assessment  - Record patient progress and toleration of activity level on Mobility SBAR; progress patient to next Phase/Stage  - Instruct patient to call for assistance with activity based on assessment  - Consider rehabilitation consult to assist with strengthening/weightbearing, etc   Outcome: Progressing     Problem: Prexisting or High Potential for Compromised Skin Integrity  Goal: Skin integrity is maintained or improved  Description  INTERVENTIONS:  - Identify patients at risk for skin breakdown  - Assess and monitor skin integrity  - Assess and monitor nutrition and hydration status  - Monitor labs   - Assess for incontinence   - Turn and reposition patient  - Assist with mobility/ambulation  - Relieve pressure over bony prominences  - Avoid friction and shearing  - Provide appropriate hygiene as needed including keeping skin clean and dry  - Evaluate need for skin moisturizer/barrier cream  - Collaborate with interdisciplinary team   - Patient/family teaching  - Consider wound care consult   Outcome: Progressing     Problem: Potential for Falls  Goal: Patient will remain free of falls  Description  INTERVENTIONS:  - Assess patient frequently for physical needs  -  Identify cognitive and physical deficits and behaviors that affect risk of falls    -  [place] fall precautions as indicated by assessment   - Educate patient/family on patient safety including physical limitations  - Instruct patient to call for assistance with activity based on assessment  - Modify environment to reduce risk of injury  - Consider OT/PT consult to assist with strengthening/mobility  Outcome: Progressing

## 2019-09-01 NOTE — PROGRESS NOTES
09/01/19 1000   Pain Assessment   Pain Assessment No/denies pain   Pain Score No Pain   Restrictions/Precautions   Precautions Bed/chair alarms;Cognitive; Fall Risk;Impulsive;Supervision on toilet/commode   Weight Bearing Restrictions No   ROM Restrictions No   Braces or Orthoses AFO  (BLE)   QI: Upper Body Dressing   Assistance Needed Physical assistance   Assistance Provided by [place] 25%-49%   Comment Able to remove shirt  Pt thread BLEs but req A to bring over head and adjust down in back due to dec shoulder ROM( L worse than R)   Upper Body Dressing CARE Score 3   QI: Lower Body Dressing   Assistance Needed Physical assistance   Assistance Provided by [place] Total assistance   Lower Body Dressing CARE Score 1   QI: Putting On/Taking Off Footwear   Assistance Needed Physical assistance   Assistance Provided by [place] Total assistance   Comment totA for donning/doffing shoes and AFOs   Putting On/Taking Off Footwear CARE Score 1   Dressing/Undressing Clothing   UB Dressing (FIM) 3 - Patient completes  50-74% of all tasks   LB Dressing (FIM) 1 - Patient completes less than 25% of all tasks   QI: Sit to Stand   Assistance Needed Physical assistance   Assistance Provided by [place] 50%-74%   Comment retropulsive at times   Sit to Stand CARE Score 2   QI: Chair/Bed-to-Chair Transfer   Assistance Needed Physical assistance   Assistance Provided by [place] 50%-74%   Comment retropulsive when approaching chair attempting to sit before close enough   Chair/Bed-to-Chair Transfer CARE Score 2   Transfer Bed/Chair/Wheelchair   Adaptive Equipment Roller [employer], Chair, Wheelchair Transfer (FIM) 2 - [place] needs to lift or boost to rise AND assist to sit   Exercise Tools   Other Exercise Tool 1 Completed towel glides + 3# resistance to promote UE strength and endurance within active range for 3x10 in forward reach, lateral slide, and circles in both directions   Pt reports no pain and able to move through full supported range with exercise  Other Exercise Tool 2 Completed tool work with bird house supported on slightly elevated table while pt seated to promote active UE ROM to inc indep with ADL tasks including donning shirt and managing oral care as pt with difficulty bringing hands to mouth at times  Pt reports quick fatigue req rest breaks after removing each screw  Pt with no pain with task  Pt able to each only slight greater than half range with RUE and about 1/3 range with LUE in flexion pattern  Cognition   Overall Cognitive Status Impaired   Arousal/Participation Cooperative   Attention Attends with cues to redirect   Orientation Level Oriented X4   Memory Decreased short term memory;Decreased recall of recent events;Decreased recall of precautions   Following Commands Follows one step commands with increased time or repetition   Activity Tolerance   Activity Tolerance Patient tolerated treatment well   Assessment   Treatment Assessment Pt participated in skilled OT session with focus on dressing, functional transfers, UE strengthening, UE ROM, b/l coordination, and functional cognition  Pt cont to be motivated to engage in all therapeutic tasks  Pt completed safety cue cards with 75% accuracy req occasional prompts  For example, pt comparing cards with women smoking  Pt reporting problem is that she may become tangled in bed sheets  Cued to look at what was in woman's hand with pt then reporting "oh she could burn herself " When directed towards unsafe situation, pt able to discuss appropriately why unsafe and what it may cause  Pt cont to fluctuate with functional transfers as pt retropulsive at times with fatigue and dec attention to task  Pt would benefit from cont therapy with focus on transfers, dynamic reach, balance in unilateral hand release for LB dressing and hygiene, act tolerance  Cont with POC  Prognosis Fair   Problem List Decreased strength;Decreased range of motion;Decreased endurance; Impaired balance;Decreased mobility; Decreased coordination;Decreased cognition; Impaired sensation;Decreased skin integrity;Orthopedic restrictions   Barriers to Discharge Decreased caregiver support   Plan   Treatment/Interventions ADL retraining;Functional transfer training; Therapeutic exercise; Endurance training;Patient/family training;Bed mobility; Equipment eval/education   Progress Progressing toward goals   Recommendation   OT Discharge Recommendation 24 hour supervision/assist  (family support pending progress)   OT Therapy Minutes   OT Time In 1000   OT Time Out 1130   OT Total Time (minutes) 90   OT Mode of treatment - Individual (minutes) 90   OT Mode of treatment - Concurrent (minutes) 0   OT Mode of treatment - Group (minutes) 0   OT Mode of treatment - Co-treat (minutes) 0   OT Mode of Teatment - Total time(minutes) 90 minutes   Therapy Time missed   Time missed?  No

## 2019-09-01 NOTE — PROGRESS NOTES
09/01/19 1230   Pain Assessment   Pain Assessment No/denies pain   Pain Score No Pain   Restrictions/Precautions   Precautions Bed/chair alarms;Cognitive; Fall Risk;Supervision on toilet/commode   Braces or Orthoses AFO  (B/L LEs)   Cognition   Overall Cognitive Status Impaired   Arousal/Participation Cooperative   Attention Attends with cues to redirect   Memory Decreased short term memory;Decreased recall of precautions   Following Commands Follows one step commands with increased time or repetition   Subjective   Subjective no complaints; pt states he is tired   QI: Sit to Stand   Assistance Needed Physical assistance   Assistance Provided by [place] 50%-74%   Comment retropulsive   Sit to Stand CARE Score 2   QI: Chair/Bed-to-Chair Transfer   Assistance Needed Physical assistance; Adaptive equipment   Assistance Provided by [place] 50%-74%   Comment retropulsive when turning to get in front of chair   Chair/Bed-to-Chair Transfer CARE Score 2   Transfer Bed/Chair/Wheelchair   Limitations Noted In Balance;Problem Solving; Sequencing;UE Strength;LE Strength   Adaptive Equipment Roller Walker   Stand Pivot Moderate Assist   Sit to Stand Moderate Assist   Stand to Sit Moderate Assist   Findings more retropulsive today  decrease fwd wt shift with standing and leans back before reaching back when sitting down  pt with increase posterior lean with SPT causing pt to need min/modA   Bed, Chair, Wheelchair Transfer (FIM) 2 - [place] needs to lift or boost to rise AND assist to sit   QI: Walk 10 Feet   Assistance Needed Physical assistance; Adaptive equipment   Assistance Provided by [place] Less than 25%   Walk 10 Feet CARE Score 3   QI: Walk 50 Feet with Two Turns   Assistance Needed Physical assistance; Adaptive equipment   Assistance Provided by [place] Less than 25%   Walk 50 Feet with Two Turns CARE Score 3   QI: Walk 150 Feet   Assistance Needed Physical assistance; Adaptive equipment   Assistance Provided by [place] Less than 25%   Walk 150 Feet CARE Score 3   QI: Walking 10 Feet on Uneven Surfaces   Reason if not Attempted Safety concerns   Walking 10 Feet on Uneven Surfaces CARE Score 88   Ambulation   Does the patient walk? 2  Yes   Primary Discharge Mode of Locomotion Walk   Walk Assist Level Minimum Assist;Contact Guard   Gait Pattern Inconsistant Lois; Slow Lois;Decreased foot clearance; Forward Flexion; Improper weight shift;Narrow JUAREZ   Assist Device Roller Gabrielle Garcia Walked (feet) 150 ft  (x3)   Limitations Noted In Balance; Endurance; Heel Strike;Posture;Speed;Strength;Swing   Findings decrease L foot clearance, cues to keep RW closer to improve stability   Walking (FIM) 4 - Patient requires steadying assist or light touching AND distance 150 feet or more, no rest   QI: Wheel 50 Feet with Two Turns   Reason if not Attempted Activity not applicable   Wheel 50 Feet with Two Turns CARE Score 9   QI: Wheel 150 Feet   Reason if not Attempted Activity not applicable   Wheel 637 Feet CARE Score 9   Wheelchair mobility   QI: Does the patient use a wheelchair? 0  No   QI: 1 Step (Curb)   Assistance Needed Physical assistance; Adaptive equipment   Assistance Provided by [place] 50%-74%   1 Step (Curb) CARE Score 2   QI: 4 Steps   Assistance Needed Physical assistance; Adaptive equipment   Assistance Provided by [place] 50%-74%   4 Steps CARE Score 2   QI: 12 Steps   Reason if not Attempted Safety concerns   12 Steps CARE Score 88   Stairs   Type Stairs   # of Steps 4   Weight Bearing Precautions Fall Risk   Assist Devices Bilateral Rail   Findings increase retropulsion and difficulty with foot placement    pt with poor safety awareness when unable to clear foot    Stairs (FIM) 2 - Patient goes up and down 4 - 11 stairs regardless of assist/device/setup   Therapeutic Interventions   Strengthening standing hip flex x20   Balance walking along the rail with HHA on L   fwd/bwd    Equipment Use   NuStep L1 10mins   Assessment Treatment Assessment pt with decrease balance and safety awareness today with functional mobility  pt with increase retropulsion with txs and stairs and unable to correct with balance training  pt needs VCs for hand placement and sequencing more than half the session to decrease falls  pt more SOB this session but O2 stats 97% with activity   pt back to recliner at end of session to rest   will cont to work on standing tolerance and balance to decrease falls and decrease burden of care at home  Family/Caregiver Present wife and dtr   Problem List Decreased strength;Decreased range of motion;Decreased endurance; Impaired balance;Decreased mobility; Decreased coordination;Decreased cognition; Impaired sensation;Decreased skin integrity;Orthopedic restrictions   Barriers to Discharge Decreased caregiver support   PT Barriers   Physical Impairment Decreased strength;Decreased endurance; Impaired balance;Decreased mobility; Decreased safety awareness;Decreased cognition   Functional Limitation Walking;Transfers;Standing;Ramp negotiation;Car transfers; Wheelchair management   Plan   Treatment/Interventions Functional transfer training;LE strengthening/ROM; Endurance training;Patient/family training;Bed mobility;Gait training   Progress Progressing toward goals   Recommendation   Recommendation 24 hour supervision/assist;Home PT; Home with family support   Equipment Recommended Wheelchair;Walker   PT Therapy Minutes   PT Time In 1230   PT Time Out 1400   PT Total Time (minutes) 90   PT Mode of treatment - Individual (minutes) 90   PT Mode of treatment - Concurrent (minutes) 0   PT Mode of treatment - Group (minutes) 0   PT Mode of treatment - Co-treat (minutes) 0   PT Mode of Teatment - Total time(minutes) 90 minutes   Therapy Time missed   Time missed?  No

## 2019-09-02 PROBLEM — N18.4 CKD (CHRONIC KIDNEY DISEASE) STAGE 4, GFR 15-29 ML/MIN (HCC): Status: ACTIVE | Noted: 2019-09-02

## 2019-09-02 LAB
ANION GAP SERPL CALCULATED.3IONS-SCNC: 8 MMOL/L (ref 4–13)
BASOPHILS # BLD AUTO: 0.03 THOUSANDS/ΜL (ref 0–0.1)
BASOPHILS NFR BLD AUTO: 1 % (ref 0–1)
BUN SERPL-MCNC: 50 MG/DL (ref 5–25)
CALCIUM SERPL-MCNC: 8.5 MG/DL (ref 8.3–10.1)
CHLORIDE SERPL-SCNC: 101 MMOL/L (ref 100–108)
CO2 SERPL-SCNC: 27 MMOL/L (ref 21–32)
CREAT SERPL-MCNC: 3.53 MG/DL (ref 0.6–1.3)
EOSINOPHIL # BLD AUTO: 0.27 THOUSAND/ΜL (ref 0–0.61)
EOSINOPHIL NFR BLD AUTO: 4 % (ref 0–6)
ERYTHROCYTE [DISTWIDTH] IN BLOOD BY AUTOMATED COUNT: 14.6 % (ref 11.6–15.1)
GFR SERPL CREATININE-BSD FRML MDRD: 15 ML/MIN/1.73SQ M
GLUCOSE P FAST SERPL-MCNC: 84 MG/DL (ref 65–99)
GLUCOSE SERPL-MCNC: 103 MG/DL (ref 65–140)
GLUCOSE SERPL-MCNC: 126 MG/DL (ref 65–140)
GLUCOSE SERPL-MCNC: 153 MG/DL (ref 65–140)
GLUCOSE SERPL-MCNC: 159 MG/DL (ref 65–140)
GLUCOSE SERPL-MCNC: 84 MG/DL (ref 65–140)
HCT VFR BLD AUTO: 28.2 % (ref 36.5–49.3)
HGB BLD-MCNC: 8.8 G/DL (ref 12–17)
IMM GRANULOCYTES # BLD AUTO: 0.02 THOUSAND/UL (ref 0–0.2)
IMM GRANULOCYTES NFR BLD AUTO: 0 % (ref 0–2)
INR PPP: 2.17 (ref 0.84–1.19)
LYMPHOCYTES # BLD AUTO: 2.01 THOUSANDS/ΜL (ref 0.6–4.47)
LYMPHOCYTES NFR BLD AUTO: 30 % (ref 14–44)
MCH RBC QN AUTO: 30.1 PG (ref 26.8–34.3)
MCHC RBC AUTO-ENTMCNC: 31.2 G/DL (ref 31.4–37.4)
MCV RBC AUTO: 97 FL (ref 82–98)
MONOCYTES # BLD AUTO: 0.72 THOUSAND/ΜL (ref 0.17–1.22)
MONOCYTES NFR BLD AUTO: 11 % (ref 4–12)
NEUTROPHILS # BLD AUTO: 3.61 THOUSANDS/ΜL (ref 1.85–7.62)
NEUTS SEG NFR BLD AUTO: 54 % (ref 43–75)
NRBC BLD AUTO-RTO: 0 /100 WBCS
PLATELET # BLD AUTO: 223 THOUSANDS/UL (ref 149–390)
PMV BLD AUTO: 10.1 FL (ref 8.9–12.7)
POTASSIUM SERPL-SCNC: 4 MMOL/L (ref 3.5–5.3)
PROTHROMBIN TIME: 23.7 SECONDS (ref 11.6–14.5)
RBC # BLD AUTO: 2.92 MILLION/UL (ref 3.88–5.62)
SODIUM SERPL-SCNC: 136 MMOL/L (ref 136–145)
WBC # BLD AUTO: 6.66 THOUSAND/UL (ref 4.31–10.16)

## 2019-09-02 PROCEDURE — 97110 THERAPEUTIC EXERCISES: CPT

## 2019-09-02 PROCEDURE — 99232 SBSQ HOSP IP/OBS MODERATE 35: CPT | Performed by: INTERNAL MEDICINE

## 2019-09-02 PROCEDURE — 97535 SELF CARE MNGMENT TRAINING: CPT

## 2019-09-02 PROCEDURE — 80048 BASIC METABOLIC PNL TOTAL CA: CPT | Performed by: NURSE PRACTITIONER

## 2019-09-02 PROCEDURE — 97116 GAIT TRAINING THERAPY: CPT

## 2019-09-02 PROCEDURE — 85025 COMPLETE CBC W/AUTO DIFF WBC: CPT | Performed by: NURSE PRACTITIONER

## 2019-09-02 PROCEDURE — 82948 REAGENT STRIP/BLOOD GLUCOSE: CPT

## 2019-09-02 PROCEDURE — 85610 PROTHROMBIN TIME: CPT | Performed by: NURSE PRACTITIONER

## 2019-09-02 PROCEDURE — 97530 THERAPEUTIC ACTIVITIES: CPT

## 2019-09-02 RX ORDER — WARFARIN SODIUM 1 MG/1
3 TABLET ORAL 2 TIMES WEEKLY
Status: DISCONTINUED | OUTPATIENT
Start: 2019-09-02 | End: 2019-09-14 | Stop reason: HOSPADM

## 2019-09-02 RX ORDER — WARFARIN SODIUM 1 MG/1
1.5 TABLET ORAL
Status: DISCONTINUED | OUTPATIENT
Start: 2019-09-03 | End: 2019-09-14 | Stop reason: HOSPADM

## 2019-09-02 RX ADMIN — ATORVASTATIN CALCIUM 40 MG: 40 TABLET, FILM COATED ORAL at 17:16

## 2019-09-02 RX ADMIN — VITAMIN D, TAB 1000IU (100/BT) 2000 UNITS: 25 TAB at 09:22

## 2019-09-02 RX ADMIN — ASPIRIN 81 MG: 81 TABLET, COATED ORAL at 09:21

## 2019-09-02 RX ADMIN — WARFARIN SODIUM 3 MG: 1 TABLET ORAL at 17:21

## 2019-09-02 RX ADMIN — LEVOTHYROXINE SODIUM 75 MCG: 75 TABLET ORAL at 05:17

## 2019-09-02 RX ADMIN — METOPROLOL TARTRATE 25 MG: 25 TABLET, FILM COATED ORAL at 17:16

## 2019-09-02 RX ADMIN — METOPROLOL TARTRATE 50 MG: 50 TABLET, FILM COATED ORAL at 09:28

## 2019-09-02 RX ADMIN — DOCUSATE SODIUM 100 MG: 100 CAPSULE, LIQUID FILLED ORAL at 17:16

## 2019-09-02 RX ADMIN — LIDOCAINE 1 PATCH: 50 PATCH CUTANEOUS at 17:20

## 2019-09-02 RX ADMIN — FINASTERIDE 5 MG: 5 TABLET, FILM COATED ORAL at 09:21

## 2019-09-02 RX ADMIN — INSULIN GLARGINE 8 UNITS: 100 INJECTION, SOLUTION SUBCUTANEOUS at 21:47

## 2019-09-02 RX ADMIN — TAMSULOSIN HYDROCHLORIDE 0.4 MG: 0.4 CAPSULE ORAL at 09:22

## 2019-09-02 RX ADMIN — TORSEMIDE 40 MG: 20 TABLET ORAL at 09:27

## 2019-09-02 RX ADMIN — RANOLAZINE 500 MG: 500 TABLET, FILM COATED, EXTENDED RELEASE ORAL at 09:23

## 2019-09-02 RX ADMIN — INSULIN LISPRO 1 UNITS: 100 INJECTION, SOLUTION INTRAVENOUS; SUBCUTANEOUS at 17:18

## 2019-09-02 RX ADMIN — RANOLAZINE 500 MG: 500 TABLET, FILM COATED, EXTENDED RELEASE ORAL at 21:47

## 2019-09-02 RX ADMIN — INSULIN LISPRO 1 UNITS: 100 INJECTION, SOLUTION INTRAVENOUS; SUBCUTANEOUS at 11:45

## 2019-09-02 RX ADMIN — POLYSACCHARIDE-IRON COMPLEX 150 MG: 150 CAPSULE ORAL at 09:23

## 2019-09-02 NOTE — PROGRESS NOTES
09/02/19 1330   Pain Assessment   Pain Assessment No/denies pain   Restrictions/Precautions   Precautions Bed/chair alarms;Cognitive;Supervision on toilet/commode; Fall Risk   Braces or Orthoses AFO  (bilat)   Subjective   Subjective no complaints   QI: Sit to Stand   Assistance Needed Physical assistance   Assistance Provided by [place] 25%-49%   Comment lift assist needed and post bias at times   Sit to Stand CARE Score 3   QI: Chair/Bed-to-Chair Transfer   Assistance Needed Physical assistance   Assistance Provided by [place] 25%-49%   Chair/Bed-to-Chair Transfer CARE Score 3   Transfer Bed/Chair/Wheelchair   Limitations Noted In Balance;Confidence; Coordination; Endurance; Sequencing;LE Strength   Adaptive Equipment Roller Walker   Stand Pivot Moderate Assist   Sit to Stand Moderate Assist   Stand to Sit Moderate Assist   Findings more post bias and poor footing this afternoon with xfers needed mroe A    Bed, Chair, Wheelchair Transfer (FIM) 3 - Patient completes 50 - 74% of all tasks   QI: Walk 50 Feet with Two Turns   Assistance Needed Physical assistance   Assistance Provided by [place] Less than 25%   Walk 50 Feet with Two Turns CARE Score 3   Ambulation   Primary Discharge Mode of Locomotion Walk   Walk Assist Level Moderate Assist   Gait Pattern Inconsistant Lois; Slow Lois;Decreased foot clearance;Retropulsion;Narrow JUAREZ;Lateral deviation; Improper weight shift   Assist Device Roller Gabrielle Garcia Walked (feet) 50 ft   Findings Pt more unsteady than this am,  poor foot placement and times of getting outside walkers JUAREZ and kicking walker and very clumsy with chair approach post LOB when stepping back   Walking (FIM) 2 - Patient ambulates between 50 - 149 feet regardless of assist/device/set up   Therapeutic Interventions   Strengthening standing at walker AROM marches, Hip abd  10x2   Balance Amb in gym cone gather to challenge reaching balance, dual task and turning around obstacles ( pt needed max cues for cones and was very unsteady due to dual task component)   Assessment   Treatment Assessment Pts gt was very unsteady with functional component involved as scanning for cones and reaching for them  At home it sounds like pt amb for just to get to one place to another  Cont skilled PT toward LTGs   Problem List Decreased strength;Decreased range of motion;Decreased endurance; Impaired balance;Decreased mobility; Decreased coordination;Decreased cognition;Decreased safety awareness;Decreased skin integrity;Orthopedic restrictions   Barriers to Discharge Decreased caregiver support   PT Barriers   Physical Impairment Decreased strength;Decreased endurance; Impaired balance;Decreased mobility; Decreased safety awareness;Decreased cognition   Functional Limitation Walking;Transfers;Standing;Ramp negotiation;Car transfers; Wheelchair management   Plan   Treatment/Interventions Functional transfer training;LE strengthening/ROM; Elevations; Therapeutic exercise; Endurance training;Gait training   Progress Progressing toward goals   Recommendation   Recommendation 24 hour supervision/assist;Short-term skilled PT; Home PT   PT Therapy Minutes   PT Time In 1330   PT Time Out 1400   PT Total Time (minutes) 30   PT Mode of treatment - Individual (minutes) 30   PT Mode of treatment - Concurrent (minutes) 0   PT Mode of treatment - Group (minutes) 0   PT Mode of treatment - Co-treat (minutes) 0   PT Mode of Teatment - Total time(minutes) 30 minutes   Therapy Time missed   Time missed?  No

## 2019-09-02 NOTE — PROGRESS NOTES
09/02/19 1030   Pain Assessment   Pain Assessment No/denies pain   Restrictions/Precautions   Precautions Bed/chair alarms;Cognitive; Fall Risk;Supervision on toilet/commode   Braces or Orthoses AFO  (bilat)   Subjective   Subjective no complaints pt is ready for therapy   QI: Sit to Stand   Assistance Needed Verbal cues; Incidental touching;Physical assistance   Assistance Provided by [place] Less than 25%   Sit to Stand CARE Score 3   QI: Chair/Bed-to-Chair Transfer   Assistance Needed Physical assistance; Incidental touching   Assistance Provided by [place] 25%-49%   Chair/Bed-to-Chair Transfer CARE Score 3   Transfer Bed/Chair/Wheelchair   Limitations Noted In Balance;Confidence; Endurance;Sensation; Sequencing;LE Strength   Adaptive Equipment Roller Walker   Stand Pivot Maximum Assist   Sit to Stand Minimal Assist   Stand to Sit Minimal Assist   Findings times of post bias, times of having difficulty alignment with chair, vcs for hand placement, each transfer is a little different, varies in needs in assistance   Bed, Chair, Wheelchair Transfer (FIM) 3 - [place] needs to lift, boost or assist to stand OR sit   QI: Walk 150 Feet   Assistance Needed Incidental touching   Assistance Provided by [place] Less than 25%   Comment Min A during turns and with chair approach   Walk 150 Feet CARE Score 3   Ambulation   Primary Discharge Mode of Locomotion Walk   Walk Assist Level Maximum Assist   Gait Pattern Inconsistant Lois;Decreased foot clearance; Forward Flexion; Retropulsion;Narrow JUAREZ;Step through; Improper weight shift   Assist Device Estela Garcia Walked (feet) 150 ft   Limitations Noted In Balance; Coordination;Device Management; Endurance; Heel Strike; Sequencing; Safety   Findings Times of very narrow JUAREZ specially with left foot adducting in and creating unsteadiness   Walking (FIM) 4 - Patient requires steadying assist or light touching AND distance 150 feet or more, no rest   QI: 4 Steps   Assistance Needed Physical assistance   Assistance Provided by [place] 50%-74%   Comment trialed ascend forward (severe heel catch, post leaning)   4 Steps CARE Score 2   Stairs   Type Stairs   # of Steps 4   Weight Bearing Precautions Fall Risk   Assist Devices Bilateral Rail   Findings significant heel catch and had trouble correcting foot placement, also strong post leaning, performed 4 steps with retro descent, much safer technique,  step to pattern   Stairs (FIM) 2 - Patient goes up and down 4 - 11 stairs regardless of assist/device/setup   Therapeutic Interventions   Balance laps amb with walker figure 8 through cones to challenge turning and wider JUAREZ   Equipment Use   NuStep L1 10 mins   Other Comments   Comments Pts 02 dec to 83% after amb 150' but back to baseline within 1 min  PLB cues, pt reported 8/10 SOB scale,    Assessment   Treatment Assessment Pt participated in therapy focus on functional xfers (sit-stand, stand pivot,) amb, steps and turning when amb  He has poor foot placement which creates unsafe and unbalanced positions needing min A,  and times of strong post bias when going to sit down, he can control that with constant vcs for flex more forward  Pt 02 dropped into 80s with longer activity but would quickly rebound to 93  Pt states he is not use to walking as far, at home was performing more shorter distances  He will benefit from cont skilled PT for endurance, strengthening, safety, balance training  Family/Caregiver Present wife   Problem List Decreased strength;Decreased range of motion;Decreased endurance; Impaired balance;Decreased mobility; Decreased coordination;Decreased cognition;Decreased safety awareness;Decreased skin integrity;Orthopedic restrictions   Barriers to Discharge Decreased caregiver support   PT Barriers   Physical Impairment Decreased strength;Decreased endurance; Impaired balance;Decreased mobility; Decreased safety awareness;Decreased cognition   Functional Limitation Walking;Transfers;Standing;Ramp negotiation;Car transfers; Wheelchair management   Plan   Treatment/Interventions Functional transfer training;LE strengthening/ROM; Elevations; Therapeutic exercise;Gait training;Bed mobility; Endurance training   Progress Progressing toward goals   Recommendation   Recommendation 24 hour supervision/assist;Home PT   PT Therapy Minutes   PT Time In 1030   PT Time Out 1130   PT Total Time (minutes) 60   PT Mode of treatment - Individual (minutes) 60   PT Mode of treatment - Concurrent (minutes) 0   PT Mode of treatment - Group (minutes) 0   PT Mode of treatment - Co-treat (minutes) 0   PT Mode of Teatment - Total time(minutes) 60 minutes   Therapy Time missed   Time missed?  No

## 2019-09-02 NOTE — PROGRESS NOTES
Progress Note - Nephrology   Ovidio Martin 80 y o  male MRN: [id number]  Unit/Bed#: -09 Encounter: [id number]      Assessment / Plan:  1  CKD stage 4 in setting of HTN/DM - sCr at baseline, b/l sCr 3 3-3 6  -allow -140s for renal perfusion  -avoid IV dye/nephrotoxins  -f/u BMP Thursday    2  Hx dCHF - -170 lb, avoid dry weight but +orthostasis, continue torsemide 40mg daily  +edema on exam but with orthostatic vitals  Primary team to metoprolol dose to 25mg po BID per my recommendation  3  Anemia - Hgb in 8s, continue oral iron, monitor CBC      Subjective:   He denies any chest pain or shortness of Breath  No   He denies any orthostatic symptoms although blood pressure drops when he stands  His legs are a bit swollen as noticed by his family member  Objective:     Vitals: Blood pressure 112/53, pulse 75, temperature 98 2 °F (36 8 °C), temperature source Oral, resp  rate 18, weight 82 4 kg (181 lb 10 5 oz), SpO2 93 %  ,Body mass index is 30 23 kg/m²  Temp (24hrs), Av 9 °F (36 6 °C), Min:97 7 °F (36 5 °C), Max:98 2 °F (36 8 °C)      Weight (last 2 days)     Date/Time   Weight    19 0600   82 4 (181 66)    19 0600   75 (165 35)                Intake/Output Summary (Last 24 hours) at 2019 1416  Last data filed at 2019 0928  Gross per 24 hour   Intake 270 ml   Output 1000 ml   Net -730 ml     I/O last 24 hours: In: 750 [P O :750]  Out: 9747 [Urine:1275]        Physical Exam:   Physical Exam   Constitutional: He appears well-developed and well-nourished  No distress  HENT:   Head: Normocephalic and atraumatic  Mouth/Throat: No oropharyngeal exudate  Eyes: Right eye exhibits no discharge  Left eye exhibits no discharge  No scleral icterus  Neck: Neck supple  Cardiovascular: Normal rate and regular rhythm  Murmur heard  Pulmonary/Chest: Effort normal and breath sounds normal  He has no wheezes  He has no rales  Abdominal: Soft   Bowel sounds are normal  He exhibits no distension  There is no tenderness  Musculoskeletal: He exhibits edema (+b/l LE)  Neurological: He is alert  awake   Skin: Skin is warm and dry  No rash noted  He is not diaphoretic  Psychiatric: He has a normal mood and affect  His behavior is normal    Vitals reviewed        Invasive Devices     None                 Medications:    Scheduled Meds:  Current Facility-Administered Medications:  acetaminophen 650 mg Oral Q6H PRN Swapnil Mueller MD   aspirin 81 mg Oral Daily Swapnil Mueller MD   atorvastatin 40 mg Oral QPM Swapnil Mueller MD   bisacodyl 10 mg Rectal Daily PRN Swapnil Mueller MD   cholecalciferol 2,000 Units Oral Daily Swapnil Mueller MD   docusate sodium 100 mg Oral BID Swpanil Mueller MD   finasteride 5 mg Oral Daily Sallye Doles, CRNP   insulin glargine 8 Units Subcutaneous HS Sallye Doles, CRNP   insulin lispro 1-5 Units Subcutaneous TID AC Renee Dewey, CRNP   insulin lispro 1-5 Units Subcutaneous HS Sallye Doles, CRNP   iron polysaccharides 150 mg Oral Daily Santana Perea MD   levothyroxine 75 mcg Oral Daily Swapnil Mueller MD   lidocaine 1 patch Topical Daily Swapnil Mueller MD   lidocaine  Topical Q4H PRN Swapnil Mueller MD   menthol-methyl salicylate  Apply externally 4x Daily PRN Swapnil Mueller MD   metoprolol tartrate 25 mg Oral BID With Meals Sallye Doles, CRNP   ondansetron 4 mg Intravenous Q6H PRN Swapnil Mueller MD   polyethylene glycol 17 g Oral Daily PRN Swapnil Mueller MD   ranolazine 500 mg Oral Q12H [facility] Swapnil Mueller MD   tamsulosin 0 4 mg Oral Daily Swapnil Mueller MD   torsemide 40 mg Oral Daily Swapnil Mueller MD   [START ON 9/3/2019] warfarin 1 5 mg Oral Once per day on Sun Tue Wed Fri Sat Sallye Doles, CRNP   warfarin 3 mg Oral Once per day on Mon Thu Sallye Doles, CRNP       PRN Meds:   acetaminophen    bisacodyl    lidocaine    menthol-methyl salicylate    ondansetron    polyethylene glycol    Continuous Infusions:         LAB RESULTS:      Results from last 7 days   Lab Units 09/02/19  0522 08/29/19  0529 08/28/19  0559 08/27/19  0442   WBC Thousand/uL 6 66 7 91  --   --    HEMOGLOBIN g/dL 8 8* 9 1*  --   --    HEMATOCRIT % 28 2* 29 0*  --   --    PLATELETS Thousands/uL 223 215  --   --    NEUTROS PCT % 54 60  --   --    LYMPHS PCT % 30 27  --   --    MONOS PCT % 11 10  --   --    EOS PCT % 4 3  --   --    POTASSIUM mmol/L 4 0 4 2 4 1 4 0   CHLORIDE mmol/L 101 100 103 100   CO2 mmol/L 27 28 25 29   BUN mg/dL 50* 56* 53* 47*   CREATININE mg/dL 3 53* 3 64* 3 56* 3 85*   CALCIUM mg/dL 8 5 8 6 8 4 8 0*   ALK PHOS U/L  --  97  --   --    ALT U/L  --  12  --   --    AST U/L  --  15  --   --        CUTURES:  Lab Results   Component Value Date    BLOODCX No Growth After 5 Days  07/07/2019    BLOODCX No Growth After 5 Days  07/07/2019    BLOODCX Klebsiella pneumoniae (A) 07/04/2019    BLOODCX Klebsiella pneumoniae (A) 07/04/2019    URINECX >100,000 cfu/ml Klebsiella pneumoniae (A) 07/04/2019    URINECX >100,000 cfu/ml Klebsiella pneumoniae (A) 06/13/2019                 Portions of the record may have been created with voice recognition software  Occasional wrong word or "sound a like" substitutions may have occurred due to the inherent limitations of voice recognition software  Read the chart carefully and recognize, using context, where substitutions have occurred  If you have any questions, please contact the dictating provider

## 2019-09-02 NOTE — PROGRESS NOTES
09/02/19 0830   Pain Assessment   Pain Assessment No/denies pain   Pain Score No Pain   Restrictions/Precautions   Precautions Bed/chair alarms;Cognitive; Fall Risk;Supervision on toilet/commode   Braces or Orthoses AFO  (B/L for transfers)   QI: Eating   Comment Pt ate prior to OT session   Reason if not Attempted Activity not applicable   Eating CARE Score 9   QI: Oral Hygiene   Assistance Needed Set-up / [facility] Provided by [place] No physical assistance   Oral Hygiene CARE Score 5   Grooming   Able To Initiate Tasks; Wash/Dry Face;Brush/Clean Teeth;Wash/Dry Hands   Limitation Noted In Safety;Strength   Findings Pt seated w/c level at sink to complete oral hygiene, pt states "I would stand at home before but now I think it's better to sit"  Pt declined trialing stance at sink  Grooming (FIM) 5 - Patient requires supervision/monitoring   QI: Shower/Bathe Self   Assistance Needed Physical assistance   Assistance Provided by [place] 50%-74%   Shower/Bathe Self CARE Score 2   Bathing   Assessed Bath Style Sponge Bath   Anticipated D/C Bath Style Sponge Bath   Able to Gather/Transport No   Able to [employer] Temperature No   Able to Wash/Rinse/Dry (body part) Left Arm;Right Arm;L Upper Leg;R Upper Leg;Chest;Abdomen;Perineal Area   Limitations Noted in Balance; Endurance;ROM;Safety;Strength;Timeliness   Positioning Seated;Standing   Findings  Pt requires A bathing b/l LE due to limited b/l UE AROM and limited dynamic reach below waist and reports wife will assist at home  Pt requires A in stance to bathe buttocks due to impaired standing balance  Bathing (FIM) 3 - Patient completes 5/10  6/10 or 7/10 parts   Tub/Shower Transfer   Not Assessed Sponge Bath   Shower Transfer (FIM) 0 - Activity does not occur   QI: Upper Body Dressing   Assistance Needed Physical assistance   Assistance Provided by [place] 25%-49%   Comment Pt able to doff shirt seated   Pt able to thread long sleeves of shirt however requires A for pull over head even with anterior fwd flexion due to dec AROM/strength in b/l LUE  Upper Body Dressing CARE Score 3   QI: Lower Body Dressing   Assistance Needed Physical assistance   Assistance Provided by [place] 50%-74%   Comment Pt dependent for thigh high WENDY stockings  Pt seated and requires A to thread pants over b/l LE  Pt initially requires modA for CM over hips in stance and noted to place hand on front of RW presenting fall risk  Pt noted w/ fatigue at this point and required rest break  At end of session following seated grooming routine, pt trialed CM over hips 2nd time in stance w/ further demo/edu from OT for alternating unilateral release from handholds of walker only, pt demo 100% carryover and noted w/ inc ability to complete CM over hips at CG level  Pt will cont to benefit from practice  Lower Body Dressing CARE Score 2   QI: Putting On/Taking Off Footwear   Assistance Needed Physical assistance   Assistance Provided by [place] Total assistance   Comment Pt maxA for donning thigh high WENDY stockings and sneakers with AFOs  Putting On/Taking Off Footwear CARE Score 1   QI: Picking Up Object   Reason if not Attempted Safety concerns   Picking Up Object CARE Score 88   Dressing/Undressing Clothing   Remove UB Clothes Pullover Shirt   Remove LB Clothes Socks; [address] UB Clothes Pullover Shirt   Don LB Clothes Undergarment;Pants;Socks;TEDs; Shoes   Limitations Noted In Balance; Endurance;Problem Solving;Strength;ROM; Timeliness   Positioning Supported Sit   UB Dressing (FIM) 3 - Patient completes  50-74% of all tasks   LB Dressing (FIM) 3 - Patient completes  50-74% of all tasks   QI: Roll Left and Right   Assistance Needed Physical assistance   Assistance Provided by [place] 25%-49%   Roll Left and Right CARE Score 3   QI: Sit to Lying   Assistance Needed Physical assistance   Assistance Provided by [place] 25%-49%   Sit to Lying CARE Score 3   QI: Lying to Sitting on Side of Bed Assistance Needed Physical assistance   Assistance Provided by [place] 25%-49%   Lying to Sitting on Side of Bed CARE Score 3   QI: Sit to Stand   Assistance Needed Physical assistance   Assistance Provided by [place] 25%-49%   Comment Pt noted with retropulsive 1x and benefits from repetition and VC for anterior weight shift and safe hand placement  Sit to Stand CARE Score 3   QI: Chair/Bed-to-Chair Transfer   Assistance Needed Physical assistance   Assistance Provided by [place] 25%-49%   Comment Pt Benny w/ RW for short distance fxnl transfers   Chair/Bed-to-Chair Transfer CARE Score 3   Transfer Bed/Chair/Wheelchair   Limitations Noted In Balance; Endurance;Problem Solving; Sequencing;UE Strength;LE Strength   Adaptive Equipment Roller Walker   Stand Pivot Minimal Assist   Sit to Stand Moderate Assist;Minimal   Stand to Sit Moderate Assist   Supine to Sit Minimal   Sit to Supine Minimal   Findings minimal A warranted however pt requires inc A for lowering stand>sit due to poor trunk control stand>sit  Bed, Chair, Wheelchair Transfer (FIM) 2 - [place] needs to lift or boost to rise AND assist to sit   QI: [address] [place]   Reason if not Attempted Activity not applicable   Toileting Hygiene CARE Score 9   Cognition   Overall Cognitive Status Impaired   Arousal/Participation Alert; Cooperative   Attention Attends with cues to redirect   Orientation Level Oriented X4   Memory Decreased short term memory;Decreased recall of precautions   Following Commands Follows one step commands with increased time or repetition   Activity Tolerance   Activity Tolerance Patient tolerated treatment well   Assessment   Treatment Assessment Pt participated in skilled OT Tx session w/ focus on completion of ADL routine, orthostatic BP monitoring, and focus on stand tolerance/dynamic balance for LB dressing  See above note for detail  Pt tolerated Tx well and demo good progress toward goals   Cont OT POC w/ focus on dynamic balance, stand tolerance,and AE to maximize pt rehab potential and reduce CG burden  Prognosis Fair   Problem List Decreased strength;Decreased range of motion;Decreased endurance; Impaired balance;Decreased mobility; Decreased coordination;Decreased cognition;Decreased safety awareness;Decreased skin integrity;Orthopedic restrictions   Barriers to Discharge Decreased caregiver support   Plan   Treatment/Interventions ADL retraining;Functional transfer training;LE strengthening/ROM; Therapeutic exercise; Endurance training;Equipment eval/education; Bed mobility; Compensatory technique education;Patient/family training   Progress Progressing toward goals   Recommendation   OT Discharge Recommendation 24 hour supervision/assist   OT Therapy Minutes   OT Time In 0830   OT Time Out 1000   OT Total Time (minutes) 90   OT Mode of treatment - Individual (minutes) 0   OT Mode of treatment - Concurrent (minutes) 0   OT Mode of treatment - Group (minutes) 0   OT Mode of treatment - Co-treat (minutes) 0   OT Mode of Teatment - Total time(minutes) 0 minutes   Therapy Time missed   Time missed?  No

## 2019-09-02 NOTE — PROGRESS NOTES
Internal Medicine Progress Note  Patient: Sharyn Gilford  Age/sex: 80 y o  male  Medical Record #: [id number]      ASSESSMENT/PLAN: (Interval History)  Sharyn Gilford is seen and examined and management for following issues:    Volume overload/chronic diastolic CHF:  s/p Lasix IV, weight dropped 11 lbs = per renal, target weight is 168-170 lbs  Renal managing his diuretic tx = currently Torsemide 40mg qd  FR 1500ml; 2Gm NA diet     Abnormal Troponin: felt to be 2/2 creatinine/vol overload putting stress on the heart; no further testing etc was recommended     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:  stable     Iron deficiency anemia: renal gave 2 doses Venofer while in hospital and now started on Niferex 150mg qd     Hx urine retention/BPH; Klebsiella UTI 6/2019 and Klebsiella urosepsis 7/2019:  Uro saw in 7/2019 and added added Finasteride  Consideration was to be given to starting suppressive therapy with Nitrofurantion 100mg qhs  Currently, he is on Flomax/Finasteride  Will need to watch closely for UTI sx  HTN/orthostasis:  Orthostatic sitting was 855 systolic and stand was 713 = no dizzy but unsteady on feet; on Lopressor 50mg BID/Ranexa 500mg BID (Ranexa shouldn't have much effect on BP) = d/w Dr Pacheco Check and will drop Lopressor dose to 25mg BID     PAF:  continue Lopressor but will drop 25 mg BID since has some orthostasis Coumadin had been on hold until INR dropped to an acceptable level -->  3mg x 2 nights and then back to 1 5mg qd = will change to 3mg 2x/week and 1 5 mg 5x/week      Hx Dual chamber PPM:  sees Dr Jennye Cogan as an OP     CVA (2014):  continue ASA/Lipitor     CAD/CABG (2013):   was here at [facility] 6/2019 for chest pain --> seen by Cardiology, had nuclear stress test = small, severe, reversible myocardial perfusion defect of the basal inferior wall --> was started on Ranexa 500mg BID but no further plans for intervention were recommended    Continue Lopressor, Lipitor and ASA as well     DM2: takes Trajenta at home = will have someone bring in and start when sure he is eating well; on 8/28, discontinued Lantus since FBS was 45  On Lantus 8U qam and next week switch over to home Trajenta  No changes today     Hypothyroidism:  continue current Levothyroxine     Left L2-3 microdiskectomy 7/18/19 at [facility]    Vitamin D deficiency:  Level is 22 6; will d/w renal      Subjective/ HPI: Patients overnight issues or events were reviewed with nursing or staff during rounds or morning huddle session  Some mild orthostasis today  DM2: stable on Lantus  Anemia: s/p venofer, now on iron PO   CAD:  Stable on current tx    Offers no complaints    ROS:     GI: denies abdominal pain, change bowel habits or reflux symptoms  Neuro: Denies any headache, new vision changes, new neuropathies,new weaknesses   Respiratory: No Cough, SOB, denies wheeze  Cardiovascular: No CP, palpitations , denies perception of rapid heartbeat  : denies any new urinary burning or frequency    Review of Scheduled Meds:    Current Facility-Administered Medications:  acetaminophen 650 mg Oral Q6H PRN Kary Nolasco MD   aspirin 81 mg Oral Daily Kary Nolasco MD   atorvastatin 40 mg Oral QPM Kary Nolasco MD   bisacodyl 10 mg Rectal Daily PRN Kary Nolasco MD   cholecalciferol 2,000 Units Oral Daily Kary Nolasco MD   docusate sodium 100 mg Oral BID Kary Nolasco MD   finasteride 5 mg Oral Daily MAURICE Villalobos   insulin glargine 8 Units Subcutaneous HS MAURICE Villalobos   insulin lispro 1-5 Units Subcutaneous TID AC MAURICE Patel   insulin lispro 1-5 Units Subcutaneous HS MAURICE Villalobos   iron polysaccharides 150 mg Oral Daily Gee Loredo MD   levothyroxine 75 mcg Oral Daily Kary Nolasco MD   lidocaine 1 patch Topical Daily Kary Nolasco MD   lidocaine  Topical Q4H PRN Kary Nolasco MD   menthol-methyl salicylate  Apply externally 4x Daily PRN Kary Nolasco MD   metoprolol tartrate 50 mg Oral BID With Meals Irving Garcia MD   ondansetron 4 mg Intravenous Q6H PRN Irving Garcia MD   polyethylene glycol 17 g Oral Daily PRN Irving Garcia MD   ranolazine 500 mg Oral Q12H [address] Irving Garcia MD   tamsulosin 0 4 mg Oral Daily Irving Garcia MD   torsemide 40 mg Oral Daily Irving Garica MD   warfarin 1 5 mg Oral Daily (warfarin) MAURICE Sy       Labs:     Results from last 7 days   Lab Units 09/02/19  0522 08/29/19  0529   WBC Thousand/uL 6 66 7 91   HEMOGLOBIN g/dL 8 8* 9 1*   HEMATOCRIT % 28 2* 29 0*   PLATELETS Thousands/uL 223 215     Results from last 7 days   Lab Units 09/02/19  0522 08/29/19  0529   SODIUM mmol/L 136 136   POTASSIUM mmol/L 4 0 4 2   CHLORIDE mmol/L 101 100   CO2 mmol/L 27 28   BUN mg/dL 50* 56*   CREATININE mg/dL 3 53* 3 64*   CALCIUM mg/dL 8 5 8 6         Results from last 7 days   Lab Units 09/02/19  0522 09/01/19  0533   INR  2 17* 2 36*          Results from last 7 days   Lab Units 09/02/19  0633 09/01/19  2125 09/01/19  1601   POC GLUCOSE mg/dl 103 218* 127       Imaging:     No orders to display       *Labs reviewed  *Radiology studies reviewed  *Medications reviewed and reconciled as needed  *Please refer to order section for additional ordered labs studies  *Case discussed with primary attending during morning huddle case rounds    Physical Examination:  Vitals:   Vitals:    09/01/19 2055 09/02/19 0521 09/02/19 0600 09/02/19 0928   BP: 134/65 146/68  148/77   BP Location: Right arm Right arm  Right arm   Pulse: 69 80  79   Resp: 20 17     Temp: 97 7 °F (36 5 °C) 97 8 °F (36 6 °C)     TempSrc: Oral Oral     SpO2: 95% 93%     Weight:   82 4 kg (181 lb 10 5 oz)        General Appearance: NAD, conversive  Eyes: No icterus; conjunctiva normal, PERRLA  HENT: oropharynx clear; mucous membranes moist  Neck: trachea midline, range of motion full   Supple w/o pain  Lungs: CTA, normal respiratory effort, no retractions, expiratory effort normal  CV: regular rate, no rubs/gallops; +murmur  ABD: soft; NT/ND; +BS  EXT: 1+ edema LE = improving  Skin: normal turgor, normal texture, no rashes  Psych: affect normal, no overt anxiety/depression during exam today   Neuro: AAOx3            Total time spent: At least 40 minutes, with more than 50% spent counseling/coordinating care  Counseling includes discussion with patient re: progress  and discussion with patient of his/her current medical state/information  Coordination of patient's care was performed in conjunction with primary service  Time invested included review of patient's labs, vitals, and management of their comorbidities with continued monitoring  In addition, this patient was discussed with medical team including physician and advanced extenders  The care of the patient was extensively discussed and appropriate treatment plan was formulated unique for this patient  ** Please Note: Dragon 360 Dictation voice to text software may have been used in the creation of this document   **

## 2019-09-02 NOTE — PROGRESS NOTES
09/02/19 0830   Pain Assessment   Pain Assessment No/denies pain   Pain Score No Pain   Restrictions/Precautions   Precautions Bed/chair alarms;Cognitive; Fall Risk;Supervision on toilet/commode   Braces or Orthoses AFO  (B/L for transfers)   QI: Eating   Comment Pt ate prior to OT session   Reason if not Attempted Activity not applicable   Eating CARE Score 9   QI: Oral Hygiene   Assistance Needed Set-up / [address] Provided by [place] No physical assistance   Oral Hygiene CARE Score 5   Grooming   Able To Initiate Tasks; Wash/Dry Face;Brush/Clean Teeth;Wash/Dry Hands   Limitation Noted In Safety;Strength   Findings Pt seated w/c level at sink to complete oral hygiene, pt states "I would stand at home before but now I think it's better to sit"  Pt declined trialing stance at sink  Grooming (FIM) 5 - Patient requires supervision/monitoring   QI: Shower/Bathe Self   Assistance Needed Physical assistance   Assistance Provided by [place] 50%-74%   Shower/Bathe Self CARE Score 2   Bathing   Assessed Bath Style Sponge Bath   Anticipated D/C Bath Style Sponge Bath   Able to Gather/Transport No   Able to [employer] Temperature No   Able to Wash/Rinse/Dry (body part) Left Arm;Right Arm;L Upper Leg;R Upper Leg;Chest;Abdomen;Perineal Area   Limitations Noted in Balance; Endurance;ROM;Safety;Strength;Timeliness   Positioning Seated;Standing   Findings  Pt requires A bathing b/l LE due to limited b/l UE AROM and limited dynamic reach below waist and reports wife will assist at home  Pt requires A in stance to bathe buttocks due to impaired standing balance  Bathing (FIM) 3 - Patient completes 5/10  6/10 or 7/10 parts   Tub/Shower Transfer   Not Assessed Sponge Bath   Shower Transfer (FIM) 0 - Activity does not occur   QI: Upper Body Dressing   Assistance Needed Physical assistance   Assistance Provided by [place] 25%-49%   Comment Pt able to doff shirt seated   Pt able to thread long sleeves of shirt however requires A for pull over head even with anterior fwd flexion due to dec AROM/strength in b/l LUE  Upper Body Dressing CARE Score 3   QI: Lower Body Dressing   Assistance Needed Physical assistance   Assistance Provided by [place] 50%-74%   Comment Pt dependent for thigh high WENDY stockings  Pt seated and requires A to thread pants over b/l LE  Pt initially requires modA for CM over hips in stance and noted to place hand on front of RW presenting fall risk  Pt noted w/ fatigue at this point and required rest break  At end of session following seated grooming routine, pt trialed CM over hips 2nd time in stance w/ further demo/edu from OT for alternating unilateral release from handholds of walker only, pt demo 100% carryover and noted w/ inc ability to complete CM over hips at CG level  Pt will cont to benefit from practice  Lower Body Dressing CARE Score 2   QI: Putting On/Taking Off Footwear   Assistance Needed Physical assistance   Assistance Provided by [place] Total assistance   Comment Pt maxA for donning thigh high WENDY stockings and sneakers with AFOs  Putting On/Taking Off Footwear CARE Score 1   QI: Picking Up Object   Reason if not Attempted Safety concerns   Picking Up Object CARE Score 88   Dressing/Undressing Clothing   Remove UB Clothes Pullover Shirt   Remove LB Clothes Socks; [place] [address] UB Clothes Pullover Shirt   Don LB Clothes Undergarment;Pants;Socks;TEDs; Shoes   Limitations Noted In Balance; Endurance;Problem Solving;Strength;ROM; Timeliness   Positioning Supported Sit   UB Dressing (FIM) 3 - Patient completes  50-74% of all tasks   LB Dressing (FIM) 3 - Patient completes  50-74% of all tasks   QI: Roll Left and Right   Assistance Needed Physical assistance   Assistance Provided by [place] 25%-49%   Roll Left and Right CARE Score 3   QI: Sit to Lying   Assistance Needed Physical assistance   Assistance Provided by [place] 25%-49%   Sit to Lying CARE Score 3   QI: Lying to Sitting on Side of Bed Assistance Needed Physical assistance   Assistance Provided by [place] 25%-49%   Lying to Sitting on Side of Bed CARE Score 3   QI: Sit to Stand   Assistance Needed Physical assistance   Assistance Provided by [place] 25%-49%   Comment Pt noted with retropulsive 1x and benefits from repetition and VC for anterior weight shift and safe hand placement  Sit to Stand CARE Score 3   QI: Chair/Bed-to-Chair Transfer   Assistance Needed Physical assistance   Assistance Provided by [place] 25%-49%   Comment Pt Benny w/ RW for short distance fxnl transfers   Chair/Bed-to-Chair Transfer CARE Score 3   Transfer Bed/Chair/Wheelchair   Limitations Noted In Balance; Endurance;Problem Solving; Sequencing;UE Strength;LE Strength   Adaptive Equipment Roller Walker   Stand Pivot Minimal Assist   Sit to Stand Moderate Assist;Minimal   Stand to Sit Moderate Assist   Supine to Sit Minimal   Sit to Supine Minimal   Findings minimal A warranted however pt requires inc A for lowering stand>sit due to poor trunk control stand>sit  Bed, Chair, Wheelchair Transfer (FIM) 2 - [place] needs to lift or boost to rise AND assist to sit   QI: [address] [place]   Reason if not Attempted Activity not applicable   Toileting Hygiene CARE Score 9   Cognition   Overall Cognitive Status Impaired   Arousal/Participation Alert; Cooperative   Attention Attends with cues to redirect   Orientation Level Oriented X4   Memory Decreased short term memory;Decreased recall of precautions   Following Commands Follows one step commands with increased time or repetition   Activity Tolerance   Activity Tolerance Patient tolerated treatment well   Other Comments   Assessment Orthostatic BP taken with automatic cuff on LUE forearm  Pt 146/60 supine, 148/69 sitting, and drop to 111/56 in stance however pt denies lightheadedness, dizziness, and/or fatigue in stance  RN aware and CRNP aware  Thigh high WENDY stockings on while in stance      Assessment   Treatment Assessment Pt participated in skilled OT Tx session w/ focus on completion of ADL routine, orthostatic BP monitoring, and focus on stand tolerance/dynamic balance for LB dressing  See above note for detail  Pt tolerated Tx well and demo good progress toward goals  Cont OT POC w/ focus on dynamic balance, stand tolerance,and AE to maximize pt rehab potential and reduce CG burden  Prognosis Fair   Problem List Decreased strength;Decreased range of motion;Decreased endurance; Impaired balance;Decreased mobility; Decreased coordination;Decreased cognition;Decreased safety awareness;Decreased skin integrity;Orthopedic restrictions   Barriers to Discharge Decreased caregiver support   Plan   Treatment/Interventions ADL retraining;Functional transfer training;LE strengthening/ROM; Therapeutic exercise; Endurance training;Equipment eval/education; Bed mobility; Compensatory technique education;Patient/family training   Progress Progressing toward goals   Recommendation   OT Discharge Recommendation 24 hour supervision/assist   OT Therapy Minutes   OT Time In 0830   OT Time Out 1000   OT Total Time (minutes) 90   OT Mode of treatment - Individual (minutes) 0   OT Mode of treatment - Concurrent (minutes) 0   OT Mode of treatment - Group (minutes) 0   OT Mode of treatment - Co-treat (minutes) 0   OT Mode of Teatment - Total time(minutes) 0 minutes   Therapy Time missed   Time missed?  No

## 2019-09-03 LAB
GLUCOSE SERPL-MCNC: 160 MG/DL (ref 65–140)
GLUCOSE SERPL-MCNC: 164 MG/DL (ref 65–140)
GLUCOSE SERPL-MCNC: 230 MG/DL (ref 65–140)
GLUCOSE SERPL-MCNC: 92 MG/DL (ref 65–140)
INR PPP: 2.04 (ref 0.84–1.19)
PROTHROMBIN TIME: 22.5 SECONDS (ref 11.6–14.5)

## 2019-09-03 PROCEDURE — 99232 SBSQ HOSP IP/OBS MODERATE 35: CPT

## 2019-09-03 PROCEDURE — 82270 OCCULT BLOOD FECES: CPT | Performed by: INTERNAL MEDICINE

## 2019-09-03 PROCEDURE — 97530 THERAPEUTIC ACTIVITIES: CPT

## 2019-09-03 PROCEDURE — 97110 THERAPEUTIC EXERCISES: CPT

## 2019-09-03 PROCEDURE — 99232 SBSQ HOSP IP/OBS MODERATE 35: CPT | Performed by: INTERNAL MEDICINE

## 2019-09-03 PROCEDURE — 85610 PROTHROMBIN TIME: CPT | Performed by: NURSE PRACTITIONER

## 2019-09-03 PROCEDURE — 82948 REAGENT STRIP/BLOOD GLUCOSE: CPT

## 2019-09-03 RX ADMIN — LIDOCAINE 1 PATCH: 50 PATCH CUTANEOUS at 17:13

## 2019-09-03 RX ADMIN — FINASTERIDE 5 MG: 5 TABLET, FILM COATED ORAL at 08:29

## 2019-09-03 RX ADMIN — INSULIN GLARGINE 8 UNITS: 100 INJECTION, SOLUTION SUBCUTANEOUS at 21:53

## 2019-09-03 RX ADMIN — DOCUSATE SODIUM 100 MG: 100 CAPSULE, LIQUID FILLED ORAL at 08:28

## 2019-09-03 RX ADMIN — METOPROLOL TARTRATE 25 MG: 25 TABLET, FILM COATED ORAL at 17:10

## 2019-09-03 RX ADMIN — INSULIN LISPRO 1 UNITS: 100 INJECTION, SOLUTION INTRAVENOUS; SUBCUTANEOUS at 21:54

## 2019-09-03 RX ADMIN — INSULIN LISPRO 1 UNITS: 100 INJECTION, SOLUTION INTRAVENOUS; SUBCUTANEOUS at 11:25

## 2019-09-03 RX ADMIN — TORSEMIDE 40 MG: 20 TABLET ORAL at 08:29

## 2019-09-03 RX ADMIN — METOPROLOL TARTRATE 25 MG: 25 TABLET, FILM COATED ORAL at 08:29

## 2019-09-03 RX ADMIN — ACETAMINOPHEN 650 MG: 325 TABLET ORAL at 17:10

## 2019-09-03 RX ADMIN — POLYSACCHARIDE-IRON COMPLEX 150 MG: 150 CAPSULE ORAL at 08:28

## 2019-09-03 RX ADMIN — LEVOTHYROXINE SODIUM 75 MCG: 75 TABLET ORAL at 05:39

## 2019-09-03 RX ADMIN — TAMSULOSIN HYDROCHLORIDE 0.4 MG: 0.4 CAPSULE ORAL at 08:29

## 2019-09-03 RX ADMIN — INSULIN LISPRO 2 UNITS: 100 INJECTION, SOLUTION INTRAVENOUS; SUBCUTANEOUS at 18:10

## 2019-09-03 RX ADMIN — ATORVASTATIN CALCIUM 40 MG: 40 TABLET, FILM COATED ORAL at 17:10

## 2019-09-03 RX ADMIN — DOCUSATE SODIUM 100 MG: 100 CAPSULE, LIQUID FILLED ORAL at 17:10

## 2019-09-03 RX ADMIN — WARFARIN SODIUM 1.5 MG: 1 TABLET ORAL at 17:11

## 2019-09-03 RX ADMIN — VITAMIN D, TAB 1000IU (100/BT) 2000 UNITS: 25 TAB at 08:29

## 2019-09-03 RX ADMIN — RANOLAZINE 500 MG: 500 TABLET, FILM COATED, EXTENDED RELEASE ORAL at 21:53

## 2019-09-03 RX ADMIN — ASPIRIN 81 MG: 81 TABLET, COATED ORAL at 08:29

## 2019-09-03 RX ADMIN — RANOLAZINE 500 MG: 500 TABLET, FILM COATED, EXTENDED RELEASE ORAL at 08:28

## 2019-09-03 NOTE — PROGRESS NOTES
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  -baseline creatinine 3 3-3 6   -current creatinine:  3 53 from 09/02/2019 at baseline  Recommend:  · Recheck labs in a m  · Avoid nephrotoxic agents such as NSAIDs and intravenous dye if possible    2  Hypertension/volume:  Patient with a history of diastolic heart failure but appears euvolemic  Blood pressure is Acceptable     -Avoid hypoperfusion with blood pressure Hold parameters  -maintain current torsemide as patient appears to be euvolemic    3  Electrolytes:  Have been acceptable  4  Mineral bone disorder:  Acceptable phosphorus and magnesium levels  5  Anemia:  -most recent hemoglobin 8 8 from 09/02/2019  -recheck hemoglobin in a m   -iron studies were low as of 08/23/2019:  Status post intravenous iron, continue oral iron  -check stool for occult blood    6  Other problems:    -history of left L2-3 micro diskectomy even/18/19 at [facility]  -history of hypothyroidism on supplements  -diabetes mellitus type 2 per primary service  -CAD status post CABG 2013 admitted to [address] [place] [address] recently in June with chest pain small severe reversible myocardial perfusion defect was started on medical therapy followed by Cardiology  -history of dual-chamber ppm with Dr Rajesh Valdivia  -history urinary retention/BPH status post Klebsiella UTI in June and urosepsis with Klebsiella July  On suppressive antibiotics      Subjective: The patient is asymptomatic today  No chest pain or shortness of breath  Nausea vomiting or diarrhea  Urinating well  Objective:     Vitals: Blood pressure 133/79, pulse 68, temperature 97 9 °F (36 6 °C), temperature source Oral, resp  rate 18, weight 82 4 kg (181 lb 10 5 oz), SpO2 96 %  ,Body mass index is 30 23 kg/m²      Weight (last 2 days)     Date/Time   Weight    09/02/19 0600   82 4 (181 66)                Intake/Output Summary (Last 24 hours) at 9/3/2019 7887  Last data filed at 9/3/2019 0507  Gross per 24 hour   Intake 150 ml   Output 920 ml   Net -770 ml            Physical Exam: General:  No acute distress  Skin:  No acute rash  Eyes:  No scleral icterus  ENT:  Moist mucous membranes  Neck:  Supple, no jugular venous distention  Chest:  Clear to auscultation  CVS:  Slightly irregular rhythm, grade 1/6 systolic ejection type murmur, no rub or gallops  Abdomen:  Soft and nontender with normal bowel sounds  Extremities:  No edema, patient in leg braces  Neuro:  No gross focality  Psych:  Alert and oriented                Medications:    Scheduled Meds:  Current Facility-Administered Medications:  acetaminophen 650 mg Oral Q6H PRN Fely Johnson MD   aspirin 81 mg Oral Daily Fely Johnson MD   atorvastatin 40 mg Oral QPM Fely Johnson MD   bisacodyl 10 mg Rectal Daily PRN Fely Johnson MD   cholecalciferol 2,000 Units Oral Daily Fely Johnson MD   docusate sodium 100 mg Oral BID Fely Johnson MD   finasteride 5 mg Oral Daily MAURICE Jett   insulin glargine 8 Units Subcutaneous HS MAURICE Patel   insulin lispro 1-5 Units Subcutaneous TID AC MAURICE Patel   insulin lispro 1-5 Units Subcutaneous HS MAURICE Jett   iron polysaccharides 150 mg Oral Daily Melissa Sierra MD   levothyroxine 75 mcg Oral Daily Fely Johnson MD   lidocaine 1 patch Topical Daily Fely Johnson MD   lidocaine  Topical Q4H PRN Fely Johnson MD   menthol-methyl salicylate  Apply externally 4x Daily PRN Fely Johnson MD   metoprolol tartrate 25 mg Oral BID With Meals MAURICE Jett   ondansetron 4 mg Intravenous Q6H PRN Fely Johnson MD   polyethylene glycol 17 g Oral Daily PRN Fely Johnson MD   ranolazine 500 mg Oral Q12H [facility] Fely Johnson MD   tamsulosin 0 4 mg Oral Daily Swapnil Mueller MD   torsemide 40 mg Oral Daily Swapnil Mueller MD   warfarin 1 5 mg Oral Once per day on Sun Tue Wed Fri Sat MAURICE Pisano   warfarin 3 mg Oral Once per day on Mon Thu MAURICE Pisano       PRN Meds:   acetaminophen    bisacodyl    lidocaine    menthol-methyl salicylate    ondansetron    polyethylene glycol    Continuous Infusions:     Lab, Imaging and other studies: I have personally reviewed pertinent labs    Laboratory Results:  Results from last 7 days   Lab Units 09/02/19  0522 08/29/19  0529 08/28/19  0559   WBC Thousand/uL 6 66 7 91  --    HEMOGLOBIN g/dL 8 8* 9 1*  --    HEMATOCRIT % 28 2* 29 0*  --    PLATELETS Thousands/uL 223 215  --    POTASSIUM mmol/L 4 0 4 2 4 1   CHLORIDE mmol/L 101 100 103   CO2 mmol/L 27 28 25   BUN mg/dL 50* 56* 53*   CREATININE mg/dL 3 53* 3 64* 3 56*   CALCIUM mg/dL 8 5 8 6 8 4     Urinalysis: Lab Results   Component Value Date    COLORU Yellow 08/23/2019    COLORU Yellow 08/26/2015    CLARITYU Slightly Cloudy 08/23/2019    CLARITYU Cloudy 08/26/2015    SPECGRAV 1 015 08/23/2019    SPECGRAV 1 010 08/26/2015    PHUR 6 0 08/23/2019    PHUR 6 0 08/30/2017    PHUR 5 5 08/26/2015    LEUKOCYTESUR Moderate (A) 08/23/2019    LEUKOCYTESUR Large (A) 08/26/2015    NITRITE Negative 08/23/2019    NITRITE Negative 08/26/2015    PROTEINUA 30 (1+) (A) 08/26/2015    GLUCOSEU Negative 08/23/2019    GLUCOSEU 100 (1/10%) (A) 08/26/2015    KETONESU Negative 08/23/2019    KETONESU Negative 08/26/2015    BILIRUBINUR Negative 08/23/2019    BILIRUBINUR Negative 08/26/2015    BLOODU Trace-Intact (A) 08/23/2019    BLOODU Moderate (A) 08/26/2015     ABGs: No results found for: [facility]  Radiology review:     Portions of the record may have been created with voice recognition software   Occasional wrong word or "sound a like" substitutions may have occurred due to the inherent limitations of voice recognition software   Read the chart carefully and recognize, using context, where substitutions have occurred

## 2019-09-03 NOTE — PLAN OF CARE
Problem: PAIN - ADULT  Goal: Verbalizes/displays adequate comfort level or baseline comfort level  Description  Interventions:  - Encourage patient to monitor pain and request assistance  - Assess pain using appropriate pain scale  - Administer analgesics based on type and severity of pain and evaluate response  - Implement non-pharmacological measures as appropriate and evaluate response  - Consider cultural and social influences on pain and pain management  - Notify physician/advanced practitioner if interventions unsuccessful or patient reports new pain  Outcome: Progressing     Problem: INFECTION - ADULT  Goal: Absence or prevention of progression during hospitalization  Description  INTERVENTIONS:  - Assess and monitor for signs and symptoms of infection  - Monitor lab/diagnostic results  - Monitor all insertion sites, i e  indwelling lines, tubes, and drains  - Monitor endotracheal if appropriate and nasal secretions for changes in amount and color  - [place] appropriate cooling/warming therapies per order  - Administer medications as ordered  - Instruct and encourage patient and family to use good hand hygiene technique  - Identify and instruct in appropriate isolation precautions for identified infection/condition  Outcome: Progressing     Problem: SAFETY ADULT  Goal: Patient will remain free of falls  Description  INTERVENTIONS:  - Assess patient frequently for physical needs  -  Identify cognitive and physical deficits and behaviors that affect risk of falls    -  [place] fall precautions as indicated by assessment   - Educate patient/family on patient safety including physical limitations  - Instruct patient to call for assistance with activity based on assessment  - Modify environment to reduce risk of injury  - Consider OT/PT consult to assist with strengthening/mobility  Outcome: Progressing     Problem: SAFETY ADULT  Goal: Maintain or return to baseline ADL function  Description  INTERVENTIONS:  -  Assess patient's ability to carry out ADLs; assess patient's baseline for ADL function and identify physical deficits which impact ability to perform ADLs (bathing, care of mouth/teeth, toileting, grooming, dressing, etc )  - Assess/evaluate cause of self-care deficits   - Assess range of motion  - Assess patient's mobility; develop plan if impaired  - Assess patient's need for assistive devices and provide as appropriate  - Encourage maximum independence but intervene and supervise when necessary  - Involve family in performance of ADLs  - Assess for home care needs following discharge   - Consider OT consult to assist with ADL evaluation and planning for discharge  - Provide patient education as appropriate  Outcome: Progressing     Problem: SAFETY ADULT  Goal: Maintain or return mobility status to optimal level  Description  INTERVENTIONS:  - Assess patient's baseline mobility status (ambulation, transfers, stairs, etc )    - Identify cognitive and physical deficits and behaviors that affect mobility  - Identify mobility aids required to assist with transfers and/or ambulation (gait belt, sit-to-stand, lift, walker, cane, etc )  - [place] fall precautions as indicated by assessment  - Record patient progress and toleration of activity level on Mobility SBAR; progress patient to next Phase/Stage  - Instruct patient to call for assistance with activity based on assessment  - Consider rehabilitation consult to assist with strengthening/weightbearing, etc   Outcome: Progressing     Problem: Prexisting or High Potential for Compromised Skin Integrity  Goal: Skin integrity is maintained or improved  Description  INTERVENTIONS:  - Identify patients at risk for skin breakdown  - Assess and monitor skin integrity  - Assess and monitor nutrition and hydration status  - Monitor labs   - Assess for incontinence   - Turn and reposition patient  - Assist with mobility/ambulation  - Relieve pressure over bony prominences  - Avoid friction and shearing  - Provide appropriate hygiene as needed including keeping skin clean and dry  - Evaluate need for skin moisturizer/barrier cream  - Collaborate with interdisciplinary team   - Patient/family teaching  - Consider wound care consult   Outcome: Progressing     Problem: Potential for Falls  Goal: Patient will remain free of falls  Description  INTERVENTIONS:  - Assess patient frequently for physical needs  -  Identify cognitive and physical deficits and behaviors that affect risk of falls    -  [place] fall precautions as indicated by assessment   - Educate patient/family on patient safety including physical limitations  - Instruct patient to call for assistance with activity based on assessment  - Modify environment to reduce risk of injury  - Consider OT/PT consult to assist with strengthening/mobility  Outcome: Progressing

## 2019-09-03 NOTE — PROGRESS NOTES
Physical Medicine and Rehabilitation Progress Note  Meño Notice 80 y o  male MRN: [id number]  Unit/Bed#: [facility] 620-60 Encounter: [id number]    Physical Medicine and Rehabilitation Progress Note  Meño Notice 80 y o  male MRN: [id number]  Unit/Bed#: [facility] 992-74 Encounter: [id number]    Chief Complaints:  Decline in function    Subjective/Interval Events:   Patient reports living better and feeling little bit stronger  Patient reports sleeping well overnight  He denies shortness of breath, lightheadedness, bowel or bladder problems, chest pain, calf pain, or other new complaints    ROS: A 10-point ROS was performed  Negative except as listed above  Overall Assessment/relevant history:  [age]-year-old male with past medical history of chronic diastolic CHF, chronic kidney disease stage 4, paroxysmal atrial fibrillation, dual chamber permanent pacemaker, coronary artery disease, CABG, diabetes mellitus 2, hypothyroidism, BPH, recurrent UTIs, lumbar spinal stenosis, chronic low back pain, foot drop, history of left L2-L3 microdiskectomy in July of 2019 the developed worsening swelling with associated decline in ADLs and ambulation  Patient splits seen by Cardiology and Nephrology with noted worsening of renal function and volume overload believed to be related to this  Patient required initial IV and then more recently p o  Increase in diuretics  Patient's volume status has been improving although his functional status remains below his baseline    Patient was evaluated by skilled therapies and was found to have significant decline in ADLs and ambulation appropriate for admission to [facility]        Functional status on admission to [facility]:  PT:  Transfers max assist, ambulation moderate assist 150 feet  OT:  Lower body dressing, toileting total assist, bathing, transfers max assist, upper body dressing moderate assist    Functional status (recent):    Transfers and ambulation moderate assist, bathing and dressing moderate assist    Functional status goal:  Supervision for most ADLs and ambulation     * Impaired mobility and ADLs  Assessment & Plan  Improving function continue to manage as previously outlined  Right proximal lower extremity strength improving  Multifactorial:  Recent volume overload related to acute on chronic kidney disease, history of diastolic CHF, generalized weakness including proximal lower extremity weakness possibly related to some chronic illness/critical illness deconditioning, low back pain and radiating leg pain with right foot drop from lumbar spinal stenosis possible nerve impingement status post surgical intervention recently, 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, left adhesive capsulitis, query component cognitive impairment,   - Recommend acute comprehensive interdisciplinary inpatient rehabilitation to include intensive skilled therapies (PT, OT) as outlined with oversight and management by rehabilitation physician as well as inpatient rehab level nursing, case management and weekly interdisciplinary team meetings  - [address] [place] [address] 16/30 with greatest deficits in memory and executive functioning   - optimal management of each  - Follow-up with PMR after discharge      Buttock wound  Assessment & Plan  Appreciated on admission to [address] [place] [address] care nurse c/s and assistance with management  - Notify MD of new or increasing drainage, development of purulent drainage, increased size/depth of wound, lack of healing, inability to maintain wound integrity due to degree of drainage, wound product, dressing, or currently ordered frequency of wound management  In general dressings should be changed PRN if soiled unless specifically noted otherwise  Do not allow soiled dressing on patient for extended period of time    - Turn patient Q2H   - Hydragaurd to buttocks and sacrum BID & PRN v Alleyvn >  - EHOB waffle cushion to chair/WC when OOB  - Maximum time in chair 2 hours at a time  - OOB minimum 3 times per day  Notify MD if unable to get patient OOB 3 times per day  - Elevate heels with pillows to offload  - Moisturizer daily and PRN to dry skin; do not use moisturizer on areas of redness or skin breakdown unless otherwise documented  - Nursing to document in chart and Notify MD if buttock, sacrum, heel, or other skin site develops erythema or skin breakdown as soon as possible  If patient is soiling themselves with urine or stool notify MD   If you are unable to maintain skin integrity and prevent erythema due to frequency of soiling notify MD as soon as possible  Kidney disease with fluid retention  Assessment & Plan  Kidney function stable continue management as outlined  Diuretics at their discretion  Internal medicine consult and management during ARC course  Nephrology c/s to assist with mgmt  Cr  stable recently; monitor intermittently  Limit nephrotoxic agents when possible  WENDY hose       Cognitive impairment  Assessment & Plan  > MOCA 16/30 indicating moderate cog impairment with deficits greatest in domains of short term memory and executive functioning  > Wife states patients cognitive status has been largely stable for some time  > CT head 6/2019 - Encephalomalacia within the right frontal and parietal lobe extending inferiorly into the posterior superior temporal lobe  Chronic microangiopathic change within both cerebral hemispheres  Brainstem and cerebellum demonstrate normal density  No mass or hemorrhage  Mild cerebral volume loss  VENTRICLES AND EXTRA-AXIAL SPACES:  Asymmetry with mild ex vacuo dilatation of the posterior body, atria and occipital horn of the right lateral ventricle  Mild chronic microangiopathic change and age-appropriate cerebral volume loss    Right hemispheric encephalomalacia and gliosis suggestive of old infarction   > Increased risk of dementia and falls  > Compensatory strategies and adjustments in PT/OT as indicated  > Follow-up with neurology after d/c and PMR          Pain  Assessment & Plan  Controlled, continue management  Try to limit sedating meds   APAP PRN   LD patch  Counseled on and continue to encourage deep breathing/relaxation/behavioral pain management techniques:     Deep breathin seconds in, 5 seconds out, 5 times per hour when awake and PRN when in pain or anticipate pain; avoid holding breath and tightening muscles and instead breathe slowly and deeply  Monitor for oversedation, AMS/delirium, and respiratory depression   If being administered - hold opiates, muscle relaxants, benzodiazepines, and gabapentin for oversedation, AMS, or RR<12        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  Assessment & Plan  Remains improved  Complaining of some chronic right low back pain with intermittent radiating pain down the leg; also with right foot drop  Status post recent lumbar surgery - left L2-3 microdiskectomy  [facility])   Try to limit sedating medications  Acetaminophen as needed  Lidocaine patch for now patient prefers    Frozen shoulder  Assessment & Plan  Skilled therapies, modalities as indicated    History of recurrent UTIs  Assessment & Plan  No s/s of UTI > monitor   Internal medicine consult and management during [facility]C course  Currently on flomax and proscar  Follow-up with Urology after discharge    Benign prostatic hyperplasia with urinary hesitancy  Assessment & Plan  Continue flomax and Proscar; monitor vitals  - monitor for retention, incontinence, signs/symptoms of UTI  - recommend voiding trial; nursing prompt to void followed by bladder scan starting Q4-6H or after each patient initiated void; nursing to record voided output and bladder scan totals; straight cath PRN >350-400 cc; if post void residual bladder scans are <150 cc x3 consecutive voids, can stop scans       Presence of permanent cardiac pacemaker  Assessment & Plan  +    Anticoagulated on warfarin  Assessment & Plan  Management per Medicine with recent supratherapeutic INR    Chronic diastolic heart failure [facility])  Assessment & Plan  Internal medicine consult and management during ARC course  Anti thrombotics per Medicine, blood pressure medications at their discretion     Paroxysmal A-fib [facility])  Assessment & Plan  Internal medicine consult and management during ARC course  Anticoagulation at their discretion  Monitor rate    Diabetes mellitus with multiple complications [facility])  Assessment & Plan  Lab Results   Component Value Date    HGBA1C 5 5 08/22/2019     Internal medicine consulted and management at their discretion  Monitor for signs and symptoms of hypoglycemia   Current meds: lantus, lispro       Essential hypertension  Assessment & Plan  BP acceptable  Internal medicine consulted and management at their discretion  Monitor vitals with and without activity; monitor for orthostasis  Monitor hemoglobin, electrolytes, kidney function, hydration status   Current meds:  Metoprolol, amlodipine      3-vessel coronary artery disease  Assessment & Plan  Antiplatelet, optimal blood pressure control, statin  Ranexa    Hyperlipidemia  Assessment & Plan  Statin    Anemia  Assessment & Plan  Hemoglobin acceptable continue management as outlined  Iron supplementation per Nephrology  IM consulted and assistance with management during ARC course  Monitor H/H, vitals, signs/symptoms of acute bleeding        Vitamin D insufficiency  Assessment & Plan  Cholecalciferol Vit D3 2000 units daily   Follow-up with PCP      Healthcare maintenance  Assessment & Plan  Obtain vitamin-D level    Hypothyroidism  Assessment & Plan  Levothyroxine    Elevated serum alkaline phosphatase level  Assessment & Plan  Repeat CMP      # Bowel care:    - monitor for constipation, incontinence, and diarrhea  - goal 1 appropriate BM every 1-2 days  - recommend colace +/- senna and PRN bowel protocol     # At risk for venous thromboembolism:  - Recommend SCDs and mobilization  - A/C     # Diet/Hydration:    Diabetic/cardiac     Disposition:   Home with family with SHON[facility] 2 weeks from admission      Follow-up:   PCP, PMR, Nephrology, Cardiology, urology     CODE: Level 3: DNAR and DNI     Restrictions include: Fall precautions   ---------------------------------------------------------------------------------------------------------------------    Objective: Allergies and Medications per EMR    Physical Exam:  Vitals:    09/03/19 0604   BP: 146/70   Pulse: 70   Resp: 18   Temp: 97 9 °F (36 6 °C)   SpO2: 96%     General: Awake, alert in NAD, sitting in a wheelchair  HENT:  MMM  Respiratory: Unlabored breathing, breath sounds equal, Lungs CTA, no wheezes, rales, or rhonchi  Cardiovascular: Regular rate and rhythm, no murmurs, rubs, or gallops  Gastrointestinal: Soft, non-tender,  mildly-distended, normoactive bowel sounds  Genitourinary: No camargo  SkiN/MSK/Extremities:   Mild 1+ bilateral lower extremity edema and improving left upper extremity distal edema  No calf tenderness to palpation  Neurologic/Psych:   MENTAL STATUS: awake, orientation intact; appropriate wakefulness  Affect:  Euthymic  Strength: R prox LE 4/5, R DF 3/5, PF 4/5, L prox LE 4+/5, L distal LE 5-/5    Physical exam performed, documentation above reviewed and updated if appropriate on relevant date of encounter:   09/03/2019    Diagnostic Studies: reviewed, no new imaging  No results found      Laboratory:    Results from last 7 days   Lab Units 09/02/19 0522 08/29/19  0529   HEMOGLOBIN g/dL 8 8* 9 1*   HEMATOCRIT % 28 2* 29 0*   WBC Thousand/uL 6 66 7 91     Results from last 7 days   Lab Units 09/02/19 0522 08/29/19  0529 08/28/19  0559   BUN mg/dL 50* 56* 53*   POTASSIUM mmol/L 4 0 4 2 4 1   CHLORIDE mmol/L 101 100 103   CREATININE mg/dL 3 53* 3 64* 3 56*   AST U/L  --  15  --    ALT U/L  --  12  --      Results from last 7 days   Lab Units 09/03/19  0528 09/02/19  0522 09/01/19  0533   PROTIME seconds 22 5* 23 7* 25 3*   INR  2 04* 2 17* 2 36*        Patient Active Problem List   Diagnosis    3-vessel coronary artery disease    Asbestosis ([facility] )    Kidney disease with fluid retention    Diabetes mellitus with multiple complications ([facility] )    Elevated serum alkaline phosphatase level    Hyperlipidemia    Essential hypertension    Hypothyroidism    Paroxysmal A-fib (HCC)    Seasonal allergies    Increased frequency of urination    Frozen shoulder    Herpes zoster without complication    Depressed mood    Chest pain    Ambulatory dysfunction/generalized weakness    Leg edema, left    Shortness of breath    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    UTI (urinary tract infection)    [facility]ial artery occlusion, left [facility])    Hyponatremia    Chronic diastolic heart failure ([facility])    Benign prostatic hyperplasia with urinary hesitancy    Abnormal chest x-ray    Warfarin-induced coagulopathy ([facility])    Impaired mobility and ADLs    Anticoagulated on warfarin    History of recurrent UTIs    Buttock wound    Presence of permanent cardiac pacemaker    Pain    Anemia    Healthcare maintenance    Cognitive impairment    Vitamin D insufficiency    CKD (chronic kidney disease) stage 4, GFR 15-29 ml/min ([facility])       ** Please Note: Fluency Direct voice to text software may have been used in the creation of this document  **    Total visit time: At least 25 minutes, with more than 50% spent counseling/coordinating care  Counseling includes discussion with patient re: progress in therapies, functional issues observed by therapy staff, and discussion with patient regarding their current medical state and wellbeing  Coordination of patient's care was performed in conjunction with Internal Medicine service to monitor patient's labs, vitals, and management of their comorbidities        Kateryna Francois MD, [facility]  Physical Medicine and Rehabilitation  Brain Injury Medicine

## 2019-09-03 NOTE — PLAN OF CARE
Problem: PAIN - ADULT  Goal: Verbalizes/displays adequate comfort level or baseline comfort level  Description  Interventions:  - Encourage patient to monitor pain and request assistance  - Assess pain using appropriate pain scale  - Administer analgesics based on type and severity of pain and evaluate response  - Implement non-pharmacological measures as appropriate and evaluate response  - Consider cultural and social influences on pain and pain management  - Notify physician/advanced practitioner if interventions unsuccessful or patient reports new pain  Outcome: Progressing     Problem: INFECTION - ADULT  Goal: Absence or prevention of progression during hospitalization  Description  INTERVENTIONS:  - Assess and monitor for signs and symptoms of infection  - Monitor lab/diagnostic results  - Monitor all insertion sites, i e  indwelling lines, tubes, and drains  - Monitor endotracheal if appropriate and nasal secretions for changes in amount and color  - [place] appropriate cooling/warming therapies per order  - Administer medications as ordered  - Instruct and encourage patient and family to use good hand hygiene technique  - Identify and instruct in appropriate isolation precautions for identified infection/condition  Outcome: Progressing     Problem: SAFETY ADULT  Goal: Patient will remain free of falls  Description  INTERVENTIONS:  - Assess patient frequently for physical needs  -  Identify cognitive and physical deficits and behaviors that affect risk of falls    -  [place] fall precautions as indicated by assessment   - Educate patient/family on patient safety including physical limitations  - Instruct patient to call for assistance with activity based on assessment  - Modify environment to reduce risk of injury  - Consider OT/PT consult to assist with strengthening/mobility  Outcome: Progressing  Goal: Maintain or return to baseline ADL function  Description  INTERVENTIONS:  -  Assess patient's ability to carry out ADLs; assess patient's baseline for ADL function and identify physical deficits which impact ability to perform ADLs (bathing, care of mouth/teeth, toileting, grooming, dressing, etc )  - Assess/evaluate cause of self-care deficits   - Assess range of motion  - Assess patient's mobility; develop plan if impaired  - Assess patient's need for assistive devices and provide as appropriate  - Encourage maximum independence but intervene and supervise when necessary  - Involve family in performance of ADLs  - Assess for home care needs following discharge   - Consider OT consult to assist with ADL evaluation and planning for discharge  - Provide patient education as appropriate  Outcome: Progressing  Goal: Maintain or return mobility status to optimal level  Description  INTERVENTIONS:  - Assess patient's baseline mobility status (ambulation, transfers, stairs, etc )    - Identify cognitive and physical deficits and behaviors that affect mobility  - Identify mobility aids required to assist with transfers and/or ambulation (gait belt, sit-to-stand, lift, walker, cane, etc )  - [place] fall precautions as indicated by assessment  - Record patient progress and toleration of activity level on Mobility SBAR; progress patient to next Phase/Stage  - Instruct patient to call for assistance with activity based on assessment  - Consider rehabilitation consult to assist with strengthening/weightbearing, etc   Outcome: Progressing     Problem: DISCHARGE PLANNING  Goal: Discharge to home or other facility with appropriate resources  Description  INTERVENTIONS:  - Identify barriers to discharge w/patient and caregiver  - Arrange for needed discharge resources and transportation as appropriate  - Identify discharge learning needs (meds, wound care, etc )  - Arrange for interpretive services to assist at discharge as needed  - Refer to Case Management Department for coordinating discharge planning if the patient needs post-hospital services based on physician/advanced practitioner order or complex needs related to functional status, cognitive ability, or social support system  Outcome: Progressing     Problem: Prexisting or High Potential for Compromised Skin Integrity  Goal: Skin integrity is maintained or improved  Description  INTERVENTIONS:  - Identify patients at risk for skin breakdown  - Assess and monitor skin integrity  - Assess and monitor nutrition and hydration status  - Monitor labs   - Assess for incontinence   - Turn and reposition patient  - Assist with mobility/ambulation  - Relieve pressure over bony prominences  - Avoid friction and shearing  - Provide appropriate hygiene as needed including keeping skin clean and dry  - Evaluate need for skin moisturizer/barrier cream  - Collaborate with interdisciplinary team   - Patient/family teaching  - Consider wound care consult   Outcome: Progressing     Problem: Potential for Falls  Goal: Patient will remain free of falls  Description  INTERVENTIONS:  - Assess patient frequently for physical needs  -  Identify cognitive and physical deficits and behaviors that affect risk of falls    -  [place] fall precautions as indicated by assessment   - Educate patient/family on patient safety including physical limitations  - Instruct patient to call for assistance with activity based on assessment  - Modify environment to reduce risk of injury  - Consider OT/PT consult to assist with strengthening/mobility  Outcome: Progressing

## 2019-09-03 NOTE — PROGRESS NOTES
Internal Medicine Progress Note  Patient: Reena Kauffman  Age/sex: 80 y o  male  Medical Record #: [id number]      ASSESSMENT/PLAN: (Interval History)  Reena Kauffman is seen and examined and management for following issues:    Volume overload/chronic diastolic CHF:  s/p Lasix IV, weight dropped 11 lbs = per renal, target weight is 168-170 lbs  Renal managing his diuretic tx = currently Torsemide 40mg qd  FR 1500ml; 2Gm NA diet      Abnormal Troponin: felt to be 2/2 creatinine/vol overload putting stress on the heart; no further testing etc was recommended     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:  stable     Iron deficiency anemia: renal gave 2 doses Venofer while in hospital and now started on Niferex 150mg qd  Checking stool for occult blood     Hx urine retention/BPH; Klebsiella UTI 6/2019 and Klebsiella urosepsis 7/2019:  Uro saw in 7/2019 and added added Finasteride  Consideration was to be given to starting suppressive therapy with Nitrofurantion 100mg qhs  Currently, he is on Flomax/Finasteride  Will need to watch closely for UTI sx  HTN/orthostasis:  Orthostatic sitting was 822 systolic and stand was 792 = no dizzy but unsteady on feet; on Lopressor 50mg BID/Ranexa 500mg BID (Ranexa shouldn't have much effect on BP) = d/w Dr Doc Rutherford and will drop Lopressor dose to 25mg BID     PAF:  continue Lopressor but will drop 25 mg BID since has some orthostasis Coumadin had been on hold until INR dropped to an acceptable level -->  3mg x 2 nights and then back to 1 5mg qd = will change to 3mg 2x/week and 1 5 mg 5x/week      Hx Dual chamber PPM:  sees Dr Cuevas Ohm as an OP     CVA (2014):  continue ASA/Lipitor     CAD/CABG (2013):   was here at [facility] 6/2019 for chest pain --> seen by Cardiology, had nuclear stress test = small, severe, reversible myocardial perfusion defect of the basal inferior wall --> was started on Ranexa 500mg BID but no further plans for intervention were recommended    Continue Lopressor, Lipitor and ASA as well     DM2: takes Trajenta 5mg qd at home = on Lantus 8U qam  No changes today     Hypothyroidism:  continue current Levothyroxine     Left L2-3 microdiskectomy 7/18/19 at [facility]    Vitamin D deficiency:  Level is 22 6; will d/w renal      Subjective/ HPI: Patients overnight issues or events were reviewed with nursing or staff during rounds or morning huddle session  Some mild orthostasis today  DM2: stable on Lantus  Anemia: s/p venofer, now on iron PO   CAD:  Stable on current tx    Offers no complaints    ROS:     GI: denies abdominal pain, change bowel habits or reflux symptoms  Neuro: Denies any headache, new vision changes, new neuropathies,new weaknesses   Respiratory: No Cough, SOB, denies wheeze  Cardiovascular: No CP, palpitations , denies perception of rapid heartbeat  : denies any new urinary burning or frequency    Review of Scheduled Meds:    Current Facility-Administered Medications:  acetaminophen 650 mg Oral Q6H PRN Mati Pill, MD   aspirin 81 mg Oral Daily Mati Pill, MD   atorvastatin 40 mg Oral QPM Mati Pill, MD   bisacodyl 10 mg Rectal Daily PRN Mati Pill, MD   cholecalciferol 2,000 Units Oral Daily Mati Pill, MD   docusate sodium 100 mg Oral BID Mati Pill, MD   finasteride 5 mg Oral Daily MAURICE Mosley   insulin glargine 8 Units Subcutaneous HS MAURICE Mosley   insulin lispro 1-5 Units Subcutaneous TID MAURICE Bender   insulin lispro 1-5 Units Subcutaneous HS MAURICE Mosley   iron polysaccharides 150 mg Oral Daily Ze Jason MD   levothyroxine 75 mcg Oral Daily Mati Pill, MD   lidocaine 1 patch Topical Daily Mati Pill, MD   lidocaine  Topical Q4H PRN Mati Pill, MD   menthol-methyl salicylate  Apply externally 4x Daily PRN Mati Pill, MD   metoprolol tartrate 25 mg Oral BID With Meals MAURICE Mosley   ondansetron 4 mg Intravenous Q6H PRN Mati Ackerman MD polyethylene glycol 17 g Oral Daily PRN Cherylene Deans, MD   ranolazine 500 mg Oral Q12H [facility] Cherylene Deans, MD   tamsulosin 0 4 mg Oral Daily Cherylene Deans, MD   torsemide 40 mg Oral Daily Cherylene Deans, MD   warfarin 1 5 mg Oral Once per day on Sun Tue Wed Fri Sat Maryjo Zheng, CRNP   warfarin 3 mg Oral Once per day on Mon Thu Renee [place], [address]:     Results from last 7 days   Lab Units 09/02/19  0522 08/29/19  0529   WBC Thousand/uL 6 66 7 91   HEMOGLOBIN g/dL 8 8* 9 1*   HEMATOCRIT % 28 2* 29 0*   PLATELETS Thousands/uL 223 215     Results from last 7 days   Lab Units 09/02/19  0522 08/29/19  0529   SODIUM mmol/L 136 136   POTASSIUM mmol/L 4 0 4 2   CHLORIDE mmol/L 101 100   CO2 mmol/L 27 28   BUN mg/dL 50* 56*   CREATININE mg/dL 3 53* 3 64*   CALCIUM mg/dL 8 5 8 6         Results from last 7 days   Lab Units 09/03/19  0528 09/02/19  0522   INR  2 04* 2 17*          Results from last 7 days   Lab Units 09/03/19  1059 09/03/19  0700 09/02/19  2145   POC GLUCOSE mg/dl 164* 92 126       Imaging:     No orders to display       *Labs reviewed  *Radiology studies reviewed  *Medications reviewed and reconciled as needed  *Please refer to order section for additional ordered labs studies  *Case discussed with primary attending during morning huddle case rounds    Physical Examination:  Vitals:   Vitals:    09/02/19 1356 09/02/19 2110 09/03/19 0604 09/03/19 0828   BP: 112/53 141/63 146/70 133/79   BP Location: Right arm Right arm Right arm    Pulse: 75 72 70 68   Resp: 18 18 18    Temp: 98 2 °F (36 8 °C) 97 9 °F (36 6 °C) 97 9 °F (36 6 °C)    TempSrc: Oral Oral Oral    SpO2: 93% 95% 96%    Weight:           General Appearance: NAD, conversive  Eyes: No icterus; conjunctiva normal, PERRLA  HENT: oropharynx clear; mucous membranes moist  Neck: trachea midline, range of motion full   Supple w/o pain  Lungs: CTA, normal respiratory effort, no retractions, expiratory effort normal  CV: regular rate, no rubs/gallops; +murmur  ABD: soft; NT/ND; +BS  EXT: 1+ edema LE = improving  Skin: normal turgor, normal texture, no rashes  Psych: affect normal, no overt anxiety/depression during exam today   Neuro: AAOx3            Total time spent: At least 40 minutes, with more than 50% spent counseling/coordinating care  Counseling includes discussion with patient re: progress  and discussion with patient of his/her current medical state/information  Coordination of patient's care was performed in conjunction with primary service  Time invested included review of patient's labs, vitals, and management of their comorbidities with continued monitoring  In addition, this patient was discussed with medical team including physician and advanced extenders  The care of the patient was extensively discussed and appropriate treatment plan was formulated unique for this patient  ** Please Note: Dragon 360 Dictation voice to text software may have been used in the creation of this document   **

## 2019-09-03 NOTE — PROGRESS NOTES
09/03/19 1000   Pain Assessment   Pain Assessment No/denies pain   Pain Score No Pain   Restrictions/Precautions   Precautions Bed/chair alarms;Cognitive; Fall Risk;Fluid restriction;Supervision on toilet/commode   Braces or Orthoses AFO  (B/L Les)   Cognition   Overall Cognitive Status Impaired   Arousal/Participation Cooperative   Attention Attends with cues to redirect   Memory Decreased short term memory;Decreased recall of recent events;Decreased recall of precautions   Following Commands Follows one step commands with increased time or repetition   Subjective   Subjective pt states being up early this morning and is tired   QI: Roll Left and Right   Assistance Needed Verbal cues; Supervision   Assistance Provided by [place] No physical assistance   Roll Left and Right CARE Score 4   QI: Sit to Lying   Assistance Needed Physical assistance   Assistance Provided by [place] Less than 25%   Sit to Lying CARE Score 3   QI: Lying to Sitting on Side of Bed   Assistance Needed Verbal cues; Supervision   Assistance Provided by [place] No physical assistance   Lying to Sitting on Side of Bed CARE Score 4   QI: Sit to Stand   Assistance Needed Physical assistance   Assistance Provided by [place] 25%-49%   Sit to Stand CARE Score 3   QI: Chair/Bed-to-Chair Transfer   Assistance Needed Physical assistance; Adaptive equipment   Assistance Provided by [place] 25%-49%   Chair/Bed-to-Chair Transfer CARE Score 3   Transfer Bed/Chair/Wheelchair   Limitations Noted In Balance; Endurance;Problem Solving;UE Strength;LE Strength   Adaptive Equipment Roller Walker   Stand Pivot Minimal Assist   Sit to Stand Minimal Assist;Moderate Assist   Stand to Sit Minimal Assist   Supine to Sit Supervision   Sit to Supine Minimal Assist   Findings cont to work on txs and balance when approaching chair    pt cont to be retropulsive    Bed, Chair, Wheelchair Transfer (FIM) 2 - [place] needs to lift or boost to rise AND assist to sit   QI: Walk 10 Feet Assistance Needed Physical assistance; Adaptive equipment   Assistance Provided by [place] Less than 25%   Walk 10 Feet CARE Score 3   QI: Walk 50 Feet with Two Turns   Assistance Needed Physical assistance; Adaptive equipment   Assistance Provided by [place] Less than 25%   Walk 50 Feet with Two Turns CARE Score 3   QI: Walk 150 Feet   Assistance Needed Physical assistance; Adaptive equipment   Assistance Provided by [place] Less than 25%   Walk 150 Feet CARE Score 3   QI: Walking 10 Feet on Uneven Surfaces   Reason if not Attempted Safety concerns   Walking 10 Feet on Uneven Surfaces CARE Score 88   Ambulation   Does the patient walk? 2  Yes   Primary Discharge Mode of Locomotion Walk   Walk Assist Level Minimum Assist   Gait Pattern Inconsistant Lois;Decreased foot clearance; Slow Lois; Improper weight shift; Forward Flexion   Assist Device Roller Gabrielle Garcia Walked (feet) 150 ft  (x3)   Limitations Noted In Balance; Endurance; Safety; Heel Strike;Speed;Strength   Findings decrease L foot clearance and foot drag with mobility  needs cueing for RW management to keep closer for stability  Walking (FIM) 3 - Patient completes 50 - 74% of all tasks, needs more than steadying or light touch AND distance 150 feet or more, no rest   Wheelchair mobility   QI: Does the patient use a wheelchair? 0  No   QI: Indicate the type of wheelchair 1  Manual   Findings will try different w/c due to lower height of current one and unable to propel well   QI: 1 Step (Curb)   Reason if not Attempted Safety concerns   1 Step (Curb) CARE Score 88   QI: 4 Steps   Reason if not Attempted Safety concerns   4 Steps CARE Score 88   QI: 12 Steps   Reason if not Attempted Safety concerns   12 Steps CARE Score 88   Therapeutic Interventions   Balance standing at sink and washing hands  item retrieval from cabinet and replacing them    standing ball toss with single UE support on RW   Other practices short distance amb in gym from mat table to chair practicing bed mobility and txs   Equipment Use   NuStep L1 10mins   Assessment   Treatment Assessment pt cont to focus on balance training and safety awareness with functional mobility  pt cont to need hands on A to decrease fall risk  pt retropulsive with STS and SPT and given VCs to avoid moving chair when approaching  pt cont to amb longer distances to improve endurance and activity tolerance  pt will benefit from cont tx training and balance training to decrease fall risk with mobility  Family/Caregiver Present wife, Daniel Boop   Problem List Decreased strength;Decreased range of motion;Decreased endurance; Impaired balance;Decreased mobility; Decreased coordination;Decreased cognition;Decreased safety awareness;Decreased skin integrity;Orthopedic restrictions   Barriers to Discharge Decreased caregiver support   PT Barriers   Physical Impairment Decreased strength;Decreased endurance; Impaired balance;Decreased mobility; Decreased safety awareness;Decreased cognition   Functional Limitation Walking;Transfers;Standing;Ramp negotiation;Car transfers; Wheelchair management   Plan   Treatment/Interventions Functional transfer training;LE strengthening/ROM; Endurance training;Patient/family training;Bed mobility;Gait training   Progress Progressing toward goals   Recommendation   Recommendation 24 hour supervision/assist;Home PT; Home with family support   Equipment Recommended Wheelchair;Walker   PT Therapy Minutes   PT Time In 1000   PT Time Out 1130   PT Total Time (minutes) 90   PT Mode of treatment - Individual (minutes) 90   PT Mode of treatment - Concurrent (minutes) 0   PT Mode of treatment - Group (minutes) 0   PT Mode of treatment - Co-treat (minutes) 0   PT Mode of Teatment - Total time(minutes) 90 minutes   Therapy Time missed   Time missed?  No

## 2019-09-04 PROBLEM — K59.00 CONSTIPATION: Status: ACTIVE | Noted: 2019-09-04

## 2019-09-04 LAB
ANION GAP SERPL CALCULATED.3IONS-SCNC: 5 MMOL/L (ref 4–13)
BUN SERPL-MCNC: 51 MG/DL (ref 5–25)
CALCIUM SERPL-MCNC: 8.3 MG/DL (ref 8.3–10.1)
CHLORIDE SERPL-SCNC: 101 MMOL/L (ref 100–108)
CO2 SERPL-SCNC: 28 MMOL/L (ref 21–32)
CREAT SERPL-MCNC: 3.56 MG/DL (ref 0.6–1.3)
GFR SERPL CREATININE-BSD FRML MDRD: 15 ML/MIN/1.73SQ M
GLUCOSE P FAST SERPL-MCNC: 73 MG/DL (ref 65–99)
GLUCOSE SERPL-MCNC: 112 MG/DL (ref 65–140)
GLUCOSE SERPL-MCNC: 139 MG/DL (ref 65–140)
GLUCOSE SERPL-MCNC: 155 MG/DL (ref 65–140)
GLUCOSE SERPL-MCNC: 73 MG/DL (ref 65–140)
GLUCOSE SERPL-MCNC: 84 MG/DL (ref 65–140)
MAGNESIUM SERPL-MCNC: 2.4 MG/DL (ref 1.6–2.6)
POTASSIUM SERPL-SCNC: 3.8 MMOL/L (ref 3.5–5.3)
SODIUM SERPL-SCNC: 134 MMOL/L (ref 136–145)

## 2019-09-04 PROCEDURE — 83735 ASSAY OF MAGNESIUM: CPT | Performed by: INTERNAL MEDICINE

## 2019-09-04 PROCEDURE — 80048 BASIC METABOLIC PNL TOTAL CA: CPT | Performed by: INTERNAL MEDICINE

## 2019-09-04 PROCEDURE — 97110 THERAPEUTIC EXERCISES: CPT

## 2019-09-04 PROCEDURE — 82948 REAGENT STRIP/BLOOD GLUCOSE: CPT

## 2019-09-04 PROCEDURE — 97535 SELF CARE MNGMENT TRAINING: CPT

## 2019-09-04 PROCEDURE — 97530 THERAPEUTIC ACTIVITIES: CPT

## 2019-09-04 PROCEDURE — 99232 SBSQ HOSP IP/OBS MODERATE 35: CPT | Performed by: INTERNAL MEDICINE

## 2019-09-04 PROCEDURE — 99232 SBSQ HOSP IP/OBS MODERATE 35: CPT

## 2019-09-04 RX ORDER — SENNOSIDES 8.6 MG
1 TABLET ORAL 2 TIMES DAILY
Status: DISCONTINUED | OUTPATIENT
Start: 2019-09-04 | End: 2019-09-12

## 2019-09-04 RX ADMIN — METOPROLOL TARTRATE 25 MG: 25 TABLET, FILM COATED ORAL at 17:13

## 2019-09-04 RX ADMIN — METOPROLOL TARTRATE 25 MG: 25 TABLET, FILM COATED ORAL at 08:54

## 2019-09-04 RX ADMIN — TORSEMIDE 40 MG: 20 TABLET ORAL at 08:55

## 2019-09-04 RX ADMIN — RANOLAZINE 500 MG: 500 TABLET, FILM COATED, EXTENDED RELEASE ORAL at 22:04

## 2019-09-04 RX ADMIN — ASPIRIN 81 MG: 81 TABLET, COATED ORAL at 08:55

## 2019-09-04 RX ADMIN — DOCUSATE SODIUM 100 MG: 100 CAPSULE, LIQUID FILLED ORAL at 08:54

## 2019-09-04 RX ADMIN — RANOLAZINE 500 MG: 500 TABLET, FILM COATED, EXTENDED RELEASE ORAL at 08:55

## 2019-09-04 RX ADMIN — LIDOCAINE 1 PATCH: 50 PATCH CUTANEOUS at 17:14

## 2019-09-04 RX ADMIN — FINASTERIDE 5 MG: 5 TABLET, FILM COATED ORAL at 08:55

## 2019-09-04 RX ADMIN — POLYSACCHARIDE-IRON COMPLEX 150 MG: 150 CAPSULE ORAL at 08:55

## 2019-09-04 RX ADMIN — ATORVASTATIN CALCIUM 40 MG: 40 TABLET, FILM COATED ORAL at 17:13

## 2019-09-04 RX ADMIN — SENNOSIDES 8.6 MG: 8.6 TABLET, FILM COATED ORAL at 17:13

## 2019-09-04 RX ADMIN — LEVOTHYROXINE SODIUM 75 MCG: 75 TABLET ORAL at 05:47

## 2019-09-04 RX ADMIN — SENNOSIDES 8.6 MG: 8.6 TABLET, FILM COATED ORAL at 13:35

## 2019-09-04 RX ADMIN — WARFARIN SODIUM 1.5 MG: 1 TABLET ORAL at 17:13

## 2019-09-04 RX ADMIN — TAMSULOSIN HYDROCHLORIDE 0.4 MG: 0.4 CAPSULE ORAL at 08:54

## 2019-09-04 RX ADMIN — INSULIN GLARGINE 8 UNITS: 100 INJECTION, SOLUTION SUBCUTANEOUS at 22:03

## 2019-09-04 RX ADMIN — VITAMIN D, TAB 1000IU (100/BT) 2000 UNITS: 25 TAB at 08:55

## 2019-09-04 RX ADMIN — INSULIN LISPRO 1 UNITS: 100 INJECTION, SOLUTION INTRAVENOUS; SUBCUTANEOUS at 11:25

## 2019-09-04 NOTE — PLAN OF CARE
Problem: PAIN - ADULT  Goal: Verbalizes/displays adequate comfort level or baseline comfort level  Description  Interventions:  - Encourage patient to monitor pain and request assistance  - Assess pain using appropriate pain scale  - Administer analgesics based on type and severity of pain and evaluate response  - Implement non-pharmacological measures as appropriate and evaluate response  - Consider cultural and social influences on pain and pain management  - Notify physician/advanced practitioner if interventions unsuccessful or patient reports new pain  Outcome: Progressing     Problem: INFECTION - ADULT  Goal: Absence or prevention of progression during hospitalization  Description  INTERVENTIONS:  - Assess and monitor for signs and symptoms of infection  - Monitor lab/diagnostic results  - Monitor all insertion sites, i e  indwelling lines, tubes, and drains  - Monitor endotracheal if appropriate and nasal secretions for changes in amount and color  - [place] appropriate cooling/warming therapies per order  - Administer medications as ordered  - Instruct and encourage patient and family to use good hand hygiene technique  - Identify and instruct in appropriate isolation precautions for identified infection/condition  Outcome: Progressing     Problem: SAFETY ADULT  Goal: Patient will remain free of falls  Description  INTERVENTIONS:  - Assess patient frequently for physical needs  -  Identify cognitive and physical deficits and behaviors that affect risk of falls    -  [place] fall precautions as indicated by assessment   - Educate patient/family on patient safety including physical limitations  - Instruct patient to call for assistance with activity based on assessment  - Modify environment to reduce risk of injury  - Consider OT/PT consult to assist with strengthening/mobility  Outcome: Progressing  Goal: Maintain or return to baseline ADL function  Description  INTERVENTIONS:  -  Assess patient's ability to carry out ADLs; assess patient's baseline for ADL function and identify physical deficits which impact ability to perform ADLs (bathing, care of mouth/teeth, toileting, grooming, dressing, etc )  - Assess/evaluate cause of self-care deficits   - Assess range of motion  - Assess patient's mobility; develop plan if impaired  - Assess patient's need for assistive devices and provide as appropriate  - Encourage maximum independence but intervene and supervise when necessary  - Involve family in performance of ADLs  - Assess for home care needs following discharge   - Consider OT consult to assist with ADL evaluation and planning for discharge  - Provide patient education as appropriate  Outcome: Progressing  Goal: Maintain or return mobility status to optimal level  Description  INTERVENTIONS:  - Assess patient's baseline mobility status (ambulation, transfers, stairs, etc )    - Identify cognitive and physical deficits and behaviors that affect mobility  - Identify mobility aids required to assist with transfers and/or ambulation (gait belt, sit-to-stand, lift, walker, cane, etc )  - [place] fall precautions as indicated by assessment  - Record patient progress and toleration of activity level on Mobility SBAR; progress patient to next Phase/Stage  - Instruct patient to call for assistance with activity based on assessment  - Consider rehabilitation consult to assist with strengthening/weightbearing, etc   Outcome: Progressing     Problem: DISCHARGE PLANNING  Goal: Discharge to home or other facility with appropriate resources  Description  INTERVENTIONS:  - Identify barriers to discharge w/patient and caregiver  - Arrange for needed discharge resources and transportation as appropriate  - Identify discharge learning needs (meds, wound care, etc )  - Arrange for interpretive services to assist at discharge as needed  - Refer to Case Management Department for coordinating discharge planning if the patient needs post-hospital services based on physician/advanced practitioner order or complex needs related to functional status, cognitive ability, or social support system  Outcome: Progressing     Problem: Prexisting or High Potential for Compromised Skin Integrity  Goal: Skin integrity is maintained or improved  Description  INTERVENTIONS:  - Identify patients at risk for skin breakdown  - Assess and monitor skin integrity  - Assess and monitor nutrition and hydration status  - Monitor labs   - Assess for incontinence   - Turn and reposition patient  - Assist with mobility/ambulation  - Relieve pressure over bony prominences  - Avoid friction and shearing  - Provide appropriate hygiene as needed including keeping skin clean and dry  - Evaluate need for skin moisturizer/barrier cream  - Collaborate with interdisciplinary team   - Patient/family teaching  - Consider wound care consult   Outcome: Progressing     Problem: Potential for Falls  Goal: Patient will remain free of falls  Description  INTERVENTIONS:  - Assess patient frequently for physical needs  -  Identify cognitive and physical deficits and behaviors that affect risk of falls    -  [place] fall precautions as indicated by assessment   - Educate patient/family on patient safety including physical limitations  - Instruct patient to call for assistance with activity based on assessment  - Modify environment to reduce risk of injury  - Consider OT/PT consult to assist with strengthening/mobility  Outcome: Progressing

## 2019-09-04 NOTE — PROGRESS NOTES
09/04/19 0930   Pain Assessment   Pain Assessment No/denies pain   Pain Score No Pain   Restrictions/Precautions   Precautions Bed/chair alarms;Cognitive; Fall Risk;Supervision on toilet/commode   Braces or Orthoses AFO  (B LEs)   Cognition   Overall Cognitive Status Impaired   Arousal/Participation Cooperative   Attention Within functional limits   Memory Decreased short term memory;Decreased recall of recent events;Decreased recall of precautions   Following Commands Follows one step commands with increased time or repetition   Subjective   Subjective no complaints at this time   QI: Sit to Stand   Assistance Needed Physical assistance   Assistance Provided by [place] 25%-49%   Sit to Stand CARE Score 3   QI: Chair/Bed-to-Chair Transfer   Assistance Needed Physical assistance; Adaptive equipment   Assistance Provided by [place] 25%-49%   Chair/Bed-to-Chair Transfer CARE Score 3   Transfer Bed/Chair/Wheelchair   Limitations Noted In Balance; Endurance;Problem Solving;UE Strength;LE Strength   Adaptive Equipment Roller Walker   Stand Pivot Minimal Assist   Sit to Stand Minimal Assist   Stand to Sit Minimal Assist   Findings practiced repeated STS x10 and SPT to work on balance approaching chair and RW management   Bed, Chair, Wheelchair Transfer (FIM) 2 - [place] needs to lift or boost to rise AND assist to sit   QI: Car Transfer   Reason if not Attempted Safety concerns   Car Transfer CARE Score 88   QI: Walk 10 Feet   Assistance Needed Physical assistance; Adaptive equipment   Assistance Provided by [place] Less than 25%   Walk 10 Feet CARE Score 3   QI: Walk 50 Feet with Two Turns   Assistance Needed Physical assistance; Adaptive equipment   Assistance Provided by [place] Less than 25%   Walk 50 Feet with Two Turns CARE Score 3   QI: Walk 150 Feet   Assistance Needed Physical assistance; Adaptive equipment   Assistance Provided by [place] Less than 25%   Walk 150 Feet CARE Score 3   QI: Walking 10 Feet on Uneven Surfaces Reason if not Attempted Safety concerns   Walking 10 Feet on Uneven Surfaces CARE Score 88   Ambulation   Does the patient walk? 2  Yes   Primary Discharge Mode of Locomotion Walk   Walk Assist Level Minimum Assist   Gait Pattern Inconsistant Lois; Slow Lois;Decreased foot clearance; Improper weight shift   Assist Device Roller Gabrielle Garcia Walked (feet)   (150x2)   Limitations Noted In Balance; Endurance; Heel Strike; Sequencing;Speed;Strength;Swing   Findings practiced amb without L AFO, pt with increase L knee hyperextension  cont use of AFOs with mobility   Walking (FIM) 4 - Patient requires steadying assist or light touching AND distance 150 feet or more, no rest   QI: Wheel 50 Feet with Two Turns   Reason if not Attempted Activity not applicable   Wheel 50 Feet with Two Turns CARE Score 9   QI: Wheel 150 Feet   Reason if not Attempted Activity not applicable   Wheel 308 Feet CARE Score 9   Wheelchair mobility   QI: Does the patient use a wheelchair? 0  No   QI: 1 Step (Curb)   Reason if not Attempted Safety concerns   1 Step (Curb) CARE Score 88   QI: 4 Steps   Reason if not Attempted Safety concerns   4 Steps CARE Score 88   QI: 12 Steps   Reason if not Attempted Safety concerns   12 Steps CARE Score 88   Therapeutic Interventions   Other fwd/bwd from chair for balance training  5ft max   Assessment   Treatment Assessment session cont to focus on balance training with functional txs to decrease falling into chair and improve STS  extra cushion put in pt's w/c to A with STS  spoke to pt's wife about chair height at home and keep seat height higher to imrpove safety and functional ind   reviewed with pt's spouse stand up notes regarding d/c and will f/u more after team meeting to finalize d/c plans  will cont to work on balance training, karl[place] with turns, and with functional txs to decrease fall risk and decrease burden of care at home     Family/Caregiver Present wife, Ariel Rouse   Problem List Decreased strength;Decreased range of motion;Decreased endurance; Impaired balance;Decreased mobility; Decreased coordination;Decreased cognition;Decreased safety awareness;Decreased skin integrity;Orthopedic restrictions   Barriers to Discharge Decreased caregiver support   PT Barriers   Physical Impairment Decreased strength;Decreased endurance; Impaired balance;Decreased mobility; Decreased safety awareness;Decreased cognition   Functional Limitation Walking;Transfers;Standing;Ramp negotiation;Car transfers; Wheelchair management   Plan   Treatment/Interventions Functional transfer training;LE strengthening/ROM; Endurance training;Patient/family training;Bed mobility;Gait training   Progress Progressing toward goals   Recommendation   Recommendation 24 hour supervision/assist;Home PT; Home with family support   Equipment Recommended [place] Boot; Wheelchair   PT Therapy Minutes   PT Time In 0930   PT Time Out 1030   PT Total Time (minutes) 60   PT Mode of treatment - Individual (minutes) 60   PT Mode of treatment - Concurrent (minutes) 0   PT Mode of treatment - Group (minutes) 0   PT Mode of treatment - Co-treat (minutes) 0   PT Mode of Teatment - Total time(minutes) 60 minutes   Therapy Time missed   Time missed?  No

## 2019-09-04 NOTE — PROGRESS NOTES
09/04/19 1245   Pain Assessment   Pain Assessment No/denies pain   Restrictions/Precautions   Precautions Bed/chair alarms;Cognitive; Fall Risk   Braces or Orthoses AFO  (bilat)   QI: Sit to Stand   Assistance Needed Physical assistance   Assistance Provided by [place] 25%-49%   Comment to RW, times of unsteadiness   Sit to Stand CARE Score 3   QI: Chair/Bed-to-Chair Transfer   Assistance Needed Physical assistance   Assistance Provided by [place] 25%-49%   Chair/Bed-to-Chair Transfer CARE Score 3   Transfer Bed/Chair/Wheelchair   Limitations Noted In Balance; Endurance;LE Strength; Sequencing   Adaptive Equipment Roller Walker   Stand Pivot Minimal Assist   Sit to Stand Minimal Assist   Stand to Sit Minimal Assist   Findings Needs specific cues for feet placement and get feet underneath body,  during pivots vcs to lean forward to decrease post bias,  vcs to turn walker slow and make sure feet are in a good spot    Bed, Chair, Wheelchair Transfer (FIM) 3 - [place] needs to lift, boost or assist to stand OR sit   QI: Walk 10 Feet   Assistance Needed Physical assistance   Assistance Provided by [place] Less than 25%   Walk 10 Feet CARE Score 3   Ambulation   Primary Discharge Mode of Locomotion Walk   Walk Assist Level Minimum Assist   Gait Pattern Inconsistant Lois;Decreased foot clearance;Retropulsion;Narrow JUAREZ;Lateral deviation; Improper weight shift   Assist Device Roller Gabrielle Jose Walked (feet) 60 ft  (30x1)   Limitations Noted In Balance;Device Management; Heel Strike; Safety;Strength;Posture   Findings mod vcs to slow down with chair approach, stay in walker, ensure feet are in good spot, slow down with retro step back to chair   Walking (FIM) 2 - Patient ambulates between 50 - 149 feet regardless of assist/device/set up   Therapeutic Interventions   Strengthening Repeated stand pivot xfers with RW (also performed for safety and balance purpose)   Equipment Use   Internal Gaming 10mins L1   Assessment   Treatment Assessment Pt performed better with education provided on slowing down with turns and if he feels unsteady stop and reposition vs trying to keep going which he gets anxious and more unsteady  Pt is not consistant but is showing slight progress  Pt presents like he will most likely need min A for safety and stability  Cont skilled PT toward LTGs   Family/Caregiver Present wife, Alejandrina Mcdaniel   PT Family training done with: Reviewed transfer training and techniques   Problem List Decreased strength;Decreased range of motion;Decreased endurance; Impaired balance;Decreased mobility; Decreased coordination;Decreased cognition;Decreased safety awareness;Decreased skin integrity;Orthopedic restrictions   Barriers to Discharge Decreased caregiver support   PT Barriers   Physical Impairment Decreased strength;Decreased endurance; Impaired balance;Decreased mobility; Decreased safety awareness;Decreased cognition   Functional Limitation Walking;Transfers;Standing;Ramp negotiation;Car transfers; Wheelchair management   Plan   Treatment/Interventions Functional transfer training;LE strengthening/ROM; Therapeutic exercise;Elevations;Gait training;Patient/family training; Endurance training   Progress Progressing toward goals   Recommendation   Recommendation Short-term skilled PT   PT Therapy Minutes   PT Time In 1245   PT Time Out 1315   PT Total Time (minutes) 30   PT Mode of treatment - Individual (minutes) 15   PT Mode of treatment - Concurrent (minutes) 15   PT Mode of treatment - Group (minutes) 0   PT Mode of treatment - Co-treat (minutes) 0   PT Mode of Teatment - Total time(minutes) 30 minutes   Therapy Time missed   Time missed?  No

## 2019-09-04 NOTE — PLAN OF CARE
Problem: PAIN - ADULT  Goal: Verbalizes/displays adequate comfort level or baseline comfort level  Description  Interventions:  - Encourage patient to monitor pain and request assistance  - Assess pain using appropriate pain scale  - Administer analgesics based on type and severity of pain and evaluate response  - Implement non-pharmacological measures as appropriate and evaluate response  - Consider cultural and social influences on pain and pain management  - Notify physician/advanced practitioner if interventions unsuccessful or patient reports new pain  Outcome: Progressing     Problem: INFECTION - ADULT  Goal: Absence or prevention of progression during hospitalization  Description  INTERVENTIONS:  - Assess and monitor for signs and symptoms of infection  - Monitor lab/diagnostic results  - Monitor all insertion sites, i e  indwelling lines, tubes, and drains  - Monitor endotracheal if appropriate and nasal secretions for changes in amount and color  - [place] appropriate cooling/warming therapies per order  - Administer medications as ordered  - Instruct and encourage patient and family to use good hand hygiene technique  - Identify and instruct in appropriate isolation precautions for identified infection/condition  Outcome: Progressing     Problem: SAFETY ADULT  Goal: Patient will remain free of falls  Description  INTERVENTIONS:  - Assess patient frequently for physical needs  -  Identify cognitive and physical deficits and behaviors that affect risk of falls    -  [place] fall precautions as indicated by assessment   - Educate patient/family on patient safety including physical limitations  - Instruct patient to call for assistance with activity based on assessment  - Modify environment to reduce risk of injury  - Consider OT/PT consult to assist with strengthening/mobility  Outcome: Progressing     Problem: SAFETY ADULT  Goal: Maintain or return to baseline ADL function  Description  INTERVENTIONS:  -  Assess patient's ability to carry out ADLs; assess patient's baseline for ADL function and identify physical deficits which impact ability to perform ADLs (bathing, care of mouth/teeth, toileting, grooming, dressing, etc )  - Assess/evaluate cause of self-care deficits   - Assess range of motion  - Assess patient's mobility; develop plan if impaired  - Assess patient's need for assistive devices and provide as appropriate  - Encourage maximum independence but intervene and supervise when necessary  - Involve family in performance of ADLs  - Assess for home care needs following discharge   - Consider OT consult to assist with ADL evaluation and planning for discharge  - Provide patient education as appropriate  Outcome: Progressing     Problem: SAFETY ADULT  Goal: Maintain or return mobility status to optimal level  Description  INTERVENTIONS:  - Assess patient's baseline mobility status (ambulation, transfers, stairs, etc )    - Identify cognitive and physical deficits and behaviors that affect mobility  - Identify mobility aids required to assist with transfers and/or ambulation (gait belt, sit-to-stand, lift, walker, cane, etc )  - [place] fall precautions as indicated by assessment  - Record patient progress and toleration of activity level on Mobility SBAR; progress patient to next Phase/Stage  - Instruct patient to call for assistance with activity based on assessment  - Consider rehabilitation consult to assist with strengthening/weightbearing, etc   Outcome: Progressing     Problem: Prexisting or High Potential for Compromised Skin Integrity  Goal: Skin integrity is maintained or improved  Description  INTERVENTIONS:  - Identify patients at risk for skin breakdown  - Assess and monitor skin integrity  - Assess and monitor nutrition and hydration status  - Monitor labs   - Assess for incontinence   - Turn and reposition patient  - Assist with mobility/ambulation  - Relieve pressure over bony prominences  - Avoid friction and shearing  - Provide appropriate hygiene as needed including keeping skin clean and dry  - Evaluate need for skin moisturizer/barrier cream  - Collaborate with interdisciplinary team   - Patient/family teaching  - Consider wound care consult   Outcome: Progressing     Problem: Potential for Falls  Goal: Patient will remain free of falls  Description  INTERVENTIONS:  - Assess patient frequently for physical needs  -  Identify cognitive and physical deficits and behaviors that affect risk of falls    -  [place] fall precautions as indicated by assessment   - Educate patient/family on patient safety including physical limitations  - Instruct patient to call for assistance with activity based on assessment  - Modify environment to reduce risk of injury  - Consider OT/PT consult to assist with strengthening/mobility  Outcome: Progressing

## 2019-09-04 NOTE — PROGRESS NOTES
Internal Medicine Progress Note  Patient: Odalys Maldonado  Age/sex: 80 y o  male  Medical Record #: [id number]      ASSESSMENT/PLAN: (Interval History)  Odalys Maldonado is seen and examined and management for following issues:    Volume overload/chronic diastolic CHF:  s/p Lasix IV, weight dropped 11 lbs = per renal, target weight is 168-170 lbs  Renal managing his diuretic tx = currently Torsemide 40mg qd  FR 1500ml; 2Gm NA diet      Abnormal Troponin: felt to be 2/2 creatinine/vol overload putting stress on the heart; no further testing etc was recommended     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:  stable     Iron deficiency anemia: renal gave 2 doses Venofer while in hospital and now started on Niferex 150mg qd  Checking stool for occult blood = 1st was negative 9/3/19     Hx urine retention/BPH; Klebsiella UTI 6/2019 and Klebsiella urosepsis 7/2019:  Uro saw in 7/2019 and added added Finasteride  Consideration was to be given to starting suppressive therapy with Nitrofurantion 100mg qhs  Currently, he is on Flomax/Finasteride  Will need to watch closely for UTI sx  HTN/orthostasis:  On 9/2/19, was orthostatic sitting was 848 systolic and stand was 353 = no dizzy but unsteady on feet; on Lopressor 50mg BID/Ranexa 500mg BID (Ranexa shouldn't have much effect on BP) = d/w Dr Alesia Ambriz and will drop Lopressor dose to 25mg BID  No sx today      PAF:  continue Lopressor but will drop 25 mg BID since has some orthostasis Coumadin is 3mg 2x/week and 1 5 mg 5x/week      Hx Dual chamber PPM:  sees Dr Arben Parikh as an OP     CVA (2014):  continue ASA/Lipitor     CAD/CABG (2013):   was here at [address] [place] [address] Place 6/2019 for chest pain --> seen by Cardiology, had nuclear stress test = small, severe, reversible myocardial perfusion defect of the basal inferior wall --> was started on Ranexa 500mg BID but no further plans for intervention were recommended    Continue Lopressor, Lipitor and ASA as well     DM2: takes Trajenta 5mg qd at home = on Lantus 8U qam  No changes today     Hypothyroidism:  continue current Levothyroxine     Left L2-3 microdiskectomy 7/18/19 at [facility]    Vitamin D deficiency:  Level is 22 6; will d/w renal      Subjective/ HPI: Patients overnight issues or events were reviewed with nursing or staff during rounds or morning huddle session  Some mild orthostasis today  DM2: stable on Lantus  Anemia: s/p venofer, now on iron PO   CAD:  Stable on current tx    Offers no complaints    ROS:     GI: denies abdominal pain, change bowel habits or reflux symptoms  Neuro: Denies any headache, new vision changes, new neuropathies,new weaknesses   Respiratory: No Cough, SOB, denies wheeze  Cardiovascular: No CP, palpitations , denies perception of rapid heartbeat  : denies any new urinary burning or frequency    Review of Scheduled Meds:    Current Facility-Administered Medications:  acetaminophen 650 mg Oral Q6H PRN Petra Brice MD   aspirin 81 mg Oral Daily Petra Brice MD   atorvastatin 40 mg Oral QPM Petra Brice MD   bisacodyl 10 mg Rectal Daily PRN Petra Brice MD   cholecalciferol 2,000 Units Oral Daily Petra Brice MD   finasteride 5 mg Oral Daily MAURICE Mosquera   insulin glargine 8 Units Subcutaneous HS MAURICE Patel   insulin lispro 1-5 Units Subcutaneous TID AC MAURICE Patel   insulin lispro 1-5 Units Subcutaneous HS MAURICE Mosquera   iron polysaccharides 150 mg Oral Daily Ernst Marshall MD   levothyroxine 75 mcg Oral Daily Petra Brice MD   lidocaine 1 patch Topical Daily Petra Brice MD   lidocaine  Topical Q4H PRN Petra Brice MD   menthol-methyl salicylate  Apply externally 4x Daily PRN Petra Brice MD   metoprolol tartrate 25 mg Oral BID With Meals MAURICE Mosquera   ondansetron 4 mg Intravenous Q6H PRN Petra Brice MD   polyethylene glycol 17 g Oral Daily PRN Petra Brice MD   ranolazine 500 mg Oral Q12H [facility] Petra Brice MD   senna 1 tablet Oral BID Eliecer Sellers MD   tamsulosin 0 4 mg Oral Daily Eliecer Sellers MD   torsemide 40 mg Oral Daily Eliecer Sellers MD   warfarin 1 5 mg Oral Once per day on Sun Tue Wed Fri Sat Amrita MAURICE Shaw   warfarin 3 mg Oral Once per day on Mon Thu Renee Dewey, [address]:     Results from last 7 days   Lab Units 09/02/19  0522 08/29/19  0529   WBC Thousand/uL 6 66 7 91   HEMOGLOBIN g/dL 8 8* 9 1*   HEMATOCRIT % 28 2* 29 0*   PLATELETS Thousands/uL 223 215     Results from last 7 days   Lab Units 09/04/19  0529 09/02/19  0522   SODIUM mmol/L 134* 136   POTASSIUM mmol/L 3 8 4 0   CHLORIDE mmol/L 101 101   CO2 mmol/L 28 27   BUN mg/dL 51* 50*   CREATININE mg/dL 3 56* 3 53*   CALCIUM mg/dL 8 3 8 5         Results from last 7 days   Lab Units 09/03/19  0528 09/02/19  0522   INR  2 04* 2 17*          Results from last 7 days   Lab Units 09/04/19  1039 09/04/19  0637 09/03/19  2052   POC GLUCOSE mg/dl 155* 84 160*       Imaging:     No orders to display       *Labs reviewed  *Radiology studies reviewed  *Medications reviewed and reconciled as needed  *Please refer to order section for additional ordered labs studies  *Case discussed with primary attending during morning huddle case rounds    Physical Examination:  Vitals:   Vitals:    09/03/19 2057 09/04/19 0610 09/04/19 0854 09/04/19 1123   BP: 136/64 132/68 140/68    BP Location: Left arm Right arm     Pulse: 70 66     Resp: 18 16     Temp: 98 °F (36 7 °C) 98 3 °F (36 8 °C)     TempSrc: Oral Oral     SpO2: 98% 97%     Weight:    75 2 kg (165 lb 12 6 oz)       General Appearance: NAD, conversive  Eyes: No icterus; conjunctiva normal, PERRLA  HENT: oropharynx clear; mucous membranes moist  Neck: trachea midline, range of motion full   Supple w/o pain  Lungs: CTA, normal respiratory effort, no retractions, expiratory effort normal  CV: regular rate, no rubs/gallops; +murmur  ABD: soft; NT/ND; +BS  EXT: 1+ edema LE = improving  Skin: normal turgor, normal texture, no rashes  Psych: affect normal, no overt anxiety/depression during exam today   Neuro: AAOx3            Total time spent: At least 40 minutes, with more than 50% spent counseling/coordinating care  Counseling includes discussion with patient re: progress  and discussion with patient of his/her current medical state/information  Coordination of patient's care was performed in conjunction with primary service  Time invested included review of patient's labs, vitals, and management of their comorbidities with continued monitoring  In addition, this patient was discussed with medical team including physician and advanced extenders  The care of the patient was extensively discussed and appropriate treatment plan was formulated unique for this patient  ** Please Note: Dragon 360 Dictation voice to text software may have been used in the creation of this document   **

## 2019-09-04 NOTE — PROGRESS NOTES
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  Continue Vitamin D    5  Anemia: Hgb stable  Had iron deficiency and got IV iron  6  Hx CAD/CABG  DISPOSITION:  · Continue Torsemide 40 mg daily  · Renal follow up on 9/11/19 at [facility] office  SUBJECTIVE / INTERVAL HISTORY:  No CP or SOB  Denies any acute issues  OBJECTIVE:  Current Weight: Weight - Scale: 75 2 kg (165 lb 12 6 oz)(with shoes and braces on)  Vitals:    09/03/19 2057 09/04/19 0610 09/04/19 0854 09/04/19 1123   BP: 136/64 132/68 140/68    BP Location: Left arm Right arm     Pulse: 70 66     Resp: 18 16     Temp: 98 °F (36 7 °C) 98 3 °F (36 8 °C)     TempSrc: Oral Oral     SpO2: 98% 97%     Weight:    75 2 kg (165 lb 12 6 oz)       Intake/Output Summary (Last 24 hours) at 9/4/2019 1213  Last data filed at 9/4/2019 1101  Gross per 24 hour   Intake 360 ml   Output 1255 ml   Net -895 ml     General: conscious, coherent, cooperative, no distress  Chest/Lungs: clear breath sounds  CVS: distinct heart sounds, normal rate  Abdomen: soft  Extremities: (+) LE edema  : no camargo catheter  Neuro: awake, alert       Medications:    Current Facility-Administered Medications:     acetaminophen (TYLENOL) tablet 650 mg, 650 mg, Oral, Q6H PRN, Kary Nolasco MD, 650 mg at 09/03/19 1710    aspirin (ECOTRIN LOW STRENGTH) EC tablet 81 mg, 81 mg, Oral, Daily, Kary Nolasco MD, 81 mg at 09/04/19 0855    atorvastatin (LIPITOR) tablet 40 mg, 40 mg, Oral, QPM, Kary Nolasco MD, 40 mg at 09/03/19 1710    bisacodyl (DULCOLAX) rectal suppository 10 mg, 10 mg, Rectal, Daily PRN, Frank Brady MD    cholecalciferol (VITAMIN D3) tablet 2,000 Units, 2,000 Units, Oral, Daily, Frank Brady MD, 2,000 Units at 09/04/19 0855    finasteride (PROSCAR) tablet 5 mg, 5 mg, Oral, Daily, Clois Linear, CRNP, 5 mg at 09/04/19 0855    insulin glargine (LANTUS) subcutaneous injection 8 Units 0 08 mL, 8 Units, Subcutaneous, HS, Clois Linear, CRNP, 8 Units at 09/03/19 2153    insulin lispro (HumaLOG) 100 units/mL subcutaneous injection 1-5 Units, 1-5 Units, Subcutaneous, TID AC, 1 Units at 09/04/19 1125 **AND** Fingerstick Glucose (POCT), , , TID AC, Clois Linear, CRNP    insulin lispro (HumaLOG) 100 units/mL subcutaneous injection 1-5 Units, 1-5 Units, Subcutaneous, HS, Clois Linear, CRNP, 1 Units at 09/03/19 2154    iron polysaccharides (FERREX) capsule 150 mg, 150 mg, Oral, Daily, Claudia Chavez MD, 150 mg at 09/04/19 0855    levothyroxine tablet 75 mcg, 75 mcg, Oral, Daily, Frank Brady MD, 75 mcg at 09/04/19 0547    lidocaine (LIDODERM) 5 % patch 1 patch, 1 patch, Topical, Daily, Frank Brady MD, 1 patch at 09/03/19 1713    lidocaine (URO-JET) 2 % urethral/mucosal gel, , Topical, Q4H PRN, Frank Brady MD    menthol-methyl salicylate (BENGAY) 98-51 % cream, , Apply externally, 4x Daily PRN, Frank Brady MD    metoprolol tartrate (LOPRESSOR) tablet 25 mg, 25 mg, Oral, BID With Meals, Clois Linear, CRNP, 25 mg at 09/04/19 0854    ondansetron (ZOFRAN) injection 4 mg, 4 mg, Intravenous, Q6H PRN, Frank Brady MD    polyethylene glycol [facility]) packet 17 g, 17 g, Oral, Daily PRN, Frank Brady MD    ranolazine [facility]) 12 hr tablet 500 mg, 500 mg, Oral, Q12H [facility], Frank Brady MD, 500 mg at 09/04/19 4015    senna (SENOKOT) tablet 8 6 mg, 1 tablet, Oral, BID, Frank Brady MD    tamsulosin [facility]) capsule 0 4 mg, 0 4 mg, Oral, Daily, Frank Brady MD, 0 4 mg at 09/04/19 200    torsemide (DEMADEX) tablet 40 mg, 40 mg, Oral, Daily, Robert Griffin MD, 40 mg at 09/04/19 0654    warfarin (COUMADIN) tablet 1 5 mg, 1 5 mg, Oral, Once per day on Sun Tue Wed Fri Sat, MARUICE Patel, 1 5 mg at 09/03/19 1711    warfarin (COUMADIN) tablet 3 mg, 3 mg, Oral, Once per day on Mon Thu, MAURICE Patel, 3 mg at 09/02/19 1721    Laboratory Results:  Results from last 7 days   Lab Units 09/04/19  0529 09/02/19  0522 08/29/19  0529   WBC Thousand/uL  --  6 66 7 91   HEMOGLOBIN g/dL  --  8 8* 9 1*   HEMATOCRIT %  --  28 2* 29 0*   PLATELETS Thousands/uL  --  223 215   POTASSIUM mmol/L 3 8 4 0 4 2   CHLORIDE mmol/L 101 101 100   CO2 mmol/L 28 27 28   BUN mg/dL 51* 50* 56*   CREATININE mg/dL 3 56* 3 53* 3 64*   CALCIUM mg/dL 8 3 8 5 8 6   MAGNESIUM mg/dL 2 4  --   --

## 2019-09-04 NOTE — PROGRESS NOTES
09/04/19 0700   Pain Assessment   Pain Assessment No/denies pain   Pain Score No Pain   Restrictions/Precautions   Precautions Bed/chair alarms;Cognitive; Fall Risk;Supervision on toilet/commode   Weight Bearing Restrictions No   ROM Restrictions No   Braces or Orthoses AFO   QI: Oral Hygiene   Assistance Needed Set-up / [facility] Provided by Josephine No physical assistance   Oral Hygiene CARE Score 5   Grooming   Able To Initiate Tasks; Acquire Items; Wash/Dry Face;Comb/Brush Hair;Brush/Clean Teeth;Wash/Dry Hands   Limitation Noted In Strength   Grooming (FIM) 5 - Patient requires supervision/monitoring   QI: Shower/Bathe Self   Assistance Needed Physical assistance   Assistance Provided by Josephine 50%-74%   Shower/Bathe Self CARE Score 2   Bathing   Assessed Bath Style Sponge Bath   Anticipated D/C Bath Style Sponge Bath   Able to Gather/Transport No   Able to [employer] Temperature No   Able to Wash/Rinse/Dry (body part) Left Arm;Right Arm;L Upper Leg;R Upper Leg;Chest;Abdomen   Limitations Noted in Balance; Endurance; Safety;Strength   Positioning Seated;Standing   Bathing (FIM) 3 - Patient completes 5/10  6/10 or 7/10 parts   Tub/Shower Transfer   Not Assessed Sponge Bath   QI: Upper Body Dressing   Assistance Needed Physical assistance   Assistance Provided by Josephine 25%-49%   Comment Requires A for pulling shirt overhead  Upper Body Dressing CARE Score 3   QI: Lower Body Dressing   Assistance Needed Physical assistance   Assistance Provided by Josephine 50%-74%   Comment Pt requires A for CM up over hips  Utilizes reacher to begin threading over BLEs  Lower Body Dressing CARE Score 2   QI: Putting On/Taking Off Footwear   Assistance Needed Physical assistance   Assistance Provided by Josephine Total assistance   Comment Pt dependent for donning TEDs      Putting On/Taking Off Footwear CARE Score 1   Dressing/Undressing Clothing   Remove UB Clothes Pullover Shirt   Remove LB Clothes Socks   Don UB Clothes Pullover Shirt   Don LB Clothes Pants;Socks   UB Dressing (FIM) 4 - Patient completes 75% of all tasks   LB Dressing (FIM) 3 - Patient completes  50-74% of all tasks   QI: Sit to Stand   Assistance Needed Physical assistance   Assistance Provided by [place] 25%-49%   Sit to Stand CARE Score 3   QI: Chair/Bed-to-Chair Transfer   Assistance Needed Physical assistance   Assistance Provided by [place] 25%-49%   Chair/Bed-to-Chair Transfer CARE Score 3   Transfer Bed/Chair/Wheelchair   Adaptive Equipment Roller Walker   Findings Pt does require boosting A with fatigue and lower surfaces  Bed, Chair, Wheelchair Transfer (FIM) 3 - [place] needs to lift, boost or assist to stand OR sit   Exercise Tools   Other Exercise Tool 1 Pt completes UB strengthening with use of b/l ethan moving through all planes for 3 x 10 each to increase UB strength and endurance for increased independence with functional transfers  Cognition   Overall Cognitive Status Impaired   Arousal/Participation Cooperative   Attention Attends with cues to redirect   Orientation Level Oriented X4   Memory Decreased short term memory;Decreased recall of recent events;Decreased recall of precautions   Following Commands Follows one step commands with increased time or repetition   Activity Tolerance   Activity Tolerance Patient tolerated treatment well   Assessment   Treatment Assessment Pt participated in skilled OT services with focus on ADL retraining  Pt continues to be limited by decreased UE ROM, decreased standing balance, dec endurance, and decreased standing tolerance  Pt demos increased ability to manage pants with use of reacher  Pt continues to require total A for thigh high teds and for managing AFOs  Pt also limited with rear hygiene 2* to dec dynamic reach  Educated pt on toileting aides   Pt will continue to benefit from skilled OT services with focus on functional transfers, ADL retraining, LB management, standing balance to increase independence and safety upon d/c  Prognosis Fair   Plan   Treatment/Interventions ADL retraining;Functional transfer training; Therapeutic exercise; Endurance training;Cognitive reorientation;Patient/family training;Equipment eval/education; Compensatory technique education; Bed mobility   Progress Progressing toward goals   Recommendation   OT Discharge Recommendation 24 hour supervision/assist   OT Therapy Minutes   OT Time In 0700   OT Time Out 0830   OT Total Time (minutes) 90   OT Mode of treatment - Individual (minutes) 90   OT Mode of treatment - Concurrent (minutes) 0   OT Mode of treatment - Group (minutes) 0   OT Mode of treatment - Co-treat (minutes) 0   OT Mode of Teatment - Total time(minutes) 90 minutes   Therapy Time missed   Time missed?  No

## 2019-09-04 NOTE — PROGRESS NOTES
Physical Medicine and Rehabilitation Progress Note  Meño Notice 80 y o  male MRN: [id number]  Unit/Bed#: [facility] 230-01 Encounter: [id number]    Physical Medicine and Rehabilitation Progress Note  Meño Notice 80 y o  male MRN: [id number]  Unit/Bed#: [facility] 004-23 Encounter: [id number]    Chief Complaints:  Decline in function    Subjective/Interval Events:   Patient reports sleeping well again last night  He reports back pain well controlled without significant radiating pain down legs  He denies worsening strength or sensation  He denies lightheadedness or shortness of breath  Patient notes decreased bowel sounds but does not feel particularly constipated was not sure entirely  He denies urinary problems  ROS: A 10-point ROS was performed  Negative except as listed above  Overall Assessment/relevant history:  [age]-year-old male with past medical history of chronic diastolic CHF, chronic kidney disease stage 4, paroxysmal atrial fibrillation, dual chamber permanent pacemaker, coronary artery disease, CABG, diabetes mellitus 2, hypothyroidism, BPH, recurrent UTIs, lumbar spinal stenosis, chronic low back pain, foot drop, history of left L2-L3 microdiskectomy in July of 2019 the developed worsening swelling with associated decline in ADLs and ambulation  Patient splits seen by Cardiology and Nephrology with noted worsening of renal function and volume overload believed to be related to this  Patient required initial IV and then more recently p o  Increase in diuretics  Patient's volume status has been improving although his functional status remains below his baseline    Patient was evaluated by skilled therapies and was found to have significant decline in ADLs and ambulation appropriate for admission to [facility]        Functional status on admission to [facility]:  PT:  Transfers max assist, ambulation moderate assist 150 feet  OT:  Lower body dressing, toileting total assist, bathing, transfers max assist, upper body dressing moderate assist    Functional status (recent):    Transfers and ambulation moderate assist, bathing and dressing moderate assist    Functional status goal:  Supervision for most ADLs and ambulation     * Impaired mobility and ADLs  Assessment & Plan  Function stable continue management as outlined  Right proximal lower extremity strength improving  Multifactorial:  Recent volume overload related to acute on chronic kidney disease, history of diastolic CHF, generalized weakness including proximal lower extremity weakness possibly related to some chronic illness/critical illness deconditioning, low back pain and radiating leg pain with right foot drop from lumbar spinal stenosis possible nerve impingement status post surgical intervention recently, 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, left adhesive capsulitis, query component cognitive impairment,   - Recommend acute comprehensive interdisciplinary inpatient rehabilitation to include intensive skilled therapies (PT, OT) as outlined with oversight and management by rehabilitation physician as well as inpatient rehab level nursing, case management and weekly interdisciplinary team meetings  - [address] [place] [address] 16/30 with greatest deficits in memory and executive functioning   - optimal management of each  - Follow-up with PMR after discharge      Buttock wound  Assessment & Plan  Appreciated on admission to [address] [place] [address] care nurse c/s and assistance with management  - Notify MD of new or increasing drainage, development of purulent drainage, increased size/depth of wound, lack of healing, inability to maintain wound integrity due to degree of drainage, wound product, dressing, or currently ordered frequency of wound management  In general dressings should be changed PRN if soiled unless specifically noted otherwise  Do not allow soiled dressing on patient for extended period of time    - Turn patient Q2H   - Hydragaurd to buttocks and sacrum BID & PRN v Alleyvn >  - EHOB waffle cushion to chair/WC when OOB  - Maximum time in chair 2 hours at a time  - OOB minimum 3 times per day  Notify MD if unable to get patient OOB 3 times per day  - Elevate heels with pillows to offload  - Moisturizer daily and PRN to dry skin; do not use moisturizer on areas of redness or skin breakdown unless otherwise documented  - Nursing to document in chart and Notify MD if buttock, sacrum, heel, or other skin site develops erythema or skin breakdown as soon as possible  If patient is soiling themselves with urine or stool notify MD   If you are unable to maintain skin integrity and prevent erythema due to frequency of soiling notify MD as soon as possible  Kidney disease with fluid retention  Assessment & Plan  Kidney function stable continue management as outlined  Diuretics at their discretion  Internal medicine consult and management during [facility] course  Nephrology c/s to assist with mgmt  Cr  stable recently; monitor intermittently  Limit nephrotoxic agents when possible  WENDY hose       Cognitive impairment  Assessment & Plan  > MOCA 16/30 indicating moderate cog impairment with deficits greatest in domains of short term memory and executive functioning  > Wife states patients cognitive status has been largely stable for some time  > CT head 6/2019 - Encephalomalacia within the right frontal and parietal lobe extending inferiorly into the posterior superior temporal lobe  Chronic microangiopathic change within both cerebral hemispheres  Brainstem and cerebellum demonstrate normal density  No mass or hemorrhage  Mild cerebral volume loss  VENTRICLES AND EXTRA-AXIAL SPACES:  Asymmetry with mild ex vacuo dilatation of the posterior body, atria and occipital horn of the right lateral ventricle  Mild chronic microangiopathic change and age-appropriate cerebral volume loss    Right hemispheric encephalomalacia and gliosis suggestive of old infarction   > Increased risk of dementia and falls  > Compensatory strategies and adjustments in PT/OT as indicated  > Follow-up with neurology after d/c and PMR          Pain  Assessment & Plan  Controlled, continue management  Try to limit sedating meds   APAP PRN   LD patch  Counseled on and continue to encourage deep breathing/relaxation/behavioral pain management techniques:     Deep breathin seconds in, 5 seconds out, 5 times per hour when awake and PRN when in pain or anticipate pain; avoid holding breath and tightening muscles and instead breathe slowly and deeply  Monitor for oversedation, AMS/delirium, and respiratory depression   If being administered - hold opiates, muscle relaxants, benzodiazepines, and gabapentin for oversedation, AMS, or RR<12  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  Assessment & Plan  Controlled; recent improvement right proximal lower extremity weakness  Complaining of some chronic right low back pain with intermittent radiating pain down the leg; also with right foot drop  Status post recent lumbar surgery - left L2-3 microdiskectomy  [facility])   Try to limit sedating medications  Acetaminophen as needed  Lidocaine patch for now patient prefers    Frozen shoulder  Assessment & Plan  Skilled therapies, modalities as indicated    Constipation  Assessment & Plan  Only a few small stools recently with decreased bowel sounds  Recommend senna b i d   P r n   Bowel regimen as well  Monitor    History of recurrent UTIs  Assessment & Plan  No s/s of UTI > monitor   Internal medicine consult and management during [facility] course  Currently on flomax and proscar  Follow-up with Urology after discharge    Benign prostatic hyperplasia with urinary hesitancy  Assessment & Plan  Continue flomax and Proscar; monitor vitals  - monitor for retention, incontinence, signs/symptoms of UTI  - recommend voiding trial; nursing prompt to void followed by bladder scan starting Q4-6H or after each patient initiated void; nursing to record voided output and bladder scan totals; straight cath PRN >350-400 cc; if post void residual bladder scans are <150 cc x3 consecutive voids, can stop scans       Presence of permanent cardiac pacemaker  Assessment & Plan  +    Anticoagulated on warfarin  Assessment & Plan  Management per Medicine with recent supratherapeutic INR    Chronic diastolic heart failure [facility])  Assessment & Plan  Internal medicine consult and management during [facility] course  Anti thrombotics per Medicine, blood pressure medications at their discretion     Paroxysmal A-fib [facility])  Assessment & Plan  Internal medicine consult and management during [facility] course  Anticoagulation at their discretion  Monitor rate    Diabetes mellitus with multiple complications [facility])  Assessment & Plan  Lab Results   Component Value Date    HGBA1C 5 5 08/22/2019     Internal medicine consulted and management at their discretion  Monitor for signs and symptoms of hypoglycemia   Current meds: lantus, lispro       Essential hypertension  Assessment & Plan  BP acceptable  Internal medicine consulted and management at their discretion  Monitor vitals with and without activity; monitor for orthostasis  Monitor hemoglobin, electrolytes, kidney function, hydration status   Current meds:  Metoprolol, amlodipine      3-vessel coronary artery disease  Assessment & Plan  Antiplatelet, optimal blood pressure control, statin  Ranexa    Hyperlipidemia  Assessment & Plan  Statin    Anemia  Assessment & Plan  Hemoglobin slightly down recently  Iron supplementation per Nephrology  IM consulted and assistance with management during [place] course  Nephrology management as well; hemoccults x1 negative  Monitor H/H, vitals, signs/symptoms of acute bleeding        Vitamin D insufficiency  Assessment & Plan  Cholecalciferol Vit D3 2000 units daily   Follow-up with PCP      Healthcare maintenance  Assessment & Plan  Obtain vitamin-D level    Hypothyroidism  Assessment & Plan  Levothyroxine    Elevated serum alkaline phosphatase level  Assessment & Plan  Repeat CMP      # Bowel care:    - monitor for constipation, incontinence, and diarrhea  - goal 1 appropriate BM every 1-2 days  - recommend colace +/- senna and PRN bowel protocol     # At risk for venous thromboembolism:  - Recommend SCDs and mobilization  - A/C     # Diet/Hydration:    Diabetic/cardiac     Disposition:   Home with family with [facility] 2 weeks from admission      Follow-up:   PCP, PMR, Nephrology, Cardiology, urology     CODE: Level 3: DNAR and DNI     Restrictions include: Fall precautions   ---------------------------------------------------------------------------------------------------------------------    Objective: Allergies and Medications per EMR    Physical Exam:  Vitals:    09/04/19 0854   BP: 140/68   Pulse:    Resp:    Temp:    SpO2:      General: Awake, alert in NAD  HENT:  MMM  Respiratory: Unlabored breathing, breath sounds equal, Lungs CTA, no wheezes, rales, or rhonchi  Cardiovascular: Regular rate and rhythm, no murmurs, rubs, or gallops  Gastrointestinal: Soft, non-tender,  mildly-distended, normoactive bowel sounds  Genitourinary: No camargo  SkiN/MSK/Extremities:   Mild 1+ bilateral lower extremity edema and improving left upper extremity distal edema  No calf tenderness to palpation  Neurologic/Psych:   MENTAL STATUS: awake, orientation intact; appropriate wakefulness  Affect:  Euthymic    Physical exam performed, documentation above reviewed and updated if appropriate on relevant date of encounter:   09/04/2019    Diagnostic Studies: reviewed, no new imaging  No results found      Laboratory:    Results from last 7 days   Lab Units 09/02/19 0522 08/29/19 0529   HEMOGLOBIN g/dL 8 8* 9 1*   HEMATOCRIT % 28 2* 29 0*   WBC Thousand/uL 6 66 7 91     Results from last 7 days   Lab Units 09/04/19 0529 09/02/19 0522 08/29/19  0529   BUN mg/dL 51* 50* 56*   POTASSIUM mmol/L 3 8 4 0 4 2   CHLORIDE mmol/L 101 101 100   CREATININE mg/dL 3 56* 3 53* 3 64*   AST U/L  --   --  15   ALT U/L  --   --  12     Results from last 7 days   Lab Units 09/03/19  0528 09/02/19  0522 09/01/19  0533   PROTIME seconds 22 5* 23 7* 25 3*   INR  2 04* 2 17* 2 36*        Patient Active Problem List   Diagnosis    3-vessel coronary artery disease    Asbestosis ([facility] [address] )    Kidney disease with fluid retention    Diabetes mellitus with multiple complications (HCC)    Elevated serum alkaline phosphatase level    Hyperlipidemia    Essential hypertension    Hypothyroidism    Paroxysmal A-fib (HCC)    Seasonal allergies    Increased frequency of urination    Frozen shoulder    Herpes zoster without complication    Depressed mood    Chest pain    Ambulatory dysfunction/generalized weakness    Leg edema, left    Shortness of breath    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    UTI (urinary tract infection)    Tibial artery occlusion, left (HCC)    Hyponatremia    Chronic diastolic [facility] failure ([facility]    Benign prostatic hyperplasia with urinary hesitancy    Abnormal chest x-ray    Warfarin-induced coagulopathy ([facility])    Impaired mobility and ADLs    Anticoagulated on warfarin    History of recurrent UTIs    Buttock wound    Presence of permanent cardiac pacemaker    Pain    Anemia    Healthcare maintenance    Cognitive impairment    Vitamin D insufficiency    CKD (chronic kidney disease) stage 4, GFR 15-29 ml/min ([facility])    Constipation       ** Please Note: Fluency Direct voice to text software may have been used in the creation of this document   **    Naresh Simpson MD, [facility]  Physical Medicine and Rehabilitation  Brain Injury Medicine

## 2019-09-05 LAB
ANION GAP SERPL CALCULATED.3IONS-SCNC: 7 MMOL/L (ref 4–13)
BASOPHILS # BLD AUTO: 0.02 THOUSANDS/ΜL (ref 0–0.1)
BASOPHILS NFR BLD AUTO: 0 % (ref 0–1)
BUN SERPL-MCNC: 48 MG/DL (ref 5–25)
CALCIUM SERPL-MCNC: 8.3 MG/DL (ref 8.3–10.1)
CHLORIDE SERPL-SCNC: 102 MMOL/L (ref 100–108)
CO2 SERPL-SCNC: 29 MMOL/L (ref 21–32)
CREAT SERPL-MCNC: 3.65 MG/DL (ref 0.6–1.3)
EOSINOPHIL # BLD AUTO: 0.26 THOUSAND/ΜL (ref 0–0.61)
EOSINOPHIL NFR BLD AUTO: 4 % (ref 0–6)
ERYTHROCYTE [DISTWIDTH] IN BLOOD BY AUTOMATED COUNT: 14.7 % (ref 11.6–15.1)
GFR SERPL CREATININE-BSD FRML MDRD: 14 ML/MIN/1.73SQ M
GLUCOSE P FAST SERPL-MCNC: 77 MG/DL (ref 65–99)
GLUCOSE SERPL-MCNC: 150 MG/DL (ref 65–140)
GLUCOSE SERPL-MCNC: 163 MG/DL (ref 65–140)
GLUCOSE SERPL-MCNC: 170 MG/DL (ref 65–140)
GLUCOSE SERPL-MCNC: 77 MG/DL (ref 65–140)
GLUCOSE SERPL-MCNC: 80 MG/DL (ref 65–140)
HCT VFR BLD AUTO: 26.2 % (ref 36.5–49.3)
HGB BLD-MCNC: 8.3 G/DL (ref 12–17)
IMM GRANULOCYTES # BLD AUTO: 0.01 THOUSAND/UL (ref 0–0.2)
IMM GRANULOCYTES NFR BLD AUTO: 0 % (ref 0–2)
INR PPP: 2.45 (ref 0.84–1.19)
LYMPHOCYTES # BLD AUTO: 1.56 THOUSANDS/ΜL (ref 0.6–4.47)
LYMPHOCYTES NFR BLD AUTO: 26 % (ref 14–44)
MCH RBC QN AUTO: 30.6 PG (ref 26.8–34.3)
MCHC RBC AUTO-ENTMCNC: 31.7 G/DL (ref 31.4–37.4)
MCV RBC AUTO: 97 FL (ref 82–98)
MONOCYTES # BLD AUTO: 0.65 THOUSAND/ΜL (ref 0.17–1.22)
MONOCYTES NFR BLD AUTO: 11 % (ref 4–12)
NEUTROPHILS # BLD AUTO: 3.61 THOUSANDS/ΜL (ref 1.85–7.62)
NEUTS SEG NFR BLD AUTO: 59 % (ref 43–75)
NRBC BLD AUTO-RTO: 0 /100 WBCS
PLATELET # BLD AUTO: 214 THOUSANDS/UL (ref 149–390)
PMV BLD AUTO: 10 FL (ref 8.9–12.7)
POTASSIUM SERPL-SCNC: 3.8 MMOL/L (ref 3.5–5.3)
PROTHROMBIN TIME: 26.1 SECONDS (ref 11.6–14.5)
RBC # BLD AUTO: 2.71 MILLION/UL (ref 3.88–5.62)
SODIUM SERPL-SCNC: 138 MMOL/L (ref 136–145)
WBC # BLD AUTO: 6.11 THOUSAND/UL (ref 4.31–10.16)

## 2019-09-05 PROCEDURE — 85610 PROTHROMBIN TIME: CPT | Performed by: NURSE PRACTITIONER

## 2019-09-05 PROCEDURE — 97530 THERAPEUTIC ACTIVITIES: CPT

## 2019-09-05 PROCEDURE — 97110 THERAPEUTIC EXERCISES: CPT

## 2019-09-05 PROCEDURE — 97116 GAIT TRAINING THERAPY: CPT

## 2019-09-05 PROCEDURE — 99232 SBSQ HOSP IP/OBS MODERATE 35: CPT | Performed by: INTERNAL MEDICINE

## 2019-09-05 PROCEDURE — 99233 SBSQ HOSP IP/OBS HIGH 50: CPT

## 2019-09-05 PROCEDURE — 85025 COMPLETE CBC W/AUTO DIFF WBC: CPT | Performed by: NURSE PRACTITIONER

## 2019-09-05 PROCEDURE — 80048 BASIC METABOLIC PNL TOTAL CA: CPT | Performed by: NURSE PRACTITIONER

## 2019-09-05 PROCEDURE — 82948 REAGENT STRIP/BLOOD GLUCOSE: CPT

## 2019-09-05 PROCEDURE — 97112 NEUROMUSCULAR REEDUCATION: CPT

## 2019-09-05 RX ADMIN — RANOLAZINE 500 MG: 500 TABLET, FILM COATED, EXTENDED RELEASE ORAL at 08:13

## 2019-09-05 RX ADMIN — INSULIN GLARGINE 8 UNITS: 100 INJECTION, SOLUTION SUBCUTANEOUS at 21:47

## 2019-09-05 RX ADMIN — WARFARIN SODIUM 3 MG: 1 TABLET ORAL at 16:53

## 2019-09-05 RX ADMIN — RANOLAZINE 500 MG: 500 TABLET, FILM COATED, EXTENDED RELEASE ORAL at 21:47

## 2019-09-05 RX ADMIN — METOPROLOL TARTRATE 25 MG: 25 TABLET, FILM COATED ORAL at 16:54

## 2019-09-05 RX ADMIN — LIDOCAINE 1 PATCH: 50 PATCH CUTANEOUS at 16:52

## 2019-09-05 RX ADMIN — INSULIN LISPRO 1 UNITS: 100 INJECTION, SOLUTION INTRAVENOUS; SUBCUTANEOUS at 16:55

## 2019-09-05 RX ADMIN — INSULIN LISPRO 1 UNITS: 100 INJECTION, SOLUTION INTRAVENOUS; SUBCUTANEOUS at 21:48

## 2019-09-05 RX ADMIN — LEVOTHYROXINE SODIUM 75 MCG: 75 TABLET ORAL at 05:36

## 2019-09-05 RX ADMIN — TAMSULOSIN HYDROCHLORIDE 0.4 MG: 0.4 CAPSULE ORAL at 08:13

## 2019-09-05 RX ADMIN — METOPROLOL TARTRATE 25 MG: 25 TABLET, FILM COATED ORAL at 08:13

## 2019-09-05 RX ADMIN — FINASTERIDE 5 MG: 5 TABLET, FILM COATED ORAL at 08:13

## 2019-09-05 RX ADMIN — ASPIRIN 81 MG: 81 TABLET, COATED ORAL at 08:13

## 2019-09-05 RX ADMIN — SENNOSIDES 8.6 MG: 8.6 TABLET, FILM COATED ORAL at 16:55

## 2019-09-05 RX ADMIN — INSULIN LISPRO 1 UNITS: 100 INJECTION, SOLUTION INTRAVENOUS; SUBCUTANEOUS at 11:31

## 2019-09-05 RX ADMIN — VITAMIN D, TAB 1000IU (100/BT) 2000 UNITS: 25 TAB at 08:13

## 2019-09-05 RX ADMIN — ATORVASTATIN CALCIUM 40 MG: 40 TABLET, FILM COATED ORAL at 16:54

## 2019-09-05 RX ADMIN — SENNOSIDES 8.6 MG: 8.6 TABLET, FILM COATED ORAL at 08:13

## 2019-09-05 RX ADMIN — POLYSACCHARIDE-IRON COMPLEX 150 MG: 150 CAPSULE ORAL at 08:13

## 2019-09-05 RX ADMIN — TORSEMIDE 40 MG: 20 TABLET ORAL at 08:13

## 2019-09-05 NOTE — PROGRESS NOTES
09/05/19 0830   Pain Assessment   Pain Assessment No/denies pain   Pain Score No Pain   Restrictions/Precautions   Precautions Bed/chair alarms;Cognitive; Fall Risk;Supervision on toilet/commode  (impaired skin integrity- use ROHO cushion )   Weight Bearing Restrictions No   ROM Restrictions No   Braces or Orthoses AFO  (B/L)   Cognition   Overall Cognitive Status Impaired   Arousal/Participation Cooperative   Attention Within functional limits   Orientation Level Oriented to person;Oriented to place;Oriented to situation   Memory Decreased short term memory;Decreased recall of recent events;Decreased recall of precautions   Following Commands Follows one step commands with increased time or repetition   Subjective   Subjective Patient agreeable and eager to particiapte in PT session  Wife present for the last appx 15 minutes remaining in session    QI: Sit to Stand   Assistance Needed Incidental touching   Assistance Provided by [place] Less than 25%   Comment CGA with RW   Sit to Stand CARE Score 3   Bed Mobility   Findings patient received OOB in chair and remained OOB in chair at the end of session  QI: Chair/Bed-to-Chair Transfer   Assistance Needed Incidental touching   Assistance Provided by [place] Less than 25%   Comment verbal instruction to increase width of turn and to "not push against back of chair"    Chair/Bed-to-Chair Transfer CARE Score 3   Transfer Bed/Chair/Wheelchair   Limitations Noted In Balance;Confidence; Coordination; Sequencing   Adaptive Equipment Roller Walker   Stand Pivot Minimal Assist;Contact Guard   Sit to Stand Minimal Assist;Contact Guard   Stand to [facility]   Findings 1 overt LOB that he was able to correct with min/steadying assist    Bed, Chair, Wheelchair Transfer (FIM) 4 - Patient requires steadying assist or light touching   QI: Walk 10 Feet   Assistance Needed Incidental touching   Assistance Provided by [place] Less than 25%   Walk 10 Feet CARE Score 3   QI: Walk 48 Feet with Two Turns   Assistance Needed Incidental touching   Assistance Provided by [place] Less than 25%   Walk 50 Feet with Two Turns CARE Score 3   QI: Walk 150 Feet   Comment focus placed on turns and household distances    QI: Walking 10 Feet on Uneven Surfaces   Reason if not Attempted Safety concerns   Walking 10 Feet on Uneven Surfaces CARE Score 88   Ambulation   Does the patient walk? 2  Yes   Primary Discharge Mode of Locomotion Walk   Walk Assist Level Minimum Assist;Contact Guard   Gait Pattern Inconsistant Lois;Decreased foot clearance; Step through; Forward Flexion  (steppage gait )   Assist Device Roller Walker   Distance Walked (feet) 60 ft  (x5)   Limitations Noted In Balance; Coordination;Sensation; Sequencing;Speed;Strength;Swing   Walking (FIM) 2 - Patient ambulates between 50 - 149 feet regardless of assist/device/set up   Wheelchair mobility   QI: Does the patient use a wheelchair? 1  Yes   QI: Indicate the type of wheelchair 1  Manual   Wheelchair Assist Level Minimum Assist   Method Right upper extremity; Left upper extremity   Needs Assist With Obstacles; Locking Brakes   Distance Level Surface (feet) 40 ft   Findings on hardwood/laminate dragan with use of gloves to increase     Wheelchair (FIM) 1 - Patient wheels less than 49 feet regardless of assist/set up   QI: 4 Steps   Reason if not Attempted Safety concerns   4 Steps CARE Score 88   QI: 12 Steps   Reason if not Attempted Safety concerns   12 Steps CARE Score 88   QI: Picking Up Object   Reason if not Attempted Safety concerns   Picking Up Object CARE Score 88   Therapeutic Interventions   Neuromuscular Re-Education 10'x2 forward/backward ambulation in // bars; repeated SPT with RW; ambulation with gripped socks    Assessment   Treatment Assessment Patient engaged in PT treatment session focusing grossly on transfers, short distance walking and transfers   Patient occasionally retropulsive (only 1x during session) in which he requires steadying assist to regain balance  Wife concerned for her ability to assist patient with these needs at home  Wife also reporting that her HHA is no longer with them  Discussed d/c planning and remaining time left in rehab  Plan to bring wife in for family training with focus mostly placed on transfers  Wife also encouraged to hire help at home; will follow up  At this time, patient continues to require skilled PT intervention to maximize function and reduce caregiver burden  Family/Caregiver Present wife, Maude Race    Problem List Decreased strength;Decreased range of motion;Decreased endurance; Impaired balance;Decreased mobility; Decreased coordination;Decreased cognition;Decreased safety awareness; Impaired sensation;Decreased skin integrity;Orthopedic restrictions   Barriers to Discharge Inaccessible home environment;Decreased caregiver support   PT Barriers   Functional Limitation Walking;Transfers;Standing   Plan   Treatment/Interventions Functional transfer training;LE strengthening/ROM; Therapeutic exercise;Elevations; Endurance training;Cognitive reorientation;Patient/family training;Equipment eval/education; Bed mobility; Compensatory technique education;Gait training   Progress Progressing toward goals   Recommendation   Recommendation Other (Comment)  (TBD pending progress)   Equipment Recommended Wheelchair;Walker  (patient owns this at home )   PT Therapy Minutes   PT Time In 0830   PT Time Out 0930   PT Total Time (minutes) 60   PT Mode of treatment - Individual (minutes) 60   PT Mode of treatment - Concurrent (minutes) 0   PT Mode of treatment - Group (minutes) 0   PT Mode of treatment - Co-treat (minutes) 0   PT Mode of Teatment - Total time(minutes) 60 minutes   Therapy Time missed   Time missed?  No

## 2019-09-05 NOTE — PROGRESS NOTES
Internal Medicine Progress Note  Patient: Olinda Valadez  Age/sex: 80 y o  male  Medical Record #: [id number]      ASSESSMENT/PLAN: (Interval History)  Olinda Valadez is seen and examined and management for following issues:    Volume overload/chronic diastolic CHF:  s/p Lasix IV, weight dropped 11 lbs = per renal, target weight is 168-170 lbs  Renal managing his diuretic tx = currently Torsemide 40mg qd  FR 1500ml; 2Gm NA diet      Abnormal Troponin: felt to be 2/2 creatinine/vol overload putting stress on the heart; no further testing etc was recommended     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:  stable     Iron deficiency anemia: renal gave 2 doses Venofer while in hospital and now started on Niferex 150mg qd  Checking stool for occult blood = 1st/2nd were negative      Hx urine retention/BPH; Klebsiella UTI 6/2019 and Klebsiella urosepsis 7/2019:  Uro saw in 7/2019 and added added Finasteride  Consideration was to be given to starting suppressive therapy with Nitrofurantion 100mg qhs  Currently, he is on Flomax/Finasteride  Will need to watch closely for UTI sx  HTN/orthostasis:  On 9/2/19, was orthostatic sitting was 955 systolic and stand was 506 = no dizzy but unsteady on feet; on Lopressor 50mg BID/Ranexa 500mg BID (Ranexa shouldn't have much effect on BP) = d/w Dr Javon Haywood and dropped Lopressor dose to 25mg BID  No sx today      PAF:  continue Lopressor but dropped to 25 mg BID since has some orthostasis Coumadin is 3mg 2x/week and 1 5 mg 5x/week = no changes today       Hx Dual chamber PPM:  sees Dr Rhonda Cooper as an OP     CVA (2014):  continue ASA/Lipitor     CAD/CABG (2013):   was here at [address] [place] [address] Place 6/2019 for chest pain --> seen by Cardiology, had nuclear stress test = small, severe, reversible myocardial perfusion defect of the basal inferior wall --> was started on Ranexa 500mg BID but no further plans for intervention were recommended    Continue Lopressor, Lipitor and ASA as well     DM2: takes Trajenta 5mg qd at home = on Lantus 8U qam  No changes today     Hypothyroidism:  continue current Levothyroxine     Left L2-3 microdiskectomy 7/18/19 at [facility]    Vitamin D deficiency:  Level is 22 6; will d/w renal      Subjective/ HPI: Patients overnight issues or events were reviewed with nursing or staff during rounds or morning huddle session  DM2: stable on Lantus  Anemia: s/p venofer, now on iron PO   CAD:  Stable on current tx    Offers no complaints    ROS:     GI: denies abdominal pain, change bowel habits or reflux symptoms  Neuro: Denies any headache, new vision changes, new neuropathies,new weaknesses   Respiratory: No Cough, SOB, denies wheeze  Cardiovascular: No CP, palpitations , denies perception of rapid heartbeat  : denies any new urinary burning or frequency    Review of Scheduled Meds:    Current Facility-Administered Medications:  acetaminophen 650 mg Oral Q6H PRN Aby Del Cid MD   aspirin 81 mg Oral Daily Aby Del Cid MD   atorvastatin 40 mg Oral QPM Aby Del Cid MD   bisacodyl 10 mg Rectal Daily PRN Aby Del Cid MD   cholecalciferol 2,000 Units Oral Daily Aby Del Cid MD   finasteride 5 mg Oral Daily MAURICE Rios   insulin glargine 8 Units Subcutaneous HS MAURICE Patel   insulin lispro 1-5 Units Subcutaneous TID AC MAURICE Patel   insulin lispro 1-5 Units Subcutaneous HS MAURICE Rios   iron polysaccharides 150 mg Oral Daily Jumana Tate MD   levothyroxine 75 mcg Oral Daily Aby Del Cid MD   lidocaine 1 patch Topical Daily Aby Del Cid MD   lidocaine  Topical Q4H PRN Aby Del Cid MD   menthol-methyl salicylate  Apply externally 4x Daily PRN Aby Del Cid MD   metoprolol tartrate 25 mg Oral BID With Meals MAURICE Rios   ondansetron 4 mg Intravenous Q6H PRN Aby Del Cid MD   polyethylene glycol 17 g Oral Daily PRN Aby Del Cid MD   ranolazine 500 mg Oral Q12H [address] Aby Del Cid MD   senna 1 tablet Oral BID Cristo Morrison MD   tamsulosin 0 4 mg Oral Daily Cristo Morrison MD   torsemide 40 mg Oral Daily Cristo Morrison MD   warfarin 1 5 mg Oral Once per day on Sun Tue Wed Fri Sat BYRON CarterKEVYN   warfarin 3 mg Oral Once per day on Mon Thu Renee Dewey, [address]:     Results from last 7 days   Lab Units 09/05/19  0508 09/02/19  0522   WBC Thousand/uL 6 11 6 66   HEMOGLOBIN g/dL 8 3* 8 8*   HEMATOCRIT % 26 2* 28 2*   PLATELETS Thousands/uL 214 223     Results from last 7 days   Lab Units 09/05/19  0508 09/04/19  0529   SODIUM mmol/L 138 134*   POTASSIUM mmol/L 3 8 3 8   CHLORIDE mmol/L 102 101   CO2 mmol/L 29 28   BUN mg/dL 48* 51*   CREATININE mg/dL 3 65* 3 56*   CALCIUM mg/dL 8 3 8 3         Results from last 7 days   Lab Units 09/05/19  0508 09/03/19  0528   INR  2 45* 2 04*          Results from last 7 days   Lab Units 09/05/19  1102 09/05/19  0702 09/04/19  2133   POC GLUCOSE mg/dl 170* 80 139       Imaging:     No orders to display       *Labs reviewed  *Radiology studies reviewed  *Medications reviewed and reconciled as needed  *Please refer to order section for additional ordered labs studies  *Case discussed with primary attending during morning huddle case rounds    Physical Examination:  Vitals:   Vitals:    09/04/19 1710 09/04/19 2131 09/05/19 0622 09/05/19 0813   BP: 143/65 153/68 159/70 135/68   BP Location: Right arm Left arm Left arm    Pulse: 70 71 72 75   Resp:  18 18    Temp:  98 2 °F (36 8 °C) 98 4 °F (36 9 °C)    TempSrc:  Oral Oral    SpO2:  98% 95%    Weight:           General Appearance: NAD, conversive  Eyes: No icterus; conjunctiva normal, PERRLA  HENT: oropharynx clear; mucous membranes moist  Neck: trachea midline, range of motion full   Supple w/o pain  Lungs: CTA, normal respiratory effort, no retractions, expiratory effort normal  CV: regular rate, no rubs/gallops; +murmur  ABD: soft; NT/ND; +BS  EXT: 1+ edema LE = improving  Skin: normal turgor, normal texture, no rashes  Psych: affect normal, no overt anxiety/depression during exam today   Neuro: AAOx3            Total time spent: At least 40 minutes, with more than 50% spent counseling/coordinating care  Counseling includes discussion with patient re: progress  and discussion with patient of his/her current medical state/information  Coordination of patient's care was performed in conjunction with primary service  Time invested included review of patient's labs, vitals, and management of their comorbidities with continued monitoring  In addition, this patient was discussed with medical team including physician and advanced extenders  The care of the patient was extensively discussed and appropriate treatment plan was formulated unique for this patient  ** Please Note: Dragon 360 Dictation voice to text software may have been used in the creation of this document   **

## 2019-09-05 NOTE — PROGRESS NOTES
09/05/19 1230   Pain Assessment   Pain Assessment No/denies pain   Pain Score No Pain   Restrictions/Precautions   Precautions Bed/chair alarms; Fall Risk;Supervision on toilet/commode   Braces or Orthoses AFO  (b/l)   QI: Putting On/Taking Off Footwear   Assistance Needed Physical assistance   Assistance Provided by [place] Total assistance   Comment Pt requires assist to don AFOs and shoes while seated in recliner   Putting On/Taking Off Footwear CARE Score 1   QI: Picking Up Object   Reason if not Attempted Safety concerns   Picking Up Object CARE Score 88   QI: Sit to Stand   Assistance Needed Physical assistance   Assistance Provided by [place] Less than 25%   Sit to Stand CARE Score 3   QI: Chair/Bed-to-Chair Transfer   Assistance Needed Physical assistance   Assistance Provided by [place] Less than 25%   Chair/Bed-to-Chair Transfer CARE Score 3   Transfer Bed/Chair/Wheelchair   Stand Pivot Minimal Assist   Sit to Stand Minimal   Stand to Sit Minimal   Bed, Chair, Wheelchair Transfer (FIM) 4 - Patient requires steadying assist or light touching   Cognition   Overall Cognitive Status Impaired   Arousal/Participation Alert; Cooperative   Attention Within functional limits   Orientation Level Oriented X4   Memory Decreased long term memory;Decreased short term memory;Decreased recall of precautions   Following Commands Follows multistep commands with increased time or repetition   Activity Tolerance   Activity Tolerance Patient tolerated treatment well   Assessment   Treatment Assessment Pt engaged in OT treatmetn session with focus on sit to stand transfers, fxnl mobility, , dynamic standing balance  Pt wife present discussed d/c planning  Discussed with wife d/c options  Wife does not feel that she will be able to provide necessary assist upon d/c  Wife wants to pursue SNF rehab for continued therapy  Pt had episode of sliding down in recliner yesterday   Discussed with pt and wife and updated white board pt to sit in recliner with feet up only  Pt completed repeititive sit to stand transfers pt with episode of retropulsion 10% of time requiring min A for balance support  Pt educated at length regarding foot positioning underneath him to complete sit to stand transfers wtihout retropulsion  Pt wife asking whether or not to purchase lift chair  Educated wife on using lift function only if pt is unable to complete sit to stand from chair  Wife educated on maintaining patients strength and importance of patient completing sit to stands from standard recliner  Wife in agreement  Pt compelted fxnl mobility from gym back to room  Pt required w/c follow for safety  When attempting to transfer from stand to sit pt required verbal cueing to move RW back for increased safety for stand to sit  Prognosis Fair   Problem List Decreased strength;Decreased endurance; Impaired balance;Decreased coordination;Decreased mobility; Decreased cognition;Decreased safety awareness; Impaired judgement   Plan   Treatment/Interventions ADL retraining;Functional transfer training;LE strengthening/ROM; Therapeutic exercise; Endurance training;Patient/family training;Equipment eval/education;Gait training; Compensatory technique education   Progress Progressing toward goals   Recommendation   OT Discharge Recommendation 24 hour supervision/assist   OT Therapy Minutes   OT Time In 1230   OT Time Out 1330   OT Total Time (minutes) 60   OT Mode of treatment - Individual (minutes) 60   OT Mode of treatment - Concurrent (minutes) 0   OT Mode of treatment - Group (minutes) 0   OT Mode of treatment - Co-treat (minutes) 0   OT Mode of Teatment - Total time(minutes) 60 minutes   Therapy Time missed   Time missed?  No

## 2019-09-05 NOTE — PROGRESS NOTES
[address] 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  Continue Vitamin D    5  Anemia: Hgb stable  Had iron deficiency and got IV iron  6  Hx CAD/CABG  DISPOSITION:  · No changes for now  · Would keep on Torsemide 40 mg daily and trend creatinine  · Has renal follow up on 9/11/19 at [facility] office  SUBJECTIVE / INTERVAL HISTORY:  No new complaints  OBJECTIVE:  Current Weight: Weight - Scale: 75 2 kg (165 lb 12 6 oz)(with shoes and braces on)  Vitals:    09/04/19 1710 09/04/19 2131 09/05/19 0622 09/05/19 0813   BP: 143/65 153/68 159/70 135/68   BP Location: Right arm Left arm Left arm    Pulse: 70 71 72 75   Resp:  18 18    Temp:  98 2 °F (36 8 °C) 98 4 °F (36 9 °C)    TempSrc:  Oral Oral    SpO2:  98% 95%    Weight:           Intake/Output Summary (Last 24 hours) at 9/5/2019 1424  Last data filed at 9/5/2019 1100  Gross per 24 hour   Intake 900 ml   Output 1600 ml   Net -700 ml     General: conscious, coherent, cooperative, no distress  Chest/Lungs: clear breath sounds  CVS: distinct heart sounds, normal rate  Abdomen: soft  Extremities: (+) LE edema  : no camargo catheter  Neuro: awake, alert       Medications:    Current Facility-Administered Medications:     acetaminophen (TYLENOL) tablet 650 mg, 650 mg, Oral, Q6H PRN, Kristen Correa MD, 650 mg at 09/03/19 1710    aspirin (ECOTRIN LOW STRENGTH) EC tablet 81 mg, 81 mg, Oral, Daily, Kristen Correa MD, 81 mg at 09/05/19 0813    atorvastatin (LIPITOR) tablet 40 mg, 40 mg, Oral, QPM, Sanya King MD, 40 mg at 09/04/19 1713    bisacodyl (DULCOLAX) rectal suppository 10 mg, 10 mg, Rectal, Daily PRN, Sanya King MD    cholecalciferol (VITAMIN D3) tablet 2,000 Units, 2,000 Units, Oral, Daily, Sanya King MD, 2,000 Units at 09/05/19 0813    finasteride (PROSCAR) tablet 5 mg, 5 mg, Oral, Daily, Espanolajennifer Mejía, CRNP, 5 mg at 09/05/19 0813    insulin glargine (LANTUS) subcutaneous injection 8 Units 0 08 mL, 8 Units, Subcutaneous, HS, MAURICE Crystal, 8 Units at 09/04/19 2203    insulin lispro (HumaLOG) 100 units/mL subcutaneous injection 1-5 Units, 1-5 Units, Subcutaneous, TID AC, 1 Units at 09/05/19 1131 **AND** Fingerstick Glucose (POCT), , , TID AC, Mikejennifer Mejía, CRNP    insulin lispro (HumaLOG) 100 units/mL subcutaneous injection 1-5 Units, 1-5 Units, Subcutaneous, HS, Mike Mejía, BYRONNP, 1 Units at 09/03/19 2154    iron polysaccharides (FERREX) capsule 150 mg, 150 mg, Oral, Daily, Jori Singleton MD, 150 mg at 09/05/19 0813    levothyroxine tablet 75 mcg, 75 mcg, Oral, Daily, Sanya King MD, 75 mcg at 09/05/19 0536    lidocaine (LIDODERM) 5 % patch 1 patch, 1 patch, Topical, Daily, Sanya King MD, 1 patch at 09/04/19 1714    lidocaine (URO-JET) 2 % urethral/mucosal gel, , Topical, Q4H PRN, Sanya King MD    menthol-methyl salicylate (BENGAY) 03-07 % cream, , Apply externally, 4x Daily PRN, Sanya King MD    metoprolol tartrate (LOPRESSOR) tablet 25 mg, 25 mg, Oral, BID With Meals, BYRON CrystalNP, 25 mg at 09/05/19 0813    ondansetron (ZOFRAN) injection 4 mg, 4 mg, Intravenous, Q6H PRN, Sanya King MD    polyethylene glycol [facility]) packet 17 g, 17 g, Oral, Daily PRN, Sanya King MD    ranolazine [facility]) 12 hr tablet 500 mg, 500 mg, Oral, Q12H [facility], Sanya King MD, 500 mg at 09/05/19 0813    senna (SENOKOT) tablet 8 6 mg, 1 tablet, Oral, BID, Sanya King MD, 8 6 mg at 09/05/19 0813    tamsulosin (FLOMAX) capsule 0 4 mg, 0 4 mg, Oral, Daily, Nilda Trevino MD, 0 4 mg at 09/05/19 0813    torsemide [facility]) tablet 40 mg, 40 mg, Oral, Daily, Nilda Trevino MD, 40 mg at 09/05/19 8452    warfarin (COUMADIN) tablet 1 5 mg, 1 5 mg, Oral, Once per day on Sun Tue Wed Fri Sat, MAURICE Patel, 1 5 mg at 09/04/19 1713    warfarin (COUMADIN) tablet 3 mg, 3 mg, Oral, Once per day on Mon Thu, MAURICE Patel, 3 mg at 09/02/19 1721    Laboratory Results:  Results from last 7 days   Lab Units 09/05/19  0508 09/04/19  0529 09/02/19  0522   WBC Thousand/uL 6 11  --  6 66   HEMOGLOBIN g/dL 8 3*  --  8 8*   HEMATOCRIT % 26 2*  --  28 2*   PLATELETS Thousands/uL 214  --  223   POTASSIUM mmol/L 3 8 3 8 4 0   CHLORIDE mmol/L 102 101 101   CO2 mmol/L 29 28 27   BUN mg/dL 48* 51* 50*   CREATININE mg/dL 3 65* 3 56* 3 53*   CALCIUM mg/dL 8 3 8 3 8 5   MAGNESIUM mg/dL  --  2 4  --

## 2019-09-05 NOTE — SOCIAL WORK
Therapy informed cm that they spoke with pts wife and reviewed pts ability and level of care at home  Wife is not sure she is able to handle him and wishes to speak with her dtr re: pt going to subacute level of care  Cm to follow up with wife tomorrow

## 2019-09-05 NOTE — TEAM CONFERENCE
Acute RehabilitationTeam Conference Note  Date: 9/5/2019   Time: 11:01 AM       Patient Name:  Nova Abernathy       Medical Record Number: [id number]   YOB: 1932  Sex:  Male          Room/Bed:  [facility]-01  Payor Info:  Payor: Renita Ganser / Plan: MEDICARE A AND B / Product Type: Medicare A & B Fee for Service /      Admitting Diagnosis: Ambulatory dysfunction [R26 2]   Admit Date/Time:  8/27/2019  2:51 PM  Admission Comments: No comment available     Primary Diagnosis:  Impaired mobility and ADLs  Principal Problem: Impaired mobility and ADLs    Patient Active Problem List    Diagnosis Date Noted    Constipation 09/04/2019    CKD (chronic kidney disease) stage 4, GFR 15-29 ml/min ([facility]) 09/02/2019    Cognitive impairment 08/29/2019    Vitamin D insufficiency 08/29/2019    Anticoagulated on warfarin 08/28/2019    History of recurrent UTIs 08/28/2019    Buttock wound 08/28/2019    Presence of permanent cardiac pacemaker 08/28/2019    Pain 08/28/2019    Anemia 08/28/2019    Healthcare maintenance 08/28/2019    Impaired mobility and ADLs 08/27/2019    Warfarin-induced coagulopathy ([facility] ) 08/26/2019    Benign prostatic hyperplasia with urinary hesitancy 08/22/2019    Abnormal chest x-ray 08/22/2019    Hyponatremia 07/04/2019    Chronic diastolic heart failure ([address] ) 07/04/2019    Leg edema, left 06/13/2019    Shortness of breath 06/13/2019    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 06/13/2019    UTI (urinary tract infection) 06/13/2019    Tibial artery occlusion, left ([address] ) 06/13/2019    Chest pain 06/12/2019    Ambulatory dysfunction/generalized weakness 06/12/2019    Herpes zoster without complication 57/94/2563    Depressed mood 04/29/2019    Frozen shoulder 05/30/2018    Paroxysmal A-fib ([address] ) 04/13/2016    Kidney disease with fluid retention 01/12/2016    Hyperlipidemia 11/11/2015    Elevated serum alkaline phosphatase level 10/20/2015    Increased frequency of urination 10/20/2015    3-vessel coronary artery disease 08/26/2015    Diabetes mellitus with multiple complications ([facility] ) 88/81/6357    Hypothyroidism 08/26/2015    Asbestosis ([facility] ) 05/06/2014    Essential hypertension 05/06/2014    Seasonal allergies 05/06/2014       Physical Therapy:    Weight Bearing Status: Full Weight Bearing  Transfers: Minimal Assistance  Bed Mobility: Minimal Assistance  Amulation Distance (ft): 150 feet  Ambulation: Minimal Assistance  Assistive Device for Ambulation: Roller Walker  Wheelchair Mobility Distance: 20 ft  Wheelchair Mobility: Moderate Assistance  Number of Stairs: 4  Assistive Device for Stairs: Bilateral Hand Rails  Stair Assistance: Moderate Assistance  Discharge Recommendations: Home with:  [address] with[de-identified] 24 Hour Supervision, 24 Hour Assisteance, Home Physical Therapy    Patient is an 80year old male presenting with debility 2* fluid overload  Patient with multiple hospital admissions: 6/12-16 for chest pain, 7/4-12 for sepsis with d/c to [facility] for rehab, 7/18 for microdiskectomy in [place] with d/c to rehab  Patient only home for a few days when he presented with above complaints  Patient with an additional problem list which includes CAD, DM, HTN, a-fib, h/o shingles, h/o CVA, and CKD  PTA, patient living at home with his wife where she assisted him with (I)ADLs and ADLs  Post d/c from rehab in [place], patient reports needing assist to get in and out of bed, dress and bathe and walk  He was primarily using a w/c in his home and utilizing a RW with therapy, caregiver, or wife providing a chair follow  He does admit to 1 fall appx 3 weeks ago  Local children assist with needs such as driving  Patient reports seeing son daily  On evaluation, pt presenting with gross limitations BLE in strength and ROM, LUE strength and ROM, core stability, balance and righting reactions and coordination  Patient required overall moderate A for functional mobility  He is a good rehab candidate and will require skilled PT intervention to achieve S goals in appx 10 days  Pt making slow progress towards d/c goals  Pt functioning at min/modA with RW  Pt with impaired balance with txs and turns during mobility increasing fall risk  Due to decrease cognition pt requires VCs to improve safety awareness with txs  Pt does not have stairs to perform and ramp installed for entry into home  Family has equipment at home for d/c   Due to impaired balance and decrease strength, pt remains a fall risk and making slow progress  Will cont to work on deficits to progress with functional mobility and decrease burden of care for home  D/c pending at this time based on support and A needed for home  Occupational Therapy:  Eating: Supervision  Grooming: Supervision  Bathing: Moderate Assistance  Bathing: Moderate Assistance  Upper Body Dressing: Minimal Assistance  Lower Body Dressing: Moderate Assistance  Toileting: Maximum Assistance  Tub/Shower Transfer: (N/A)  Toilet Transfer: Moderate Assistance  Cognition: Exceptions to WNL  Cognition: Decreased Memory, Decreased Comprehension, Decreased Safety, Decreased Executive Functions  Orientation: Person, Place, Time, Situation  Discharge Recommendations: Home with:  [address] with[de-identified] Family Support       Pt making progress towards LTGs  At baseline pt was using w/c and RW for stand pivot transfers only  Pt was receiving home OT/PT and walking with therapy only  Pt had HHA 2 hrs/day 5x/week  HHA was available to come in evening prn  Pt dtr was assisting pt and wife for first week that he was home post rehab  Pt currently requires Benny for UB bathing, modA for LB bathing, and maxA for toileting  Pt continues to be limited by decreased standing balance, posterior weight shift in stance, decreased standing tolerance, decreased dynamic reach below waist, and SOB   Pt will continue to benefit from skilled OT services following POC with focus on above mentioned deficits to increase safety and independence with ADL tasks and reduce burden of care  Speech Therapy:           No notes on file    Nursing Notes:  Appetite: Good  Diet Type: 2 gram sodium diet, Diabetic                      Diet Patient/Family Education Complete: Yes    Type of Wound (LDA): Wound                    Type of Wound Patient/Family Education: Yes  Bladder: 5 - Supervision     Bladder Patient/Family Education: Yes  Bowel: 6 - Modified [place]     Bowel Patient/Family Education: Yes  Pain Location: Shoulder  Pain Orientation: Right  Pain Score: 0                       Hospital Pain Intervention(s): Rest  Pain Patient/Family Education: Yes  Medication Management/Safety  Safe Administration: Yes  Medication Patient/Family Education Complete: Yes    Pt is 81 yo male with PMH chronic CHF, CKD IV, PAF,  dual chamber PPM, CVA, CAD/CABG, DM II, Hypothyroidism, left L2L3 microdiskectomy, & BPH who was admitted with volume overload 8/22 to [address] [place] [address] & to [facility] 8/27  CHF & volume overload managed by renal   Pt with positive orthostatics and diuretics adjusted  Fl restriction of 1500 ml and low sodium diet  Baseline Cr 3 3-3 6  Pt wears Teds during the day  BPH managed with Flomax daily  Previous CVA managed with ASA & Lipitor, PAF lopressor & coumadin when within acceptable limits  DM II with lantus for now  On BS checks QID w coverage  Pt takes Trajenta at home  Pt has Fe def anemia and is on niferex  Hypothyroidism with synthroid  Pt came to [facility] with a small stage II on upper R side of his sacrum alleyvn in place  Pt requires alarms for safety  Pt is continent of bowel and bladder  This week we will be monitoring pt's vital signs, lab results & daily weight  We will manage his DM II with diet, corrective doses of insulin according to qid blood sugars,& lantus  We will encourage strength & endurance to promote independence with ADL's   We will teach & maintain T/R & offloading to prevent further skin breakdown  We will perform routine skin checks  We will monitor for constipation and medicate as per bowel regimen  We will increase safety awareness and keep pt free from falls  Case Management:        Pt is participating with therapy and is expected to return home w/family and contd therapy services  Pt was receiving nursing and therapy services through [facility] prior to admission  Following to assist w/dc planning needs  Is the patient actively participating in therapies? yes  List any modifications to the treatment plan:     Barriers Interventions   Strength, balance Therapy exercises   Lack of support in the home Family mtg                 Is the patient making expected progress toward goals? yes  List any update or changes to goals:     Medical Goals: Patient will be medically stable for discharge to [facility] upon completion of rehab program and Patient will be able to manage medical conditions and comorbid conditions with medications and follow up upon completion of rehab program    Weekly Team Goals:   Rehab Team Goals  ADL Team Goal: Patient will require assist with ADLs with least restrictive device upon completion of rehab program  Transfer Team Goal: Patient will require supervision with transfers with least restrictive device upon completion of rehab program  Locomotion Team Goal: Patient will require supervision with locomotion with least restrictive device upon completion of rehab program  Cognitive Team Goal: Patient will require supervision for basic and complex tasks upon completion of rehab program    Discussion: pt presents with the above barriers and team is concerned about level of support available in the home  Fall prevention education provided to spouse  Pt is super retropulsive and goals are for w/c function in the home  Recommendations are for a family mtg at this time to review options for spouse   There has been discussion that pt and spouse do not wish for subacute rehab  Pt is mod to max for adl, min for transfers  Education and training this weekend with spouse confirming she will be able to take him home next week  Anticipated Discharge Date:  9/12/19  [facility] Team Members Present: The following team members are supervising care for this patient and were present during this Weekly Team Conference      Physician: Dr Alexandro Chávez MD  : HEATHER De Los Santos  Registered Nurse: La Nena Roman RN  Physical Therapist: Puja Benton DPT  Occupational Therapist: Nida Weiss MS, OTR/L  Speech Therapist: Rakan Keller MS, CCC-SLP  Other:

## 2019-09-05 NOTE — PLAN OF CARE
Problem: PAIN - ADULT  Goal: Verbalizes/displays adequate comfort level or baseline comfort level  Description  Interventions:  - Encourage patient to monitor pain and request assistance  - Assess pain using appropriate pain scale  - Administer analgesics based on type and severity of pain and evaluate response  - Implement non-pharmacological measures as appropriate and evaluate response  - Consider cultural and social influences on pain and pain management  - Notify physician/advanced practitioner if interventions unsuccessful or patient reports new pain  Outcome: Progressing     Problem: INFECTION - ADULT  Goal: Absence or prevention of progression during hospitalization  Description  INTERVENTIONS:  - Assess and monitor for signs and symptoms of infection  - Monitor lab/diagnostic results  - Monitor all insertion sites, i e  indwelling lines, tubes, and drains  - Monitor endotracheal if appropriate and nasal secretions for changes in amount and color  - [place] appropriate cooling/warming therapies per order  - Administer medications as ordered  - Instruct and encourage patient and family to use good hand hygiene technique  - Identify and instruct in appropriate isolation precautions for identified infection/condition  Outcome: Progressing     Problem: SAFETY ADULT  Goal: Patient will remain free of falls  Description  INTERVENTIONS:  - Assess patient frequently for physical needs  -  Identify cognitive and physical deficits and behaviors that affect risk of falls    -  [place] fall precautions as indicated by assessment   - Educate patient/family on patient safety including physical limitations  - Instruct patient to call for assistance with activity based on assessment  - Modify environment to reduce risk of injury  - Consider OT/PT consult to assist with strengthening/mobility  Outcome: Progressing     Problem: SAFETY ADULT  Goal: Maintain or return to baseline ADL function  Description  INTERVENTIONS:  -  Assess patient's ability to carry out ADLs; assess patient's baseline for ADL function and identify physical deficits which impact ability to perform ADLs (bathing, care of mouth/teeth, toileting, grooming, dressing, etc )  - Assess/evaluate cause of self-care deficits   - Assess range of motion  - Assess patient's mobility; develop plan if impaired  - Assess patient's need for assistive devices and provide as appropriate  - Encourage maximum independence but intervene and supervise when necessary  - Involve family in performance of ADLs  - Assess for home care needs following discharge   - Consider OT consult to assist with ADL evaluation and planning for discharge  - Provide patient education as appropriate  Outcome: Progressing     Problem: SAFETY ADULT  Goal: Maintain or return mobility status to optimal level  Description  INTERVENTIONS:  - Assess patient's baseline mobility status (ambulation, transfers, stairs, etc )    - Identify cognitive and physical deficits and behaviors that affect mobility  - Identify mobility aids required to assist with transfers and/or ambulation (gait belt, sit-to-stand, lift, walker, cane, etc )  - [place] fall precautions as indicated by assessment  - Record patient progress and toleration of activity level on Mobility SBAR; progress patient to next Phase/Stage  - Instruct patient to call for assistance with activity based on assessment  - Consider rehabilitation consult to assist with strengthening/weightbearing, etc   Outcome: Progressing     Problem: Prexisting or High Potential for Compromised Skin Integrity  Goal: Skin integrity is maintained or improved  Description  INTERVENTIONS:  - Identify patients at risk for skin breakdown  - Assess and monitor skin integrity  - Assess and monitor nutrition and hydration status  - Monitor labs   - Assess for incontinence   - Turn and reposition patient  - Assist with mobility/ambulation  - Relieve pressure over bony prominences  - Avoid friction and shearing  - Provide appropriate hygiene as needed including keeping skin clean and dry  - Evaluate need for skin moisturizer/barrier cream  - Collaborate with interdisciplinary team   - Patient/family teaching  - Consider wound care consult   Outcome: Progressing     Problem: Potential for Falls  Goal: Patient will remain free of falls  Description  INTERVENTIONS:  - Assess patient frequently for physical needs  -  Identify cognitive and physical deficits and behaviors that affect risk of falls    -  [place] fall precautions as indicated by assessment   - Educate patient/family on patient safety including physical limitations  - Instruct patient to call for assistance with activity based on assessment  - Modify environment to reduce risk of injury  - Consider OT/PT consult to assist with strengthening/mobility  Outcome: Progressing

## 2019-09-05 NOTE — PROGRESS NOTES
09/05/19 1030   Pain Assessment   Pain Assessment 0-10   Pain Score No Pain   Restrictions/Precautions   Precautions Bed/chair alarms; Fall Risk;Supervision on toilet/commode   Cognition   Overall Cognitive Status Impaired   Subjective   Subjective pt agreeable to perform skilled PT    QI: Sit to Stand   Assistance Needed Incidental touching   Assistance Provided by [place] Less than 25%   Sit to Stand CARE Score 3   QI: Chair/Bed-to-Chair Transfer   Assistance Needed Incidental touching   Assistance Provided by [place] Less than 25%   Chair/Bed-to-Chair Transfer CARE Score 3   Transfer Bed/Chair/Wheelchair   Limitations Noted In Balance; Coordination; Sequencing;LE Strength   Adaptive Equipment Roller Walker   Stand Pivot Minimal Assist;Contact Guard   Sit to Stand Minimal Assist;Contact Guard   Stand to [facility], Chair, Wheelchair Transfer (FIM) 4 - Patient requires steadying assist or light touching   QI: Walk 10 Feet   Assistance Needed Incidental touching   Assistance Provided by [place] Less than 25%   Walk 10 Feet CARE Score 3   QI: Walk 50 Feet with Two Turns   Assistance Needed Incidental touching   Assistance Provided by [place] Less than 25%   Walk 50 Feet with Two Turns CARE Score 3   QI: Walking 10 Feet on Uneven Surfaces   Reason if not Attempted Safety concerns   Walking 10 Feet on Uneven Surfaces CARE Score 88   Ambulation   Does the patient walk? 2  Yes   Primary Discharge Mode of Locomotion Walk   Walk Assist Level Minimum Assist;Contact Guard   Gait Pattern Inconsistant Lois;Decreased foot clearance; Improper weight shift;Narrow JUAREZ; Forward Flexion   Assist Device Estela Garcia Walked (feet) 60 ft   Limitations Noted In Coordination;Balance; Endurance; Safety; Sequencing;Speed;Strength   Findings 10- 15 x5 to inc short ambulation/gait training with RW vc's for wide turns and step pattern and for hand placement    Walking (FIM) 2 - Patient ambulates between 50 - 149 feet regardless of assist/device/set up   QI: Picking Up Object   Reason if not Attempted Safety concerns   Picking Up Object CARE Score 88   Therapeutic Interventions   Strengthening STS and mini squats for correct STS vc 's for hand placement    Balance standing balance dynamic    Neuromuscular Re-Education walking with RW BWD and Side to side    Equipment Use   NuStep 11 min level 2   Assessment   Treatment Assessment pt engaged in skilled PT focus on functional activities STS /SPT with RW short walking gait training distance with vc's for step pattern and safety   Pt need instruction for  gait safety with ambulaton RW wider turns and keeping BL LE apart during walking   Pt also seems fatigue and a little tired during PT treatment   Pt required [place] during transfers and ambualtion with RW , pt cont to need vc 's for RW moving back for safety before standing up   pt will con to benefit with skilled PT to meet goals    Problem List Decreased strength;Decreased endurance; Impaired balance;Decreased mobility; Decreased cognition;Decreased coordination;Decreased safety awareness; Impaired judgement;Orthopedic restrictions;Decreased skin integrity   Barriers to Discharge Inaccessible home environment;Decreased caregiver support   Plan   Treatment/Interventions Functional transfer training;LE strengthening/ROM; Therapeutic exercise; Endurance training;Cognitive reorientation; Bed mobility;Gait training;Patient/family training   PT Therapy Minutes   PT Time In 1030   PT Time Out 1130   PT Total Time (minutes) 60   PT Mode of treatment - Individual (minutes) 60   PT Mode of treatment - Concurrent (minutes) 0   PT Mode of treatment - Group (minutes) 0   PT Mode of treatment - Co-treat (minutes) 0   PT Mode of Teatment - Total time(minutes) 60 minutes   Therapy Time missed   Time missed?  No

## 2019-09-06 LAB
ANION GAP SERPL CALCULATED.3IONS-SCNC: 6 MMOL/L (ref 4–13)
BUN SERPL-MCNC: 52 MG/DL (ref 5–25)
CALCIUM SERPL-MCNC: 8.2 MG/DL (ref 8.3–10.1)
CHLORIDE SERPL-SCNC: 102 MMOL/L (ref 100–108)
CO2 SERPL-SCNC: 27 MMOL/L (ref 21–32)
CREAT SERPL-MCNC: 3.6 MG/DL (ref 0.6–1.3)
DATE SPECIMEN #2: NORMAL
DATE SPECIMEN #3: NORMAL
GFR SERPL CREATININE-BSD FRML MDRD: 14 ML/MIN/1.73SQ M
GLUCOSE SERPL-MCNC: 100 MG/DL (ref 65–140)
GLUCOSE SERPL-MCNC: 148 MG/DL (ref 65–140)
GLUCOSE SERPL-MCNC: 171 MG/DL (ref 65–140)
GLUCOSE SERPL-MCNC: 192 MG/DL (ref 65–140)
GLUCOSE SERPL-MCNC: 95 MG/DL (ref 65–140)
HEMOCCULT SP1 STL QL: NEGATIVE
HEMOCCULT SP2 STL QL: NEGATIVE
HEMOCCULT SP3 STL QL: NEGATIVE
POTASSIUM SERPL-SCNC: 3.8 MMOL/L (ref 3.5–5.3)
SODIUM SERPL-SCNC: 135 MMOL/L (ref 136–145)

## 2019-09-06 PROCEDURE — 97530 THERAPEUTIC ACTIVITIES: CPT

## 2019-09-06 PROCEDURE — 97116 GAIT TRAINING THERAPY: CPT

## 2019-09-06 PROCEDURE — 80048 BASIC METABOLIC PNL TOTAL CA: CPT | Performed by: INTERNAL MEDICINE

## 2019-09-06 PROCEDURE — 99232 SBSQ HOSP IP/OBS MODERATE 35: CPT

## 2019-09-06 PROCEDURE — 97110 THERAPEUTIC EXERCISES: CPT

## 2019-09-06 PROCEDURE — 99232 SBSQ HOSP IP/OBS MODERATE 35: CPT | Performed by: INTERNAL MEDICINE

## 2019-09-06 PROCEDURE — 82948 REAGENT STRIP/BLOOD GLUCOSE: CPT

## 2019-09-06 PROCEDURE — 97535 SELF CARE MNGMENT TRAINING: CPT

## 2019-09-06 RX ORDER — INSULIN GLARGINE 100 [IU]/ML
8 INJECTION, SOLUTION SUBCUTANEOUS
Status: DISCONTINUED | OUTPATIENT
Start: 2019-09-08 | End: 2019-09-08

## 2019-09-06 RX ADMIN — TORSEMIDE 40 MG: 20 TABLET ORAL at 08:29

## 2019-09-06 RX ADMIN — LIDOCAINE 1 PATCH: 50 PATCH CUTANEOUS at 17:35

## 2019-09-06 RX ADMIN — VITAMIN D, TAB 1000IU (100/BT) 2000 UNITS: 25 TAB at 08:29

## 2019-09-06 RX ADMIN — INSULIN LISPRO 1 UNITS: 100 INJECTION, SOLUTION INTRAVENOUS; SUBCUTANEOUS at 22:13

## 2019-09-06 RX ADMIN — RANOLAZINE 500 MG: 500 TABLET, FILM COATED, EXTENDED RELEASE ORAL at 08:29

## 2019-09-06 RX ADMIN — METOPROLOL TARTRATE 25 MG: 25 TABLET, FILM COATED ORAL at 07:33

## 2019-09-06 RX ADMIN — TAMSULOSIN HYDROCHLORIDE 0.4 MG: 0.4 CAPSULE ORAL at 08:30

## 2019-09-06 RX ADMIN — SENNOSIDES 8.6 MG: 8.6 TABLET, FILM COATED ORAL at 08:30

## 2019-09-06 RX ADMIN — LEVOTHYROXINE SODIUM 75 MCG: 75 TABLET ORAL at 05:56

## 2019-09-06 RX ADMIN — RANOLAZINE 500 MG: 500 TABLET, FILM COATED, EXTENDED RELEASE ORAL at 22:13

## 2019-09-06 RX ADMIN — FINASTERIDE 5 MG: 5 TABLET, FILM COATED ORAL at 08:29

## 2019-09-06 RX ADMIN — ASPIRIN 81 MG: 81 TABLET, COATED ORAL at 08:29

## 2019-09-06 RX ADMIN — WARFARIN SODIUM 1.5 MG: 1 TABLET ORAL at 17:35

## 2019-09-06 RX ADMIN — INSULIN LISPRO 1 UNITS: 100 INJECTION, SOLUTION INTRAVENOUS; SUBCUTANEOUS at 12:12

## 2019-09-06 RX ADMIN — IRON SUCROSE 200 MG: 20 INJECTION, SOLUTION INTRAVENOUS at 17:36

## 2019-09-06 RX ADMIN — ATORVASTATIN CALCIUM 40 MG: 40 TABLET, FILM COATED ORAL at 17:34

## 2019-09-06 RX ADMIN — POLYSACCHARIDE-IRON COMPLEX 150 MG: 150 CAPSULE ORAL at 08:29

## 2019-09-06 RX ADMIN — SENNOSIDES 8.6 MG: 8.6 TABLET, FILM COATED ORAL at 17:34

## 2019-09-06 NOTE — PROGRESS NOTES
Physical Medicine and Rehabilitation Progress Note  Flora Wan 80 y o  male MRN: [id number]  Unit/Bed#: [facility] 533-89 Encounter: [id number]    Physical Medicine and Rehabilitation Progress Note  Flora Wan 80 y o  male MRN: [id number]  Unit/Bed#: [facility] 711-11 Encounter: [id number]    Chief Complaints:  Decline in function    Subjective/Interval Events:   Extended discussion held with patient and patient's wife regarding current functional status, rehabilitation management plan now and after discharge, potential prognosis and disposition planning  Patient reports continued to feel better with improving ADLs and ambulation  He denies lightheadedness, increased swelling, shortness of breath, fever, chills, sweats, bowel or bladder problems, or other complaints  ROS: A 10-point ROS was performed  Negative except as listed above  Overall Assessment/relevant history:  [age]-year-old male with past medical history of chronic diastolic CHF, chronic kidney disease stage 4, paroxysmal atrial fibrillation, dual chamber permanent pacemaker, coronary artery disease, CABG, diabetes mellitus 2, hypothyroidism, BPH, recurrent UTIs, lumbar spinal stenosis, chronic low back pain, foot drop, history of left L2-L3 microdiskectomy in July of 2019 the developed worsening swelling with associated decline in ADLs and ambulation  Patient splits seen by Cardiology and Nephrology with noted worsening of renal function and volume overload believed to be related to this  Patient required initial IV and then more recently p o  Increase in diuretics  Patient's volume status has been improving although his functional status remains below his baseline    Patient was evaluated by skilled therapies and was found to have significant decline in ADLs and ambulation appropriate for admission to [facility]        Functional status on admission to [facility]:  PT:  Transfers max assist, ambulation moderate assist 150 feet  OT:  Lower body dressing, toileting total assist, bathing, transfers max assist, upper body dressing moderate assist    Functional status (recent):    Transfers min-mod assist, ambulation 60 feet Min assist technically max assist based on functional scores    Functional status goal:  Supervision for most ADLs and ambulation     * Impaired mobility and ADLs  Assessment & Plan  Function improving, continue management as outlined  Again caregiver training soon  Right proximal lower extremity strength improved from admission  Multifactorial:  Recent volume overload related to acute on chronic kidney disease, history of diastolic CHF, generalized weakness including proximal lower extremity weakness possibly related to some chronic illness/critical illness deconditioning, low back pain and radiating leg pain with right foot drop from lumbar spinal stenosis possible nerve impingement status post surgical intervention recently, 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, left adhesive capsulitis, query component cognitive impairment,   - Recommend acute comprehensive interdisciplinary inpatient rehabilitation to include intensive skilled therapies (PT, OT) as outlined with oversight and management by rehabilitation physician as well as inpatient rehab level nursing, case management and weekly interdisciplinary team meetings  - [address] [place] [address] 16/30 with greatest deficits in memory and executive functioning   - optimal management of each  - Follow-up with PMR after discharge      Buttock wound  Assessment & Plan  Appreciated on admission to [address] [place] [address] care nurse c/s and assistance with management  - Notify MD of new or increasing drainage, development of purulent drainage, increased size/depth of wound, lack of healing, inability to maintain wound integrity due to degree of drainage, wound product, dressing, or currently ordered frequency of wound management    In general dressings should be changed PRN if soiled unless specifically noted otherwise  Do not allow soiled dressing on patient for extended period of time  - Turn patient Q2H   - Hydragaurd to buttocks and sacrum BID & PRN v Alleyvn >  - EHOB waffle cushion to chair/WC when OOB  - Maximum time in chair 2 hours at a time  - OOB minimum 3 times per day  Notify MD if unable to get patient OOB 3 times per day  - Elevate heels with pillows to offload  - Moisturizer daily and PRN to dry skin; do not use moisturizer on areas of redness or skin breakdown unless otherwise documented  - Nursing to document in chart and Notify MD if buttock, sacrum, heel, or other skin site develops erythema or skin breakdown as soon as possible  If patient is soiling themselves with urine or stool notify MD   If you are unable to maintain skin integrity and prevent erythema due to frequency of soiling notify MD as soon as possible  Kidney disease with fluid retention  Assessment & Plan  Kidney function remained stable; no significant edema  Diuretics at discretion of Medicine Nephrology  Internal medicine consult and management during [facility] course  Nephrology c/s to assist with mgmt  Cr  stable recently; monitor intermittently  Limit nephrotoxic agents when possible  WENDY hose       Cognitive impairment  Assessment & Plan  > [address] [place] 16/30 indicating moderate cog impairment with deficits greatest in domains of short term memory and executive functioning  > Wife states patients cognitive status has been largely stable for some time  > CT head 6/2019 - Encephalomalacia within the right frontal and parietal lobe extending inferiorly into the posterior superior temporal lobe  Chronic microangiopathic change within both cerebral hemispheres  Brainstem and cerebellum demonstrate normal density  No mass or hemorrhage  Mild cerebral volume loss   VENTRICLES AND EXTRA-AXIAL SPACES:  Asymmetry with mild ex vacuo dilatation of the posterior body, atria and occipital horn of the right lateral ventricle  Mild chronic microangiopathic change and age-appropriate cerebral volume loss  Right hemispheric encephalomalacia and gliosis suggestive of old infarction   > Increased risk of dementia and falls  > Compensatory strategies and adjustments in PT/OT as indicated  > Follow-up with neurology after d/c and PMR          Pain  Assessment & Plan  Controlled, continue management  Try to limit sedating meds   APAP PRN   LD patch  Counseled on and continue to encourage deep breathing/relaxation/behavioral pain management techniques:     Deep breathin seconds in, 5 seconds out, 5 times per hour when awake and PRN when in pain or anticipate pain; avoid holding breath and tightening muscles and instead breathe slowly and deeply  Monitor for oversedation, AMS/delirium, and respiratory depression   If being administered - hold opiates, muscle relaxants, benzodiazepines, and gabapentin for oversedation, AMS, or RR<12  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  Assessment & Plan  Remains well controlled  On admission was complaining of some chronic right low back pain with intermittent radiating pain down the leg; also with right foot drop  Status post recent lumbar surgery - left L2-3 microdiskectomy  [facility])   Try to limit sedating medications  Acetaminophen as needed  Lidocaine patch for now patient prefers    Frozen shoulder  Assessment & Plan  Skilled therapies, modalities as indicated    Constipation  Assessment & Plan  Improved; continue management as outlined  Recommend colace/senna b i d   P r n   Bowel regimen as well  Monitor    History of recurrent UTIs  Assessment & Plan  No s/s of UTI > monitor   Internal medicine consult and management during [facility] course  Currently on flomax and proscar  Follow-up with Urology after discharge    Benign prostatic hyperplasia with urinary hesitancy  Assessment & Plan  Continue flomax and Proscar; monitor vitals  - monitor for retention, incontinence, signs/symptoms of UTI  - recommend voiding trial; nursing prompt to void followed by bladder scan starting Q4-6H or after each patient initiated void; nursing to record voided output and bladder scan totals; straight cath PRN >350-400 cc; if post void residual bladder scans are <150 cc x3 consecutive voids, can stop scans       Presence of permanent cardiac pacemaker  Assessment & Plan  +    Anticoagulated on warfarin  Assessment & Plan  Management per Medicine with recent supratherapeutic INR    Chronic diastolic heart failure [facility])  Assessment & Plan  Internal medicine consult and management during [facility] course  Anti thrombotics per Medicine, blood pressure medications at their discretion     Paroxysmal A-fib [facility])  Assessment & Plan  Internal medicine consult and management during [facility] course  Anticoagulation at their discretion  Monitor rate    Diabetes mellitus with multiple complications [facility])  Assessment & Plan  Lab Results   Component Value Date    HGBA1C 5 5 08/22/2019     Internal medicine consulted and management at their discretion  Monitor for signs and symptoms of hypoglycemia   Current meds: lantus, lispro       Essential hypertension  Assessment & Plan  BP acceptable  Internal medicine consulted and management at their discretion  Monitor vitals with and without activity; monitor for orthostasis  Monitor hemoglobin, electrolytes, kidney function, hydration status   Current meds:  Metoprolol, amlodipine      3-vessel coronary artery disease  Assessment & Plan  Antiplatelet, optimal blood pressure control, statin  Ranexa    Hyperlipidemia  Assessment & Plan  Statin    Anemia  Assessment & Plan  Hemoglobin trending down a little bit more; vitals stable  Iron supplementation per Nephrology  IM consulted and assistance with management during [facility] course  Nephrology management as well; hemoccults x2 negative  Monitor H/H, vitals, signs/symptoms of acute bleeding        Vitamin D insufficiency  Assessment & Plan  Cholecalciferol Vit D3 2000 units daily   Follow-up with PCP      Healthcare maintenance  Assessment & Plan  Obtain vitamin-D level    Hypothyroidism  Assessment & Plan  Levothyroxine    Elevated serum alkaline phosphatase level  Assessment & Plan  Repeat CMP      # Bowel care:    - monitor for constipation, incontinence, and diarrhea  - goal 1 appropriate BM every 1-2 days  - recommend colace +/- senna and PRN bowel protocol     # At risk for venous thromboembolism:  - Recommend SCDs and mobilization  - A/C     # Diet/Hydration:    Diabetic/cardiac     Disposition:   Home with family with PARMJIT late next wee     Follow-up:   PCP, PMR, Nephrology, Cardiology, urology     CODE: Level 3: DNAR and DNI     Restrictions include: Fall precautions   ---------------------------------------------------------------------------------------------------------------------    Objective: Allergies and Medications per EMR    Physical Exam:  Temperature 98 4 pulse 72 respiratory 18 blood pressure 115/70 SpO2 95%  General: Awake, alert in NAD  HENT:  MMM  Respiratory: Unlabored breathing, breath sounds equal, Lungs CTA, no wheezes, rales, or rhonchi  Cardiovascular: Regular rate and rhythm, no murmurs, rubs, or gallops  Gastrointestinal: Soft, non-tender,  mildly-distended, normoactive bowel sounds  Genitourinary: No camargo  SkiN/MSK/Extremities:   Stable mild 1+ bilateral lower extremity edema and improving left upper extremity distal edema  No calf tenderness to palpation  Neurologic/Psych:   MENTAL STATUS: awake, orientation intact; appropriate wakefulness  Affect:  Euthymic  Right proximal lower extremity strength remains improved    Physical exam performed, documentation above reviewed and updated if appropriate on relevant date of encounter:   09/05/2019    Diagnostic Studies: reviewed, no new imaging  No results found      Laboratory:    Results from last 7 days   Lab Units 09/05/19  0191 09/02/19  0522   HEMOGLOBIN g/dL 8 3* 8 8*   HEMATOCRIT % 26 2* 28 2*   WBC Thousand/uL 6 11 6 66     Results from last 7 days   Lab Units 09/05/19  0508 09/04/19  0529   BUN mg/dL 48* 51*   POTASSIUM mmol/L 3 8 3 8   CHLORIDE mmol/L 102 101   CREATININE mg/dL 3 65* 3 56*     Results from last 7 days   Lab Units 09/05/19  0508 09/03/19  0528 09/02/19  0522   PROTIME seconds 26 1* 22 5* 23 7*   INR  2 45* 2 04* 2 17*        Patient Active Problem List   Diagnosis    3-vessel coronary artery disease    Asbestosis ([facility] [address] )    Kidney disease with fluid retention    Diabetes mellitus with multiple complications (HCC)    Elevated serum alkaline phosphatase level    Hyperlipidemia    Essential hypertension    Hypothyroidism    Paroxysmal A-fib (HCC)    Seasonal allergies    Increased frequency of urination    Frozen shoulder    Herpes zoster without complication    Depressed mood    Chest pain    Ambulatory dysfunction/generalized weakness    Leg edema, left    Shortness of breath    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    UTI (urinary tract infection)    Tibial artery occlusion, left (HCC)    Hyponatremia    Chronic diastolic heart failure (HCC)    Benign prostatic hyperplasia with urinary hesitancy    Abnormal chest x-ray    Warfarin-induced coagulopathy (HCC)    Impaired mobility and ADLs    Anticoagulated on warfarin    History of recurrent UTIs    Buttock wound    Presence of permanent cardiac pacemaker    Pain    Anemia    Healthcare maintenance    Cognitive impairment    Vitamin D insufficiency    CKD (chronic kidney disease) stage 4, GFR 15-29 ml/min (HCC)    Constipation       ** Please Note: Fluency Direct voice to text software may have been used in the creation of this document  **    Total time spent: At least 35 minutes, with more than 50% spent counseling/coordinating care   Counseling includes discussion with patient re: progress in therapies, functional issues observed by therapy staff, and discussion with patient his/her current medical state/wellbeing  Coordination of patient's care was performed in conjunction with Internal Medicine service to monitor patient's labs, vitals, and management of their comorbidities  In addition, this patient was discussed by the interdisciplinary team in weekly case conference today  The care of the patient was extensively discussed with all care providers and an appropriate rehabilitation plan was formulated unique for this patient  Barriers were identified preventing progression of therapy and appropriate interventions were discussed with each discipline  Please see the team note for input from all disciplines regarding barriers, intervention, and discharge planning        Srini Perez MD, [address] [facility]  Physical Medicine and Rehabilitation  Brain Injury Medicine

## 2019-09-06 NOTE — PROGRESS NOTES
[address] [facility] NOTE   Sweetie Vargas 80 y o  male MRN: [id number]  Unit/Bed#: -01 Encounter: [id number]  Reason for Consult: CKD IV    ASSESSMENT and PLAN:  1  CKD IV:  · Suspect that baseline creatinine will settle around 3 4 to 3 7  · Renal function stable today - SCr 3 60  · Saw Dr Brown Husbands in May 2019 - has appt on 9/11/19 with Duarte Parker  · Etiology - cardiorenal syndrome? 2  HTN: BP control is acceptable  3  HFpEF (EF 55%, G1DD on 6/12/19): Continue Torsemide 40 mg daily  4  MBD: Phos is at goal  Continue Vitamin D    5  Anemia:  · Hgb is drifting down slowly  · He received Venofer 600 mg total dose this hospital stay for iron def  · Will order for 200 mg of Venofer x 2 doses to give a total of 1000 mg    · If Hgb continues to drop, may need to start Epogen  6  Hx CAD/CABG  DISPOSITION:  · Continue Torsemide 40 mg daily   · Venofer 200 mg IV x 2 doses today and tomorrow  · Please call with questions over the weekend  · Repeat labs next Monday, Sep 9    · Has renal follow up on 9/11/19 at [facility] office  SUBJECTIVE / INTERVAL HISTORY:  Denies any acute issues overnight  No SOB or orthopnea  OBJECTIVE:  Current Weight: Weight - Scale: 77 7 kg (171 lb 4 8 oz)  Vitals:    09/05/19 2005 09/06/19 0554 09/06/19 1100 09/06/19 1333   BP: 167/69 152/68  100/55   BP Location: Right arm Right arm  Left arm   Pulse: 70 72  82   Resp: 18 20  20   Temp: (!) 97 3 °F (36 3 °C) 98 6 °F (37 °C)  97 9 °F (36 6 °C)   TempSrc: Oral Oral  Oral   SpO2: 98% 94%  95%   Weight:       Height:   5' 5" (1 651 m)        Intake/Output Summary (Last 24 hours) at 9/6/2019 1409  Last data filed at 9/6/2019 1238  Gross per 24 hour   Intake 520 ml   Output 900 ml   Net -380 ml     General: conscious, coherent, cooperative, no distress  Chest/Lungs: occasional crackles on the R base  CVS: distinct heart sounds, normal rate  Abdomen: soft  Extremities: (+) LE edema  : no camargo catheter  Neuro: awake, alert       Medications:    Current Facility-Administered Medications:     acetaminophen (TYLENOL) tablet 650 mg, 650 mg, Oral, Q6H PRN, Mati Ackerman MD, 650 mg at 09/03/19 1710    aspirin (ECOTRIN LOW STRENGTH) EC tablet 81 mg, 81 mg, Oral, Daily, Mati Ackerman MD, 81 mg at 09/06/19 0829    atorvastatin (LIPITOR) tablet 40 mg, 40 mg, Oral, QPM, Mati Ackerman MD, 40 mg at 09/05/19 1654    bisacodyl (DULCOLAX) rectal suppository 10 mg, 10 mg, Rectal, Daily PRN, Mati Ackerman MD    cholecalciferol (VITAMIN D3) tablet 2,000 Units, 2,000 Units, Oral, Daily, Mati Ackerman MD, 2,000 Units at 09/06/19 3319    finasteride (PROSCAR) tablet 5 mg, 5 mg, Oral, Daily, MAURICE Mosley, 5 mg at 09/06/19 0829    [START ON 9/8/2019] insulin glargine (LANTUS) subcutaneous injection 8 Units 0 08 mL, 8 Units, Subcutaneous, HS, MAURICE Mosley    insulin lispro (HumaLOG) 100 units/mL subcutaneous injection 1-5 Units, 1-5 Units, Subcutaneous, TID AC, 1 Units at 09/06/19 1212 **AND** Fingerstick Glucose (POCT), , , TID AC, MAURICE Mosley    insulin lispro (HumaLOG) 100 units/mL subcutaneous injection 1-5 Units, 1-5 Units, Subcutaneous, HS, MAURICE Mosley, 1 Units at 09/05/19 2148    iron polysaccharides (FERREX) capsule 150 mg, 150 mg, Oral, Daily, Ze Jason MD, 150 mg at 09/06/19 6956    levothyroxine tablet 75 mcg, 75 mcg, Oral, Daily, Mati Ackerman MD, 75 mcg at 09/06/19 0556    lidocaine (LIDODERM) 5 % patch 1 patch, 1 patch, Topical, Daily, Mati Ackerman MD, 1 patch at 09/05/19 1652    lidocaine (URO-JET) 2 % urethral/mucosal gel, , Topical, Q4H PRN, Mati Pill, MD    menthol-methyl salicylate (BENGAY) 53-58 % cream, , Apply externally, 4x Daily PRN, Mati Ackerman MD    metoprolol tartrate (LOPRESSOR) tablet 25 mg, 25 mg, Oral, BID With Meals, MAURICE Mosley, 25 mg at 09/06/19 0733    ondansetron (ZOFRAN) injection 4 mg, 4 mg, Intravenous, Q6H PRN, Lisseth Andujar MD    polyethylene glycol [facility]) packet 17 g, 17 g, Oral, Daily PRN, Lisseth Andujar MD    ranolazine [facility]) 12 hr tablet 500 mg, 500 mg, Oral, Q12H [address], Lisseth Andujar MD, 500 mg at 09/06/19 5830    senna (SENOKOT) tablet 8 6 mg, 1 tablet, Oral, BID, Lisseth Andujar MD, 8 6 mg at 09/06/19 0830    tamsulosin (FLOMAX) capsule 0 4 mg, 0 4 mg, Oral, Daily, Lisseth Andujar MD, 0 4 mg at 09/06/19 0830    torsemide (DEMADEX) tablet 40 mg, 40 mg, Oral, Daily, Lisseth Andujar MD, 40 mg at 09/06/19 8888    warfarin (COUMADIN) tablet 1 5 mg, 1 5 mg, Oral, Once per day on Sun Tue Wed Fri Sat, MAURICE Patel, 1 5 mg at 09/04/19 1713    warfarin (COUMADIN) tablet 3 mg, 3 mg, Oral, Once per day on Mon Thu, MAURICE Patel, 3 mg at 09/05/19 1653    Laboratory Results:  Results from last 7 days   Lab Units 09/06/19  0550 09/05/19  0508 09/04/19  0529 09/02/19  0522   WBC Thousand/uL  --  6 11  --  6 66   HEMOGLOBIN g/dL  --  8 3*  --  8 8*   HEMATOCRIT %  --  26 2*  --  28 2*   PLATELETS Thousands/uL  --  214  --  223   POTASSIUM mmol/L 3 8 3 8 3 8 4 0   CHLORIDE mmol/L 102 102 101 101   CO2 mmol/L 27 29 28 27   BUN mg/dL 52* 48* 51* 50*   CREATININE mg/dL 3 60* 3 65* 3 56* 3 53*   CALCIUM mg/dL 8 2* 8 3 8 3 8 5   MAGNESIUM mg/dL  --   --  2 4  --

## 2019-09-06 NOTE — PROGRESS NOTES
09/06/19 1230   Pain Assessment   Pain Assessment No/denies pain   Restrictions/Precautions   Precautions Bed/chair alarms;Cognitive; Fall Risk;Supervision on toilet/commode   Braces or Orthoses AFO  (bilat)   Subjective   Subjective no complaints   QI: Sit to Stand   Assistance Needed Physical assistance; Incidental touching   Assistance Provided by [place] Less than 25%   Sit to Stand CARE Score 3   QI: Chair/Bed-to-Chair Transfer   Assistance Needed Incidental touching;Physical assistance   Assistance Provided by [place] Less than 25%   Chair/Bed-to-Chair Transfer CARE Score 3   Transfer Bed/Chair/Wheelchair   Limitations Noted In Balance; Endurance;LE Strength; Sequencing   Adaptive Equipment Roller Walker   Sit to [employer]   Stand to [place] Assist   Findings less assistance needed with using added cushion on chair or wc, vcs to get feet back and lean forward,  times of post bias that pt needs steady A   Bed, Chair, Wheelchair Transfer (FIM) 4 - Patient requires steadying assist or light touching   QI: Car Transfer   Assistance Needed Physical assistance   Assistance Provided by [place] 25%-49%   Comment A to get feet cleared up into compartment, min A to stand (placed extra cushion on seat)   Car Transfer CARE Score 3   QI: Walk 150 Feet   Assistance Needed Physical assistance   Assistance Provided by [place] Less than 25%   Walk 150 Feet CARE Score 3   Ambulation   Does the patient walk? 2  Yes   Primary Discharge Mode of Locomotion Walk   Walk Assist Level Minimum Assist   Gait Pattern Slow Lois;Decreased foot clearance;Narrow JUAREZ;Lateral deviation;Scissoring;Retropulsion; Step through   Assist Device Estela Garcia Walked (feet) 150 ft  (50x3)   Findings focus on household distance but 1 max endurance trial,  audible SOB but pts 02 92%   QI: 4 Steps   Assistance Needed Physical assistance   Assistance Provided by [place] 50%-74%   Comment post bias and poor foot placement 4 Steps CARE Score 2   Stairs   Type Stairs   # of Steps 9  (9x2   6x1)   Weight Bearing Precautions Fall Risk   Assist Devices Bilateral Rail   Findings more for strengthening, ascend with left, descend retro style with right, step to pattern, post bias,  ed pt on optimal foot placement and correct if his foot is in poor place   Stairs (FIM) 2 - Patient goes up and down 4 - 11 stairs regardless of assist/device/setup   Equipment Use   NuStep 12min L2   Assessment   Treatment Assessment Pt continues to have times of unsteadiness  Pt was able to show improvement on steps from earlier in the week, also improved with each trial   Pts strength  and awareness of his foot placement and post bias creates him to be a safety concern  Pt was transfering better from higher surface and recommend for home but to strengthen legs in therapy will benefit from still varying surface ht  Cont skilled PT toward LTGs   Problem List Decreased range of motion;Decreased strength;Decreased endurance; Impaired balance;Decreased mobility; Decreased cognition; Impaired judgement;Decreased safety awareness   Barriers to Discharge Inaccessible home environment;Decreased caregiver support   Plan   Treatment/Interventions Functional transfer training;LE strengthening/ROM; Elevations; Therapeutic exercise; Endurance training;Gait training   Progress Progressing toward goals   PT Therapy Minutes   PT Time In 1230   PT Time Out 1330   PT Total Time (minutes) 60   PT Mode of treatment - Individual (minutes) 60   PT Mode of treatment - Concurrent (minutes) 0   PT Mode of treatment - Group (minutes) 0   PT Mode of treatment - Co-treat (minutes) 0   PT Mode of Teatment - Total time(minutes) 60 minutes   Therapy Time missed   Time missed?  No

## 2019-09-06 NOTE — PROGRESS NOTES
Physical Medicine and Rehabilitation Progress Note  Nova Abernathy 80 y o  male MRN: [id number]  Unit/Bed#: [facility] 085-60 Encounter: [id number]    Physical Medicine and Rehabilitation Progress Note  Nova Abernathy 80 y o  male MRN: [id number]  Unit/Bed#: [facility] 969-01 Encounter: [id number]    Chief Complaints:  Decline in mobility     Subjective/Interval Events:   Patient denies lightheadedness, shortness of breath, worsening strength or sensation, cough, significant pain, difficulty sleeping, or other new complaints  ROS: A 10-point ROS was performed  Negative except as listed above  Overall Assessment/relevant history:  [age]-year-old male with past medical history of chronic diastolic CHF, chronic kidney disease stage 4, paroxysmal atrial fibrillation, dual chamber permanent pacemaker, coronary artery disease, CABG, diabetes mellitus 2, hypothyroidism, BPH, recurrent UTIs, lumbar spinal stenosis, chronic low back pain, foot drop, history of left L2-L3 microdiskectomy in July of 2019 the developed worsening swelling with associated decline in ADLs and ambulation  Patient splits seen by Cardiology and Nephrology with noted worsening of renal function and volume overload believed to be related to this  Patient required initial IV and then more recently p o  Increase in diuretics  Patient's volume status has been improving although his functional status remains below his baseline    Patient was evaluated by skilled therapies and was found to have significant decline in ADLs and ambulation appropriate for admission to [facility]        Functional status on admission to [facility]:  PT:  Transfers max assist, ambulation moderate assist 150 feet  OT:  Lower body dressing, toileting total assist, bathing, transfers max assist, upper body dressing moderate assist    Functional status (recent):    Transfers min assist, ambulation 50 feet Min assist technically max assist based on functional scores    Functional status goal:  Supervision for most ADLs and ambulation     * Impaired mobility and ADLs  Assessment & Plan  Function stable continue management as outlined  Caregiver training incorporate with skilled therapies  Right proximal lower extremity strength improved from admission  Multifactorial:  Recent volume overload related to acute on chronic kidney disease, history of diastolic CHF, generalized weakness including proximal lower extremity weakness possibly related to some chronic illness/critical illness deconditioning, low back pain and radiating leg pain with right foot drop from lumbar spinal stenosis possible nerve impingement status post surgical intervention recently, 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, left adhesive capsulitis, query component cognitive impairment,   - Recommend acute comprehensive interdisciplinary inpatient rehabilitation to include intensive skilled therapies (PT, OT) as outlined with oversight and management by rehabilitation physician as well as inpatient rehab level nursing, case management and weekly interdisciplinary team meetings  - [address] [place] [address] 16/30 with greatest deficits in memory and executive functioning   - optimal management of each  - Follow-up with PMR after discharge      Buttock wound  Assessment & Plan  Appreciated on admission to [address] [place] [address] care nurse c/s and assistance with management  - Notify MD of new or increasing drainage, development of purulent drainage, increased size/depth of wound, lack of healing, inability to maintain wound integrity due to degree of drainage, wound product, dressing, or currently ordered frequency of wound management  In general dressings should be changed PRN if soiled unless specifically noted otherwise  Do not allow soiled dressing on patient for extended period of time    - Turn patient Q2H   - Hydragaurd to buttocks and sacrum BID & PRN v Alleyvn >  - EHOB waffle cushion to chair/WC when OOB  - Maximum time in chair 2 hours at a time  - OOB minimum 3 times per day  Notify MD if unable to get patient OOB 3 times per day  - Elevate heels with pillows to offload  - Moisturizer daily and PRN to dry skin; do not use moisturizer on areas of redness or skin breakdown unless otherwise documented  - Nursing to document in chart and Notify MD if buttock, sacrum, heel, or other skin site develops erythema or skin breakdown as soon as possible  If patient is soiling themselves with urine or stool notify MD   If you are unable to maintain skin integrity and prevent erythema due to frequency of soiling notify MD as soon as possible  Kidney disease with fluid retention  Assessment & Plan  Kidney function remained stable; no significant edema  Diuretics at discretion of Medicine Nephrology  Internal medicine consult and management during [place] course  Nephrology c/s to assist with mgmt  Cr  stable recently; monitor intermittently  Limit nephrotoxic agents when possible  WENYD hose       Cognitive impairment  Assessment & Plan  > [address] [place] 16/30 indicating moderate cog impairment with deficits greatest in domains of short term memory and executive functioning  > Wife states patients cognitive status has been largely stable for some time  > CT head 6/2019 - Encephalomalacia within the right frontal and parietal lobe extending inferiorly into the posterior superior temporal lobe  Chronic microangiopathic change within both cerebral hemispheres  Brainstem and cerebellum demonstrate normal density  No mass or hemorrhage  Mild cerebral volume loss  VENTRICLES AND EXTRA-AXIAL SPACES:  Asymmetry with mild ex vacuo dilatation of the posterior body, atria and occipital horn of the right lateral ventricle  Mild chronic microangiopathic change and age-appropriate cerebral volume loss    Right hemispheric encephalomalacia and gliosis suggestive of old infarction   > Increased risk of dementia and falls  > Compensatory strategies and adjustments in PT/OT as indicated  > Follow-up with neurology after d/c and PMR          Pain  Assessment & Plan  Controlled, continue management  Try to limit sedating meds   APAP PRN   LD patch  Counseled on and continue to encourage deep breathing/relaxation/behavioral pain management techniques:     Deep breathin seconds in, 5 seconds out, 5 times per hour when awake and PRN when in pain or anticipate pain; avoid holding breath and tightening muscles and instead breathe slowly and deeply  Monitor for oversedation, AMS/delirium, and respiratory depression   If being administered - hold opiates, muscle relaxants, benzodiazepines, and gabapentin for oversedation, AMS, or RR<12  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  Assessment & Plan  Remains well controlled  On admission was complaining of some chronic right low back pain with intermittent radiating pain down the leg; also with right foot drop  Status post recent lumbar surgery - left L2-3 microdiskectomy  [facility])   Try to limit sedating medications  Acetaminophen as needed  Lidocaine patch for now patient prefers    Frozen shoulder  Assessment & Plan  Skilled therapies, modalities as indicated    Constipation  Assessment & Plan  Improved; continue management as outlined  Recommend colace/senna b i d   P r n   Bowel regimen as well  Monitor    History of recurrent UTIs  Assessment & Plan  No s/s of UTI > monitor   Internal medicine consult and management during [facility] course  Currently on flomax and proscar  Follow-up with Urology after discharge    Benign prostatic hyperplasia with urinary hesitancy  Assessment & Plan  Continue flomax and Proscar; monitor vitals  - monitor for retention, incontinence, signs/symptoms of UTI  - recommend voiding trial; nursing prompt to void followed by bladder scan starting Q4-6H or after each patient initiated void; nursing to record voided output and bladder scan totals; straight cath PRN >350-400 cc; if post void residual bladder scans are <150 cc x3 consecutive voids, can stop scans       Presence of permanent cardiac pacemaker  Assessment & Plan  +    Anticoagulated on warfarin  Assessment & Plan  Management per Medicine with recent supratherapeutic INR    Chronic diastolic heart failure [facility])  Assessment & Plan  Internal medicine consult and management during [facility] course  Anti thrombotics per Medicine, blood pressure medications at their discretion     Paroxysmal A-fib [facility])  Assessment & Plan  Internal medicine consult and management during [facility] course  Anticoagulation at their discretion  Monitor rate    Diabetes mellitus with multiple complications [facility])  Assessment & Plan  Lab Results   Component Value Date    HGBA1C 5 5 08/22/2019     Internal medicine consulted and management at their discretion  Monitor for signs and symptoms of hypoglycemia   Current meds: lantus, lispro       Essential hypertension  Assessment & Plan  BP acceptable  Internal medicine consulted and management at their discretion  Monitor vitals with and without activity; monitor for orthostasis  Monitor hemoglobin, electrolytes, kidney function, hydration status   Current meds:  Metoprolol, amlodipine      3-vessel coronary artery disease  Assessment & Plan  Antiplatelet, optimal blood pressure control, statin  Ranexa    Hyperlipidemia  Assessment & Plan  Statin    Anemia  Assessment & Plan  Hemoglobin trending down a little bit more; vitals stable  Iron supplementation per Nephrology  IM consulted and assistance with management during [facility] course  Nephrology management as well; hemoccults x2 negative  Monitor H/H, vitals, signs/symptoms of acute bleeding        Vitamin D insufficiency  Assessment & Plan  Cholecalciferol Vit D3 2000 units daily   Follow-up with PCP      Healthcare maintenance  Assessment & Plan  Obtain vitamin-D level    Hypothyroidism  Assessment & Plan  Levothyroxine    Elevated serum alkaline phosphatase level  Assessment & Plan  Repeat CMP      # Bowel care:    - monitor for constipation, incontinence, and diarrhea  - goal 1 appropriate BM every 1-2 days  - recommend colace +/- senna and PRN bowel protocol     # At risk for venous thromboembolism:  - Recommend SCDs and mobilization  - A/C     # Diet/Hydration:    Diabetic/cardiac     Disposition:   Home with family with [place] late next wee     Follow-up:   PCP, PMR, Nephrology, Cardiology, urology     CODE: Level 3: DNAR and DNI     Restrictions include: Fall precautions   ---------------------------------------------------------------------------------------------------------------------    Objective: Allergies and Medications per EMR    Physical Exam:  Vitals:    09/06/19 1333   BP: 100/55   Pulse: 82   Resp: 20   Temp: 97 9 °F (36 6 °C)   SpO2: 95%     General: Awake, alert in NAD  HENT:  MMM  Respiratory: Unlabored breathing, breath sounds equal, Lungs CTA, no wheezes, rales, or rhonchi  Cardiovascular: Regular rate and rhythm, no murmurs, rubs, or gallops  Gastrointestinal: Soft, non-tender,  mildly-distended, normoactive bowel sounds  Genitourinary: No camargo  SkiN/MSK/Extremities:   Stable mild 1+ bilateral lower extremity edema and improving left upper extremity distal edema  No calf tenderness to palpation  Neurologic/Psych:   MENTAL STATUS: awake, orientation intact; appropriate wakefulness  Affect:  Euthymic  Strength unchanged    Physical exam performed, documentation above reviewed and updated if appropriate on relevant date of encounter:   09/06/2019    Diagnostic Studies: reviewed, no new imaging  No results found      Laboratory:    Results from last 7 days   Lab Units 09/05/19  0508 09/02/19  0522   HEMOGLOBIN g/dL 8 3* 8 8*   HEMATOCRIT % 26 2* 28 2*   WBC Thousand/uL 6 11 6 66     Results from last 7 days   Lab Units 09/06/19  0550 09/05/19  0508 09/04/19  0529   BUN mg/dL 52* 48* 51*   POTASSIUM mmol/L 3 8 3 8 3 8   CHLORIDE mmol/L 102 102 101 CREATININE mg/dL 3 60* 3 65* 3 56*     Results from last 7 days   Lab Units 09/05/19  0508 09/03/19  0528 09/02/19  0522   PROTIME seconds 26 1* 22 5* 23 7*   INR  2 45* 2 04* 2 17*        Patient Active Problem List   Diagnosis    3-vessel coronary artery disease    Asbestosis ([facility] )    Kidney disease with fluid retention    Diabetes mellitus with multiple complications ([facility] )    Elevated serum alkaline phosphatase level    Hyperlipidemia    Essential hypertension    Hypothyroidism    Paroxysmal A-fib (HCC)    Seasonal allergies    Increased frequency of urination    Frozen shoulder    Herpes zoster without complication    Depressed mood    Chest pain    Ambulatory dysfunction/generalized weakness    Leg edema, left    Shortness of breath    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    UTI (urinary tract infection)    [facility]ial [facility] occlusion[facility] left [facility]    Hyponatremia    Chronic diastolic heart failure ([facility])    Benign prostatic hyperplasia with urinary hesitancy    Abnormal chest x-ray    Warfarin-induced coagulopathy (HCC)    Impaired mobility and ADLs    Anticoagulated on warfarin    History of recurrent UTIs    Buttock wound    Presence of permanent cardiac pacemaker    Pain    Anemia    Healthcare maintenance    Cognitive impairment    Vitamin D insufficiency    CKD (chronic kidney disease) stage 4, GFR 15-29 ml/min (HCC)    Constipation       ** Please Note: Fluency Direct voice to text software may have been used in the creation of this document   **      Sharee Goodell MD, [facility]  Physical Medicine and Rehabilitation  Brain Injury Medicine

## 2019-09-06 NOTE — PROGRESS NOTES
09/06/19 1000   Pain Assessment   Pain Assessment No/denies pain   Pain Score No Pain   Restrictions/Precautions   Precautions Fall Risk;Bed/chair alarms;Supervision on toilet/commode   Braces or Orthoses AFO  (bilateral)   QI: Shower/Bathe Self   Assistance Needed Physical assistance   Assistance Provided by [place] 25%-49%   Comment Pt seated on shower chair  Pt is able to wash b/l thighs and LE's except for feet  Pt with improved sitting balance while reaching forward  Pt able to wash tess area while standing   Pt requires assist for buttock  Pt utlized grab bars while in stance  Shower/Bathe Self CARE Score 3   Bathing   Assessed Bath Style Sponge Bath   Anticipated D/C Bath Style Sponge Bath   Able to Gather/Transport No   Able to [employer] Temperature No   Able to Wash/Rinse/Dry (body part) Left Arm;Right Arm;L Upper Leg;R Upper Leg;Chest;Abdomen;Perineal Area   Limitations Noted in Balance; Endurance;Problem Solving; Safety;Strength;Timeliness   Positioning Seated;Standing   Bathing (FIM) 3 - Patient completes 5/10  6/10 or 7/10 parts   Tub/Shower Transfer   Limitations Noted In Balance; Endurance;Problem Solving; Safety;LE Strength   Assessed Shower   Not Assessed Sponge Bath   Shower Transfer (FIM) 3 - [place] needs to lift, boost or assist to stand OR sit   QI: Upper Body Dressing   Assistance Needed Physical assistance   Assistance Provided by [place] 25%-49%   Comment Pt ablet o thread b/l UE's into shirt however pt requires assist to pull shirt over head due to limited ROM  Pt is able to pull shirt down  Upper Body Dressing CARE Score 3   QI: Lower Body Dressing   Assistance Needed Physical assistance   Assistance Provided by [place] 25%-49%   Comment Pt able to doff pants while seated ont ub bench with increased time and effort  Pt nic to thread LE's into pants with use of reacher however therapist did need to assist to untangle pants   Pt attempted b/l hand release to pull pants up over hips however pt required asisst due to signficant retropulsion and inability to maintain balance  Lower Body Dressing CARE Score 3   QI: Putting On/Taking Off Footwear   Assistance Needed Physical assistance   Assistance Provided by [place] 50%-74%   Comment Pt is able to doff socks one time with increased time and effort  Pt requires assist to don/doff WENDY stockings and AFO's and shoes   Putting On/Taking Off Footwear CARE Score 2   QI: Picking Up Object   Reason if not Attempted Safety concerns   Picking Up Object CARE Score 88   Dressing/Undressing Clothing   Remove UB Clothes Pullover Shirt   Remove LB Clothes Socks;Pants   Don UB Clothes Pullover Shirt   Don LB Clothes Pants; Undergarment;Socks; Shoes   Limitations Noted In Balance; Endurance;Problem Solving; Safety;Strength   Positioning Supported Sit;Standing   UB Dressing (FIM) 4 - Patient completes 75% of all tasks   LB Dressing (FIM) 3 - Patient completes  50-74% of all tasks   QI: Sit to Stand   Assistance Needed Physical assistance   Assistance Provided by [place] Less than 25%   Comment Pt CGA for all sit to stand transfers   Sit to Stand CARE Score 3   QI: Chair/Bed-to-Chair Transfer   Assistance Needed Incidental touching   Assistance Provided by [place] No physical assistance   Chair/Bed-to-Chair Transfer CARE Score 4   Transfer Bed/Chair/Wheelchair   Limitations Noted In Balance; Endurance;Problem Solving;LE Strength   Stand Pivot Minimal Assist   Sit to Stand Minimal   Stand to Sit Minimal   Supine to Sit Minimal   Bed, Chair, Wheelchair Transfer (FIM) 4 - Patient requires steadying assist or light touching   Cognition   Overall Cognitive Status Impaired   Arousal/Participation Alert; Cooperative   Attention Within functional limits   Orientation Level Oriented X4   Memory Decreased long term memory;Decreased short term memory   Following Commands Follows multistep commands with increased time or repetition   Assessment   Treatment Assessment Pt engaged in OT brayden harper with focus on ADL routine  Pt is making good progress with dynamic standing balance during fxnl transfers and ADL routine  Pt with improved dynamic sitting balance during forward reach for LB dressing  Pt demonstrates increased carryover with LB dressing with reacher  Pt continues to demonstrate balance deficits however with b/l hand release  Had lengthly conversation with patient and family including dtr and patients wife regarding d/c plan  Discussed SNF rehab vs home with home rehab  Pt dtr expressed desire to have pt and wife move toward her in [place]  Pt is willing to consider  Family will discuss options and determine d/c plan  Discussed having wife present for alld ay to provide care/transfers to determine if d/c plan for home is appropriate  At this time will proceed forward with family training and will have follow up discussion Monday regarding plan for home vs SNF rehab  Problem List Decreased range of motion;Decreased strength;Decreased endurance; Impaired balance;Decreased mobility; Decreased cognition; Impaired judgement;Decreased safety awareness   Plan   Treatment/Interventions ADL retraining;Functional transfer training;LE strengthening/ROM; Therapeutic exercise; Endurance training;Patient/family training;Equipment eval/education; Compensatory technique education;Gait training   Recommendation   OT Discharge Recommendation 24 hour supervision/assist   OT Therapy Minutes   OT Time In 1000   OT Time Out 1200   OT Total Time (minutes) 120   OT Mode of treatment - Individual (minutes) 120   OT Mode of treatment - Concurrent (minutes) 0   OT Mode of treatment - Group (minutes) 0   OT Mode of treatment - Co-treat (minutes) 0   OT Mode of Teatment - Total time(minutes) 120 minutes   Therapy Time missed   Time missed?  No

## 2019-09-06 NOTE — PROGRESS NOTES
09/06/19 0830   Pain Assessment   Pain Assessment No/denies pain   Restrictions/Precautions   Precautions Bed/chair alarms; Fall Risk;Supervision on toilet/commode   Weight Bearing Restrictions No   Braces or Orthoses AFO  (bilat)   Subjective   Subjective no complaints and ready for the session   QI: Sit to Stand   Assistance Needed Physical assistance   Assistance Provided by [place] Less than 25%   Comment mostly CG and cues, 1time slight lift assist   Sit to Stand CARE Score 3   QI: Chair/Bed-to-Chair Transfer   Assistance Needed Verbal cues; Incidental touching;Physical assistance   Assistance Provided by [place] Less than 25%   Chair/Bed-to-Chair Transfer CARE Score 3   Transfer Bed/Chair/Wheelchair   Limitations Noted In Balance;Confidence; Coordination;LE Strength; Sequencing   Adaptive Equipment Roller Walker   Stand Pivot Contact Guard;Minimal Assist   Sit to [employer] Guard;Minimal Assist   Stand to Sit Contact Guard;Minimal Assist   Findings transfers fluctuate depending on body and feet positioning, times of difficulty rising, and times of post bias   Bed, Chair, Wheelchair Transfer (FIM) 4 - Patient requires steadying assist or light touching   QI: Walk 10 Feet   Assistance Needed Incidental touching   Comment RW   Walk 10 Feet CARE Score 4   QI: Walk 50 Feet with Two Turns   Assistance Needed Incidental touching   Comment min A for balance with turn   Walk 50 Feet with Two Turns CARE Score 4   Ambulation   Does the patient walk? 2  Yes   Primary Discharge Mode of Locomotion Walk   Walk Assist Level Minimum Assist   Gait Pattern Slow Lois;Decreased foot clearance; Forward Flexion;Narrow JUAREZ;Retropulsion   Assist Device Seleneer Gabrielle Jose Walked (feet) 50 ft  (125)   Limitations Noted In Balance; Coordination;Device Management; Endurance; Safety;Strength   Findings VCs to slow down with turns and pivots, stay inside walker JUAREZ and keep feet apart    Walking (FIM) 2 - Patient ambulates between 50 - 149 feet regardless of assist/device/set up   Therapeutic Interventions   Balance Amb t/o gym with cone gather in different areas to simulate amb around house, challenged pts judgement on walker management and reaching balance(needed min A)   Assessment   Treatment Assessment Pt stated he would like to do walking activity, performed multiple transfers and amb t/o gym, pt overall appeared steadier than earlier in the week but can be very unpredictable with movements  Noticed he performs much better in quieter enviornment with less distractions  Cont skilled PT toward LTGs   Problem List Decreased strength;Decreased endurance; Impaired balance;Decreased coordination;Decreased mobility; Decreased cognition;Decreased safety awareness; Impaired judgement   Barriers to Discharge Inaccessible home environment;Decreased caregiver support   PT Barriers   Physical Impairment Decreased strength;Decreased endurance; Impaired balance;Decreased mobility; Decreased safety awareness;Decreased cognition   Functional Limitation Walking;Transfers;Standing   Plan   Treatment/Interventions Functional transfer training;Elevations; Therapeutic exercise; Endurance training;Gait training;LE strengthening/ROM   Progress Progressing toward goals   PT Therapy Minutes   PT Time In 0830   PT Time Out 0900   PT Total Time (minutes) 30   PT Mode of treatment - Individual (minutes) 30   PT Mode of treatment - Concurrent (minutes) 0   PT Mode of treatment - Group (minutes) 0   PT Mode of treatment - Co-treat (minutes) 0   PT Mode of Teatment - Total time(minutes) 30 minutes   Therapy Time missed   Time missed?  No

## 2019-09-06 NOTE — PLAN OF CARE
Problem: PAIN - ADULT  Goal: Verbalizes/displays adequate comfort level or baseline comfort level  Description  Interventions:  - Encourage patient to monitor pain and request assistance  - Assess pain using appropriate pain scale  - Administer analgesics based on type and severity of pain and evaluate response  - Implement non-pharmacological measures as appropriate and evaluate response  - Consider cultural and social influences on pain and pain management  - Notify physician/advanced practitioner if interventions unsuccessful or patient reports new pain  Outcome: Progressing     Problem: INFECTION - ADULT  Goal: Absence or prevention of progression during hospitalization  Description  INTERVENTIONS:  - Assess and monitor for signs and symptoms of infection  - Monitor lab/diagnostic results  - Monitor all insertion sites, i e  indwelling lines, tubes, and drains  - Monitor endotracheal if appropriate and nasal secretions for changes in amount and color  - [place] appropriate cooling/warming therapies per order  - Administer medications as ordered  - Instruct and encourage patient and family to use good hand hygiene technique  - Identify and instruct in appropriate isolation precautions for identified infection/condition  Outcome: Progressing     Problem: SAFETY ADULT  Goal: Patient will remain free of falls  Description  INTERVENTIONS:  - Assess patient frequently for physical needs  -  Identify cognitive and physical deficits and behaviors that affect risk of falls    -  [place] fall precautions as indicated by assessment   - Educate patient/family on patient safety including physical limitations  - Instruct patient to call for assistance with activity based on assessment  - Modify environment to reduce risk of injury  - Consider OT/PT consult to assist with strengthening/mobility  Outcome: Progressing     Problem: SAFETY ADULT  Goal: Maintain or return to baseline ADL function  Description  INTERVENTIONS:  -  Assess patient's ability to carry out ADLs; assess patient's baseline for ADL function and identify physical deficits which impact ability to perform ADLs (bathing, care of mouth/teeth, toileting, grooming, dressing, etc )  - Assess/evaluate cause of self-care deficits   - Assess range of motion  - Assess patient's mobility; develop plan if impaired  - Assess patient's need for assistive devices and provide as appropriate  - Encourage maximum independence but intervene and supervise when necessary  - Involve family in performance of ADLs  - Assess for home care needs following discharge   - Consider OT consult to assist with ADL evaluation and planning for discharge  - Provide patient education as appropriate  Outcome: Progressing     Problem: SAFETY ADULT  Goal: Maintain or return mobility status to optimal level  Description  INTERVENTIONS:  - Assess patient's baseline mobility status (ambulation, transfers, stairs, etc )    - Identify cognitive and physical deficits and behaviors that affect mobility  - Identify mobility aids required to assist with transfers and/or ambulation (gait belt, sit-to-stand, lift, walker, cane, etc )  - [place] fall precautions as indicated by assessment  - Record patient progress and toleration of activity level on Mobility SBAR; progress patient to next Phase/Stage  - Instruct patient to call for assistance with activity based on assessment  - Consider rehabilitation consult to assist with strengthening/weightbearing, etc   Outcome: Progressing     Problem: Prexisting or High Potential for Compromised Skin Integrity  Goal: Skin integrity is maintained or improved  Description  INTERVENTIONS:  - Identify patients at risk for skin breakdown  - Assess and monitor skin integrity  - Assess and monitor nutrition and hydration status  - Monitor labs   - Assess for incontinence   - Turn and reposition patient  - Assist with mobility/ambulation  - Relieve pressure over bony prominences  - Avoid friction and shearing  - Provide appropriate hygiene as needed including keeping skin clean and dry  - Evaluate need for skin moisturizer/barrier cream  - Collaborate with interdisciplinary team   - Patient/family teaching  - Consider wound care consult   Outcome: Progressing     Problem: Potential for Falls  Goal: Patient will remain free of falls  Description  INTERVENTIONS:  - Assess patient frequently for physical needs  -  Identify cognitive and physical deficits and behaviors that affect risk of falls    -  [place] fall precautions as indicated by assessment   - Educate patient/family on patient safety including physical limitations  - Instruct patient to call for assistance with activity based on assessment  - Modify environment to reduce risk of injury  - Consider OT/PT consult to assist with strengthening/mobility  Outcome: Progressing

## 2019-09-06 NOTE — PROGRESS NOTES
Internal Medicine Progress Note  Patient: Paris Caicedo  Age/sex: 80 y o  male  Medical Record #: [id number]      ASSESSMENT/PLAN: (Interval History)  Paris Caicedo is seen and examined and management for following issues:    Volume overload/chronic diastolic CHF:  s/p Lasix IV, weight dropped 11 lbs = per renal, target weight is 168-170 lbs  Renal managing his diuretic tx = currently Torsemide 40mg qd  FR 1500ml; 2Gm NA diet      Abnormal Troponin: felt to be 2/2 creatinine/vol overload putting stress on the heart; no further testing etc was recommended     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:  stable     Iron deficiency anemia: renal gave 3 doses Venofer while in hospital and now started on Niferex 150mg qd  Checking stool for occult blood = 1st/2nd were negative      Hx urine retention/BPH; Klebsiella UTI 6/2019 and Klebsiella urosepsis 7/2019:  Uro saw in 7/2019 and added added Finasteride  Consideration was to be given to starting suppressive therapy with Nitrofurantion 100mg qhs  Currently, he is on Flomax/Finasteride  Will need to watch closely for UTI sx  HTN/orthostasis:  On 9/2/19, was orthostatic sitting was 744 systolic and stand was 948 = no dizzy but unsteady on feet; on Lopressor 50mg BID/Ranexa 500mg BID (Ranexa shouldn't have much effect on BP) = d/w Dr Martha Keller and dropped Lopressor dose to 25mg BID  No sx again today      PAF:  continue Lopressor but dropped to 25 mg BID since has some orthostasis Coumadin is 3mg 2x/week and 1 5 mg 5x/week = no changes today; INR AM       Hx Dual chamber PPM:  sees Dr Floyd Rayo as an OP     CVA (2014):  continue ASA/Lipitor     CAD/CABG (2013):   was here at [address] [place] [address] Place 6/2019 for chest pain --> seen by Cardiology, had nuclear stress test = small, severe, reversible myocardial perfusion defect of the basal inferior wall --> was started on Ranexa 500mg BID but no further plans for intervention were recommended    Continue Lopressor, Lipitor and ASA as well     DM2: takes Trajenta 5mg qd at home = on Lantus 8U qhs  Since gets low in AM and higher during day will switch Lantus to qam starting Sunday morning; wont start [address] for now since may end up at SNF      Hypothyroidism:  continue current Levothyroxine     Left L2-3 microdiskectomy 7/18/19 at [facility]    Vitamin D deficiency:  Level is 22 6; will d/w renal      Subjective/ HPI: Patients overnight issues or events were reviewed with nursing or staff during rounds or morning huddle session  DM2: stable on Lantus  Anemia: s/p venofer, now on iron PO   CAD:  Stable on current tx    Offers no complaints    ROS:     GI: denies abdominal pain, change bowel habits or reflux symptoms  Neuro: Denies any headache, new vision changes, new neuropathies,new weaknesses   Respiratory: No Cough, SOB, denies wheeze  Cardiovascular: No CP, palpitations , denies perception of rapid heartbeat  : denies any new urinary burning or frequency    Review of Scheduled Meds:    Current Facility-Administered Medications:  acetaminophen 650 mg Oral Q6H PRN Irving Garcia MD   aspirin 81 mg Oral Daily Irving Garcia MD   atorvastatin 40 mg Oral QPM Irving Garcia MD   bisacodyl 10 mg Rectal Daily PRN Irving Garcia MD   cholecalciferol 2,000 Units Oral Daily Irving Garcia MD   finasteride 5 mg Oral Daily MAURICE yS   insulin glargine 8 Units Subcutaneous HS MAURICE Patel   insulin lispro 1-5 Units Subcutaneous TID AC MAURICE Patel   insulin lispro 1-5 Units Subcutaneous HS MAURICE Sy   iron polysaccharides 150 mg Oral Daily Andrew Dumas MD   levothyroxine 75 mcg Oral Daily Irving Garcia MD   lidocaine 1 patch Topical Daily Irving Garcia MD   lidocaine  Topical Q4H PRN Irving Garcia MD   menthol-methyl salicylate  Apply externally 4x Daily PRN Irving Garcia MD   metoprolol tartrate 25 mg Oral BID With Meals MAURICE Sy   ondansetron 4 mg Intravenous Q6H PRN Portia Gutiérrez Arnold Carrizales MD   polyethylene glycol 17 g Oral Daily PRN Marilou Miller MD   ranolazine 500 mg Oral Q12H [address] Marilou Miller MD   senna 1 tablet Oral BID Marilou Miller MD   tamsulosin 0 4 mg Oral Daily Marilou Miller MD   torsemide 40 mg Oral Daily Marilou Miller MD   warfarin 1 5 mg Oral Once per day on Sun Tue Wed Fri Sat MAURICE Boudreaux   warfarin 3 mg Oral Once per day on Mon Thu Renee MayAfiaPanther, [address]:     Results from last 7 days   Lab Units 09/05/19  0508 09/02/19  0522   WBC Thousand/uL 6 11 6 66   HEMOGLOBIN g/dL 8 3* 8 8*   HEMATOCRIT % 26 2* 28 2*   PLATELETS Thousands/uL 214 223     Results from last 7 days   Lab Units 09/06/19  0550 09/05/19  0508   SODIUM mmol/L 135* 138   POTASSIUM mmol/L 3 8 3 8   CHLORIDE mmol/L 102 102   CO2 mmol/L 27 29   BUN mg/dL 52* 48*   CREATININE mg/dL 3 60* 3 65*   CALCIUM mg/dL 8 2* 8 3         Results from last 7 days   Lab Units 09/05/19  0508 09/03/19  0528   INR  2 45* 2 04*          Results from last 7 days   Lab Units 09/06/19  1102 09/06/19  0647 09/05/19  2113   POC GLUCOSE mg/dl 171* 100 163*       Imaging:     No orders to display       *Labs reviewed  *Radiology studies reviewed  *Medications reviewed and reconciled as needed  *Please refer to order section for additional ordered labs studies  *Case discussed with primary attending during morning huddle case rounds    Physical Examination:  Vitals:   Vitals:    09/05/19 2005 09/06/19 0554 09/06/19 1100 09/06/19 1333   BP: 167/69 152/68  100/55   BP Location: Right arm Right arm  Left arm   Pulse: 70 72  82   Resp: 18 20  20   Temp: (!) 97 3 °F (36 3 °C) 98 6 °F (37 °C)  97 9 °F (36 6 °C)   TempSrc: Oral Oral  Oral   SpO2: 98% 94%  95%   Weight:       Height:   5' 5" (1 651 m)        General Appearance: NAD, conversive  Eyes: No icterus; conjunctiva normal, PERRLA  HENT: oropharynx clear; mucous membranes moist  Neck: trachea midline, range of motion full   Supple w/o pain  Lungs: CTA, normal respiratory effort, no retractions, expiratory effort normal  CV: regular rate, no rubs/gallops; +murmur  ABD: soft; NT/ND; +BS  EXT: 1+ edema LE = improving  Skin: normal turgor, normal texture, no rashes  Psych: affect normal, no overt anxiety/depression during exam today   Neuro: AAOx3            Total time spent: At least 40 minutes, with more than 50% spent counseling/coordinating care  Counseling includes discussion with patient re: progress  and discussion with patient of his/her current medical state/information  Coordination of patient's care was performed in conjunction with primary service  Time invested included review of patient's labs, vitals, and management of their comorbidities with continued monitoring  In addition, this patient was discussed with medical team including physician and advanced extenders  The care of the patient was extensively discussed and appropriate treatment plan was formulated unique for this patient  ** Please Note: Dragon 360 Dictation voice to text software may have been used in the creation of this document   **

## 2019-09-07 LAB
GLUCOSE SERPL-MCNC: 102 MG/DL (ref 65–140)
GLUCOSE SERPL-MCNC: 121 MG/DL (ref 65–140)
GLUCOSE SERPL-MCNC: 157 MG/DL (ref 65–140)
GLUCOSE SERPL-MCNC: 202 MG/DL (ref 65–140)
INR PPP: 2.6 (ref 0.84–1.19)
PROTHROMBIN TIME: 27.3 SECONDS (ref 11.6–14.5)

## 2019-09-07 PROCEDURE — 85610 PROTHROMBIN TIME: CPT | Performed by: NURSE PRACTITIONER

## 2019-09-07 PROCEDURE — 97530 THERAPEUTIC ACTIVITIES: CPT

## 2019-09-07 PROCEDURE — 97110 THERAPEUTIC EXERCISES: CPT

## 2019-09-07 PROCEDURE — 97116 GAIT TRAINING THERAPY: CPT

## 2019-09-07 PROCEDURE — 82948 REAGENT STRIP/BLOOD GLUCOSE: CPT

## 2019-09-07 RX ADMIN — LEVOTHYROXINE SODIUM 75 MCG: 75 TABLET ORAL at 05:57

## 2019-09-07 RX ADMIN — ASPIRIN 81 MG: 81 TABLET, COATED ORAL at 08:51

## 2019-09-07 RX ADMIN — TORSEMIDE 40 MG: 20 TABLET ORAL at 08:51

## 2019-09-07 RX ADMIN — POLYSACCHARIDE-IRON COMPLEX 150 MG: 150 CAPSULE ORAL at 08:53

## 2019-09-07 RX ADMIN — RANOLAZINE 500 MG: 500 TABLET, FILM COATED, EXTENDED RELEASE ORAL at 08:53

## 2019-09-07 RX ADMIN — VITAMIN D, TAB 1000IU (100/BT) 2000 UNITS: 25 TAB at 08:52

## 2019-09-07 RX ADMIN — ATORVASTATIN CALCIUM 40 MG: 40 TABLET, FILM COATED ORAL at 17:49

## 2019-09-07 RX ADMIN — FINASTERIDE 5 MG: 5 TABLET, FILM COATED ORAL at 08:52

## 2019-09-07 RX ADMIN — LIDOCAINE 1 PATCH: 50 PATCH CUTANEOUS at 17:51

## 2019-09-07 RX ADMIN — INSULIN LISPRO 1 UNITS: 100 INJECTION, SOLUTION INTRAVENOUS; SUBCUTANEOUS at 12:21

## 2019-09-07 RX ADMIN — RANOLAZINE 500 MG: 500 TABLET, FILM COATED, EXTENDED RELEASE ORAL at 21:01

## 2019-09-07 RX ADMIN — TAMSULOSIN HYDROCHLORIDE 0.4 MG: 0.4 CAPSULE ORAL at 08:51

## 2019-09-07 RX ADMIN — WARFARIN SODIUM 1.5 MG: 1 TABLET ORAL at 17:50

## 2019-09-07 RX ADMIN — METOPROLOL TARTRATE 25 MG: 25 TABLET, FILM COATED ORAL at 08:52

## 2019-09-07 RX ADMIN — IRON SUCROSE 200 MG: 20 INJECTION, SOLUTION INTRAVENOUS at 09:00

## 2019-09-07 RX ADMIN — SENNOSIDES 8.6 MG: 8.6 TABLET, FILM COATED ORAL at 17:50

## 2019-09-07 RX ADMIN — SENNOSIDES 8.6 MG: 8.6 TABLET, FILM COATED ORAL at 08:54

## 2019-09-07 RX ADMIN — INSULIN LISPRO 1 UNITS: 100 INJECTION, SOLUTION INTRAVENOUS; SUBCUTANEOUS at 21:07

## 2019-09-07 NOTE — PROGRESS NOTES
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:  stable     Iron deficiency anemia: renal gave 3 doses Venofer while in hospital and now started on Niferex 150mg qd  Checking stool for occult blood = 1st/2nd were negative      Hx urine retention/BPH; Klebsiella UTI 6/2019 and Klebsiella urosepsis 7/2019:  Uro saw in 7/2019 and added added Finasteride  Consideration was to be given to starting suppressive therapy with Nitrofurantion 100mg qhs  Currently, he is on Flomax/Finasteride  Will need to watch closely for UTI sx  HTN/orthostasis:  On 9/2/19, was orthostatic sitting was 832 systolic and stand was 940 = no dizzy but unsteady on feet; on Lopressor 50mg BID/Ranexa 500mg BID (Ranexa shouldn't have much effect on BP) = d/w Dr Marine Chi and dropped Lopressor dose to 25mg BID  No sx again today      PAF:  continue Lopressor but dropped to 25 mg BID since has some orthostasis Coumadin is 3mg 2x/week and 1 5 mg 5x/week = no changes today; INR 2 6     Hx Dual chamber PPM:  sees Dr Rajesh Valdivia as an OP     CVA (2014):  continue ASA/Lipitor     CAD/CABG (2013):   was here at [address] [place] [address] Place 6/2019 for chest pain --> seen by Cardiology, had nuclear stress test = small, severe, reversible myocardial perfusion defect of the basal inferior wall --> was started on Ranexa 500mg BID but no further plans for intervention were recommended    Continue Lopressor, Lipitor and ASA as well     DM2: takes Trajenta 5mg qd at home = on Lantus 8U qhs  Since gets low in AM and higher during day will switch Lantus to qam starting Sunday morning; won't start Trajenta for now since may end up at SNF      Hypothyroidism:  continue current Levothyroxine     Left L2-3 microdiskectomy 7/18/19 at [facility]    Vitamin D deficiency:  Level is 22 6; will d/w renal      Subjective/ HPI: Patients overnight issues or events were reviewed with nursing or staff during rounds or morning huddle session  DM2: stable on Lantus  Anemia: s/p venofer, now on iron PO   CAD:  Stable on current tx    Offers no complaints    ROS:     GI: denies abdominal pain, change bowel habits or reflux symptoms  Neuro: Denies any headache, new vision changes, new neuropathies,new weaknesses   Respiratory: No Cough, SOB, denies wheeze  Cardiovascular: No CP, palpitations , denies perception of rapid heartbeat  : denies any new urinary burning or frequency    Review of Scheduled Meds:    Current Facility-Administered Medications:  acetaminophen 650 mg Oral Q6H PRN Chandni Seth MD    aspirin 81 mg Oral Daily Chandni Seth MD    atorvastatin 40 mg Oral QPM Chandni Seth MD    bisacodyl 10 mg Rectal Daily PRN Chandni Seth MD    cholecalciferol 2,000 Units Oral Daily Chandni Seth MD    finasteride 5 mg Oral Daily Clearance Reveal, CRNP    [START ON 9/8/2019] insulin glargine 8 Units Subcutaneous HS MAURICE Patel    insulin lispro 1-5 Units Subcutaneous TID AC MAURICE Patel    insulin lispro 1-5 Units Subcutaneous HS Clearance Reveal, CRNP    iron polysaccharides 150 mg Oral Daily Marybeth Sutton MD    iron sucrose 200 mg Intravenous Daily Bishop Barrera MD Last Rate: 200 mg (09/06/19 4027)   levothyroxine 75 mcg Oral Daily Chandni Seth MD    lidocaine 1 patch Topical Daily Chandni Seth MD    lidocaine  Topical Q4H PRN Chandni Seth MD    menthol-methyl salicylate  Apply externally 4x Daily PRN Chandni Seth MD    metoprolol tartrate 25 mg Oral BID With Meals MAURICE Ray    ondansetron 4 mg Intravenous Q6H PRN Robert Griffin MD    polyethylene glycol 17 g Oral Daily PRN Robert Griffin MD    ranolazine 500 mg Oral Q12H [address] Robert Griffin, MD    senna 1 tablet Oral BID Robert Griffin MD    tamsulosin 0 4 mg Oral Daily Robert Griffin MD    torsemide 40 mg Oral Daily Robert Griffin MD    warfarin 1 5 mg Oral Once per day on Sun Tue Wed Fri Sat MAURICE Ray    warfarin 3 mg Oral Once per day on Mon Thu LENY Patel  [address]:     Results from last 7 days   Lab Units 09/05/19  0508 09/02/19  0522   WBC Thousand/uL 6 11 6 66   HEMOGLOBIN g/dL 8 3* 8 8*   HEMATOCRIT % 26 2* 28 2*   PLATELETS Thousands/uL 214 223     Results from last 7 days   Lab Units 09/06/19  0550 09/05/19  0508   SODIUM mmol/L 135* 138   POTASSIUM mmol/L 3 8 3 8   CHLORIDE mmol/L 102 102   CO2 mmol/L 27 29   BUN mg/dL 52* 48*   CREATININE mg/dL 3 60* 3 65*   CALCIUM mg/dL 8 2* 8 3         Results from last 7 days   Lab Units 09/07/19  0550 09/05/19  0508   INR  2 60* 2 45*          Results from last 7 days   Lab Units 09/07/19  0646 09/06/19  2118 09/06/19  1617   POC GLUCOSE mg/dl 102 192* 148*       Imaging:     No orders to display       *Labs reviewed  *Radiology studies reviewed  *Medications reviewed and reconciled as needed  *Please refer to order section for additional ordered labs studies  *Case discussed with primary attending during morning huddle case rounds    Physical Examination:  Vitals:   Vitals:    09/06/19 1100 09/06/19 1333 09/06/19 2140 09/07/19 0631   BP:  100/55 158/68 131/76   BP Location:  Left arm Left arm    Pulse:  82 71 73   Resp:  20 18 20   Temp:  97 9 °F (36 6 °C) 98 °F (36 7 °C) 98 °F (36 7 °C)   TempSrc:  Oral Oral Oral   SpO2:  95% 95% 97%   Weight:  78 kg (171 lb 15 3 oz)  80 2 kg (176 lb 12 9 oz)   Height: 5' 5" (1 651 m)          General Appearance: NAD, conversive  Eyes: No icterus; conjunctiva normal, PERRLA  HENT: oropharynx clear; mucous membranes moist  Neck: trachea midline, range of motion full  Supple w/o pain  Lungs: CTA, normal respiratory effort, no retractions, expiratory effort normal  CV: regular rate, no rubs/gallops; +murmur  ABD: soft; NT/ND; +BS  EXT: 1+ edema LE = improving  Skin: normal turgor, normal texture, no rashes  Psych: affect normal, no overt anxiety/depression during exam today   Neuro: AAOx3            Total time spent: At least 40 minutes, with more than 50% spent counseling/coordinating care  Counseling includes discussion with patient re: progress  and discussion with patient of his/her current medical state/information  Coordination of patient's care was performed in conjunction with primary service  Time invested included review of patient's labs, vitals, and management of their comorbidities with continued monitoring  In addition, this patient was discussed with medical team including physician and advanced extenders  The care of the patient was extensively discussed and appropriate treatment plan was formulated unique for this patient  ** Please Note: Dragon 360 Dictation voice to text software may have been used in the creation of this document   **

## 2019-09-07 NOTE — PROGRESS NOTES
09/07/19 1030   Pain Assessment   Pain Assessment 0-10   Pain Score No Pain   Restrictions/Precautions   Precautions Bed/chair alarms;Cognitive; Fall Risk;Supervision on toilet/commode   Weight Bearing Restrictions No   ROM Restrictions No   Braces or Orthoses AFO  (B/L)   Cognition   Overall Cognitive Status Impaired   Arousal/Participation Alert; Cooperative   Attention Within functional limits   Orientation Level Oriented X4   Memory Decreased long term memory;Decreased short term memory   Following Commands Follows multistep commands with increased time or repetition   Subjective   Subjective No c/o pain  Patient tolerated therapy very well with rest breaks in between  Very motivated to participate in therapy session  QI: Roll Left and Right   Reason if not Attempted Activity not applicable   Roll Left and Right CARE Score 9   QI: Sit to Lying   Reason if not Attempted Activity not applicable   Sit to Lying CARE Score 9   QI: Lying to Sitting on Side of Bed   Reason if not Attempted Activity not applicable   Lying to Sitting on Side of Bed CARE Score 9   QI: Sit to Stand   Assistance Needed Physical assistance   Assistance Provided by [place] Less than 25%   Comment CG with sit to stand transfers from wheelchair to RW  Sit to Stand CARE Score 3   QI: Chair/Bed-to-Chair Transfer   Assistance Needed Physical assistance   Assistance Provided by [place] Less than 25%   Comment CG using RW   Chair/Bed-to-Chair Transfer CARE Score 3   Transfer Bed/Chair/Wheelchair   Limitations Noted In Balance; Endurance;LE Strength   Adaptive Equipment Roller Walker   Stand Pivot Contact Guard   Sit to [employer]   Stand to [employer] [place], Chair, Wheelchair Transfer (FIM) 4 - Patient requires steadying assist or light touching   QI: Car Transfer   Reason if not Attempted Activity not applicable   Car Transfer CARE Score 9   QI: Walk 10 Feet   Assistance Needed Incidental touching   Assistance Provided by [place] Less than 25%   Walk 10 Feet CARE Score 3   QI: Walk 50 Feet with Two Turns   Assistance Needed Incidental touching   Assistance Provided by [place] Less than 25%   Walk 50 Feet with Two Turns CARE Score 3   QI: Walk 150 Feet   Assistance Needed Incidental touching   Assistance Provided by [place] Less than 25%   Comment CG using RW while therapist pulls wheelchair behind  Walk 150 Feet CARE Score 3   QI: Walking 10 Feet on Uneven Surfaces   Reason if not Attempted Activity not applicable   Walking 10 Feet on Uneven Surfaces CARE Score 9   Ambulation   Does the patient walk? 2  Yes   Primary Discharge Mode of Locomotion Walk   Walk Assist Level Contact Guard   Gait Pattern Inconsistant Lois;Decreased foot clearance; Step through; Improper weight shift   Assist Device Roller Gabrielle Garcia Walked (feet) 150 ft   Limitations Noted In Balance; Coordination; Endurance;Strength;Swing   Walking (FIM) 4 - Patient requires steadying assist or light touching AND distance 150 feet or more, no rest   QI: Wheel 50 Feet with Two Turns   Reason if not Attempted Activity not applicable   Wheel 50 Feet with Two Turns CARE Score 9   QI: Wheel 150 Feet   Reason if not Attempted Activity not applicable   Wheel 584 Feet CARE Score 9   Wheelchair mobility   QI: Does the patient use a wheelchair? 1  Yes   QI: Indicate the type of wheelchair 1   Manual   Wheelchair (FIM) 0 - Activity does not occur   QI: 1 Step (Curb)   Reason if not Attempted Activity not applicable   1 Step (Curb) CARE Score 9   QI: 4 Steps   Assistance Needed Physical assistance   Assistance Provided by [place] 25%-49%   Comment Min Assist using BHR descending bakwards   4 Steps CARE Score 3   QI: 12 Steps   Reason if not Attempted Activity not applicable   12 Steps CARE Score 9   Stairs   Type Stairs   # of Steps 10  ( steps)   Weight Bearing Precautions Fall Risk   Assist Devices Bilateral Rail   Stairs (FIM) 2 - Patient goes up and down 4 - 11 stairs regardless of assist/device/setup   QI: Picking Up Object   Reason if not Attempted Activity not applicable   Picking Up Object CARE Score 9   QI: Toilet Transfer   Reason if not Attempted Activity not applicable   Toilet Transfer CARE Score 9   Toilet Transfer   Toilet Transfer (FIM) 0 - Activity does not occur   Therapeutic Interventions   Strengthening Seated ther ex on BLE's using blue theraband for hip abduction and hip adduction with ball squeezes for 3 x 10 reps each  Equipment Use   NuStep Level 2 x 15 minutes   Assessment   Treatment Assessment Patient was very pleasant and very cooperative during therapy session today without any c/o pain  This therapist applied B/L AFO prior to therapy session  Ambulated 150 feet with CG using RW while therapist pulls wheelchiar behind for safety and be ready when he needs to sit back down  Goes up and down 10 steps using BHR with CG/Min Assist descending backwards  He needed CG with sit to stand transfers and SPT using RW  No LOB noted  He will benefit from continued skilled PT services to improve BLE's strength to facilitate safety and with lesser assistance from caregivers during transfers and gait, to improve endurance with gait and improve standing balance to reduce risk for falls  Problem List Decreased strength;Decreased endurance; Impaired balance;Decreased mobility; Decreased cognition; Impaired judgement;Decreased safety awareness   Barriers to Discharge Inaccessible home environment;Decreased caregiver support   PT Barriers   Physical Impairment Decreased strength;Decreased endurance; Impaired balance;Decreased mobility; Decreased cognition;Decreased safety awareness   Functional Limitation Standing;Transfers; Walking;Stair negotiation   Plan   Treatment/Interventions Functional transfer training;LE strengthening/ROM; Elevations; Therapeutic exercise; Endurance training;Bed mobility;Gait training   Progress Progressing toward goals   Recommendation   Recommendation Other (Comment)  (TBD, pending on his progress)   Equipment Recommended Wheelchair;Walker   PT Therapy Minutes   PT Time In 1030   PT Time Out 1130   PT Total Time (minutes) 60   PT Mode of treatment - Individual (minutes) 60   PT Mode of treatment - Concurrent (minutes) 0   PT Mode of treatment - Group (minutes) 0   PT Mode of treatment - Co-treat (minutes) 0   PT Mode of Teatment - Total time(minutes) 60 minutes   Therapy Time missed   Time missed?  No

## 2019-09-07 NOTE — PLAN OF CARE
Problem: PAIN - ADULT  Goal: Verbalizes/displays adequate comfort level or baseline comfort level  Description  Interventions:  - Encourage patient to monitor pain and request assistance  - Assess pain using appropriate pain scale  - Administer analgesics based on type and severity of pain and evaluate response  - Implement non-pharmacological measures as appropriate and evaluate response  - Consider cultural and social influences on pain and pain management  - Notify physician/advanced practitioner if interventions unsuccessful or patient reports new pain  Outcome: Progressing     Problem: INFECTION - ADULT  Goal: Absence or prevention of progression during hospitalization  Description  INTERVENTIONS:  - Assess and monitor for signs and symptoms of infection  - Monitor lab/diagnostic results  - Monitor all insertion sites, i e  indwelling lines, tubes, and drains  - Monitor endotracheal if appropriate and nasal secretions for changes in amount and color  - [place] appropriate cooling/warming therapies per order  - Administer medications as ordered  - Instruct and encourage patient and family to use good hand hygiene technique  - Identify and instruct in appropriate isolation precautions for identified infection/condition  Outcome: Progressing     Problem: SAFETY ADULT  Goal: Patient will remain free of falls  Description  INTERVENTIONS:  - Assess patient frequently for physical needs  -  Identify cognitive and physical deficits and behaviors that affect risk of falls    -  [place] fall precautions as indicated by assessment   - Educate patient/family on patient safety including physical limitations  - Instruct patient to call for assistance with activity based on assessment  - Modify environment to reduce risk of injury  - Consider OT/PT consult to assist with strengthening/mobility  Outcome: Progressing  Goal: Maintain or return to baseline ADL function  Description  INTERVENTIONS:  -  Assess patient's ability to carry out ADLs; assess patient's baseline for ADL function and identify physical deficits which impact ability to perform ADLs (bathing, care of mouth/teeth, toileting, grooming, dressing, etc )  - Assess/evaluate cause of self-care deficits   - Assess range of motion  - Assess patient's mobility; develop plan if impaired  - Assess patient's need for assistive devices and provide as appropriate  - Encourage maximum independence but intervene and supervise when necessary  - Involve family in performance of ADLs  - Assess for home care needs following discharge   - Consider OT consult to assist with ADL evaluation and planning for discharge  - Provide patient education as appropriate  Outcome: Progressing  Goal: Maintain or return mobility status to optimal level  Description  INTERVENTIONS:  - Assess patient's baseline mobility status (ambulation, transfers, stairs, etc )    - Identify cognitive and physical deficits and behaviors that affect mobility  - Identify mobility aids required to assist with transfers and/or ambulation (gait belt, sit-to-stand, lift, walker, cane, etc )  - [place] fall precautions as indicated by assessment  - Record patient progress and toleration of activity level on Mobility SBAR; progress patient to next Phase/Stage  - Instruct patient to call for assistance with activity based on assessment  - Consider rehabilitation consult to assist with strengthening/weightbearing, etc   Outcome: Progressing     Problem: DISCHARGE PLANNING  Goal: Discharge to home or other facility with appropriate resources  Description  INTERVENTIONS:  - Identify barriers to discharge w/patient and caregiver  - Arrange for needed discharge resources and transportation as appropriate  - Identify discharge learning needs (meds, wound care, etc )  - Arrange for interpretive services to assist at discharge as needed  - Refer to Case Management Department for coordinating discharge planning if the patient needs post-hospital services based on physician/advanced practitioner order or complex needs related to functional status, cognitive ability, or social support system  Outcome: Progressing     Problem: Prexisting or High Potential for Compromised Skin Integrity  Goal: Skin integrity is maintained or improved  Description  INTERVENTIONS:  - Identify patients at risk for skin breakdown  - Assess and monitor skin integrity  - Assess and monitor nutrition and hydration status  - Monitor labs   - Assess for incontinence   - Turn and reposition patient  - Assist with mobility/ambulation  - Relieve pressure over bony prominences  - Avoid friction and shearing  - Provide appropriate hygiene as needed including keeping skin clean and dry  - Evaluate need for skin moisturizer/barrier cream  - Collaborate with interdisciplinary team   - Patient/family teaching  - Consider wound care consult   Outcome: Progressing     Problem: Potential for Falls  Goal: Patient will remain free of falls  Description  INTERVENTIONS:  - Assess patient frequently for physical needs  -  Identify cognitive and physical deficits and behaviors that affect risk of falls    -  [place] fall precautions as indicated by assessment   - Educate patient/family on patient safety including physical limitations  - Instruct patient to call for assistance with activity based on assessment  - Modify environment to reduce risk of injury  - Consider OT/PT consult to assist with strengthening/mobility  Outcome: Progressing

## 2019-09-07 NOTE — PROGRESS NOTES
09/07/19 1330   Pain Assessment   Pain Assessment No/denies pain   Pain Score No Pain   Restrictions/Precautions   Precautions Bed/chair alarms;Cognitive; Fall Risk;Pressure Ulcer;Supervision on toilet/commode   Weight Bearing Restrictions No   ROM Restrictions No   Braces or Orthoses AFO   QI: Putting On/Taking Off Footwear   Assistance Needed Physical assistance   Assistance Provided by [place] Total assistance   Comment Dependent for donning shoes and AFOS   Putting On/Taking Off Footwear CARE Score 1   QI: Sit to Stand   Assistance Needed Physical assistance   Assistance Provided by [place] 75% or more   Comment Pt with signifcant LOB during sit to stand transfers  Sit to Stand CARE Score 2   QI: Chair/Bed-to-Chair Transfer   Assistance Needed Physical assistance   Assistance Provided by [place] 75% or more   Chair/Bed-to-Chair Transfer CARE Score 2   Transfer Bed/Chair/Wheelchair   Limitations Noted In Balance; Coordination; Endurance;Problem Solving;UE Strength;LE Strength; Sequencing   Adaptive Equipment Roller Walker   Findings Pt noticeably more fatigued this session  Significant retropulsive epsiodes where pt required intervention to safely sit on surface  Had no intervention been provided, most likely would have resulted in fall as pt with significantly dec righting reactions  Bed, Chair, Wheelchair Transfer (FIM) 2 - [place] needs to lift or boost to rise AND assist to sit   Functional Standing Tolerance   Time 4 minutes    Activity Pt engaged in static stance with focus on functional reach activity  Pt requires modA this session while in stance when reaching outside JU[place]Z to maintain balance  Cognition   Overall Cognitive Status Impaired   Arousal/Participation Alert; Cooperative   Attention Within functional limits   Orientation Level Oriented X4   Memory Decreased long term memory;Decreased short term memory   Following Commands Follows multistep commands with increased time or repetition   Activity Tolerance   Activity Tolerance Patient tolerated treatment well   Assessment   Treatment Assessment Pt participated in skilled OT services with focus on standing balance and functional transfers  Pt's wife had to excuse herself for duration of session 2* to important phone call so unable to complete formal family training  Pt with significant episodes of LOB this session and requires maxA to control sit onto surfaces  Pt will continue to benefit from skilled OT services with focus on functional transfers, standing balance, and standing tolerance  Prognosis Fair   Problem List Decreased range of motion;Decreased strength;Decreased endurance; Impaired balance;Decreased mobility; Decreased cognition; Impaired judgement;Decreased safety awareness   Plan   Treatment/Interventions ADL retraining;Functional transfer training; Therapeutic exercise; Endurance training;Cognitive reorientation;Patient/family training;Equipment eval/education; Bed mobility; Compensatory technique education   Progress Progressing toward goals   Recommendation   OT Discharge Recommendation 24 hour supervision/assist   OT Therapy Minutes   OT Time In 1330   OT Time Out 1408   OT Total Time (minutes) 38   OT Mode of treatment - Individual (minutes) 38   OT Mode of treatment - Concurrent (minutes) 0   OT Mode of treatment - Group (minutes) 0   OT Mode of treatment - Co-treat (minutes) 0   OT Mode of Teatment - Total time(minutes) 38 minutes   Therapy Time missed   Time missed?  No

## 2019-09-08 LAB
GLUCOSE SERPL-MCNC: 128 MG/DL (ref 65–140)
GLUCOSE SERPL-MCNC: 155 MG/DL (ref 65–140)
GLUCOSE SERPL-MCNC: 270 MG/DL (ref 65–140)
GLUCOSE SERPL-MCNC: 99 MG/DL (ref 65–140)

## 2019-09-08 PROCEDURE — 97530 THERAPEUTIC ACTIVITIES: CPT

## 2019-09-08 PROCEDURE — 82948 REAGENT STRIP/BLOOD GLUCOSE: CPT

## 2019-09-08 PROCEDURE — 97530 THERAPEUTIC ACTIVITIES: CPT | Performed by: PHYSICAL THERAPIST

## 2019-09-08 PROCEDURE — 97116 GAIT TRAINING THERAPY: CPT | Performed by: PHYSICAL THERAPIST

## 2019-09-08 PROCEDURE — 97110 THERAPEUTIC EXERCISES: CPT | Performed by: PHYSICAL THERAPIST

## 2019-09-08 RX ORDER — INSULIN GLARGINE 100 [IU]/ML
8 INJECTION, SOLUTION SUBCUTANEOUS EVERY MORNING
Status: DISCONTINUED | OUTPATIENT
Start: 2019-09-09 | End: 2019-09-09

## 2019-09-08 RX ADMIN — INSULIN LISPRO 2 UNITS: 100 INJECTION, SOLUTION INTRAVENOUS; SUBCUTANEOUS at 11:35

## 2019-09-08 RX ADMIN — LIDOCAINE 1 PATCH: 50 PATCH CUTANEOUS at 18:15

## 2019-09-08 RX ADMIN — METOPROLOL TARTRATE 25 MG: 25 TABLET, FILM COATED ORAL at 18:21

## 2019-09-08 RX ADMIN — INSULIN LISPRO 1 UNITS: 100 INJECTION, SOLUTION INTRAVENOUS; SUBCUTANEOUS at 21:37

## 2019-09-08 RX ADMIN — TORSEMIDE 40 MG: 20 TABLET ORAL at 08:00

## 2019-09-08 RX ADMIN — VITAMIN D, TAB 1000IU (100/BT) 2000 UNITS: 25 TAB at 08:00

## 2019-09-08 RX ADMIN — FINASTERIDE 5 MG: 5 TABLET, FILM COATED ORAL at 08:00

## 2019-09-08 RX ADMIN — WARFARIN SODIUM 1.5 MG: 1 TABLET ORAL at 01:05

## 2019-09-08 RX ADMIN — ASPIRIN 81 MG: 81 TABLET, COATED ORAL at 08:00

## 2019-09-08 RX ADMIN — POLYSACCHARIDE-IRON COMPLEX 150 MG: 150 CAPSULE ORAL at 08:00

## 2019-09-08 RX ADMIN — RANOLAZINE 500 MG: 500 TABLET, FILM COATED, EXTENDED RELEASE ORAL at 21:37

## 2019-09-08 RX ADMIN — RANOLAZINE 500 MG: 500 TABLET, FILM COATED, EXTENDED RELEASE ORAL at 08:00

## 2019-09-08 RX ADMIN — METOPROLOL TARTRATE 25 MG: 25 TABLET, FILM COATED ORAL at 08:00

## 2019-09-08 RX ADMIN — TAMSULOSIN HYDROCHLORIDE 0.4 MG: 0.4 CAPSULE ORAL at 08:00

## 2019-09-08 RX ADMIN — ATORVASTATIN CALCIUM 40 MG: 40 TABLET, FILM COATED ORAL at 18:21

## 2019-09-08 RX ADMIN — LEVOTHYROXINE SODIUM 75 MCG: 75 TABLET ORAL at 05:28

## 2019-09-08 NOTE — PROGRESS NOTES
09/08/19 1338   Pain Assessment   Pain Assessment No/denies pain   Pain Score No Pain   Restrictions/Precautions   Precautions Bed/chair alarms;Cognitive; Fall Risk;Supervision on toilet/commode;Pressure Ulcer   Weight Bearing Restrictions No   ROM Restrictions No   Braces or Orthoses AFO   QI: Putting On/Taking Off Footwear   Assistance Needed Physical assistance   Assistance Provided by [place] Total assistance   Comment Dependent for AFOs  Putting On/Taking Off Footwear CARE Score 1   QI: Sit to Stand   Assistance Needed Physical assistance   Assistance Provided by [place] Less than 25%   Sit to Stand CARE Score 3   QI: Chair/Bed-to-Chair Transfer   Assistance Needed Physical assistance   Assistance Provided by [place] Less than 25%   Chair/Bed-to-Chair Transfer CARE Score 3   Transfer Bed/Chair/Wheelchair   Limitations Noted In Balance; Coordination; Endurance;Problem Solving;UE Strength;LE Strength   Adaptive Equipment Roller Walker   Findings Pt does demo increased balance this session, with only one minor instance of retropulsion requiring Benny to correct in stance  Requires less VC's this session for step by step sequencing with increased carryover noted  Pt's wife present for quick family training with focus on transfers  Bed, Chair, Wheelchair Transfer (FIM) 4 - Patient completes 75% of all tasks   Cognition   Overall Cognitive Status Impaired   Arousal/Participation Alert; Cooperative   Attention Within functional limits   Orientation Level Oriented X4   Memory Decreased long term memory;Decreased short term memory   Following Commands Follows multistep commands with increased time or repetition   Activity Tolerance   Activity Tolerance Patient tolerated treatment well   Assessment   Treatment Assessment Pt participated in skilled OT services with focus on functional transfers and family training  Pt's wife Ariel Rouse present   Pt's wife expresses concerns with pt sliding out of chair at times and stated pt did not have call bell within reach  Educated pt and wife on importance of directing care and speaking up if they notice concerns  Will address with staff to ensure all needs are met before exiting  Extensive education on transfer training and education on remaining balance deficits and pt remaining high fall risk 2* to inconsistencies with standing balance  Pt does demo mild retropulsion this date but overall only requires Benny this session  Pt will continue to benefit from skilled OT services with focus on standing balance, functional transfers, endurance, and family training  OT Family training done with: Wife, Meredith Snow  Prognosis Fair   Problem List Decreased range of motion;Decreased strength;Decreased endurance; Impaired balance;Decreased mobility; Decreased cognition; Impaired judgement;Decreased safety awareness   Plan   Treatment/Interventions ADL retraining;Functional transfer training; Therapeutic exercise; Endurance training;Cognitive reorientation;Patient/family training;Equipment eval/education; Bed mobility; Compensatory technique education   Progress Progressing toward goals   Recommendation   OT Discharge Recommendation 24 hour supervision/assist   OT Therapy Minutes   OT Time In 3679   OT Time Out 1408   OT Total Time (minutes) 30   OT Mode of treatment - Individual (minutes) 30   OT Mode of treatment - Concurrent (minutes) 0   OT Mode of treatment - Group (minutes) 0   OT Mode of treatment - Co-treat (minutes) 0   OT Mode of Teatment - Total time(minutes) 30 minutes   Therapy Time missed   Time missed?  No

## 2019-09-08 NOTE — PLAN OF CARE
Problem: PAIN - ADULT  Goal: Verbalizes/displays adequate comfort level or baseline comfort level  Description  Interventions:  - Encourage patient to monitor pain and request assistance  - Assess pain using appropriate pain scale  - Administer analgesics based on type and severity of pain and evaluate response  - Implement non-pharmacological measures as appropriate and evaluate response  - Consider cultural and social influences on pain and pain management  - Notify physician/advanced practitioner if interventions unsuccessful or patient reports new pain  Outcome: Progressing     Problem: INFECTION - ADULT  Goal: Absence or prevention of progression during hospitalization  Description  INTERVENTIONS:  - Assess and monitor for signs and symptoms of infection  - Monitor lab/diagnostic results  - Monitor all insertion sites, i e  indwelling lines, tubes, and drains  - Monitor endotracheal if appropriate and nasal secretions for changes in amount and color  - [place] appropriate cooling/warming therapies per order  - Administer medications as ordered  - Instruct and encourage patient and family to use good hand hygiene technique  - Identify and instruct in appropriate isolation precautions for identified infection/condition  Outcome: Progressing     Problem: SAFETY ADULT  Goal: Patient will remain free of falls  Description  INTERVENTIONS:  - Assess patient frequently for physical needs  -  Identify cognitive and physical deficits and behaviors that affect risk of falls    -  [place] fall precautions as indicated by assessment   - Educate patient/family on patient safety including physical limitations  - Instruct patient to call for assistance with activity based on assessment  - Modify environment to reduce risk of injury  - Consider OT/PT consult to assist with strengthening/mobility  Outcome: Progressing  Goal: Maintain or return to baseline ADL function  Description  INTERVENTIONS:  -  Assess patient's ability to carry out ADLs; assess patient's baseline for ADL function and identify physical deficits which impact ability to perform ADLs (bathing, care of mouth/teeth, toileting, grooming, dressing, etc )  - Assess/evaluate cause of self-care deficits   - Assess range of motion  - Assess patient's mobility; develop plan if impaired  - Assess patient's need for assistive devices and provide as appropriate  - Encourage maximum independence but intervene and supervise when necessary  - Involve family in performance of ADLs  - Assess for home care needs following discharge   - Consider OT consult to assist with ADL evaluation and planning for discharge  - Provide patient education as appropriate  Outcome: Progressing  Goal: Maintain or return mobility status to optimal level  Description  INTERVENTIONS:  - Assess patient's baseline mobility status (ambulation, transfers, stairs, etc )    - Identify cognitive and physical deficits and behaviors that affect mobility  - Identify mobility aids required to assist with transfers and/or ambulation (gait belt, sit-to-stand, lift, walker, cane, etc )  - [place] fall precautions as indicated by assessment  - Record patient progress and toleration of activity level on Mobility SBAR; progress patient to next Phase/Stage  - Instruct patient to call for assistance with activity based on assessment  - Consider rehabilitation consult to assist with strengthening/weightbearing, etc   Outcome: Progressing     Problem: DISCHARGE PLANNING  Goal: Discharge to home or other facility with appropriate resources  Description  INTERVENTIONS:  - Identify barriers to discharge w/patient and caregiver  - Arrange for needed discharge resources and transportation as appropriate  - Identify discharge learning needs (meds, wound care, etc )  - Arrange for interpretive services to assist at discharge as needed  - Refer to Case Management Department for coordinating discharge planning if the patient needs post-hospital services based on physician/advanced practitioner order or complex needs related to functional status, cognitive ability, or social support system  Outcome: Progressing     Problem: Prexisting or High Potential for Compromised Skin Integrity  Goal: Skin integrity is maintained or improved  Description  INTERVENTIONS:  - Identify patients at risk for skin breakdown  - Assess and monitor skin integrity  - Assess and monitor nutrition and hydration status  - Monitor labs   - Assess for incontinence   - Turn and reposition patient  - Assist with mobility/ambulation  - Relieve pressure over bony prominences  - Avoid friction and shearing  - Provide appropriate hygiene as needed including keeping skin clean and dry  - Evaluate need for skin moisturizer/barrier cream  - Collaborate with interdisciplinary team   - Patient/family teaching  - Consider wound care consult   Outcome: Progressing     Problem: Potential for Falls  Goal: Patient will remain free of falls  Description  INTERVENTIONS:  - Assess patient frequently for physical needs  -  Identify cognitive and physical deficits and behaviors that affect risk of falls    -  [place] fall precautions as indicated by assessment   - Educate patient/family on patient safety including physical limitations  - Instruct patient to call for assistance with activity based on assessment  - Modify environment to reduce risk of injury  - Consider OT/PT consult to assist with strengthening/mobility  Outcome: Progressing

## 2019-09-08 NOTE — PROGRESS NOTES
09/08/19 0830   Pain Assessment   Pain Assessment No/denies pain   Pain Score No Pain   Restrictions/Precautions   Precautions Bed/chair alarms; Fall Risk;Supervision on toilet/commode;Pressure Ulcer;Cognitive   Braces or Orthoses AFO  (B/L)   Cognition   Arousal/Participation Alert; Cooperative   Subjective   Subjective Pt reports he is ready for therapy, denies pain or lightheadedness  QI: Sit to Stand   Assistance Needed Incidental touching   Assistance Provided by [place] No physical assistance   Sit to Stand CARE Score 4   QI: Chair/Bed-to-Chair Transfer   Assistance Needed Incidental touching; Adaptive equipment   Assistance Provided by [place] No physical assistance   Chair/Bed-to-Chair Transfer CARE Score 4   Transfer Bed/Chair/Wheelchair   Limitations Noted In Balance; Endurance;LE Strength   Adaptive Equipment Roller [employer], Chair, Wheelchair Transfer (FIM) 4 - Patient requires steadying assist or light touching   QI: Walk 10 Feet   Assistance Needed Incidental touching; Adaptive equipment   Assistance Provided by [place] No physical assistance   Walk 10 Feet CARE Score 4   QI: Walk 50 Feet with Two Turns   Assistance Needed Incidental touching; Adaptive equipment   Assistance Provided by [place] No physical assistance   Walk 50 Feet with Two Turns CARE Score 4   QI: Walk 150 Feet   Assistance Needed Incidental touching; Adaptive equipment   Assistance Provided by [place] No physical assistance   Walk 150 Feet CARE Score 4   Ambulation   Does the patient walk? 2  Yes   Primary Discharge Mode of Locomotion Walk   Walk Assist Level Contact Guard   Gait Pattern Inconsistant Lois; Slow Lois;Decreased foot clearance; Improper weight shift   Assist Device Estela Garcia Walked (feet) 150 ft   Limitations Noted In Balance; Endurance;Speed;Strength;Swing   Walking (FIM) 4 - Patient requires steadying assist or light touching AND distance 150 feet or more, no rest   QI: 4 Steps   Assistance Needed Physical assistance   Assistance Provided by [place] 25%-49%   4 Steps CARE Score 3   Stairs   Type Stairs   # of Steps 6   Weight Bearing Precautions Fall Risk   Assist Devices Bilateral Rail   Findings ascend forward and descend backwards - min A  negotiated 6 steps, 2 reps  Stairs (FIM) 2 - Patient goes up and down 4 - 11 stairs regardless of assist/device/setup   Therapeutic Interventions   Strengthening standing hip abduction 2 sets 10 ; STS from w/c to RW 10 reps    Equipment Use   NuStep L2 x 10 min   Parallel Bars stepping over bolsters to improve foot clearance   Assessment   Treatment Assessment Pt tolerated treatment session well today  Progressed to standing hip abduction exercise in // bars  Pt states he has been practicing on 4" side of  steps, so progressed to 6" step today and able to perform @ min A level  Difficulty with foot placement when descending backwards  He requires cues for anterior weight shift during STS  Switched out w/c to 18"x18" with higher cushion and he was able to perform STS better  Pt requires continued skilled PT services to maximize I and safety w/ all functional mobility  Barriers to Discharge Inaccessible home environment;Decreased caregiver support   PT Barriers   Physical Impairment Decreased strength;Decreased endurance; Impaired balance;Decreased mobility; Decreased safety awareness   Functional Limitation Standing;Transfers; Walking;Stair negotiation   Plan   Treatment/Interventions Functional transfer training;LE strengthening/ROM; Elevations; Therapeutic exercise; Endurance training;Patient/family training;Equipment eval/education; Bed mobility;Gait training   Progress Progressing toward goals   Recommendation   Recommendation Home with family support   Equipment Recommended Wheelchair;Walker   PT Therapy Minutes   PT Time In 0830   PT Time Out 0930   PT Total Time (minutes) 60   PT Mode of treatment - Individual (minutes) 60   PT Mode of treatment - Concurrent (minutes) 0   PT Mode of treatment - Group (minutes) 0   PT Mode of treatment - Co-treat (minutes) 0   PT Mode of Teatment - Total time(minutes) 60 minutes   Therapy Time missed   Time missed?  No

## 2019-09-08 NOTE — PROGRESS NOTES
Internal Medicine Progress Note  Patient: Nikko Hagen  Age/sex: 80 y o  male  Medical Record #: [id number]      ASSESSMENT/PLAN: (Interval History)  Nikko Hagen is seen and examined and management for following issues:    Volume overload/chronic diastolic CHF:  s/p Lasix IV, weight dropped 11 lbs = per renal, target weight is 168-170 lbs  Renal managing his diuretic tx = currently Torsemide 40mg qd  FR 1500ml; 2Gm NA diet      Abnormal Troponin: felt to be 2/2 creatinine/vol overload putting stress on the heart; no further testing etc was recommended     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:  stable     Iron deficiency anemia: renal gave 3 doses Venofer while in hospital and now started on Niferex 150mg qd  Checking stool for occult blood = 1st/2nd were negative      Hx urine retention/BPH; Klebsiella UTI 6/2019 and Klebsiella urosepsis 7/2019:  Uro saw in 7/2019 and added added Finasteride  Consideration was to be given to starting suppressive therapy with Nitrofurantion 100mg qhs  Currently, he is on Flomax/Finasteride  Will need to watch closely for UTI sx  HTN/orthostasis:  On 9/2/19, was orthostatic sitting was 530 systolic and stand was 626 = no dizzy but unsteady on feet; on Lopressor 50mg BID/Ranexa 500mg BID (Ranexa shouldn't have much effect on BP) = d/w Dr Palumbo Plan and dropped Lopressor dose to 25mg BID  No sx again today      PAF:  continue Lopressor but dropped to 25 mg BID since has some orthostasis Coumadin is 3mg 2x/week and 1 5 mg 5x/week = no changes today; INR 2 6     Hx Dual chamber PPM:  sees Dr Emily Avila as an OP     CVA (2014):  continue ASA/Lipitor     CAD/CABG (2013):   was here at [facility] 6/2019 for chest pain --> seen by Cardiology, had nuclear stress test = small, severe, reversible myocardial perfusion defect of the basal inferior wall --> was started on Ranexa 500mg BID but no further plans for intervention were recommended    Continue Lopressor, Lipitor and ASA as well     DM2: takes Trajenta 5mg qd at home = on Lantus 8U qhs currently  Dosing to change to AM starting 9/919  Won't start Trajenta for now since may end up at SNF      Hypothyroidism:  continue current Levothyroxine     Left L2-3 [facility]diske[facility] 7/18/[facility] at [facility]    Vitamin D deficiency:  Level is 22 6; will d/w renal      Subjective/ HPI: Patients overnight issues or events were reviewed with nursing or staff during rounds or morning huddle session  DM2: stable on Lantus  Anemia: s/p venofer, now on iron PO   CAD:  Stable on current tx    Offers no complaints    ROS:     GI: denies abdominal pain, change bowel habits or reflux symptoms  Neuro: Denies any headache, new vision changes, new neuropathies,new weaknesses   Respiratory: No Cough, SOB, denies wheeze  Cardiovascular: No CP, palpitations , denies perception of rapid heartbeat  : denies any new urinary burning or frequency    Review of Scheduled Meds:    Current Facility-Administered Medications:  acetaminophen 650 mg Oral Q6H PRN Xochitl Mckenzie MD   aspirin 81 mg Oral Daily Xochitl Mckenzie MD   atorvastatin 40 mg Oral QPM Xochitl Mckenzie MD   bisacodyl 10 mg Rectal Daily PRN Xochitl Mckenzie MD   cholecalciferol 2,000 Units Oral Daily Xochitl Mckenzie MD   finasteride 5 mg Oral Daily MAURICE Huerta   insulin glargine 8 Units Subcutaneous HS MAURICE Patel   insulin lispro 1-5 Units Subcutaneous TID AC MAURICE Patel   insulin lispro 1-5 Units Subcutaneous HS MAURICE Huerta   iron polysaccharides 150 mg Oral Daily Rika Ochoa MD   levothyroxine 75 mcg Oral Daily Xochitl Mckenzie MD   lidocaine 1 patch Topical Daily Xochitl Mckenzie MD   lidocaine  Topical Q4H PRN Xochitl Mckenzie MD   menthol-methyl salicylate  Apply externally 4x Daily PRN Xochitl Mckenzie MD   metoprolol tartrate 25 mg Oral BID With Meals MAURICE Huerta   ondansetron 4 mg Intravenous Q6H PRN Xochitl Mckenzie MD   polyethylene glycol 17 g Oral Daily PRN Babatunde Ray MD   ranolazine 500 mg Oral Q12H [address] Babatunde Ray MD   senna 1 tablet Oral BID Babatunde Ray MD   tamsulosin 0 4 mg Oral Daily Babatunde Ray MD   torsemide 40 mg Oral Daily Babatunde Ray MD   warfarin 1 5 mg Oral Once per day on Sun Tue Wed Fri Sat MAURICE Cameron   warfarin 3 mg Oral Once per day on Mon Thu [place], [address]     Results from last 7 days   Lab Units 09/05/19  0508 09/02/19  0522   WBC Thousand/uL 6 11 6 66   HEMOGLOBIN g/dL 8 3* 8 8*   HEMATOCRIT % 26 2* 28 2*   PLATELETS Thousands/uL 214 223     Results from last 7 days   Lab Units 09/06/19  0550 09/05/19  0508   SODIUM mmol/L 135* 138   POTASSIUM mmol/L 3 8 3 8   CHLORIDE mmol/L 102 102   CO2 mmol/L 27 29   BUN mg/dL 52* 48*   CREATININE mg/dL 3 60* 3 65*   CALCIUM mg/dL 8 2* 8 3         Results from last 7 days   Lab Units 09/07/19  0550 09/05/19  0508   INR  2 60* 2 45*          Results from last 7 days   Lab Units 09/08/19  1047 09/08/19  0635 09/07/19  2106   POC GLUCOSE mg/dl 270* 99 202*       Imaging:     No orders to display       *Labs reviewed  *Radiology studies reviewed  *Medications reviewed and reconciled as needed  *Please refer to order section for additional ordered labs studies  *Case discussed with primary attending during morning huddle case rounds    Physical Examination:  Vitals:   Vitals:    09/07/19 2100 09/08/19 0700 09/08/19 0800 09/08/19 1300   BP: 145/63 135/62 160/71 120/58   BP Location: Left arm Left arm  Right arm   Pulse: 70 75 71 92   Resp: 18 18  18   Temp: 98 7 °F (37 1 °C) 98 5 °F (36 9 °C)  97 6 °F (36 4 °C)   TempSrc: Oral Oral  Oral   SpO2: 97%   98%   Weight:       Height:           General Appearance: NAD, conversive  Eyes: No icterus; conjunctiva normal, PERRLA  HENT: oropharynx clear; mucous membranes moist  Neck: trachea midline, range of motion full   Supple w/o pain  Lungs: CTA, normal respiratory effort, no retractions, expiratory effort normal  CV: regular rate, no rubs/gallops; +murmur  ABD: soft; NT/ND; +BS  EXT: 1+ edema LE = improving  Skin: normal turgor, normal texture, no rashes  Psych: affect normal, no overt anxiety/depression during exam today   Neuro: AAOx3            Total time spent: At least 40 minutes, with more than 50% spent counseling/coordinating care  Counseling includes discussion with patient re: progress  and discussion with patient of his/her current medical state/information  Coordination of patient's care was performed in conjunction with primary service  Time invested included review of patient's labs, vitals, and management of their comorbidities with continued monitoring  In addition, this patient was discussed with medical team including physician and advanced extenders  The care of the patient was extensively discussed and appropriate treatment plan was formulated unique for this patient  ** Please Note: Dragon 360 Dictation voice to text software may have been used in the creation of this document   **

## 2019-09-09 LAB
ANION GAP SERPL CALCULATED.3IONS-SCNC: 5 MMOL/L (ref 4–13)
BASOPHILS # BLD AUTO: 0.04 THOUSANDS/ΜL (ref 0–0.1)
BASOPHILS NFR BLD AUTO: 1 % (ref 0–1)
BUN SERPL-MCNC: 51 MG/DL (ref 5–25)
CALCIUM SERPL-MCNC: 8.3 MG/DL (ref 8.3–10.1)
CHLORIDE SERPL-SCNC: 100 MMOL/L (ref 100–108)
CO2 SERPL-SCNC: 29 MMOL/L (ref 21–32)
CREAT SERPL-MCNC: 3.76 MG/DL (ref 0.6–1.3)
EOSINOPHIL # BLD AUTO: 0.31 THOUSAND/ΜL (ref 0–0.61)
EOSINOPHIL NFR BLD AUTO: 4 % (ref 0–6)
ERYTHROCYTE [DISTWIDTH] IN BLOOD BY AUTOMATED COUNT: 14.7 % (ref 11.6–15.1)
GFR SERPL CREATININE-BSD FRML MDRD: 14 ML/MIN/1.73SQ M
GLUCOSE P FAST SERPL-MCNC: 95 MG/DL (ref 65–99)
GLUCOSE SERPL-MCNC: 107 MG/DL (ref 65–140)
GLUCOSE SERPL-MCNC: 146 MG/DL (ref 65–140)
GLUCOSE SERPL-MCNC: 157 MG/DL (ref 65–140)
GLUCOSE SERPL-MCNC: 188 MG/DL (ref 65–140)
GLUCOSE SERPL-MCNC: 95 MG/DL (ref 65–140)
HCT VFR BLD AUTO: 28.3 % (ref 36.5–49.3)
HGB BLD-MCNC: 9 G/DL (ref 12–17)
IMM GRANULOCYTES # BLD AUTO: 0.03 THOUSAND/UL (ref 0–0.2)
IMM GRANULOCYTES NFR BLD AUTO: 0 % (ref 0–2)
INR PPP: 2.24 (ref 0.84–1.19)
LYMPHOCYTES # BLD AUTO: 1.87 THOUSANDS/ΜL (ref 0.6–4.47)
LYMPHOCYTES NFR BLD AUTO: 25 % (ref 14–44)
MCH RBC QN AUTO: 30.9 PG (ref 26.8–34.3)
MCHC RBC AUTO-ENTMCNC: 31.8 G/DL (ref 31.4–37.4)
MCV RBC AUTO: 97 FL (ref 82–98)
MONOCYTES # BLD AUTO: 0.69 THOUSAND/ΜL (ref 0.17–1.22)
MONOCYTES NFR BLD AUTO: 9 % (ref 4–12)
NEUTROPHILS # BLD AUTO: 4.64 THOUSANDS/ΜL (ref 1.85–7.62)
NEUTS SEG NFR BLD AUTO: 61 % (ref 43–75)
NRBC BLD AUTO-RTO: 0 /100 WBCS
PLATELET # BLD AUTO: 243 THOUSANDS/UL (ref 149–390)
PMV BLD AUTO: 9.8 FL (ref 8.9–12.7)
POTASSIUM SERPL-SCNC: 3.6 MMOL/L (ref 3.5–5.3)
PROTHROMBIN TIME: 24.3 SECONDS (ref 11.6–14.5)
RBC # BLD AUTO: 2.91 MILLION/UL (ref 3.88–5.62)
SODIUM SERPL-SCNC: 134 MMOL/L (ref 136–145)
WBC # BLD AUTO: 7.58 THOUSAND/UL (ref 4.31–10.16)

## 2019-09-09 PROCEDURE — 80048 BASIC METABOLIC PNL TOTAL CA: CPT | Performed by: NURSE PRACTITIONER

## 2019-09-09 PROCEDURE — 82948 REAGENT STRIP/BLOOD GLUCOSE: CPT

## 2019-09-09 PROCEDURE — 99232 SBSQ HOSP IP/OBS MODERATE 35: CPT

## 2019-09-09 PROCEDURE — 97535 SELF CARE MNGMENT TRAINING: CPT

## 2019-09-09 PROCEDURE — 97110 THERAPEUTIC EXERCISES: CPT

## 2019-09-09 PROCEDURE — 85025 COMPLETE CBC W/AUTO DIFF WBC: CPT | Performed by: NURSE PRACTITIONER

## 2019-09-09 PROCEDURE — 97116 GAIT TRAINING THERAPY: CPT

## 2019-09-09 PROCEDURE — 85610 PROTHROMBIN TIME: CPT | Performed by: NURSE PRACTITIONER

## 2019-09-09 PROCEDURE — 99232 SBSQ HOSP IP/OBS MODERATE 35: CPT | Performed by: INTERNAL MEDICINE

## 2019-09-09 PROCEDURE — 97530 THERAPEUTIC ACTIVITIES: CPT

## 2019-09-09 RX ORDER — TORSEMIDE 20 MG/1
40 TABLET ORAL DAILY
Status: DISCONTINUED | OUTPATIENT
Start: 2019-09-09 | End: 2019-09-12

## 2019-09-09 RX ORDER — TORSEMIDE 20 MG/1
40 TABLET ORAL DAILY
Status: DISCONTINUED | OUTPATIENT
Start: 2019-09-10 | End: 2019-09-09

## 2019-09-09 RX ADMIN — ASPIRIN 81 MG: 81 TABLET, COATED ORAL at 08:25

## 2019-09-09 RX ADMIN — INSULIN GLARGINE 8 UNITS: 100 INJECTION, SOLUTION SUBCUTANEOUS at 08:25

## 2019-09-09 RX ADMIN — LIDOCAINE 1 PATCH: 50 PATCH CUTANEOUS at 16:24

## 2019-09-09 RX ADMIN — TAMSULOSIN HYDROCHLORIDE 0.4 MG: 0.4 CAPSULE ORAL at 08:25

## 2019-09-09 RX ADMIN — FINASTERIDE 5 MG: 5 TABLET, FILM COATED ORAL at 08:25

## 2019-09-09 RX ADMIN — POLYSACCHARIDE-IRON COMPLEX 150 MG: 150 CAPSULE ORAL at 08:24

## 2019-09-09 RX ADMIN — TORSEMIDE 40 MG: 20 TABLET ORAL at 15:41

## 2019-09-09 RX ADMIN — INSULIN LISPRO 1 UNITS: 100 INJECTION, SOLUTION INTRAVENOUS; SUBCUTANEOUS at 11:43

## 2019-09-09 RX ADMIN — ATORVASTATIN CALCIUM 40 MG: 40 TABLET, FILM COATED ORAL at 17:49

## 2019-09-09 RX ADMIN — RANOLAZINE 500 MG: 500 TABLET, FILM COATED, EXTENDED RELEASE ORAL at 08:24

## 2019-09-09 RX ADMIN — RANOLAZINE 500 MG: 500 TABLET, FILM COATED, EXTENDED RELEASE ORAL at 21:06

## 2019-09-09 RX ADMIN — WARFARIN SODIUM 3 MG: 1 TABLET ORAL at 17:50

## 2019-09-09 RX ADMIN — INSULIN LISPRO 1 UNITS: 100 INJECTION, SOLUTION INTRAVENOUS; SUBCUTANEOUS at 16:27

## 2019-09-09 RX ADMIN — LEVOTHYROXINE SODIUM 75 MCG: 75 TABLET ORAL at 05:10

## 2019-09-09 RX ADMIN — SENNOSIDES 8.6 MG: 8.6 TABLET, FILM COATED ORAL at 08:25

## 2019-09-09 RX ADMIN — METOPROLOL TARTRATE 25 MG: 25 TABLET, FILM COATED ORAL at 07:30

## 2019-09-09 RX ADMIN — METOPROLOL TARTRATE 25 MG: 25 TABLET, FILM COATED ORAL at 16:23

## 2019-09-09 RX ADMIN — VITAMIN D, TAB 1000IU (100/BT) 2000 UNITS: 25 TAB at 08:25

## 2019-09-09 NOTE — PROGRESS NOTES
09/09/19 1230   Pain Assessment   Pain Assessment No/denies pain   Pain Score No Pain   Restrictions/Precautions   Precautions Bed/chair alarms;Cognitive; Fall Risk;Supervision on toilet/commode   Braces or Orthoses AFO  (B LEs)   Cognition   Overall Cognitive Status Impaired   Arousal/Participation Cooperative   Attention Attends with cues to redirect   Memory Decreased short term memory;Decreased recall of recent events;Decreased recall of precautions   Following Commands Follows multistep commands with increased time or repetition   Subjective   Subjective pt with no complaints at this time   QI: Sit to Stand   Assistance Needed Physical assistance   Assistance Provided by [place] 25%-49%   Sit to Stand CARE Score 3   QI: Chair/Bed-to-Chair Transfer   Assistance Needed Physical assistance; Adaptive equipment   Assistance Provided by [place] 25%-49%   Comment can be retropulsive and unable to correct balance safely   Chair/Bed-to-Chair Transfer CARE Score 3   Transfer Bed/Chair/Wheelchair   Limitations Noted In Balance; Endurance;Problem Solving; Sequencing;LE Strength   Adaptive Equipment Roller Walker   Stand Pivot Contact Guard;Minimal Assist   Sit to Stand Minimal Assist   Stand to Sit Minimal Assist   Findings cont to be retropulsive approaching chair  decrease fwd wt shift with STS   Bed, Chair, Wheelchair Transfer (FIM) 2 - [place] needs to lift or boost to rise AND assist to sit   QI: Car Transfer   Comment will have to practice with family car day of d/c   QI: Walk 10 Feet   Assistance Needed Incidental touching;Verbal cues; Adaptive equipment   Assistance Provided by [place] No physical assistance   Walk 10 Feet CARE Score 4   QI: Walk 50 Feet with Two Turns   Assistance Needed Incidental touching;Verbal cues; Adaptive equipment   Assistance Provided by [place] No physical assistance   Walk 50 Feet with Two Turns CARE Score 4   QI: Walk 150 Feet   Assistance Needed Incidental touching;Verbal cues; Adaptive equipment   Walk 150 Feet CARE Score 4   QI: Walking 10 Feet on Uneven Surfaces   Reason if not Attempted Activity not applicable   Walking 10 Feet on Uneven Surfaces CARE Score 9   Ambulation   Does the patient walk? 2  Yes   Primary Discharge Mode of Locomotion Walk   Walk Assist Level Contact Guard   Gait Pattern Inconsistant Lois; Slow Lois;Decreased foot clearance; Improper weight shift; Forward Flexion   Assist Device Roller Gabrielle Garcia Walked (feet) 150 ft  (x2)   Limitations Noted In Balance; Endurance; Heel Strike; Safety;Speed;Strength;Swing   Findings occasional cueing to keep RW closer for improved stability   Walking (FIM) 4 - Patient requires steadying assist or light touching AND distance 150 feet or more, no rest   QI: Wheel 50 Feet with Two Turns   Reason if not Attempted Activity not applicable   Wheel 50 Feet with Two Turns CARE Score 9   QI: Wheel 150 Feet   Reason if not Attempted Activity not applicable   Wheel 970 Feet CARE Score 9   Wheelchair mobility   QI: Does the patient use a wheelchair? 0  No   QI: 1 Step (Curb)   Reason if not Attempted Safety concerns   1 Step (Curb) CARE Score 88   QI: 4 Steps   Reason if not Attempted Safety concerns   4 Steps CARE Score 88   QI: 12 Steps   Reason if not Attempted Activity not applicable   12 Steps CARE Score 9   Equipment Use   NuStep L2 10min   Assessment   Treatment Assessment pt cont to focus on technique and safety with functional txs for d/c home  spent second half of session speaking with pt's wife about d/c planning  pt with many questions about insurance and directed her to the [facility]  pt's spouse overwhelmed about taking pt home and reviewed recommendations for therapy and setting up car tx   will cont with family training and hands on with txs next session to get spouse more comfortable being hands on   will cont to work on FT and safety for d/c home with txs and bed mobility     Family/Caregiver Present wife, Natalia Bender   Problem List Decreased range of motion;Decreased strength;Decreased endurance; Impaired balance;Decreased mobility; Decreased cognition; Impaired judgement;Decreased safety awareness   Barriers to Discharge Inaccessible home environment;Decreased caregiver support   PT Barriers   Physical Impairment Decreased strength;Decreased endurance; Impaired balance;Decreased mobility; Decreased safety awareness   Functional Limitation Standing;Transfers; Walking;Stair negotiation   Plan   Treatment/Interventions Functional transfer training;LE strengthening/ROM; Endurance training;Patient/family training;Bed mobility;Gait training   Progress Progressing toward goals   Recommendation   Recommendation 24 hour supervision/assist;Home PT; Home with family support   Equipment Recommended Wheelchair   PT Therapy Minutes   PT Time In 1230   PT Time Out 1400   PT Total Time (minutes) 90   PT Mode of treatment - Individual (minutes) 90   PT Mode of treatment - Concurrent (minutes) 0   PT Mode of treatment - Group (minutes) 0   PT Mode of treatment - Co-treat (minutes) 0   PT Mode of Teatment - Total time(minutes) 90 minutes   Therapy Time missed   Time missed?  No

## 2019-09-09 NOTE — PROGRESS NOTES
NEPHROLOGY PROGRESS NOTE   Nevin Candelaria 80 y o  male MRN: [id number]  Unit/Bed#: -74 Encounter: [id number]    ASSESSMENT & PLAN:  80 y  o male with history of chronic kidney disease stage 4, BPH, hypertension presented with increased lower extremity edema gradually worsening, he initially presented to [facility] on 08/23  Nephrology consulted for chronic kidney disease and for fluid overload  Volume status improved with IV Lasix followed by oral torsemide  1  Chronic kidney disease stage 4:  · Suspect that baseline creatinine will settle around 3 5 to 3 7 with diuretics  as per records recent baseline creatinine previously has been around 3 3-3 5  Previously in 2014 creatinine was 1 9 and 2 9 in 2015  · Renal function stable at creatinine 3 7 today, may have to except slight increase in creatinine to maintain euvolemia  · Saw Dr Steve Prado in May 2019 - has appt on 9/11/19 with [facility]  · Etiology -likely due to hypertensive nephrosclerosis and diabetic nephropathy as well as age-related nephron loss  · Phosphorus is at goal   Continue vitamin-D     2  Primary HTN with chronic kidney disease stage 4: BP  is acceptable and is at goal   Continue current medication  3  Acute on Chronic diastolic CHF (EF 99%, T6RL on 6/12/19): Continue Torsemide 40 mg daily  Still has lower extremity edema, weight has trended down to 145 lb  Continue monitoring renal function  If renal function continue to worsen, may decrease the dose of torsemide  4  Hyponatremia:  Sodium slightly low at 134  Continue to monitor  Continue fluid restriction  5  Anemia, iron deficiency:  Iron saturation was 14% on 08/23  · Hemoglobin is stable  · He received Venofer 1000 mg during this hospital stay  Continue Niferex 150 mg daily  · Hemoglobin already improving to 9 0, if hemoglobin remains low may consider HOLLY after discussion with patient    6  History of urine retention/BPH:  Continue finasteride and Flomax    Continue follow up with Urology  7  Hx CAD/CABG    Discussed with primary team      SUBJECTIVE:  No new complaints  No shortness of breath  Denies dizziness  OBJECTIVE:  Current Weight: Weight - Scale: 65 8 kg (145 lb)  Vitals:    09/09/19 0730   BP: 132/68   Pulse: 68   Resp:    Temp:    SpO2:        Intake/Output Summary (Last 24 hours) at 9/9/2019 0738  Last data filed at 9/9/2019 0618  Gross per 24 hour   Intake 540 ml   Output 2125 ml   Net -1585 ml       Physical Exam  General:  Ill looking, awake  Eyes: Conjunctivae pink,  Sclera anicteric  ENT: lips and mucous membranes moist  Neck: supple   Chest: Clear to Auscultation both lungs,  no crackles, ronchus or wheezing  CVS: S1 & S2 present, normal rate, regular rhythm, no murmur    Abdomen: soft, non-tender, non-distended, Bowel sounds normoactive  Extremities:  1+ pitting edema bilateral lower extremities  Skin: no rash  Neuro: awake, alert, oriented x3      Medications:    Current Facility-Administered Medications:     acetaminophen (TYLENOL) tablet 650 mg, 650 mg, Oral, Q6H PRN, Sanya King MD, 650 mg at 09/03/19 1710    aspirin (ECOTRIN LOW STRENGTH) EC tablet 81 mg, 81 mg, Oral, Daily, Sanya King MD, 81 mg at 09/08/19 0800    atorvastatin (LIPITOR) tablet 40 mg, 40 mg, Oral, QPM, Sanya King MD, 40 mg at 09/08/19 1821    bisacodyl (DULCOLAX) rectal suppository 10 mg, 10 mg, Rectal, Daily PRN, Sanya King MD    cholecalciferol (VITAMIN D3) tablet 2,000 Units, 2,000 Units, Oral, Daily, Sanya King MD, 2,000 Units at 09/08/19 0800    finasteride (PROSCAR) tablet 5 mg, 5 mg, Oral, Daily, MAURICE Crystal, 5 mg at 09/08/19 0800    insulin glargine (LANTUS) subcutaneous injection 8 Units 0 08 mL, 8 Units, Subcutaneous, Nany SNOW PA-C    insulin lispro (HumaLOG) 100 units/mL subcutaneous injection 1-5 Units, 1-5 Units, Subcutaneous, TID AC, 2 Units at 09/08/19 1135 **AND** Fingerstick Glucose (POCT), , , TID AC, Thecaitlin Shaw, CRNP    insulin lispro (HumaLOG) 100 units/mL subcutaneous injection 1-5 Units, 1-5 Units, Subcutaneous, HS, Amrita Null, CRNP, 1 Units at 09/08/19 2137    iron polysaccharides (FERREX) capsule 150 mg, 150 mg, Oral, Daily, Colette Bryant MD, 150 mg at 09/08/19 0800    levothyroxine tablet 75 mcg, 75 mcg, Oral, Daily, Eliecer Sellers MD, 75 mcg at 09/09/19 0510    lidocaine (LIDODERM) 5 % patch 1 patch, 1 patch, Topical, Daily, Eliecer Sellers MD, 1 patch at 09/08/19 1815    lidocaine (URO-JET) 2 % urethral/mucosal gel, , Topical, Q4H PRN, Eliecer Sellers MD    menthol-methyl salicylate (BENGAY) 82-80 % cream, , Apply externally, 4x Daily PRN, Eliecer Sellers MD    metoprolol tartrate (LOPRESSOR) tablet 25 mg, 25 mg, Oral, BID With Meals, MAURICE Mirza, 25 mg at 09/09/19 0730    ondansetron (ZOFRAN) injection 4 mg, 4 mg, Intravenous, Q6H PRN, Eliecer Sellers MD    polyethylene glycol [facility]) packet 17 g, 17 g, Oral, Daily PRN, Eliecer Sellers MD    ranolazine [facility]) 12 hr tablet 500 mg, 500 mg, Oral, Q12H [facility], Eliecer Sellers MD, 500 mg at 09/08/19 2137    senna (SENOKOT) tablet 8 6 mg, 1 tablet, Oral, BID, Eliecer Sellers MD, 8 6 mg at 09/07/19 1750    tamsulosin (FLOMAX) capsule 0 4 mg, 0 4 mg, Oral, Daily, Eliecer Sellers MD, 0 4 mg at 09/08/19 0800    torsemide (DEMADEX) tablet 40 mg, 40 mg, Oral, Daily, Eliecer Sellers MD, 40 mg at 09/08/19 0800    warfarin (COUMADIN) tablet 1 5 mg, 1 5 mg, Oral, Once per day on Sun Tue Wed Fri Sat, MAURICE Mirza, 1 5 mg at 09/08/19 0105    warfarin (COUMADIN) tablet 3 mg, 3 mg, Oral, Once per day on Mon Thu, MAURICE Patel, 3 mg at 09/05/19 1653    Invasive Devices:        Lab Results:   Results from last 7 days   Lab Units 09/09/19  0642 09/06/19  0550 09/05/19  0508 09/04/19  0529   WBC Thousand/uL 7 58  --  6 11  --    HEMOGLOBIN g/dL 9 0*  --  8 3*  --    HEMATOCRIT % 28 3*  --  26 2*  -- PLATELETS Thousands/uL 243  --  214  --    POTASSIUM mmol/L  --  3 8 3 8 3 8   CHLORIDE mmol/L  --  102 102 101   CO2 mmol/L  --  27 29 28   BUN mg/dL  --  52* 48* 51*   CREATININE mg/dL  --  3 60* 3 65* 3 56*   CALCIUM mg/dL  --  8 2* 8 3 8 3   MAGNESIUM mg/dL  --   --   --  2 4       Previous work up:      Portions of the record may have been created with voice recognition software  Occasional wrong word or "sound a like" substitutions may have occurred due to the inherent limitations of voice recognition software  Read the chart carefully and recognize, using context, where substitutions have occurred  If you have any questions, please contact the dictating provider

## 2019-09-09 NOTE — PROGRESS NOTES
Internal Medicine Progress Note  Patient: Edilma Luke  Age/sex: 80 y o  male  Medical Record #: [id number]      ASSESSMENT/PLAN: (Interval History)  Edilma Luke is seen and examined and management for following issues:    Volume overload/chronic diastolic CHF:  s/p Lasix IV, weight dropped 11 lbs = per renal, target weight is 168-170 lbs  Renal managing his diuretic tx = currently Torsemide 40mg qd  FR 1500ml; 2Gm NA diet      Abnormal Troponin: felt to be 2/2 creatinine/vol overload putting stress on the heart; no further testing etc was recommended     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:  stable = 3 7 today and OK per renal to get Torsemide today      Iron deficiency anemia: renal gave 5 doses Venofer total and is on Niferex 150mg qd  Checking stool for occult blood = 1st/2nd were negative      Hx urine retention/BPH; Klebsiella UTI 6/2019 and Klebsiella urosepsis 7/2019:  Uro saw in 7/2019 and added added Finasteride  Consideration was to be given to starting suppressive therapy with Nitrofurantion 100mg qhs  Currently, he is on Flomax/Finasteride  Will need to watch closely for UTI sx  HTN/orthostasis:  On 9/2/19, was orthostatic sitting was 606 systolic and stand was 468 = no dizzy but unsteady on feet; on Lopressor 50mg BID/Ranexa 500mg BID (Ranexa shouldn't have much effect on BP) = d/w Dr Marine Chi and dropped Lopressor dose to 25mg BID  No sx again today      PAF:  continue Lopressor but dropped to 25 mg BID since has some orthostasis Coumadin is 3mg 2x/week and 1 5 mg 5x/week = no changes today     Hx Dual chamber PPM:  sees Dr Rajesh Valdivia as an OP     CVA (2014):  continue ASA/Lipitor     CAD/CABG (2013):   was [facility] for chest pain --> seen by Cardiology, had nuclear stress test = small, severe, reversible myocardial perfusion defect of the basal inferior wall --> was started on Ranexa 500mg BID but no further plans for intervention were recommended    Continue Lopressor, Lipitor and ASA as well     DM2: takes Trajenta 5mg qd at home = on Lantus 8U qhs currently  Will restart Trajenta 5mg qam and stop Lantus      Hypothyroidism:  continue current Levothyroxine     Left L2-3 microdiskectomy 7/18/19 at [facility][note 3]    Vitamin D deficiency:  Level is 22 6; will d/w renal      Subjective/ HPI: Patients overnight issues or events were reviewed with nursing or staff during rounds or morning huddle session  DM2: stable on Lantus  Anemia: s/p venofer, now on iron PO   CAD:  Stable on current tx    Offers no complaints    ROS:     GI: denies abdominal pain, change bowel habits or reflux symptoms  Neuro: Denies any headache, new vision changes, new neuropathies,new weaknesses   Respiratory: No Cough, SOB, denies wheeze  Cardiovascular: No CP, palpitations , denies perception of rapid heartbeat  : denies any new urinary burning or frequency    Review of Scheduled Meds:    Current Facility-Administered Medications:  acetaminophen 650 mg Oral Q6H PRN Irving Garcia MD   aspirin 81 mg Oral Daily Irving Garcia MD   atorvastatin 40 mg Oral QPM Irving Garcia MD   bisacodyl 10 mg Rectal Daily PRN Irving Garcia MD   cholecalciferol 2,000 Units Oral Daily Irving Garcia MD   finasteride 5 mg Oral Daily MAURICE Sy   insulin glargine 8 Units Subcutaneous QAM Nany Ya PA-C   insulin lispro 1-5 Units Subcutaneous TID AC MAURICE Patel   insulin lispro 1-5 Units Subcutaneous HS MAURICE Sy   iron polysaccharides 150 mg Oral Daily Andrew Dumas MD   levothyroxine 75 mcg Oral Daily Irving Garcia MD   lidocaine 1 patch Topical Daily Irving Garcia MD   lidocaine  Topical Q4H PRN Irving Garcia MD   menthol-methyl salicylate  Apply externally 4x Daily PRN Irving Garcia MD   metoprolol tartrate 25 mg Oral BID With Meals MAURICE Sy   ondansetron 4 mg Intravenous Q6H PRN Irving Garcia MD   polyethylene glycol 17 g Oral Daily PRN Irving Garcia MD ranolazine 500 mg Oral Q12H [facility] Nilda Trevino MD   senna 1 tablet Oral BID Nilda Trevino MD   tamsulosin 0 4 mg Oral Daily Nilda Trevino MD   [START ON 9/10/2019] torsemide 40 mg Oral Daily Nilda Trevino MD   warfarin 1 5 mg Oral Once per day on Sun Tue Wed Fri Sat NehemiahMAURICE Freed   warfarin 3 mg Oral Once per day on Mon Thu [place]enee [place]baz[place], [address]:     Results from last 7 days   Lab Units 09/09/19  0642 09/05/19  0508   WBC Thousand/uL 7 58 6 11   HEMOGLOBIN g/dL 9 0* 8 3*   HEMATOCRIT % 28 3* 26 2*   PLATELETS Thousands/uL 243 214     Results from last 7 days   Lab Units 09/09/19  0642 09/06/19  0550   SODIUM mmol/L 134* 135*   POTASSIUM mmol/L 3 6 3 8   CHLORIDE mmol/L 100 102   CO2 mmol/L 29 27   BUN mg/dL 51* 52*   CREATININE mg/dL 3 76* 3 60*   CALCIUM mg/dL 8 3 8 2*         Results from last 7 days   Lab Units 09/09/19  0642 09/07/19  0550   INR  2 24* 2 60*          Results from last 7 days   Lab Units 09/09/19  0639 09/08/19  2113 09/08/19  1647   POC GLUCOSE mg/dl 107 155* 128       Imaging:     No orders to display       *Labs reviewed  *Radiology studies reviewed  *Medications reviewed and reconciled as needed  *Please refer to order section for additional ordered labs studies  *Case discussed with primary attending during morning huddle case rounds    Physical Examination:  Vitals:   Vitals:    09/08/19 2015 09/09/19 0600 09/09/19 0618 09/09/19 0730   BP: 153/66  149/68 132/68   BP Location: Left arm  Right arm    Pulse: 67  68 68   Resp: 16  18    Temp: 98 4 °F (36 9 °C)  98 6 °F (37 °C)    TempSrc: Oral  Oral    SpO2: 95%  98%    Weight:  65 8 kg (145 lb)     Height:           General Appearance: NAD, conversive  Eyes: No icterus; conjunctiva normal, PERRLA  HENT: oropharynx clear; mucous membranes moist  Neck: trachea midline, range of motion full   Supple w/o pain  Lungs: CTA, normal respiratory effort, no retractions, expiratory effort normal  CV: regular rate, no rubs/gallops; +murmur  ABD: soft; NT/ND; +BS  EXT: 1+ edema LE = improving  Skin: normal turgor, normal texture, no rashes  Psych: affect normal, no overt anxiety/depression during exam today   Neuro: AAOx3            Total time spent: At least 40 minutes, with more than 50% spent counseling/coordinating care  Counseling includes discussion with patient re: progress  and discussion with patient of his/her current medical state/information  Coordination of patient's care was performed in conjunction with primary service  Time invested included review of patient's labs, vitals, and management of their comorbidities with continued monitoring  In addition, this patient was discussed with medical team including physician and advanced extenders  The care of the patient was extensively discussed and appropriate treatment plan was formulated unique for this patient  ** Please Note: Dragon 360 Dictation voice to text software may have been used in the creation of this document   **

## 2019-09-09 NOTE — PLAN OF CARE
Problem: PAIN - ADULT  Goal: Verbalizes/displays adequate comfort level or baseline comfort level  Description  Interventions:  - Encourage patient to monitor pain and request assistance  - Assess pain using appropriate pain scale  - Administer analgesics based on type and severity of pain and evaluate response  - Implement non-pharmacological measures as appropriate and evaluate response  - Consider cultural and social influences on pain and pain management  - Notify physician/advanced practitioner if interventions unsuccessful or patient reports new pain  Outcome: Progressing     Problem: INFECTION - ADULT  Goal: Absence or prevention of progression during hospitalization  Description  INTERVENTIONS:  - Assess and monitor for signs and symptoms of infection  - Monitor lab/diagnostic results  - Monitor all insertion sites, i e  indwelling lines, tubes, and drains  - Monitor endotracheal if appropriate and nasal secretions for changes in amount and color  - [place] appropriate cooling/warming therapies per order  - Administer medications as ordered  - Instruct and encourage patient and family to use good hand hygiene technique  - Identify and instruct in appropriate isolation precautions for identified infection/condition  Outcome: Progressing     Problem: SAFETY ADULT  Goal: Patient will remain free of falls  Description  INTERVENTIONS:  - Assess patient frequently for physical needs  -  Identify cognitive and physical deficits and behaviors that affect risk of falls    -  [place] fall precautions as indicated by assessment   - Educate patient/family on patient safety including physical limitations  - Instruct patient to call for assistance with activity based on assessment  - Modify environment to reduce risk of injury  - Consider OT/PT consult to assist with strengthening/mobility  Outcome: Progressing     Problem: SAFETY ADULT  Goal: Maintain or return to baseline ADL function  Description  INTERVENTIONS:  -  Assess patient's ability to carry out ADLs; assess patient's baseline for ADL function and identify physical deficits which impact ability to perform ADLs (bathing, care of mouth/teeth, toileting, grooming, dressing, etc )  - Assess/evaluate cause of self-care deficits   - Assess range of motion  - Assess patient's mobility; develop plan if impaired  - Assess patient's need for assistive devices and provide as appropriate  - Encourage maximum independence but intervene and supervise when necessary  - Involve family in performance of ADLs  - Assess for home care needs following discharge   - Consider OT consult to assist with ADL evaluation and planning for discharge  - Provide patient education as appropriate  Outcome: Progressing     Problem: SAFETY ADULT  Goal: Maintain or return mobility status to optimal level  Description  INTERVENTIONS:  - Assess patient's baseline mobility status (ambulation, transfers, stairs, etc )    - Identify cognitive and physical deficits and behaviors that affect mobility  - Identify mobility aids required to assist with transfers and/or ambulation (gait belt, sit-to-stand, lift, walker, cane, etc )  - [place] fall precautions as indicated by assessment  - Record patient progress and toleration of activity level on Mobility SBAR; progress patient to next Phase/Stage  - Instruct patient to call for assistance with activity based on assessment  - Consider rehabilitation consult to assist with strengthening/weightbearing, etc   Outcome: Progressing     Problem: Prexisting or High Potential for Compromised Skin Integrity  Goal: Skin integrity is maintained or improved  Description  INTERVENTIONS:  - Identify patients at risk for skin breakdown  - Assess and monitor skin integrity  - Assess and monitor nutrition and hydration status  - Monitor labs   - Assess for incontinence   - Turn and reposition patient  - Assist with mobility/ambulation  - Relieve pressure over bony prominences  - Avoid friction and shearing  - Provide appropriate hygiene as needed including keeping skin clean and dry  - Evaluate need for skin moisturizer/barrier cream  - Collaborate with interdisciplinary team   - Patient/family teaching  - Consider wound care consult   Outcome: Progressing     Problem: Potential for Falls  Goal: Patient will remain free of falls  Description  INTERVENTIONS:  - Assess patient frequently for physical needs  -  Identify cognitive and physical deficits and behaviors that affect risk of falls    -  [place] fall precautions as indicated by assessment   - Educate patient/family on patient safety including physical limitations  - Instruct patient to call for assistance with activity based on assessment  - Modify environment to reduce risk of injury  - Consider OT/PT consult to assist with strengthening/mobility  Outcome: Progressing

## 2019-09-09 NOTE — SOCIAL WORK
Met w/pt and wife while occupational therapy session was occurring  Discussion held re: dc plans and pt and wife feel comfortable with going home with a few more days of therapy training  Cm confirmed hhc arrangements through [facility] but [email or web address] as she is stating she was not offered it in the past  Cm explained she may need to contact [facility] to inform them of closing their case  Dc is currently scheduled for 9/12 but may need to be pushed back due to arrangements of hha services and sons availability to provide transport at the time of dc  Dr Joellen Werner made aware  Another list of hha and brochures provided to pts wife for her to work on today  Following to assist w/dc planning needs

## 2019-09-10 ENCOUNTER — PATIENT OUTREACH (OUTPATIENT)
Dept: CASE MANAGEMENT | Facility: OTHER | Age: 84
End: 2019-09-10

## 2019-09-10 LAB
ANION GAP SERPL CALCULATED.3IONS-SCNC: 5 MMOL/L (ref 4–13)
BUN SERPL-MCNC: 51 MG/DL (ref 5–25)
CALCIUM SERPL-MCNC: 8.1 MG/DL (ref 8.3–10.1)
CHLORIDE SERPL-SCNC: 101 MMOL/L (ref 100–108)
CO2 SERPL-SCNC: 28 MMOL/L (ref 21–32)
CREAT SERPL-MCNC: 3.64 MG/DL (ref 0.6–1.3)
GFR SERPL CREATININE-BSD FRML MDRD: 14 ML/MIN/1.73SQ M
GLUCOSE SERPL-MCNC: 103 MG/DL (ref 65–140)
GLUCOSE SERPL-MCNC: 173 MG/DL (ref 65–140)
GLUCOSE SERPL-MCNC: 238 MG/DL (ref 65–140)
GLUCOSE SERPL-MCNC: 83 MG/DL (ref 65–140)
GLUCOSE SERPL-MCNC: 99 MG/DL (ref 65–140)
POTASSIUM SERPL-SCNC: 3.5 MMOL/L (ref 3.5–5.3)
SODIUM SERPL-SCNC: 134 MMOL/L (ref 136–145)

## 2019-09-10 PROCEDURE — 97535 SELF CARE MNGMENT TRAINING: CPT

## 2019-09-10 PROCEDURE — 97116 GAIT TRAINING THERAPY: CPT

## 2019-09-10 PROCEDURE — 99232 SBSQ HOSP IP/OBS MODERATE 35: CPT | Performed by: INTERNAL MEDICINE

## 2019-09-10 PROCEDURE — 80048 BASIC METABOLIC PNL TOTAL CA: CPT

## 2019-09-10 PROCEDURE — 97530 THERAPEUTIC ACTIVITIES: CPT

## 2019-09-10 PROCEDURE — 82948 REAGENT STRIP/BLOOD GLUCOSE: CPT

## 2019-09-10 PROCEDURE — 97110 THERAPEUTIC EXERCISES: CPT

## 2019-09-10 RX ADMIN — INSULIN LISPRO 1 UNITS: 100 INJECTION, SOLUTION INTRAVENOUS; SUBCUTANEOUS at 11:30

## 2019-09-10 RX ADMIN — INSULIN LISPRO 2 UNITS: 100 INJECTION, SOLUTION INTRAVENOUS; SUBCUTANEOUS at 16:22

## 2019-09-10 RX ADMIN — RANOLAZINE 500 MG: 500 TABLET, FILM COATED, EXTENDED RELEASE ORAL at 08:01

## 2019-09-10 RX ADMIN — LIDOCAINE 1 PATCH: 50 PATCH CUTANEOUS at 16:31

## 2019-09-10 RX ADMIN — SENNOSIDES 8.6 MG: 8.6 TABLET, FILM COATED ORAL at 08:01

## 2019-09-10 RX ADMIN — WARFARIN SODIUM 1.5 MG: 1 TABLET ORAL at 20:29

## 2019-09-10 RX ADMIN — RANOLAZINE 500 MG: 500 TABLET, FILM COATED, EXTENDED RELEASE ORAL at 20:31

## 2019-09-10 RX ADMIN — METOPROLOL TARTRATE 25 MG: 25 TABLET, FILM COATED ORAL at 16:31

## 2019-09-10 RX ADMIN — SENNOSIDES 8.6 MG: 8.6 TABLET, FILM COATED ORAL at 18:33

## 2019-09-10 RX ADMIN — ASPIRIN 81 MG: 81 TABLET, COATED ORAL at 08:01

## 2019-09-10 RX ADMIN — FINASTERIDE 5 MG: 5 TABLET, FILM COATED ORAL at 08:01

## 2019-09-10 RX ADMIN — TAMSULOSIN HYDROCHLORIDE 0.4 MG: 0.4 CAPSULE ORAL at 08:01

## 2019-09-10 RX ADMIN — POLYSACCHARIDE-IRON COMPLEX 150 MG: 150 CAPSULE ORAL at 08:01

## 2019-09-10 RX ADMIN — METOPROLOL TARTRATE 25 MG: 25 TABLET, FILM COATED ORAL at 07:47

## 2019-09-10 RX ADMIN — LINAGLIPTIN 5 MG: 5 TABLET, FILM COATED ORAL at 08:01

## 2019-09-10 RX ADMIN — TORSEMIDE 40 MG: 20 TABLET ORAL at 08:01

## 2019-09-10 RX ADMIN — VITAMIN D, TAB 1000IU (100/BT) 2000 UNITS: 25 TAB at 08:01

## 2019-09-10 RX ADMIN — ATORVASTATIN CALCIUM 40 MG: 40 TABLET, FILM COATED ORAL at 18:33

## 2019-09-10 RX ADMIN — LEVOTHYROXINE SODIUM 75 MCG: 75 TABLET ORAL at 05:28

## 2019-09-10 NOTE — PROGRESS NOTES
Internal Medicine Progress Note  Patient: Rhea Emery  Age/sex: 80 y o  male  Medical Record #: [id number]      ASSESSMENT/PLAN: (Interval History)  Rhea Emery is seen and examined and management for following issues:    Volume overload/chronic diastolic CHF:  s/p Lasix IV, weight dropped 11 lbs = per renal, target weight is 168-170 lbs  Renal managing his diuretic tx = currently Torsemide 40mg qd  FR 1500ml; 2Gm NA diet      Abnormal Troponin: felt to be 2/2 creatinine/vol overload putting stress on the heart; no further testing etc was recommended     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:  stable      Iron deficiency anemia: renal gave 5 doses Venofer total and is on Niferex 150mg qd  Checked stool for occult blood = negative x 2      Hx urine retention/BPH; Klebsiella UTI 6/2019 and Klebsiella urosepsis 7/2019:  Uro saw in 7/2019 and added added Finasteride  Consideration was to be given to starting suppressive therapy with Nitrofurantion 100mg qhs  Currently, he is on Flomax/Finasteride  Will need to watch closely for UTI sx  HTN/orthostasis: on Lopressor 25mg BID/Ranexa 500mg BID  No sx dizziness again today      PAF:  continue Lopressor but dropped to 25 mg BID since has some orthostasis Coumadin is 3mg 2x/week and 1 5 mg 5x/week = no changes today; INR thursday     Hx Dual chamber PPM:  sees Dr Sydnee Schultz as an OP     CVA (2014):  continue ASA/Lipitor     CAD/CABG (2013):   was [facility] at [facility] for chest pain --> seen by Cardiology, had nuclear stress test = small, severe, reversible myocardial perfusion defect of the basal inferior wall --> was started on Ranexa 500mg BID but no further plans for intervention were recommended    Continue Lopressor, Lipitor and ASA as well     DM2: takes Tradjenta 5mg qd at home = was on Lantus 8U qhs and this AM, restarted Tradjenta 5mg qam and stopped Lantus      Hypothyroidism:  continue current Levothyroxine     Left L2-3 microdiskectomy 7/18/19 at [facility]    Vitamin D deficiency:  Level is 22 6; will d/w renal      Subjective/ HPI: Patients overnight issues or events were reviewed with nursing or staff during rounds or morning huddle session  DM2:switched off of Lantus to Felicitas5 Armaanleonardo Almendarez which he takes at home  Anemia: s/p venofer, now on iron PO   CAD:  Stable on current tx    Offers no complaints    ROS:     GI: denies abdominal pain, change bowel habits or reflux symptoms  Neuro: Denies any headache, new vision changes, new neuropathies,new weaknesses   Respiratory: No Cough, SOB, denies wheeze  Cardiovascular: No CP, palpitations , denies perception of rapid heartbeat  : denies any new urinary burning or frequency    Review of Scheduled Meds:    Current Facility-Administered Medications:  acetaminophen 650 mg Oral Q6H PRN Cristo Morrison MD   aspirin 81 mg Oral Daily Cristo Morrison MD   atorvastatin 40 mg Oral QPM Cristo Morrison MD   bisacodyl 10 mg Rectal Daily PRN Cristo Morrison MD   cholecalciferol 2,000 Units Oral Daily Cristo Morrison MD   finasteride 5 mg Oral Daily MAURICE Carter   insulin lispro 1-5 Units Subcutaneous TID AC MAURICE Patel   insulin lispro 1-5 Units Subcutaneous HS MAURICE Carter   iron polysaccharides 150 mg Oral Daily Jay Lomas MD   levothyroxine 75 mcg Oral Daily Cristo Morrison MD   lidocaine 1 patch Topical Daily Cristo Morrison MD   lidocaine  Topical Q4H PRN Cristo Morrison MD   linaGLIPtin 5 mg Oral Daily MAURICE Carter   menthol-methyl salicylate  Apply externally 4x Daily PRN Cristo Morrison MD   metoprolol tartrate 25 mg Oral BID With Meals MAURICE Carter   ondansetron 4 mg Intravenous Q6H PRN Cristo Morrison MD   polyethylene glycol 17 g Oral Daily PRN Cristo Morrison MD   ranolazine 500 mg Oral Q12H [address] Cristo Morrison MD   senna 1 tablet Oral BID Cristo Morrison MD   tamsulosin 0 4 mg Oral Daily Cristo Morrison MD   torsemide 40 mg Oral Daily Cristo Morrison MD warfarin 1 5 mg Oral Once per day on Sun Tue Wed Fri Sat MAURICE Villalobos   warfarin 3 mg Oral Once per day on Mon Thu MAURICE Patel       Labs:     Results from last 7 days   Lab Units 09/09/19  0642 09/05/19  0508   WBC Thousand/uL 7 58 6 11   HEMOGLOBIN g/dL 9 0* 8 3*   HEMATOCRIT % 28 3* 26 2*   PLATELETS Thousands/uL 243 214     Results from last 7 days   Lab Units 09/10/19  0527 09/09/19  0642   SODIUM mmol/L 134* 134*   POTASSIUM mmol/L 3 5 3 6   CHLORIDE mmol/L 101 100   CO2 mmol/L 28 29   BUN mg/dL 51* 51*   CREATININE mg/dL 3 64* 3 76*   CALCIUM mg/dL 8 1* 8 3         Results from last 7 days   Lab Units 09/09/19  0642 09/07/19  0550   INR  2 24* 2 60*          Results from last 7 days   Lab Units 09/10/19  0710 09/09/19  2031 09/09/19  1601   POC GLUCOSE mg/dl 99 146* 157*       Imaging:     No orders to display       *Labs reviewed  *Radiology studies reviewed  *Medications reviewed and reconciled as needed  *Please refer to order section for additional ordered labs studies  *Case discussed with primary attending during morning huddle case rounds    Physical Examination:  Vitals:   Vitals:    09/09/19 2029 09/10/19 0524 09/10/19 0539 09/10/19 0747   BP: 154/72 140/72  148/68   BP Location: Right arm Right arm     Pulse: 74 73  66   Resp: 18 18     Temp: 98 7 °F (37 1 °C) 97 7 °F (36 5 °C)     TempSrc: Oral Oral     SpO2: 98% 96%     Weight:   70 kg (154 lb 5 2 oz)    Height:           General Appearance: NAD, conversive  Eyes: No icterus; conjunctiva normal, PERRLA  HENT: oropharynx clear; mucous membranes moist  Neck: trachea midline, range of motion full   Supple w/o pain  Lungs: CTA, normal respiratory effort, no retractions, expiratory effort normal  CV: regular rate, no rubs/gallops; +murmur  ABD: soft; NT/ND; +BS  EXT: 1+ edema LE = improving  Skin: normal turgor, normal texture, no rashes  Psych: affect normal, no overt anxiety/depression during exam today   Neuro: AAOx3 Total time spent: At least 40 minutes, with more than 50% spent counseling/coordinating care  Counseling includes discussion with patient re: progress  and discussion with patient of his/her current medical state/information  Coordination of patient's care was performed in conjunction with primary service  Time invested included review of patient's labs, vitals, and management of their comorbidities with continued monitoring  In addition, this patient was discussed with medical team including physician and advanced extenders  The care of the patient was extensively discussed and appropriate treatment plan was formulated unique for this patient  ** Please Note: Dragon 360 Dictation voice to text software may have been used in the creation of this document   **

## 2019-09-10 NOTE — PROGRESS NOTES
Physical Medicine and Rehabilitation Progress Note  Arnie Spatz 80 y o  male MRN: [id number]  Unit/Bed#: [facility] 636-87 Encounter: [id number]    Physical Medicine and Rehabilitation Progress Note  Arnie Spatz 80 y o  male MRN: [id number]  Unit/Bed#: [facility] 675-96 Encounter: [id number]    Chief Complaints:  Decline in function    Subjective/Interval Events:   Patient reports feeling well over the weekend without new issues or complaints  Patient quite grateful for the help he has been receiving  Patient denies increased swelling, shortness of breath, cough, worsening strength or sensation, lightheadedness, bowel or bladder problems, uncontrolled pain, or other complaints  ROS: A 10-point ROS was performed  Negative except as listed above  Overall Assessment/relevant history:  [age]-year-old male with past medical history of chronic diastolic CHF, chronic kidney disease stage 4, paroxysmal atrial fibrillation, dual chamber permanent pacemaker, coronary artery disease, CABG, diabetes mellitus 2, hypothyroidism, BPH, recurrent UTIs, lumbar spinal stenosis, chronic low back pain, foot drop, history of left L2-L3 microdiskectomy in July of 2019 the developed worsening swelling with associated decline in ADLs and ambulation  Patient splits seen by Cardiology and Nephrology with noted worsening of renal function and volume overload believed to be related to this  Patient required initial IV and then more recently p o  Increase in diuretics  Patient's volume status has been improving although his functional status remains below his baseline    Patient was evaluated by skilled therapies and was found to have significant decline in ADLs and ambulation appropriate for admission to [facility]        Functional status on admission to [facility]:  PT:  Transfers max assist, ambulation moderate assist 150 feet  OT:  Lower body dressing, toileting total assist, bathing, transfers max assist, upper body dressing moderate assist    Functional status (recent):    Transfers and ambulation 150 ft Min assist    Functional status goal:  Supervision for most ADLs and ambulation     * Impaired mobility and ADLs  Assessment & Plan  Function improving, caregiver training ongoing, continue management as outlined  Right proximal lower extremity strength improved from admission  Multifactorial:  Recent volume overload related to acute on chronic kidney disease, history of diastolic CHF, generalized weakness including proximal lower extremity weakness possibly related to some chronic illness/critical illness deconditioning, low back pain and radiating leg pain with right foot drop from lumbar spinal stenosis possible nerve impingement status post surgical intervention recently, 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, left adhesive capsulitis, query component cognitive impairment,   - Recommend acute comprehensive interdisciplinary inpatient rehabilitation to include intensive skilled therapies (PT, OT) as outlined with oversight and management by rehabilitation physician as well as inpatient rehab level nursing, case management and weekly interdisciplinary team meetings  - [address] [place] [address] 16/30 with greatest deficits in memory and executive functioning   - optimal management of each  - Follow-up with PMR after discharge      Buttock wound  Assessment & Plan  Appreciated on admission to [address] [place] [address] care nurse c/s and assistance with management  - Notify MD of new or increasing drainage, development of purulent drainage, increased size/depth of wound, lack of healing, inability to maintain wound integrity due to degree of drainage, wound product, dressing, or currently ordered frequency of wound management  In general dressings should be changed PRN if soiled unless specifically noted otherwise  Do not allow soiled dressing on patient for extended period of time    - Turn patient Q2H   - Hydragaurd to buttocks and sacrum BID & PRN v Alleyvn >  - EHOB waffle cushion to chair/WC when OOB  - Maximum time in chair 2 hours at a time  - OOB minimum 3 times per day  Notify MD if unable to get patient OOB 3 times per day  - Elevate heels with pillows to offload  - Moisturizer daily and PRN to dry skin; do not use moisturizer on areas of redness or skin breakdown unless otherwise documented  - Nursing to document in chart and Notify MD if buttock, sacrum, heel, or other skin site develops erythema or skin breakdown as soon as possible  If patient is soiling themselves with urine or stool notify MD   If you are unable to maintain skin integrity and prevent erythema due to frequency of soiling notify MD as soon as possible  Kidney disease with fluid retention  Assessment & Plan  Creatinine fairly stable with Nephrology recommendation to continue torsemide at current dose  Diuretics at discretion of Medicine Nephrology  Internal medicine consult and management during [facility] course  Nephrology c/s to assist with mgmt  Cr  stable recently; monitor intermittently  Limit nephrotoxic agents when possible  WENDY hose       Cognitive impairment  Assessment & Plan  > [address] [place] [address] 16/30 indicating moderate cog impairment with deficits greatest in domains of short term memory and executive functioning  > Wife states patients cognitive status has been largely stable for some time  > CT head 6/2019 - Encephalomalacia within the right frontal and parietal lobe extending inferiorly into the posterior superior temporal lobe  Chronic microangiopathic change within both cerebral hemispheres  Brainstem and cerebellum demonstrate normal density  No mass or hemorrhage  Mild cerebral volume loss  VENTRICLES AND EXTRA-AXIAL SPACES:  Asymmetry with mild ex vacuo dilatation of the posterior body, atria and occipital horn of the right lateral ventricle  Mild chronic microangiopathic change and age-appropriate cerebral volume loss    Right hemispheric encephalomalacia and gliosis suggestive of old infarction   > Increased risk of dementia and falls  > Compensatory strategies and adjustments in PT/OT as indicated  > Follow-up with neurology after d/c and PMR          Pain  Assessment & Plan  Controlled, continue management  Try to limit sedating meds   APAP PRN   LD patch  Counseled on and continue to encourage deep breathing/relaxation/behavioral pain management techniques:     Deep breathin seconds in, 5 seconds out, 5 times per hour when awake and PRN when in pain or anticipate pain; avoid holding breath and tightening muscles and instead breathe slowly and deeply  Monitor for oversedation, AMS/delirium, and respiratory depression   If being administered - hold opiates, muscle relaxants, benzodiazepines, and gabapentin for oversedation, AMS, or RR<12  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  Assessment & Plan  Remains well controlled  On admission was complaining of some chronic right low back pain with intermittent radiating pain down the leg; also with right foot drop  Status post recent lumbar surgery - left L2-3 microdiskectomy  [facility])   Try to limit sedating medications  Acetaminophen as needed  Lidocaine patch for now patient prefers    Frozen shoulder  Assessment & Plan  Skilled therapies, modalities as indicated    Constipation  Assessment & Plan  Improved; continue management as outlined  Recommend colace/senna b i d   P r n   Bowel regimen as well  Monitor    History of recurrent UTIs  Assessment & Plan  No s/s of UTI > monitor   Internal medicine consult and management during [facility] course  Currently on flomax and proscar  Follow-up with Urology after discharge    Benign prostatic hyperplasia with urinary hesitancy  Assessment & Plan  Urinating adequately, continue to monitor  Continue flomax and Proscar; monitor vitals  - monitor for retention, incontinence, signs/symptoms of UTI  - recommend voiding trial; nursing prompt to void followed by bladder scan starting Q4-6H or after each patient initiated void; nursing to record voided output and bladder scan totals; straight cath PRN >350-400 cc; if post void residual bladder scans are <150 cc x3 consecutive voids, can stop scans       Presence of permanent cardiac pacemaker  Assessment & Plan  +    Anticoagulated on warfarin  Assessment & Plan  Management per Medicine with recent supratherapeutic INR    Chronic diastolic heart failure [facility])  Assessment & Plan  Internal medicine consult and management during [facility] course  Anti thrombotics per Medicine, blood pressure medications at their discretion     Paroxysmal A-fib [facility])  Assessment & Plan  Internal medicine consult and management during [facility] course  Anticoagulation at their discretion  Monitor rate    Diabetes mellitus with multiple complications [facility])  Assessment & Plan  Lab Results   Component Value Date    HGBA1C 5 5 08/22/2019     Internal medicine consulted and management at their discretion  Monitor for signs and symptoms of hypoglycemia   Current meds: lispro   > resuming Tradjenta per Medicine      Essential hypertension  Assessment & Plan  BP acceptable  Internal medicine consulted and management at their discretion  Monitor vitals with and without activity; monitor for orthostasis  Monitor hemoglobin, electrolytes, kidney function, hydration status   Current meds:  Metoprolol, amlodipine      3-vessel coronary artery disease  Assessment & Plan  Antiplatelet, optimal blood pressure control, statin  Ranexa    Hyperlipidemia  Assessment & Plan  Statin    Anemia  Assessment & Plan  Hemoglobin up to 9 0 recently  Recently received Venofer  Iron supplementation per Nephrology  IM consulted and assistance with management during [facility] course  Nephrology management as well; hemoccults x2 negative  Monitor H/H, vitals, signs/symptoms of acute bleeding        Vitamin D insufficiency  Assessment & Plan  Cholecalciferol Vit D3 2000 units daily   Follow-up with PCP      Healthcare maintenance  Assessment & Plan  Obtain vitamin-D level    Hypothyroidism  Assessment & Plan  Levothyroxine    Elevated serum alkaline phosphatase level  Assessment & Plan  Repeat CMP      # Bowel care:    - monitor for constipation, incontinence, and diarrhea  - goal 1 appropriate BM every 1-2 days  - recommend colace +/- senna and PRN bowel protocol     # At risk for venous thromboembolism:  - Recommend SCDs and mobilization  - A/C     # Diet/Hydration:    Diabetic/cardiac     Disposition:   Home with family Friday or Saturday     Follow-up:   PCP, PMR, Nephrology, Cardiology, urology     CODE: Level 3: DNAR and DNI     Restrictions include: Fall precautions   ---------------------------------------------------------------------------------------------------------------------    Objective: Allergies and Medications per EMR    Physical Exam:  Temperature 98 F pulse 77 respiratory 20 blood pressure 122/57 SpO2 98% room air  General: Awake, alert in NAD  HENT:  MMM  Respiratory: Unlabored breathing, breath sounds equal, Lungs CTA, no wheezes, rales, or rhonchi  Cardiovascular: Regular rate and rhythm, no murmurs, rubs, or gallops  Gastrointestinal: Soft, non-tender,  minimally-distended, normoactive bowel sounds  Genitourinary: No camargo  SkiN/MSK/Extremities:   1+ bilateral lower extremity edema  No calf tenderness to palpation  Neurologic/Psych:   MENTAL STATUS: awake, orientation intact; appropriate wakefulness  Affect:  Euthymic  Right proximal lower extremity strength remains improved, overall strength improved from admission    Physical exam performed, documentation above reviewed and updated if appropriate on relevant date of encounter:   09/09/2019    Diagnostic Studies: reviewed, no new imaging  No results found      Laboratory:    Results from last 7 days   Lab Units 09/09/19  0642 09/05/19  0508   HEMOGLOBIN g/dL 9 0* 8 3*   HEMATOCRIT % 28 3* 26 2*   WBC Thousand/uL 7 58 6 11     Results from last 7 days   Lab Units 09/09/19  0642 09/06/19  0550   BUN mg/dL 51* 52*   POTASSIUM mmol/L 3 6 3 8   CHLORIDE mmol/L 100 102   CREATININE mg/dL 3 76* 3 60*     Results from last 7 days   Lab Units 09/09/19  0642 09/07/19  0550 09/05/19  0508   PROTIME seconds 24 3* 27 3* 26 1*   INR  2 24* 2 60* 2 45*        Patient Active Problem List   Diagnosis    3-vessel coronary artery disease    Asbestosis ([facility] [address] )    Kidney disease with fluid retention    Diabetes mellitus with multiple complications (HCC)    Elevated serum alkaline phosphatase level    Hyperlipidemia    Essential hypertension    Hypothyroidism    Paroxysmal A-fib (HCC)    Seasonal allergies    Increased frequency of urination    Frozen shoulder    Herpes zoster without complication    Depressed mood    Chest pain    Ambulatory dysfunction/generalized weakness    Leg edema, left    Shortness of breath    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    UTI (urinary tract infection)    Tibial artery occlusion, left (HCC)    Hyponatremia    Chronic diastolic heart failure (HCC)    Benign prostatic hyperplasia with urinary hesitancy    Abnormal chest x-ray    Warfarin-induced coagulopathy (HCC)    Impaired mobility and ADLs    Anticoagulated on warfarin    History of recurrent UTIs    Buttock wound    Presence of permanent cardiac pacemaker    Pain    Anemia    Healthcare maintenance    Cognitive impairment    Vitamin D insufficiency    CKD (chronic kidney disease) stage 4, GFR 15-29 ml/min (HCC)    Constipation       ** Please Note: Fluency Direct voice to text software may have been used in the creation of this document   **      Lele Martinez MD, [address] [facility]  Physical Medicine and Rehabilitation  Brain Injury Medicine

## 2019-09-10 NOTE — SOCIAL WORK
Cm met w/pt and wife and answered questions re: hha and services through [facility] and [facility]  Wife wishes to stay with [facility] if the hha services through medicare are the same in both agencies  Wife is researching options for hha and thinks she will go with [place]  Cm confirmed that dc will be either Friday or Saturday depending on the availability of their son for transportation  Cm received call from Carlos at [facility] in [place] who stated the patients dtr is researching their facility for pt to go to on dc  CM stated there had been no contact with the daughter and cm needed to secure permission from the patient and wife  Cm met with pt and wife and reviewed the above  Per wife they are not having pt enter that facility upon dc from the [facility] but are interested in knowing if they can accommodate down the road should they be interested in making that transition  Cm phoned Carlos back at [phone] and relayed the above but stated info could be faxed for their review  Cm faxed demographics, h&p, and therapy notes  Wife aware

## 2019-09-10 NOTE — PROGRESS NOTES
09/10/19 1230   Pain Assessment   Pain Assessment No/denies pain   Pain Score No Pain   Restrictions/Precautions   Precautions Bed/chair alarms;Cognitive; Fall Risk;Supervision on toilet/commode   Weight Bearing Restrictions No   ROM Restrictions No   Braces or Orthoses AFO   QI: Putting On/Taking Off Footwear   Assistance Needed Physical assistance   Assistance Provided by Benedict Total assistance   Comment Dependent for AFOS  Putting On/Taking Off Footwear CARE Score 1   QI: Sit to Stand   Assistance Needed Physical assistance   Assistance Provided by Benedict Less than 25%   Comment Pt completes sit to stand transfers at an overall Benny level this session with improved overall standing balance noted  Pt continues to require VC's for sequencing steps  Sit to Stand CARE Score 3   QI: Chair/Bed-to-Chair Transfer   Assistance Needed Physical assistance   Assistance Provided by Benedict 50%-74%   Chair/Bed-to-Chair Transfer CARE Score 2   Transfer Bed/Chair/Wheelchair   Limitations Noted In Balance; Coordination; Endurance;UE Strength;LE Strength;Problem Solving   Adaptive Equipment Roller Walker   Findings Pt continues to require A for boosting and lowering  Pt's wife present for family training and is hands on with transfers  Continues to require cueing for carryover  Bed, Chair, Wheelchair Transfer (FIM) 2 - Benedict needs to lift or boost to rise AND assist to sit   Functional Standing Tolerance   Time 7 minutes, 10 minutes    Activity Pt continues to present with fatigue with prolonged standing and requires rest breaks to manage fatigue  Pt engaged in standing tolerance activity with focus on unilateral UE release  Pt continues to present with decreased righting reactions while in stance      Cognition   Overall Cognitive Status Impaired   Arousal/Participation Cooperative   Attention Attends with cues to redirect   Orientation Level Oriented X4   Memory Decreased short term memory;Decreased recall of recent events;Decreased recall of precautions   Following Commands Follows one step commands with increased time or repetition   Activity Tolerance   Activity Tolerance Patient tolerated treatment well   Assessment   Treatment Assessment Pt's wife present at start of session  Discussion held with pt and wife in regards to pt's progress and current level of function in addition to pt's remaining barriers  Pt continues to present with inconsistent standing balance and at times does requires significant assist for boosting and lowering safely onto surface  Pt continues to present with slight retropulsion upon initial sit to stand  He requires VC's throughout session for proper positioning prior to transferring  Pt's wife does demonstrate F carryover of positioning, guarding and cueing but will benefit from further repetition to ensure safety and accuracy with stand pivot transfers  She understands she shouldn't be walking pt at this time  Continue to reinforce use of aislinn belt  Pt's wife reports looking into additional help upon d/c home, deferred to CM for assistance  Pt will continue to benefit from skilled OT services following POC with focus on family training, functional transfers, toileting, ADL retraining and standing balance to increase safety and reduce burden of care upon d/c  Prognosis Fair   Problem List Decreased range of motion;Decreased strength;Decreased endurance; Impaired balance;Decreased mobility; Decreased cognition; Impaired judgement;Decreased safety awareness   Plan   Treatment/Interventions ADL retraining;Functional transfer training; Therapeutic exercise; Endurance training;Cognitive reorientation;Patient/family training;Equipment eval/education; Bed mobility; Compensatory technique education   Progress Progressing toward goals   Recommendation   OT Discharge Recommendation 24 hour supervision/assist   OT Therapy Minutes   OT Time In 1230   OT Time Out 1400   OT Total Time (minutes) 90   OT Mode of treatment - Individual (minutes) 90   OT Mode of treatment - Concurrent (minutes) 0   OT Mode of treatment - Group (minutes) 0   OT Mode of treatment - Co-treat (minutes) 0   OT Mode of Teatment - Total time(minutes) 90 minutes   Therapy Time missed   Time missed?  No

## 2019-09-10 NOTE — PROGRESS NOTES
NEPHROLOGY PROGRESS NOTE   Delmar Leyva 80 y o  male MRN: [id number]  Unit/Bed#: -22 Encounter: [id number]    ASSESSMENT & PLAN:  80 y  o male with history of chronic kidney disease stage 4, BPH, hypertension presented with increased lower extremity edema gradually worsening, he initially presented to [facility] on 08/23  Nephrology consulted for chronic kidney disease and for fluid overload  Volume status improved with IV Lasix followed by oral torsemide  1  Chronic kidney disease stage 4:  · Suspect that baseline creatinine will settle around 3 5 to 3 7 with diuretics  as per records recent baseline creatinine  has been around 3 3-3 5  Previously in 2014 creatinine was 1 9 and 2 9 in 2015  · Renal function stable at creatinine 3 64 today which is within baseline   · may have to except slight increase in creatinine to maintain euvolemia  · Saw Dr Polly Solo in May 2019 - has appt on 9/11/19 with [facility]  · Etiology -likely due to hypertensive nephrosclerosis and diabetic nephropathy as well as age-related nephron loss  · Phosphorus is at goal   Continue vitamin-D     2  Primary HTN with chronic kidney disease stage 4: BP  is acceptable and is at goal   Continue current medication  Avoid hypotension  3  Acute on Chronic diastolic CHF (EF 42%, M3GT on 6/12/19): Continue Torsemide 40 mg daily  Continue monitoring renal function  4  Hyponatremia:  Sodium slightly low at 134 but stable  Continue to monitor  Continue fluid restriction  Hyponatremia likely from hypervolemia  5  Anemia, iron deficiency:  Iron saturation was 14% on 08/23  · Hemoglobin is stable  · He received Venofer 1000 mg during this hospital stay  Continue Niferex 150 mg daily  · Hemoglobin already improving to 9 0, if hemoglobin remains low may consider HOLLY after discussion with patient    6  History of urine retention/BPH:  Continue finasteride and Flomax  Continue follow up with Urology    7  Hx CAD/CABG    Discussed with primary team      SUBJECTIVE:  No shortness of breath  Lower extremity edema improved    OBJECTIVE:  Current Weight: Weight - Scale: 70 kg (154 lb 5 2 oz)  Vitals:    09/10/19 1300   BP: 108/56   Pulse: 71   Resp: 19   Temp: 97 8 °F (36 6 °C)   SpO2: 97%       Intake/Output Summary (Last 24 hours) at 9/10/2019 1533  Last data filed at 9/10/2019 1100  Gross per 24 hour   Intake 300 ml   Output 2425 ml   Net -2125 ml       Physical Exam  General:  Ill looking, awake  Eyes: Conjunctivae pink,  Sclera anicteric  ENT: lips and mucous membranes moist  Neck: supple   Chest: Clear to Auscultation both lungs,  no crackles, ronchus or wheezing  CVS: S1 & S2 present, normal rate, regular rhythm, no murmur    Abdomen: soft, non-tender, non-distended, Bowel sounds normoactive  Extremities:  Trace bilateral lower extremity edema  Skin: no rash  Neuro: awake, alert, oriented x3      Medications:    Current Facility-Administered Medications:     acetaminophen (TYLENOL) tablet 650 mg, 650 mg, Oral, Q6H PRN, Akosua Poole MD, 650 mg at 09/03/19 1710    aspirin (ECOTRIN LOW STRENGTH) EC tablet 81 mg, 81 mg, Oral, Daily, Akosua Poole MD, 81 mg at 09/10/19 0801    atorvastatin (LIPITOR) tablet 40 mg, 40 mg, Oral, QPM, Akosua Poole MD, 40 mg at 09/09/19 1749    bisacodyl (DULCOLAX) rectal suppository 10 mg, 10 mg, Rectal, Daily PRN, Akosua Poole MD    cholecalciferol (VITAMIN D3) tablet 2,000 Units, 2,000 Units, Oral, Daily, Akosua Poole MD, 2,000 Units at 09/10/19 0801    finasteride (PROSCAR) tablet 5 mg, 5 mg, Oral, Daily, MAURICE Macedo, 5 mg at 09/10/19 0801    insulin lispro (HumaLOG) 100 units/mL subcutaneous injection 1-5 Units, 1-5 Units, Subcutaneous, TID AC, 1 Units at 09/10/19 1130 **AND** Fingerstick Glucose (POCT), , , TID AC, MAURICE Patel    insulin lispro (HumaLOG) 100 units/mL subcutaneous injection 1-5 Units, 1-5 Units, Subcutaneous, TOM, Macy Crew MAURICE Dewey, 1 Units at 09/08/19 2137    iron polysaccharides (FERREX) capsule 150 mg, 150 mg, Oral, Daily, Melissa Sierra MD, 150 mg at 09/10/19 0801    levothyroxine tablet 75 mcg, 75 mcg, Oral, Daily, Fely Johnson MD, 75 mcg at 09/10/19 0528    lidocaine (LIDODERM) 5 % patch 1 patch, 1 patch, Topical, Daily, Fely Johnson MD, 1 patch at 09/09/19 1624    lidocaine (URO-JET) 2 % urethral/mucosal gel, , Topical, Q4H PRN, Fely Johnson MD    linaGLIPtin TABS 5 mg, 5 mg, Oral, Daily, MAURICE Jett, 5 mg at 09/10/19 0801    menthol-methyl salicylate (BENGAY) 39-41 % cream, , Apply externally, 4x Daily PRN, Fely Johnson MD    metoprolol tartrate (LOPRESSOR) tablet 25 mg, 25 mg, Oral, BID With Meals, MAURICE Jett, 25 mg at 09/10/19 0747    ondansetron (ZOFRAN) injection 4 mg, 4 mg, Intravenous, Q6H PRN, Fely Johnson MD    polyethylene glycol [facility]) packet 17 g, 17 g, Oral, Daily PRN, Fely Johnson MD    ranolazine [facility]) 12 hr tablet 500 mg, 500 mg, Oral, Q12H [address], Fely Johnson MD, 500 mg at 09/10/19 0801    senna (SENOKOT) tablet 8 6 mg, 1 tablet, Oral, BID, Fely Johnson MD, 8 6 mg at 09/10/19 0801    tamsulosin (FLOMAX) capsule 0 4 mg, 0 4 mg, Oral, Daily, Fely Johnson MD, 0 4 mg at 09/10/19 0801    torsemide (DEMADEX) tablet 40 mg, 40 mg, Oral, Daily, Fely Johnson MD, 40 mg at 09/10/19 0801    warfarin (COUMADIN) tablet 1 5 mg, 1 5 mg, Oral, Once per day on Sun Tue Wed Fri Sat, MAURICE Jett, 1 5 mg at 09/08/19 0105    warfarin (COUMADIN) tablet 3 mg, 3 mg, Oral, Once per day on Mon Thu, MAURICE Patel, 3 mg at 09/09/19 1750    Invasive Devices:        Lab Results:   Results from last 7 days   Lab Units 09/10/19  0527 09/09/19  0642 09/06/19  0550 09/05/19  0508 09/04/19  0529   WBC Thousand/uL  --  7 58  --  6 11  --    HEMOGLOBIN g/dL  --  9 0*  --  8 3*  --    HEMATOCRIT %  --  28 3*  --  26 2*  --    PLATELETS Thousands/uL  --  243  --  214  --    POTASSIUM mmol/L 3 5 3 6 3 8 3 8 3 8   CHLORIDE mmol/L 101 100 102 102 101   CO2 mmol/L 28 29 27 29 28   BUN mg/dL 51* 51* 52* 48* 51*   CREATININE mg/dL 3 64* 3 76* 3 60* 3 65* 3 56*   CALCIUM mg/dL 8 1* 8 3 8 2* 8 3 8 3   MAGNESIUM mg/dL  --   --   --   --  2 4       Previous work up:      Portions of the record may have been created with voice recognition software  Occasional wrong word or "sound a like" substitutions may have occurred due to the inherent limitations of voice recognition software  Read the chart carefully and recognize, using context, where substitutions have occurred  If you have any questions, please contact the dictating provider

## 2019-09-10 NOTE — PLAN OF CARE
Problem: PAIN - ADULT  Goal: Verbalizes/displays adequate comfort level or baseline comfort level  Description  Interventions:  - Encourage patient to monitor pain and request assistance  - Assess pain using appropriate pain scale  - Administer analgesics based on type and severity of pain and evaluate response  - Implement non-pharmacological measures as appropriate and evaluate response  - Consider cultural and social influences on pain and pain management  - Notify physician/advanced practitioner if interventions unsuccessful or patient reports new pain  Outcome: Progressing     Problem: INFECTION - ADULT  Goal: Absence or prevention of progression during hospitalization  Description  INTERVENTIONS:  - Assess and monitor for signs and symptoms of infection  - Monitor lab/diagnostic results  - Monitor all insertion sites, i e  indwelling lines, tubes, and drains  - Monitor endotracheal if appropriate and nasal secretions for changes in amount and color  - [place] appropriate cooling/warming therapies per order  - Administer medications as ordered  - Instruct and encourage patient and family to use good hand hygiene technique  - Identify and instruct in appropriate isolation precautions for identified infection/condition  Outcome: Progressing     Problem: SAFETY ADULT  Goal: Patient will remain free of falls  Description  INTERVENTIONS:  - Assess patient frequently for physical needs  -  Identify cognitive and physical deficits and behaviors that affect risk of falls    -  [place] fall precautions as indicated by assessment   - Educate patient/family on patient safety including physical limitations  - Instruct patient to call for assistance with activity based on assessment  - Modify environment to reduce risk of injury  - Consider OT/PT consult to assist with strengthening/mobility  Outcome: Progressing     Problem: SAFETY ADULT  Goal: Maintain or return to baseline ADL function  Description  INTERVENTIONS:  -  Assess patient's ability to carry out ADLs; assess patient's baseline for ADL function and identify physical deficits which impact ability to perform ADLs (bathing, care of mouth/teeth, toileting, grooming, dressing, etc )  - Assess/evaluate cause of self-care deficits   - Assess range of motion  - Assess patient's mobility; develop plan if impaired  - Assess patient's need for assistive devices and provide as appropriate  - Encourage maximum independence but intervene and supervise when necessary  - Involve family in performance of ADLs  - Assess for home care needs following discharge   - Consider OT consult to assist with ADL evaluation and planning for discharge  - Provide patient education as appropriate  Outcome: Progressing     Problem: SAFETY ADULT  Goal: Maintain or return mobility status to optimal level  Description  INTERVENTIONS:  - Assess patient's baseline mobility status (ambulation, transfers, stairs, etc )    - Identify cognitive and physical deficits and behaviors that affect mobility  - Identify mobility aids required to assist with transfers and/or ambulation (gait belt, sit-to-stand, lift, walker, cane, etc )  - [place] fall precautions as indicated by assessment  - Record patient progress and toleration of activity level on Mobility SBAR; progress patient to next Phase/Stage  - Instruct patient to call for assistance with activity based on assessment  - Consider rehabilitation consult to assist with strengthening/weightbearing, etc   Outcome: Progressing     Problem: Prexisting or High Potential for Compromised Skin Integrity  Goal: Skin integrity is maintained or improved  Description  INTERVENTIONS:  - Identify patients at risk for skin breakdown  - Assess and monitor skin integrity  - Assess and monitor nutrition and hydration status  - Monitor labs   - Assess for incontinence   - Turn and reposition patient  - Assist with mobility/ambulation  - Relieve pressure over bony prominences  - Avoid friction and shearing  - Provide appropriate hygiene as needed including keeping skin clean and dry  - Evaluate need for skin moisturizer/barrier cream  - Collaborate with interdisciplinary team   - Patient/family teaching  - Consider wound care consult   Outcome: Progressing     Problem: Potential for Falls  Goal: Patient will remain free of falls  Description  INTERVENTIONS:  - Assess patient frequently for physical needs  -  Identify cognitive and physical deficits and behaviors that affect risk of falls    -  [place] fall precautions as indicated by assessment   - Educate patient/family on patient safety including physical limitations  - Instruct patient to call for assistance with activity based on assessment  - Modify environment to reduce risk of injury  - Consider OT/PT consult to assist with strengthening/mobility  Outcome: Progressing

## 2019-09-10 NOTE — PROGRESS NOTES
09/10/19 0930   Pain Assessment   Pain Assessment No/denies pain   Pain Score No Pain   Restrictions/Precautions   Precautions Bed/chair alarms;Cognitive; Fall Risk;Supervision on toilet/commode   Braces or Orthoses AFO  (B LEs)   Cognition   Overall Cognitive Status Impaired   Arousal/Participation Cooperative   Attention Attends with cues to redirect   Memory Decreased short term memory;Decreased recall of recent events;Decreased recall of precautions   Following Commands Follows one step commands with increased time or repetition   Subjective   Subjective pt with no current complaints   QI: Roll Left and Right   Assistance Needed Supervision; Adaptive equipment   Assistance Provided by Roosevelt No physical assistance   Roll Left and Right CARE Score 4   QI: Sit to Lying   Assistance Needed Physical assistance   Assistance Provided by Roosevelt 25%-49%   Sit to Lying CARE Score 3   QI: Lying to Sitting on Side of Bed   Assistance Needed Supervision; Adaptive equipment   Assistance Provided by Roosevelt No physical assistance   Lying to Sitting on Side of Bed CARE Score 4   QI: Sit to Stand   Assistance Needed Physical assistance   Assistance Provided by Roosevelt Less than 25%   Sit to Stand CARE Score 3   QI: Chair/Bed-to-Chair Transfer   Assistance Needed Physical assistance; Adaptive equipment   Assistance Provided by Roosevelt Less than 25%   Chair/Bed-to-Chair Transfer CARE Score 3   Transfer Bed/Chair/Wheelchair   Limitations Noted In Balance; Endurance;Problem Solving;LE Strength;UE Strength   Adaptive Equipment Roller Walker   Stand Pivot Minimal Assist;Contact Guard   Sit to Stand Minimal Assist   Stand to Sit Contact Guard;Minimal Assist   Supine to Sit Supervision   Sit to Supine Minimal Assist   Findings has difficulty getting up from lower surfaces   Bed, Chair, Wheelchair Transfer (FIM) 2 - Roosevelt needs to lift or boost to rise AND assist to sit   QI: Walk 10 Feet   Assistance Needed Incidental touching; Adaptive equipment   Assistance Provided by [place] No physical assistance   Walk 10 Feet CARE Score 4   QI: Walk 50 Feet with Two Turns   Assistance Needed Incidental touching; Adaptive equipment   Assistance Provided by [place] No physical assistance   Walk 50 Feet with Two Turns CARE Score 4   QI: Walk 150 Feet   Assistance Needed Incidental touching; Adaptive equipment   Assistance Provided by [place] No physical assistance   Walk 150 Feet CARE Score 4   QI: Walking 10 Feet on Uneven Surfaces   Reason if not Attempted Activity not applicable   Walking 10 Feet on Uneven Surfaces CARE Score 9   Ambulation   Does the patient walk? 2  Yes   Primary Discharge Mode of Locomotion Wheelchair   Walk Assist Level Contact Guard   Gait Pattern Inconsistant Lois; Slow Lois;Decreased foot clearance; Improper weight shift   Assist Device Seleneer Gabrielle Garcia Walked (feet) 150 ft   Limitations Noted In Balance; Endurance;Posture;Speed;Strength;Swing   Findings decrease L foot clearance   Walking (FIM) 4 - Patient requires steadying assist or light touching AND distance 150 feet or more, no rest   QI: Wheel 50 Feet with Two Turns   Reason if not Attempted Activity not applicable   Wheel 50 Feet with Two Turns CARE Score 9   QI: Wheel 150 Feet   Reason if not Attempted Activity not applicable   Wheel 581 Feet CARE Score 9   Wheelchair mobility   QI: Does the patient use a wheelchair? 0  No   QI: 1 Step (Curb)   Reason if not Attempted Safety concerns   1 Step (Curb) CARE Score 88   QI: 4 Steps   Reason if not Attempted Safety concerns   4 Steps CARE Score 88   QI: 12 Steps   Reason if not Attempted Safety concerns   12 Steps CARE Score 88   Equipment Use   NuStep L2 10mins   Assessment   Treatment Assessment FT done with pt's wife performing txs in room from w/c to bed and to [facility]  reviewed safe positioning during txs and w/c set up   provided information for posey gait belt to purchase on own  pt stated her dtr will take care of it  cont to review safe placest to sit at home to safely get in and out of   cont to recommend no walking with spouse at d/c, only with therapy at this time  will cont to work on functional txs and bed mobility with pt's wife for safe d/c home  Family/Caregiver Present wife, Ariel Rouse   PT Family training done with: see assessment   Problem List Decreased range of motion;Decreased strength;Decreased endurance; Impaired balance;Decreased mobility; Decreased cognition; Impaired judgement;Decreased safety awareness   Barriers to Discharge Inaccessible home environment;Decreased caregiver support   PT Barriers   Physical Impairment Decreased strength;Decreased endurance; Impaired balance;Decreased mobility; Decreased safety awareness   Functional Limitation Standing;Transfers; Walking;Stair negotiation   Plan   Treatment/Interventions Functional transfer training;LE strengthening/ROM; Endurance training;Patient/family training;Bed mobility;Gait training   Progress Progressing toward goals   Recommendation   Recommendation 24 hour supervision/assist;Outpatient PT; Home with family support   Equipment Recommended Wheelchair   PT Therapy Minutes   PT Time In 0930   PT Time Out 1100   PT Total Time (minutes) 90   PT Mode of treatment - Individual (minutes) 90   PT Mode of treatment - Concurrent (minutes) 0   PT Mode of treatment - Group (minutes) 0   PT Mode of treatment - Co-treat (minutes) 0   PT Mode of Teatment - Total time(minutes) 90 minutes   Therapy Time missed   Time missed?  No

## 2019-09-10 NOTE — PROGRESS NOTES
Patient's wife calling this CM to update her since last conversation  Reports Chandrika Sumner came home from rehab site on 8/11/19  Was home about 1 week and half and was readmitted -- "filled up with fluid "   Patient is currently at [facility] receiving OT and PT  Wife reports tentative discharge this week / weekend  Wife is hoping to take patient home but needs a lot of assistance to provide care  If patient discharges to home, they will start [facility] services again  It is possible he may rehab at another location -- depends on how patient progresses in couple days  CM encouraged patient's wife to contact this CM for future needs / assistance  Raymundo Mcleod expressed gratitude and verbalized understanding

## 2019-09-10 NOTE — SOCIAL WORK
In preparation of Pt d/c, a referral was sent via [facility] to [facility] for Deion Napier RN/PT/OT, and for A to resume

## 2019-09-11 LAB
GLUCOSE SERPL-MCNC: 154 MG/DL (ref 65–140)
GLUCOSE SERPL-MCNC: 166 MG/DL (ref 65–140)
GLUCOSE SERPL-MCNC: 222 MG/DL (ref 65–140)
GLUCOSE SERPL-MCNC: 224 MG/DL (ref 65–140)
GLUCOSE SERPL-MCNC: 97 MG/DL (ref 65–140)
MAGNESIUM SERPL-MCNC: 2.1 MG/DL (ref 1.6–2.6)

## 2019-09-11 PROCEDURE — 97110 THERAPEUTIC EXERCISES: CPT

## 2019-09-11 PROCEDURE — 99232 SBSQ HOSP IP/OBS MODERATE 35: CPT | Performed by: INTERNAL MEDICINE

## 2019-09-11 PROCEDURE — 99232 SBSQ HOSP IP/OBS MODERATE 35: CPT

## 2019-09-11 PROCEDURE — 97535 SELF CARE MNGMENT TRAINING: CPT

## 2019-09-11 PROCEDURE — 83735 ASSAY OF MAGNESIUM: CPT | Performed by: INTERNAL MEDICINE

## 2019-09-11 PROCEDURE — 97530 THERAPEUTIC ACTIVITIES: CPT

## 2019-09-11 PROCEDURE — 82948 REAGENT STRIP/BLOOD GLUCOSE: CPT

## 2019-09-11 RX ADMIN — LINAGLIPTIN 5 MG: 5 TABLET, FILM COATED ORAL at 08:31

## 2019-09-11 RX ADMIN — FINASTERIDE 5 MG: 5 TABLET, FILM COATED ORAL at 08:31

## 2019-09-11 RX ADMIN — TAMSULOSIN HYDROCHLORIDE 0.4 MG: 0.4 CAPSULE ORAL at 08:32

## 2019-09-11 RX ADMIN — INSULIN LISPRO 1 UNITS: 100 INJECTION, SOLUTION INTRAVENOUS; SUBCUTANEOUS at 17:40

## 2019-09-11 RX ADMIN — INSULIN LISPRO 1 UNITS: 100 INJECTION, SOLUTION INTRAVENOUS; SUBCUTANEOUS at 22:07

## 2019-09-11 RX ADMIN — LEVOTHYROXINE SODIUM 75 MCG: 75 TABLET ORAL at 05:19

## 2019-09-11 RX ADMIN — LIDOCAINE 1 PATCH: 50 PATCH CUTANEOUS at 17:34

## 2019-09-11 RX ADMIN — RANOLAZINE 500 MG: 500 TABLET, FILM COATED, EXTENDED RELEASE ORAL at 08:32

## 2019-09-11 RX ADMIN — TORSEMIDE 40 MG: 20 TABLET ORAL at 08:32

## 2019-09-11 RX ADMIN — WARFARIN SODIUM 1.5 MG: 1 TABLET ORAL at 17:34

## 2019-09-11 RX ADMIN — RANOLAZINE 500 MG: 500 TABLET, FILM COATED, EXTENDED RELEASE ORAL at 21:13

## 2019-09-11 RX ADMIN — POLYSACCHARIDE-IRON COMPLEX 150 MG: 150 CAPSULE ORAL at 08:32

## 2019-09-11 RX ADMIN — ASPIRIN 81 MG: 81 TABLET, COATED ORAL at 08:31

## 2019-09-11 RX ADMIN — ATORVASTATIN CALCIUM 40 MG: 40 TABLET, FILM COATED ORAL at 17:34

## 2019-09-11 RX ADMIN — METOPROLOL TARTRATE 25 MG: 25 TABLET, FILM COATED ORAL at 17:35

## 2019-09-11 RX ADMIN — VITAMIN D, TAB 1000IU (100/BT) 2000 UNITS: 25 TAB at 08:31

## 2019-09-11 RX ADMIN — METOPROLOL TARTRATE 25 MG: 25 TABLET, FILM COATED ORAL at 08:31

## 2019-09-11 RX ADMIN — SENNOSIDES 8.6 MG: 8.6 TABLET, FILM COATED ORAL at 17:34

## 2019-09-11 RX ADMIN — INSULIN LISPRO 2 UNITS: 100 INJECTION, SOLUTION INTRAVENOUS; SUBCUTANEOUS at 11:34

## 2019-09-11 NOTE — PLAN OF CARE
Problem: PAIN - ADULT  Goal: Verbalizes/displays adequate comfort level or baseline comfort level  Description  Interventions:  - Encourage patient to monitor pain and request assistance  - Assess pain using appropriate pain scale  - Administer analgesics based on type and severity of pain and evaluate response  - Implement non-pharmacological measures as appropriate and evaluate response  - Consider cultural and social influences on pain and pain management  - Notify physician/advanced practitioner if interventions unsuccessful or patient reports new pain  Outcome: Progressing     Problem: INFECTION - ADULT  Goal: Absence or prevention of progression during hospitalization  Description  INTERVENTIONS:  - Assess and monitor for signs and symptoms of infection  - Monitor lab/diagnostic results  - Monitor all insertion sites, i e  indwelling lines, tubes, and drains  - Monitor endotracheal if appropriate and nasal secretions for changes in amount and color  - [place] appropriate cooling/warming therapies per order  - Administer medications as ordered  - Instruct and encourage patient and family to use good hand hygiene technique  - Identify and instruct in appropriate isolation precautions for identified infection/condition  Outcome: Progressing     Problem: SAFETY ADULT  Goal: Patient will remain free of falls  Description  INTERVENTIONS:  - Assess patient frequently for physical needs  -  Identify cognitive and physical deficits and behaviors that affect risk of falls    -  [place] fall precautions as indicated by assessment   - Educate patient/family on patient safety including physical limitations  - Instruct patient to call for assistance with activity based on assessment  - Modify environment to reduce risk of injury  - Consider OT/PT consult to assist with strengthening/mobility  Outcome: Progressing     Problem: SAFETY ADULT  Goal: Maintain or return to baseline ADL function  Description  INTERVENTIONS:  -  Assess patient's ability to carry out ADLs; assess patient's baseline for ADL function and identify physical deficits which impact ability to perform ADLs (bathing, care of mouth/teeth, toileting, grooming, dressing, etc )  - Assess/evaluate cause of self-care deficits   - Assess range of motion  - Assess patient's mobility; develop plan if impaired  - Assess patient's need for assistive devices and provide as appropriate  - Encourage maximum independence but intervene and supervise when necessary  - Involve family in performance of ADLs  - Assess for home care needs following discharge   - Consider OT consult to assist with ADL evaluation and planning for discharge  - Provide patient education as appropriate  Outcome: Progressing     Problem: SAFETY ADULT  Goal: Maintain or return mobility status to optimal level  Description  INTERVENTIONS:  - Assess patient's baseline mobility status (ambulation, transfers, stairs, etc )    - Identify cognitive and physical deficits and behaviors that affect mobility  - Identify mobility aids required to assist with transfers and/or ambulation (gait belt, sit-to-stand, lift, walker, cane, etc )  - [place] fall precautions as indicated by assessment  - Record patient progress and toleration of activity level on Mobility SBAR; progress patient to next Phase/Stage  - Instruct patient to call for assistance with activity based on assessment  - Consider rehabilitation consult to assist with strengthening/weightbearing, etc   Outcome: Progressing     Problem: Prexisting or High Potential for Compromised Skin Integrity  Goal: Skin integrity is maintained or improved  Description  INTERVENTIONS:  - Identify patients at risk for skin breakdown  - Assess and monitor skin integrity  - Assess and monitor nutrition and hydration status  - Monitor labs   - Assess for incontinence   - Turn and reposition patient  - Assist with mobility/ambulation  - Relieve pressure over bony prominences  - Avoid friction and shearing  - Provide appropriate hygiene as needed including keeping skin clean and dry  - Evaluate need for skin moisturizer/barrier cream  - Collaborate with interdisciplinary team   - Patient/family teaching  - Consider wound care consult   Outcome: Progressing     Problem: Potential for Falls  Goal: Patient will remain free of falls  Description  INTERVENTIONS:  - Assess patient frequently for physical needs  -  Identify cognitive and physical deficits and behaviors that affect risk of falls    -  [place] fall precautions as indicated by assessment   - Educate patient/family on patient safety including physical limitations  - Instruct patient to call for assistance with activity based on assessment  - Modify environment to reduce risk of injury  - Consider OT/PT consult to assist with strengthening/mobility  Outcome: Progressing

## 2019-09-11 NOTE — PROGRESS NOTES
NEPHROLOGY PROGRESS NOTE   Tammy Ireland 80 y o  male MRN: [id number]  Unit/Bed#: -81 Encounter: [id number]    ASSESSMENT & PLAN:  80 y  o male with history of chronic kidney disease stage 4, BPH, hypertension presented with increased lower extremity edema gradually worsening, he initially presented to [address] [place] [address] on 08/23  Nephrology consulted for chronic kidney disease and for fluid overload  Volume status improved with IV Lasix followed by oral torsemide  1  Chronic kidney disease stage 4:  · Suspect that baseline creatinine will settle around 3 5 to 3 7 with diuretics  As per records recent baseline creatinine  has been around 3 3-3 5  Previously in 2014 creatinine was 1 9 and 2 9 in 2015  · Renal function stable at creatinine 3 64 which is within baseline  No labs from today, will check labs tomorrow  · may have to accept slight increase in creatinine to maintain euvolemia  · Saw Dr Adrian Zaldivar in May 2019 - has appt on 9/11/19 with [facility] which needs to rescheduled  · Etiology -likely due to hypertensive nephrosclerosis and diabetic nephropathy as well as age-related nephron loss  · Phosphorus is at goal   Continue vitamin-D       2  Primary HTN with chronic kidney disease stage 4: BP  is slightly elevated today morning but acceptable, has been fluctuating  Continue current medication  Avoid hypotension  3  Acute on Chronic diastolic CHF (EF 84%, X2VY on 6/12/19): Continue Torsemide 40 mg daily  Continue monitoring renal function  4  Hyponatremia:  Sodium slightly low at 134 but stable  Continue to monitor  Continue fluid restriction  Hyponatremia likely from hypervolemia  5  Anemia, iron deficiency:  Iron saturation was 14% on 08/23  · Hemoglobin is stable  · He received Venofer 1000 mg during this hospital stay  Continue Niferex 150 mg daily     · Hemoglobin already improving to 9 0, if hemoglobin remains low may consider HOLLY after discussion with patient    6  History of urine retention/BPH:  Continue finasteride and Flomax  Continue follow up with Urology  7  Hx CAD/CABG    Discussed with  MAURICE Boudreaux, will check labs tomorrow    SUBJECTIVE:  No shortness of breath, using compression stockings, stable trace lower extremity edema  OBJECTIVE:  Current Weight: Weight - Scale: 75 4 kg (166 lb 3 6 oz)  Vitals:    09/11/19 0831   BP: 151/63   Pulse: 70   Resp:    Temp:    SpO2:        Intake/Output Summary (Last 24 hours) at 9/11/2019 1342  Last data filed at 9/11/2019 1204  Gross per 24 hour   Intake 444 ml   Output 1750 ml   Net -1306 ml       Physical Exam  General:  Ill looking, awake  Eyes: Conjunctivae pink,  Sclera anicteric  ENT: lips and mucous membranes moist  Neck: supple   Chest: Clear to Auscultation both lungs,  no crackles, ronchus or wheezing  CVS: S1 & S2 present, normal rate, regular rhythm, no murmur    Abdomen: soft, non-tender, non-distended, Bowel sounds normoactive  Extremities:  Trace bilateral lower extremity edema  Skin: no rash  Neuro: awake, alert, oriented x3    Medications:    Current Facility-Administered Medications:     acetaminophen (TYLENOL) tablet 650 mg, 650 mg, Oral, Q6H PRN, Marilou Miller MD, 650 mg at 09/03/19 1710    aspirin (ECOTRIN LOW STRENGTH) EC tablet 81 mg, 81 mg, Oral, Daily, Marilou Miller MD, 81 mg at 09/11/19 0831    atorvastatin (LIPITOR) tablet 40 mg, 40 mg, Oral, QPM, Marilou Miller MD, 40 mg at 09/10/19 1833    bisacodyl (DULCOLAX) rectal suppository 10 mg, 10 mg, Rectal, Daily PRN, Marilou Miller MD    cholecalciferol (VITAMIN D3) tablet 2,000 Units, 2,000 Units, Oral, Daily, Marilou Miller MD, 2,000 Units at 09/11/19 0831    finasteride (PROSCAR) tablet 5 mg, 5 mg, Oral, Daily, MAURICE Boudreaux, 5 mg at 09/11/19 0831    insulin lispro (HumaLOG) 100 units/mL subcutaneous injection 1-5 Units, 1-5 Units, Subcutaneous, TID AC, 2 Units at 09/11/19 1134 **AND** Fingerstick Glucose (POCT), , , CECILIO BARBOZA, MAURICE Lassiter    insulin lispro (HumaLOG) 100 units/mL subcutaneous injection 1-5 Units, 1-5 Units, Subcutaneous, HS, MAURICE Lassiter, 1 Units at 09/08/19 2137    iron polysaccharides (FERREX) capsule 150 mg, 150 mg, Oral, Daily, Malik Gonzalez MD, 150 mg at 09/11/19 0737    levothyroxine tablet 75 mcg, 75 mcg, Oral, Daily, Lianet Schultz MD, 75 mcg at 09/11/19 0519    lidocaine (LIDODERM) 5 % patch 1 patch, 1 patch, Topical, Daily, Lianet Schultz MD, 1 patch at 09/10/19 1631    lidocaine (URO-JET) 2 % urethral/mucosal gel, , Topical, Q4H PRN, Lianet Schultz MD    linaGLIPtin TABS 5 mg, 5 mg, Oral, Daily, BYRON LassiterNP, 5 mg at 09/11/19 0831    menthol-methyl salicylate (BENGAY) 20-48 % cream, , Apply externally, 4x Daily PRN, Lianet Schultz MD    metoprolol tartrate (LOPRESSOR) tablet 25 mg, 25 mg, Oral, BID With Meals, BYRON LassiterNP, 25 mg at 09/11/19 0831    ondansetron (ZOFRAN) injection 4 mg, 4 mg, Intravenous, Q6H PRN, Lianet Schultz MD    polyethylene glycol [facility]) packet 17 g, 17 g, Oral, Daily PRN, Lianet Schultz MD    ranolazine [facility]) 12 hr tablet 500 mg, 500 mg, Oral, Q12H [address], Lianet Schultz MD, 500 mg at 09/11/19 9997    senna (SENOKOT) tablet 8 6 mg, 1 tablet, Oral, BID, Lianet Schultz MD, 8 6 mg at 09/10/19 1833    tamsulosin (FLOMAX) capsule 0 4 mg, 0 4 mg, Oral, Daily, Lianet Schultz MD, 0 4 mg at 09/11/19 4189    torsemide (DEMADEX) tablet 40 mg, 40 mg, Oral, Daily, Lianet Schultz MD, 40 mg at 09/11/19 4002    warfarin (COUMADIN) tablet 1 5 mg, 1 5 mg, Oral, Once per day on Sun Tue Wed Fri Sat, MAURICE Patel, 1 5 mg at 09/10/19 2029    warfarin (COUMADIN) tablet 3 mg, 3 mg, Oral, Once per day on Mon Thu, MAURICE Patel, 3 mg at 09/09/19 1750    Invasive Devices:        Lab Results:   Results from last 7 days   Lab Units 09/11/19  0533 09/10/19  0527 09/09/19  0642 09/06/19  0550 09/05/19  0508   WBC Thousand/uL  --   --  7 58  --  6 11   HEMOGLOBIN g/dL  --   --  9 0*  --  8 3*   HEMATOCRIT %  --   --  28 3*  --  26 2*   PLATELETS Thousands/uL  --   --  243  --  214   POTASSIUM mmol/L  --  3 5 3 6 3 8 3 8   CHLORIDE mmol/L  --  101 100 102 102   CO2 mmol/L  --  28 29 27 29   BUN mg/dL  --  51* 51* 52* 48*   CREATININE mg/dL  --  3 64* 3 76* 3 60* 3 65*   CALCIUM mg/dL  --  8 1* 8 3 8 2* 8 3   MAGNESIUM mg/dL 2 1  --   --   --   --        Previous work up:      Portions of the record may have been created with voice recognition software  Occasional wrong word or "sound a like" substitutions may have occurred due to the inherent limitations of voice recognition software  Read the chart carefully and recognize, using context, where substitutions have occurred  If you have any questions, please contact the dictating provider

## 2019-09-11 NOTE — PROGRESS NOTES
09/11/19 0700   Pain Assessment   Pain Assessment No/denies pain   Pain Score No Pain   Restrictions/Precautions   Precautions Bed/chair alarms;Cognitive; Fall Risk;Supervision on toilet/commode   Weight Bearing Restrictions No   ROM Restrictions No   Braces or Orthoses AFO  (BLEs)   QI: Oral Hygiene   Assistance Needed Incidental touching   Assistance Provided by [place] No physical assistance   Comment CGA in stance at sink req ext time and occasional self steadying with RW and sink ledge    Oral Hygiene CARE Score 4   Grooming   Able To Initiate Tasks; Acquire Items; Wash/Dry Face;Brush/Clean Teeth;Wash/Dry Hands   Limitation Noted In Strength  (UE ROM)   Findings CGA in stance at sink req ext time and occasional self steadying with RW and sink ledge   Req A to brush hair due to dec shoulder ROM  Grooming (FIM) 4 - Patient completed 3/4  tasks   QI: Shower/Bathe Self   Assistance Needed Physical assistance   Assistance Provided by [place] Less than 25%   Comment Completed bathing while seated on shower chair in walk-in shower  Pt able to reach all body parts with ext time for dynamic reach to feet  Pt in stance with UE support from grab bar to wash buttocks with RUE  Pt req CGA/[place] overall for balance in stance  Shower/Bathe Self CARE Score 3   Bathing   Assessed Bath Style Shower   Anticipated D/C Bath Style Sponge Bath   Able to Matthew Leander No   Able to [employer] Temperature Yes   Able to Wash/Rinse/Dry (body part) Left Arm;Right Arm;L Upper Leg;R Upper Leg;L Lower Leg/Foot;R Lower Leg/Foot;Chest;Abdomen;Perineal Area; Buttocks   Limitations Noted in Balance; Endurance;ROM;Safety;Strength   Positioning Seated;Standing   Adaptive Equipment Shower Bars; Shower Seat;Hand Fernández's   Bathing (FIM) 4 - Patient requires steadying assist or light touching   Tub/Shower Transfer   Limitations Noted In Balance; Endurance; Safety   Adaptive Equipment Grab Bars;Seat with Back   Assessed Shower   Findings stand pivot with use of grab bars as pt with difficulty maneuvering RW into tighter space for shower transfer   Shower Transfer (FIM) 4 - Patient requires steadying assist or light touching   QI: Upper Body Dressing   Assistance Needed Physical assistance   Assistance Provided by [place] 25%-49%   Comment able to thread BUEs and bring mostly overhead but req A to pull down in back  Upper Body Dressing CARE Score 3   QI: Lower Body Dressing   Assistance Needed Physical assistance   Assistance Provided by [place] 50%-74%   Comment Req A to thread over LEs  Able to bring up over knees and performs CM over hips in stance with CGA  Lower Body Dressing CARE Score 2   QI: Putting On/Taking Off Footwear   Assistance Needed Physical assistance   Assistance Provided by [place] Total assistance   Comment dependent for TEDs and footwear  Putting On/Taking Off Footwear CARE Score 1   Dressing/Undressing Clothing   UB Dressing (FIM) 3 - Patient completes  50-74% of all tasks   LB Dressing (FIM) 2 - Patient completes 25-49% of all tasks   QI: Lying to Sitting on Side of Bed   Assistance Needed Supervision;Verbal cues   Assistance Provided by [place] No physical assistance   Comment ext time to position self at EOB   Lying to Sitting on Side of Bed CARE Score 4   QI: Sit to Stand   Assistance Needed Incidental touching   Assistance Provided by [place] No physical assistance   Comment CGA with occasional assistance necessary to stabilize surface as pt pushes back on it when sitting  Sit to Stand CARE Score 4   QI: Chair/Bed-to-Chair Transfer   Assistance Needed Incidental touching   Assistance Provided by [place] No physical assistance   Comment CGA for all transfers this session with ext time   Chair/Bed-to-Chair Transfer CARE Score 4   Transfer Bed/Chair/Wheelchair   Limitations Noted In Balance; Endurance;Problem Solving   Adaptive Equipment Roller [employer], Chair, Wheelchair Transfer (FIM) 4 - Patient requires steadying assist or light touching   Functional Standing Tolerance   Time 4min, 7min, 6 5min   Activity Pt completed peg board task in stance with focus on standing tolerance, standing balacne with unilateral hand release for CM/hygiene, and attention to task  Pt with no reported s/s of fatigue limiting safety this session and CGA overall for sit<>stand and in stance  Pt req cuing to attend to details in puzzle including colors and pattern but reports that he can see pattern with glasses one  ROM- Right Upper Extremities   RUE ROM Comment Can reach to front and back of head with RUE but unable to reach L side of head  ROM - Left Upper Extremities    LUE ROM Comment Pt unable to bring hand to head unless utilizing R hand under L elbow to lift UE  Cognition   Overall Cognitive Status Impaired   Arousal/Participation Cooperative   Attention Attends with cues to redirect   Orientation Level Oriented X4   Memory Decreased short term memory;Decreased recall of recent events;Decreased recall of precautions   Following Commands Follows one step commands with increased time or repetition   Activity Tolerance   Activity Tolerance Patient tolerated treatment well   Medical Staff Made Aware EMILY Canchola present to perform skin check and apply new sacral Allevyn  Assessment   Treatment Assessment Pt participated in skilled OT session with focus on ADL retraining, standing tolerance, and repetitive functional transfers  Pt benefits from continued repetitive transfer training with focus on positioning LEs and sequencing as pt cont to demo slower new learning  Pt overall with improvements noted in functional endurance for seated and standing tasks  Pt will req A for LB dressing upon dc as pt with difficulty managing TEDs and AFOs as well as difficulty performing dynamic reach to feet to thread pants  Wife verbalizing that she will provide A upon dc for these tasks as she was prior to his admission   Pt would benefit from cont therapy with focus on family training, functional transfers, act tolerance, and simple direction following to inc pt safety and dec burden of care upon dc  Cont with POC  Prognosis Fair   Problem List Decreased range of motion;Decreased strength;Decreased endurance; Impaired balance;Decreased mobility; Decreased cognition; Impaired judgement;Decreased safety awareness   Barriers to Discharge Inaccessible home environment;Decreased caregiver support   Plan   Treatment/Interventions ADL retraining;Functional transfer training; Therapeutic exercise; Endurance training;Patient/family training;Equipment eval/education; Bed mobility   Progress Progressing toward goals   Recommendation   OT Discharge Recommendation 24 hour supervision/assist   OT Therapy Minutes   OT Time In 0700   OT Time Out 0830   OT Total Time (minutes) 90   OT Mode of treatment - Individual (minutes) 90   OT Mode of treatment - Concurrent (minutes) 0   OT Mode of treatment - Group (minutes) 0   OT Mode of treatment - Co-treat (minutes) 0   OT Mode of Teatment - Total time(minutes) 90 minutes   Therapy Time missed   Time missed?  No

## 2019-09-11 NOTE — PROGRESS NOTES
09/11/19 1330   Pain Assessment   Pain Assessment No/denies pain   Pain Score No Pain   Restrictions/Precautions   Precautions Bed/chair alarms;Cognitive; Fall Risk;Fluid restriction;Supervision on toilet/commode   Braces or Orthoses AFO  (B LEs)   Cognition   Overall Cognitive Status Impaired   Subjective   Subjective no new complaints at this time   QI: Sit to Stand   Assistance Needed Physical assistance   Assistance Provided by [place] Less than 25%   Comment posterior LOB x2, able to sit down and try again   Sit to Stand CARE Score 3   QI: Chair/Bed-to-Chair Transfer   Assistance Needed Physical assistance; Adaptive equipment   Assistance Provided by [place] Less than 25%   Comment with RW   Chair/Bed-to-Chair Transfer CARE Score 3   Transfer Bed/Chair/Wheelchair   Limitations Noted In Balance; Endurance;UE Strength;LE Strength;Problem Solving   Adaptive Equipment Roller Walker   Stand Pivot Contact Guard   Sit to Stand Minimal Assist;Contact Guard   Stand to [facility]   Findings practiced sit pivots and SB txs to demonstrate different tx option  pt min/modA for sit pivot due to poor fwd wt shift but able to perform SB at Sup with A to set up   explained to pt's spouse SB if pt becomes weaker and unable to stand   Bed, Chair, Wheelchair Transfer (FIM) 3 - [place] needs to lift, boost or assist to stand OR sit   QI: Walk 10 Feet   Assistance Needed Incidental touching; Adaptive equipment   Assistance Provided by [place] No physical assistance   Walk 10 Feet CARE Score 4   QI: Walk 50 Feet with Two Turns   Assistance Needed Incidental touching; Adaptive equipment   Assistance Provided by [place] No physical assistance   Walk 50 Feet with Two Turns CARE Score 4   QI: Walk 150 Feet   Assistance Needed Incidental touching; Adaptive equipment   Assistance Provided by [place] No physical assistance   Walk 150 Feet CARE Score 4   QI: Walking 10 Feet on Uneven Surfaces   Reason if not Attempted Safety concerns   Walking 10 Feet on Uneven Surfaces CARE Score 88   Ambulation   Does the patient walk? 2  Yes   Primary Discharge Mode of Locomotion Wheelchair   Walk Assist Level Contact Guard   Gait Pattern Inconsistant Lois; Slow Lois;Decreased foot clearance; Improper weight shift   Assist Device Seleneer Gabrielle Garcia Walked (feet) 150 ft   Limitations Noted In Balance; Endurance; Heel Strike;Speed;Strength;Swing   Findings amb for endurance and activity tolerance training   Walking (FIM) 4 - Patient requires steadying assist or light touching AND distance 150 feet or more, no rest   QI: Wheel 50 Feet with [address]  Needed Physical assistance   Assistance Provided by [place] Less than 25%   Wheel 50 Feet with Two Turns CARE Score 3   QI: Wheel 150 Feet   Assistance Needed Physical assistance   Assistance Provided by [place] Less than 25%   Wheel 150 Feet CARE Score 3   Wheelchair mobility   QI: Does the patient use a wheelchair? 1  Yes   QI: Indicate the type of wheelchair 1  Manual   Wheelchair Assist Level Minimum Assist   Method Right upper extremity; Left upper extremity;Right lower extremity; Left lower extremity   Needs Assist With Locking Brakes;Obstacles   Distance Level Surface (feet) 150 ft   Findings difficulty getting  on wheels and using LEs to propel  pt uses transport chair at home   Wheelchair (FIM) 3 - Patient completes 50 - 74% of all tasks, needs more than incidental assist AND wheels distance 150 feet or more, no rest   QI: 1 Step (Curb)   Reason if not Attempted Safety concerns   1 Step (Curb) CARE Score 88   QI: 4 Steps   Reason if not Attempted Safety concerns   4 Steps CARE Score 88   QI: 12 Steps   Reason if not Attempted Safety concerns   12 Steps CARE Score 88   Equipment Use   NuStep L2 15mins   Assessment   Treatment Assessment session cont to focus on set up to perform SPTs wtih pt and pt's wife    pt able to set w/c up to tx to bed, but forgot to put RW in front of pt and needed cueing from pt to get RW before pt stood  reviewed other tx options to get from w/c   pt able to perform SB at [address]  and explained to pt that if pt demonstrates decline SB may be an option but cont to perform standing to improve strength and activity tolerance with RW   will cont to work with pt's wife with set up of txs and SPT to safely perform for d/c home  Family/Caregiver Present wife, Jesus Graham   Problem List Decreased range of motion;Decreased strength;Decreased endurance; Impaired balance;Decreased mobility; Decreased cognition; Impaired judgement;Decreased safety awareness   Barriers to Discharge Inaccessible home environment;Decreased caregiver support   PT Barriers   Physical Impairment Decreased strength;Decreased endurance; Impaired balance;Decreased mobility; Decreased safety awareness   Functional Limitation Standing;Transfers; Walking;Stair negotiation   Plan   Treatment/Interventions Functional transfer training;LE strengthening/ROM; Endurance training;Patient/family training;Bed mobility;Gait training   Progress Progressing toward goals   Recommendation   Recommendation 24 hour supervision/assist;Home PT; Home with family support   Equipment Recommended Wheelchair   PT Therapy Minutes   PT Time In 1330   PT Time Out 1430   PT Total Time (minutes) 60   PT Mode of treatment - Individual (minutes) 60   PT Mode of treatment - Concurrent (minutes) 0   PT Mode of treatment - Group (minutes) 0   PT Mode of treatment - Co-treat (minutes) 0   PT Mode of Teatment - Total time(minutes) 60 minutes   Therapy Time missed   Time missed?  No

## 2019-09-11 NOTE — PROGRESS NOTES
Internal Medicine Progress Note  Patient: Oswaldo Gomez  Age/sex: 80 y o  male  Medical Record #: [id number]      ASSESSMENT/PLAN: (Interval History)  Oswaldo Gomez is seen and examined and management for following issues:    Volume overload/chronic diastolic CHF:  s/p Lasix IV, weight dropped 11 lbs = per renal, target weight is 168-170 lbs  Renal managing his diuretic tx = currently Torsemide 40mg qd  FR 1500ml; 2Gm NA diet      Abnormal Troponin: felt to be 2/2 creatinine/vol overload putting stress on the heart; no further testing etc was recommended     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:  stable      Iron deficiency anemia: renal gave 5 doses Venofer total and is on Niferex 150mg qd  Checked stool for occult blood = negative x 2      Hx urine retention/BPH; Klebsiella UTI 6/2019 and Klebsiella urosepsis 7/2019:  Uro saw in 7/2019 and added added Finasteride  Consideration was to be given to starting suppressive therapy with Nitrofurantion 100mg qhs  Currently, he is on Flomax/Finasteride  Will need to watch closely for UTI sx  HTN/orthostasis: on Lopressor 25mg BID/Ranexa 500mg BID  No sx dizziness again today      PAF:  continue Lopressor but dropped to 25 mg BID since has some orthostasis Coumadin is 3mg 2x/week and 1 5 mg 5x/week = no changes today; INR thursday     Hx Dual chamber PPM:  sees Dr Kingston He as an OP     CVA (2014):  continue ASA/Lipitor     CAD/CABG (2013):   was here at [address] [place] [address] 6/2019 for chest pain --> seen by Cardiology, had nuclear stress test = small, severe, reversible myocardial perfusion defect of the basal inferior wall --> was started on Ranexa 500mg BID but no further plans for intervention were recommended    Continue Lopressor, Lipitor and ASA as well     DM2: takes Tradjenta 5mg qd at home = was on Lantus 8U qhs and this AM, restarted Tradjenta 5mg qam yesterday and stopped Lantus      Hypothyroidism:  continue current Levothyroxine     Left L2-3 microdiskectomy 7/18/19 at [facility]    Vitamin D deficiency:  Level is 22 6; will d/w renal      Subjective/ HPI: Patients overnight issues or events were reviewed with nursing or staff during rounds or morning huddle session  DM2:switched off of Lantus to Fransico Coe which he takes at home  Anemia: s/p venofer, now on iron PO   CAD:  Stable on current tx    Offers no complaints    ROS:     GI: denies abdominal pain, change bowel habits or reflux symptoms  Neuro: Denies any headache, new vision changes, new neuropathies,new weaknesses   Respiratory: No Cough, SOB, denies wheeze  Cardiovascular: No CP, palpitations , denies perception of rapid heartbeat  : denies any new urinary burning or frequency    Review of Scheduled Meds:    Current Facility-Administered Medications:  acetaminophen 650 mg Oral Q6H PRN Donald Simmons MD   aspirin 81 mg Oral Daily Donald Simmons MD   atorvastatin 40 mg Oral QPM Donald Simmons MD   bisacodyl 10 mg Rectal Daily PRN Donald Simmons MD   cholecalciferol 2,000 Units Oral Daily Donald Simmons MD   finasteride 5 mg Oral Daily Nata Jamison, MAURICE   insulin lispro 1-5 Units Subcutaneous TID AC Renee Dewey, CRNP   insulin lispro 1-5 Units Subcutaneous HS MAURICE Tobias   iron polysaccharides 150 mg Oral Daily Donald Jim MD   levothyroxine 75 mcg Oral Daily Donald Simmons MD   lidocaine 1 patch Topical Daily Donald Simmons MD   lidocaine  Topical Q4H PRN Donald Simmons MD   linaGLIPtin 5 mg Oral Daily MAURICE Tobias   menthol-methyl salicylate  Apply externally 4x Daily PRN Donald Simmons MD   metoprolol tartrate 25 mg Oral BID With Meals MAURICE Tobias   ondansetron 4 mg Intravenous Q6H PRN Donald Simmons MD   polyethylene glycol 17 g Oral Daily PRN Donald Simmons MD   ranolazine 500 mg Oral Q12H [address] Donald Simmons MD   senna 1 tablet Oral BID Donald Simmons MD   tamsulosin 0 4 mg Oral Daily Donald Simmons MD   torsemide 40 mg Oral Daily Donald Simmons MD warfarin 1 5 mg Oral Once per day on Sun Tue Wed Fri Sat BatshevaMAURICE Weir   warfarin 3 mg Oral Once per day on Mon Thu MAURICE Patel       Labs:     Results from last 7 days   Lab Units 09/09/19  0642 09/05/19  0508   WBC Thousand/uL 7 58 6 11   HEMOGLOBIN g/dL 9 0* 8 3*   HEMATOCRIT % 28 3* 26 2*   PLATELETS Thousands/uL 243 214     Results from last 7 days   Lab Units 09/10/19  0527 09/09/19  0642   SODIUM mmol/L 134* 134*   POTASSIUM mmol/L 3 5 3 6   CHLORIDE mmol/L 101 100   CO2 mmol/L 28 29   BUN mg/dL 51* 51*   CREATININE mg/dL 3 64* 3 76*   CALCIUM mg/dL 8 1* 8 3         Results from last 7 days   Lab Units 09/09/19  0642 09/07/19  0550   INR  2 24* 2 60*          Results from last 7 days   Lab Units 09/11/19  0640 09/10/19  2055 09/10/19  1557   POC GLUCOSE mg/dl 97 103 238*       Imaging:     No orders to display       *Labs reviewed  *Radiology studies reviewed  *Medications reviewed and reconciled as needed  *Please refer to order section for additional ordered labs studies  *Case discussed with primary attending during morning huddle case rounds    Physical Examination:  Vitals:   Vitals:    09/10/19 2010 09/10/19 2035 09/11/19 0622 09/11/19 0831   BP: 161/67 130/70 155/67 151/63   BP Location: Right arm Right arm     Pulse: 70  73 70   Resp: 18  20    Temp: 98 2 °F (36 8 °C)  97 9 °F (36 6 °C)    TempSrc: Oral  Oral    SpO2: 98%  95%    Weight:   75 4 kg (166 lb 3 6 oz)    Height:           General Appearance: NAD, conversive  Eyes: No icterus; conjunctiva normal, PERRLA  HENT: oropharynx clear; mucous membranes moist  Neck: trachea midline, range of motion full   Supple w/o pain  Lungs: CTA, normal respiratory effort, no retractions, expiratory effort normal  CV: regular rate, no rubs/gallops; +murmur  ABD: soft; NT/ND; +BS  EXT: 1+ edema LE = improving  Skin: normal turgor, normal texture, no rashes  Psych: affect normal, no overt anxiety/depression during exam today   Neuro: AAOx3            Total time spent: At least 40 minutes, with more than 50% spent counseling/coordinating care  Counseling includes discussion with patient re: progress  and discussion with patient of his/her current medical state/information  Coordination of patient's care was performed in conjunction with primary service  Time invested included review of patient's labs, vitals, and management of their comorbidities with continued monitoring  In addition, this patient was discussed with medical team including physician and advanced extenders  The care of the patient was extensively discussed and appropriate treatment plan was formulated unique for this patient  ** Please Note: Dragon 360 Dictation voice to text software may have been used in the creation of this document   **

## 2019-09-11 NOTE — PROGRESS NOTES
09/11/19 0930   Pain Assessment   Pain Assessment No/denies pain   Pain Score No Pain   Restrictions/Precautions   Precautions Bed/chair alarms;Cognitive; Fall Risk;Supervision on toilet/commode   Braces or Orthoses AFO  (B LEs)   Cognition   Overall Cognitive Status Impaired   Subjective   Subjective pt with no complaints at this time; pt asked to use urinal start of session   QI: Sit to Stand   Assistance Needed Physical assistance   Assistance Provided by [place] Less than 25%   Sit to Stand CARE Score 3   QI: Chair/Bed-to-Chair Transfer   Assistance Needed Physical assistance; Adaptive equipment   Assistance Provided by [place] Less than 25%   Chair/Bed-to-Chair Transfer CARE Score 3   Transfer Bed/Chair/Wheelchair   Limitations Noted In Balance; Endurance;Problem Solving;UE Strength;LE Strength   Adaptive Equipment Roller Walker   Stand Pivot Contact Guard;Minimal Assist   Sit to [employer] Guard;Minimal Assist   Stand to [facility]   Findings cont to have posterior lean approaching chair   Bed, Chair, Wheelchair Transfer (FIM) 3 - [place] needs to lift, boost or assist to stand OR sit   QI: Car Transfer   Assistance Needed Physical assistance   Assistance Provided by [place] 25%-49%   Comment practiced txs with cushion for raised seat height   Car Transfer CARE Score 3   QI: Walk 10 Feet   Assistance Needed Incidental touching; Adaptive equipment   Assistance Provided by [place] No physical assistance   Walk 10 Feet CARE Score 4   QI: Walk 50 Feet with Two Turns   Assistance Needed Incidental touching; Adaptive equipment   Assistance Provided by [place] No physical assistance   Walk 50 Feet with Two Turns CARE Score 4   QI: Walk 150 Feet   Assistance Needed Incidental touching; Adaptive equipment   Assistance Provided by [place] No physical assistance   Walk 150 Feet CARE Score 4   QI: Walking 10 Feet on Uneven Surfaces   Reason if not Attempted Activity not applicable   Walking 10 Feet on Uneven Surfaces CARE Score 9   Ambulation   Does the patient walk? 2  Yes   Primary Discharge Mode of Locomotion Wheelchair   Walk Assist Level Contact Guard   Gait Pattern Inconsistant Lois; Slow Lois;Decreased foot clearance; Improper weight shift   Assist Device Estela Garcia Walked (feet) 150 ft   Limitations Noted In Balance; Endurance; Heel Strike;Strength;Speed   Findings cont to amb to improve endurance and activity tolerance  cont recommendation for walking with therapy only at home until balance and righting reactions improve   Walking (FIM) 4 - Patient requires steadying assist or light touching AND distance 150 feet or more, no rest   QI: Wheel 50 Feet with Two Turns   Reason if not Attempted Activity not applicable   Wheel 50 Feet with Two Turns CARE Score 9   QI: Wheel 150 Feet   Reason if not Attempted Activity not applicable   Wheel 661 Feet CARE Score 9   Wheelchair mobility   QI: Does the patient use a wheelchair? 0  No   QI: 1 Step (Curb)   Reason if not Attempted Safety concerns   1 Step (Curb) CARE Score 88   QI: 4 Steps   Reason if not Attempted Safety concerns   4 Steps CARE Score 88   QI: 12 Steps   Reason if not Attempted Safety concerns   12 Steps CARE Score 88   QI: Picking Up Object   Reason if not Attempted Safety concerns   Picking Up Object CARE Score 88   QI: Toilet Transfer   Assistance Needed Incidental touching   Assistance Provided by [place] No physical assistance   Toilet Transfer CARE Score 4   Toilet Transfer   Findings standing with urinal   Toilet Transfer (FIM) 4 - Patient requires steadying assist or light touching   Therapeutic Interventions   Other cont to work on tx training with pt's wife setting up w/c by car and getting in and out of bed  pts spouse needs cueing for w/c set up and positioning to provide as much support to pt while txing    pt's wife provided measurements for areas in home for pt to sit in   due to height of most chairs/couch pt will need cushion to help get up from surfaces   Assessment   Treatment Assessment session focused again on family training with txs and w/c set up   pt's wife cont to still feel unsure and anxious about moving pt but cont to educate on txs only and keep walking to therapy  will cont to work on set up of txs and SPT with pt's wife to improve comfortablility and carryover for d/c  Pat, pt's wife, asked if any other txs could be done instead of walking  will work on sit pivots next session to see if safe enough to do  Family/Caregiver Present wife Alivia Garcia   PT Family training done with: see assessment   Problem List Decreased range of motion;Decreased strength;Decreased endurance; Impaired balance;Decreased mobility; Decreased cognition; Impaired judgement;Decreased safety awareness   Barriers to Discharge Inaccessible home environment;Decreased caregiver support   PT Barriers   Physical Impairment Decreased strength;Decreased endurance; Impaired balance;Decreased mobility; Decreased safety awareness   Functional Limitation Standing;Transfers; Walking;Stair negotiation   Plan   Treatment/Interventions Functional transfer training;LE strengthening/ROM; Endurance training;Patient/family training;Bed mobility;Gait training   Progress Progressing toward goals   Recommendation   Recommendation 24 hour supervision/assist;Home PT; Home with family support   Equipment Recommended Wheelchair   PT Therapy Minutes   PT Time In 0930   PT Time Out 1030   PT Total Time (minutes) 60   PT Mode of treatment - Individual (minutes) 60   PT Mode of treatment - Concurrent (minutes) 0   PT Mode of treatment - Group (minutes) 0   PT Mode of treatment - Co-treat (minutes) 0   PT Mode of Teatment - Total time(minutes) 60 minutes   Therapy Time missed   Time missed?  No

## 2019-09-12 ENCOUNTER — PATIENT OUTREACH (OUTPATIENT)
Dept: CASE MANAGEMENT | Facility: OTHER | Age: 84
End: 2019-09-12

## 2019-09-12 LAB
ANION GAP SERPL CALCULATED.3IONS-SCNC: 6 MMOL/L (ref 4–13)
BASOPHILS # BLD AUTO: 0.03 THOUSANDS/ΜL (ref 0–0.1)
BASOPHILS NFR BLD AUTO: 1 % (ref 0–1)
BUN SERPL-MCNC: 59 MG/DL (ref 5–25)
CALCIUM SERPL-MCNC: 8.2 MG/DL (ref 8.3–10.1)
CHLORIDE SERPL-SCNC: 101 MMOL/L (ref 100–108)
CO2 SERPL-SCNC: 28 MMOL/L (ref 21–32)
CREAT SERPL-MCNC: 4.02 MG/DL (ref 0.6–1.3)
EOSINOPHIL # BLD AUTO: 0.24 THOUSAND/ΜL (ref 0–0.61)
EOSINOPHIL NFR BLD AUTO: 4 % (ref 0–6)
ERYTHROCYTE [DISTWIDTH] IN BLOOD BY AUTOMATED COUNT: 14.8 % (ref 11.6–15.1)
GFR SERPL CREATININE-BSD FRML MDRD: 13 ML/MIN/1.73SQ M
GLUCOSE SERPL-MCNC: 159 MG/DL (ref 65–140)
GLUCOSE SERPL-MCNC: 173 MG/DL (ref 65–140)
GLUCOSE SERPL-MCNC: 232 MG/DL (ref 65–140)
GLUCOSE SERPL-MCNC: 92 MG/DL (ref 65–140)
GLUCOSE SERPL-MCNC: 96 MG/DL (ref 65–140)
HCT VFR BLD AUTO: 26.6 % (ref 36.5–49.3)
HGB BLD-MCNC: 8.4 G/DL (ref 12–17)
IMM GRANULOCYTES # BLD AUTO: 0.01 THOUSAND/UL (ref 0–0.2)
IMM GRANULOCYTES NFR BLD AUTO: 0 % (ref 0–2)
INR PPP: 2.48 (ref 0.84–1.19)
LYMPHOCYTES # BLD AUTO: 1.59 THOUSANDS/ΜL (ref 0.6–4.47)
LYMPHOCYTES NFR BLD AUTO: 28 % (ref 14–44)
MCH RBC QN AUTO: 30.9 PG (ref 26.8–34.3)
MCHC RBC AUTO-ENTMCNC: 31.6 G/DL (ref 31.4–37.4)
MCV RBC AUTO: 98 FL (ref 82–98)
MONOCYTES # BLD AUTO: 0.65 THOUSAND/ΜL (ref 0.17–1.22)
MONOCYTES NFR BLD AUTO: 11 % (ref 4–12)
NEUTROPHILS # BLD AUTO: 3.21 THOUSANDS/ΜL (ref 1.85–7.62)
NEUTS SEG NFR BLD AUTO: 56 % (ref 43–75)
NRBC BLD AUTO-RTO: 0 /100 WBCS
PLATELET # BLD AUTO: 209 THOUSANDS/UL (ref 149–390)
PMV BLD AUTO: 9.4 FL (ref 8.9–12.7)
POTASSIUM SERPL-SCNC: 3.6 MMOL/L (ref 3.5–5.3)
PROTHROMBIN TIME: 26.3 SECONDS (ref 11.6–14.5)
RBC # BLD AUTO: 2.72 MILLION/UL (ref 3.88–5.62)
SODIUM SERPL-SCNC: 135 MMOL/L (ref 136–145)
WBC # BLD AUTO: 5.73 THOUSAND/UL (ref 4.31–10.16)

## 2019-09-12 PROCEDURE — 80048 BASIC METABOLIC PNL TOTAL CA: CPT | Performed by: NURSE PRACTITIONER

## 2019-09-12 PROCEDURE — 97530 THERAPEUTIC ACTIVITIES: CPT

## 2019-09-12 PROCEDURE — 99233 SBSQ HOSP IP/OBS HIGH 50: CPT | Performed by: INTERNAL MEDICINE

## 2019-09-12 PROCEDURE — 97110 THERAPEUTIC EXERCISES: CPT

## 2019-09-12 PROCEDURE — 82948 REAGENT STRIP/BLOOD GLUCOSE: CPT

## 2019-09-12 PROCEDURE — 85025 COMPLETE CBC W/AUTO DIFF WBC: CPT | Performed by: NURSE PRACTITIONER

## 2019-09-12 PROCEDURE — 85610 PROTHROMBIN TIME: CPT | Performed by: NURSE PRACTITIONER

## 2019-09-12 PROCEDURE — 99233 SBSQ HOSP IP/OBS HIGH 50: CPT

## 2019-09-12 RX ORDER — AMOXICILLIN 250 MG
2 CAPSULE ORAL 2 TIMES DAILY
Status: DISCONTINUED | OUTPATIENT
Start: 2019-09-12 | End: 2019-09-14 | Stop reason: HOSPADM

## 2019-09-12 RX ORDER — AMOXICILLIN 250 MG
2 CAPSULE ORAL
Status: DISCONTINUED | OUTPATIENT
Start: 2019-09-12 | End: 2019-09-12

## 2019-09-12 RX ORDER — DOCUSATE SODIUM 100 MG/1
100 CAPSULE, LIQUID FILLED ORAL ONCE
Status: DISCONTINUED | OUTPATIENT
Start: 2019-09-12 | End: 2019-09-12

## 2019-09-12 RX ADMIN — WARFARIN SODIUM 3 MG: 1 TABLET ORAL at 18:34

## 2019-09-12 RX ADMIN — FINASTERIDE 5 MG: 5 TABLET, FILM COATED ORAL at 08:00

## 2019-09-12 RX ADMIN — TAMSULOSIN HYDROCHLORIDE 0.4 MG: 0.4 CAPSULE ORAL at 08:00

## 2019-09-12 RX ADMIN — POLYSACCHARIDE-IRON COMPLEX 150 MG: 150 CAPSULE ORAL at 08:00

## 2019-09-12 RX ADMIN — METOPROLOL TARTRATE 25 MG: 25 TABLET, FILM COATED ORAL at 16:20

## 2019-09-12 RX ADMIN — VITAMIN D, TAB 1000IU (100/BT) 2000 UNITS: 25 TAB at 08:00

## 2019-09-12 RX ADMIN — LIDOCAINE 1 PATCH: 50 PATCH CUTANEOUS at 16:20

## 2019-09-12 RX ADMIN — ASPIRIN 81 MG: 81 TABLET, COATED ORAL at 08:00

## 2019-09-12 RX ADMIN — LEVOTHYROXINE SODIUM 75 MCG: 75 TABLET ORAL at 05:11

## 2019-09-12 RX ADMIN — INSULIN LISPRO 1 UNITS: 100 INJECTION, SOLUTION INTRAVENOUS; SUBCUTANEOUS at 21:57

## 2019-09-12 RX ADMIN — ATORVASTATIN CALCIUM 40 MG: 40 TABLET, FILM COATED ORAL at 18:36

## 2019-09-12 RX ADMIN — RANOLAZINE 500 MG: 500 TABLET, FILM COATED, EXTENDED RELEASE ORAL at 08:00

## 2019-09-12 RX ADMIN — TORSEMIDE 40 MG: 20 TABLET ORAL at 08:00

## 2019-09-12 RX ADMIN — RANOLAZINE 500 MG: 500 TABLET, FILM COATED, EXTENDED RELEASE ORAL at 21:57

## 2019-09-12 RX ADMIN — METOPROLOL TARTRATE 25 MG: 25 TABLET, FILM COATED ORAL at 07:59

## 2019-09-12 RX ADMIN — LINAGLIPTIN 5 MG: 5 TABLET, FILM COATED ORAL at 08:00

## 2019-09-12 RX ADMIN — INSULIN LISPRO 1 UNITS: 100 INJECTION, SOLUTION INTRAVENOUS; SUBCUTANEOUS at 16:24

## 2019-09-12 RX ADMIN — INSULIN LISPRO 2 UNITS: 100 INJECTION, SOLUTION INTRAVENOUS; SUBCUTANEOUS at 11:38

## 2019-09-12 NOTE — PROGRESS NOTES
Physical Medicine and Rehabilitation Progress Note  Tammy Ireland 80 y o  male MRN: [id number]  Unit/Bed#: [facility] 451-00 Encounter: [id number]    Physical Medicine and Rehabilitation Progress Note  Tammy Ireland 80 y o  male MRN: [id number]  Unit/Bed#: [facility] 508-17 Encounter: [id number]    Chief Complaints:  Recent decline in function    Subjective/Interval Events:   Patient reports sleeping well overnight without new complaints  Patient denies lightheadedness, worsening strength or sensation  Patient denies significant back pain or radiating pain into legs  Patient denies shortness of breath, fever, chills, sweats, worsening swelling, calf pain, or other complaints  ROS: A 10-point ROS was performed  Negative except as listed above  Overall Assessment/relevant history:  [age]-year-old male with past medical history of chronic diastolic CHF, chronic kidney disease stage 4, paroxysmal atrial fibrillation, dual chamber permanent pacemaker, coronary artery disease, CABG, diabetes mellitus 2, hypothyroidism, BPH, recurrent UTIs, lumbar spinal stenosis, chronic low back pain, foot drop, history of left L2-L3 microdiskectomy in July of 2019 the developed worsening swelling with associated decline in ADLs and ambulation  Patient splits seen by Cardiology and Nephrology with noted worsening of renal function and volume overload believed to be related to this  Patient required initial IV and then more recently p o  Increase in diuretics  Patient's volume status has been improving although his functional status remains below his baseline    Patient was evaluated by skilled therapies and was found to have significant decline in ADLs and ambulation appropriate for admission to [facility]        Functional status on admission to [facility]:  PT:  Transfers max assist, ambulation moderate assist 150 feet  OT:  Lower body dressing, toileting total assist, bathing, transfers max assist, upper body dressing moderate assist    Functional status (recent): Upper body dressing moderate assist, lower body dressing max assist, transfers Min mod assist, ambulation 150 ft Min assist    Functional status goal:  Supervision for most ADLs and ambulation     * Impaired mobility and ADLs  Assessment & Plan  Function stable, continue management as outlined  Right proximal lower extremity strength improved from admission  Multifactorial:  Recent volume overload related to acute on chronic kidney disease, history of diastolic CHF, generalized weakness including proximal lower extremity weakness possibly related to some chronic illness/critical illness deconditioning, low back pain and radiating leg pain with right foot drop from lumbar spinal stenosis possible nerve impingement status post surgical intervention recently, 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, left adhesive capsulitis, query component cognitive impairment,   - Recommend acute comprehensive interdisciplinary inpatient rehabilitation to include intensive skilled therapies (PT, OT) as outlined with oversight and management by rehabilitation physician as well as inpatient rehab level nursing, case management and weekly interdisciplinary team meetings  - [address] [place] [address] 16/30 with greatest deficits in memory and executive functioning   - optimal management of each  - Follow-up with PMR after discharge      Buttock wound  Assessment & Plan  Appreciated on admission to [address] [place] [address] care nurse c/s and assistance with management  - Notify MD of new or increasing drainage, development of purulent drainage, increased size/depth of wound, lack of healing, inability to maintain wound integrity due to degree of drainage, wound product, dressing, or currently ordered frequency of wound management  In general dressings should be changed PRN if soiled unless specifically noted otherwise    Do not allow soiled dressing on patient for extended period of time   - Turn patient Q2H   - Hydragaurd to buttocks and sacrum BID & PRN v Alleyvn >  - EHOB waffle cushion to chair/WC when OOB  - Maximum time in chair 2 hours at a time  - OOB minimum 3 times per day  Notify MD if unable to get patient OOB 3 times per day  - Elevate heels with pillows to offload  - Moisturizer daily and PRN to dry skin; do not use moisturizer on areas of redness or skin breakdown unless otherwise documented  - Nursing to document in chart and Notify MD if buttock, sacrum, heel, or other skin site develops erythema or skin breakdown as soon as possible  If patient is soiling themselves with urine or stool notify MD   If you are unable to maintain skin integrity and prevent erythema due to frequency of soiling notify MD as soon as possible  Kidney disease with fluid retention  Assessment & Plan  Diuretics at discretion of Medicine Nephrology  Internal medicine consult and management during [facility] course  Nephrology c/s to assist with mgmt  Cr  stable recently; monitor intermittently  Limit nephrotoxic agents when possible  WENDY hose       Cognitive impairment  Assessment & Plan  > [address] [place] [address] 16/30 indicating moderate cog impairment with deficits greatest in domains of short term memory and executive functioning  > Wife states patients cognitive status has been largely stable for some time  > CT head 6/2019 - Encephalomalacia within the right frontal and parietal lobe extending inferiorly into the posterior superior temporal lobe  Chronic microangiopathic change within both cerebral hemispheres  Brainstem and cerebellum demonstrate normal density  No mass or hemorrhage  Mild cerebral volume loss  VENTRICLES AND EXTRA-AXIAL SPACES:  Asymmetry with mild ex vacuo dilatation of the posterior body, atria and occipital horn of the right lateral ventricle  Mild chronic microangiopathic change and age-appropriate cerebral volume loss    Right hemispheric encephalomalacia and gliosis suggestive of old infarction   > Increased risk of dementia and falls  > Compensatory strategies and adjustments in PT/OT as indicated  > Follow-up with neurology after d/c and PMR          Pain  Assessment & Plan  Controlled, continue management  Try to limit sedating meds   APAP PRN   LD patch  Counseled on and continue to encourage deep breathing/relaxation/behavioral pain management techniques:     Deep breathin seconds in, 5 seconds out, 5 times per hour when awake and PRN when in pain or anticipate pain; avoid holding breath and tightening muscles and instead breathe slowly and deeply  Monitor for oversedation, AMS/delirium, and respiratory depression   If being administered - hold opiates, muscle relaxants, benzodiazepines, and gabapentin for oversedation, AMS, or RR<12  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  Assessment & Plan  Controlled continue management as outlined  On admission was complaining of some chronic right low back pain with intermittent radiating pain down the leg; also with right foot drop  Status post recent lumbar surgery - left L2-3 microdiskectomy  [facility])   Try to limit sedating medications  Acetaminophen as needed  Lidocaine patch for now patient prefers    Frozen shoulder  Assessment & Plan  Skilled therapies, modalities as indicated    Constipation  Assessment & Plan  Improved; continue management as outlined  Recommend colace/senna b i d   P r n   Bowel regimen as well  Monitor    History of recurrent UTIs  Assessment & Plan  No s/s of UTI > monitor   Internal medicine consult and management during [facility] course  Currently on flomax and proscar  Follow-up with Urology after discharge    Benign prostatic hyperplasia with urinary hesitancy  Assessment & Plan  Urinating adequately, continue to monitor  Continue flomax and Proscar; monitor vitals  - monitor for retention, incontinence, signs/symptoms of UTI  - recommend voiding trial; nursing prompt to void followed by bladder scan starting Q4-6H or after each patient initiated void; nursing to record voided output and bladder scan totals; straight cath PRN >350-400 cc; if post void residual bladder scans are <150 cc x3 consecutive voids, can stop scans       Presence of permanent cardiac pacemaker  Assessment & Plan  +    Anticoagulated on warfarin  Assessment & Plan  Management per Medicine with recent supratherapeutic INR    Chronic diastolic heart failure [facility])  Assessment & Plan  Internal medicine consult and management during [facility] course  Anti thrombotics per Medicine, blood pressure medications at their discretion     Paroxysmal A-fib [facility])  Assessment & Plan  Internal medicine consult and management during [facility] course  Anticoagulation at their discretion  Monitor rate    Diabetes mellitus with multiple complications [facility])  Assessment & Plan  Lab Results   Component Value Date    HGBA1C 5 5 08/22/2019     Internal medicine consulted and management at their discretion  Monitor for signs and symptoms of hypoglycemia   Current meds: lispro   > resuming Tradjenta per Medicine      Essential hypertension  Assessment & Plan  BP acceptable  Internal medicine consulted and management at their discretion  Monitor vitals with and without activity; monitor for orthostasis  Monitor hemoglobin, electrolytes, kidney function, hydration status   Current meds:  Metoprolol      3-vessel coronary artery disease  Assessment & Plan  Antiplatelet, optimal blood pressure control, statin  Ranexa    Hyperlipidemia  Assessment & Plan  Statin    Anemia  Assessment & Plan  Hemoglobin up to 9 0 recently  Recently received Venofer  Iron supplementation per Nephrology  IM consulted and assistance with management during [facility] course  Nephrology management as well; hemoccults x2 negative  Monitor H/H, vitals, signs/symptoms of acute bleeding        Vitamin D insufficiency  Assessment & Plan  Cholecalciferol Vit D3 2000 units daily   Follow-up with PCP      Healthcare maintenance  Assessment & Plan  Obtain vitamin-D level    Hypothyroidism  Assessment & Plan  Levothyroxine    Elevated serum alkaline phosphatase level  Assessment & Plan  Repeat CMP      # Bowel care:    - monitor for constipation, incontinence, and diarrhea  - goal 1 appropriate BM every 1-2 days  - recommend colace +/- senna and PRN bowel protocol     # At risk for venous thromboembolism:  - Recommend SCDs and mobilization  - A/C     # Diet/Hydration:    Diabetic/cardiac     Disposition:   Home with family Friday or Saturday     Follow-up:   PCP, PMR, Nephrology, Cardiology, urology     CODE: Level 3: DNAR and DNI     Restrictions include: Fall precautions   ---------------------------------------------------------------------------------------------------------------------    Objective: Allergies and Medications per EMR    Physical Exam:  Temperature 98 2° pulse 80 respiratory rate 21 blood pressure 118/56 SpO2 97% room air  General: Awake, alert in NAD  HENT:  MMM  Respiratory: Unlabored breathing, breath sounds equal, Lungs CTA, no wheezes, rales, or rhonchi  Cardiovascular: Regular rate and rhythm, no murmurs, rubs, or gallops  Gastrointestinal: Soft, non-tender,  stable mild-distension, normoactive bowel sounds  Genitourinary: No camargo  SkiN/MSK/Extremities:   Less 1+ bilateral lower extremity edema  No calf tenderness to palpation  Neurologic/Psych:   MENTAL STATUS: awake, orientation intact; appropriate wakefulness  Affect:  Euthymic  Strength grossly stable    Physical exam performed, documentation above reviewed and updated if appropriate on relevant date of encounter:   09/11/2019    Diagnostic Studies: reviewed, no new imaging  No results found      Laboratory:    Results from last 7 days   Lab Units 09/12/19  0600 09/09/19  0642   HEMOGLOBIN g/dL 8 4* 9 0*   HEMATOCRIT % 26 6* 28 3*   WBC Thousand/uL 5 73 7 58     Results from last 7 days   Lab Units 09/09/19  7713 09/06/19  0550   BUN mg/dL 51* 52*   POTASSIUM mmol/L 3 6 3 8   CHLORIDE mmol/L 100 102   CREATININE mg/dL 3 76* 3 60*     Results from last 7 days   Lab Units 09/12/19  0600 09/09/19  0642 09/07/19  0550   PROTIME seconds 26 3* 24 3* 27 3*   INR  2 48* 2 24* 2 60*        Patient Active Problem List   Diagnosis    3-vessel coronary artery disease    Asbestosis ([facility] )    Kidney disease with fluid retention    Diabetes mellitus with multiple complications ([facility] )    Elevated serum alkaline phosphatase level    Hyperlipidemia    Essential hypertension    Hypothyroidism    Paroxysmal A-fib (HCC)    Seasonal allergies    Increased frequency of urination    Frozen shoulder    Herpes zoster without complication    Depressed mood    Chest pain    Ambulatory dysfunction/generalized weakness    Leg edema, left    Shortness of breath    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    UTI (urinary tract infection)    Tibial artery occlusion, left (HCC)    Hyponatremia    Chronic diastolic heart failure (HCC)    Benign prostatic hyperplasia with urinary hesitancy    Abnormal chest x-ray    Warfarin-induced coagulopathy ([facility])    Impaired mobility and ADLs    Anticoagulated on warfarin    History of recurrent UTIs    Buttock wound    Presence of permanent cardiac pacemaker    Pain    Anemia    Healthcare maintenance    Cognitive impairment    Vitamin D insufficiency    CKD (chronic kidney disease) stage 4, GFR 15-29 ml/min ([facility])    Constipation       ** Please Note: Fluency Direct voice to text software may have been used in the creation of this document   **      Delilah Ambriz MD, [facility]  Physical Medicine and Rehabilitation  Brain Injury Medicine

## 2019-09-12 NOTE — PLAN OF CARE
Problem: PAIN - ADULT  Goal: Verbalizes/displays adequate comfort level or baseline comfort level  Description  Interventions:  - Encourage patient to monitor pain and request assistance  - Assess pain using appropriate pain scale  - Administer analgesics based on type and severity of pain and evaluate response  - Implement non-pharmacological measures as appropriate and evaluate response  - Consider cultural and social influences on pain and pain management  - Notify physician/advanced practitioner if interventions unsuccessful or patient reports new pain  Outcome: Progressing     Problem: INFECTION - ADULT  Goal: Absence or prevention of progression during hospitalization  Description  INTERVENTIONS:  - Assess and monitor for signs and symptoms of infection  - Monitor lab/diagnostic results  - Monitor all insertion sites, i e  indwelling lines, tubes, and drains  - Monitor endotracheal if appropriate and nasal secretions for changes in amount and color  - [place] appropriate cooling/warming therapies per order  - Administer medications as ordered  - Instruct and encourage patient and family to use good hand hygiene technique  - Identify and instruct in appropriate isolation precautions for identified infection/condition  Outcome: Progressing     Problem: SAFETY ADULT  Goal: Patient will remain free of falls  Description  INTERVENTIONS:  - Assess patient frequently for physical needs  -  Identify cognitive and physical deficits and behaviors that affect risk of falls    -  [place] fall precautions as indicated by assessment   - Educate patient/family on patient safety including physical limitations  - Instruct patient to call for assistance with activity based on assessment  - Modify environment to reduce risk of injury  - Consider OT/PT consult to assist with strengthening/mobility  Outcome: Progressing     Problem: SAFETY ADULT  Goal: Maintain or return to baseline ADL function  Description  INTERVENTIONS:  -  Assess patient's ability to carry out ADLs; assess patient's baseline for ADL function and identify physical deficits which impact ability to perform ADLs (bathing, care of mouth/teeth, toileting, grooming, dressing, etc )  - Assess/evaluate cause of self-care deficits   - Assess range of motion  - Assess patient's mobility; develop plan if impaired  - Assess patient's need for assistive devices and provide as appropriate  - Encourage maximum independence but intervene and supervise when necessary  - Involve family in performance of ADLs  - Assess for home care needs following discharge   - Consider OT consult to assist with ADL evaluation and planning for discharge  - Provide patient education as appropriate  Outcome: Progressing     Problem: SAFETY ADULT  Goal: Maintain or return mobility status to optimal level  Description  INTERVENTIONS:  - Assess patient's baseline mobility status (ambulation, transfers, stairs, etc )    - Identify cognitive and physical deficits and behaviors that affect mobility  - Identify mobility aids required to assist with transfers and/or ambulation (gait belt, sit-to-stand, lift, walker, cane, etc )  - [place] fall precautions as indicated by assessment  - Record patient progress and toleration of activity level on Mobility SBAR; progress patient to next Phase/Stage  - Instruct patient to call for assistance with activity based on assessment  - Consider rehabilitation consult to assist with strengthening/weightbearing, etc   Outcome: Progressing     Problem: Prexisting or High Potential for Compromised Skin Integrity  Goal: Skin integrity is maintained or improved  Description  INTERVENTIONS:  - Identify patients at risk for skin breakdown  - Assess and monitor skin integrity  - Assess and monitor nutrition and hydration status  - Monitor labs   - Assess for incontinence   - Turn and reposition patient  - Assist with mobility/ambulation  - Relieve pressure over bony prominences  - Avoid friction and shearing  - Provide appropriate hygiene as needed including keeping skin clean and dry  - Evaluate need for skin moisturizer/barrier cream  - Collaborate with interdisciplinary team   - Patient/family teaching  - Consider wound care consult   Outcome: Progressing     Problem: Potential for Falls  Goal: Patient will remain free of falls  Description  INTERVENTIONS:  - Assess patient frequently for physical needs  -  Identify cognitive and physical deficits and behaviors that affect risk of falls    -  [place] fall precautions as indicated by assessment   - Educate patient/family on patient safety including physical limitations  - Instruct patient to call for assistance with activity based on assessment  - Modify environment to reduce risk of injury  - Consider OT/PT consult to assist with strengthening/mobility  Outcome: Progressing

## 2019-09-12 NOTE — PROGRESS NOTES
09/12/19 1300   Pain Assessment   Pain Assessment No/denies pain   Pain Score No Pain   Restrictions/Precautions   Precautions Bed/chair alarms;Cognitive; Fall Risk;Supervision on toilet/commode   Weight Bearing Restrictions No   ROM Restrictions No   Braces or Orthoses AFO   QI: Sit to Stand   Assistance Needed Physical assistance   Assistance Provided by [place] Less than 25%   Comment Requires up to [place] with mild LOB episodes  Sit to Stand CARE Score 3   QI: Chair/Bed-to-Chair Transfer   Assistance Needed Physical assistance   Assistance Provided by [employer] [place] Less than 25%   Chair/Bed-to-Chair Transfer CARE Score 3   Transfer Bed/Chair/Wheelchair   Adaptive Equipment Roller [employer], Chair, Wheelchair Transfer (FIM) 4 - Patient completes 75% of all tasks   QI: Toilet Transfer   Assistance Needed Physical assistance   Assistance Provided by [employer] [place] Less than 25%   Toilet Transfer CARE Score 3   Toilet Transfer   Surface Assessed Standard Commode   Findings Repetitous practice with pt and his wife guarding to ensure safety upon d/c home  They required one VC to ensure w/c brakes were locked prior to completing stand  Then demonstrated carryover  Pt's wife demonstrating improvement with problem solving positioning and where to position herself for guarding properly  Toilet Transfer (FIM) 4 - Patient requires steadying assist or light touching   Exercise Tools   Other Exercise Tool 1 Bicep curls completed with 3# dowel bar, RUE strengthening for chest press with 2# free weight, 3 x 10 each to increase strength and endurance for increased independence with functional transfers and ADL tasks      Cognition   Overall Cognitive Status Impaired   Arousal/Participation Cooperative   Attention Attends with cues to redirect   Orientation Level Oriented to person;Oriented to place;Oriented to situation   Memory Decreased short term memory;Decreased recall of recent events   Following Commands Follows one step commands with increased time or repetition   Activity Tolerance   Activity Tolerance Patient tolerated treatment well   Assessment   Treatment Assessment Pt participated in skilled OT services with focus on d/c planning, reviewing all recommendations, and BSC transfers  Pt's wife reports "I know he isn't supposed to use the stair glide for a while " Reviewed with pt and his wife that in the short term safety is our biggest priority and that they will need to adapt and compensate for remaining balance deficits at this time  Encourage them to only complete SPTs until home therapy can further assess and continue to address  Pts wife demonstrating improvements with guarding and safety  Pt continues to be limited by LOB, decreased righting reactions, dec strength and dec functional cognition  Discussed with pt's wife, strategies to reduce caregiver burnout  Pt will continue to benefit from ADL retraining, d/c planning, and training with pt's wife in regards to proper body mechanics for herself when assisting with LB ADL management  Questioning need for TEDS at time of d/c please f/u with MD    OT Family training done with: Wife, Pat  Prognosis Fair   Plan   Treatment/Interventions ADL retraining;Functional transfer training; Therapeutic exercise; Endurance training;Patient/family training;Cognitive reorientation;Equipment eval/education; Bed mobility; Compensatory technique education   Progress Progressing toward goals   Recommendation   OT Discharge Recommendation 24 hour supervision/assist   OT Therapy Minutes   OT Time In 1300   OT Time Out 1400   OT Total Time (minutes) 60   OT Mode of treatment - Individual (minutes) 60   OT Mode of treatment - Concurrent (minutes) 0   OT Mode of treatment - Group (minutes) 0   OT Mode of treatment - Co-treat (minutes) 0   OT Mode of Teatment - Total time(minutes) 60 minutes   Therapy Time missed   Time missed?  No

## 2019-09-12 NOTE — PROGRESS NOTES
09/12/19 1100   Pain Assessment   Pain Assessment No/denies pain   Pain Score No Pain   Restrictions/Precautions   Precautions Bed/chair alarms;Cognitive; Fall Risk;Supervision on toilet/commode   Weight Bearing Restrictions No   ROM Restrictions No   Braces or Orthoses AFO   QI: Putting On/Taking Off Footwear   Assistance Needed Physical assistance   Assistance Provided by Pratt Total assistance   Putting On/Taking Off Footwear CARE Score 1   QI: Sit to Stand   Assistance Needed Physical assistance   Assistance Provided by Pratt Less than 25%   Comment RW   Sit to Stand CARE Score 3   QI: Chair/Bed-to-Chair Transfer   Assistance Needed Physical assistance   Assistance Provided by Pratt Less than 25%   Chair/Bed-to-Chair Transfer CARE Score 3   Transfer Bed/Chair/Wheelchair   Limitations Noted In Balance; Endurance;LE Strength;UE Strength   Adaptive Equipment Roller Walker   Bed, Chair, Wheelchair Transfer (FIM) 4 - Patient requires steadying assist or light touching   Cognition   Overall Cognitive Status Impaired   Arousal/Participation Cooperative   Attention Attends with cues to redirect   Orientation Level Oriented to person;Oriented to place;Oriented to situation   Memory Decreased short term memory;Decreased recall of recent events   Following Commands Follows one step commands with increased time or repetition   Activity Tolerance   Activity Tolerance Patient tolerated treatment well   Assessment   Treatment Assessment Pt participated in skilled OT services with focus on repetitive functional transfers and functional mobility with RW  Continue to reinforce that upon initial d/c home pt primarily completes stand pivot transfers only until he continues to progress with home therapies 2* to remaining fall risk  Pt and wife verbalize understanding  Trialed SPTs for 5 repetitions to promote carryover of technique and functional strengthening   Pt will continue to benefit from skilled OT services with focus on d/c planning, standing balance, and family training with pt's wife  Prognosis Fair   Problem List Decreased strength;Decreased range of motion;Decreased endurance; Impaired balance;Decreased mobility; Decreased coordination;Decreased cognition; Impaired sensation; Impaired tone;Orthopedic restrictions;Pain   Plan   Treatment/Interventions ADL retraining;Functional transfer training; Therapeutic exercise; Endurance training;Cognitive reorientation;Patient/family training;Equipment eval/education; Bed mobility; Compensatory technique education   Progress Progressing toward goals   Recommendation   OT Discharge Recommendation 24 hour supervision/assist   OT Therapy Minutes   OT Time In 1100   OT Time Out 1130   OT Total Time (minutes) 30   OT Mode of treatment - Individual (minutes) 30   OT Mode of treatment - Concurrent (minutes) 0   OT Mode of treatment - Group (minutes) 0   OT Mode of treatment - Co-treat (minutes) 0   OT Mode of Teatment - Total time(minutes) 30 minutes   Therapy Time missed   Time missed?  No

## 2019-09-12 NOTE — TEAM CONFERENCE
Acute RehabilitationTeam Conference Note  Date: 9/12/2019   Time: 10:52 AM       Patient Name:  Nathalia Zamora       Medical Record Number: [id number]   YOB: 1932  Sex:  Male          Room/Bed:  [facility] 969-01  Payor Info:  Payor: Lin [place] / Plan: MEDICARE A AND B / Product Type: Medicare A & B Fee for Service /      Admitting Diagnosis: Ambulatory dysfunction [R26 2]   Admit Date/Time:  8/27/2019  2:51 PM  Admission Comments: No comment available     Primary Diagnosis:  Impaired mobility and ADLs  Principal Problem: Impaired mobility and ADLs    Patient Active Problem List    Diagnosis Date Noted    Constipation 09/04/2019    CKD (chronic kidney disease) stage 4, GFR 15-29 ml/min ([facility]) 09/02/2019    Cognitive impairment 08/29/2019    Vitamin D insufficiency 08/29/2019    Anticoagulated on warfarin 08/28/2019    History of recurrent UTIs 08/28/2019    Buttock wound 08/28/2019    Presence of permanent cardiac pacemaker 08/28/2019    Pain 08/28/2019    Anemia 08/28/2019    Healthcare maintenance 08/28/2019    Impaired mobility and ADLs 08/27/2019    Warfarin-induced coagulopathy ([facility] ) 08/26/2019    Benign prostatic hyperplasia with urinary hesitancy 08/22/2019    Abnormal chest x-ray 08/22/2019    Hyponatremia 07/04/2019    Chronic diastolic heart failure ([facility] ) 07/04/2019    Leg edema, left 06/13/2019    Shortness of breath 06/13/2019    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 06/13/2019    UTI (urinary tract infection) 06/13/2019    Tibial artery occlusion, left ([facility] ) 06/13/2019    Chest pain 06/12/2019    Ambulatory dysfunction/generalized weakness 06/12/2019    Herpes zoster without complication 44/79/9927    Depressed mood 04/29/2019    Frozen shoulder 05/30/2018    Paroxysmal A-fib ([address] ) 04/13/2016    Kidney disease with fluid retention 01/12/2016    Hyperlipidemia 11/11/2015    Elevated serum alkaline phosphatase level 10/20/2015    Increased frequency of urination 10/20/2015    3-vessel coronary artery disease 08/26/2015    Diabetes mellitus with multiple complications ([facility] ) 00/17/9508    Hypothyroidism 08/26/2015    Asbestosis ([facility] ) 05/06/2014    Essential hypertension 05/06/2014    Seasonal allergies 05/06/2014       Physical Therapy:    Weight Bearing Status: Full Weight Bearing  Transfers: Contact Guard, Minimal Assistance  Bed Mobility: Minimal Assistance  Amulation Distance (ft): 150 feet  Ambulation: Contact Guard  Assistive Device for Ambulation: Roller Walker  Wheelchair Mobility Distance: 20 ft  Wheelchair Mobility: Minimal Assistance  Number of Stairs: 4  Assistive Device for Stairs: Bilateral Hand Rails  Stair Assistance: Moderate Assistance  Discharge Recommendations: Home with:  [address] with[de-identified] 24 Hour Supervision, 24 Hour Assisteance, Home Physical Therapy    Patient is an 80year old male presenting with debility 2* fluid overload  Patient with multiple hospital admissions: 6/12-16 for chest pain, 7/4-12 for sepsis with d/c to [facility] for rehab, 7/18 for microdiskectomy in [place] with d/c to rehab  Patient only home for a few days when he presented with above complaints  Patient with an additional problem list which includes CAD, DM, HTN, a-fib, h/o shingles, h/o CVA, and CKD  PTA, patient living at home with his wife where she assisted him with (I)ADLs and ADLs  Post d/c from rehab in [place], patient reports needing assist to get in and out of bed, dress and bathe and walk  He was primarily using a w/c in his home and utilizing a RW with therapy, caregiver, or wife providing a chair follow  He does admit to 1 fall appx 3 weeks ago  Local children assist with needs such as driving  Patient reports seeing son daily  On evaluation, pt presenting with gross limitations BLE in strength and ROM, LUE strength and ROM, core stability, balance and righting reactions and coordination   Patient required overall moderate A for functional mobility  He is a good rehab candidate and will require skilled PT intervention to achieve S goals in appx 10 days  Pt making slow progress towards d/c goals  Pt functioning at min/modA with RW  Pt with impaired balance with txs and turns during mobility increasing fall risk  Due to decrease cognition pt requires VCs to improve safety awareness with txs  Pt does not have stairs to perform and ramp installed for entry into home  Family has equipment at home for d/c   Due to impaired balance and decrease strength, pt remains a fall risk and making slow progress  Will cont to work on deficits to progress with functional mobility and decrease burden of care for home  D/c pending at this time based on support and A needed for home  Pt making gains towards d/c goals  Pt and pt's spouse have completed some FT with functional txs with RW  Pt occasional with LOB with txs requiring more A, especially when fatigued  Will cont to work on FT and safety with txs for d/c home with spouse  Amb recommended for therapy only to decrease fall risk at home  Occupational Therapy:  Eating: Supervision  Grooming: Minimal Assistance  Bathing: Minimal Assistance  Bathing: Minimal Assistance  Upper Body Dressing: Moderate Assistance  Lower Body Dressing: Maximum Assistance  Toileting: Moderate Assistance  Tub/Shower Transfer: Minimal Assistance  Toilet Transfer: Minimal Assistance  Cognition: Exceptions to WNL  Cognition: Decreased Memory, Decreased Executive Functions, Decreased Safety, Decreased Attention  Orientation: Person, Place, Time, Situation  Discharge Recommendations: Home with:  [address] with[de-identified] 24 Hour Assistance, 24 Hour Supervision, Family Support       Pt making slow progress towards LTGs  At baseline pt was using w/c and RW for stand pivot transfers only  Pt was receiving home OT/PT and walking with therapy only  Pt had HHA 2 hrs/day 5x/week  HHA was available to come in evening prn   Pt dtr was assisting pt and wife for first week that he was home post rehab  Pt continues to require Benny for UB bathing due to dec UE ROM, Benny for LB in stance to assist with balance, and min/modA for toileting  Pt is demo improvements in overall standing tolerance and balance for mobility and completes with CGA when cued to position LEs and sequence transfer correctly  Pt continues to be limited by occasional posterior weight shift in stance, decreased dynamic reach below waist, and dec UE ROM req cont assistance for functional ADL tasks    Pt will continue to benefit from skilled OT services following POC with focus on above mentioned deficits to increase safety and independence with ADL tasks and reduce burden of care  Speech Therapy:           No notes on file    Nursing Notes:  Appetite: Good  Diet Type: Diabetic, 2 gram sodium diet                      Diet Patient/Family Education Complete: Yes    Type of Wound (LDA): Wound                    Type of Wound Patient/Family Education: Yes  Bladder: 5 - Supervision     Bladder Patient/Family Education: Yes  Bowel: 6 - Modified Jay     Bowel Patient/Family Education: Yes  Pain Location: Shoulder  Pain Orientation: Right  Pain Score: 0                       Hospital Pain Intervention(s): Rest  Pain Patient/Family Education: Yes  Medication Management/Safety  Safe Administration: Yes  Medication Patient/Family Education Complete: Yes    Pt is 79 yo male with PMH chronic CHF, CKD IV, PAF,  dual chamber PPM, CVA, CAD/CABG, DM II, Hypothyroidism, left L2L3 microdiskectomy, & BPH who was admitted with volume overload 8/22 to [address] [facility] 8[facility]  CHF & volume overload managed by renal   Pt with positive orthostatics and diuretics adjusted  Fl restriction of 1500 ml and low sodium diet  Baseline Cr 3 3-3 6  Pt wears Teds during the day  BPH managed with Flomax daily  Previous CVA managed with ASA & Lipitor, PAF lopressor & coumadin when within acceptable limits   DM II- Nini Ditch restarted and lantus D/C  On BS checks QID w coverage  Pt has Fe def anemia and is on niferex  Hypothyroidism with synthroid  Pt came to [facility] with a small stage II on upper R side of his sacrum which is healed--  alleyvn in place  Pt requires alarms for safety  Pt is continent of bowel and bladder  This week we will be monitoring pt's vital signs, lab results & daily weight  We will manage his DM II with diet, corrective doses of insulin according to qid blood sugars,& lantus  We will encourage strength & endurance to promote independence with ADL's  We will teach & maintain T/R & offloading to prevent further skin breakdown  We will perform routine skin checks  We will monitor for constipation and medicate as per bowel regimen  We will increase safety awareness and keep pt free from falls  Case Management:     Discharge Planning  Living Arrangements: Spouse/significant other  Support Systems: Spouse/significant other, Children, Home care staff  Assistance Needed: ADL's  Type of Current Residence: Private residence  [address]: No  Pt is participating with therapy and is expected to return home w/family and contd therapy services  Pt was receiving nursing and therapy services through [facility] prior to admission  Following to assist w/dc planning needs  Is the patient actively participating in therapies? yes  List any modifications to the treatment plan:     Barriers Interventions   labs Medical team following                     Is the patient making expected progress toward goals?  yes  List any update or changes to goals:     Medical Goals: Patient will be medically stable for discharge to [facility] upon completion of rehab program and Patient will be able to manage medical conditions and comorbid conditions with medications and follow up upon completion of rehab program    Weekly Team Goals:   Rehab Team Goals  ADL Team Goal: Patient will require assist with ADLs with least restrictive device upon completion of rehab program  Transfer Team Goal: Patient will require supervision with transfers with least restrictive device upon completion of rehab program  Locomotion Team Goal: Patient will require supervision with locomotion with least restrictive device upon completion of rehab program  Cognitive Team Goal: Patient will require supervision for basic and complex tasks upon completion of rehab program    Discussion: pt is progressing well and is making gains  Family training has been occurring daily and dc is scheduled for Saturday as long as creatnin is stable  Pt will be receiving [facility] services for rn pt and ot  Anticipated Discharge Date:  9/14/19  [facility] Team Members Present: The following team members are supervising care for this patient and were present during this Weekly Team Conference      Physician: Dr Kiara Roberson MD  : Royce Gil MSW  Registered Nurse: Bashir Gracia RN  Physical Therapist: Aron Ding, DPT  Occupational Therapist: Rojelio Miramontes MS, OTR/L  Speech Therapist: Abel Garcia [address], CCC-SLP  Other: Douglas Ratliff, RN, BSN  [address] [place] [address] and Hospice

## 2019-09-12 NOTE — PROGRESS NOTES
BPCI form updated, care coordination note removed and bundle episode resolved  Inbasket message to LENY Stewart

## 2019-09-12 NOTE — PROGRESS NOTES
Internal Medicine Progress Note  Patient: Jay Mcwilliams  Age/sex: 80 y o  male  Medical Record #: [id number]      ASSESSMENT/PLAN: (Interval History)  Jay Mcwilliams is seen and examined and management for following issues:    Volume overload/chronic diastolic CHF:  s/p Lasix IV, weight dropped 11 lbs = per renal, target weight is 168-170 lbs  Renal managing his diuretic tx = currently Torsemide 40mg qd which is to be held tomorrow until labs back  FR 1500ml; 2Gm NA diet      Abnormal Troponin: felt to be 2/2 creatinine/vol overload putting stress on the heart; no further testing etc was recommended     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:  above baseline = renal aware but dose of Torsemide already given ---> to hold tomorrow until see BMP      Iron deficiency anemia: renal gave 5 doses Venofer total and is on Niferex 150mg qd  Checked stool for occult blood = negative x 2      Hx urine retention/BPH; Klebsiella UTI 6/2019 and Klebsiella urosepsis 7/2019:  Uro saw in 7/2019 and added added Finasteride  Consideration was to be given to starting suppressive therapy with Nitrofurantion 100mg qhs  Currently, he is on Flomax/Finasteride  Will need to watch closely for UTI sx  HTN/orthostasis: on Lopressor 25mg BID/Ranexa 500mg BID  No sx dizziness again today      PAF:  continue Lopressor but dropped to 25 mg BID since has some orthostasis Coumadin is 3mg 2x/week and 1 5 mg 5x/week = no changes today     Hx Dual chamber PPM:  sees Dr Kehinde Rome as an OP     CVA (2014):  continue ASA/Lipitor     CAD/CABG (2013):   was [facility] at [facility] 6/2019 for chest pain --> seen by Cardiology, had nuclear stress test = small, severe, reversible myocardial perfusion defect of the basal inferior wall --> was started on Ranexa 500mg BID but no further plans for intervention were recommended    Continue Lopressor, Lipitor and ASA as well     DM2: takes Tradjenta 5mg qd at home = restarted Tradjenta 5mg qam 9/10/19 and stopped Lantus      Hypothyroidism:  continue current Levothyroxine     Left L2-3 microdiskectomy 7/18/19 at [facility]    Vitamin D deficiency:  Level is 22 6; d/w renal = ok continue 2000U/day       Subjective/ HPI: Patients overnight issues or events were reviewed with nursing or staff during rounds or morning huddle session  DM2:switched off of Lantus to [address] which he takes at home  Anemia: s/p venofer, now on iron PO   CAD:  Stable on current tx    Offers no complaints    ROS:     GI: denies abdominal pain, change bowel habits or reflux symptoms  Neuro: Denies any headache, new vision changes, new neuropathies,new weaknesses   Respiratory: No Cough, SOB, denies wheeze  Cardiovascular: No CP, palpitations , denies perception of rapid heartbeat  : denies any new urinary burning or frequency    Review of Scheduled Meds:    Current Facility-Administered Medications:  acetaminophen 650 mg Oral Q6H PRN Irving Garcia MD   aspirin 81 mg Oral Daily Irving Garcia MD   atorvastatin 40 mg Oral QPM Irving Garcia MD   bisacodyl 10 mg Rectal Daily PRN Irving Garcia MD   cholecalciferol 2,000 Units Oral Daily Irving Garcia MD   finasteride 5 mg Oral Daily MAURICE Sy   insulin lispro 1-5 Units Subcutaneous TID AC MAURICE Patel   insulin lispro 1-5 Units Subcutaneous HS MAURICE Sy   iron polysaccharides 150 mg Oral Daily Andrew Dumas MD   levothyroxine 75 mcg Oral Daily Irving Garcia MD   lidocaine 1 patch Topical Daily Irving Garcia MD   lidocaine  Topical Q4H PRN Irving Garcia MD   linaGLIPtin 5 mg Oral Daily MAURICE Sy   menthol-methyl salicylate  Apply externally 4x Daily PRN Irving Garcia MD   metoprolol tartrate 25 mg Oral BID With Meals MAURICE Sy   ondansetron 4 mg Intravenous Q6H PRN Irving Garcia MD   polyethylene glycol 17 g Oral Daily PRN Irving Garcia MD   ranolazine 500 mg Oral Q12H Victor M Foley MD senna-docusate sodium 2 tablet Oral BID Lopez Jacobs MD   tamsulosin 0 4 mg Oral Daily Lopez Jacobs MD   warfarin 1 5 mg Oral Once per day on Sun Tue Wed Fri Sat My Mimst, CRNP   warfarin 3 mg Oral Once per day on Mon Thu My [place], [address]:     Results from last 7 days   Lab Units 09/12/19  0600 09/09/19  0642   WBC Thousand/uL 5 73 7 58   HEMOGLOBIN g/dL 8 4* 9 0*   HEMATOCRIT % 26 6* 28 3*   PLATELETS Thousands/uL 209 243     Results from last 7 days   Lab Units 09/12/19  0600 09/10/19  0527   SODIUM mmol/L 135* 134*   POTASSIUM mmol/L 3 6 3 5   CHLORIDE mmol/L 101 101   CO2 mmol/L 28 28   BUN mg/dL 59* 51*   CREATININE mg/dL 4 02* 3 64*   CALCIUM mg/dL 8 2* 8 1*         Results from last 7 days   Lab Units 09/12/19  0600 09/09/19  0642   INR  2 48* 2 24*          Results from last 7 days   Lab Units 09/12/19  0635 09/11/19  2139 09/11/19  1629   POC GLUCOSE mg/dl 96 166* 154*       Imaging:     No orders to display       *Labs reviewed  *Radiology studies reviewed  *Medications reviewed and reconciled as needed  *Please refer to order section for additional ordered labs studies  *Case discussed with primary attending during morning huddle case rounds    Physical Examination:  Vitals:   Vitals:    09/11/19 1735 09/11/19 2051 09/12/19 0500 09/12/19 0759   BP: 141/65 155/67 150/66 140/65   BP Location:  Left arm     Pulse: 70 71 73 68   Resp:  18 20    Temp:  98 6 °F (37 °C) 97 7 °F (36 5 °C)    TempSrc:  Oral Oral    SpO2:  99% 97%    Weight:       Height:           General Appearance: NAD, conversive  Eyes: No icterus; conjunctiva normal, PERRLA  HENT: oropharynx clear; mucous membranes moist  Neck: trachea midline, range of motion full   Supple w/o pain  Lungs: CTA, normal respiratory effort, no retractions, expiratory effort normal  CV: regular rate, no rubs/gallops; +murmur  ABD: soft; NT/ND; +BS  EXT: <1+ edema LE = improving  Skin: normal turgor, normal texture, no rashes  Psych: affect normal, no overt anxiety/depression during exam today   Neuro: AAOx3            Total time spent: At least 40 minutes, with more than 50% spent counseling/coordinating care  Counseling includes discussion with patient re: progress  and discussion with patient of his/her current medical state/information  Coordination of patient's care was performed in conjunction with primary service  Time invested included review of patient's labs, vitals, and management of their comorbidities with continued monitoring  In addition, this patient was discussed with medical team including physician and advanced extenders  The care of the patient was extensively discussed and appropriate treatment plan was formulated unique for this patient  ** Please Note: Dragon 360 Dictation voice to text software may have been used in the creation of this document   **

## 2019-09-12 NOTE — PROGRESS NOTES
NEPHROLOGY PROGRESS NOTE   Jay Mcwilliams 80 y o  male MRN: [id number]  Unit/Bed#: -43 Encounter: [id number]    ASSESSMENT & PLAN:  80 y  o male with history of chronic kidney disease stage 4, BPH, hypertension presented with increased lower extremity edema gradually worsening, he initially presented to [facility] on 08/23  Nephrology consulted for chronic kidney disease and for fluid overload  Volume status improved with IV Lasix followed by oral torsemide  1  Chronic kidney disease stage 4:  · Suspect that baseline creatinine will settle around 3 5 to 3 7 with diuretics  As per records recent baseline creatinine  has been around 3 3-3 5  Previously in 2014 creatinine was 1 9 and 2 9 in 2015  · Renal function has worsened today to creatinine 4 02 from previous creatinine of 3 6 on 09/10  Worsening renal function could be due to intravascular volume depletion from use of torsemide  Will also do bladder scan to rule out urine retention  I have stopped torsemide for now but patient ordered received today morning dose  If renal function continue to worsen tomorrow, may consider IV fluids  Once renal function stabilizes to recent creatinine of 3 5-3 7, may consider lower dose of torsemide  Patient was receiving torsemide 40 mg daily for last few days  · Saw Dr Erika Gordon in May 2019 -  appt on 9/11/19 with Milton Anguiano which needs to reschedule at the time of discharge  · Etiology -likely due to hypertensive nephrosclerosis and diabetic nephropathy as well as age-related nephron loss  · Phosphorus is at goal   Continue vitamin-D       2  Primary HTN with chronic kidney disease stage 4: BP  is slightly elevated today morning but acceptable, has been fluctuating  Continue current medication  Avoid hypotension  3  Acute on Chronic diastolic CHF (EF 04%, B4BL on 6/12/19): Volume status acceptable, due to worsening renal function holding further torsemide  He did receive today morning dose    Continue monitoring renal function  4  Hyponatremia:  Resolved, sodium 135  Continue to monitor  Continue fluid restriction  Hyponatremia likely from hypervolemia  5  Anemia, iron deficiency:  Iron saturation was 14% on 08/23  · Hemoglobin is stable  · He received Venofer 1000 mg during this hospital stay  Continue Niferex 150 mg daily  · Hemoglobin stable at 8 4 to 9 0   if hemoglobin remains low may consider HOLLY after discussion with patient    6  History of urine retention/BPH:  Continue finasteride and Flomax  Continue follow up with Urology  Bladder scan showed only 15 mL  7  Vitamin-D deficiency:  Vitamin-D level was 22 6 on 08/29 last PTH level from 06/05 was 74 8  Continue current dose of vitamin-D 2000 units daily  Monitor vitamin-D level with repeat labs in 3-4 months  Vitamin-D was not on the list when patient was seen by me at [address] [place] [address] on 08/25    8  Hx CAD/CABG    Discussed with  MAURICE Cameron    SUBJECTIVE:  No shortness of breath  Sitting comfortably on the chair  Denies any chest    OBJECTIVE:  Current Weight: Weight - Scale: 75 4 kg (166 lb 3 6 oz)  Vitals:    09/12/19 1316   BP: 113/56   Pulse: 69   Resp: 20   Temp: 98 1 °F (36 7 °C)   SpO2: 97%       Intake/Output Summary (Last 24 hours) at 9/12/2019 1437  Last data filed at 9/12/2019 1215  Gross per 24 hour   Intake 444 ml   Output 1375 ml   Net -931 ml       Physical Exam  General:  Ill looking, awake  Eyes: Conjunctivae pink,  Sclera anicteric  ENT: lips and mucous membranes moist  Neck: supple   Chest: Clear to Auscultation both lungs,  no crackles, ronchus or wheezing  CVS: S1 & S2 present, normal rate, regular rhythm, no murmur    Abdomen: soft, non-tender, non-distended, Bowel sounds normoactive  Extremities:  Trace edema both legs  Skin: no rash  Neuro: awake, alert, oriented x3        Medications:    Current Facility-Administered Medications:     acetaminophen (TYLENOL) tablet 650 mg, 650 mg, Oral, Q6H PRN, Babatunde Ray MD, 650 mg at 09/03/19 1710    aspirin (ECOTRIN LOW STRENGTH) EC tablet 81 mg, 81 mg, Oral, Daily, Babatunde Ray MD, 81 mg at 09/12/19 0800    atorvastatin (LIPITOR) tablet 40 mg, 40 mg, Oral, QPM, Babatunde Ray MD, 40 mg at 09/11/19 1734    bisacodyl (DULCOLAX) rectal suppository 10 mg, 10 mg, Rectal, Daily PRN, Babatunde Ray MD    cholecalciferol (VITAMIN D3) tablet 2,000 Units, 2,000 Units, Oral, Daily, Babatunde Ray MD, 2,000 Units at 09/12/19 0800    finasteride (PROSCAR) tablet 5 mg, 5 mg, Oral, Daily, Gabino Daniels, CRNP, 5 mg at 09/12/19 0800    insulin lispro (HumaLOG) 100 units/mL subcutaneous injection 1-5 Units, 1-5 Units, Subcutaneous, TID AC, 2 Units at 09/12/19 1138 **AND** Fingerstick Glucose (POCT), , , TID AC, Gabino Daniels, CRNP    insulin lispro (HumaLOG) 100 units/mL subcutaneous injection 1-5 Units, 1-5 Units, Subcutaneous, HS, MAURICE Cameron, 1 Units at 09/11/19 2207    iron polysaccharides (FERREX) capsule 150 mg, 150 mg, Oral, Daily, Joseph Arnett MD, 150 mg at 09/12/19 0800    levothyroxine tablet 75 mcg, 75 mcg, Oral, Daily, Babatunde Ray MD, 75 mcg at 09/12/19 0511    lidocaine (LIDODERM) 5 % patch 1 patch, 1 patch, Topical, Daily, Babatunde Ray MD, 1 patch at 09/11/19 1734    lidocaine (URO-JET) 2 % urethral/mucosal gel, , Topical, Q4H PRN, Babatunde Ray MD    linaGLIPtin TABS 5 mg, 5 mg, Oral, Daily, Gabino Daniels, CRNP, 5 mg at 09/12/19 0800    menthol-methyl salicylate (BENGAY) 11-33 % cream, , Apply externally, 4x Daily PRN, Babatunde Ray MD    metoprolol tartrate (LOPRESSOR) tablet 25 mg, 25 mg, Oral, BID With Meals, MAURICE Cameron, 25 mg at 09/12/19 0759    ondansetron (ZOFRAN) injection 4 mg, 4 mg, Intravenous, Q6H PRN, Babatunde Ray MD    polyethylene glycol [facility]) packet 17 g, 17 g, Oral, Daily PRN, Babatunde Ray MD    ranolazine (RANEXA) 12 hr tablet 500 mg, 500 mg, Oral, Q12H [address], Donald Simmons MD, 500 mg at 09/12/19 0800    senna-docusate sodium (SENOKOT S) 8 6-50 mg per tablet 2 tablet, 2 tablet, Oral, BID, Donald Simmons MD    [facility]) capsule 0 4 mg, 0 4 mg, Oral, Daily, Donald Simmons MD, 0 4 mg at 09/12/19 0800    warfarin (COUMADIN) tablet 1 5 mg, 1 5 mg, Oral, Once per day on Sun Tue Wed Fri Sat, MAURICE Tobias, 1 5 mg at 09/11/19 1734    warfarin (COUMADIN) tablet 3 mg, 3 mg, Oral, Once per day on Mon Thu, MAURICE Patel, 3 mg at 09/09/19 1750    Invasive Devices:        Lab Results:   Results from last 7 days   Lab Units 09/12/19  0600 09/11/19  0533 09/10/19  0527 09/09/19  0642   WBC Thousand/uL 5 73  --   --  7 58   HEMOGLOBIN g/dL 8 4*  --   --  9 0*   HEMATOCRIT % 26 6*  --   --  28 3*   PLATELETS Thousands/uL 209  --   --  243   POTASSIUM mmol/L 3 6  --  3 5 3 6   CHLORIDE mmol/L 101  --  101 100   CO2 mmol/L 28  --  28 29   BUN mg/dL 59*  --  51* 51*   CREATININE mg/dL 4 02*  --  3 64* 3 76*   CALCIUM mg/dL 8 2*  --  8 1* 8 3   MAGNESIUM mg/dL  --  2 1  --   --        Previous work up:      Portions of the record may have been created with voice recognition software  Occasional wrong word or "sound a like" substitutions may have occurred due to the inherent limitations of voice recognition software  Read the chart carefully and recognize, using context, where substitutions have occurred  If you have any questions, please contact the dictating provider

## 2019-09-12 NOTE — SOCIAL WORK
Met with Pts spouse to inform her about the team meeting  She is on board with the d/c for Saturday, and will confirm the time ASAP  CM shared that the referral was made for [facility], for RN/PT/OT, and requesting that [facility] don BLAKELY offered to reach out and request someone call Pts spouse to schedule the initial contact tomorrow  Message sent through [facility] to [facility], requesting that someone contact Pts spouse tomorrow

## 2019-09-12 NOTE — PROGRESS NOTES
09/12/19 1400   Pain Assessment   Pain Assessment No/denies pain   Pain Score No Pain   Restrictions/Precautions   Precautions Bed/chair alarms;Cognitive; Fall Risk;Supervision on toilet/commode   Weight Bearing Restrictions No   ROM Restrictions No   Braces or Orthoses AFO  (B/L )   Cognition   Overall Cognitive Status Impaired   Arousal/Participation Cooperative   Attention Attends with cues to redirect   Orientation Level Oriented to person;Oriented to place;Oriented to situation   Memory Decreased short term memory;Decreased recall of recent events;Decreased recall of precautions   Following Commands Follows one step commands with increased time or repetition   Subjective   Subjective Patient eager to particiapte      QI: Sit to Stand   Assistance Needed Physical assistance   Assistance Provided by [place] Less than 25%   Sit to Stand CARE Score 3   QI: Chair/Bed-to-Chair Transfer   Assistance Needed Physical assistance   Assistance Provided by [place] Less than 25%   Chair/Bed-to-Chair Transfer CARE Score 3   Transfer Bed/Chair/Wheelchair   Limitations Noted In Balance;Confidence;UE Strength;LE Strength;Problem Solving   Adaptive Equipment Roller Walker   Stand Pivot Minimal Assist   Sit to Stand Minimal Assist   Stand to Sit Contact Guard   Bed, Chair, Wheelchair Transfer (FIM) 4 - Patient requires steadying assist or light touching   QI: Walk 10 Feet   Assistance Needed Incidental touching   Assistance Provided by [place] Less than 25%   Walk 10 Feet CARE Score 3   QI: Walk 50 Feet with Two Turns   Assistance Needed Incidental touching   Assistance Provided by [place] Less than 25%   Walk 50 Feet with Two Turns CARE Score 3   QI: Walk 150 Feet   Assistance Needed Incidental touching   Assistance Provided by [place] Less than 25%   Walk 150 Feet CARE Score 3   QI: Walking 10 Feet on Uneven Surfaces   Reason if not Attempted Safety concerns   Walking 10 Feet on Uneven Surfaces CARE Score 88   Ambulation   Does the patient walk? 2  Yes   Primary Discharge Mode of Locomotion Wheelchair   Walk Assist Level Contact Guard   Gait Pattern Inconsistant Lois;Decreased foot clearance; Step through   Assist Device Seleneer Gabrielle Garcia Walked (feet) 200 ft   Limitations Noted In Balance; Endurance; Sequencing;Speed;Strength;Swing   Walking (FIM) 4 - Patient requires steadying assist or light touching AND distance 150 feet or more, no rest   Wheelchair mobility   QI: Does the patient use a wheelchair? 1  Yes   QI: Indicate the type of wheelchair 1  Manual   Findings not assessed this session    Wheelchair (FIM) 0 - Activity does not occur   Therapeutic Interventions   Strengthening standing therex: marches, hip ABD, hip extension   Balance lateral stepping at sink; backwards ambulation; forward ambulation on railing with HHA    Assessment   Treatment Assessment Patient engaged in PT treatment session focusing on LE therex  Reviewed with patient's spouse therex that can be done at home  Patient previously given seated therex program  Her concern is that patient will decline in functional mobility when not in rehab  She is encouraged to continue performing LE therex at discharge with standing and stand pivot transfers to be performed for strengthening  Patient and wife instructed to hold all ambulation for patient's children, caregiver or home therapist to maintain safety and reduce risk for falls  Patient tentative for d/c 9/14/19 home with family support and HHPT where he will continue to benefit from skilled PT intervention to address deficits in balance and strength  Problem List Decreased strength;Decreased range of motion;Decreased endurance; Impaired balance;Decreased mobility; Decreased coordination; Impaired sensation;Orthopedic restrictions   PT Barriers   Functional Limitation Walking   Plan   Treatment/Interventions Functional transfer training;LE strengthening/ROM; Endurance training; Therapeutic exercise;Cognitive reorientation;Patient/family training;Equipment eval/education; Bed mobility;Gait training; Compensatory technique education   Progress Progressing toward goals   Recommendation   Recommendation 24 hour supervision/assist;Home PT; Home with family support   Equipment Recommended Other (Comment)  (owns all necessary DME )   PT Therapy Minutes   PT Time In 1400   PT Time Out 1430   PT Total Time (minutes) 30   PT Mode of treatment - Individual (minutes) 30   PT Mode of treatment - Concurrent (minutes) 0   PT Mode of treatment - Group (minutes) 0   PT Mode of treatment - Co-treat (minutes) 0   PT Mode of Teatment - Total time(minutes) 30 minutes   Therapy Time missed   Time missed?  No

## 2019-09-12 NOTE — PROGRESS NOTES
09/12/19 0930   Pain Assessment   Pain Assessment No/denies pain   Pain Score No Pain   Restrictions/Precautions   Precautions Bed/chair alarms;Cognitive; Fall Risk;Supervision on toilet/commode   Weight Bearing Restrictions No   ROM Restrictions No   Braces or Orthoses AFO  (BLE )   Cognition   Overall Cognitive Status Impaired   Arousal/Participation Cooperative   Attention Attends with cues to redirect   Orientation Level Oriented to person;Oriented to place;Oriented to situation   Memory Decreased short term memory;Decreased recall of recent events   Following Commands Follows one step commands with increased time or repetition   Subjective   Subjective Patient agreeable to particiapte in PT session    QI: Sit to Stand   Assistance Needed Physical assistance   Assistance Provided by [place] Less than 25%   Comment RW   Sit to Stand CARE Score 3   Bed Mobility   Findings Patient OOB in chair upon arrival    QI: Chair/Bed-to-Chair Transfer   Assistance Needed Physical assistance   Assistance Provided by [place] Less than 25%   Comment RW   Chair/Bed-to-Chair Transfer CARE Score 3   Transfer Bed/Chair/Wheelchair   Limitations Noted In Balance;Confidence;UE Strength;LE Strength;Problem Solving   Adaptive Equipment Roller Walker   Stand Pivot Minimal Assist;Contact Guard   Sit to Stand Minimal Assist;Contact Guard   [facility] to [facility], Chair, Wheelchair Transfer (FIM) 4 - Patient requires steadying assist or light touching   QI: Walk 10 Feet   Assistance Needed Incidental touching   Assistance Provided by [place] Less than 25%   Comment RW   Walk 10 Feet CARE Score 3   QI: Walk 50 Feet with Two Turns   Assistance Needed Incidental touching   Assistance Provided by [place] Less than 25%   Walk 50 Feet with Two Turns CARE Score 3   QI: Walk 150 Feet   Assistance Needed Incidental touching   Assistance Provided by [place] Less than 25%   Walk 150 Feet CARE Score 3   QI: Walking 10 Feet on Uneven Surfaces Reason if not Attempted Safety concerns   Walking 10 Feet on Uneven Surfaces CARE Score 88   Ambulation   Does the patient walk? 2  Yes   Primary Discharge Mode of Locomotion Wheelchair   Walk Assist Level Contact Guard   Gait Pattern Inconsistant Lois;Decreased foot clearance   Assist Device Roller Walker   Distance Walked (feet) 150 ft   Limitations Noted In Balance; Endurance   Walking (FIM) 4 - Patient requires steadying assist or light touching AND distance 150 feet or more, no rest   Wheelchair mobility   QI: Does the patient use a wheelchair? 1  Yes   QI: Indicate the type of wheelchair 1  Manual   Findings not assessed this session    Wheelchair (FIM) 0 - Activity does not occur   QI: 4 Steps   Reason if not Attempted Safety concerns   4 Steps CARE Score 88   QI: 12 Steps   Reason if not Attempted Safety concerns   12 Steps CARE Score 88   QI: Picking Up Object   Reason if not Attempted Safety concerns   Picking Up Object CARE Score 88   Toilet Transfer   Findings Stood to utilize urinal with wife providing assistance; therpist providing supervision    Therapeutic Interventions   Strengthening repeated STS, repeated SPT   Assessment   Treatment Assessment Patient and spouse engaged in 60 minute PT treatment session focusing on safe d/c planning  At the start of session, [facility] representative present to assess AFO's administered 8/10/19 by [facility] in [place]  Her report is despite fluctuation in volume in BLE, plan to keep AFOs as is and follow up at a later time; business card given  Wife instructed to have braces donned for any standing and transfers  She is also to take off when patient resting for extended period of time and to check skin  In event there is any reddness, wife to monitor with goal to have redness gone by 20-30 minutes  Remainder of session spent discussing upcoming d/c on 9/14/19 with HHPT   Plan for wife to perform STS and SPT only and have patient ambulate with therapist, caregiver and patient's children  Wife states she will not have a caregiver in place inially at d/c but is prepared with help from family for d/c Saturday  Wife performing all transfers during session with good carryover demonstrated from previous sessions  Utilized soft care cushion in chair with dycem to increase height of chair slightly; wife encouraged to bring home to slightly increase height of wheelchair at home to ease with STS transfers  Educated wife on good body mechanics and posture  She is encouraged to perform donning/doffing of braces and foot rests in chair which she said she has access to  Nolvia belt ordered by daughter  Wife also encouraged to have table with items prepared especially in event patient is utilizing urinal such as wipes, etc  Wife appeared more confident and calm towards end of session  Patient will continue to benefit from skilled PT intervention to progress functional mobility (I) and safety  Family/Caregiver Present wife, Ilsa Ochoa    PT Family training done with: see assessment    Problem List Decreased strength;Decreased range of motion;Decreased endurance; Impaired balance;Decreased mobility; Decreased coordination;Decreased cognition; Impaired sensation; Impaired tone;Orthopedic restrictions;Pain   PT Barriers   Functional Limitation Walking   Plan   Treatment/Interventions Functional transfer training;LE strengthening/ROM; Elevations; Therapeutic exercise; Endurance training;Cognitive reorientation;Patient/family training;Equipment eval/education; Bed mobility;Gait training; Compensatory technique education   Progress Progressing toward goals   Recommendation   Recommendation 24 hour supervision/assist;Home PT; Home with family support   Equipment Recommended Other (Comment)  (owns all necessary DME )   PT Therapy Minutes   PT Time In 0930   PT Time Out 1030   PT Total Time (minutes) 60   PT Mode of treatment - Individual (minutes) 60   PT Mode of treatment - Concurrent (minutes) 0   PT Mode of treatment - Group (minutes) 0   PT Mode of treatment - Co-treat (minutes) 0   PT Mode of Teatment - Total time(minutes) 60 minutes   Therapy Time missed   Time missed?  No

## 2019-09-13 ENCOUNTER — EPISODE CHANGES (OUTPATIENT)
Dept: CASE MANAGEMENT | Facility: OTHER | Age: 84
End: 2019-09-13

## 2019-09-13 LAB
ANION GAP SERPL CALCULATED.3IONS-SCNC: 7 MMOL/L (ref 4–13)
BUN SERPL-MCNC: 52 MG/DL (ref 5–25)
CALCIUM SERPL-MCNC: 8.5 MG/DL (ref 8.3–10.1)
CHLORIDE SERPL-SCNC: 102 MMOL/L (ref 100–108)
CO2 SERPL-SCNC: 29 MMOL/L (ref 21–32)
CREAT SERPL-MCNC: 3.78 MG/DL (ref 0.6–1.3)
GFR SERPL CREATININE-BSD FRML MDRD: 13 ML/MIN/1.73SQ M
GLUCOSE SERPL-MCNC: 115 MG/DL (ref 65–140)
GLUCOSE SERPL-MCNC: 157 MG/DL (ref 65–140)
GLUCOSE SERPL-MCNC: 186 MG/DL (ref 65–140)
GLUCOSE SERPL-MCNC: 91 MG/DL (ref 65–140)
GLUCOSE SERPL-MCNC: 95 MG/DL (ref 65–140)
POTASSIUM SERPL-SCNC: 3.6 MMOL/L (ref 3.5–5.3)
SODIUM SERPL-SCNC: 138 MMOL/L (ref 136–145)

## 2019-09-13 PROCEDURE — 80048 BASIC METABOLIC PNL TOTAL CA: CPT | Performed by: INTERNAL MEDICINE

## 2019-09-13 PROCEDURE — 82948 REAGENT STRIP/BLOOD GLUCOSE: CPT

## 2019-09-13 PROCEDURE — 97110 THERAPEUTIC EXERCISES: CPT

## 2019-09-13 PROCEDURE — 97535 SELF CARE MNGMENT TRAINING: CPT

## 2019-09-13 PROCEDURE — 99232 SBSQ HOSP IP/OBS MODERATE 35: CPT

## 2019-09-13 PROCEDURE — 97530 THERAPEUTIC ACTIVITIES: CPT

## 2019-09-13 PROCEDURE — 99232 SBSQ HOSP IP/OBS MODERATE 35: CPT | Performed by: INTERNAL MEDICINE

## 2019-09-13 PROCEDURE — 97116 GAIT TRAINING THERAPY: CPT

## 2019-09-13 RX ORDER — TORSEMIDE 20 MG/1
20 TABLET ORAL DAILY
Status: DISCONTINUED | OUTPATIENT
Start: 2019-09-13 | End: 2019-09-14 | Stop reason: HOSPADM

## 2019-09-13 RX ADMIN — ASPIRIN 81 MG: 81 TABLET, COATED ORAL at 08:25

## 2019-09-13 RX ADMIN — POLYSACCHARIDE-IRON COMPLEX 150 MG: 150 CAPSULE ORAL at 08:25

## 2019-09-13 RX ADMIN — LEVOTHYROXINE SODIUM 75 MCG: 75 TABLET ORAL at 05:48

## 2019-09-13 RX ADMIN — TORSEMIDE 20 MG: 20 TABLET ORAL at 11:31

## 2019-09-13 RX ADMIN — RANOLAZINE 500 MG: 500 TABLET, FILM COATED, EXTENDED RELEASE ORAL at 08:25

## 2019-09-13 RX ADMIN — LINAGLIPTIN 5 MG: 5 TABLET, FILM COATED ORAL at 08:25

## 2019-09-13 RX ADMIN — VITAMIN D, TAB 1000IU (100/BT) 2000 UNITS: 25 TAB at 08:25

## 2019-09-13 RX ADMIN — TAMSULOSIN HYDROCHLORIDE 0.4 MG: 0.4 CAPSULE ORAL at 08:25

## 2019-09-13 RX ADMIN — METOPROLOL TARTRATE 25 MG: 25 TABLET, FILM COATED ORAL at 16:47

## 2019-09-13 RX ADMIN — LIDOCAINE 1 PATCH: 50 PATCH CUTANEOUS at 16:50

## 2019-09-13 RX ADMIN — SENNOSIDES AND DOCUSATE SODIUM 2 TABLET: 8.6; 5 TABLET ORAL at 16:47

## 2019-09-13 RX ADMIN — FINASTERIDE 5 MG: 5 TABLET, FILM COATED ORAL at 08:25

## 2019-09-13 RX ADMIN — ATORVASTATIN CALCIUM 40 MG: 40 TABLET, FILM COATED ORAL at 16:48

## 2019-09-13 RX ADMIN — METOPROLOL TARTRATE 25 MG: 25 TABLET, FILM COATED ORAL at 07:37

## 2019-09-13 RX ADMIN — RANOLAZINE 500 MG: 500 TABLET, FILM COATED, EXTENDED RELEASE ORAL at 21:02

## 2019-09-13 RX ADMIN — INSULIN LISPRO 1 UNITS: 100 INJECTION, SOLUTION INTRAVENOUS; SUBCUTANEOUS at 16:48

## 2019-09-13 RX ADMIN — WARFARIN SODIUM 1.5 MG: 1 TABLET ORAL at 18:10

## 2019-09-13 RX ADMIN — INSULIN LISPRO 1 UNITS: 100 INJECTION, SOLUTION INTRAVENOUS; SUBCUTANEOUS at 11:31

## 2019-09-13 NOTE — PLAN OF CARE
Problem: PAIN - ADULT  Goal: Verbalizes/displays adequate comfort level or baseline comfort level  Description  Interventions:  - Encourage patient to monitor pain and request assistance  - Assess pain using appropriate pain scale  - Administer analgesics based on type and severity of pain and evaluate response  - Implement non-pharmacological measures as appropriate and evaluate response  - Consider cultural and social influences on pain and pain management  - Notify physician/advanced practitioner if interventions unsuccessful or patient reports new pain  Outcome: Progressing     Problem: INFECTION - ADULT  Goal: Absence or prevention of progression during hospitalization  Description  INTERVENTIONS:  - Assess and monitor for signs and symptoms of infection  - Monitor lab/diagnostic results  - Monitor all insertion sites, i e  indwelling lines, tubes, and drains  - Monitor endotracheal if appropriate and nasal secretions for changes in amount and color  - [place] appropriate cooling/warming therapies per order  - Administer medications as ordered  - Instruct and encourage patient and family to use good hand hygiene technique  - Identify and instruct in appropriate isolation precautions for identified infection/condition  Outcome: Progressing     Problem: SAFETY ADULT  Goal: Patient will remain free of falls  Description  INTERVENTIONS:  - Assess patient frequently for physical needs  -  Identify cognitive and physical deficits and behaviors that affect risk of falls    -  [place] fall precautions as indicated by assessment   - Educate patient/family on patient safety including physical limitations  - Instruct patient to call for assistance with activity based on assessment  - Modify environment to reduce risk of injury  - Consider OT/PT consult to assist with strengthening/mobility  Outcome: Progressing  Goal: Maintain or return to baseline ADL function  Description  INTERVENTIONS:  -  Assess patient's ability to carry out ADLs; assess patient's baseline for ADL function and identify physical deficits which impact ability to perform ADLs (bathing, care of mouth/teeth, toileting, grooming, dressing, etc )  - Assess/evaluate cause of self-care deficits   - Assess range of motion  - Assess patient's mobility; develop plan if impaired  - Assess patient's need for assistive devices and provide as appropriate  - Encourage maximum independence but intervene and supervise when necessary  - Involve family in performance of ADLs  - Assess for home care needs following discharge   - Consider OT consult to assist with ADL evaluation and planning for discharge  - Provide patient education as appropriate  Outcome: Progressing  Goal: Maintain or return mobility status to optimal level  Description  INTERVENTIONS:  - Assess patient's baseline mobility status (ambulation, transfers, stairs, etc )    - Identify cognitive and physical deficits and behaviors that affect mobility  - Identify mobility aids required to assist with transfers and/or ambulation (gait belt, sit-to-stand, lift, walker, cane, etc )  - [place] fall precautions as indicated by assessment  - Record patient progress and toleration of activity level on Mobility SBAR; progress patient to next Phase/Stage  - Instruct patient to call for assistance with activity based on assessment  - Consider rehabilitation consult to assist with strengthening/weightbearing, etc   Outcome: Progressing     Problem: DISCHARGE PLANNING  Goal: Discharge to home or other facility with appropriate resources  Description  INTERVENTIONS:  - Identify barriers to discharge w/patient and caregiver  - Arrange for needed discharge resources and transportation as appropriate  - Identify discharge learning needs (meds, wound care, etc )  - Arrange for interpretive services to assist at discharge as needed  - Refer to Case Management Department for coordinating discharge planning if the patient needs post-hospital services based on physician/advanced practitioner order or complex needs related to functional status, cognitive ability, or social support system  Outcome: Progressing     Problem: Prexisting or High Potential for Compromised Skin Integrity  Goal: Skin integrity is maintained or improved  Description  INTERVENTIONS:  - Identify patients at risk for skin breakdown  - Assess and monitor skin integrity  - Assess and monitor nutrition and hydration status  - Monitor labs   - Assess for incontinence   - Turn and reposition patient  - Assist with mobility/ambulation  - Relieve pressure over bony prominences  - Avoid friction and shearing  - Provide appropriate hygiene as needed including keeping skin clean and dry  - Evaluate need for skin moisturizer/barrier cream  - Collaborate with interdisciplinary team   - Patient/family teaching  - Consider wound care consult   Outcome: Progressing     Problem: Potential for Falls  Goal: Patient will remain free of falls  Description  INTERVENTIONS:  - Assess patient frequently for physical needs  -  Identify cognitive and physical deficits and behaviors that affect risk of falls    -  [place] fall precautions as indicated by assessment   - Educate patient/family on patient safety including physical limitations  - Instruct patient to call for assistance with activity based on assessment  - Modify environment to reduce risk of injury  - Consider OT/PT consult to assist with strengthening/mobility  Outcome: Progressing

## 2019-09-13 NOTE — PROGRESS NOTES
Internal Medicine Progress Note  Patient: Ovidio Rogers  Age/sex: 80 y o  male  Medical Record #: [id number]      ASSESSMENT/PLAN: (Interval History)  Ovidio Rogers is seen and examined and management for following issues:    Volume overload/chronic diastolic CHF:  s/p Lasix IV, weight dropped 11 lbs in hospital   On FR 1500ml; 2Gm NA diet  Renal feels his current body wt will be his baseline and he is to weigh himself when he gets home tomorrow and call if wt goes up 3-4 lbs; Torsemide was reduced to 20mg qd (he did get 40mg yesterday)     Abnormal Troponin: felt to be 2/2 creatinine/vol overload putting stress on the heart; no further testing etc was recommended     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:  a little above baseline = renal is ok with creat 3 7 and  Torsemide at 20mg qd not 40mg (he did get yesterday)     Iron deficiency anemia: renal gave 5 doses Venofer total and is on Niferex 150mg qd  Checked stool for occult blood = negative x 2      Hx urine retention/BPH; Klebsiella UTI 6/2019 and Klebsiella urosepsis 7/2019:  Uro saw in 7/2019 and added added Finasteride  Consideration was to be given to starting suppressive therapy with Nitrofurantion 100mg qhs  Currently, he is on Flomax/Finasteride  Will need to watch closely for UTI sx  HTN/orthostasis: on Lopressor 25mg BID/Ranexa 500mg BID  No sx dizziness again today      PAF:  continue Lopressor but dropped to 25 mg BID since has some orthostasis Coumadin is 3mg 2x/week and 1 5 mg 5x/week = no changes today; INR as OP Monday     Hx Dual chamber PPM:  sees Dr Ines Bradley as an OP     CVA (2014):  continue ASA/Lipitor     CAD/CABG (2013):   was here at [address] 6/2019 for chest pain --> seen by Cardiology, had nuclear stress test = small, severe, reversible myocardial perfusion defect of the basal inferior wall --> was started on Ranexa 500mg BID but no further plans for intervention were recommended    Continue Lopressor, Lipitor and ASA as well     DM2: takes Tradjenta 5mg qd at home = restarted Tradjenta 5mg qam 9/10/19 and stopped Lantus      Hypothyroidism:  continue current Levothyroxine     Left L2-3 microdiskectomy 7/18/19 at [facility]    Vitamin D deficiency:  Level is 22 6; d/w renal = ok continue 2000U/day       Subjective/ HPI: Patients overnight issues or events were reviewed with nursing or staff during rounds or morning huddle session  DM2: switched off of Lantus to Charolet Dries which he takes at home  Anemia: s/p venofer, now on iron PO   CAD:  Stable on current tx    Offers no complaints    ROS:     GI: denies abdominal pain, change bowel habits or reflux symptoms  Neuro: Denies any headache, new vision changes, new neuropathies,new weaknesses   Respiratory: No Cough, SOB, denies wheeze  Cardiovascular: No CP, palpitations , denies perception of rapid heartbeat  : denies any new urinary burning or frequency    Review of Scheduled Meds:    Current Facility-Administered Medications:  acetaminophen 650 mg Oral Q6H PRN Eliecer Sellers MD   aspirin 81 mg Oral Daily Eliecer Sellers MD   atorvastatin 40 mg Oral QPM Eliecer Sellers MD   bisacodyl 10 mg Rectal Daily PRN Eliecer Sellers MD   cholecalciferol 2,000 Units Oral Daily Eliecer Sellers MD   finasteride 5 mg Oral Daily Thedore Null, CRNP   insulin lispro 1-5 Units Subcutaneous TID AC MAURICE Patel   insulin lispro 1-5 Units Subcutaneous HS Thedore Null, CRNP   iron polysaccharides 150 mg Oral Daily Colette Bryant MD   levothyroxine 75 mcg Oral Daily Eliecer Sellers MD   lidocaine 1 patch Topical Daily Eliecer Sellers MD   lidocaine  Topical Q4H PRN Eliecer Sellers MD   linaGLIPtin 5 mg Oral Daily Thedore Null, MAURICE   menthol-methyl salicylate  Apply externally 4x Daily PRN Eliecer Sellers MD   metoprolol tartrate 25 mg Oral BID With Meals Thedore MAURICE Shaw   ondansetron 4 mg Intravenous Q6H PRN Eliecer Sellers MD   polyethylene glycol 17 g Oral Daily PRN Sushil Samson Pool Romero MD   ranolazine 500 mg Oral Q12H [address] Eliecer Sellers MD   senna-docusate sodium 2 tablet Oral BID Eliecer Sellers MD   tamsulosin 0 4 mg Oral Daily Eliecer Sellers MD   torsemide 20 mg Oral Daily Thedore Null, CRNP   warfarin 1 5 mg Oral Once per day on Sun Tue Wed Fri Sat Thedore Null, CRNP   warfarin 3 mg Oral Once per day on Mon Thu Thedore Null, [address]:     Results from last 7 days   Lab Units 09/12/19  0600 09/09/19  0642   WBC Thousand/uL 5 73 7 58   HEMOGLOBIN g/dL 8 4* 9 0*   HEMATOCRIT % 26 6* 28 3*   PLATELETS Thousands/uL 209 243     Results from last 7 days   Lab Units 09/13/19  0542 09/12/19  0600   SODIUM mmol/L 138 135*   POTASSIUM mmol/L 3 6 3 6   CHLORIDE mmol/L 102 101   CO2 mmol/L 29 28   BUN mg/dL 52* 59*   CREATININE mg/dL 3 78* 4 02*   CALCIUM mg/dL 8 5 8 2*         Results from last 7 days   Lab Units 09/12/19  0600 09/09/19  0642   INR  2 48* 2 24*          Results from last 7 days   Lab Units 09/13/19  0638 09/12/19  2112 09/12/19  1550   POC GLUCOSE mg/dl 95 159* 173*       Imaging:     No orders to display       *Labs reviewed  *Radiology studies reviewed  *Medications reviewed and reconciled as needed  *Please refer to order section for additional ordered labs studies  *Case discussed with primary attending during morning huddle case rounds    Physical Examination:  Vitals:   Vitals:    09/12/19 2110 09/13/19 0524 09/13/19 0600 09/13/19 0737   BP: 165/78 153/71  152/70   BP Location: Right arm Left arm     Pulse: 69 68  86   Resp: 20 18     Temp: 97 9 °F (36 6 °C) 97 6 °F (36 4 °C)     TempSrc: Oral Oral     SpO2: 97% 96%     Weight:   78 2 kg (172 lb 6 4 oz)    Height:           General Appearance: NAD, conversive  Eyes: No icterus; conjunctiva normal, PERRLA  HENT: oropharynx clear; mucous membranes moist  Neck: trachea midline, range of motion full   Supple w/o pain  Lungs: CTA, normal respiratory effort, no retractions, expiratory effort normal  CV: regular rate, no rubs/gallops; +murmur  ABD: soft; NT/ND; +BS  EXT: <1+ edema LE = improving  Skin: normal turgor, normal texture, no rashes  Psych: affect normal, no overt anxiety/depression during exam today   Neuro: AAOx3            Total time spent: At least 40 minutes, with more than 50% spent counseling/coordinating care  Counseling includes discussion with patient re: progress  and discussion with patient of his/her current medical state/information  Coordination of patient's care was performed in conjunction with primary service  Time invested included review of patient's labs, vitals, and management of their comorbidities with continued monitoring  In addition, this patient was discussed with medical team including physician and advanced extenders  The care of the patient was extensively discussed and appropriate treatment plan was formulated unique for this patient  ** Please Note: Dragon 360 Dictation voice to text software may have been used in the creation of this document   **

## 2019-09-13 NOTE — PROGRESS NOTES
Physical Medicine and Rehabilitation Progress Note  Paris Caicedo 80 y o  male MRN: [id number]  Unit/Bed#: [facility] 549-47 Encounter: [id number]    Physical Medicine and Rehabilitation Progress Note  Paris Caicedo 80 y o  male MRN: [id number]  Unit/Bed#: [facility] 688-56 Encounter: [id number]    Chief Complaints:  Recent decline in function    Subjective/Interval Events:   Patient again without significant complaints this morning  Patient reports he feels like the swelling is legs getting better  Patient denies calf pain, lightheadedness, worsening strength or sensation, fever, chills, sweats, significant back pain or radiating leg pain, or other complaints  ROS: A 10-point ROS was performed  Negative except as listed above  Overall Assessment/relevant history:  [age]-year-old male with past medical history of chronic diastolic CHF, chronic kidney disease stage 4, paroxysmal atrial fibrillation, dual chamber permanent pacemaker, coronary artery disease, CABG, diabetes mellitus 2, hypothyroidism, BPH, recurrent UTIs, lumbar spinal stenosis, chronic low back pain, foot drop, history of left L2-L3 microdiskectomy in July of 2019 the developed worsening swelling with associated decline in ADLs and ambulation  Patient splits seen by Cardiology and Nephrology with noted worsening of renal function and volume overload believed to be related to this  Patient required initial IV and then more recently p o  Increase in diuretics  Patient's volume status has been improving although his functional status remains below his baseline    Patient was evaluated by skilled therapies and was found to have significant decline in ADLs and ambulation appropriate for admission to [facility]        Functional status on admission to [facility]:  PT:  Transfers max assist, ambulation moderate assist 150 feet  OT:  Lower body dressing, toileting total assist, bathing, transfers max assist, upper body dressing moderate assist    Functional status (recent):    Dressing moderate assist, ambulation 200 feet Min assist    Functional status goal:  Supervision for most ADLs and ambulation     * Impaired mobility and ADLs  Assessment & Plan  Function improving, caregiver training ongoing, continue management as outlined with plan for discharge tomorrow  Right proximal lower extremity strength improved from admission  Multifactorial:  Recent volume overload related to acute on chronic kidney disease, history of diastolic CHF, generalized weakness including proximal lower extremity weakness possibly related to some chronic illness/critical illness deconditioning, low back pain and radiating leg pain with right foot drop from lumbar spinal stenosis possible nerve impingement status post surgical intervention recently, 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, left adhesive capsulitis, query component cognitive impairment,   - Recommend acute comprehensive interdisciplinary inpatient rehabilitation to include intensive skilled therapies (PT, OT) as outlined with oversight and management by rehabilitation physician as well as inpatient rehab level nursing, case management and weekly interdisciplinary team meetings  - [facility] 16/30 with greatest deficits in memory and executive functioning   - optimal management of each  - Follow-up with PMR after discharge      Buttock wound  Assessment & Plan  Appreciated on admission to [address] [place] [address] care nurse c/s and assistance with management  - Notify MD of new or increasing drainage, development of purulent drainage, increased size/depth of wound, lack of healing, inability to maintain wound integrity due to degree of drainage, wound product, dressing, or currently ordered frequency of wound management  In general dressings should be changed PRN if soiled unless specifically noted otherwise  Do not allow soiled dressing on patient for extended period of time    - Turn patient Q2H   - Hydragaurd to buttocks and sacrum BID & PRN v Alleyvn >  - EHOB waffle cushion to chair/WC when OOB  - Maximum time in chair 2 hours at a time  - OOB minimum 3 times per day  Notify MD if unable to get patient OOB 3 times per day  - Elevate heels with pillows to offload  - Moisturizer daily and PRN to dry skin; do not use moisturizer on areas of redness or skin breakdown unless otherwise documented  - Nursing to document in chart and Notify MD if buttock, sacrum, heel, or other skin site develops erythema or skin breakdown as soon as possible  If patient is soiling themselves with urine or stool notify MD   If you are unable to maintain skin integrity and prevent erythema due to frequency of soiling notify MD as soon as possible  Kidney disease with fluid retention  Assessment & Plan  Creatinine back down to 3 78  Per Nephrology to assist with management during course  "decrease torsemide to 20 mg daily starting today  Would discharge at torsemide 20 mg daily, discussed about daily monitoring of weight at home and to call office if any weight gain more than 3-4 lb from current weight in which case we may have to increase the dose of torsemide  The weight measured at the rehab unit has been fluctuating ? Accuracy"  Monitor labs overall management per Nephrology and Internal Medicine  Limit nephrotoxic agents when possible  WENDY hose       Cognitive impairment  Assessment & Plan  > MOCA 16/30 indicating moderate cog impairment with deficits greatest in domains of short term memory and executive functioning  > Wife states patients cognitive status has been largely stable for some time  > CT head 6/2019 - Encephalomalacia within the right frontal and parietal lobe extending inferiorly into the posterior superior temporal lobe  Chronic microangiopathic change within both cerebral hemispheres  Brainstem and cerebellum demonstrate normal density  No mass or hemorrhage  Mild cerebral volume loss  VENTRICLES AND EXTRA-AXIAL SPACES:  Asymmetry with mild ex vacuo dilatation of the posterior body, atria and occipital horn of the right lateral ventricle  Mild chronic microangiopathic change and age-appropriate cerebral volume loss  Right hemispheric encephalomalacia and gliosis suggestive of old infarction   > Increased risk of dementia and falls  > Compensatory strategies and adjustments in PT/OT as indicated  > Follow-up with neurology after d/c and PMR          Pain  Assessment & Plan  Controlled, continue management  Try to limit sedating meds   APAP PRN   LD patch  Counseled on and continue to encourage deep breathing/relaxation/behavioral pain management techniques:     Deep breathin seconds in, 5 seconds out, 5 times per hour when awake and PRN when in pain or anticipate pain; avoid holding breath and tightening muscles and instead breathe slowly and deeply  Monitor for oversedation, AMS/delirium, and respiratory depression   If being administered - hold opiates, muscle relaxants, benzodiazepines, and gabapentin for oversedation, AMS, or RR<12  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  Assessment & Plan  Pain well controlled, leg strength improved  On admission was complaining of some chronic right low back pain with intermittent radiating pain down the leg; also with right foot drop  Status post recent lumbar surgery - left L2-3 microdiskectomy  [facility])   Try to limit sedating medications  Acetaminophen as needed  Lidocaine patch for now patient prefers    Frozen shoulder  Assessment & Plan  Skilled therapies, modalities as indicated    Constipation  Assessment & Plan  Improved; continue management as outlined  Recommend colace/senna b i d   P r n   Bowel regimen as well  Monitor    History of recurrent UTIs  Assessment & Plan  No s/s of UTI > monitor   Internal medicine consult and management during [facility] course  Currently on flomax and proscar  Follow-up with Urology after discharge    Benign prostatic hyperplasia with urinary hesitancy  Assessment & Plan  Urinating adequately, continue to monitor  Continue flomax and Proscar; monitor vitals  - monitor for retention, incontinence, signs/symptoms of UTI  - recommend voiding trial; nursing prompt to void followed by bladder scan starting Q4-6H or after each patient initiated void; nursing to record voided output and bladder scan totals; straight cath PRN >350-400 cc; if post void residual bladder scans are <150 cc x3 consecutive voids, can stop scans       Presence of permanent cardiac pacemaker  Assessment & Plan  +    Anticoagulated on warfarin  Assessment & Plan  Management per Medicine     Chronic diastolic heart failure [facility])  Assessment & Plan  Internal medicine consult and management during [facility] course  Anti thrombotics per Medicine, blood pressure medications at their discretion     Paroxysmal A-fib [facility])  Assessment & Plan  Internal medicine consult and management during [facility] course  Anticoagulation at their discretion  Monitor rate    Diabetes mellitus with multiple complications [facility])  Assessment & Plan  Lab Results   Component Value Date    HGBA1C 5 5 08/22/2019     Internal medicine consulted and management at their discretion  Monitor for signs and symptoms of hypoglycemia   Current meds: lispro and Tradjenta per Medicine  >Provide new glucometer Rx at d/c       Essential hypertension  Assessment & Plan  BP acceptable  Internal medicine consulted and management at their discretion  Monitor vitals with and without activity; monitor for orthostasis  Monitor hemoglobin, electrolytes, kidney function, hydration status   Current meds:  Metoprolol      3-vessel coronary artery disease  Assessment & Plan  Antiplatelet, optimal blood pressure control, statin  Ranexa    Hyperlipidemia  Assessment & Plan  Statin    Anemia  Assessment & Plan  Hemoglobin 8 4 on 09/12  Recently received Venofer  Iron supplementation per Nephrology  IM consulted and assistance with management during [facility] course  Nephrology management as well; hemoccults x2 negative  Monitor H/H, vitals, signs/symptoms of acute bleeding        Vitamin D insufficiency  Assessment & Plan  Cholecalciferol Vit D3 2000 units daily   Follow-up with PCP      Healthcare maintenance  Assessment & Plan  Obtain vitamin-D level    Hypothyroidism  Assessment & Plan  Levothyroxine    Elevated serum alkaline phosphatase level  Assessment & Plan  Repeat CMP      # Bowel care:    - monitor for constipation, incontinence, and diarrhea  - goal 1 appropriate BM every 1-2 days  - recommend colace +/- senna and PRN bowel protocol     # At risk for venous thromboembolism:  - Recommend SCDs and mobilization  - A/C     # Diet/Hydration:    Diabetic/cardiac     Disposition:   Home with family Saturday     Follow-up:   PCP, PMR, Nephrology, Cardiology, urology     CODE: Level 3: DNAR and DNI     Restrictions include: Fall precautions   ---------------------------------------------------------------------------------------------------------------------    Objective:     Allergies and Medications per EMR    Physical Exam:  Vitals:    09/13/19 1326   BP: 150/77   Pulse: 88   Resp: 18   Temp: 98 °F (36 7 °C)   SpO2: 99%     General: Awake, alert in NAD  HENT:  MMM  Respiratory: Unlabored breathing, breath sounds equal, Lungs CTA, no wheezes, rales, or rhonchi  Cardiovascular: Regular rate and rhythm, no murmurs, rubs, or gallops  Gastrointestinal: Soft, non-tender,  mild-distension, normoactive bowel sounds  Genitourinary: No camargo  SkiN/MSK/Extremities:   Better 1+ bilateral lower extremity edema  No calf tenderness to palpation  Neurologic/Psych:   MENTAL STATUS: awake, orientation intact; appropriate wakefulness  Affect:  Euthymic    Physical exam performed, documentation above reviewed and updated if appropriate on relevant date of encounter:   09/13/2019    Diagnostic Studies: reviewed, no new imaging  No results found  Laboratory:    Results from last 7 days   Lab Units 09/12/19  0600 09/09/19  0642   HEMOGLOBIN g/dL 8 4* 9 0*   HEMATOCRIT % 26 6* 28 3*   WBC Thousand/uL 5 73 7 58     Results from last 7 days   Lab Units 09/13/19  0542 09/12/19  0600 09/10/19  0527   BUN mg/dL 52* 59* 51*   POTASSIUM mmol/L 3 6 3 6 3 5   CHLORIDE mmol/L 102 101 101   CREATININE mg/dL 3 78* 4 02* 3 64*     Results from last 7 days   Lab Units 09/12/19  0600 09/09/19  0642 09/07/19  0550   PROTIME seconds 26 3* 24 3* 27 3*   INR  2 48* 2 24* 2 60*          Patient Active Problem List   Diagnosis    3-vessel coronary artery disease    Asbestosis ([facility] [address] )    Kidney disease with fluid retention    Diabetes mellitus with multiple complications (HCC)    Elevated serum alkaline phosphatase level    Hyperlipidemia    Essential hypertension    Hypothyroidism    Paroxysmal A-fib (HCC)    Seasonal allergies    Increased frequency of urination    Frozen shoulder    Herpes zoster without complication    Depressed mood    Chest pain    Ambulatory dysfunction/generalized weakness    Leg edema, left    Shortness of breath    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    UTI (urinary tract infection)    Tibial artery occlusion, left (HCC)    Hyponatremia    Chronic diastolic heart failure (HCC)    Benign prostatic hyperplasia with urinary hesitancy    Abnormal chest x-ray    Warfarin-induced coagulopathy (HCC)    Impaired mobility and ADLs    Anticoagulated on warfarin    History of recurrent UTIs    Buttock wound    Presence of permanent cardiac pacemaker    Pain    Anemia    Healthcare maintenance    Cognitive impairment    Vitamin D insufficiency    CKD (chronic kidney disease) stage 4, GFR 15-29 ml/min (HCC)    Constipation       ** Please Note: Fluency Direct voice to text software may have been used in the creation of this document  **    Total visit time:  At least 25 minutes, with more than 50% spent counseling/coordinating care  Counseling includes discussion with patient re: progress in therapies, functional issues observed by therapy staff, and discussion with patient regarding their current medical state and wellbeing  Coordination of patient's care was performed in conjunction with Internal Medicine service to monitor patient's labs, vitals, and management of their comorbidities        Be Osorio MD, [address] [facility]  Physical Medicine and Rehabilitation  Brain Injury Medicine

## 2019-09-13 NOTE — PROGRESS NOTES
09/13/19 0930   Pain Assessment   Pain Assessment No/denies pain   Pain Score No Pain   Restrictions/Precautions   Precautions Bed/chair alarms; Fall Risk;Supervision on toilet/commode   Weight Bearing Restrictions No   ROM Restrictions No   Braces or Orthoses AFO  (BLEs)   QI: Oral Hygiene   Assistance Needed Set-up / Paddy Chinmay Provided by [place] No physical assistance   Comment wife completed prior to initiation of session   Oral Hygiene CARE Score 5   Grooming   Able To Comb/Brush Hair;Wash/Dry Face;Brush/Clean Teeth;Wash/Dry Hands; Initiate Tasks; Acquire Items; Shave   Limitation Noted In Strength   Findings wife able to assist with all grooming tasks at dc   Grooming (FIM) 4 - Patient completed  4/5 tasks   QI: Shower/Bathe Self   Assistance Needed Physical assistance   Assistance Provided by [place] 25%-49%   Comment Wife provided all A necessary for bathing at this time  Currently recommending pt complete sponge bathe at home as shower on 2nd floor and pt unable to get to it  Completed bathing of LEs while in supported sit in bed  Pt then perform self bathing while seated EOB  Educated wife to have supported footwear or nonslip socks on feet while seated EOB to ensure pt does not slip  Wife A with thoroughly buttocks bathing in stance at EOB     Shower/Bathe Self CARE Score 3   Bathing   Assessed Bath Style Sponge Bath   Anticipated D/C Bath Style Sponge Bath   Able to Matthew Leander No   Able to [employer] Temperature No   Able to Wash/Rinse/Dry (body part) Left Arm;Right Arm;L Upper Leg;R Upper Leg;Chest;Abdomen;Perineal Area   Limitations Noted in Endurance;ROM;Safety   Positioning Seated;Standing   Bathing (FIM) 3 - Patient completes 5/10  6/10 or 7/10 parts   Tub/Shower Transfer   Not Assessed Sponge Bath   Tub Transfer (FIM) 0 - Activity does not occur   Shower Transfer (FIM) 0 - Activity does not occur   QI: Upper Body Dressing   Assistance Needed Physical assistance   Assistance Provided by [place] Less than 25%   Comment pt able to shirt; req A to bring down in back after donningOH   Upper Body Dressing CARE Score 3   QI: Lower Body Dressing   Assistance Needed Physical assistance   Assistance Provided by [place] 50%-74%   Comment Pt able to thread over LLE but req A for RLE while seated EOB  Pt in stance while wife performed CM over hips  Lower Body Dressing CARE Score 2   QI: Putting On/Taking Off Footwear   Assistance Needed Physical assistance   Assistance Provided by [place] Total assistance   Comment Educated wife to don thigh high TEDs while pt in bed to ensure proper body mechanics for pt  Wife also educated on benefits of donning AFOs and shoes while pt in bed  Putting On/Taking Off Footwear CARE Score 1   Dressing/Undressing Clothing   UB Dressing (FIM) 4 - Patient completes 75% of all tasks   LB Dressing (FIM) 1 - Patient completes less than 25% of all tasks   QI: Lying to Sitting on Side of Bed   Assistance Needed Supervision; Adaptive equipment   Assistance Provided by [place] No physical assistance   Comment pt has hospital bed at home and benefits from bed rails for supine>sit transfers   Lying to Sitting on Side of Bed CARE Score 4   QI: Sit to Stand   Assistance Needed Incidental touching   Assistance Provided by [place] No physical assistance   Comment Educated wife that if pt having difficulty performing sit>stand to let him sit back down and reset instead of attempting to pull him up  Pt verbalizes understanding  Sit to Stand CARE Score 4   QI: Chair/Bed-to-Chair Transfer   Assistance Needed Physical assistance   Assistance Provided by [place] Less than 25%   Comment CGA/Benny for stand pivots this session  Recommend only stand pivots completed at dc      Chair/Bed-to-Chair Transfer CARE Score 3   Transfer Bed/Chair/Wheelchair   Bed, Chair, Wheelchair Transfer (FIM) 4 - Patient completes 75% of all tasks   QI: [address] Needed Physical assistance   Assistance Provided by [place] Total assistance   Comment wife performed hygiene for pt this session and completed CM up/down as pt in stance with UE support from RW at CS/CGA level  Toileting Hygiene CARE Score 1   Toileting   Toileting (FIM) 1 - Patient completes less than 25% of all tasks   QI: Toilet Transfer   Assistance Needed Incidental touching   Assistance Provided by [place] No physical assistance   Comment CGA with RW   Toilet Transfer CARE Score 4   Toilet Transfer   Surface Assessed Standard Commode   Transfer Technique Stand Pivot   Limitations Noted In Balance; Safety   Findings wife able to position self safetly during transfers to commode   Toilet Transfer (FIM) 4 - Patient requires steadying assist or light touching   Exercise Tools   Other Exercise Tool 1 Pt completd towel glides + 3# weight to promote UE ROM, strength, and endurance for inc indep with UB dressing and functional transfers indep  No reported pain with exercise and tolerated 3x10  Cognition   Overall Cognitive Status Impaired   Arousal/Participation Cooperative; Alert   Attention Attends with cues to redirect   Orientation Level Oriented X4   Memory Decreased short term memory;Decreased recall of recent events;Decreased recall of precautions   Following Commands Follows one step commands with increased time or repetition   Activity Tolerance   Activity Tolerance Patient tolerated treatment well   Assessment   OT Family training done with: wife Ro   Assessment of family training Family training completed with wife this session with focus on ADL retraining and functional transfers  Wife cont to be receptive to all education  Pt does have moments of anxious behavior reporting she worries about messing up at dc but able to perform all tasks safely and demo carryover with positioning safety for self and pt during transfers   Re-educated wife on donning TEDs/footwear in bed, functional transfer completion and guarding for stand pivots, skin checks this session to ensure carryover upon dc  Continuing to recommend sponge bathing with use of hospital bed, stand pivots only from wc/bed/chair/commode at this time, and thigh high TEDs to ensure pt safety and dec risk of pt/caregiver injury upon dc  Pt completed all transfers at [facility] this session  Pt has all equipment in place at dc including commode and hospital bed  Continuing to recommend to wife hiring supplemental assistance for home to dec burden of care  Pt positioned in recliner at end of session with alarm engaged, wife present, and food tray / all necessary items in reach  Pt reporting comfortable  Prognosis Fair   Problem List Decreased strength;Decreased range of motion;Decreased endurance; Impaired balance;Decreased mobility; Decreased coordination; Impaired sensation;Orthopedic restrictions   Barriers to Discharge None   Plan   Treatment/Interventions Functional transfer training;ADL retraining   Progress Progressing toward goals   Recommendation   OT Discharge Recommendation 24 hour supervision/assist   OT Therapy Minutes   OT Time In 0930   OT Time Out 1115   OT Total Time (minutes) 105   OT Mode of treatment - Individual (minutes) 105   OT Mode of treatment - Concurrent (minutes) 0   OT Mode of treatment - Group (minutes) 0   OT Mode of treatment - Co-treat (minutes) 0   OT Mode of Teatment - Total time(minutes) 105 minutes   Therapy Time missed   Time missed?  No

## 2019-09-13 NOTE — PROGRESS NOTES
NEPHROLOGY PROGRESS NOTE   Nikko Hagen 80 y o  male MRN: [id number]  Unit/Bed#: -37 Encounter: [id number]    ASSESSMENT & PLAN:  80 y  o male with history of chronic kidney disease stage 4, BPH, hypertension presented with increased lower extremity edema gradually worsening, he initially presented to [facility] on 08/23  Nephrology consulted for chronic kidney disease and for fluid overload  Volume status improved with IV Lasix followed by oral torsemide  1  Chronic kidney disease stage 4:  · Suspect that baseline creatinine will settle around 3 5 to 3 7 with diuretics  As per records recent baseline creatinine  has been around 3 3-3 5  Previously in 2014 creatinine was 1 9 and 2 9 in 2015  · Renal function has worsened today to creatinine 4 02 on 09/12 from previous creatinine of 3 6 on 09/10  Today renal function again improved to 3 7 creatinine despite patient receiving torsemide 40 mg on 09/12  · Due to worsening renal function, would decrease torsemide to 20 mg daily starting today  Would discharge at torsemide 20 mg daily, discussed about daily monitoring of weight at home and to call office if any weight gain more than 3-4 lb from current weight in which case we may have to increase the dose of torsemide  The weight measured at the rehab unit has been fluctuating ? Accuracy  ·  Saw Dr Catalina Mendoza in May 2019 -  office informed to reschedule appointment  · Etiology -likely due to hypertensive nephrosclerosis and diabetic nephropathy as well as age-related nephron loss  · Phosphorus is at goal   Continue vitamin-D       2  Primary HTN with chronic kidney disease stage 4: BP  is slightly elevated today morning but acceptable, has been fluctuating  Continue current medication  Avoid hypotension  3  Acute on Chronic diastolic CHF (EF 83%, S0DT on 6/12/19): Volume status acceptable, due to worsening renal function holding further torsemide  He did receive today morning dose    Continue monitoring renal function  4  Hyponatremia:  Resolved, sodium 135  Continue to monitor  Continue fluid restriction  Hyponatremia likely from hypervolemia  5  Anemia, iron deficiency:  Iron saturation was 14% on 08/23  · Hemoglobin is stable  · He received Venofer 1000 mg during this hospital stay  Continue Niferex 150 mg daily  · Hemoglobin stable at 8 4 to 9 0   if hemoglobin remains low may consider HOLLY after discussion with patient    6  History of urine retention/BPH:  Continue finasteride and Flomax  Continue follow up with Urology  Bladder scan showed only 15 mL  7  Vitamin-D deficiency:  Vitamin-D level was 22 6 on 08/29 last PTH level from 06/05 was 74 8  Continue current dose of vitamin-D 2000 units daily  Monitor vitamin-D level with repeat labs in 3-4 months  Vitamin-D was not on the list when patient was seen by me at [facility] on 08/25    8  Hx CAD/CABG    Discussed with  MAURICE De Santiago  Office informed to schedule outpatient follow-up, will order repeat BMP to be done in 1 week  SUBJECTIVE:  No shortness of breath  Sitting comfortably on the chair  Denies any chest    OBJECTIVE:  Current Weight: Weight - Scale: 78 2 kg (172 lb 6 4 oz)  Vitals:    09/13/19 1326   BP: 150/77   Pulse: 88   Resp: 18   Temp: 98 °F (36 7 °C)   SpO2: 99%       Intake/Output Summary (Last 24 hours) at 9/13/2019 1432  Last data filed at 9/13/2019 1300  Gross per 24 hour   Intake 522 ml   Output 1375 ml   Net -853 ml       Physical Exam  General:  Ill looking, awake  Eyes: Conjunctivae pink,  Sclera anicteric  ENT: lips and mucous membranes moist  Neck: supple   Chest: Clear to Auscultation both lungs,  no crackles, ronchus or wheezing  CVS: S1 & S2 present, normal rate, regular rhythm, no murmur    Abdomen: soft, non-tender, non-distended, Bowel sounds normoactive  Extremities:  Trace edema both legs  Skin: no rash  Neuro: awake, alert, oriented x3      Medications:    Current Facility-Administered Medications:     acetaminophen (TYLENOL) tablet 650 mg, 650 mg, Oral, Q6H PRN, Swapnil Mueller MD, 650 mg at 09/03/19 1710    aspirin (ECOTRIN LOW STRENGTH) EC tablet 81 mg, 81 mg, Oral, Daily, Swapnil Mueller MD, 81 mg at 09/13/19 0825    atorvastatin (LIPITOR) tablet 40 mg, 40 mg, Oral, QPM, Swapnil Mueller MD, 40 mg at 09/12/19 1836    bisacodyl (DULCOLAX) rectal suppository 10 mg, 10 mg, Rectal, Daily PRN, Swapnil Mueller MD    cholecalciferol (VITAMIN D3) tablet 2,000 Units, 2,000 Units, Oral, Daily, Swapnil Mueller MD, 2,000 Units at 09/13/19 0825    finasteride (PROSCAR) tablet 5 mg, 5 mg, Oral, Daily, MAURICE Pisano, 5 mg at 09/13/19 0825    insulin lispro (HumaLOG) 100 units/mL subcutaneous injection 1-5 Units, 1-5 Units, Subcutaneous, TID AC, 1 Units at 09/13/19 1131 **AND** Fingerstick Glucose (POCT), , , TID AC, MAURICE Pisano    insulin lispro (HumaLOG) 100 units/mL subcutaneous injection 1-5 Units, 1-5 Units, Subcutaneous, HS, MAURICE Pisano, 1 Units at 09/12/19 2157    iron polysaccharides (FERREX) capsule 150 mg, 150 mg, Oral, Daily, Santaan Perea MD, 150 mg at 09/13/19 0825    levothyroxine tablet 75 mcg, 75 mcg, Oral, Daily, Swapnil Mueller MD, 75 mcg at 09/13/19 0548    lidocaine (LIDODERM) 5 % patch 1 patch, 1 patch, Topical, Daily, Swapnil Mueller MD, 1 patch at 09/12/19 1620    lidocaine (URO-JET) 2 % urethral/mucosal gel, , Topical, Q4H PRN, Swapnil Mueller MD    linaGLIPtin TABS 5 mg, 5 mg, Oral, Daily, MAURICE Pisano, 5 mg at 09/13/19 0825    menthol-methyl salicylate (BENGAY) 85-43 % cream, , Apply externally, 4x Daily PRN, Swapnil Mueller MD    metoprolol tartrate (LOPRESSOR) tablet 25 mg, 25 mg, Oral, BID With Meals, Xin Scott, BYRONNP, 25 mg at 09/13/19 0737    ondansetron (ZOFRAN) injection 4 mg, 4 mg, Intravenous, Q6H PRN, Swapnil Mueller MD    polyethylene glycol [facility]) packet 17 g, 17 g, Oral, Daily PRN, MD Mayra Guerra [facility]) 12 hr tablet 500 mg, 500 mg, Oral, Q12H [facility], Mati Ackerman MD, 500 mg at 09/13/19 0825    senna-docusate sodium (SENOKOT S) 8 6-50 mg per tablet 2 tablet, 2 tablet, Oral, BID, Mati Ackerman MD    tamsulosin [facility]) capsule 0 4 mg, 0 4 mg, Oral, Daily, Mati Ackerman MD, 0 4 mg at 09/13/19 0825    torsemide (DEMADEX) tablet 20 mg, 20 mg, Oral, Daily, MAURICE Mosley, 20 mg at 09/13/19 1131    warfarin (COUMADIN) tablet 1 5 mg, 1 5 mg, Oral, Once per day on Sun Tue Wed Fri Sat, MAURICE Mosley, 1 5 mg at 09/11/19 1734    warfarin (COUMADIN) tablet 3 mg, 3 mg, Oral, Once per day on Mon Thu, MAURICE Patel, 3 mg at 09/12/19 1834    Invasive Devices:        Lab Results:   Results from last 7 days   Lab Units 09/13/19  0542 09/12/19  0600 09/11/19  0533 09/10/19  0527 09/09/19  0642   WBC Thousand/uL  --  5 73  --   --  7 58   HEMOGLOBIN g/dL  --  8 4*  --   --  9 0*   HEMATOCRIT %  --  26 6*  --   --  28 3*   PLATELETS Thousands/uL  --  209  --   --  243   POTASSIUM mmol/L 3 6 3 6  --  3 5 3 6   CHLORIDE mmol/L 102 101  --  101 100   CO2 mmol/L 29 28  --  28 29   BUN mg/dL 52* 59*  --  51* 51*   CREATININE mg/dL 3 78* 4 02*  --  3 64* 3 76*   CALCIUM mg/dL 8 5 8 2*  --  8 1* 8 3   MAGNESIUM mg/dL  --   --  2 1  --   --        Previous work up:      Portions of the record may have been created with voice recognition software  Occasional wrong word or "sound a like" substitutions may have occurred due to the inherent limitations of voice recognition software  Read the chart carefully and recognize, using context, where substitutions have occurred  If you have any questions, please contact the dictating provider

## 2019-09-13 NOTE — PROGRESS NOTES
09/13/19 1230   Pain Assessment   Pain Assessment No/denies pain   Pain Score No Pain   Restrictions/Precautions   Precautions Bed/chair alarms;Cognitive; Fall Risk;Supervision on toilet/commode   Braces or Orthoses AFO  (B LEs)   Cognition   Overall Cognitive Status Impaired   Subjective   Subjective pt with no complaints at this time   QI: Sit to Lying   Assistance Needed Physical assistance   Assistance Provided by [place] 25%-49%   Sit to Lying CARE Score 3   QI: Lying to Sitting on Side of Bed   Assistance Needed Physical assistance   Assistance Provided by [place] Less than 25%   Lying to Sitting on Side of Bed CARE Score 3   QI: Sit to Stand   Assistance Needed Physical assistance   Assistance Provided by [place] Less than 25%   Sit to Stand CARE Score 3   QI: Chair/Bed-to-Chair Transfer   Assistance Needed Physical assistance; Adaptive equipment   Assistance Provided by [place] Less than 25%   Comment use of gait belt   Chair/Bed-to-Chair Transfer CARE Score 3   Transfer Bed/Chair/Wheelchair   Limitations Noted In Balance;Confidence; Endurance;LE Strength   Adaptive Equipment Roller Walker   Stand Pivot Contact Guard;Minimal Assist   Sit to Stand Contact Guard;Minimal Assist   Stand to Sit Contact Guard   Supine to Sit Supervision   Sit to Supine Minimal Assist   Findings use of gait belt   Bed, Chair, Wheelchair Transfer (FIM) 4 - Patient requires steadying assist or light touching   QI: Car Transfer   Assistance Needed Physical assistance   Assistance Provided by [place] 50%-74%   Comment A with LEs   Car Transfer CARE Score 2   QI: Walk 10 Feet   Assistance Needed Incidental touching; Adaptive equipment   Assistance Provided by [place] No physical assistance   Walk 10 Feet CARE Score 4   QI: Walk 50 Feet with Two Turns   Assistance Needed Incidental touching; Adaptive equipment   Assistance Provided by [place] No physical assistance   Walk 50 Feet with Two Turns CARE Score 4   QI: Walk 150 Feet   Assistance Needed Incidental touching; Adaptive equipment   Assistance Provided by [place] No physical assistance   Walk 150 Feet CARE Score 4   Ambulation   Does the patient walk? 2  Yes   Primary Discharge Mode of Locomotion Wheelchair   Walk Assist Level Contact Guard   Gait Pattern Inconsistant Lois; Slow Lois;Decreased foot clearance; Improper weight shift;Narrow JUAREZ   Assist Device Roller Gabrielle Garcia Walked (feet) 150 ft  (x2)   Limitations Noted In Balance; Endurance; Heel Strike;Posture;Speed;Strength;Swing   Findings with gait belt on   Walking (FIM) 4 - Patient requires steadying assist or light touching AND distance 150 feet or more, no rest   QI: 1 Step (Curb)   Reason if not Attempted Safety concerns   1 Step (Curb) CARE Score 88   QI: 4 Steps   Reason if not Attempted Safety concerns   4 Steps CARE Score 88   QI: 12 Steps   Reason if not Attempted Safety concerns   12 Steps CARE Score 88   QI: Picking Up Object   Reason if not Attempted Safety concerns   Picking Up Object CARE Score 88   Other Comments   Comments cont with tx training and set up with pt and pt's wife  pt worked on 10' amb with wife using gait belt  pt's wife cont to need VCs to remember RW before pt stands but demonstrated improvements with w/c set up for txs   pt's wife guarded during longer walk today with therapist on side CS   pt's wife asked about leaving pt alone for short period of time and stressed again the importance of 24/7 Sup due to decrease balance and safety and cognition alone  Assessment   Treatment Assessment cont with family training today with pt and pt's spouse  reviewed any questions and concerns  cont to educate spouse on 24/7 Sup and simple cues to instruct pt with txs   occasional cueing for her to remember RW at all times with txs  pt to have home therapy and HHC upon d/c and cont to perform txs only at d/c to decrease fall risk       Problem List Decreased strength;Decreased range of motion;Decreased endurance; Impaired balance;Decreased mobility; Decreased coordination; Impaired sensation;Orthopedic restrictions   Barriers to Discharge None   PT Barriers   Physical Impairment Decreased strength;Decreased endurance; Impaired balance;Decreased mobility; Decreased safety awareness   Functional Limitation Walking   Plan   Treatment/Interventions Functional transfer training;LE strengthening/ROM; Patient/family training;Bed mobility;Gait training   Progress Progressing toward goals   Recommendation   Recommendation 24 hour supervision/assist;Home PT; Home with family support   PT Therapy Minutes   PT Time In 1230   PT Time Out 1400   PT Total Time (minutes) 90   PT Mode of treatment - Individual (minutes) 90   PT Mode of treatment - Concurrent (minutes) 0   PT Mode of treatment - Group (minutes) 0   PT Mode of treatment - Co-treat (minutes) 0   PT Mode of Teatment - Total time(minutes) 90 minutes   Therapy Time missed   Time missed?  No

## 2019-09-13 NOTE — PROGRESS NOTES
Physical Medicine and Rehabilitation Progress Note  Chiquita Rudd 80 y o  male MRN: [id number]  Unit/Bed#: [facility] 153-30 Encounter: [id number]    Physical Medicine and Rehabilitation Progress Note  Chiquita Rudd 80 y o  male MRN: [id number]  Unit/Bed#: [facility] 961-54 Encounter: [id number]    Chief Complaints:  Recent history of functional decline    Subjective/Interval Events:   Patient reports feeling pretty well overall  Extended discussion held with patient patient's family regarding patient's overall condition, rehabilitation, medical management disposition planning  Patient reports back pain and prior radiating leg pain remained well controlled  He reports no new issues with bowel or bladder  Patient's wife feels like his legs are more swollen  Patient denies calf pain  He denies shortness of breath, fever, chills, sweats, or other new complaints  ROS: A 10-point ROS was performed  Negative except as listed above  Overall Assessment/relevant history:  [age]-year-old male with past medical history of chronic diastolic CHF, chronic kidney disease stage 4, paroxysmal atrial fibrillation, dual chamber permanent pacemaker, coronary artery disease, CABG, diabetes mellitus 2, hypothyroidism, BPH, recurrent UTIs, lumbar spinal stenosis, chronic low back pain, foot drop, history of left L2-L3 microdiskectomy in July of 2019 the developed worsening swelling with associated decline in ADLs and ambulation  Patient splits seen by Cardiology and Nephrology with noted worsening of renal function and volume overload believed to be related to this  Patient required initial IV and then more recently p o  Increase in diuretics  Patient's volume status has been improving although his functional status remains below his baseline    Patient was evaluated by skilled therapies and was found to have significant decline in ADLs and ambulation appropriate for admission to [address] [place] [address] Center        Functional status on admission to ARC:  PT:  Transfers max assist, ambulation moderate assist 150 feet  OT:  Lower body dressing, toileting total assist, bathing, transfers max assist, upper body dressing moderate assist    Functional status (recent): Upper body dressing moderate assist, lower body dressing max assist, transfers Min mod assist, ambulation 150 ft Min assist    Functional status goal:  Supervision for most ADLs and ambulation     * Impaired mobility and ADLs  Assessment & Plan  Caregiver training ongoing, function remained stable  Continue management as outlined  Right proximal lower extremity strength improved from admission  Multifactorial:  Recent volume overload related to acute on chronic kidney disease, history of diastolic CHF, generalized weakness including proximal lower extremity weakness possibly related to some chronic illness/critical illness deconditioning, low back pain and radiating leg pain with right foot drop from lumbar spinal stenosis possible nerve impingement status post surgical intervention recently, 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, left adhesive capsulitis, query component cognitive impairment,   - Recommend acute comprehensive interdisciplinary inpatient rehabilitation to include intensive skilled therapies (PT, OT) as outlined with oversight and management by rehabilitation physician as well as inpatient rehab level nursing, case management and weekly interdisciplinary team meetings     - [address] [place] [address] 16/30 with greatest deficits in memory and executive functioning   - optimal management of each  - Follow-up with PMR after discharge      Buttock wound  Assessment & Plan  Appreciated on admission to [address] [place] [address] care nurse c/s and assistance with management  - Notify MD of new or increasing drainage, development of purulent drainage, increased size/depth of wound, lack of healing, inability to maintain wound integrity due to degree of drainage, wound product, dressing, or currently ordered frequency of wound management  In general dressings should be changed PRN if soiled unless specifically noted otherwise  Do not allow soiled dressing on patient for extended period of time  - Turn patient Q2H   - Hydragaurd to buttocks and sacrum BID & PRN v Alleyvn >  - EHOB waffle cushion to chair/WC when OOB  - Maximum time in chair 2 hours at a time  - OOB minimum 3 times per day  Notify MD if unable to get patient OOB 3 times per day  - Elevate heels with pillows to offload  - Moisturizer daily and PRN to dry skin; do not use moisturizer on areas of redness or skin breakdown unless otherwise documented  - Nursing to document in chart and Notify MD if buttock, sacrum, heel, or other skin site develops erythema or skin breakdown as soon as possible  If patient is soiling themselves with urine or stool notify MD   If you are unable to maintain skin integrity and prevent erythema due to frequency of soiling notify MD as soon as possible  Kidney disease with fluid retention  Assessment & Plan  Creatinine up over 4  Nephrology recommends holding torsemide tomorrow   Monitor labs overall management per Nephrology and Internal Medicine  Limit nephrotoxic agents when possible  WENDY hose       Cognitive impairment  Assessment & Plan  > MOCA 16/30 indicating moderate cog impairment with deficits greatest in domains of short term memory and executive functioning  > Wife states patients cognitive status has been largely stable for some time  > CT head 6/2019 - Encephalomalacia within the right frontal and parietal lobe extending inferiorly into the posterior superior temporal lobe  Chronic microangiopathic change within both cerebral hemispheres  Brainstem and cerebellum demonstrate normal density  No mass or hemorrhage  Mild cerebral volume loss   VENTRICLES AND EXTRA-AXIAL SPACES:  Asymmetry with mild ex vacuo dilatation of the posterior body, atria and occipital horn of the right lateral ventricle  Mild chronic microangiopathic change and age-appropriate cerebral volume loss  Right hemispheric encephalomalacia and gliosis suggestive of old infarction   > Increased risk of dementia and falls  > Compensatory strategies and adjustments in PT/OT as indicated  > Follow-up with neurology after d/c and PMR          Pain  Assessment & Plan  Controlled, continue management  Try to limit sedating meds   APAP PRN   LD patch  Counseled on and continue to encourage deep breathing/relaxation/behavioral pain management techniques:     Deep breathin seconds in, 5 seconds out, 5 times per hour when awake and PRN when in pain or anticipate pain; avoid holding breath and tightening muscles and instead breathe slowly and deeply  Monitor for oversedation, AMS/delirium, and respiratory depression   If being administered - hold opiates, muscle relaxants, benzodiazepines, and gabapentin for oversedation, AMS, or RR<12  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  Assessment & Plan  Pain well controlled, leg strength improved  On admission was complaining of some chronic right low back pain with intermittent radiating pain down the leg; also with right foot drop  Status post recent lumbar surgery - left L2-3 microdiskectomy  [facility])   Try to limit sedating medications  Acetaminophen as needed  Lidocaine patch for now patient prefers    Frozen shoulder  Assessment & Plan  Skilled therapies, modalities as indicated    Constipation  Assessment & Plan  Improved; continue management as outlined  Recommend colace/senna b i d   P r n   Bowel regimen as well  Monitor    History of recurrent UTIs  Assessment & Plan  No s/s of UTI > monitor   Internal medicine consult and management during ARC course  Currently on flomax and proscar  Follow-up with Urology after discharge    Benign prostatic hyperplasia with urinary hesitancy  Assessment & Plan  Urinating adequately, continue to monitor  Continue flomax and Proscar; monitor vitals  - monitor for retention, incontinence, signs/symptoms of UTI  - recommend voiding trial; nursing prompt to void followed by bladder scan starting Q4-6H or after each patient initiated void; nursing to record voided output and bladder scan totals; straight cath PRN >350-400 cc; if post void residual bladder scans are <150 cc x3 consecutive voids, can stop scans       Presence of permanent cardiac pacemaker  Assessment & Plan  +    Anticoagulated on warfarin  Assessment & Plan  Management per Medicine with recent supratherapeutic INR    Chronic diastolic heart failure [facility])  Assessment & Plan  Internal medicine consult and management during ARC course  Anti thrombotics per Medicine, blood pressure medications at their discretion     Paroxysmal A-fib [facility])  Assessment & Plan  Internal medicine consult and management during ARC course  Anticoagulation at their discretion  Monitor rate    Diabetes mellitus with multiple complications [facility])  Assessment & Plan  Lab Results   Component Value Date    HGBA1C 5 5 08/22/2019     Internal medicine consulted and management at their discretion  Monitor for signs and symptoms of hypoglycemia   Current meds: lispro and Tradjenta per Medicine  >Provide new glucometer Rx at d/c       Essential hypertension  Assessment & Plan  BP acceptable  Internal medicine consulted and management at their discretion  Monitor vitals with and without activity; monitor for orthostasis  Monitor hemoglobin, electrolytes, kidney function, hydration status   Current meds:  Metoprolol      3-vessel coronary artery disease  Assessment & Plan  Antiplatelet, optimal blood pressure control, statin  Ranexa    Hyperlipidemia  Assessment & Plan  Statin    Anemia  Assessment & Plan  Hemoglobin 8 4 on 09/12  Recently received Venofer  Iron supplementation per Nephrology  IM consulted and assistance with management during ARC course  Nephrology management as well; hemoccults x2 negative  Monitor H/H, vitals, signs/symptoms of acute bleeding        Vitamin D insufficiency  Assessment & Plan  Cholecalciferol Vit D3 2000 units daily   Follow-up with PCP      Healthcare maintenance  Assessment & Plan  Obtain vitamin-D level    Hypothyroidism  Assessment & Plan  Levothyroxine    Elevated serum alkaline phosphatase level  Assessment & Plan  Repeat CMP      # Bowel care:    - monitor for constipation, incontinence, and diarrhea  - goal 1 appropriate BM every 1-2 days  - recommend colace +/- senna and PRN bowel protocol     # At risk for venous thromboembolism:  - Recommend SCDs and mobilization  - A/C     # Diet/Hydration:    Diabetic/cardiac     Disposition:   Home with family Saturday     Follow-up:   PCP, PMR, Nephrology, Cardiology, urology     CODE: Level 3: DNAR and DNI     Restrictions include: Fall precautions   ---------------------------------------------------------------------------------------------------------------------    Objective:     Allergies and Medications per EMR    Physical Exam:  Temperature 98° point once pulse 69 respiratory rate 20 blood pressure 113/56 SpO2 97% on room air  General: Awake, alert in NAD  HENT:  MMM  Respiratory: Unlabored breathing, breath sounds equal, Lungs CTA, no wheezes, rales, or rhonchi  Cardiovascular: Regular rate and rhythm, no murmurs, rubs, or gallops  Gastrointestinal: Soft, non-tender,  mild-distension, normoactive bowel sounds  Genitourinary: No [place]  SkiN/MSK/Extremities:   Somewhat increased 1+ bilateral lower extremity edema  No calf tenderness to palpation  Neurologic/Psych:   MENTAL STATUS: awake, orientation intact; appropriate wakefulness  Affect:  Euthymic  Strength unchanged    Physical exam performed, documentation above reviewed and updated if appropriate on relevant date of encounter:   09/12/2019    Diagnostic Studies: reviewed, no new imaging  No results found  Laboratory:    Results from last 7 days   Lab Units 09/12/19  0600 09/09/19  0642   HEMOGLOBIN g/dL 8 4* 9 0*   HEMATOCRIT % 26 6* 28 3*   WBC Thousand/uL 5 73 7 58     Results from last 7 days   Lab Units 09/12/19  0600 09/10/19  0527   BUN mg/dL 59* 51*   POTASSIUM mmol/L 3 6 3 5   CHLORIDE mmol/L 101 101   CREATININE mg/dL 4 02* 3 64*     Results from last 7 days   Lab Units 09/12/19  0600 09/09/19  0642 09/07/19  0550   PROTIME seconds 26 3* 24 3* 27 3*   INR  2 48* 2 24* 2 60*            Patient Active Problem List   Diagnosis    3-vessel coronary artery disease    Asbestosis ([address] )    Kidney disease with fluid retention    Diabetes mellitus with multiple complications (HCC)    Elevated serum alkaline phosphatase level    Hyperlipidemia    Essential hypertension    Hypothyroidism    Paroxysmal A-fib (HCC)    Seasonal allergies    Increased frequency of urination    Frozen shoulder    Herpes zoster without complication    Depressed mood    Chest pain    Ambulatory dysfunction/generalized weakness    Leg edema, left    Shortness of breath    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    UTI (urinary tract infection)    Tibial artery occlusion, left (HCC)    Hyponatremia    Chronic diastolic heart failure (HCC)    Benign prostatic hyperplasia with urinary hesitancy    Abnormal chest x-ray    Warfarin-induced coagulopathy (HCC)    Impaired mobility and ADLs    Anticoagulated on warfarin    History of recurrent UTIs    Buttock wound    Presence of permanent cardiac pacemaker    Pain    Anemia    Healthcare maintenance    Cognitive impairment    Vitamin D insufficiency    CKD (chronic kidney disease) stage 4, GFR 15-29 ml/min (HCC)    Constipation       ** Please Note: Fluency Direct voice to text software may have been used in the creation of this document  **    Total time spent: At least 35 minutes, with more than 50% spent counseling/coordinating care  Counseling includes discussion with patient re: progress in therapies, functional issues observed by therapy staff, and discussion with patient his/her current medical state/wellbeing  Coordination of patient's care was performed in conjunction with Internal Medicine service to monitor patient's labs, vitals, and management of their comorbidities  In addition, this patient was discussed by the interdisciplinary team in weekly case conference today  The care of the patient was extensively discussed with all care providers and an appropriate rehabilitation plan was formulated unique for this patient  Barriers were identified preventing progression of therapy and appropriate interventions were discussed with each discipline  Please see the team note for input from all disciplines regarding barriers, intervention, and discharge planning        Hussein Lechuga MD, [address] [facility]  Physical Medicine and Rehabilitation  Brain Injury Medicine

## 2019-09-13 NOTE — PLAN OF CARE
Problem: PAIN - ADULT  Goal: Verbalizes/displays adequate comfort level or baseline comfort level  Description  Interventions:  - Encourage patient to monitor pain and request assistance  - Assess pain using appropriate pain scale  - Administer analgesics based on type and severity of pain and evaluate response  - Implement non-pharmacological measures as appropriate and evaluate response  - Consider cultural and social influences on pain and pain management  - Notify physician/advanced practitioner if interventions unsuccessful or patient reports new pain  Outcome: Progressing     Problem: INFECTION - ADULT  Goal: Absence or prevention of progression during hospitalization  Description  INTERVENTIONS:  - Assess and monitor for signs and symptoms of infection  - Monitor lab/diagnostic results  - Monitor all insertion sites, i e  indwelling lines, tubes, and drains  - Monitor endotracheal if appropriate and nasal secretions for changes in amount and color  - [place] appropriate cooling/warming therapies per order  - Administer medications as ordered  - Instruct and encourage patient and family to use good hand hygiene technique  - Identify and instruct in appropriate isolation precautions for identified infection/condition  Outcome: Progressing     Problem: SAFETY ADULT  Goal: Patient will remain free of falls  Description  INTERVENTIONS:  - Assess patient frequently for physical needs  -  Identify cognitive and physical deficits and behaviors that affect risk of falls    -  [place] fall precautions as indicated by assessment   - Educate patient/family on patient safety including physical limitations  - Instruct patient to call for assistance with activity based on assessment  - Modify environment to reduce risk of injury  - Consider OT/PT consult to assist with strengthening/mobility  Outcome: Progressing     Problem: SAFETY ADULT  Goal: Maintain or return to baseline ADL function  Description  INTERVENTIONS:  -  Assess patient's ability to carry out ADLs; assess patient's baseline for ADL function and identify physical deficits which impact ability to perform ADLs (bathing, care of mouth/teeth, toileting, grooming, dressing, etc )  - Assess/evaluate cause of self-care deficits   - Assess range of motion  - Assess patient's mobility; develop plan if impaired  - Assess patient's need for assistive devices and provide as appropriate  - Encourage maximum independence but intervene and supervise when necessary  - Involve family in performance of ADLs  - Assess for home care needs following discharge   - Consider OT consult to assist with ADL evaluation and planning for discharge  - Provide patient education as appropriate  Outcome: Progressing     Problem: SAFETY ADULT  Goal: Maintain or return mobility status to optimal level  Description  INTERVENTIONS:  - Assess patient's baseline mobility status (ambulation, transfers, stairs, etc )    - Identify cognitive and physical deficits and behaviors that affect mobility  - Identify mobility aids required to assist with transfers and/or ambulation (gait belt, sit-to-stand, lift, walker, cane, etc )  - [place] fall precautions as indicated by assessment  - Record patient progress and toleration of activity level on Mobility SBAR; progress patient to next Phase/Stage  - Instruct patient to call for assistance with activity based on assessment  - Consider rehabilitation consult to assist with strengthening/weightbearing, etc   Outcome: Progressing     Problem: Prexisting or High Potential for Compromised Skin Integrity  Goal: Skin integrity is maintained or improved  Description  INTERVENTIONS:  - Identify patients at risk for skin breakdown  - Assess and monitor skin integrity  - Assess and monitor nutrition and hydration status  - Monitor labs   - Assess for incontinence   - Turn and reposition patient  - Assist with mobility/ambulation  - Relieve pressure over bony prominences  - Avoid friction and shearing  - Provide appropriate hygiene as needed including keeping skin clean and dry  - Evaluate need for skin moisturizer/barrier cream  - Collaborate with interdisciplinary team   - Patient/family teaching  - Consider wound care consult   Outcome: Progressing     Problem: Potential for Falls  Goal: Patient will remain free of falls  Description  INTERVENTIONS:  - Assess patient frequently for physical needs  -  Identify cognitive and physical deficits and behaviors that affect risk of falls    -  [place] fall precautions as indicated by assessment   - Educate patient/family on patient safety including physical limitations  - Instruct patient to call for assistance with activity based on assessment  - Modify environment to reduce risk of injury  - Consider OT/PT consult to assist with strengthening/mobility  Outcome: Progressing

## 2019-09-14 VITALS
WEIGHT: 172.84 LBS | TEMPERATURE: 98 F | SYSTOLIC BLOOD PRESSURE: 171 MMHG | RESPIRATION RATE: 18 BRPM | BODY MASS INDEX: 28.8 KG/M2 | HEART RATE: 68 BPM | OXYGEN SATURATION: 94 % | HEIGHT: 65 IN | DIASTOLIC BLOOD PRESSURE: 71 MMHG

## 2019-09-14 PROBLEM — Z87.19 HISTORY OF CONSTIPATION: Status: ACTIVE | Noted: 2019-09-04

## 2019-09-14 LAB
GLUCOSE SERPL-MCNC: 101 MG/DL (ref 65–140)
GLUCOSE SERPL-MCNC: 150 MG/DL (ref 65–140)

## 2019-09-14 PROCEDURE — 82948 REAGENT STRIP/BLOOD GLUCOSE: CPT

## 2019-09-14 PROCEDURE — 99239 HOSP IP/OBS DSCHRG MGMT >30: CPT

## 2019-09-14 RX ORDER — TORSEMIDE 20 MG/1
20 TABLET ORAL DAILY
Qty: 60 TABLET | Refills: 0 | Status: SHIPPED | OUTPATIENT
Start: 2019-09-14 | End: 2019-11-04 | Stop reason: SDUPTHER

## 2019-09-14 RX ORDER — ACETAMINOPHEN 325 MG/1
650 TABLET ORAL 3 TIMES DAILY PRN
Qty: 30 TABLET | Refills: 0 | Status: SHIPPED | OUTPATIENT
Start: 2019-09-14 | End: 2021-06-11 | Stop reason: ALTCHOICE

## 2019-09-14 RX ORDER — WARFARIN SODIUM 3 MG/1
TABLET ORAL
Qty: 60 TABLET | Refills: 0 | Status: SHIPPED | OUTPATIENT
Start: 2019-09-14 | End: 2019-10-09 | Stop reason: DRUGHIGH

## 2019-09-14 RX ORDER — IRON POLYSACCHARIDE COMPLEX 150 MG
150 CAPSULE ORAL DAILY
Qty: 30 CAPSULE | Refills: 0 | Status: SHIPPED | OUTPATIENT
Start: 2019-09-15 | End: 2019-10-16

## 2019-09-14 RX ORDER — AMOXICILLIN 250 MG
2 CAPSULE ORAL 2 TIMES DAILY
Qty: 60 TABLET | Refills: 0 | Status: SHIPPED | OUTPATIENT
Start: 2019-09-14 | End: 2020-07-15 | Stop reason: ALTCHOICE

## 2019-09-14 RX ADMIN — ASPIRIN 81 MG: 81 TABLET, COATED ORAL at 08:44

## 2019-09-14 RX ADMIN — TAMSULOSIN HYDROCHLORIDE 0.4 MG: 0.4 CAPSULE ORAL at 08:44

## 2019-09-14 RX ADMIN — POLYSACCHARIDE-IRON COMPLEX 150 MG: 150 CAPSULE ORAL at 08:46

## 2019-09-14 RX ADMIN — LINAGLIPTIN 5 MG: 5 TABLET, FILM COATED ORAL at 08:46

## 2019-09-14 RX ADMIN — TORSEMIDE 20 MG: 20 TABLET ORAL at 08:44

## 2019-09-14 RX ADMIN — VITAMIN D, TAB 1000IU (100/BT) 2000 UNITS: 25 TAB at 08:44

## 2019-09-14 RX ADMIN — INSULIN LISPRO 1 UNITS: 100 INJECTION, SOLUTION INTRAVENOUS; SUBCUTANEOUS at 11:25

## 2019-09-14 RX ADMIN — RANOLAZINE 500 MG: 500 TABLET, FILM COATED, EXTENDED RELEASE ORAL at 08:46

## 2019-09-14 RX ADMIN — LEVOTHYROXINE SODIUM 75 MCG: 75 TABLET ORAL at 05:34

## 2019-09-14 RX ADMIN — FINASTERIDE 5 MG: 5 TABLET, FILM COATED ORAL at 08:44

## 2019-09-14 RX ADMIN — METOPROLOL TARTRATE 25 MG: 25 TABLET, FILM COATED ORAL at 08:44

## 2019-09-14 NOTE — DISCHARGE INSTRUCTIONS
DISCHARGE INSTRUCTIONS: Jovanna Neal [age]    Bring these instructions with you to your outpatient physician appointments so they can order and follow-up any additional lab work or imaging recommended at time of discharge  If you have any questions or concerns regarding your acute rehabilitation stay including issues with medications, rehabilitation, and follow-up plan, please call:          [address] [place] [address] at [phone] or [phone]  Should you develop fevers, chills, new weakness, changes in sensation, difficulty speaking, facial weakness, confusion, or other concerning symptoms please call 911 and/or obtain transportation to nearest ER immediately  PHYSICIANS to see:  Please see your doctors listed in the follow up providers section of your discharge paperwork, and take the discharge paperwork with you to your appointments  LAB WORK to follow-up:  BMP (to monitor electrolytes and kidney function) within 1 week  PT/INR obtain on Monday  Obtain lab orders and follow-up management through nephrology and PCP  IMAGING to follow-up:  Follow-up imaging at discretion of your outpatient physicians to be determined at time of your appointments  SKIN CARE INSTRUCTIONS to follow:  Monitor buttocks/sacrum for skin breakdown  Notify physician of worsening skin  Can use barrier type cream such as Hydragaurd 1-2 times per day  Be sure patient gets off behind and ambulates every 1-2 hours during day and while seated or in bed moves from side to side often  WEIGHTBEARING/ACTIVITY PRECAUTIONS to follow:  Weightbearing as tolerated  Follow post-surgical restrictions as outlined by your surgeon  Driving restrictions: You are recommended against driving  Driving at current time can increase your risk of injury and can increase risk of injury of others  Work restrictions:   You should not operate heavy machinery (if applicable) until cleared by an outpatient physician  Alcohol restrictions: You are recommended to not drink alcohol at this time unless cleared by an outpatient physician  Combining alcohol with your current medications can increase your risk of injury which could be severe  Drinking alcohol in your current functional condition can increase your risk of injury which could be severe  Drinking alcohol given your current health problems can lead to increased medical complications which could be severe  MEDICATIONS:  Please see a full list of your medications outlined in the After Visit Summary that is attached to these Discharge Instructions  Please note changes may have been made to your medications please refer to your discharge paperwork for your current medications and take this list with you to all your doctors appointments for your doctors to review  Please do not resume a home medication unless the medication reconciliation sheet indicates to do so, please do not assume that a medication that you were given a prescription for is the same as a medication you have at home based on both medications having the same name as dosages and frequency may have changed  Unless specifically noted in your medication list provided to you in your discharge paper work do not resume prior vitamins, minerals, or supplements you may have been taking prior to your hospitalization unless instructed by an outpatient physician in the future  Blood thinners prescribed to optimize long and short term health and decrease risk of complications but with risks related to bleeding and other complications  Coumadin/Warfarin instructions: You have been prescribed warfarin (coumadin)  This medication is used to prevent clots which can cause severe disability and even death  This medications was started prior to your acute rehabilitation course as recommended by your prior physicians    It was continued during your acute rehabilitation course  This medication will need to be managed by your primary care physician after discharge  Follow-up with this provider as soon as possible to ensure appropriate use  Warfarin is a strong anticoagulant (blood thinner)  It is being recommended for use by you (or your family) to decrease the risk of clotting which can be severely disabling and even life-threatening  Even when provided as recommended it can cause severely disabling and even life-threatening bleeding  Too much or too little warfarin further increases your risk of this medication causing serious and even life-threatening complications such as severe bleeding, clots, strokes and death  With that said, at this time based on available evidence and consensus agreement, your physicians recommend you take warfarin medication based on your overall risks and benefits in your specific medical situation  Warfarin levels are measured by lab work called PT/INR  Too high number or too low number further increases your risks  Your current PT/INR lab goal is 2 0-3 0  You will need to have your PT/INR lab drawn more frequently at first and followed closely by your outpatient doctor  Check with your outpatient warfarin provider to ensure your warfarin dose does not need to be adjusted  It is not uncommon to need changes in warfarin dosing particularly early on  If you (or your family/caregiver) notice black stools, bloody stools, vomit blood, develop new weakness, slurred speech, confusion or have any other concerning symptoms call 911 or obtain transportation to nearest emergency room immediately  Aspirin instructions  TAKE aspirin daily INDEFINITELY unless instructed otherwise by a doctor  This medications was started prior to your acute rehabilitation course as recommended by your  prior physicians  It was continued during your acute rehabilitation course    This medication will need to be managed by your primary care physician after discharge  Follow-up with this provider as soon as possible to ensure appropriate use  This is a blood thinner  It is being recommended for use by you (or your family) to decrease the risk of clotting which can be severely disabling and even life-threatening  Even when provided as recommended it can cause severe disabling and even life-threatening bleeding  Too much or too little of this blood thinner further increases your risk of this medication causing serious and even life-threatening complications such as severe bleeding, clots, strokes, and death  With that said, at this time based on best available evidence and consensus agreement, your physicians recommend you take aspirin based on your overall risks and benefits in your specific medical situation  If you (or your family/caregiver) notice black stools, bloody stools, vomit blood, develop new weakness, slurred speech, confusion or have any other concerning symptoms call 911 or obtain transportation to nearest emergency room immediately  MEDICAL MANAGEMENT AT HOME specific to you:    Diabetes Management:  Please check your blood sugars 4 times daily before meals and at bedtime 2 times daily alternate between before breakfast and dinner one day and then before lunch and before bedtime the next day and record them to provide to your doctors, contact your family doctor as soon as possible for blood sugars higher than 220 or lower than 100  Hypertension Management:  Only take the medications prescribed for you at time of discharge - overly high or low blood pressure increases your risk for health complications  Please check your blood pressure prior to taking your blood pressure medications and keep a log that you will bring with you to your follow-up doctors' appointments      >Please contact your family doctor or cardiologist immediately for a blood pressure below 100/50 and do not take your blood pressure medications until speaking with them  >Please contact your family doctor or cardiologist as soon as possible for blood pressure greater than 160/100  Kidney Injury management:  >Daily monitoring of weight at home and to call office if any weight gain more than 3-4 lb from current weight  >Current torsemide dose 20mg daily but may need to be adjusted in future  >Obtain BMP within 1 week from discharge       NSAID Warning:  Please avoid NSAID (including but not limited to advil, aleve, motrin, naproxen, ibuprofen, mobic, meloxicam, diclofenac etc) medications as NSAID medications may increase your risk of bleeding (which can be life-threatening), stroke, heart attack, kidney disease  Please note a summary of your hospital stay with relevant information for your doctors will try to be sent to them  Please confirm with your doctors at your follow up visits that they have received this summary and have them contact [address] [place] [address] if they have not received them along with any other medical records they may require  Nicole Grace Phone Number:  [phone]          Chronic Kidney Disease   WHAT YOU NEED TO KNOW:   Chronic kidney disease (CKD) is the gradual and permanent loss of kidney function  It is also called chronic kidney failure, or chronic renal insufficiency  Normally, the kidneys remove fluid, chemicals, and waste from your blood  These wastes are turned into urine by your kidneys  CKD may worsen over time and lead to kidney failure  DISCHARGE INSTRUCTIONS:   Seek care immediately if:   · You are confused and very drowsy  · You have a seizure  · You have shortness of breath  Contact your healthcare provider if:   · You suddenly gain or lose more weight than your healthcare provider has told you is okay  · You have itchy skin or a rash  · You urinate more or less than you normally do  · You have blood in your urine       · You have nausea and repeated vomiting  · You have fatigue or muscle weakness  · You have hiccups that will not stop  · You have questions or concerns about your condition or care  Medicines:   · Medicines  may be given to decrease blood pressure and get rid of extra fluid  You may also receive medicine to manage health conditions that may occur with CKD, such as anemia, diabetes, and heart disease  · Take your medicine as directed  Contact your healthcare provider if you think your medicine is not helping or if you have side effects  Tell him or her if you are allergic to any medicine  Keep a list of the medicines, vitamins, and herbs you take  Include the amounts, and when and why you take them  Bring the list or the pill bottles to follow-up visits  Carry your medicine list with you in case of an emergency  Follow up with your healthcare provider as directed: You will need to return for tests to monitor your kidney function  You may also be referred to a kidney specialist  Write down your questions so you remember to ask them during your visits  Manage other health conditions: Follow your healthcare provider's directions on how to manage diabetes, high blood pressure, and heart disease  These conditions can make CKD worse  Talk to your healthcare provider before you take over-the-counter medicine  Medicines such as NSAIDs, stomach medicine, or laxatives may harm your kidneys  Weigh yourself daily:  Ask your healthcare provider what your weight should be  Ask how much liquid you should drink each day  CKD may cause you to gain or lose weight rapidly  Weigh yourself every day  Write down your weight, how much liquid you drink or eat, and how much you urinate each day  Contact your healthcare provider if your weight is higher or lower than it should be  Manage CKD:   · Maintain a healthy weight  Ask your healthcare provider how much you should weigh   Ask him to help you create a weight loss plan if you are overweight  · Exercise 30 to 60 minutes a day, 4 to 7 times a week, or as directed  Ask about the best exercise plan for you  Regular exercise can help you manage CKD, high blood pressure, and diabetes  · Follow your healthcare provider's advice about what to eat and drink  He may tell you to eat food low in sodium (salt), potassium, phosphorus, or protein  You may need to see a dietitian if you need help planning meals  Ask how much liquid to drink each day and which liquids are best for you  · Limit alcohol  Ask how much alcohol is safe for you to drink  A drink of alcohol is 12 ounces of beer, 5 ounces of wine, or 1½ ounces of liquor  · Do not smoke  Nicotine and other chemicals in cigarettes and cigars can cause lung and kidney damage  Ask your healthcare provider for information if you currently smoke and need help to quit  E-cigarettes or smokeless tobacco still contain nicotine  Talk to your healthcare provider before you use these products  · Ask your healthcare provider if you need vaccines  Infections such as pneumonia, influenza, and hepatitis can be more harmful or more likely to occur in a person who has CKD  Vaccines reduce your risk of infection with these viruses  © 2017 [employer]  Information is for End User's use only and may not be sold, redistributed or otherwise used for commercial purposes  All illustrations and images included in CareNotes® are the copyrighted property of A [employer] M [employer]  or Yoan Rosenbaum  The above information is an  only  It is not intended as medical advice for individual conditions or treatments  Talk to your doctor, nurse or pharmacist before following any medical regimen to see if it is safe and effective for you

## 2019-09-14 NOTE — PROGRESS NOTES
Internal Medicine Progress Note  Patient: Nathalia Zamora  Age/sex: 80 y o  male  Medical Record #: [id number]      ASSESSMENT/PLAN: (Interval History)  Nathalia Zamora is seen and examined and management for following issues:    Volume overload/chronic diastolic CHF:  s/p Lasix IV, weight dropped 11 lbs in hospital   On FR 1500ml; 2Gm NA diet  Renal feels his current body wt will be his baseline and he is to weigh himself when he gets home today and call if wt goes up 3-4 lbs; Torsemide was reduced to 20mg qd (he did get 40mg yesterday)     Abnormal Troponin: felt to be 2/2 creatinine/vol overload putting stress on the heart; no further testing etc was recommended; f/u cards as outpatient     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:  a little above baseline = renal is ok with creat 3 7 and  Torsemide at 20mg qd; daily weights at home and call renal if weight goes up 3-4 lbs; stress importance of low sodium diet     Iron deficiency anemia: renal gave 5 doses Venofer total and is on Niferex 150mg qd     Hx urine retention/BPH; Klebsiella UTI 6/2019 and Klebsiella urosepsis 7/2019:  Uro saw in 7/2019 and added added Finasteride  Consideration was to be given to starting suppressive therapy with Nitrofurantion 100mg qhs  Currently, he is on Flomax/Finasteride  Will need to watch closely for UTI sx  HTN/orthostasis: on Lopressor 25mg BID/Ranexa 500mg BID  No sx dizziness      PAF:  continue Lopressor but dropped to 25 mg BID since has some orthostasis Coumadin is 3mg 2x/week and 1 5 mg 5x/week = no changes today; INR as OP Monday     Hx Dual chamber PPM:  sees Dr Gael Holter as an OP     CVA (2014):  continue ASA/Lipitor     CAD/CABG (2013):   was here at [address] 6/2019 for chest pain --> seen by Cardiology, had nuclear stress test = small, severe, reversible myocardial perfusion defect of the basal inferior wall --> was started on Ranexa 500mg BID but no further plans for intervention were recommended    Continue Lopressor, Lipitor and ASA as well     DM2: takes Tradjenta 5mg qd at home = restarted Tradjenta 5mg qam 9/10/19 and stopped Lantus      Hypothyroidism:  continue current Levothyroxine     Left L2-3 microdiskectomy 7/18/19 at [facility]    Vitamin D deficiency:  Level is 22 6; d/w renal = ok continue 2000U/day       Subjective/ HPI: Patients overnight issues or events were reviewed with nursing or staff during rounds or morning huddle session  DM2: cont Tradjenta which he takes at home  Anemia: s/p venofer, now on iron PO   CAD:  Stable on current tx    Offers no complaints    ROS:     GI: denies abdominal pain, change bowel habits or reflux symptoms  Neuro: Denies any headache, new vision changes, new neuropathies,new weaknesses   Respiratory: No Cough, SOB, denies wheeze  Cardiovascular: No CP, palpitations , denies perception of rapid heartbeat  : denies any new urinary burning or frequency    Review of Scheduled Meds:    Current Facility-Administered Medications:  acetaminophen 650 mg Oral Q6H PRN Jennifer Nath MD   aspirin 81 mg Oral Daily Jennifer Nath MD   atorvastatin 40 mg Oral QPM Jennifer Nath MD   bisacodyl 10 mg Rectal Daily PRN Jennifer Nath MD   cholecalciferol 2,000 Units Oral Daily Jennifer Nath MD   finasteride 5 mg Oral Daily MAURICE Park   insulin lispro 1-5 Units Subcutaneous TID AC MAURICE Patel   insulin lispro 1-5 Units Subcutaneous HS MAURICE Park   iron polysaccharides 150 mg Oral Daily Sera Nelson MD   levothyroxine 75 mcg Oral Daily Jennifer Nath MD   lidocaine 1 patch Topical Daily Jennifer Nath MD   lidocaine  Topical Q4H PRN Jennifer Nath MD   linaGLIPtin 5 mg Oral Daily MAURICE Park   menthol-methyl salicylate  Apply externally 4x Daily PRN Jennifer Nath MD   metoprolol tartrate 25 mg Oral BID With Meals MAURICE Park   ondansetron 4 mg Intravenous Q6H PRN Jennifer Nath MD   polyethylene glycol 17 g Oral Daily PRN Frank Brady MD   ranolazine 500 mg Oral Q12H [facility] Frank Brady MD   senna-docusate sodium 2 tablet Oral BID Frank Brady MD   tamsulosin 0 4 mg Oral Daily Frank Brady MD   torsemide 20 mg Oral Daily Clois Linear, CRNP   warfarin 1 5 mg Oral Once per day on Sun Tue Wed Fri Sat Clois Linear, CRNP   warfarin 3 mg Oral Once per day on Mon Thu Clois Linear, [address]:     Results from last 7 days   Lab Units 09/12/19  0600 09/09/19  0642   WBC Thousand/uL 5 73 7 58   HEMOGLOBIN g/dL 8 4* 9 0*   HEMATOCRIT % 26 6* 28 3*   PLATELETS Thousands/uL 209 243     Results from last 7 days   Lab Units 09/13/19  0542 09/12/19  0600   SODIUM mmol/L 138 135*   POTASSIUM mmol/L 3 6 3 6   CHLORIDE mmol/L 102 101   CO2 mmol/L 29 28   BUN mg/dL 52* 59*   CREATININE mg/dL 3 78* 4 02*   CALCIUM mg/dL 8 5 8 2*         Results from last 7 days   Lab Units 09/12/19  0600 09/09/19  0642   INR  2 48* 2 24*          Results from last 7 days   Lab Units 09/14/19  0635 09/13/19  2037 09/13/19  1556   POC GLUCOSE mg/dl 101 115 186*       Imaging:     No orders to display       *Labs reviewed  *Radiology studies reviewed  *Medications reviewed and reconciled as needed  *Please refer to order section for additional ordered labs studies  *Case discussed with primary attending during morning huddle case rounds    Physical Examination:  Vitals:   Vitals:    09/13/19 1646 09/13/19 2119 09/14/19 0547 09/14/19 0600   BP: 146/79 169/69 156/72    BP Location: Left arm Left arm Left arm    Pulse: 82 70 70    Resp:  18 18    Temp:  97 8 °F (36 6 °C) 98 °F (36 7 °C)    TempSrc:  Oral Oral    SpO2:  99% 94%    Weight:    78 4 kg (172 lb 13 5 oz)   Height:           General Appearance: NAD, conversive  Eyes: No icterus; conjunctiva normal, PERRLA  HENT: oropharynx clear; mucous membranes moist  Neck: trachea midline, range of motion full   Supple w/o pain  Lungs: CTA, normal respiratory effort, no retractions, expiratory effort normal  CV: regular rate, no rubs/gallops; +murmur  ABD: soft; NT/ND; +BS  EXT: <1+ edema LE = improving  Skin: normal turgor, normal texture, no rashes  Psych: affect normal, no overt anxiety/depression during exam today   Neuro: AAOx3            Total time spent: At least 40 minutes, with more than 50% spent counseling/coordinating care  Counseling includes discussion with patient re: progress  and discussion with patient of his/her current medical state/information  Coordination of patient's care was performed in conjunction with primary service  Time invested included review of patient's labs, vitals, and management of their comorbidities with continued monitoring  In addition, this patient was discussed with medical team including physician and advanced extenders  The care of the patient was extensively discussed and appropriate treatment plan was formulated unique for this patient  ** Please Note: Dragon 360 Dictation voice to text software may have been used in the creation of this document   **

## 2019-09-14 NOTE — PLAN OF CARE
Problem: PAIN - ADULT  Goal: Verbalizes/displays adequate comfort level or baseline comfort level  Description  Interventions:  - Encourage patient to monitor pain and request assistance  - Assess pain using appropriate pain scale  - Administer analgesics based on type and severity of pain and evaluate response  - Implement non-pharmacological measures as appropriate and evaluate response  - Consider cultural and social influences on pain and pain management  - Notify physician/advanced practitioner if interventions unsuccessful or patient reports new pain  Outcome: Adequate for Discharge     Problem: INFECTION - ADULT  Goal: Absence or prevention of progression during hospitalization  Description  INTERVENTIONS:  - Assess and monitor for signs and symptoms of infection  - Monitor lab/diagnostic results  - Monitor all insertion sites, i e  indwelling lines, tubes, and drains  - Monitor endotracheal if appropriate and nasal secretions for changes in amount and color  - [place] appropriate cooling/warming therapies per order  - Administer medications as ordered  - Instruct and encourage patient and family to use good hand hygiene technique  - Identify and instruct in appropriate isolation precautions for identified infection/condition  Outcome: Adequate for Discharge     Problem: SAFETY ADULT  Goal: Patient will remain free of falls  Description  INTERVENTIONS:  - Assess patient frequently for physical needs  -  Identify cognitive and physical deficits and behaviors that affect risk of falls    -  [place] fall precautions as indicated by assessment   - Educate patient/family on patient safety including physical limitations  - Instruct patient to call for assistance with activity based on assessment  - Modify environment to reduce risk of injury  - Consider OT/PT consult to assist with strengthening/mobility  Outcome: Adequate for Discharge  Goal: Maintain or return to baseline ADL function  Description  INTERVENTIONS:  -  Assess patient's ability to carry out ADLs; assess patient's baseline for ADL function and identify physical deficits which impact ability to perform ADLs (bathing, care of mouth/teeth, toileting, grooming, dressing, etc )  - Assess/evaluate cause of self-care deficits   - Assess range of motion  - Assess patient's mobility; develop plan if impaired  - Assess patient's need for assistive devices and provide as appropriate  - Encourage maximum independence but intervene and supervise when necessary  - Involve family in performance of ADLs  - Assess for home care needs following discharge   - Consider OT consult to assist with ADL evaluation and planning for discharge  - Provide patient education as appropriate  Outcome: Adequate for Discharge  Goal: Maintain or return mobility status to optimal level  Description  INTERVENTIONS:  - Assess patient's baseline mobility status (ambulation, transfers, stairs, etc )    - Identify cognitive and physical deficits and behaviors that affect mobility  - Identify mobility aids required to assist with transfers and/or ambulation (gait belt, sit-to-stand, lift, walker, cane, etc )  - [place] fall precautions as indicated by assessment  - Record patient progress and toleration of activity level on Mobility SBAR; progress patient to next Phase/Stage  - Instruct patient to call for assistance with activity based on assessment  - Consider rehabilitation consult to assist with strengthening/weightbearing, etc   Outcome: Adequate for Discharge     Problem: DISCHARGE PLANNING  Goal: Discharge to home or other facility with appropriate resources  Description  INTERVENTIONS:  - Identify barriers to discharge w/patient and caregiver  - Arrange for needed discharge resources and transportation as appropriate  - Identify discharge learning needs (meds, wound care, etc )  - Arrange for interpretive services to assist at discharge as needed  - Refer to Case Management Department for coordinating discharge planning if the patient needs post-hospital services based on physician/advanced practitioner order or complex needs related to functional status, cognitive ability, or social support system  Outcome: Adequate for Discharge     Problem: Prexisting or High Potential for Compromised Skin Integrity  Goal: Skin integrity is maintained or improved  Description  INTERVENTIONS:  - Identify patients at risk for skin breakdown  - Assess and monitor skin integrity  - Assess and monitor nutrition and hydration status  - Monitor labs   - Assess for incontinence   - Turn and reposition patient  - Assist with mobility/ambulation  - Relieve pressure over bony prominences  - Avoid friction and shearing  - Provide appropriate hygiene as needed including keeping skin clean and dry  - Evaluate need for skin moisturizer/barrier cream  - Collaborate with interdisciplinary team   - Patient/family teaching  - Consider wound care consult   Outcome: Adequate for Discharge     Problem: Potential for Falls  Goal: Patient will remain free of falls  Description  INTERVENTIONS:  - Assess patient frequently for physical needs  -  Identify cognitive and physical deficits and behaviors that affect risk of falls    -  [place] fall precautions as indicated by assessment   - Educate patient/family on patient safety including physical limitations  - Instruct patient to call for assistance with activity based on assessment  - Modify environment to reduce risk of injury  - Consider OT/PT consult to assist with strengthening/mobility  Outcome: Adequate for Discharge

## 2019-09-14 NOTE — NURSING NOTE
Patient discharged with wife and son appointments and prescriptions gone over with them was continent of bowel and bladder no pain sin intact alleyvn on sacral area for protection 2 more given to wife in case they need one patient walks with assist of a walker his afo braces on legs and one person  Med from the pharmacey brought up and given to the patient

## 2019-09-16 ENCOUNTER — TELEPHONE (OUTPATIENT)
Dept: FAMILY MEDICINE CLINIC | Facility: CLINIC | Age: 84
End: 2019-09-16

## 2019-09-16 ENCOUNTER — APPOINTMENT (OUTPATIENT)
Dept: LAB | Facility: MEDICAL CENTER | Age: 84
End: 2019-09-16
Payer: MEDICARE

## 2019-09-16 LAB
INR PPP: 1.84 (ref 0.84–1.19)
PROTHROMBIN TIME: 20.8 SECONDS (ref 11.6–14.5)

## 2019-09-16 PROCEDURE — 85610 PROTHROMBIN TIME: CPT

## 2019-09-16 PROCEDURE — 36415 COLL VENOUS BLD VENIPUNCTURE: CPT

## 2019-09-16 NOTE — CASE MANAGEMENT
Team Discharge Summary  Pt made good progress, and returned home with family support and cont'd service  Pt will resume HHA through [facility], as well as [facility] for cont'd RN/PT/OT  There were no DME needs identified  Family training occurred, and Pts wife was present for d/c instructions, as well as aware of Pts overall  functioning

## 2019-09-17 ENCOUNTER — TRANSITIONAL CARE MANAGEMENT (OUTPATIENT)
Dept: FAMILY MEDICINE CLINIC | Facility: CLINIC | Age: 84
End: 2019-09-17

## 2019-09-18 ENCOUNTER — ANTICOAG VISIT (OUTPATIENT)
Dept: FAMILY MEDICINE CLINIC | Facility: CLINIC | Age: 84
End: 2019-09-18

## 2019-09-18 NOTE — PHYSICAL THERAPY NOTE
PT DISCHARGE SUMMARY      Patient made fair progress during his 18 day LOS at the [facility]  Patient however unable to achieve full supervision goals and required CGA/Benny for transfers and bed mobility at time of discharge 2* impaired balance and decreased overall strength  Family training performed with spouse who was able to good body mechanics and carryover with training  Plan for wife to perform STS and SPT with RW only and have patient ambulate with therapist, caregiver and patient's children  During stay, [facility] representative out to assess AFO's administered 8/10/19 by Staff Ranker in [place]  Her report was "despite fluctuation in volume in BLE, plan to keep AFOs as is and follow up at a later time; business card given"  Wife instructed in wear time and what to be mindful of when checking skin  Patient with all necessary DME owned prior to admission  Plan for patient to have HHPT to continue to address safety with mobility and balance and strength deficits limiting his (I) with functional activities        Mayra Maxwell, PT

## 2019-09-20 NOTE — PROGRESS NOTES
OT DISCHARGE SUMMARY      Pt [facility] s/p chronic CHF and recent decline in ADLs and fxnl mobility  At time of evaluation pt was AX2 for all tasks  Pt made some progress during acute rehab and improved fxnl transfers to min A with use of RW  Pt improved with fxnl balance, and endurance  Pt required total A for LB dressing at time of d/c due to need for management of AFO braces, and assist to pull up pants for balance support  Pt is limited by decreased standing balance with hand release, generalized weakness  Pt wife engaged in family training and is able to provide necessary assistance upon d/c  Wife also recommended to have home health aids in AM/PM to assist with bathing and dressing  Recommend home OT/PT  Pt had all DME needs prior to admission      Bess Barrientos, OT

## 2019-09-23 ENCOUNTER — OFFICE VISIT (OUTPATIENT)
Dept: FAMILY MEDICINE CLINIC | Facility: CLINIC | Age: 84
End: 2019-09-23
Payer: MEDICARE

## 2019-09-23 VITALS — HEART RATE: 72 BPM | TEMPERATURE: 98.1 F | DIASTOLIC BLOOD PRESSURE: 82 MMHG | SYSTOLIC BLOOD PRESSURE: 132 MMHG

## 2019-09-23 DIAGNOSIS — N18.4 ANEMIA DUE TO STAGE 4 CHRONIC KIDNEY DISEASE (HCC): ICD-10-CM

## 2019-09-23 DIAGNOSIS — I25.10 3-VESSEL CORONARY ARTERY DISEASE: ICD-10-CM

## 2019-09-23 DIAGNOSIS — N18.4 CKD (CHRONIC KIDNEY DISEASE) STAGE 4, GFR 15-29 ML/MIN (HCC): ICD-10-CM

## 2019-09-23 DIAGNOSIS — IMO0001 TRANSITION OF CARE PERFORMED WITH SHARING OF CLINICAL SUMMARY: Primary | ICD-10-CM

## 2019-09-23 DIAGNOSIS — G89.29 CHRONIC RIGHT-SIDED LOW BACK PAIN WITH RIGHT-SIDED SCIATICA: ICD-10-CM

## 2019-09-23 DIAGNOSIS — M54.41 CHRONIC RIGHT-SIDED LOW BACK PAIN WITH RIGHT-SIDED SCIATICA: ICD-10-CM

## 2019-09-23 DIAGNOSIS — I10 ESSENTIAL HYPERTENSION: ICD-10-CM

## 2019-09-23 DIAGNOSIS — I50.33 ACUTE ON CHRONIC DIASTOLIC HEART FAILURE (HCC): ICD-10-CM

## 2019-09-23 DIAGNOSIS — I48.0 PAROXYSMAL A-FIB (HCC): ICD-10-CM

## 2019-09-23 DIAGNOSIS — E11.8 DIABETES MELLITUS WITH MULTIPLE COMPLICATIONS (HCC): ICD-10-CM

## 2019-09-23 DIAGNOSIS — D63.1 ANEMIA DUE TO STAGE 4 CHRONIC KIDNEY DISEASE (HCC): ICD-10-CM

## 2019-09-23 PROCEDURE — 99495 TRANSJ CARE MGMT MOD F2F 14D: CPT | Performed by: FAMILY MEDICINE

## 2019-09-23 PROCEDURE — 1111F DSCHRG MED/CURRENT MED MERGE: CPT | Performed by: FAMILY MEDICINE

## 2019-09-23 NOTE — PROGRESS NOTES
Assessment/Plan:    Diabetes mellitus with multiple complications ([place] [address] )    Lab Results   Component Value Date    HGBA1C 5 5 08/22/2019     Blood sugars have been stable despite his complicated postop course  Continue present medications as well as present diet  Recheck 4 weeks    3-vessel coronary artery disease  Asymptomatic  Continue present medications  Follow up with Cardiology  Recheck 4 weeks    Chronic diastolic heart failure (HCC)  Wt Readings from Last 3 Encounters:   09/14/19 78 4 kg (172 lb 13 5 oz)   08/27/19 78 7 kg (173 lb 6 4 oz)   07/12/19 81 2 kg (179 lb 0 2 oz)     With recent exacerbation  Patient is down at least 7 lb since July  Continue present dose of torsemide  Continue to monitor weight daily  Call if weight increases greater than 2-3 lb in 1-2 days or greater than 4-5 lb in a week  Follow-up with Cardiology  Recheck 1 month        Paroxysmal A-fib ([facility])  Rate controlled  Continue present treatment  Follow up with Cardiology    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 Postop, complicated by underlying renal disease  Recheck CBC  Continue iron  Further recommendations based on results       Diagnoses and all orders for this visit:    Transition of care performed with sharing of clinical summary    Acute on chronic diastolic heart failure (HCC)    Anemia due to stage 4 chronic kidney disease ([place] [address] )  -     CBC and differential; Future  -     Comprehensive metabolic panel; Future    Paroxysmal A-fib ([place] [address] )  -     Protime-INR;  Standing  -     Protime-INR    Diabetes mellitus with multiple complications (HCC)    3-vessel coronary artery disease    Essential hypertension    Chronic right-sided low back pain with right-sided sciatica    CKD (chronic kidney disease) stage 4, GFR 15-29 ml/min ([facility])          Subjective:     TCM Call (since 8/25/2019)     Date and time call was made  9/17/2019  1:53 PM    Patient was hospitialized at  [facility]    Date of Admission  08/27/19    Date of discharge  09/14/19    Diagnosis  Impaired mobility and ADL"S    Disposition  Home health services    Were the patients medications reviewed and updated  No      TCM Call (since 8/25/2019)     Scheduled for follow up? Yes    Patients specialists  Other (comment); Nephrologist    Nephrologist name  Kyleigh Michel PA-c 1 week     Other specialists names  [facility] -Physical Medication and Rehabilation -3 weeks     Did you obtain your prescribed medications  Yes    Do you need help managing your prescriptions or medications  No    Is transportation to your appointment needed  No    I have advised the patient to call PCP with any new or worsening symptoms  M  [address] or [facility] other    Are you recieving any outpatient services  Yes    What type of services  [facility]     Are you recieving home care services  Yes    Types of home care services  Home PT    Are you using any community resources  No    Interperter language line needed  No           Patient ID: Ovidio Martin is a 80 y o  male  Transition of care visit  - 51-year-old male with a complex past medical history including stage I diastolic failure, stage IV chronic renal disease, paroxysmal atrial fibrillation, and CAD underwent lumbar spinal surgery at [facility] in [place] on 07/18  Patient was discharged in sent to rehab on 07/21 where he reportedly he did well and was discharged home  While at home, patient was noted to be edematous  Family was unable to bring him in for evaluation    Eventually, edema worsened and patient became mildly short of breath  He was taken to [facility] emergency department where he was found to be in congestive heart failure  Initial troponins were slightly elevated  Creatinine was > 3  Patient was admitted and diuresed  Cardiology and Nephrology were consulted  It was found that his diuretic was held while in rehab postop and never restarted  Weight decreased by a over 10 lb with diuresis  Troponins resolved and creatinine remained stable  Patient was found to be stable and able to be transferred to rehab on 08/27  While in rehab, patient did well and was able to be discharged home on 09/14  Patient here for transitional care visit  - since discharge, patient has been compliant with his home therapy  He states that he is slowly feeling stronger  Patient is compliant with all of his medications  Patient and family are monitoring weight carefully which has been stable  Patient denies any chest pain, palpitations or worsening shortness of breath  He does not have any GI or  complaints at present  Low back has been improving since surgery  - I reviewed available hospital records, lab results and study reports with pt and wife        The following portions of the patient's history were reviewed and updated as appropriate: He  has a past medical history of 3-vessel coronary artery disease, BPH (benign prostatic hyperplasia), Chronic kidney disease, Diabetes mellitus ([facility] [address] ), Hyperlipidemia, Hypertension, Shingles (04/29/2019), SOB (shortness of breath), and Stroke ([facility] [address] )    He   Patient Active Problem List    Diagnosis Date Noted    History of constipation 09/04/2019    CKD (chronic kidney disease) stage 4, GFR 15-29 ml/min ([facility]) 09/02/2019    Cognitive impairment 08/29/2019    Vitamin D insufficiency 08/29/2019    Anticoagulated on warfarin 08/28/2019    History of recurrent UTIs 08/28/2019    Buttock wound 08/28/2019    Presence of permanent cardiac pacemaker 08/28/2019    Pain 08/28/2019    Anemia 08/28/2019    Healthcare maintenance 08/28/2019    Impaired mobility and ADLs 08/27/2019    Warfarin-induced coagulopathy ([place] [address] ) 08/26/2019    Benign prostatic hyperplasia with urinary hesitancy 08/22/2019    Abnormal chest x-ray 08/22/2019    Hyponatremia 07/04/2019    Chronic diastolic heart failure ([place] [address] ) 07/04/2019    Leg edema, left 06/13/2019    Shortness of breath 06/13/2019    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 06/13/2019    UTI (urinary tract infection) 06/13/2019    Tibial artery occlusion, left ([place] [address] ) 06/13/2019    Chest pain 06/12/2019    Ambulatory dysfunction/generalized weakness 06/12/2019    Depressed mood 04/29/2019    Frozen shoulder 05/30/2018    Paroxysmal A-fib ([place] [address] ) 04/13/2016    Kidney disease with fluid retention 01/12/2016    Hyperlipidemia 11/11/2015    Elevated serum alkaline phosphatase level 10/20/2015    Increased frequency of urination 10/20/2015    3-vessel coronary artery disease 08/26/2015    Diabetes mellitus with multiple complications ([place] [address] ) 21/10/8470    Hypothyroidism 08/26/2015    Asbestosis ([place] [address] ) 05/06/2014    Essential hypertension 05/06/2014    Seasonal allergies 05/06/2014     He  has a past surgical history that includes Coronary artery bypass graft; Cataract extraction; and Cardiac pacemaker placement  He  reports that he has never smoked  He has never used smokeless tobacco  He reports that he drank alcohol  He reports that he does not use drugs    Current Outpatient Medications   Medication Sig Dispense Refill    acetaminophen (TYLENOL) 325 mg tablet Take 2 tablets (650 mg total) by mouth 3 (three) times a day as needed for mild pain or moderate pain 30 tablet 0    aspirin (ECOTRIN LOW STRENGTH) 81 mg EC tablet Take 1 tablet (81 mg total) by mouth daily 30 tablet 0    atorvastatin (LIPITOR) 40 mg tablet TAKE 1 TABLET BY MOUTH EVERY DAY (Patient taking differently: TAKE 1 TABLET BY MOUTH EVERY EVENING) 90 tablet 3    cholecalciferol 2000 units TABS Take 1 tablet (2,000 Units total) by mouth daily 60 tablet 0    finasteride (PROSCAR) 5 mg tablet Take 1 tablet (5 mg total) by mouth daily 30 tablet 0    glucose blood test strip 1 each by Other route daily 100 each 1    iron polysaccharides (FERREX) 150 mg capsule Take 1 capsule (150 mg total) by mouth daily 30 capsule 0    levothyroxine 75 mcg tablet TAKE 1 TABLET BY MOUTH EVERY DAY 90 tablet 3    metoprolol tartrate (LOPRESSOR) 25 mg tablet Take 1 tablet (25 mg total) by mouth 2 (two) times a day with meals 60 tablet 0    ranolazine (RANEXA) 500 mg 12 hr tablet Take 1 tablet (500 mg total) by mouth every 12 (twelve) hours 60 tablet 0    senna-docusate sodium (SENOKOT S) 8 6-50 mg per tablet Take 2 tablets by mouth 2 (two) times a day 60 tablet 0    tamsulosin (FLOMAX) 0 4 mg TAKE ONE CAPSULE BY MOUTH ONCE DAILY (Patient taking differently: TAKE ONE CAPSULE BY MOUTH ONCE hs) 90 capsule 3    torsemide (DEMADEX) 20 mg tablet Take 1 tablet (20 mg total) by mouth daily 60 tablet 0    TRADJENTA 5 MG TABS TAKE 1 TABLET (5 MG) BY MOUTH DAILY 90 tablet 3    warfarin (COUMADIN) 3 mg tablet Take 1 tab (3mg) Monday and Thursday, Take 0 5 tab (1 5mg) Sun, Tue, Wed, Fri, Sat; Check PT/INR 60 tablet 0     No current facility-administered medications for this visit  He has No Known Allergies       Review of Systems   Constitutional: Positive for fatigue  Negative for activity change, chills and fever  HENT: Negative  Eyes: Negative  Respiratory: Positive for shortness of breath (mild increase with exertion)  Cardiovascular: Positive for leg swelling (greatly improved)  Negative for chest pain  Gastrointestinal: Negative  Endocrine: Negative  Genitourinary: Negative  Musculoskeletal: Positive for back pain and gait problem  Negative for joint swelling and myalgias  Skin: Negative      Neurological: Positive for weakness and light-headedness (occasional)  Negative for dizziness, facial asymmetry and headaches  Hematological: Negative  Psychiatric/Behavioral: Negative  Objective:      /82 (BP Location: Left arm, Patient Position: Sitting, Cuff Size: Standard)   Pulse 72   Temp 98 1 °F (36 7 °C)          Physical Exam   Constitutional: He is oriented to person, place, and time  He appears well-developed and well-nourished  Elderly male in NAD   HENT:   Head: Normocephalic and atraumatic  Right Ear: External ear normal    Left Ear: External ear normal    Nose: Nose normal    Mouth/Throat: Oropharynx is clear and moist  No oropharyngeal exudate  Eyes: Pupils are equal, round, and reactive to light  Conjunctivae and EOM are normal    Neck: Normal range of motion  Neck supple  Cardiovascular: Normal rate  Pulses:       Dorsalis pedis pulses are 1+ on the right side, and 1+ on the left side  Pulmonary/Chest: Effort normal and breath sounds normal  He has no wheezes  He has no rales  Abdominal: Soft  Bowel sounds are normal  He exhibits no distension and no mass  There is no tenderness  Musculoskeletal: He exhibits edema (trace to 1+ bilat  )  Right shoulder: He exhibits decreased range of motion (on abduction, anterior flexion and internal/external rotation)  He exhibits no swelling, no effusion, no spasm and normal strength  R chest wall NT to palp   Feet:   Right Foot:   Skin Integrity: Negative for ulcer, skin breakdown, erythema, warmth, callus or dry skin  Left Foot:   Skin Integrity: Negative for ulcer, skin breakdown, erythema, warmth, callus or dry skin  Lymphadenopathy:     He has no cervical adenopathy  Neurological: He is alert and oriented to person, place, and time  No cranial nerve deficit or sensory deficit  He exhibits abnormal muscle tone (pt with bilat lower leg MAFO like braces due to bilat foot drop)   Coordination (ambulates with cane) and gait (sl ataxic gait with poor L arm swing) abnormal    Skin: Capillary refill takes less than 2 seconds  Psychiatric: He has a normal mood and affect  Vitals reviewed

## 2019-09-23 NOTE — TELEPHONE ENCOUNTER
Patient wife returning call  Patient was just released from rehab and was advised to call in and get a er fu appointment  Patient requesting [facility] [place] office  Patient wife states early mornings are hard for them  Found opening on 10/01/2019 with Dr Monica Bowers is this appropriate please advise

## 2019-09-25 ENCOUNTER — ANTICOAG VISIT (OUTPATIENT)
Dept: FAMILY MEDICINE CLINIC | Facility: CLINIC | Age: 84
End: 2019-09-25

## 2019-09-25 ENCOUNTER — APPOINTMENT (OUTPATIENT)
Dept: LAB | Age: 84
End: 2019-09-25
Payer: MEDICARE

## 2019-09-25 DIAGNOSIS — N18.4 ANEMIA DUE TO STAGE 4 CHRONIC KIDNEY DISEASE (HCC): ICD-10-CM

## 2019-09-25 DIAGNOSIS — N18.4 CKD (CHRONIC KIDNEY DISEASE), STAGE IV (HCC): ICD-10-CM

## 2019-09-25 DIAGNOSIS — D63.1 ANEMIA DUE TO STAGE 4 CHRONIC KIDNEY DISEASE (HCC): ICD-10-CM

## 2019-09-25 PROBLEM — B02.9 HERPES ZOSTER WITHOUT COMPLICATION: Status: RESOLVED | Noted: 2019-04-29 | Resolved: 2019-09-25

## 2019-09-25 LAB
ALBUMIN SERPL BCP-MCNC: 4 G/DL (ref 3.5–5)
ALP SERPL-CCNC: 127 U/L (ref 46–116)
ALT SERPL W P-5'-P-CCNC: 13 U/L (ref 12–78)
ANION GAP SERPL CALCULATED.3IONS-SCNC: 8 MMOL/L (ref 4–13)
AST SERPL W P-5'-P-CCNC: 12 U/L (ref 5–45)
BASOPHILS # BLD AUTO: 0.04 THOUSANDS/ΜL (ref 0–0.1)
BASOPHILS NFR BLD AUTO: 0 % (ref 0–1)
BILIRUB SERPL-MCNC: 0.54 MG/DL (ref 0.2–1)
BUN SERPL-MCNC: 50 MG/DL (ref 5–25)
CALCIUM SERPL-MCNC: 9 MG/DL (ref 8.3–10.1)
CHLORIDE SERPL-SCNC: 106 MMOL/L (ref 100–108)
CO2 SERPL-SCNC: 26 MMOL/L (ref 21–32)
CREAT SERPL-MCNC: 3.9 MG/DL (ref 0.6–1.3)
EOSINOPHIL # BLD AUTO: 0.26 THOUSAND/ΜL (ref 0–0.61)
EOSINOPHIL NFR BLD AUTO: 3 % (ref 0–6)
ERYTHROCYTE [DISTWIDTH] IN BLOOD BY AUTOMATED COUNT: 14.7 % (ref 11.6–15.1)
GFR SERPL CREATININE-BSD FRML MDRD: 13 ML/MIN/1.73SQ M
GLUCOSE P FAST SERPL-MCNC: 119 MG/DL (ref 65–99)
HCT VFR BLD AUTO: 35 % (ref 36.5–49.3)
HGB BLD-MCNC: 10.7 G/DL (ref 12–17)
IMM GRANULOCYTES # BLD AUTO: 0.02 THOUSAND/UL (ref 0–0.2)
IMM GRANULOCYTES NFR BLD AUTO: 0 % (ref 0–2)
INR PPP: 2.77 (ref 0.84–1.19)
LYMPHOCYTES # BLD AUTO: 1.94 THOUSANDS/ΜL (ref 0.6–4.47)
LYMPHOCYTES NFR BLD AUTO: 21 % (ref 14–44)
MCH RBC QN AUTO: 30.7 PG (ref 26.8–34.3)
MCHC RBC AUTO-ENTMCNC: 30.6 G/DL (ref 31.4–37.4)
MCV RBC AUTO: 100 FL (ref 82–98)
MONOCYTES # BLD AUTO: 0.65 THOUSAND/ΜL (ref 0.17–1.22)
MONOCYTES NFR BLD AUTO: 7 % (ref 4–12)
NEUTROPHILS # BLD AUTO: 6.24 THOUSANDS/ΜL (ref 1.85–7.62)
NEUTS SEG NFR BLD AUTO: 69 % (ref 43–75)
NRBC BLD AUTO-RTO: 0 /100 WBCS
PLATELET # BLD AUTO: 224 THOUSANDS/UL (ref 149–390)
PMV BLD AUTO: 10.5 FL (ref 8.9–12.7)
POTASSIUM SERPL-SCNC: 4.2 MMOL/L (ref 3.5–5.3)
PROT SERPL-MCNC: 7.3 G/DL (ref 6.4–8.2)
PROTHROMBIN TIME: 28.7 SECONDS (ref 11.6–14.5)
RBC # BLD AUTO: 3.49 MILLION/UL (ref 3.88–5.62)
SODIUM SERPL-SCNC: 140 MMOL/L (ref 136–145)
WBC # BLD AUTO: 9.15 THOUSAND/UL (ref 4.31–10.16)

## 2019-09-25 PROCEDURE — 80053 COMPREHEN METABOLIC PANEL: CPT

## 2019-09-25 PROCEDURE — 85610 PROTHROMBIN TIME: CPT | Performed by: FAMILY MEDICINE

## 2019-09-25 PROCEDURE — 85025 COMPLETE CBC W/AUTO DIFF WBC: CPT

## 2019-09-25 PROCEDURE — 36415 COLL VENOUS BLD VENIPUNCTURE: CPT | Performed by: FAMILY MEDICINE

## 2019-09-25 NOTE — ASSESSMENT & PLAN NOTE
Lab Results   Component Value Date    HGBA1C 5 5 08/22/2019     Blood sugars have been stable despite his complicated postop course  Continue present medications as well as present diet    Recheck 4 weeks

## 2019-09-25 NOTE — ASSESSMENT & PLAN NOTE
Back pain much better status post surgery  Still with some right sciatica symptoms  Continue physical therapy  Follow-up with Neurosurgery    Recheck 1 month

## 2019-09-25 NOTE — ASSESSMENT & PLAN NOTE
?  Postop, complicated by underlying renal disease  Recheck CBC  Continue iron    Further recommendations based on results

## 2019-09-25 NOTE — ASSESSMENT & PLAN NOTE
Wt Readings from Last 3 Encounters:   09/14/19 78 4 kg (172 lb 13 5 oz)   08/27/19 78 7 kg (173 lb 6 4 oz)   07/12/19 81 2 kg (179 lb 0 2 oz)     With recent exacerbation  Patient is down at least 7 lb since July  Continue present dose of torsemide  Continue to monitor weight daily  Call if weight increases greater than 2-3 lb in 1-2 days or greater than 4-5 lb in a week  Follow-up with Cardiology    Recheck 1 month

## 2019-09-25 NOTE — DISCHARGE SUMMARY
Discharge Summary - PMR   Ovidio Rogers 80 y o  male MRN: [id number]  Unit/Bed#: -63 Encounter: [id number]    Admission Date: 8/27/2019     Discharge Date: 9/14/2019    Rehabilitation/Etiologic Diagnosis:   Debility:  12  Debility (Non-cardiac/Non-pulmonary)  Volume overload, lumbar spinal stenosis, weakness    Discharge Diagnoses:      Patient Active Problem List   Diagnosis    3-vessel coronary artery disease    Asbestosis ([facility] [address] )    Kidney disease with fluid retention    Diabetes mellitus with multiple complications ([facility] )    Elevated serum alkaline phosphatase level    Hyperlipidemia    Essential hypertension    Hypothyroidism    Paroxysmal A-fib (HCC)    Seasonal allergies    Increased frequency of urination    Frozen shoulder    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    Hyponatremia    Chronic diastolic heart failure (HCC)    Benign prostatic hyperplasia with urinary hesitancy    Impaired mobility and ADLs    Anticoagulated on warfarin    History of recurrent UTIs    Buttock wound    Presence of permanent cardiac pacemaker    Pain    Anemia    Cognitive impairment    Vitamin D insufficiency    CKD (chronic kidney disease) stage 4, GFR 15-29 ml/min ([facility])    History of constipation       Acute Rehabilitation Center Course:   (with significant findings, care, complications, treatment, and services provided):    [age]-year-old male with past medical history of chronic diastolic CHF, chronic kidney disease stage 4, paroxysmal atrial fibrillation, dual chamber permanent pacemaker, coronary artery disease, CABG, diabetes mellitus 2, hypothyroidism, BPH, recurrent UTIs, lumbar spinal stenosis, chronic low back pain, foot drop, history of left L2-L3 microdiskectomy in July of 2019 the developed worsening swelling with associated decline in ADLs and ambulation   Patient splits seen by Cardiology and Nephrology with noted worsening of renal function and volume overload believed to be related to this   Patient required initial IV and then more recently p o  Increase in diuretics   Patient's volume status has been improving although his functional status remains below his baseline   Patient was evaluated by skilled therapies and was found to have significant decline in ADLs and ambulation appropriate for admission to [facility]  Functional status on admission to [facility]:  PT:  Transfers max assist, ambulation moderate assist 150 feet  OT:  Lower body dressing, toileting total assist, bathing, transfers max assist, upper body dressing moderate assist     Functional status on discharge from [facility]:  PT: transfers and ambulation 150 ft min assist  OT: bathing mod assist; groomin min assist; UB dressing min assist; LB dressing total assist      * Impaired mobility and ADLs  Assessment & Plan  Function improving; BLE strength improving; caregiver training completed  >Multifactorial:  Recent volume overload related to acute on chronic kidney disease, history of diastolic CHF, generalized weakness including proximal lower extremity weakness possibly related to some chronic illness/critical illness deconditioning, low back pain and radiating leg pain with right foot drop from lumbar spinal stenosis possible nerve impingement status post surgical intervention recently, 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, left adhesive capsulitis, query component cognitive impairment,   >Completed acute comprehensive interdisciplinary inpatient rehabilitation include intensive skilled therapies (PT, OT, ST) as previously outlined with oversight and management by rehabilitation physician, inpatient rehab level nursing, case management and weekly interdisciplinary team meetings     - [address] [place] [address] 16/30 with greatest deficits in memory and executive functioning   - Follow-up with PMR after discharge      Buttock wound  Assessment & Plan  Skin breakdown buttock improved > wife discussed appropriate skin mgmt   - Turn patient Q2H   - Hydragaurd to buttocks and sacrum BID  - EHOB waffle cushion to chair/WC when OOB  - Maximum time in chair 2 hours at a time      Kidney disease with fluid retention  Assessment & Plan  Creatinine 3 78 on 9/13   Per Nephrology who assisted with management during course  "decrease torsemide to 20 mg daily starting today  Would discharge at torsemide 20 mg daily, discussed about daily monitoring of weight at home and to call office if any weight gain more than 3-4 lb from current weight in which case we may have to increase the dose of torsemide  The weight measured at the rehab unit has been fluctuating ? Accuracy"  Monitor labs overall management per Nephrology and Internal Medicine  Limit nephrotoxic agents when possible  WENDY hose   >Follow-up BMP in about 1 week  >Follow-up with nephrology and PCP after d/c     Cognitive impairment  Assessment & Plan  > [address] [place] [address] 16/30 indicating moderate cog impairment with deficits greatest in domains of short term memory and executive functioning  > Wife states patients cognitive status has been largely stable for some time  > CT head 6/2019 - Encephalomalacia within the right frontal and parietal lobe extending inferiorly into the posterior superior temporal lobe  Chronic microangiopathic change within both cerebral hemispheres  Brainstem and cerebellum demonstrate normal density  No mass or hemorrhage  Mild cerebral volume loss  VENTRICLES AND EXTRA-AXIAL SPACES:  Asymmetry with mild ex vacuo dilatation of the posterior body, atria and occipital horn of the right lateral ventricle  Mild chronic microangiopathic change and age-appropriate cerebral volume loss    Right hemispheric encephalomalacia and gliosis suggestive of old infarction   > Increased risk of dementia and falls  > Compensatory strategies and adjustments in PT/OT as indicated  > Follow-up with PCP and PMR after d/c > consider follow-up with neuro in future           Pain  Assessment & Plan  Well controlled only on APAP   Try to limit sedating meds   APAP PRN       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  Assessment & Plan  Pain well controlled, leg strength improved  On admission was complaining of some chronic right low back pain with intermittent radiating pain down the leg; also with right foot drop  Status post recent lumbar surgery - left L2-3 microdiskectomy 7/181/9 [facility])   Acetaminophen as needed  Follow-up with spine sx/PMR after d/c     Frozen shoulder  Assessment & Plan  Skilled therapies, modalities as indicated      History of constipation  Assessment & Plan  Improved  Bowel regimen     History of recurrent UTIs  Assessment & Plan  No s/s of UTI > monitor   Internal medicine consult and management during [facility] course  Currently on flomax and proscar  Follow-up with Urology after discharge    Benign prostatic hyperplasia with urinary hesitancy  Assessment & Plan  Urinating adequately, continue to monitor  Continue flomax and Proscar  Follow-up with Urology after d/c     Presence of permanent cardiac pacemaker  Assessment & Plan  + F/U device clinic/cards     Anticoagulated on warfarin  Assessment & Plan  Management per Medicine during [facility] course; mgmt per outpt providers after d/c  Wafarin 3mg M&Thu, 1 5mg other days   See above     Chronic diastolic heart failure [facility])  Assessment & Plan  Internal medicine consult and management during [facility] course  Anti thrombotics per Medicine, blood pressure medications at their discretion   Med/nephrology recommend torsemide 20mg qday   Per nephro " Would discharge at torsemide 20 mg daily, discussed about daily monitoring of weight at home and to call office if any weight gain more than 3-4 lb from current weight in which case we may have to increase the dose of torsemide"  F/U PCP, nephro, cards after d/c     Paroxysmal A-fib [facility])  Assessment & Plan  Internal medicine consult and management during [facility] course  Anticoagulation at their discretion   >Medicine recommends d/c on warfarin 3mg Mon, Thurs, 1 5mg Sun, Tue, Wed, Fri, Sat   >PT/INR starting Monday and follow-up labs and adjustments per outpt providers   >Discussed with wife  F/U PCP, Cards     Diabetes mellitus with multiple complications [facility])  Assessment & Plan  Lab Results   Component Value Date    HGBA1C 5 5 08/22/2019     Internal medicine consulted and management at their discretion  Monitor for signs and symptoms of hypoglycemia   Current meds: lispro and Tradjenta per Medicine  >Provide new glucometer Rx at d/c       Essential hypertension  Assessment & Plan  BP acceptable  Internal medicine consulted and management at their discretion  Monitor vitals with and without activity; monitor for orthostasis  Monitor hemoglobin, electrolytes, kidney function, hydration status   Current meds:  Metoprolol, Torsemide       3-vessel coronary artery disease  Assessment & Plan  Antiplatelet, optimal blood pressure control, statin  Ranexa    Hyperlipidemia  Assessment & Plan  Statin    Anemia  Assessment & Plan  Hemoglobin 8 4 on 09/12  Recently received Venofer  Iron supplementation per Nephrology  F/U labs in about 1 week       Vitamin D insufficiency  Assessment & Plan  Cholecalciferol Vit D3 2000 units daily   Follow-up with PCP      Hypothyroidism  Assessment & Plan  Levothyroxine    Elevated serum alkaline phosphatase level  Assessment & Plan  Stable recently; f/u with PCP       # Diet/Hydration:    Diabetic/cardiac     Disposition:   Home with family Saturday     Follow-up:   PCP, PMR, Nephrology, Cardiology, urology, Spine     CODE: Level 3: DNAR and DNI     Restrictions include:  Fall precautions    Imaging:  No results found  Pertinent Recent Labs (See Course above, EPIC EMR, and if needed request additional records):  Results for Juan Francisco Ast (MRN [id number]) as of 9/25/2019 12:08   Ref   Range 9/13/2019 05:42   Sodium Latest Ref Range: 136 - 145 mmol/L 138   Potassium Latest Ref Range: 3 5 - 5 3 mmol/L 3 6   Chloride Latest Ref Range: 100 - 108 mmol/L 102   CO2 Latest Ref Range: 21 - 32 mmol/L 29   Anion Gap Latest Ref Range: 4 - 13 mmol/L 7   BUN Latest Ref Range: 5 - 25 mg/dL 52 (H)   Creatinine Latest Ref Range: 0 60 - 1 30 mg/dL 3 78 (H)   Glucose, Random Latest Ref Range: 65 - 140 mg/dL 91   Calcium Latest Ref Range: 8 3 - 10 1 mg/dL 8 5   Results for Xavier Madsen (MRN [id number]) as of 9/25/2019 12:08   Ref   Range 9/12/2019 06:00   WBC Latest Ref Range: 4 31 - 10 16 Thousand/uL 5 73   Red Blood Cell Count Latest Ref Range: 3 88 - 5 62 Million/uL 2 72 (L)   Hemoglobin Latest Ref Range: 12 0 - 17 0 g/dL 8 4 (L)   HCT Latest Ref Range: 36 5 - 49 3 % 26 6 (L)   MCV Latest Ref Range: 82 - 98 fL 98   MCH Latest Ref Range: 26 8 - 34 3 pg 30 9   MCHC Latest Ref Range: 31 4 - 37 4 g/dL 31 6   RDW Latest Ref Range: 11 6 - 15 1 % 14 8   Platelet Count Latest Ref Range: 149 - 390 Thousands/uL 209       Procedures Performed During [facility] Admission: None    Discharge Physical Examination:  Vitals:    09/14/19 0844   BP: (!) 171/71   Pulse: 68   Resp:    Temp:    SpO2:    General: Awake, alert in NAD  HENT:  MMM  Respiratory: Unlabored breathing, breath sounds equal, Lungs CTA, no wheezes, rales, or rhonchi  Cardiovascular: Regular rate and rhythm, no murmurs, rubs, or gallops  Gastrointestinal: Soft, non-tender,  mild-distension, normoactive bowel sounds  Genitourinary: No acmargo  SkiN/MSK/Extremities:   Better 1+ bilateral lower extremity edema  No calf tenderness to palpation  Neurologic/Psych:   MENTAL STATUS: awake, orientation intact; appropriate wakefulness  Affect:  Euthymic  Strength: RUE strength intact; LUE prox strength limited 2/2 frozen shoulder and possible prior stroke 5-5, L prox LE 5-/5, R prox RLE 4/5, R distal LE 4+/5, L distal LE 5-/5    Physical exam performed, documentation above reviewed, and updated if appropriate on relevant date of encounter:   9/14/2019    HPI:   Noelle Burroughs male with past medical history of chronic diastolic CHF, chronic kidney disease stage 4, paroxysmal atrial fibrillation, dual chamber permanent pacemaker, coronary artery disease, CABG, diabetes mellitus 2, hypothyroidism, BPH, recurrent UTIs, lumbar spinal stenosis, chronic low back pain, foot drop, history of left L2-L3 microdiskectomy in July of 2019 the developed worsening swelling with associated decline in ADLs and ambulation  Patient splits seen by Cardiology and Nephrology with noted worsening of renal function and volume overload believed to be related to this  Patient required initial IV and then more recently p o  Increase in diuretics  Patient's volume status has been improving although his functional status remains below his baseline  Patient was evaluated by skilled therapies and was found to have significant decline in ADLs and ambulation appropriate for admission to [facility]      On evaluation, patient reports improving swelling throughout his body  Patient notes still some generalized fatigue and difficulty mobilizing  Patient reports intermittent right low back pain with radiation down the leg which feels electrical in nature which is stable recently  Patient reports some chronic right foot drop which is also stable  Patient noted some chronic left-sided weakness after last stroke but this is largely been as baseline recently  He reports urinating adequately without dysuria  Patient denies radiating groin pain  Patient denies constipation  Patient denies lightheadedness, fever, chills, sweats, shortness of breath, chest pain, abdominal pain, significant difficulty sleeping or with mood, or other complaints      Review of Systems:     Complete review of systems obtained  Please see HPI for details with other significant symptoms or history listed here:     Otherwise, 14 point review of systems completed and was otherwise unremarkable      Condition at Discharge: good     Discharge instructions/Information to patient and family:   See after visit summary for information provided to patient and family  Provisions for Follow-Up Care:  See after visit summary for information related to follow-up care and any pertinent home health orders  Disposition: Home with family/wife     Planned Readmission:  No    Discharge Statement   Total time spent examining patient, counseling patient (or patient/family) on condition, medication, rehabilitation/medical plan, and coordinating care on day of discharge: at least 45 minutes  Greater than 50% of the total time was spent examining patient, answering all questions, directly discussing plan of care and post-discharge instructions with patient (or patient and family)  Additional time spent on coordinating care and other discharge activities  Discharge Medications:  See after visit summary for reconciled discharge medications provided to patient and family

## 2019-09-26 ENCOUNTER — OFFICE VISIT (OUTPATIENT)
Dept: NEPHROLOGY | Facility: CLINIC | Age: 84
End: 2019-09-26
Payer: MEDICARE

## 2019-09-26 VITALS
SYSTOLIC BLOOD PRESSURE: 140 MMHG | DIASTOLIC BLOOD PRESSURE: 76 MMHG | WEIGHT: 165.6 LBS | HEIGHT: 65 IN | HEART RATE: 68 BPM | BODY MASS INDEX: 27.59 KG/M2

## 2019-09-26 DIAGNOSIS — N18.4 ANEMIA DUE TO STAGE 4 CHRONIC KIDNEY DISEASE (HCC): ICD-10-CM

## 2019-09-26 DIAGNOSIS — I50.32 CHRONIC DIASTOLIC HEART FAILURE (HCC): ICD-10-CM

## 2019-09-26 DIAGNOSIS — N18.4 CKD (CHRONIC KIDNEY DISEASE) STAGE 4, GFR 15-29 ML/MIN (HCC): Primary | ICD-10-CM

## 2019-09-26 DIAGNOSIS — N18.4 BENIGN HYPERTENSION WITH CKD (CHRONIC KIDNEY DISEASE) STAGE IV (HCC): ICD-10-CM

## 2019-09-26 DIAGNOSIS — D63.1 ANEMIA DUE TO STAGE 4 CHRONIC KIDNEY DISEASE (HCC): ICD-10-CM

## 2019-09-26 DIAGNOSIS — I12.9 BENIGN HYPERTENSION WITH CKD (CHRONIC KIDNEY DISEASE) STAGE IV (HCC): ICD-10-CM

## 2019-09-26 DIAGNOSIS — E55.9 VITAMIN D INSUFFICIENCY: ICD-10-CM

## 2019-09-26 PROCEDURE — 99214 OFFICE O/P EST MOD 30 MIN: CPT | Performed by: PHYSICIAN ASSISTANT

## 2019-09-26 NOTE — PATIENT INSTRUCTIONS
Chronic Kidney Disease stage 4/5- Baseline creatinine progressed to the high 3s with euvolemia  Suspected etiology is related to hypertensive nephrosclerosis and diabetic nephropathy  CHF / Volume Overload- He takes torsemide 20mg daily  His home weight is now 161 pounds  Hypertension- Antihypertensive regimen includes metoprolol 25mg twice a day and torsemide 20mg daily  His blood pressure is acceptable  Monitor his blood pressure at home  Anemia- Continue oral iron supplement  He received full 1g IV venofer in the hospital   Consideration for HOLLY if hgb remains low but hgb improved on outpatient blood work  Vitamin D Deficiency- continue cholecalciferol 2000 units daily  History of urinary retention / BPH- continue flomax and finasteride  Continue to follow up with urology  Follow up with Dr Lonny Mahmood in 3 months  Please call the office with any questions or concerns

## 2019-09-26 NOTE — PROGRESS NOTES
Assessment and Plan:    Diagnoses and all orders for this visit:    CKD (chronic kidney disease) stage 4, GFR 15-29 ml/min ([facility])  -     Ambulatory referral to ckd education program; Future  -     Basic metabolic panel; Future  -     Magnesium; Future  -     Phosphorus; Future  -     CBC; Future  -     PTH, intact; Future  -     Protein / creatinine ratio, urine; Future  -     Basic metabolic panel; Future  -     Basic metabolic panel; Future    Anemia due to stage 4 chronic kidney disease ([facility])    Vitamin D insufficiency    Benign hypertension with CKD (chronic kidney disease) stage IV ([facility])    Chronic diastolic heart failure (HCC)      Chronic Kidney Disease stage 4/5- Baseline creatinine progressed to the high 3s with euvolemia  Suspected etiology is related to hypertensive nephrosclerosis and diabetic nephropathy  Kidney Smart: will schedule  Dialysis modalities discussed  Consider comprehensive kidney care meeting in the future    CHF / Volume Overload- He takes torsemide 20mg daily  His home weight is now 161 pounds  Hypertension- Antihypertensive regimen includes metoprolol 25mg twice a day and torsemide 20mg daily  His blood pressure is acceptable  Monitor his blood pressure at home  Anemia- Continue oral iron supplement  He received full 1g IV venofer in the hospital   Consideration for HOLLY if hgb remains low but hgb improved on outpatient blood work  Vitamin D Deficiency- continue cholecalciferol 2000 units daily  History of urinary retention / BPH- continue flomax and finasteride  Continue to follow up with urology  Follow up with Dr Esteban Jules in 3 months  Please call the office with any questions or concerns  Colonoscopy: deferred, age 80    Reason for Visit: No chief complaint on file  HPI: Olinda Valadez is a 80 y o  male who is here for follow up after hospitalization for volume overload with acute CHF exacerbation    He was diuresed with IV lasix and discharged on torsemide  His weight on discharge was 172 pounds  He has lost about 10 pounds since then and is feeling well  He denies SOB  He denies worsening LE edema  His urine output is acceptable per his report  He denies lightheadedness and dizziness  They avoid salt  He presents today with his wife and daughter  ROS: A complete review of systems was performed and was negative unless otherwise noted in the history of present illness  Allergies:   Patient has no known allergies      Medications:     Current Outpatient Medications:     acetaminophen (TYLENOL) 325 mg tablet, Take 2 tablets (650 mg total) by mouth 3 (three) times a day as needed for mild pain or moderate pain, Disp: 30 tablet, Rfl: 0    aspirin (ECOTRIN LOW STRENGTH) 81 mg EC tablet, Take 1 tablet (81 mg total) by mouth daily, Disp: 30 tablet, Rfl: 0    atorvastatin (LIPITOR) 40 mg tablet, TAKE 1 TABLET BY MOUTH EVERY DAY (Patient taking differently: TAKE 1 TABLET BY MOUTH EVERY EVENING), Disp: 90 tablet, Rfl: 3    cholecalciferol 2000 units TABS, Take 1 tablet (2,000 Units total) by mouth daily, Disp: 60 tablet, Rfl: 0    finasteride (PROSCAR) 5 mg tablet, Take 1 tablet (5 mg total) by mouth daily, Disp: 30 tablet, Rfl: 0    glucose blood test strip, 1 each by Other route daily, Disp: 100 each, Rfl: 1    iron polysaccharides (FERREX) 150 mg capsule, Take 1 capsule (150 mg total) by mouth daily, Disp: 30 capsule, Rfl: 0    levothyroxine 75 mcg tablet, TAKE 1 TABLET BY MOUTH EVERY DAY, Disp: 90 tablet, Rfl: 3    metoprolol tartrate (LOPRESSOR) 25 mg tablet, Take 1 tablet (25 mg total) by mouth 2 (two) times a day with meals, Disp: 60 tablet, Rfl: 0    ranolazine (RANEXA) 500 mg 12 hr tablet, Take 1 tablet (500 mg total) by mouth every 12 (twelve) hours, Disp: 60 tablet, Rfl: 0    senna-docusate sodium (SENOKOT S) 8 6-50 mg per tablet, Take 2 tablets by mouth 2 (two) times a day (Patient taking differently: Take 1 tablet by mouth daily as needed ), Disp: 60 tablet, Rfl: 0    tamsulosin (FLOMAX) 0 4 mg, TAKE ONE CAPSULE BY MOUTH ONCE DAILY (Patient taking differently: TAKE ONE CAPSULE BY MOUTH ONCE hs), Disp: 90 capsule, Rfl: 3    torsemide (DEMADEX) 20 mg tablet, Take 1 tablet (20 mg total) by mouth daily, Disp: 60 tablet, Rfl: 0    TRADJENTA 5 MG TABS, TAKE 1 TABLET (5 MG) BY MOUTH DAILY, Disp: 90 tablet, Rfl: 3    warfarin (COUMADIN) 3 mg tablet, Take 1 tab (3mg) Monday and Thursday, Take 0 5 tab (1 5mg) Sun, Tue, Wed, Fri, Sat; Check PT/INR, Disp: 60 tablet, Rfl: 0    Past Medical History:   Diagnosis Date    3-vessel coronary artery disease     last assessed: 08/15/2016    BPH (benign prostatic hyperplasia)     Chronic kidney disease     [facility] [address] [facility]     Hyperlipidemia     Hypertension     Shingles 04/29/2019    SOB (shortness of breath)     Stroke [facility])      Past Surgical History:   Procedure Laterality Date    CARDIAC PACEMAKER PLACEMENT      CATARACT EXTRACTION      last assessed: 11/11/2015    CORONARY ARTERY BYPASS GRAFT      last assessed: 08/26/2015     Family History   Problem Relation Age of Onset    Heart disease Mother     Cancer Father         stomach    No Known Problems Sister     No Known Problems Brother     No Known Problems Brother       reports that he has never smoked  He has never used smokeless tobacco  He reports that he drank alcohol  He reports that he does not use drugs  Physical Exam:   Vitals:    09/26/19 1604 09/26/19 1620   BP:  140/76   BP Location:  Right arm   Patient Position:  Sitting   Cuff Size:  Standard   Pulse:  68   Weight: 75 1 kg (165 lb 9 6 oz)    Height: 5' 5" (1 651 m)      Body mass index is 27 56 kg/m²  General: NAD  Neuro: AAO  Neck: supple  Skin: no rash  Cardiac: RRR  Lungs: CTAB  Abdomen: soft nt nd  Extremities: mild LE edema L>R    Procedure:  No results found for this or any previous visit  Labs reviewed      Lab Results   Component Value Date GLUCOSE 161 (H) 01/05/2016    CALCIUM 9 0 09/25/2019     01/05/2016    K 4 2 09/25/2019    CO2 26 09/25/2019     09/25/2019    BUN 50 (H) 09/25/2019    CREATININE 3 90 (H) 09/25/2019       Results from last 7 days   Lab Units 09/25/19  0832   WBC Thousand/uL 9 15   HEMOGLOBIN g/dL 10 7*   HEMATOCRIT % 35 0*   PLATELETS Thousands/uL 224   POTASSIUM mmol/L 4 2   CHLORIDE mmol/L 106   CO2 mmol/L 26   BUN mg/dL 50*   CREATININE mg/dL 3 90*   CALCIUM mg/dL 9 0

## 2019-10-01 ENCOUNTER — OFFICE VISIT (OUTPATIENT)
Dept: UROLOGY | Facility: CLINIC | Age: 84
End: 2019-10-01
Payer: MEDICARE

## 2019-10-01 VITALS
BODY MASS INDEX: 27.49 KG/M2 | DIASTOLIC BLOOD PRESSURE: 64 MMHG | HEART RATE: 57 BPM | WEIGHT: 165 LBS | SYSTOLIC BLOOD PRESSURE: 138 MMHG | HEIGHT: 65 IN

## 2019-10-01 DIAGNOSIS — N40.1 BENIGN PROSTATIC HYPERPLASIA WITH URINARY HESITANCY: ICD-10-CM

## 2019-10-01 DIAGNOSIS — N17.9 AKI (ACUTE KIDNEY INJURY) (HCC): ICD-10-CM

## 2019-10-01 DIAGNOSIS — N39.0 URINARY TRACT INFECTION WITHOUT HEMATURIA, SITE UNSPECIFIED: Primary | ICD-10-CM

## 2019-10-01 DIAGNOSIS — R39.11 BENIGN PROSTATIC HYPERPLASIA WITH URINARY HESITANCY: ICD-10-CM

## 2019-10-01 LAB
POST-VOID RESIDUAL VOLUME, ML POC: 47 ML
SL AMB  POCT GLUCOSE, UA: NORMAL
SL AMB LEUKOCYTE ESTERASE,UA: NORMAL
SL AMB POCT BILIRUBIN,UA: NORMAL
SL AMB POCT BLOOD,UA: NORMAL
SL AMB POCT CLARITY,UA: CLEAR
SL AMB POCT COLOR,UA: YELLOW
SL AMB POCT KETONES,UA: NORMAL
SL AMB POCT NITRITE,UA: NORMAL
SL AMB POCT PH,UA: 5
SL AMB POCT SPECIFIC GRAVITY,UA: 1.01
SL AMB POCT URINE PROTEIN: NORMAL
SL AMB POCT UROBILINOGEN: NORMAL

## 2019-10-01 PROCEDURE — 81002 URINALYSIS NONAUTO W/O SCOPE: CPT | Performed by: UROLOGY

## 2019-10-01 PROCEDURE — 51798 US URINE CAPACITY MEASURE: CPT | Performed by: UROLOGY

## 2019-10-01 PROCEDURE — 99213 OFFICE O/P EST LOW 20 MIN: CPT | Performed by: UROLOGY

## 2019-10-01 RX ORDER — FINASTERIDE 5 MG/1
5 TABLET, FILM COATED ORAL DAILY
Qty: 90 TABLET | Refills: 3 | Status: SHIPPED | OUTPATIENT
Start: 2019-10-01 | End: 2020-09-08

## 2019-10-01 NOTE — PROGRESS NOTES
Problem List Items Addressed This Visit        Genitourinary    UTI (urinary tract infection) - Primary    Relevant Orders    POCT urine dip (Completed)    Benign prostatic hyperplasia with urinary hesitancy    Relevant Medications    finasteride (PROSCAR) 5 mg tablet    Other Relevant Orders    POCT Measure PVR (Completed)      Other Visit Diagnoses     MAGUI (acute kidney injury) ([facility] [address] )        Relevant Medications    finasteride (PROSCAR) 5 mg tablet          Discussion:  Amy Cotter is doing quite well  He has not had issues with recurrent infections, he is emptying his bladder well, PVR indicates complete bladder emptying as well  No issues with dual medical therapy, he is taking this faithfully, I have given him a refill for his finasteride today  He does not need refills on his tamsulosin  He does have some leukocytes on his urinalysis, in the absence of symptoms I am not concerned about this potential asymptomatic bacteriuria, however  He will see us back in 1 year if necessary, if he is doing well at that time he can follow with his primary care provider going forward  Assessment and plan:     Please see problem oriented charting for the assessment plan of today's urological complaints      Pallavi Santiago MD      Chief Complaint     Chief Complaint   Patient presents with    HX recurrent UTI's    Benign Prostatic Hypertrophy         History of Present Illness     Jeanette Borja is a 80 y o  gentleman that has been seen by our service previously while as an inpatient for recurrent UTI  He also had significant stool burden within the colon at that time  In terms of other complaints today he has none, no fevers, no bladder pain, no signs of systemic illness  He is emptying his bladder completely as per PVR documented below  He looks great  Continues on dual medical therapy which he feels is working  I do recommend that this be a long-term, maintenance medication for him      He does have leukocytes on his urine today, I am not concerned about these in the absence of symptoms  We did discuss intermittent catheterization which may become necessary in the future should he go into retention, versus indwelling catheter, I am hopeful that we can avoid both of these options       The following portions of the patient's history were reviewed and updated as appropriate: allergies, current medications, past family history, past medical history, past social history, past surgical history and problem list     Detailed Urologic History     - please refer to HPI    Review of Systems     Review of Systems   Constitutional: Negative  HENT: Negative  Eyes: Negative  Respiratory: Negative  Cardiovascular: Negative  Gastrointestinal: Negative  Endocrine: Negative  Genitourinary: Positive for decreased urine volume and difficulty urinating  Musculoskeletal: Negative  Skin: Negative  Allergic/Immunologic: Negative  Neurological: Negative  Hematological: Negative  Psychiatric/Behavioral: Negative  PVR is 47 milliliters indicating complete bladder emptying      Allergies     No Known Allergies    Physical Exam     Physical Exam   Constitutional: He is oriented to person, place, and time  He appears well-developed and well-nourished  No distress  HENT:   Head: Normocephalic and atraumatic  Eyes: Pupils are equal, round, and reactive to light  EOM are normal  Right eye exhibits no discharge  Left eye exhibits no discharge  Neck: No tracheal deviation present  Cardiovascular: Intact distal pulses  Pulmonary/Chest: Effort normal  No stridor  No respiratory distress  Abdominal: Soft  He exhibits no distension and no mass  There is no tenderness  There is no rebound and no guarding  No hernia  Musculoskeletal: He exhibits no edema, tenderness or deformity     The patient walks with a walker, slow gait   Neurological: He is alert and oriented to person, place, and time  No cranial nerve deficit  Coordination normal    Skin: Skin is warm and dry  No rash noted  He is not diaphoretic  No erythema  No pallor  Psychiatric: He has a normal mood and affect  His behavior is normal  Judgment and thought content normal    Nursing note and vitals reviewed            Vital Signs  Vitals:    10/01/19 1037   BP: 138/64   BP Location: Left arm   Patient Position: Sitting   Cuff Size: Adult   Pulse: 57   Weight: 74 8 kg (165 lb)   Height: 5' 5" (1 651 m)         Current Medications       Current Outpatient Medications:     acetaminophen (TYLENOL) 325 mg tablet, Take 2 tablets (650 mg total) by mouth 3 (three) times a day as needed for mild pain or moderate pain, Disp: 30 tablet, Rfl: 0    aspirin (ECOTRIN LOW STRENGTH) 81 mg EC tablet, Take 1 tablet (81 mg total) by mouth daily, Disp: 30 tablet, Rfl: 0    atorvastatin (LIPITOR) 40 mg tablet, TAKE 1 TABLET BY MOUTH EVERY DAY (Patient taking differently: TAKE 1 TABLET BY MOUTH EVERY EVENING), Disp: 90 tablet, Rfl: 3    cholecalciferol 2000 units TABS, Take 1 tablet (2,000 Units total) by mouth daily, Disp: 60 tablet, Rfl: 0    finasteride (PROSCAR) 5 mg tablet, Take 1 tablet (5 mg total) by mouth daily, Disp: 90 tablet, Rfl: 3    glucose blood test strip, 1 each by Other route daily, Disp: 100 each, Rfl: 1    iron polysaccharides (FERREX) 150 mg capsule, Take 1 capsule (150 mg total) by mouth daily, Disp: 30 capsule, Rfl: 0    levothyroxine 75 mcg tablet, TAKE 1 TABLET BY MOUTH EVERY DAY, Disp: 90 tablet, Rfl: 3    metoprolol tartrate (LOPRESSOR) 25 mg tablet, Take 1 tablet (25 mg total) by mouth 2 (two) times a day with meals, Disp: 60 tablet, Rfl: 0    ranolazine (RANEXA) 500 mg 12 hr tablet, Take 1 tablet (500 mg total) by mouth every 12 (twelve) hours, Disp: 60 tablet, Rfl: 0    senna-docusate sodium (SENOKOT S) 8 6-50 mg per tablet, Take 2 tablets by mouth 2 (two) times a day (Patient taking differently: Take 1 tablet by mouth daily as needed ), Disp: 60 tablet, Rfl: 0    tamsulosin (FLOMAX) 0 4 mg, TAKE ONE CAPSULE BY MOUTH ONCE DAILY (Patient taking differently: TAKE ONE CAPSULE BY MOUTH ONCE hs), Disp: 90 capsule, Rfl: 3    torsemide (DEMADEX) 20 mg tablet, Take 1 tablet (20 mg total) by mouth daily, Disp: 60 tablet, Rfl: 0    TRADJENTA 5 MG TABS, TAKE 1 TABLET (5 MG) BY MOUTH DAILY, Disp: 90 tablet, Rfl: 3    warfarin (COUMADIN) 3 mg tablet, Take 1 tab (3mg) Monday and Thursday, Take 0 5 tab (1 5mg) Sun, Tue, Wed, Fri, Sat; Check PT/INR, Disp: 60 tablet, Rfl: 0      Active Problems     Patient Active Problem List   Diagnosis    3-vessel coronary artery disease    Asbestosis ([facility] [address] )    Kidney disease with fluid retention    Diabetes mellitus with multiple complications (HCC)    Elevated serum alkaline phosphatase level    Hyperlipidemia    Benign hypertension with CKD (chronic kidney disease) stage IV (HCC)    Hypothyroidism    Paroxysmal A-fib (HCC)    Seasonal allergies    Increased frequency of urination    Frozen shoulder    Depressed mood    Chest pain    Ambulatory dysfunction/generalized weakness    Leg edema, left    Shortness of breath    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    UTI (urinary tract infection)    Tibial artery occlusion, left (HCC)    Hyponatremia    Chronic diastolic heart failure (HCC)    Benign prostatic hyperplasia with urinary hesitancy    Abnormal chest x-ray    Warfarin-induced coagulopathy (HCC)    Impaired mobility and ADLs    Anticoagulated on warfarin    History of recurrent UTIs    Buttock wound    Presence of permanent cardiac pacemaker    Pain    Anemia    Healthcare maintenance    Cognitive impairment    Vitamin D insufficiency    CKD (chronic kidney disease) stage 4, GFR 15-29 ml/min (HCC)    History of constipation         Past Medical History     Past Medical History:   Diagnosis Date    3-vessel coronary artery disease     last assessed: 08/15/2016    BPH (benign prostatic hyperplasia)     Chronic kidney disease     Diabetes mellitus ([address] )     Hyperlipidemia     Hypertension     Shingles 04/29/2019    SOB (shortness of breath)     Stroke [facility])          Surgical History     Past Surgical History:   Procedure Laterality Date    CARDIAC PACEMAKER PLACEMENT      CATARACT EXTRACTION      last assessed: 11/11/2015    CORONARY ARTERY BYPASS GRAFT      last assessed: 08/26/2015         Family History     Family History   Problem Relation Age of Onset    Heart disease Mother     Cancer Father         stomach    No Known Problems Sister     No Known Problems Brother     No Known Problems Brother          Social History     Social History     Social History     Tobacco Use   Smoking Status Never Smoker   Smokeless Tobacco Never Used         Pertinent Lab Values     Lab Results   Component Value Date    CREATININE 3 90 (H) 09/25/2019                 Pertinent Imaging      PVR imaging shows complete bladder emptying

## 2019-10-01 NOTE — PATIENT INSTRUCTIONS
Recurrent urinary tract infections can be due to bacterial persistence or bacterial reinfection or reintroduction  Causes of bacterial persistence include urolithiasis (stones within the urinary tract), the presence of foreign bodies, poor drainage of the urinary bladder (which can be due to diabetes, or an enlarged prostate), possibility of ureteral or caliceal obstruction, and the presence of diverticula (or outpouchings) along the urinary tract  Additionally, the contribution of possible fistula (an abnormal connection between 2 organs such as the colon and the bladder) to the urinary tract, vesicoureteral reflux (movement of urine back toward the kidney during urination), immunosuppression, diabetes mellitus, and sexual activity to recurrent urinary tract infections is a possibility  Spermicide use, diaphragm use, cystocele (a following bladder) and pelvic organ prolapse, and vaginal mucosal atrophy (shrinkage of the vaginal tissue) may have a role in recurrent urinary tract infection  Work-up (the group of tests needed to figure out what is causing recurrent infections) includes urine culture and sensitivity, determination of postvoid residual urine volume, and cystoscopy (a small camera in the bladder) and upper tract imaging (CT urogram or renal bladder ultrasound)  This helps us to look for anatomic abnormalities that may contribute to recurrent or persistent urinary tract infections      Behavioral modification techniques for maximum bladder function and drainage include optimization of bowel function and the avoidance of constipation, use of cranberry extract tablets or 100% cranberry juice intake to prevent bacterial adherence symptomatic UTI, the use of probiotics (taking in healthy bacteria) in the form of intake of saprophytic bacterial strains (Activia yogurt, kombucha, and probiotic supplements in pill form), and timed and double voiding (emptying her bladder every 2-3 hours even if you do not feel like you need to urinate, and sitting down again 10-15 minutes after urinating and trying to empty your bladder further)  Management options for recurrent urinary tract infections include self start antibiotic therapy, postcoital prophylactic antibiotic administration (taking antibiotics after sex), daily suppressive antibiotic therapy, and the use of methenamine hippurate to decrease symptomatic urinary tract infection (this last medication is turned in to formaldehyde by the kidney tissue and makes the bladder less hospitable to bacteria)  These medications may interact with other medications, there is a risk of interaction with birth control such that it is less effective, there is the risk of polypharmacy, and there is a risk of other unforseeable side effects  Surgery may need to be performed if stones or a large prostate are encountered  In older female patients with or without vaginal pain and shrinkage of the vaginal tissues the potential use of hormone cream in the form of Estrace or Premarin cream may improve local blood flow and immune function to the vaginal tissues

## 2019-10-03 ENCOUNTER — TELEPHONE (OUTPATIENT)
Dept: FAMILY MEDICINE CLINIC | Facility: CLINIC | Age: 84
End: 2019-10-03

## 2019-10-03 DIAGNOSIS — I25.10 3-VESSEL CORONARY ARTERY DISEASE: ICD-10-CM

## 2019-10-03 DIAGNOSIS — R07.9 CHEST PAIN: ICD-10-CM

## 2019-10-03 RX ORDER — RANOLAZINE 500 MG/1
500 TABLET, EXTENDED RELEASE ORAL EVERY 12 HOURS SCHEDULED
Qty: 60 TABLET | Refills: 0 | Status: SHIPPED | OUTPATIENT
Start: 2019-10-03 | End: 2019-10-04

## 2019-10-03 NOTE — TELEPHONE ENCOUNTER
He was in rehab they gave him   Ranolazine  mg  1tab every 12hrs   [facility] [place]     She needs them today

## 2019-10-04 DIAGNOSIS — I25.10 3-VESSEL CORONARY ARTERY DISEASE: ICD-10-CM

## 2019-10-04 DIAGNOSIS — R07.9 CHEST PAIN: ICD-10-CM

## 2019-10-04 RX ORDER — RANOLAZINE 500 MG/1
500 TABLET, EXTENDED RELEASE ORAL EVERY 12 HOURS SCHEDULED
Qty: 60 TABLET | Refills: 0 | Status: SHIPPED | OUTPATIENT
Start: 2019-10-04 | End: 2019-10-30 | Stop reason: SDUPTHER

## 2019-10-09 ENCOUNTER — APPOINTMENT (OUTPATIENT)
Dept: LAB | Age: 84
End: 2019-10-09
Payer: MEDICARE

## 2019-10-09 DIAGNOSIS — I48.0 PAROXYSMAL A-FIB (HCC): Primary | ICD-10-CM

## 2019-10-09 RX ORDER — WARFARIN SODIUM 2 MG/1
TABLET ORAL
Qty: 30 TABLET | Refills: 5 | Status: SHIPPED | OUTPATIENT
Start: 2019-10-09 | End: 2020-03-23

## 2019-10-10 ENCOUNTER — ANTICOAG VISIT (OUTPATIENT)
Dept: FAMILY MEDICINE CLINIC | Facility: CLINIC | Age: 84
End: 2019-10-10

## 2019-10-15 ENCOUNTER — TELEPHONE (OUTPATIENT)
Dept: FAMILY MEDICINE CLINIC | Facility: CLINIC | Age: 84
End: 2019-10-15

## 2019-10-15 NOTE — TELEPHONE ENCOUNTER
Marítn Eastman from [facility]  Called, Pt had a 4 lb Wt gain in 1 day he has foot swelling and  a +2 edema ,no SOB or any other symptoms ,he is currently on Torsemide 20 mg ,but the wife states this is an ongoing issue ,and his WT fluctuates from 159-164 because they are going into the home they need to report , the wife is good at elevating his feet as much as possible and weighing at the same time daily w/o clothing

## 2019-10-16 ENCOUNTER — TELEPHONE (OUTPATIENT)
Dept: NEUROLOGY | Facility: CLINIC | Age: 84
End: 2019-10-16

## 2019-10-16 ENCOUNTER — OFFICE VISIT (OUTPATIENT)
Dept: NEUROLOGY | Facility: CLINIC | Age: 84
End: 2019-10-16
Payer: MEDICARE

## 2019-10-16 VITALS
HEART RATE: 88 BPM | BODY MASS INDEX: 28.16 KG/M2 | SYSTOLIC BLOOD PRESSURE: 137 MMHG | WEIGHT: 169 LBS | DIASTOLIC BLOOD PRESSURE: 68 MMHG | HEIGHT: 65 IN

## 2019-10-16 DIAGNOSIS — G89.29 CHRONIC RIGHT-SIDED LOW BACK PAIN WITH RIGHT-SIDED SCIATICA: ICD-10-CM

## 2019-10-16 DIAGNOSIS — M54.41 CHRONIC RIGHT-SIDED LOW BACK PAIN WITH RIGHT-SIDED SCIATICA: ICD-10-CM

## 2019-10-16 DIAGNOSIS — I48.0 PAROXYSMAL A-FIB (HCC): ICD-10-CM

## 2019-10-16 DIAGNOSIS — I25.10 3-VESSEL CORONARY ARTERY DISEASE: Primary | ICD-10-CM

## 2019-10-16 DIAGNOSIS — I50.32 CHRONIC DIASTOLIC HEART FAILURE (HCC): ICD-10-CM

## 2019-10-16 DIAGNOSIS — M75.01 ADHESIVE CAPSULITIS OF RIGHT SHOULDER: ICD-10-CM

## 2019-10-16 PROCEDURE — 99215 OFFICE O/P EST HI 40 MIN: CPT | Performed by: PHYSICAL MEDICINE & REHABILITATION

## 2019-10-16 RX ORDER — LIDOCAINE 50 MG/G
1 PATCH TOPICAL DAILY
Qty: 30 PATCH | Refills: 0 | Status: SHIPPED | OUTPATIENT
Start: 2019-10-16 | End: 2020-07-15 | Stop reason: ALTCHOICE

## 2019-10-16 NOTE — TELEPHONE ENCOUNTER
Pt wife called his weight yest w/o clothes was 165 8  Yesterday afternoon with clothes 166 2  Today w/o clothes 165 2

## 2019-10-16 NOTE — PATIENT INSTRUCTIONS
We have discussed follow up appointment with Dr Dwayne Billy, at the [facility] [place] [facility]    Please have your physical therapist show you shoulder stretches and exercises, for frozen shoulder    I have ordered lidocaine patches to your pharmacy for your shoulder pain    Please call your nephrologist as you have gained more than 4 pounds in the last few days, to adjust diuretics

## 2019-10-16 NOTE — PROGRESS NOTES
Physical Medicine & Rehabilitation New Patient Evaluation  Brenton Rodrigez 80 y o  male      ASSESSMENT/PLAN:   Debility following volume overload, CHF, CKD  - continue home PT/OT  - using walker at baseline  - previously had bilateral AFOs, but at recommendation of therapist stopped using; no significant dorsiflexion weakness noted on exam today     Adhesive capsulitis, bilateral shoulders  - no imaging on file, consider XR shoulders to assess for arthritis if no improvement with PT  - follow up in 4 weeks to assess progress, consider corticosteroid injection if no improvement   - lidocaine patches prescribed  - avoid NSAIDs in setting of CKD; ok to use tylenol    Cognitive impairment - CTH suggestive of old infarction affecting right hemisphere   - [place] in June 2019 with encephalomalacia in right frontal and parietal lobe, chronic microangiopathic changes in both cerebral hemispheres    Low back pain   - s/p left L2-3 microdiscectomy July 2019    Afib   - on coumadin     CHF  - on torsemide, management per renal and cardiology   - review of weight log reveals 8lb weight gain in last few days; advised to call nephrology to adjust diuretics    CAD  - continue statin  - continue ASA    Patient prefers to see PM&R at [facility], will refer to Dr Madeline Mccauley for follow up     *I have spent 45 minutes with Patient and family today in which greater than 50% of this time was spent in counseling/coordination of care regarding Risks and benefits of tx options, Intructions for management, Risk factor reductions and Impressions  SUBJECTIVE:  Patient presents today in the office with chief complaint of hospital follow up    HPI:   Brenton Rodrigez 80 y o  male, who  has a past medical history of 3-vessel coronary artery disease, BPH (benign prostatic hyperplasia), Chronic kidney disease, Diabetes mellitus ([address] ), Hyperlipidemia, Hypertension, Shingles (04/29/2019), SOB (shortness of breath), and Stroke ([facility] [address] )   Old records were reviewed personally  Ren Glaser was admitted to [facility] from 8/27/19 - 9/14/19  He has history of CHF, CKD, afib, CAD s/p CABG, DM2, hypothyroidism, lumbar stenosis  He was initially admitted to hospital for progressive LE edema, found to have CHF exacerbation and acute on chronic kidney injury  He was then evaluated by therapies and found below functional baseline  On discharge from [facility], functional status was min assist for transfers and ambulation; mod A for bathing, min A UB dressing, total assist LB dressing  Today, he endorses weight gain in last few days of 8lbs  He was seen by home health nurse, who called his PCP  His diuretic was increased yesterday evening  I discussed with patient and family and recommended reaching out to nephrologist, per recommendations on discharge from hospital      He has been seeing home PT/OT 2-3x weekly, and reports that he is getting stronger  He uses walker for ambulation without reported falls  He does endorse bilateral shoulder pain, right 8/10 and left 3/10, with limited ROM  This is a chronic issue and he reports history of shoulder arthritis  Imaging: I personally reviewed pertinent imaging    ROS:   No fever, chills, chest pain, SOB, cough, nausea, vomiting, diarrhea, constipation, abdominal pain, dysuria, bowel/bladder incontinence, new weakness or changes in sensation  +bilateral shoulder pain  Complete ROS obtained and otherwise negative       OBJECTIVE:   /68 (BP Location: Left arm, Patient Position: Sitting, Cuff Size: Large)   Pulse 88   Ht 5' 5" (1 651 m)   Wt 76 7 kg (169 lb)   BMI 28 12 kg/m²      GEN: NAD, sitting comfortably in chair  Head: NCAT, no gross lesions  Eyes: PERRL, EOMI  Throat: clear, no thrush, MMM  Pulm: CTAB, no rales/wheezes  CV: RRR, normal s1/s2  Abd: soft, NTND  Ext: 3+ pitting edema to bilateral shins   Psych: normal affect, no agitation  Skin: no observable rashes  Neuro:  A+Ox3, fluent speech, follows commands    Motor Exam:   Right Left Site Right Left Site   * * S Ab:  Shoulder Abductors 5- 5 HF:  Hip Flexors   4 4 EF:  Elbow Flexors 5 5 KE:  Knee Extensors   5- 5 EE:  Elbow Extensors 4 5 DR:  Dorsi Flexors   5 5 WE:  Wrist Extensors 4 4 EHL: Ext Galvan Longus   5 5 FF:  Finger Flexors 5 5 PF:  Plantar Flexors   5 5 HI:  Hand Intrinsics      *limited ROM     Right shoulder passive ROM to 90 degrees, left to 120  Mild TTP right long head biceps, AC joint      Labs:   Lab Results   Component Value Date    WBC 9 15 09/25/2019    HGB 10 7 (L) 09/25/2019    HCT 35 0 (L) 09/25/2019     (H) 09/25/2019     09/25/2019     Lab Results   Component Value Date    GLUCOSE 161 (H) 01/05/2016    CALCIUM 9 0 09/25/2019     01/05/2016    K 4 2 09/25/2019    CO2 26 09/25/2019     09/25/2019    BUN 50 (H) 09/25/2019    CREATININE 3 90 (H) 09/25/2019         Past Medical History:   Diagnosis Date    3-vessel coronary artery disease     last assessed: 08/15/2016    BPH (benign prostatic hyperplasia)     Chronic kidney disease     Diabetes mellitus (HCC)     Hyperlipidemia     Hypertension     Shingles 04/29/2019    SOB (shortness of breath)     Stroke [facility])        Patient Active Problem List    Diagnosis Date Noted    History of constipation 09/04/2019    CKD (chronic kidney disease) stage 4, GFR 15-29 ml/min ([facility]) 09/02/2019    Cognitive impairment 08/29/2019    Vitamin D insufficiency 08/29/2019    Anticoagulated on warfarin 08/28/2019    History of recurrent UTIs 08/28/2019    Buttock wound 08/28/2019    Presence of permanent cardiac pacemaker 08/28/2019    Pain 08/28/2019    Anemia 08/28/2019    Healthcare maintenance 08/28/2019    Impaired mobility and ADLs 08/27/2019    Warfarin-induced coagulopathy ([facility] [address] ) 08/26/2019    Benign prostatic hyperplasia with urinary hesitancy 08/22/2019    Abnormal chest x-ray 08/22/2019    Hyponatremia 07/04/2019    Chronic diastolic heart failure ([place] [address] ) 07/04/2019    Leg edema, left 06/13/2019    Shortness of breath 06/13/2019    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 06/13/2019    UTI (urinary tract infection) 06/13/2019    Tibial artery occlusion, left (HCC) 06/13/2019    Chest pain 06/12/2019    Ambulatory dysfunction/generalized weakness 06/12/2019    Depressed mood 04/29/2019    Frozen shoulder 05/30/2018    Paroxysmal A-fib ([place] [address] ) 04/13/2016    Kidney disease with fluid retention 01/12/2016    Hyperlipidemia 11/11/2015    Elevated serum alkaline phosphatase level 10/20/2015    Increased frequency of urination 10/20/2015    3-vessel coronary artery disease 08/26/2015    Diabetes mellitus with multiple complications ([place] [address] ) 36/03/8427    Hypothyroidism 08/26/2015    Asbestosis ([place] [address] ) 05/06/2014    Benign hypertension with CKD (chronic kidney disease) stage IV ([place] [address] ) 05/06/2014    Seasonal allergies 05/06/2014       Past Surgical History:   Procedure Laterality Date    CARDIAC PACEMAKER PLACEMENT      CATARACT EXTRACTION      last assessed: 11/11/2015    CORONARY ARTERY BYPASS GRAFT      last assessed: 08/26/2015       Family History   Problem Relation Age of Onset    Heart disease Mother     Cancer Father         stomach    No Known Problems Sister     No Known Problems Brother     No Known Problems Brother        Social History   No current alcohol/tobacco    No Known Allergies      Current Outpatient Medications:     acetaminophen (TYLENOL) 325 mg tablet, Take 2 tablets (650 mg total) by mouth 3 (three) times a day as needed for mild pain or moderate pain, Disp: 30 tablet, Rfl: 0    aspirin (ECOTRIN LOW STRENGTH) 81 mg EC tablet, Take 1 tablet (81 mg total) by mouth daily, Disp: 30 tablet, Rfl: 0    atorvastatin (LIPITOR) 40 mg tablet, TAKE 1 TABLET BY MOUTH EVERY DAY (Patient taking differently: TAKE 1 TABLET BY MOUTH EVERY EVENING), Disp: 90 tablet, Rfl: 3    cholecalciferol 2000 units TABS, Take 1 tablet (2,000 Units total) by mouth daily, Disp: 60 tablet, Rfl: 0    finasteride (PROSCAR) 5 mg tablet, Take 1 tablet (5 mg total) by mouth daily, Disp: 90 tablet, Rfl: 3    glucose blood test strip, 1 each by Other route daily, Disp: 100 each, Rfl: 1    levothyroxine 75 mcg tablet, TAKE 1 TABLET BY MOUTH EVERY DAY, Disp: 90 tablet, Rfl: 3    metoprolol tartrate (LOPRESSOR) 25 mg tablet, Take 1 tablet (25 mg total) by mouth 2 (two) times a day with meals, Disp: 60 tablet, Rfl: 0    ranolazine (RANEXA) 500 mg 12 hr tablet, Take 1 tablet (500 mg total) by mouth every 12 (twelve) hours, Disp: 60 tablet, Rfl: 0    senna-docusate sodium (SENOKOT S) 8 6-50 mg per tablet, Take 2 tablets by mouth 2 (two) times a day (Patient taking differently: Take 1 tablet by mouth daily as needed ), Disp: 60 tablet, Rfl: 0    tamsulosin (FLOMAX) 0 4 mg, TAKE ONE CAPSULE BY MOUTH ONCE DAILY (Patient taking differently: TAKE ONE CAPSULE BY MOUTH ONCE hs), Disp: 90 capsule, Rfl: 3    torsemide (DEMADEX) 20 mg tablet, Take 1 tablet (20 mg total) by mouth daily, Disp: 60 tablet, Rfl: 0    TRADJENTA 5 MG TABS, TAKE 1 TABLET (5 MG) BY MOUTH DAILY, Disp: 90 tablet, Rfl: 3    warfarin (COUMADIN) 2 mg tablet, 1 tab po qd (Patient taking differently: daily 1 tab daily), Disp: 30 tablet, Rfl: 5    lidocaine (LIDODERM) 5 %, Apply 1 patch topically daily Remove & Discard patch within 12 hours or as directed by MD, Disp: 30 patch, Rfl: 0

## 2019-10-17 NOTE — TELEPHONE ENCOUNTER
Spoke with Pt's wife she is aware to monitor and yesterday they had a F/U appoint with Dr Miroslava Estes from [facility] and she told him what was going on and he said she should have notified his Nephrologist ,and she told him that you were the PCP and she didn't want to do anything without checking with you first ,butr I told her I didn't think you would mind letting them know what was going on as well

## 2019-10-17 NOTE — TELEPHONE ENCOUNTER
Patients wife called, would like to speak to only Julio C Walls regarding patient  Please call back today

## 2019-10-17 NOTE — TELEPHONE ENCOUNTER
Lidocaine denied as it is not prescribed for an FDA approved diagnosis  the requested drug must be used for a medically accepted condition in order to be covered      Please advise

## 2019-10-18 NOTE — TELEPHONE ENCOUNTER
What is the FDA approved diagnosis they are looking for? In the meantime, please advise patient to purchase Salonpas patches over the counter, which can be applied as needed

## 2019-10-18 NOTE — TELEPHONE ENCOUNTER
lidocaine usually is approved for diabetic neuropathy, post herpetic neuralgia and cancer pain  I spoke to pt wife and made her aware of below

## 2019-10-21 ENCOUNTER — TELEPHONE (OUTPATIENT)
Dept: NEPHROLOGY | Facility: CLINIC | Age: 84
End: 2019-10-21

## 2019-10-21 ENCOUNTER — TELEPHONE (OUTPATIENT)
Dept: FAMILY MEDICINE CLINIC | Facility: CLINIC | Age: 84
End: 2019-10-21

## 2019-10-21 NOTE — TELEPHONE ENCOUNTER
Yes on the compression stocking   Can try genweric OTC, low pressure stokcing (knee high)  Yes can wait until next week for flu shot

## 2019-10-21 NOTE — TELEPHONE ENCOUNTER
Patient's wife wanted to inform you of his weights  Please advise if any changes    10/18/19 161 8  10/19/19 161 8  10/20/19 163 2  10/21/19 163 0      Babatunde Lane MA

## 2019-10-21 NOTE — TELEPHONE ENCOUNTER
Weights:  10/18  161 8   10/19  161 8   10/20  163 2  10/21  163 0      He is feeling fine  Question:  (1) Should he continue to wear compression stockings ? If he does, what is the proper size compression he should be wearing  (2)  Can he wait until next week to get Flu shot with you ?

## 2019-10-23 ENCOUNTER — APPOINTMENT (OUTPATIENT)
Dept: LAB | Age: 84
End: 2019-10-23
Payer: MEDICARE

## 2019-10-23 DIAGNOSIS — N18.4 CKD (CHRONIC KIDNEY DISEASE) STAGE 4, GFR 15-29 ML/MIN (HCC): ICD-10-CM

## 2019-10-23 LAB
ANION GAP SERPL CALCULATED.3IONS-SCNC: 7 MMOL/L (ref 4–13)
BUN SERPL-MCNC: 58 MG/DL (ref 5–25)
CALCIUM SERPL-MCNC: 9 MG/DL (ref 8.3–10.1)
CHLORIDE SERPL-SCNC: 108 MMOL/L (ref 100–108)
CO2 SERPL-SCNC: 27 MMOL/L (ref 21–32)
CREAT SERPL-MCNC: 3.87 MG/DL (ref 0.6–1.3)
GFR SERPL CREATININE-BSD FRML MDRD: 13 ML/MIN/1.73SQ M
GLUCOSE P FAST SERPL-MCNC: 128 MG/DL (ref 65–99)
POTASSIUM SERPL-SCNC: 4.4 MMOL/L (ref 3.5–5.3)
SODIUM SERPL-SCNC: 142 MMOL/L (ref 136–145)

## 2019-10-23 PROCEDURE — 80048 BASIC METABOLIC PNL TOTAL CA: CPT

## 2019-10-24 ENCOUNTER — OFFICE VISIT (OUTPATIENT)
Dept: CARDIOLOGY CLINIC | Facility: CLINIC | Age: 84
End: 2019-10-24
Payer: MEDICARE

## 2019-10-24 ENCOUNTER — TELEPHONE (OUTPATIENT)
Dept: NEPHROLOGY | Facility: CLINIC | Age: 84
End: 2019-10-24

## 2019-10-24 VITALS
SYSTOLIC BLOOD PRESSURE: 122 MMHG | HEIGHT: 65 IN | DIASTOLIC BLOOD PRESSURE: 78 MMHG | HEART RATE: 80 BPM | BODY MASS INDEX: 27.06 KG/M2 | WEIGHT: 162.4 LBS

## 2019-10-24 DIAGNOSIS — I48.91 ATRIAL FIBRILLATION, UNSPECIFIED TYPE (HCC): Primary | ICD-10-CM

## 2019-10-24 PROCEDURE — 93000 ELECTROCARDIOGRAM COMPLETE: CPT | Performed by: PHYSICIAN ASSISTANT

## 2019-10-24 PROCEDURE — 99214 OFFICE O/P EST MOD 30 MIN: CPT | Performed by: PHYSICIAN ASSISTANT

## 2019-10-24 NOTE — TELEPHONE ENCOUNTER
Wife advised  ----- Message from [place], [place] sent at 10/23/2019  1:57 PM EDT -----  Please let patient know kidney function is stable

## 2019-10-24 NOTE — PROGRESS NOTES
Cardiology Follow Up    Zoraida Vital  1/29/1932  [id number]  HEART & VASCULAR [facility]  [address]        Assessment/Plan     1  Atrial fibrillation, unspecified type ([address] )  POCT ECG     1  Paroxsymal nonvalvular atrial fibrillation, asymptomatic    * [facility]/ coumadin with last INR being 2 4   * currently a paced    * continue current dose of medications    * last AF episode on device interrogations was August 2019 which was self limiting     2  T2DM   3  CKD IV    * Cr 3 87   * he is followed closely by Nephrology    4  CAD s/p CABG    * on BB    * on statin    * no ASA due to being on coumadin     5  S/p DC MDT PPM    * implanted in 2014    * a pacing     6  Sepsis 2/2 UTI from klebsiella bacteria    * stable    * ID saw inpatient and did not recommend device explant       History of Present Illness     HPI/INTERVAL HISTORY: Zoraida Vital is a 80 y o  male with a history of CAD s/p CABG x 3, HTN, paroxsymal nonvalvular atrial fibrillation AC w/ coumadin, s/p DC [employer] PPM (2014), T2DM, CKD IV, CVA x 2 who presents to the EP office for routine f/u  He normally follows with Dr Aure Sloan      9-2018:   Since his last visit he has no new concerns or complaints  He has been struggling with right shoulder discomfort but does not want to go to PT  He really wants to drive again since he has not since 2014  He is going to begin taking driving lessons with a private instructor  His only exercise is climbing the 13 stairs in his home multiple times per day which he can do without difficulty  10-24-19:  Since last office visit patient has been hospitalized on 4 separate occasions mainly relating to a UTI and mild acute on chronic diastolic CHF exacerbation  He was diagnosed with klebsiella bacteremia from a UTI which was treated with IV abx   He was recently discharged from inpatient rehab on 9-14-19 and has been doing well without any concerns or complaints  Review of Systems  ROS as noted above, otherwise 12 point review of systems was performed and is negative         Historical Information   Social History     Socioeconomic History    Marital status: /Civil Union     Spouse name: Not on file    Number of children: Not on file    Years of education: Not on file    Highest education level: Not on file   Occupational History    Not on file   Social Needs    Financial resource strain: Not on file    Food insecurity:     Worry: Not on file     Inability: Not on file    Transportation needs:     Medical: Not on file     Non-medical: Not on file   Tobacco Use    Smoking status: Never Smoker    Smokeless tobacco: Never Used   Substance and Sexual Activity    Alcohol use: Not Currently     Frequency: Never    Drug use: No    Sexual activity: Never   Lifestyle    Physical activity:     Days per week: Not on file     Minutes per session: Not on file    Stress: Not on file   Relationships    Social connections:     Talks on phone: Not on file     Gets together: Not on file     Attends Jew service: Not on file     Active member of club or organization: Not on file     Attends meetings of clubs or organizations: Not on file     Relationship status: Not on file    Intimate partner violence:     Fear of current or ex partner: Not on file     Emotionally abused: Not on file     Physically abused: Not on file     Forced sexual activity: Not on file   Other Topics Concern    Not on file   Social History Narrative    No advance directive     Past Medical History:   Diagnosis Date    3-vessel coronary artery disease     last assessed: 08/15/2016    BPH (benign prostatic hyperplasia)     Chronic kidney disease     Diabetes mellitus ([facility] )     Hyperlipidemia     Hypertension     Shingles 04/29/2019    SOB (shortness of breath)     Stroke [facility])      Past Surgical History:   Procedure Laterality Date    CARDIAC PACEMAKER PLACEMENT  CATARACT EXTRACTION      last assessed: 11/11/2015    CORONARY ARTERY BYPASS GRAFT      last assessed: 08/26/2015     Social History     Substance and Sexual Activity   Alcohol Use Not Currently    Frequency: Never     Social History     Substance and Sexual Activity   Drug Use No     Social History     Tobacco Use   Smoking Status Never Smoker   Smokeless Tobacco Never Used     Family History   Problem Relation Age of Onset    Heart disease Mother     Cancer Father         stomach    No Known Problems Sister     No Known Problems Brother     No Known Problems Brother        Meds/Allergies       Current Outpatient Medications:     acetaminophen (TYLENOL) 325 mg tablet, Take 2 tablets (650 mg total) by mouth 3 (three) times a day as needed for mild pain or moderate pain, Disp: 30 tablet, Rfl: 0    aspirin (ECOTRIN LOW STRENGTH) 81 mg EC tablet, Take 1 tablet (81 mg total) by mouth daily, Disp: 30 tablet, Rfl: 0    atorvastatin (LIPITOR) 40 mg tablet, TAKE 1 TABLET BY MOUTH EVERY DAY (Patient taking differently: TAKE 1 TABLET BY MOUTH EVERY EVENING), Disp: 90 tablet, Rfl: 3    cholecalciferol 2000 units TABS, Take 1 tablet (2,000 Units total) by mouth daily, Disp: 60 tablet, Rfl: 0    finasteride (PROSCAR) 5 mg tablet, Take 1 tablet (5 mg total) by mouth daily, Disp: 90 tablet, Rfl: 3    glucose blood test strip, 1 each by Other route daily, Disp: 100 each, Rfl: 1    levothyroxine 75 mcg tablet, TAKE 1 TABLET BY MOUTH EVERY DAY, Disp: 90 tablet, Rfl: 3    lidocaine (LIDODERM) 5 %, Apply 1 patch topically daily Remove & Discard patch within 12 hours or as directed by MD, Disp: 30 patch, Rfl: 0    metoprolol tartrate (LOPRESSOR) 25 mg tablet, Take 1 tablet (25 mg total) by mouth 2 (two) times a day with meals, Disp: 60 tablet, Rfl: 0    ranolazine (RANEXA) 500 mg 12 hr tablet, Take 1 tablet (500 mg total) by mouth every 12 (twelve) hours, Disp: 60 tablet, Rfl: 0    senna-docusate sodium (SENOKOT S) 8 6-50 mg per tablet, Take 2 tablets by mouth 2 (two) times a day (Patient taking differently: Take 1 tablet by mouth daily as needed ), Disp: 60 tablet, Rfl: 0    tamsulosin (FLOMAX) 0 4 mg, TAKE ONE CAPSULE BY MOUTH ONCE DAILY (Patient taking differently: TAKE ONE CAPSULE BY MOUTH ONCE hs), Disp: 90 capsule, Rfl: 3    torsemide (DEMADEX) 20 mg tablet, Take 1 tablet (20 mg total) by mouth daily, Disp: 60 tablet, Rfl: 0    TRADJENTA 5 MG TABS, TAKE 1 TABLET (5 MG) BY MOUTH DAILY, Disp: 90 tablet, Rfl: 3    warfarin (COUMADIN) 2 mg tablet, 1 tab po qd (Patient taking differently: daily 1 tab daily), Disp: 30 tablet, Rfl: 5    No Known Allergies    Objective   Vitals: Blood pressure 122/78, pulse 80, height 5' 5" (1 651 m), weight 73 7 kg (162 lb 6 4 oz)  Physical Exam   Constitutional: He is oriented to person, place, and time  He appears well-developed and well-nourished  HENT:   Head: Normocephalic and atraumatic  Eyes: Pupils are equal, round, and reactive to light  EOM are normal    Neck: Normal range of motion  Neck supple  Cardiovascular: Normal rate and regular rhythm  Murmur heard  Pulmonary/Chest: Effort normal and breath sounds normal    Abdominal: Soft  Bowel sounds are normal    Musculoskeletal: Normal range of motion  Neurological: He is alert and oriented to person, place, and time  Skin: Skin is warm and dry  Psychiatric: He has a normal mood and affect  Labs:not applicable    Imaging: I have personally reviewed pertinent reports  ECHO:   LEFT VENTRICLE:  Size was normal   Systolic function was normal  Ejection fraction was estimated to be 55 %  There was mild concentric hypertrophy  Doppler parameters were consistent with abnormal left ventricular relaxation (grade 1 diastolic dysfunction)      VENTRICULAR SEPTUM:  There was dyssynergic motion   These changes are consistent with a post-thoracotomy state      RIGHT VENTRICLE:  The size was normal   Systolic function was normal   A pacing wire was present      MITRAL VALVE:  There was mild regurgitation      TRICUSPID VALVE:  There was trace regurgitation      HISTORY: PRIOR HISTORY: Chest Pain; 3-vessel CAD; CKD; DM2; HTN; HLD; Pacemaker     PROCEDURE: The procedure was performed at the bedside  This was a routine study  The transthoracic approach was used  The study included complete 2D imaging, M-mode, complete spectral Doppler, and color Doppler  The heart rate was 84 bpm,  at the start of the study  Images were obtained from the parasternal, apical, subcostal, and suprasternal notch acoustic windows  Image quality was adequate      LEFT VENTRICLE: Size was normal  Systolic function was normal  Ejection fraction was estimated to be 55 %  There were no regional wall motion abnormalities  Wall thickness was mildly increased  There was mild concentric hypertrophy  DOPPLER: Doppler parameters were consistent with abnormal left ventricular relaxation (grade 1 diastolic dysfunction)      VENTRICULAR SEPTUM: There was dyssynergic motion  These changes are consistent with a post-thoracotomy state      RIGHT VENTRICLE: The size was normal  Systolic function was normal  Wall thickness was normal  A pacing wire was present      LEFT ATRIUM: Size was normal      RIGHT ATRIUM: Size was normal      MITRAL VALVE: Valve structure was normal  There was normal leaflet separation  DOPPLER: The transmitral velocity was within the normal range  There was no evidence for stenosis  There was mild regurgitation      AORTIC VALVE: The valve was trileaflet  Leaflets exhibited mildly increased thickness, mild calcification, mildly reduced cuspal separation, and sclerosis  DOPPLER: Transaortic velocity was within the normal range  There was no evidence  for stenosis  There was no regurgitation      TRICUSPID VALVE: The valve structure was normal  There was normal leaflet separation   DOPPLER: The transtricuspid velocity was within the normal range  There was no evidence for stenosis  There was trace regurgitation  The tricuspid jet  envelope definition was inadequate for estimation of RV systolic pressure  There are no indirect findings suggestive of moderate or severe pulmonary hypertension      PULMONIC VALVE: Leaflets exhibited normal thickness, no calcification, and normal cuspal separation  DOPPLER: The transpulmonic velocity was within the normal range  There was trace regurgitation      PERICARDIUM: There was no pericardial effusion  The pericardium was normal in appearance      AORTA: The root exhibited normal size      SYSTEMIC VEINS: IVC: The inferior vena cava was normal in size and course   Respirophasic changes were normal         CATH/STRESS TEST:   None     EKG:   A-paced rhythm

## 2019-10-29 ENCOUNTER — OFFICE VISIT (OUTPATIENT)
Dept: FAMILY MEDICINE CLINIC | Facility: CLINIC | Age: 84
End: 2019-10-29
Payer: MEDICARE

## 2019-10-29 VITALS
TEMPERATURE: 97.8 F | RESPIRATION RATE: 16 BRPM | DIASTOLIC BLOOD PRESSURE: 82 MMHG | BODY MASS INDEX: 27.82 KG/M2 | WEIGHT: 167 LBS | SYSTOLIC BLOOD PRESSURE: 124 MMHG | HEART RATE: 72 BPM | HEIGHT: 65 IN

## 2019-10-29 DIAGNOSIS — Z23 NEED FOR INFLUENZA VACCINATION: ICD-10-CM

## 2019-10-29 DIAGNOSIS — I50.32 CHRONIC DIASTOLIC HEART FAILURE (HCC): ICD-10-CM

## 2019-10-29 DIAGNOSIS — F32.4 MAJOR DEPRESSIVE DISORDER WITH SINGLE EPISODE, IN PARTIAL REMISSION (HCC): ICD-10-CM

## 2019-10-29 DIAGNOSIS — E11.22 TYPE 2 DIABETES MELLITUS WITH STAGE 5 CHRONIC KIDNEY DISEASE NOT ON CHRONIC DIALYSIS, WITHOUT LONG-TERM CURRENT USE OF INSULIN (HCC): Primary | ICD-10-CM

## 2019-10-29 DIAGNOSIS — N18.5 TYPE 2 DIABETES MELLITUS WITH STAGE 5 CHRONIC KIDNEY DISEASE NOT ON CHRONIC DIALYSIS, WITHOUT LONG-TERM CURRENT USE OF INSULIN (HCC): Primary | ICD-10-CM

## 2019-10-29 DIAGNOSIS — N18.5 CKD (CHRONIC KIDNEY DISEASE), STAGE V (HCC): ICD-10-CM

## 2019-10-29 PROCEDURE — 90662 IIV NO PRSV INCREASED AG IM: CPT | Performed by: FAMILY MEDICINE

## 2019-10-29 PROCEDURE — 99214 OFFICE O/P EST MOD 30 MIN: CPT | Performed by: FAMILY MEDICINE

## 2019-10-29 PROCEDURE — G0008 ADMIN INFLUENZA VIRUS VAC: HCPCS | Performed by: FAMILY MEDICINE

## 2019-10-29 NOTE — PROGRESS NOTES
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 [facility])    Lab Results   Component Value Date    HGBA1C 5 5 08/22/2019     BGs have been stable  Creat stable on present meds  Continue present care  F/u with nephro re: renal failure  Recheck 2m    Chronic diastolic heart failure (HCC)  Wt Readings from Last 3 Encounters:   10/29/19 75 8 kg (167 lb)   10/24/19 73 7 kg (162 lb 6 4 oz)   10/16/19 76 7 kg (169 lb)       Weight has been stable  Pt slowly feeling stronger  Cont present treatment  Continue to monitor weight and diet  F/u with Cardio  Recheck 2m      CKD (chronic kidney disease) stage 5, GFR less than 15 ml/min ([facility])  GFR = 13 on recent labs but has been stable since discharge  Cont present care  F/u with Nephro  Recheck labs in 1m  F/u 2m - earlier if worse    Major depressive disorder with single episode, in partial remission ([facility] [address] )  Recent depressed mood after hospitalization improving  Pt and wife agree that he has improved  PHQ -2 now  = 1  Continue to monitor  Recheck 2m - earlier if mood worsens       Diagnoses and all orders for this visit:    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, without long-term current use of insulin ([facility])  -     Comprehensive metabolic panel; Future  -     Hemoglobin A1C; Future    CKD (chronic kidney disease), stage V ([facility])  -     Comprehensive metabolic panel; Future  -     Hemoglobin A1C; Future  -     CBC and differential; Future    Need for influenza vaccination  -     influenza vaccine, [id number], high-dose, PF 0 5 mL (FLUZONE HIGH-DOSE)    Chronic diastolic heart failure (HCC)    Major depressive disorder with single episode, in partial remission ([facility] [address] )          Subjective:      Patient ID: Lamar Medellin is a 80 y o  male  F/u multiple med issues  - pt has been doing well since last visit  - home weights reviewed  Ranging from 159-165    When pt was a little higher, pt received an extra 10mg of furosemide x1 with good effect  Now pt is back to 160  Breathing is stable  No CP, palp increased edema or other CV symptoms  - low back continues to improve s/p surgery  Has home PT and is compliant with exercises  No new symptoms  - no GI or  issues  - family feels that he has been a little depressed recently  Pt admits to recent depressed mood but notes that he has been improving as he has felt better  Denies suicidal though/ plan  - I reviewed recent labs with pt and wife            The following portions of the patient's history were reviewed and updated as appropriate:   He  has a past medical history of 3-vessel coronary artery disease, BPH (benign prostatic hyperplasia), Chronic kidney disease, Diabetes mellitus ([place] [address] ), Hyperlipidemia, Hypertension, Shingles (04/29/2019), SOB (shortness of breath), and Stroke ([place] [address] )    He   Patient Active Problem List    Diagnosis Date Noted    History of constipation 09/04/2019    CKD (chronic kidney disease) stage 5, GFR less than 15 ml/min ([facility]) 09/02/2019    Cognitive impairment 08/29/2019    Vitamin D insufficiency 08/29/2019    Anticoagulated on warfarin 08/28/2019    History of recurrent UTIs 08/28/2019    Buttock wound 08/28/2019    Presence of permanent cardiac pacemaker 08/28/2019    Pain 08/28/2019    Anemia 08/28/2019    Healthcare maintenance 08/28/2019    Impaired mobility and ADLs 08/27/2019    Warfarin-induced coagulopathy ([place] [address] ) 08/26/2019    Benign prostatic hyperplasia with urinary hesitancy 08/22/2019    Abnormal chest x-ray 08/22/2019    Hyponatremia 07/04/2019    Chronic diastolic heart failure ([facility] ) 07/04/2019    Leg edema, left 06/13/2019    Shortness of breath 06/13/2019    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 06/13/2019    UTI (urinary tract infection) 06/13/2019    Tibial artery occlusion, left ([facility] ) 06/13/2019    Chest pain 06/12/2019    Ambulatory dysfunction/generalized weakness 06/12/2019    Major depressive disorder with single episode, in partial remission ([place] [address] ) 04/29/2019    Frozen shoulder 05/30/2018    Paroxysmal A-fib ([place] [address] ) 04/13/2016    Kidney disease with fluid retention 01/12/2016    Hyperlipidemia 11/11/2015    Elevated serum alkaline phosphatase level 10/20/2015    Increased frequency of urination 10/20/2015    3-vessel coronary artery disease 08/26/2015    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 ([place] [address] ) 08/26/2015    Hypothyroidism 08/26/2015    Asbestosis ([place] [address] ) 05/06/2014    Benign hypertension with CKD (chronic kidney disease) stage IV (HCC) 05/06/2014    Seasonal allergies 05/06/2014     He  has a past surgical history that includes Coronary artery bypass graft; Cataract extraction; and Cardiac pacemaker placement  He  reports that he has never smoked  He has never used smokeless tobacco  He reports that he drank alcohol  He reports that he does not use drugs    Current Outpatient Medications   Medication Sig Dispense Refill    acetaminophen (TYLENOL) 325 mg tablet Take 2 tablets (650 mg total) by mouth 3 (three) times a day as needed for mild pain or moderate pain 30 tablet 0    aspirin (ECOTRIN LOW STRENGTH) 81 mg EC tablet Take 1 tablet (81 mg total) by mouth daily 30 tablet 0    cholecalciferol 2000 units TABS Take 1 tablet (2,000 Units total) by mouth daily 60 tablet 0    finasteride (PROSCAR) 5 mg tablet Take 1 tablet (5 mg total) by mouth daily 90 tablet 3    glucose blood test strip 1 each by Other route daily 100 each 1    levothyroxine 75 mcg tablet TAKE 1 TABLET BY MOUTH EVERY DAY 90 tablet 3    lidocaine (LIDODERM) 5 % Apply 1 patch topically daily Remove & Discard patch within 12 hours or as directed by MD 30 patch 0    metoprolol tartrate (LOPRESSOR) 25 mg tablet Take 1 tablet (25 mg total) by mouth 2 (two) times a day with meals 60 tablet 0    senna-docusate sodium (SENOKOT S) 8 6-50 mg per tablet Take 2 tablets by mouth 2 (two) times a day (Patient taking differently: Take 1 tablet by mouth daily as needed ) 60 tablet 0    tamsulosin (FLOMAX) 0 4 mg TAKE ONE CAPSULE BY MOUTH ONCE DAILY (Patient taking differently: TAKE ONE CAPSULE BY MOUTH ONCE hs) 90 capsule 3    torsemide (DEMADEX) 20 mg tablet Take 1 tablet (20 mg total) by mouth daily 60 tablet 0    TRADJENTA 5 MG TABS TAKE 1 TABLET (5 MG) BY MOUTH DAILY 90 tablet 3    warfarin (COUMADIN) 2 mg tablet 1 tab po qd (Patient taking differently: daily 1 tab daily) 30 tablet 5    atorvastatin (LIPITOR) 40 mg tablet TAKE 1 TABLET BY MOUTH EVERY DAY (Patient not taking: Reported on 10/29/2019) 90 tablet 3    ranolazine (RANEXA) 500 mg 12 hr tablet TAKE 1 TABLET BY MOUTH EVERY 12 HOURS 60 tablet 0     No current facility-administered medications for this visit  He has No Known Allergies       Review of Systems   Constitutional: Positive for fatigue (improving)  Negative for activity change, chills and fever  HENT: Negative  Eyes: Negative  Respiratory: Positive for shortness of breath (mild increase with exertion - improving)  Cardiovascular: Negative for leg swelling (improved)  Gastrointestinal: Negative  Endocrine: Negative  Genitourinary: Negative  Musculoskeletal: Positive for back pain (improving) and gait problem  Negative for joint swelling and myalgias  Skin: Negative  Neurological: Positive for weakness (improving)  Negative for dizziness, facial asymmetry, light-headedness and headaches  Hematological: Negative  Psychiatric/Behavioral: Negative for dysphoric mood (improved)  Objective:      /82   Pulse 72   Temp 97 8 °F (36 6 °C) (Tympanic)   Resp 16   Ht 5' 5" (1 651 m)   Wt 75 8 kg (167 lb)   BMI 27 79 kg/m²          Physical Exam   Constitutional: He is oriented to person, place, and time  He appears well-developed and well-nourished     Elderly male in NAD   HENT:   Head: Normocephalic and atraumatic  Right Ear: External ear normal    Left Ear: External ear normal    Nose: Nose normal    Mouth/Throat: Oropharynx is clear and moist  No oropharyngeal exudate  Eyes: Pupils are equal, round, and reactive to light  Conjunctivae and EOM are normal    Neck: Normal range of motion  Neck supple  No JVD present  Cardiovascular: Normal rate and intact distal pulses  Pulses:       Dorsalis pedis pulses are 1+ on the right side, and 1+ on the left side  Pulmonary/Chest: Effort normal and breath sounds normal  He has no wheezes  He has no rales  Abdominal: Soft  Bowel sounds are normal  He exhibits no distension and no mass  There is no tenderness  Musculoskeletal: He exhibits edema (trace bilat)  Right shoulder: He exhibits decreased range of motion (on abduction, anterior flexion and internal/external rotation)  He exhibits no swelling, no effusion, no spasm and normal strength  R chest wall NT to palp   Feet:   Right Foot:   Skin Integrity: Negative for ulcer, skin breakdown, erythema, warmth, callus or dry skin  Left Foot:   Skin Integrity: Negative for ulcer, skin breakdown, erythema, warmth, callus or dry skin  Lymphadenopathy:     He has no cervical adenopathy  Neurological: He is alert and oriented to person, place, and time  No cranial nerve deficit  Coordination (ambulates with cane) and gait (sl ataxic gait with poor L arm swing) abnormal    Skin: Capillary refill takes less than 2 seconds  Psychiatric: His behavior is normal  Judgment and thought content normal    occasional dysphoria - improved   Vitals reviewed

## 2019-10-30 DIAGNOSIS — R07.9 CHEST PAIN: ICD-10-CM

## 2019-10-30 DIAGNOSIS — I25.10 3-VESSEL CORONARY ARTERY DISEASE: ICD-10-CM

## 2019-10-30 RX ORDER — RANOLAZINE 500 MG/1
TABLET, EXTENDED RELEASE ORAL
Qty: 60 TABLET | Refills: 0 | Status: SHIPPED | OUTPATIENT
Start: 2019-10-30 | End: 2019-11-24 | Stop reason: SDUPTHER

## 2019-10-30 NOTE — TELEPHONE ENCOUNTER
pts wife called in stating she received denial letter  Explained to her again that patches were denied  She states they did get the recommended patches over the counter  She has no further questions at this time

## 2019-10-31 PROBLEM — F32.4 MAJOR DEPRESSIVE DISORDER WITH SINGLE EPISODE, IN PARTIAL REMISSION (HCC): Status: ACTIVE | Noted: 2019-04-29

## 2019-10-31 PROBLEM — N18.5 CKD (CHRONIC KIDNEY DISEASE) STAGE 5, GFR LESS THAN 15 ML/MIN (HCC): Status: ACTIVE | Noted: 2019-09-02

## 2019-10-31 NOTE — ASSESSMENT & PLAN NOTE
Wt Readings from Last 3 Encounters:   10/29/19 75 8 kg (167 lb)   10/24/19 73 7 kg (162 lb 6 4 oz)   10/16/19 76 7 kg (169 lb)       Weight has been stable  Pt slowly feeling stronger  Cont present treatment  Continue to monitor weight and diet  F/u with Cardio    Recheck 2m

## 2019-10-31 NOTE — ASSESSMENT & PLAN NOTE
Recent depressed mood after hospitalization improving  Pt and wife agree that he has improved  PHQ -2 now  = 1  Continue to monitor   Recheck 2m - earlier if mood worsens

## 2019-10-31 NOTE — ASSESSMENT & PLAN NOTE
Lab Results   Component Value Date    HGBA1C 5 5 08/22/2019     BGs have been stable  Creat stable on present meds  Continue present care  F/u with nephro re: renal failure   Recheck 2m

## 2019-10-31 NOTE — ASSESSMENT & PLAN NOTE
GFR = 13 on recent labs but has been stable since discharge  Cont present care  F/u with Nephro  Recheck labs in 1m   F/u 2m - earlier if worse

## 2019-11-04 DIAGNOSIS — R60.1 GENERALIZED EDEMA: ICD-10-CM

## 2019-11-04 DIAGNOSIS — R39.11 URINARY HESITANCY: ICD-10-CM

## 2019-11-04 RX ORDER — TAMSULOSIN HYDROCHLORIDE 0.4 MG/1
CAPSULE ORAL
Qty: 30 CAPSULE | Refills: 0 | Status: SHIPPED | OUTPATIENT
Start: 2019-11-04 | End: 2019-11-24 | Stop reason: SDUPTHER

## 2019-11-04 RX ORDER — TORSEMIDE 20 MG/1
TABLET ORAL
Qty: 30 TABLET | Refills: 1 | Status: SHIPPED | OUTPATIENT
Start: 2019-11-04 | End: 2019-11-28 | Stop reason: SDUPTHER

## 2019-11-07 ENCOUNTER — TELEPHONE (OUTPATIENT)
Dept: FAMILY MEDICINE CLINIC | Facility: CLINIC | Age: 84
End: 2019-11-07

## 2019-11-07 ENCOUNTER — ANTICOAG VISIT (OUTPATIENT)
Dept: FAMILY MEDICINE CLINIC | Facility: CLINIC | Age: 84
End: 2019-11-07

## 2019-11-07 ENCOUNTER — APPOINTMENT (OUTPATIENT)
Dept: LAB | Age: 84
End: 2019-11-07
Payer: MEDICARE

## 2019-11-07 NOTE — TELEPHONE ENCOUNTER
His nurse was there this morning and advised to call you,  He has black and blue marks on his buttocks and 2 on his left arm,  Do you want any bl wk done? He will be going for his INR this morning and realizes you may not address message right away , he will go for bl wk again enedelia  If you want to order anything  Pl adv

## 2019-11-08 ENCOUNTER — TELEPHONE (OUTPATIENT)
Dept: FAMILY MEDICINE CLINIC | Facility: CLINIC | Age: 84
End: 2019-11-08

## 2019-11-08 DIAGNOSIS — E78.2 MIXED HYPERLIPIDEMIA: ICD-10-CM

## 2019-11-08 RX ORDER — ATORVASTATIN CALCIUM 40 MG/1
40 TABLET, FILM COATED ORAL DAILY
Qty: 90 TABLET | Refills: 3 | Status: SHIPPED | OUTPATIENT
Start: 2019-11-08 | End: 2020-10-30

## 2019-11-08 NOTE — TELEPHONE ENCOUNTER
Needs refill, has one left, you ordered in the past then it was ordered when he was in rehab  Atorvastatin 40 mg    [facility]

## 2019-11-13 ENCOUNTER — REMOTE DEVICE CLINIC VISIT (OUTPATIENT)
Dept: CARDIOLOGY CLINIC | Facility: CLINIC | Age: 84
End: 2019-11-13
Payer: MEDICARE

## 2019-11-13 DIAGNOSIS — Z95.0 CARDIAC PACEMAKER IN SITU: Primary | ICD-10-CM

## 2019-11-13 PROCEDURE — 93294 REM INTERROG EVL PM/LDLS PM: CPT | Performed by: INTERNAL MEDICINE

## 2019-11-13 PROCEDURE — 93296 REM INTERROG EVL PM/IDS: CPT | Performed by: INTERNAL MEDICINE

## 2019-11-13 NOTE — PROGRESS NOTES
Results for orders placed or performed in visit on 11/13/19   Cardiac EP device report    Narrative    MDT DUAL CHAMBER PM  CARELINK TRANSMISSION: BATTERY VOLTAGE ADEQUATE (4 YRS)  AP-95%, -0 5%  ALL AVAILABLE LEAD PARAMETERS WITHIN NORMAL LIMITS  1 NSVT EPISODE FOR 8 BEATS, AVG CL~355MS  EF-64% (NST 6/13/19)  PT ON WARFARIN, ASA 81MG & METOPROLOL  NORMAL DEVICE FUNCTION   GV

## 2019-11-22 ENCOUNTER — TELEPHONE (OUTPATIENT)
Dept: FAMILY MEDICINE CLINIC | Facility: CLINIC | Age: 84
End: 2019-11-22

## 2019-11-22 NOTE — TELEPHONE ENCOUNTER
He has a 3 lb wt gain,   Weight yesterday was 159 2  Today 162    No sob  No swelling  No symptoms    Weight has been fluctuating 1-4 lbs since Oct     Call the patient or vna if any orders

## 2019-11-22 NOTE — TELEPHONE ENCOUNTER
Watch one more day  If weight is > 1lb tomorrow, give an extra dose of torsemide   Call Monday with follow up

## 2019-11-24 DIAGNOSIS — R07.9 CHEST PAIN: ICD-10-CM

## 2019-11-24 DIAGNOSIS — I25.10 3-VESSEL CORONARY ARTERY DISEASE: ICD-10-CM

## 2019-11-24 DIAGNOSIS — R39.11 URINARY HESITANCY: ICD-10-CM

## 2019-11-24 RX ORDER — RANOLAZINE 500 MG/1
TABLET, EXTENDED RELEASE ORAL
Qty: 60 TABLET | Refills: 0 | Status: SHIPPED | OUTPATIENT
Start: 2019-11-24 | End: 2019-12-16 | Stop reason: SDUPTHER

## 2019-11-24 RX ORDER — TAMSULOSIN HYDROCHLORIDE 0.4 MG/1
CAPSULE ORAL
Qty: 30 CAPSULE | Refills: 0 | Status: SHIPPED | OUTPATIENT
Start: 2019-11-24 | End: 2019-12-26 | Stop reason: SDUPTHER

## 2019-11-25 NOTE — TELEPHONE ENCOUNTER
1) I do not see a Tdap in the Epic  Ok to get one if he wants, but remind pt that insurance may not pay for it  2) OK for shingles vax  3) weights look stable   Continue present treatment

## 2019-11-27 ENCOUNTER — APPOINTMENT (OUTPATIENT)
Dept: LAB | Age: 84
End: 2019-11-27
Payer: MEDICARE

## 2019-11-27 DIAGNOSIS — N18.4 CKD (CHRONIC KIDNEY DISEASE) STAGE 4, GFR 15-29 ML/MIN (HCC): ICD-10-CM

## 2019-11-27 LAB
ANION GAP SERPL CALCULATED.3IONS-SCNC: 5 MMOL/L (ref 4–13)
BUN SERPL-MCNC: 49 MG/DL (ref 5–25)
CALCIUM SERPL-MCNC: 9.1 MG/DL (ref 8.3–10.1)
CHLORIDE SERPL-SCNC: 104 MMOL/L (ref 100–108)
CO2 SERPL-SCNC: 27 MMOL/L (ref 21–32)
CREAT SERPL-MCNC: 3.92 MG/DL (ref 0.6–1.3)
GFR SERPL CREATININE-BSD FRML MDRD: 13 ML/MIN/1.73SQ M
GLUCOSE P FAST SERPL-MCNC: 125 MG/DL (ref 65–99)
POTASSIUM SERPL-SCNC: 4.4 MMOL/L (ref 3.5–5.3)
SODIUM SERPL-SCNC: 136 MMOL/L (ref 136–145)

## 2019-11-27 PROCEDURE — 36415 COLL VENOUS BLD VENIPUNCTURE: CPT

## 2019-11-27 PROCEDURE — 80048 BASIC METABOLIC PNL TOTAL CA: CPT

## 2019-11-28 DIAGNOSIS — R60.1 GENERALIZED EDEMA: ICD-10-CM

## 2019-11-29 RX ORDER — TORSEMIDE 20 MG/1
TABLET ORAL
Qty: 30 TABLET | Refills: 1 | Status: SHIPPED | OUTPATIENT
Start: 2019-11-29 | End: 2019-12-25 | Stop reason: SDUPTHER

## 2019-12-02 ENCOUNTER — TELEPHONE (OUTPATIENT)
Dept: NEPHROLOGY | Facility: CLINIC | Age: 84
End: 2019-12-02

## 2019-12-02 NOTE — TELEPHONE ENCOUNTER
Message given to patient's wife     ----- Message from [place], [place] sent at 11/30/2019  1:28 PM EST -----  Creatinine and electrolytes stable

## 2019-12-06 ENCOUNTER — ANTICOAG VISIT (OUTPATIENT)
Dept: FAMILY MEDICINE CLINIC | Facility: CLINIC | Age: 84
End: 2019-12-06

## 2019-12-06 ENCOUNTER — TELEPHONE (OUTPATIENT)
Dept: FAMILY MEDICINE CLINIC | Facility: CLINIC | Age: 84
End: 2019-12-06

## 2019-12-06 ENCOUNTER — APPOINTMENT (OUTPATIENT)
Dept: LAB | Age: 84
End: 2019-12-06
Payer: MEDICARE

## 2019-12-06 NOTE — TELEPHONE ENCOUNTER
[facility] called to inform you that patient gained 3 lbs over night   No swelling or SOB just wanted to inform PCP

## 2019-12-06 NOTE — TELEPHONE ENCOUNTER
Please call - ok to watch for now but he will need to take an extra dose of diuretic if his weight goes up tomorrow or he develops symptoms

## 2019-12-06 NOTE — TELEPHONE ENCOUNTER
Spoke with Pt's wife and she is aware what to look for and what to do if there are any other symptoms

## 2019-12-16 DIAGNOSIS — I25.10 3-VESSEL CORONARY ARTERY DISEASE: ICD-10-CM

## 2019-12-16 DIAGNOSIS — R07.9 CHEST PAIN: ICD-10-CM

## 2019-12-16 RX ORDER — RANOLAZINE 500 MG/1
TABLET, EXTENDED RELEASE ORAL
Qty: 60 TABLET | Refills: 0 | Status: SHIPPED | OUTPATIENT
Start: 2019-12-16 | End: 2020-01-28

## 2019-12-25 DIAGNOSIS — R60.1 GENERALIZED EDEMA: ICD-10-CM

## 2019-12-26 DIAGNOSIS — R39.11 URINARY HESITANCY: ICD-10-CM

## 2019-12-26 RX ORDER — TAMSULOSIN HYDROCHLORIDE 0.4 MG/1
CAPSULE ORAL
Qty: 30 CAPSULE | Refills: 0 | Status: SHIPPED | OUTPATIENT
Start: 2019-12-26 | End: 2020-01-22

## 2019-12-26 RX ORDER — TORSEMIDE 20 MG/1
TABLET ORAL
Qty: 30 TABLET | Refills: 1 | Status: SHIPPED | OUTPATIENT
Start: 2019-12-26 | End: 2020-01-22

## 2020-01-06 ENCOUNTER — APPOINTMENT (OUTPATIENT)
Dept: LAB | Age: 85
End: 2020-01-06
Payer: MEDICARE

## 2020-01-06 ENCOUNTER — ANTICOAG VISIT (OUTPATIENT)
Dept: FAMILY MEDICINE CLINIC | Facility: CLINIC | Age: 85
End: 2020-01-06

## 2020-01-06 DIAGNOSIS — E11.22 TYPE 2 DIABETES MELLITUS WITH STAGE 5 CHRONIC KIDNEY DISEASE NOT ON CHRONIC DIALYSIS, WITHOUT LONG-TERM CURRENT USE OF INSULIN (HCC): ICD-10-CM

## 2020-01-06 DIAGNOSIS — N18.4 CKD (CHRONIC KIDNEY DISEASE) STAGE 4, GFR 15-29 ML/MIN (HCC): ICD-10-CM

## 2020-01-06 DIAGNOSIS — N18.5 TYPE 2 DIABETES MELLITUS WITH STAGE 5 CHRONIC KIDNEY DISEASE NOT ON CHRONIC DIALYSIS, WITHOUT LONG-TERM CURRENT USE OF INSULIN (HCC): ICD-10-CM

## 2020-01-06 DIAGNOSIS — E11.21 TYPE 2 DIABETES MELLITUS WITH DIABETIC NEPHROPATHY, WITHOUT LONG-TERM CURRENT USE OF INSULIN (HCC): ICD-10-CM

## 2020-01-06 DIAGNOSIS — N18.5 CKD (CHRONIC KIDNEY DISEASE), STAGE V (HCC): ICD-10-CM

## 2020-01-06 LAB
ALBUMIN SERPL BCP-MCNC: 3.3 G/DL (ref 3.5–5)
ALP SERPL-CCNC: 131 U/L (ref 46–116)
ALT SERPL W P-5'-P-CCNC: 16 U/L (ref 12–78)
ANION GAP SERPL CALCULATED.3IONS-SCNC: 7 MMOL/L (ref 4–13)
AST SERPL W P-5'-P-CCNC: 12 U/L (ref 5–45)
BASOPHILS # BLD AUTO: 0.03 THOUSANDS/ΜL (ref 0–0.1)
BASOPHILS NFR BLD AUTO: 0 % (ref 0–1)
BILIRUB SERPL-MCNC: 0.61 MG/DL (ref 0.2–1)
BUN SERPL-MCNC: 41 MG/DL (ref 5–25)
CALCIUM SERPL-MCNC: 9 MG/DL (ref 8.3–10.1)
CHLORIDE SERPL-SCNC: 105 MMOL/L (ref 100–108)
CO2 SERPL-SCNC: 27 MMOL/L (ref 21–32)
CREAT SERPL-MCNC: 4.16 MG/DL (ref 0.6–1.3)
CREAT UR-MCNC: 69.1 MG/DL
EOSINOPHIL # BLD AUTO: 0.19 THOUSAND/ΜL (ref 0–0.61)
EOSINOPHIL NFR BLD AUTO: 2 % (ref 0–6)
ERYTHROCYTE [DISTWIDTH] IN BLOOD BY AUTOMATED COUNT: 13.6 % (ref 11.6–15.1)
EST. AVERAGE GLUCOSE BLD GHB EST-MCNC: 117 MG/DL
GFR SERPL CREATININE-BSD FRML MDRD: 12 ML/MIN/1.73SQ M
GLUCOSE P FAST SERPL-MCNC: 143 MG/DL (ref 65–99)
HBA1C MFR BLD: 5.7 % (ref 4.2–6.3)
HCT VFR BLD AUTO: 38.4 % (ref 36.5–49.3)
HGB BLD-MCNC: 12.2 G/DL (ref 12–17)
IMM GRANULOCYTES # BLD AUTO: 0.03 THOUSAND/UL (ref 0–0.2)
IMM GRANULOCYTES NFR BLD AUTO: 0 % (ref 0–2)
LYMPHOCYTES # BLD AUTO: 1.84 THOUSANDS/ΜL (ref 0.6–4.47)
LYMPHOCYTES NFR BLD AUTO: 23 % (ref 14–44)
MAGNESIUM SERPL-MCNC: 2.4 MG/DL (ref 1.6–2.6)
MCH RBC QN AUTO: 31.3 PG (ref 26.8–34.3)
MCHC RBC AUTO-ENTMCNC: 31.8 G/DL (ref 31.4–37.4)
MCV RBC AUTO: 99 FL (ref 82–98)
MONOCYTES # BLD AUTO: 0.61 THOUSAND/ΜL (ref 0.17–1.22)
MONOCYTES NFR BLD AUTO: 8 % (ref 4–12)
NEUTROPHILS # BLD AUTO: 5.46 THOUSANDS/ΜL (ref 1.85–7.62)
NEUTS SEG NFR BLD AUTO: 67 % (ref 43–75)
NRBC BLD AUTO-RTO: 0 /100 WBCS
PHOSPHATE SERPL-MCNC: 3.4 MG/DL (ref 2.3–4.1)
PLATELET # BLD AUTO: 182 THOUSANDS/UL (ref 149–390)
PMV BLD AUTO: 11 FL (ref 8.9–12.7)
POTASSIUM SERPL-SCNC: 4.6 MMOL/L (ref 3.5–5.3)
PROT SERPL-MCNC: 7.1 G/DL (ref 6.4–8.2)
PROT UR-MCNC: 52 MG/DL
PROT/CREAT UR: 0.75 MG/G{CREAT} (ref 0–0.1)
PTH-INTACT SERPL-MCNC: 173.6 PG/ML (ref 18.4–80.1)
RBC # BLD AUTO: 3.9 MILLION/UL (ref 3.88–5.62)
SODIUM SERPL-SCNC: 139 MMOL/L (ref 136–145)
WBC # BLD AUTO: 8.16 THOUSAND/UL (ref 4.31–10.16)

## 2020-01-06 PROCEDURE — 83036 HEMOGLOBIN GLYCOSYLATED A1C: CPT

## 2020-01-06 PROCEDURE — 84100 ASSAY OF PHOSPHORUS: CPT

## 2020-01-06 PROCEDURE — 83735 ASSAY OF MAGNESIUM: CPT

## 2020-01-06 PROCEDURE — 83970 ASSAY OF PARATHORMONE: CPT

## 2020-01-06 PROCEDURE — 85025 COMPLETE CBC W/AUTO DIFF WBC: CPT

## 2020-01-06 PROCEDURE — 82570 ASSAY OF URINE CREATININE: CPT

## 2020-01-06 PROCEDURE — 84156 ASSAY OF PROTEIN URINE: CPT

## 2020-01-06 PROCEDURE — 80053 COMPREHEN METABOLIC PANEL: CPT

## 2020-01-08 ENCOUNTER — TELEPHONE (OUTPATIENT)
Dept: NEPHROLOGY | Facility: CLINIC | Age: 85
End: 2020-01-08

## 2020-01-08 NOTE — TELEPHONE ENCOUNTER
Pt wife states he is doing well  Weight is maintained 163/164 lbs  No swelling  Appetite good  No S O B       ----- Message from [place], [place] sent at 1/6/2020  4:37 PM EST -----  Please make sure patient feeling okay  No changes in weight/ appetite/ urination  Creatinine slightly elevated but not by much  Dr Kathrin Hart can address further during his office appointment later this month

## 2020-01-10 ENCOUNTER — OFFICE VISIT (OUTPATIENT)
Dept: FAMILY MEDICINE CLINIC | Facility: CLINIC | Age: 85
End: 2020-01-10
Payer: MEDICARE

## 2020-01-10 VITALS
BODY MASS INDEX: 28.66 KG/M2 | HEIGHT: 65 IN | TEMPERATURE: 98.3 F | SYSTOLIC BLOOD PRESSURE: 118 MMHG | DIASTOLIC BLOOD PRESSURE: 78 MMHG | WEIGHT: 172 LBS | HEART RATE: 76 BPM

## 2020-01-10 DIAGNOSIS — N18.5 TYPE 2 DIABETES MELLITUS WITH STAGE 5 CHRONIC KIDNEY DISEASE NOT ON CHRONIC DIALYSIS, WITHOUT LONG-TERM CURRENT USE OF INSULIN (HCC): ICD-10-CM

## 2020-01-10 DIAGNOSIS — E03.9 ACQUIRED HYPOTHYROIDISM: ICD-10-CM

## 2020-01-10 DIAGNOSIS — E11.22 TYPE 2 DIABETES MELLITUS WITH STAGE 5 CHRONIC KIDNEY DISEASE NOT ON CHRONIC DIALYSIS, WITHOUT LONG-TERM CURRENT USE OF INSULIN (HCC): ICD-10-CM

## 2020-01-10 DIAGNOSIS — Z00.00 MEDICARE ANNUAL WELLNESS VISIT, SUBSEQUENT: Primary | ICD-10-CM

## 2020-01-10 DIAGNOSIS — N18.5 CKD (CHRONIC KIDNEY DISEASE) STAGE 5, GFR LESS THAN 15 ML/MIN (HCC): ICD-10-CM

## 2020-01-10 DIAGNOSIS — I25.10 3-VESSEL CORONARY ARTERY DISEASE: ICD-10-CM

## 2020-01-10 DIAGNOSIS — I48.0 PAROXYSMAL A-FIB (HCC): ICD-10-CM

## 2020-01-10 DIAGNOSIS — I50.32 CHRONIC DIASTOLIC HEART FAILURE (HCC): ICD-10-CM

## 2020-01-10 PROBLEM — N39.0 UTI (URINARY TRACT INFECTION): Status: RESOLVED | Noted: 2019-06-13 | Resolved: 2020-01-10

## 2020-01-10 PROCEDURE — 1123F ACP DISCUSS/DSCN MKR DOCD: CPT | Performed by: FAMILY MEDICINE

## 2020-01-10 PROCEDURE — G0439 PPPS, SUBSEQ VISIT: HCPCS | Performed by: FAMILY MEDICINE

## 2020-01-10 PROCEDURE — 99214 OFFICE O/P EST MOD 30 MIN: CPT | Performed by: FAMILY MEDICINE

## 2020-01-10 NOTE — PROGRESS NOTES
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 [facility])       Lab Results   Component Value Date    HGBA1C 5 7 01/06/2020     BGs controlled and pt with out symptoms of renal failure  Pt to see nephro next week  Cont present med s Recheck   6m            Cardiovascular and Mediastinum    3-vessel coronary artery disease     Asymptomatic  Monitor labs  Recheck 6m         Chronic diastolic heart failure (HCC)     Wt Readings from Last 3 Encounters:   01/10/20 78 kg (172 lb)   10/29/19 75 8 kg (167 lb)   10/24/19 73 7 kg (162 lb 6 4 oz)     Weight is a little higher though lungs are clear and pt is without worsening symptoms  Cont present meds at present doses  Monitor weight  Recheck 3m           Paroxysmal A-fib (HCC)     Stable  F/u with Cardio            Genitourinary    CKD (chronic kidney disease) stage 5, GFR less than 15 ml/min (HCC)     GFR now 12  F/u with Nephro next week  Cont present meds in the interim           Other Visit Diagnoses     Medicare annual wellness visit, subsequent    -  Primary           Preventive health issues were discussed with patient, and age appropriate screening tests were ordered as noted in patient's After Visit Summary  Personalized health advice and appropriate referrals for health education or preventive services given if needed, as noted in patient's After Visit Summary       History of Present Illness:     Patient presents for Medicare Annual Wellness visit    Patient Care Team:  Valentino Camacho MD as PCP - General (Family Medicine)  Rad Nguyen MD (Nephrology)  Lilliam Bonilla DO (Cardiology)  Toshia Cabral MD (Urology)     Problem List:     Patient Active Problem List   Diagnosis    3-vessel coronary artery disease    Asbestosis ([place] [address] )    Kidney disease with fluid retention    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 ([facility])    Elevated serum alkaline phosphatase level    Hyperlipidemia    Hypothyroidism    Paroxysmal A-fib ([facility])    Seasonal allergies    Increased frequency of urination    Frozen shoulder    Major depressive disorder with single episode, in partial remission ([place] [address] )    Chest pain    Ambulatory dysfunction/generalized weakness    Leg edema, left    Shortness of breath    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    Tibial artery occlusion, left ([facility])    Hyponatremia    Chronic diastolic heart failure ([facility])    Benign prostatic hyperplasia with urinary hesitancy    Abnormal chest x-ray    Warfarin-induced coagulopathy ([facility])    Impaired mobility and ADLs    Anticoagulated on warfarin    History of recurrent UTIs    Buttock wound    Presence of permanent cardiac pacemaker    Pain    Anemia    Healthcare maintenance    Cognitive impairment    Vitamin D insufficiency    CKD (chronic kidney disease) stage 5, GFR less than 15 ml/min ([facility])    History of constipation      Past Medical and Surgical History:     Past Medical History:   Diagnosis Date    3-vessel coronary artery disease     last assessed: 08/15/2016    BPH (benign prostatic hyperplasia)     Chronic kidney disease     Diabetes mellitus ([place] [address] )     Hyperlipidemia     Hypertension     Shingles 04/29/2019    SOB (shortness of breath)     Stroke [facility])      Past Surgical History:   Procedure Laterality Date    CARDIAC PACEMAKER PLACEMENT      CATARACT EXTRACTION      last assessed: 11/11/2015    CORONARY ARTERY BYPASS GRAFT      last assessed: 08/26/2015      Family History:     Family History   Problem Relation Age of Onset    Heart disease Mother     Cancer Father         stomach    No Known Problems Sister     No Known Problems Brother     No Known Problems Brother Social History:     Social History     Socioeconomic History    Marital status: /Civil Union     Spouse name: None    Number of children: None    Years of education: None    Highest education level: None   Occupational History    None   Social Needs    Financial resource strain: None    Food insecurity:     Worry: None     Inability: None    Transportation needs:     Medical: None     Non-medical: None   Tobacco Use    Smoking status: Never Smoker    Smokeless tobacco: Never Used   Substance and Sexual Activity    Alcohol use: Not Currently     Frequency: Never    Drug use: No    Sexual activity: Never   Lifestyle    Physical activity:     Days per week: None     Minutes per session: None    Stress: None   Relationships    Social connections:     Talks on phone: None     Gets together: None     Attends Judaism service: None     Active member of club or organization: None     Attends meetings of clubs or organizations: None     Relationship status: None    Intimate partner violence:     Fear of current or ex partner: None     Emotionally abused: None     Physically abused: None     Forced sexual activity: None   Other Topics Concern    None   Social History Narrative    No advance directive       Medications and Allergies:     Current Outpatient Medications   Medication Sig Dispense Refill    acetaminophen (TYLENOL) 325 mg tablet Take 2 tablets (650 mg total) by mouth 3 (three) times a day as needed for mild pain or moderate pain 30 tablet 0    aspirin (ECOTRIN LOW STRENGTH) 81 mg EC tablet Take 1 tablet (81 mg total) by mouth daily 30 tablet 0    atorvastatin (LIPITOR) 40 mg tablet Take 1 tablet (40 mg total) by mouth daily 90 tablet 3    cholecalciferol 2000 units TABS Take 1 tablet (2,000 Units total) by mouth daily 60 tablet 0    finasteride (PROSCAR) 5 mg tablet Take 1 tablet (5 mg total) by mouth daily 90 tablet 3    glucose blood test strip TEST ONCE DAILY 50 each 0    levothyroxine 75 mcg tablet TAKE 1 TABLET BY MOUTH EVERY DAY 90 tablet 3    lidocaine (LIDODERM) 5 % Apply 1 patch topically daily Remove & Discard patch within 12 hours or as directed by MD 30 patch 0    metoprolol tartrate (LOPRESSOR) 25 mg tablet Take 1 tablet (25 mg total) by mouth 2 (two) times a day with meals 60 tablet 0    ranolazine (RANEXA) 500 mg 12 hr tablet TAKE 1 TABLET BY MOUTH EVERY 12 HOURS 60 tablet 0    senna-docusate sodium (SENOKOT S) 8 6-50 mg per tablet Take 2 tablets by mouth 2 (two) times a day (Patient taking differently: Take 1 tablet by mouth daily as needed ) 60 tablet 0    tamsulosin (FLOMAX) 0 4 mg TAKE 1 CAPSULE BY MOUTH NIGHTLY FOR 30 DAYS  30 capsule 0    torsemide (DEMADEX) 20 mg tablet TAKE 1 TABLET BY MOUTH EVERY DAY 30 tablet 1    TRADJENTA 5 MG TABS TAKE 1 TABLET (5 MG) BY MOUTH DAILY 90 tablet 3    warfarin (COUMADIN) 2 mg tablet 1 tab po qd (Patient taking differently: daily 1 tab daily) 30 tablet 5     No current facility-administered medications for this visit  No Known Allergies   Immunizations:     Immunization History   Administered Date(s) Administered    INFLUENZA 10/31/2013, 01/01/2014, 10/06/2015, 10/06/2015, 10/23/2018    Influenza TIV (IM) 01/01/2014, 10/01/2015    Influenza, high dose seasonal 0 5 mL 10/29/2019    Pneumococcal Conjugate 13-Valent 02/17/2015, 10/20/2015    Pneumococcal Polysaccharide PPV23 01/[birth year]/1932    Tdap 01/29/1932, 11/27/2019    Zoster 01/29/1932      Health Maintenance: There are no preventive care reminders to display for this patient  Topic Date Due    Hepatitis B Vaccine (1 of 3 - Risk 3-dose series) 01/29/1951    Pneumococcal Vaccine: 65+ Years (2 of 2 - PPSV23) 10/20/2016      Medicare Health Risk Assessment:     /78   Pulse 76   Temp 98 3 °F (36 8 °C)   Ht 5' 5" (1 651 m)   Wt 78 kg (172 lb)   BMI 28 62 kg/m²      Thanh Lowe is here for his Subsequent Wellness visit   Last Medicare Wellness visit information reviewed, patient interviewed and updates made to the record today  Health Risk Assessment:   Patient rates overall health as very good  Patient feels that their physical health rating is much better  Eyesight was rated as same  Hearing was rated as same  Patient feels that their emotional and mental health rating is same  Pain experienced in the last 7 days has been some  Patient's pain rating has been 7/10  Patient states that he has experienced no weight loss or gain in last 6 months  R shoulder pain    Depression Screening:   PHQ-2 Score: 1      Fall Risk Screening: In the past year, patient has experienced: no history of falling in past year      Home Safety:  Patient has trouble with stairs inside or outside of their home  Patient has working smoke alarms and has working carbon monoxide detector  Home safety hazards include: none  Nutrition:   Current diet is Diabetic and No Added Salt  Medications:   Patient is not currently taking any over-the-counter supplements  Patient is able to manage medications  Activities of Daily Living (ADLs)/Instrumental Activities of Daily Living (IADLs):   Walk and transfer into and out of bed and chair?: Yes  Dress and groom yourself?: No    Bathe or shower yourself?: Yes    Feed yourself? Yes  Do your laundry/housekeeping?: Yes  Manage your money, pay your bills and track your expenses?: Yes  Make your own meals?: Yes    Do your own shopping?: Yes    ADL comments: Trouble putting on shirts due to shoulder    Previous Hospitalizations:   Any hospitalizations or ED visits within the last 12 months?: Yes    How many hospitalizations have you had in the last year?: more than 4    Hospitalization Comments: CHF, UTI, back surgery    Advance Care Planning:   Living will: Yes    Durable POA for healthcare:  Yes    Advanced directive: Yes    Advanced directive counseling given: Yes      Cognitive Screening:   Provider or family/friend/caregiver concerned regarding cognition?: No    PREVENTIVE SCREENINGS      Cardiovascular Screening:    General: Screening Not Indicated and History Lipid Disorder      Diabetes Screening:     General: Screening Not Indicated and History Diabetes      Colorectal Cancer Screening:     General: Screening Not Indicated      Prostate Cancer Screening:    General: Screening Not Indicated      Osteoporosis Screening:    General: Screening Not Indicated      Abdominal Aortic Aneurysm (AAA) Screening:        General: Screening Not Indicated      Lung Cancer Screening:     General: Screening Not Indicated      Hepatitis C Screening:    General: Risks and Benefits Discussed and History Hepatitis C    Hep C Screening Accepted: Yes      Other Counseling Topics:   Regular weightbearing exercise         Jose Aguilar MD

## 2020-01-10 NOTE — PROGRESS NOTES
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 [facility])    Lab Results   Component Value Date    HGBA1C 5 7 01/06/2020     BGs controlled and pt with out symptoms of renal failure  Pt to see nephro next week  Cont present med s Recheck   6m    3-vessel coronary artery disease  Asymptomatic  Monitor labs  Recheck 6m    Chronic diastolic heart failure (HCC)  Wt Readings from Last 3 Encounters:   01/10/20 78 kg (172 lb)   10/29/19 75 8 kg (167 lb)   10/24/19 73 7 kg (162 lb 6 4 oz)     Weight is a little higher though lungs are clear and pt is without worsening symptoms  Cont present meds at present doses  Monitor weight  Recheck 3m      Paroxysmal A-fib (HCC)  Stable  F/u with Cardio    CKD (chronic kidney disease) stage 5, GFR less than 15 ml/min ([facility])  GFR now 12  F/u with Nephro next week  Cont present meds in the interim       Diagnoses and all orders for this visit:    Medicare annual wellness visit, subsequent    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, without long-term current use of insulin (HCC)    Acquired hypothyroidism    3-vessel coronary artery disease    Chronic diastolic heart failure (HCC)    Paroxysmal A-fib (HCC)    CKD (chronic kidney disease) stage 5, GFR less than 15 ml/min ([facility])          Subjective:      Patient ID: Favian Ugalde is a 80 y o  male  F/u multiple med issues and subsequent AWV  - pt has been doing well since last visit  - weights have increased slowly since last visit, though pt has not experienced any worsening swelling or SOB  Using a cane or walker for ambulation  Has not needed ot take any extra furosemide  No CP, palp increased edema or other CV symptoms  - pt compliant with diet  Home BGs 120-140s (occ higher)  Recent A1C = 5 7   Up to date with eye exam  - low back much better since surgery  Pt is now pain free  - R shoulder has been acting up for a while  Worse with activity such a putting a shirt on  Takes tylenol for pain  - no GI or  issues  - does have some depressed mood days  States that he has more good days than bad  PHQ done  - I reviewed recent labs with pt and wife            The following portions of the patient's history were reviewed and updated as appropriate:   He  has a past medical history of 3-vessel coronary artery disease, BPH (benign prostatic hyperplasia), Chronic kidney disease, [facility] ([facility] ), Hyperlipidemia, Hypertension, Shingles (04/29/2019), SOB (shortness of breath), and Stroke ([facility] )    He   Patient Active Problem List    Diagnosis Date Noted    History of constipation 09/04/2019    CKD (chronic kidney disease) stage 5, GFR less than 15 ml/min ([facility]) 09/02/2019    Cognitive impairment 08/29/2019    Vitamin D insufficiency 08/29/2019    Anticoagulated on warfarin 08/28/2019    History of recurrent UTIs 08/28/2019    Buttock wound 08/28/2019    Presence of permanent cardiac pacemaker 08/28/2019    Pain 08/28/2019    Anemia 08/28/2019    Healthcare maintenance 08/28/2019    Impaired mobility and ADLs 08/27/2019    Warfarin-induced coagulopathy ([facility] ) 08/26/2019    Benign prostatic hyperplasia with urinary hesitancy 08/22/2019    Abnormal chest x-ray 08/22/2019    Hyponatremia 07/04/2019    Chronic diastolic heart failure (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 ) 06/13/2019    Chest pain 06/12/2019    Ambulatory dysfunction/generalized weakness 06/12/2019    Major depressive disorder with single episode, in partial remission (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 ([place] [address] ) 08/26/2015    Hypothyroidism 08/26/2015    Asbestosis ([place] [address] ) 05/06/2014    Seasonal allergies 05/06/2014     He  has a past surgical history that includes Coronary artery bypass graft; Cataract extraction; and Cardiac pacemaker placement  He  reports that he has never smoked  He has never used smokeless tobacco  He reports that he drank alcohol  He reports that he does not use drugs    Current Outpatient Medications   Medication Sig Dispense Refill    acetaminophen (TYLENOL) 325 mg tablet Take 2 tablets (650 mg total) by mouth 3 (three) times a day as needed for mild pain or moderate pain 30 tablet 0    aspirin (ECOTRIN LOW STRENGTH) 81 mg EC tablet Take 1 tablet (81 mg total) by mouth daily 30 tablet 0    atorvastatin (LIPITOR) 40 mg tablet Take 1 tablet (40 mg total) by mouth daily 90 tablet 3    cholecalciferol 2000 units TABS Take 1 tablet (2,000 Units total) by mouth daily 60 tablet 0    finasteride (PROSCAR) 5 mg tablet Take 1 tablet (5 mg total) by mouth daily 90 tablet 3    glucose blood test strip TEST ONCE DAILY 50 each 0    levothyroxine 75 mcg tablet TAKE 1 TABLET BY MOUTH EVERY DAY 90 tablet 3    lidocaine (LIDODERM) 5 % Apply 1 patch topically daily Remove & Discard patch within 12 hours or as directed by MD 30 patch 0    metoprolol tartrate (LOPRESSOR) 25 mg tablet Take 1 tablet (25 mg total) by mouth 2 (two) times a day with meals 60 tablet 0    ranolazine (RANEXA) 500 mg 12 hr tablet TAKE 1 TABLET BY MOUTH EVERY 12 HOURS 60 tablet 0    senna-docusate sodium (SENOKOT S) 8 6-50 mg per tablet Take 2 tablets by mouth 2 (two) times a day (Patient taking differently: Take 1 tablet by mouth daily as needed ) 60 tablet 0    tamsulosin (FLOMAX) 0 4 mg TAKE 1 CAPSULE BY MOUTH NIGHTLY FOR 30 DAYS  30 capsule 0    torsemide (DEMADEX) 20 mg tablet TAKE 1 TABLET BY MOUTH EVERY DAY 30 tablet 1    TRADJENTA 5 MG TABS TAKE 1 TABLET (5 MG) BY MOUTH DAILY 90 tablet 3    warfarin (COUMADIN) 2 mg tablet 1 tab po qd (Patient taking differently: daily 1 tab daily) 30 tablet 5     No current facility-administered medications for this visit  He has No Known Allergies       Review of Systems   Constitutional: Positive for fatigue (mild, stable)  Negative for activity change, chills and fever  HENT: Negative  Eyes: Negative  Respiratory: Positive for shortness of breath (occasional with exertion )  Cardiovascular: Negative  Negative for leg swelling  Gastrointestinal: Negative  Endocrine: Negative  Genitourinary: Negative  Musculoskeletal: Positive for arthralgias (R shoulder)  Negative for back pain (resolved), gait problem, joint swelling and myalgias  Skin: Negative  Neurological: Negative for dizziness, facial asymmetry, weakness, light-headedness and headaches  Hematological: Negative  Psychiatric/Behavioral: Negative for dysphoric mood  Objective:      /78   Pulse 76   Temp 98 3 °F (36 8 °C)   Ht 5' 5" (1 651 m)   Wt 78 kg (172 lb)   BMI 28 62 kg/m²          Physical Exam   Constitutional: He is oriented to person, place, and time  He appears well-developed and well-nourished  Elderly male in NAD   HENT:   Head: Normocephalic and atraumatic  Right Ear: External ear normal    Left Ear: External ear normal    Nose: Nose normal    Mouth/Throat: Oropharynx is clear and moist  No oropharyngeal exudate  Eyes: Pupils are equal, round, and reactive to light  Conjunctivae and EOM are normal    Neck: Normal range of motion  Neck supple  No JVD present  Cardiovascular: Normal rate and intact distal pulses  Pulses:       Dorsalis pedis pulses are 1+ on the right side, and 1+ on the left side  Pulmonary/Chest: Effort normal and breath sounds normal  He has no wheezes  He has no rales  Abdominal: Soft  Bowel sounds are normal  He exhibits no distension and no mass  There is no tenderness  Musculoskeletal: He exhibits edema (trace bilat)  Right shoulder: He exhibits decreased range of motion (on abduction, anterior flexion and internal/external rotation)  He exhibits no swelling, no effusion, no spasm and normal strength  R chest wall NT to palp   Feet:   Right Foot:   Skin Integrity: Negative for ulcer, skin breakdown, erythema, warmth, callus or dry skin  Left Foot:   Skin Integrity: Negative for ulcer, skin breakdown, erythema, warmth, callus or dry skin  Lymphadenopathy:     He has no cervical adenopathy  Neurological: He is alert and oriented to person, place, and time  No cranial nerve deficit  Coordination (ambulates with cane) and gait (sl ataxic gait with poor L arm swing) abnormal    Skin: Capillary refill takes less than 2 seconds  Psychiatric: His behavior is normal  Judgment and thought content normal    occasional dysphoria  PHQ-2 = 1   Vitals reviewed

## 2020-01-10 NOTE — PATIENT INSTRUCTIONS

## 2020-01-11 NOTE — ASSESSMENT & PLAN NOTE
Lab Results   Component Value Date    HGBA1C 5 7 01/06/2020     BGs controlled and pt with out symptoms of renal failure  Pt to see nephro next week   Cont present med s Recheck   6m

## 2020-01-11 NOTE — ASSESSMENT & PLAN NOTE
Wt Readings from Last 3 Encounters:   01/10/20 78 kg (172 lb)   10/29/19 75 8 kg (167 lb)   10/24/19 73 7 kg (162 lb 6 4 oz)     Weight is a little higher though lungs are clear and pt is without worsening symptoms  Cont present meds at present doses  Monitor weight   Recheck 3m

## 2020-01-21 ENCOUNTER — OFFICE VISIT (OUTPATIENT)
Dept: NEPHROLOGY | Facility: CLINIC | Age: 85
End: 2020-01-21
Payer: MEDICARE

## 2020-01-21 VITALS
HEIGHT: 65 IN | HEART RATE: 70 BPM | WEIGHT: 172.2 LBS | SYSTOLIC BLOOD PRESSURE: 118 MMHG | BODY MASS INDEX: 28.69 KG/M2 | DIASTOLIC BLOOD PRESSURE: 64 MMHG

## 2020-01-21 DIAGNOSIS — I10 ESSENTIAL HYPERTENSION: ICD-10-CM

## 2020-01-21 DIAGNOSIS — N18.9 CHRONIC KIDNEY DISEASE, UNSPECIFIED CKD STAGE: Primary | ICD-10-CM

## 2020-01-21 DIAGNOSIS — N18.5 CKD (CHRONIC KIDNEY DISEASE) STAGE 5, GFR LESS THAN 15 ML/MIN (HCC): ICD-10-CM

## 2020-01-21 PROBLEM — E87.1 HYPONATREMIA: Status: RESOLVED | Noted: 2019-07-04 | Resolved: 2020-01-21

## 2020-01-21 PROCEDURE — 99214 OFFICE O/P EST MOD 30 MIN: CPT | Performed by: INTERNAL MEDICINE

## 2020-01-21 RX ORDER — METOPROLOL TARTRATE 50 MG/1
25 TABLET, FILM COATED ORAL 2 TIMES DAILY WITH MEALS
Qty: 90 TABLET | Refills: 1 | Status: SHIPPED | OUTPATIENT
Start: 2020-01-21 | End: 2020-07-13

## 2020-01-21 NOTE — PROGRESS NOTES
NEPHROLOGY PROGRESS NOTE    Selena Dear 80 y o  male MRN: [id number]  Unit/Bed#:  Encounter: [id number]  Reason for Consult:  Chronic kidney disease stage 5    The patient is here and I have not seen him in almost a year and unfortunately he had been in the hospital between last summer related to urinary tract infection that he had back surgery was in rehab in [place]  He then was also admitted at times with sepsis and a blood infection  He walks in today with his cane he look strong he is feeling well he is eating good and really seems very upbeat positive  ASSESSMENT/PLAN:  1  Renal    Patient's chronic kidney disease stage 5 based on calculated GFR but he has no symptoms of uremia  He is doing much better clinically with respect to his energy appetite and ambulation after his back surgery and rehabilitation  Creatinine level through all this had been running in the mid to high 3 range but the most recent creatinine was around 4 1  After he was in the rehabilitation his wife informed me he was very volume overloaded on discharge in got admitted and was diuresed and now placed on maintenance diuretic which he was not on in the past   This could be why his creatinine is running slightly higher versus underlying progression  The patient's hemoglobin is excellent and he really looks well physically and is feeling well  We then began to discuss the future and we talked about dialysis in the future potential modalities and I informed him that he does not required at the present time it is just a good idea to be informed and understands  I explained to him the different types of dialysis including hemodialysis and peritoneal dialysis and we have scheduled him for [facility] education class  His wife did tell me they rescheduled for that in the past but he was sick and was not able to make it in    They will be seeing the [facility] to do a dialysis modality reviewing get education I will be seeing him in 3 months with repeat labs and told him to call me if there is any problems between visits  SUBJECTIVE:  Review of Systems   Constitution: Negative for chills, decreased appetite, fever and malaise/fatigue  HENT: Negative  Eyes: Negative  Cardiovascular: Negative  Negative for chest pain, dyspnea on exertion, leg swelling and orthopnea  Respiratory: Negative  Negative for cough, shortness of breath, sputum production and wheezing  Gastrointestinal: Negative for bloating, abdominal pain, anorexia, diarrhea, nausea and vomiting  Genitourinary: Negative for dysuria, flank pain, hematuria and incomplete emptying  Neurological: Negative  Psychiatric/Behavioral: Negative  Negative for altered mental status  OBJECTIVE:  Current Weight: Weight - Scale: 78 1 kg (172 lb 3 2 oz)  Carrie@[employer] com:     Blood pressure 118/64, pulse 70, height 5' 5" (1 651 m), weight 78 1 kg (172 lb 3 2 oz)  , Body mass index is 28 66 kg/m²  [unfilled]    Physical Exam: /64 (BP Location: Left arm, Patient Position: Sitting, Cuff Size: Standard)   Pulse 70   Ht 5' 5" (1 651 m)   Wt 78 1 kg (172 lb 3 2 oz)   BMI 28 66 kg/m²   Physical Exam   Constitutional: He is oriented to person, place, and time  No distress  HENT:   Head: Atraumatic  Mouth/Throat: Oropharynx is clear and moist    Eyes: Conjunctivae and EOM are normal    Neck: Neck supple  No JVD present  Cardiovascular: Normal rate and regular rhythm  Exam reveals no gallop and no friction rub  Murmur heard  No significant pretibial edema  Pulmonary/Chest: Effort normal and breath sounds normal  No respiratory distress  He has no wheezes  He has no rales  Abdominal: Soft  Bowel sounds are normal  He exhibits no distension  There is no tenderness  There is no rebound  Neurological: He is alert and oriented to person, place, and time  No asterixis  Patient is ambulating well with a cane     Psychiatric: He has a normal mood and affect         Medications:    Current Outpatient Medications:     acetaminophen (TYLENOL) 325 mg tablet, Take 2 tablets (650 mg total) by mouth 3 (three) times a day as needed for mild pain or moderate pain, Disp: 30 tablet, Rfl: 0    aspirin (ECOTRIN LOW STRENGTH) 81 mg EC tablet, Take 1 tablet (81 mg total) by mouth daily, Disp: 30 tablet, Rfl: 0    atorvastatin (LIPITOR) 40 mg tablet, Take 1 tablet (40 mg total) by mouth daily, Disp: 90 tablet, Rfl: 3    cholecalciferol 2000 units TABS, Take 1 tablet (2,000 Units total) by mouth daily, Disp: 60 tablet, Rfl: 0    finasteride (PROSCAR) 5 mg tablet, Take 1 tablet (5 mg total) by mouth daily, Disp: 90 tablet, Rfl: 3    glucose blood test strip, TEST ONCE DAILY, Disp: 50 each, Rfl: 0    levothyroxine 75 mcg tablet, TAKE 1 TABLET BY MOUTH EVERY DAY, Disp: 90 tablet, Rfl: 3    metoprolol tartrate (LOPRESSOR) 25 mg tablet, Take 1 tablet (25 mg total) by mouth 2 (two) times a day with meals, Disp: 60 tablet, Rfl: 0    ranolazine (RANEXA) 500 mg 12 hr tablet, TAKE 1 TABLET BY MOUTH EVERY 12 HOURS, Disp: 60 tablet, Rfl: 0    tamsulosin (FLOMAX) 0 4 mg, TAKE 1 CAPSULE BY MOUTH NIGHTLY FOR 30 DAYS , Disp: 30 capsule, Rfl: 0    torsemide (DEMADEX) 20 mg tablet, TAKE 1 TABLET BY MOUTH EVERY DAY, Disp: 30 tablet, Rfl: 1    TRADJENTA 5 MG TABS, TAKE 1 TABLET (5 MG) BY MOUTH DAILY, Disp: 90 tablet, Rfl: 3    warfarin (COUMADIN) 2 mg tablet, 1 tab po qd (Patient taking differently: daily 1 tab daily), Disp: 30 tablet, Rfl: 5    lidocaine (LIDODERM) 5 %, Apply 1 patch topically daily Remove & Discard patch within 12 hours or as directed by MD (Patient not taking: Reported on 1/21/2020), Disp: 30 patch, Rfl: 0    senna-docusate sodium (SENOKOT S) 8 6-50 mg per tablet, Take 2 tablets by mouth 2 (two) times a day (Patient not taking: Reported on 1/21/2020), Disp: 60 tablet, Rfl: 0    Laboratory Results:  Lab Results   Component Value Date    WBC 8 16 01/06/2020 HGB 12 2 01/06/2020    HCT 38 4 01/06/2020    MCV 99 (H) 01/06/2020     01/06/2020     Lab Results   Component Value Date    SODIUM 139 01/06/2020    K 4 6 01/06/2020     01/06/2020    CO2 27 01/06/2020    BUN 41 (H) 01/06/2020    CREATININE 4 16 (H) 01/06/2020    GLUC 91 09/13/2019    CALCIUM 9 0 01/06/2020     Lab Results   Component Value Date    CALCIUM 9 0 01/06/2020    PHOS 3 4 01/06/2020     No results found for: LABPROT

## 2020-01-21 NOTE — TELEPHONE ENCOUNTER
LM for patient to call back     I received a phone call from [facility] requesting a refill on metoprolol tartrate 50 mg twice daily   His chart says that he is taking 25 mg twice daily  Please clarify how patient is taking the medication and I will send in a refill request

## 2020-01-21 NOTE — LETTER
January 21, 2020     Donald Villa MD  [address]    Patient: Raine Stallings   YOB: 1932   Date of Visit: 1/21/2020       Dear Dr Patricia Lam:    Thank you for referring Raine Stallings to me for evaluation  Below are my notes for this consultation  If you have questions, please do not hesitate to call me  I look forward to following your patient along with you  Sincerely,        Miguel Marc MD        CC: No Recipients  Miguel Marc MD  1/21/2020  2:09 PM  Sign at close encounter  NEPHROLOGY PROGRESS NOTE    Raine Stallings 80 y o  male MRN: [id number]  Unit/Bed#:  Encounter: [id number]  Reason for Consult:  Chronic kidney disease stage 5    The patient is here and I have not seen him in almost a year and unfortunately he had been in the hospital between last summer related to urinary tract infection that he had back surgery was in rehab in [place]  He then was also admitted at times with sepsis and a blood infection  He walks in today with his cane he look strong he is feeling well he is eating good and really seems very upbeat positive  ASSESSMENT/PLAN:  1  Renal    Patient's chronic kidney disease stage 5 based on calculated GFR but he has no symptoms of uremia  He is doing much better clinically with respect to his energy appetite and ambulation after his back surgery and rehabilitation  Creatinine level through all this had been running in the mid to high 3 range but the most recent creatinine was around 4 1  After he was in the rehabilitation his wife informed me he was very volume overloaded on discharge in got admitted and was diuresed and now placed on maintenance diuretic which he was not on in the past   This could be why his creatinine is running slightly higher versus underlying progression  The patient's hemoglobin is excellent and he really looks well physically and is feeling well      We then began to discuss the future and we talked about dialysis in the future potential modalities and I informed him that he does not required at the present time it is just a good idea to be informed and understands  I explained to him the different types of dialysis including hemodialysis and peritoneal dialysis and we have scheduled him for [facility] education class  His wife did tell me they rescheduled for that in the past but he was sick and was not able to make it in  They will be seeing the [facility] to do a dialysis modality reviewing get education I will be seeing him in 3 months with repeat labs and told him to call me if there is any problems between visits  SUBJECTIVE:  Review of Systems   Constitution: Negative for chills, decreased appetite, fever and malaise/fatigue  HENT: Negative  Eyes: Negative  Cardiovascular: Negative  Negative for chest pain, dyspnea on exertion, leg swelling and orthopnea  Respiratory: Negative  Negative for cough, shortness of breath, sputum production and wheezing  Gastrointestinal: Negative for bloating, abdominal pain, anorexia, diarrhea, nausea and vomiting  Genitourinary: Negative for dysuria, flank pain, hematuria and incomplete emptying  Neurological: Negative  Psychiatric/Behavioral: Negative  Negative for altered mental status  OBJECTIVE:  Current Weight: Weight - Scale: 78 1 kg (172 lb 3 2 oz)  [place]@[employer]:     Blood pressure 118/64, pulse 70, height 5' 5" (1 651 m), weight 78 1 kg (172 lb 3 2 oz)  , Body mass index is 28 66 kg/m²  @[facility]@    Physical Exam: /64 (BP Location: Left arm, Patient Position: Sitting, Cuff Size: Standard)   Pulse 70   Ht 5' 5" (1 651 m)   Wt 78 1 kg (172 lb 3 2 oz)   BMI 28 66 kg/m²    Physical Exam   Constitutional: He is oriented to person, place, and time  No distress  HENT:   Head: Atraumatic     Mouth/Throat: Oropharynx is clear and moist    Eyes: Conjunctivae and EOM are normal    Neck: Neck supple  No JVD present  Cardiovascular: Normal rate and regular rhythm  Exam reveals no gallop and no friction rub  Murmur heard  No significant pretibial edema  Pulmonary/Chest: Effort normal and breath sounds normal  No respiratory distress  He has no wheezes  He has no rales  Abdominal: Soft  Bowel sounds are normal  He exhibits no distension  There is no tenderness  There is no rebound  Neurological: He is alert and oriented to person, place, and time  No asterixis  Patient is ambulating well with a cane  Psychiatric: He has a normal mood and affect         Medications:    Current Outpatient Medications:     acetaminophen (TYLENOL) 325 mg tablet, Take 2 tablets (650 mg total) by mouth 3 (three) times a day as needed for mild pain or moderate pain, Disp: 30 tablet, Rfl: 0    aspirin (ECOTRIN LOW STRENGTH) 81 mg EC tablet, Take 1 tablet (81 mg total) by mouth daily, Disp: 30 tablet, Rfl: 0    atorvastatin (LIPITOR) 40 mg tablet, Take 1 tablet (40 mg total) by mouth daily, Disp: 90 tablet, Rfl: 3    cholecalciferol 2000 units TABS, Take 1 tablet (2,000 Units total) by mouth daily, Disp: 60 tablet, Rfl: 0    finasteride (PROSCAR) 5 mg tablet, Take 1 tablet (5 mg total) by mouth daily, Disp: 90 tablet, Rfl: 3    glucose blood test strip, TEST ONCE DAILY, Disp: 50 each, Rfl: 0    levothyroxine 75 mcg tablet, TAKE 1 TABLET BY MOUTH EVERY DAY, Disp: 90 tablet, Rfl: 3    metoprolol tartrate (LOPRESSOR) 25 mg tablet, Take 1 tablet (25 mg total) by mouth 2 (two) times a day with meals, Disp: 60 tablet, Rfl: 0    ranolazine (RANEXA) 500 mg 12 hr tablet, TAKE 1 TABLET BY MOUTH EVERY 12 HOURS, Disp: 60 tablet, Rfl: 0    tamsulosin (FLOMAX) 0 4 mg, TAKE 1 CAPSULE BY MOUTH NIGHTLY FOR 30 DAYS , Disp: 30 capsule, Rfl: 0    torsemide (DEMADEX) 20 mg tablet, TAKE 1 TABLET BY MOUTH EVERY DAY, Disp: 30 tablet, Rfl: 1    TRADJENTA 5 MG TABS, TAKE 1 TABLET (5 MG) BY MOUTH DAILY, Disp: 90 tablet, Rfl: 3   warfarin (COUMADIN) 2 mg tablet, 1 tab po qd (Patient taking differently: daily 1 tab daily), Disp: 30 tablet, Rfl: 5    lidocaine (LIDODERM) 5 %, Apply 1 patch topically daily Remove & Discard patch within 12 hours or as directed by MD (Patient not taking: Reported on 1/21/2020), Disp: 30 patch, Rfl: 0    senna-docusate sodium (SENOKOT S) 8 6-50 mg per tablet, Take 2 tablets by mouth 2 (two) times a day (Patient not taking: Reported on 1/21/2020), Disp: 60 tablet, Rfl: 0    Laboratory Results:  Lab Results   Component Value Date    WBC 8 16 01/06/2020    HGB 12 2 01/06/2020    HCT 38 4 01/06/2020    MCV 99 (H) 01/06/2020     01/06/2020     Lab Results   Component Value Date    SODIUM 139 01/06/2020    K 4 6 01/06/2020     01/06/2020    CO2 27 01/06/2020    BUN 41 (H) 01/06/2020    CREATININE 4 16 (H) 01/06/2020    GLUC 91 09/13/2019    CALCIUM 9 0 01/06/2020     Lab Results   Component Value Date    CALCIUM 9 0 01/06/2020    PHOS 3 4 01/06/2020     No results found for: LABPROT

## 2020-01-21 NOTE — PATIENT INSTRUCTIONS
You are here for follow-up and we reviewed the last year that you really were hospitalized many times in rehabilitation is in hospitals you had back surgery you had sepsis you had urinary tract infections  Saying all the hat you look absolutely great today  It seems the back surgeries helped you your walking around better you look strong  Your creatinine level which is the blood test for the kidney function is around to 4 and when I had seen you close to a year ago was running in the mid 3 range  The creatinine may be slightly higher because at this point your on a diuretic to prevent swelling but that is necessary  You do not seem to have any symptoms related to the kidney dysfunction at this point  We did discuss setting you up for an education session to learn about different types of dialysis including hemodialysis and peritoneal dialysis which he can do at home  Again your interested in really just learning about this so I will have the [facility] team contact you to set up the appointment  In the meantime just continue current medications no changes obtain lab work and follow-up as scheduled

## 2020-01-22 DIAGNOSIS — R39.11 URINARY HESITANCY: ICD-10-CM

## 2020-01-22 DIAGNOSIS — R60.1 GENERALIZED EDEMA: ICD-10-CM

## 2020-01-22 RX ORDER — TAMSULOSIN HYDROCHLORIDE 0.4 MG/1
CAPSULE ORAL
Qty: 30 CAPSULE | Refills: 0 | Status: SHIPPED | OUTPATIENT
Start: 2020-01-22 | End: 2020-02-13

## 2020-01-22 RX ORDER — TORSEMIDE 20 MG/1
TABLET ORAL
Qty: 30 TABLET | Refills: 0 | Status: SHIPPED | OUTPATIENT
Start: 2020-01-22 | End: 2020-02-14

## 2020-01-28 DIAGNOSIS — I25.10 3-VESSEL CORONARY ARTERY DISEASE: ICD-10-CM

## 2020-01-28 DIAGNOSIS — R07.9 CHEST PAIN: ICD-10-CM

## 2020-01-28 RX ORDER — RANOLAZINE 500 MG/1
TABLET, EXTENDED RELEASE ORAL
Qty: 180 TABLET | Refills: 0 | Status: SHIPPED | OUTPATIENT
Start: 2020-01-28 | End: 2020-03-23

## 2020-02-06 ENCOUNTER — ANTICOAG VISIT (OUTPATIENT)
Dept: FAMILY MEDICINE CLINIC | Facility: CLINIC | Age: 85
End: 2020-02-06

## 2020-02-06 ENCOUNTER — APPOINTMENT (OUTPATIENT)
Dept: LAB | Age: 85
End: 2020-02-06
Payer: MEDICARE

## 2020-02-13 ENCOUNTER — REMOTE DEVICE CLINIC VISIT (OUTPATIENT)
Dept: CARDIOLOGY CLINIC | Facility: CLINIC | Age: 85
End: 2020-02-13
Payer: MEDICARE

## 2020-02-13 DIAGNOSIS — R39.11 URINARY HESITANCY: ICD-10-CM

## 2020-02-13 DIAGNOSIS — Z95.0 CARDIAC PACEMAKER IN SITU: Primary | ICD-10-CM

## 2020-02-13 PROCEDURE — 93294 REM INTERROG EVL PM/LDLS PM: CPT | Performed by: INTERNAL MEDICINE

## 2020-02-13 PROCEDURE — 93296 REM INTERROG EVL PM/IDS: CPT | Performed by: INTERNAL MEDICINE

## 2020-02-13 RX ORDER — TAMSULOSIN HYDROCHLORIDE 0.4 MG/1
CAPSULE ORAL
Qty: 30 CAPSULE | Refills: 0 | Status: SHIPPED | OUTPATIENT
Start: 2020-02-13 | End: 2020-03-11

## 2020-02-13 NOTE — PROGRESS NOTES
Results for orders placed or performed in visit on 02/13/20   Cardiac EP device report    Narrative    MDT DUAL CHAMBER PM  CARELINK TRANSMISSION: BATTERY VOLTAGE ADEQUATE (4 YRS)  AP-99%, -0 3%  ALL AVAILABLE LEAD PARAMETERS WITHIN NORMAL LIMITS  NO SIGNIFICANT HIGH RATE EPISODES  NORMAL DEVICE FUNCTION   GV

## 2020-02-14 DIAGNOSIS — R60.1 GENERALIZED EDEMA: ICD-10-CM

## 2020-02-14 RX ORDER — TORSEMIDE 20 MG/1
TABLET ORAL
Qty: 30 TABLET | Refills: 1 | Status: SHIPPED | OUTPATIENT
Start: 2020-02-14 | End: 2020-03-10

## 2020-02-19 DIAGNOSIS — E11.22: Primary | ICD-10-CM

## 2020-02-19 DIAGNOSIS — N18.5: Primary | ICD-10-CM

## 2020-03-06 ENCOUNTER — ANTICOAG VISIT (OUTPATIENT)
Dept: FAMILY MEDICINE CLINIC | Facility: CLINIC | Age: 85
End: 2020-03-06

## 2020-03-06 ENCOUNTER — APPOINTMENT (OUTPATIENT)
Dept: LAB | Age: 85
End: 2020-03-06
Payer: MEDICARE

## 2020-03-10 DIAGNOSIS — R60.1 GENERALIZED EDEMA: ICD-10-CM

## 2020-03-10 RX ORDER — TORSEMIDE 20 MG/1
TABLET ORAL
Qty: 30 TABLET | Refills: 1 | Status: SHIPPED | OUTPATIENT
Start: 2020-03-10 | End: 2020-04-12

## 2020-03-11 ENCOUNTER — ANTICOAG VISIT (OUTPATIENT)
Dept: FAMILY MEDICINE CLINIC | Facility: CLINIC | Age: 85
End: 2020-03-11

## 2020-03-11 ENCOUNTER — APPOINTMENT (OUTPATIENT)
Dept: LAB | Age: 85
End: 2020-03-11
Payer: MEDICARE

## 2020-03-11 DIAGNOSIS — R39.11 URINARY HESITANCY: ICD-10-CM

## 2020-03-11 DIAGNOSIS — I48.0 PAROXYSMAL A-FIB (HCC): ICD-10-CM

## 2020-03-11 LAB
INR PPP: 2.51 (ref 0.84–1.19)
PROTHROMBIN TIME: 26.6 SECONDS (ref 11.6–14.5)

## 2020-03-11 PROCEDURE — 36415 COLL VENOUS BLD VENIPUNCTURE: CPT

## 2020-03-11 PROCEDURE — 85610 PROTHROMBIN TIME: CPT

## 2020-03-11 RX ORDER — TAMSULOSIN HYDROCHLORIDE 0.4 MG/1
CAPSULE ORAL
Qty: 30 CAPSULE | Refills: 0 | Status: SHIPPED | OUTPATIENT
Start: 2020-03-11 | End: 2020-04-12

## 2020-03-23 DIAGNOSIS — R07.9 CHEST PAIN: ICD-10-CM

## 2020-03-23 DIAGNOSIS — I25.10 3-VESSEL CORONARY ARTERY DISEASE: ICD-10-CM

## 2020-03-23 DIAGNOSIS — I48.0 PAROXYSMAL A-FIB (HCC): ICD-10-CM

## 2020-03-23 RX ORDER — WARFARIN SODIUM 2 MG/1
TABLET ORAL
Qty: 90 TABLET | Refills: 1 | Status: SHIPPED | OUTPATIENT
Start: 2020-03-23 | End: 2020-09-14

## 2020-03-23 RX ORDER — RANOLAZINE 500 MG/1
TABLET, EXTENDED RELEASE ORAL
Qty: 170 TABLET | Refills: 1 | Status: SHIPPED | OUTPATIENT
Start: 2020-03-23 | End: 2020-07-13

## 2020-03-24 ENCOUNTER — TELEPHONE (OUTPATIENT)
Dept: FAMILY MEDICINE CLINIC | Facility: CLINIC | Age: 85
End: 2020-03-24

## 2020-03-25 ENCOUNTER — TELEPHONE (OUTPATIENT)
Dept: NEPHROLOGY | Facility: CLINIC | Age: 85
End: 2020-03-25

## 2020-03-25 NOTE — TELEPHONE ENCOUNTER
[place] for patient asking him to call back regarding his appointment on 4/14/20  I need to go over telemedicine appointments with him  I told him he could ask for me

## 2020-03-27 ENCOUNTER — TELEPHONE (OUTPATIENT)
Dept: FAMILY MEDICINE CLINIC | Facility: CLINIC | Age: 85
End: 2020-03-27

## 2020-03-27 DIAGNOSIS — N18.5: ICD-10-CM

## 2020-03-27 DIAGNOSIS — E11.22: ICD-10-CM

## 2020-03-27 PROBLEM — I10 ESSENTIAL HYPERTENSION: Status: ACTIVE | Noted: 2020-03-27

## 2020-03-27 NOTE — TELEPHONE ENCOUNTER
Patient's wife called  He needs a refill on his test strips, but they want to know if you can change the prescription to read test twice daily   Patient is testing twice daily and running out early

## 2020-04-06 ENCOUNTER — APPOINTMENT (OUTPATIENT)
Dept: LAB | Facility: HOSPITAL | Age: 85
End: 2020-04-06
Payer: MEDICARE

## 2020-04-06 DIAGNOSIS — N18.9 CHRONIC KIDNEY DISEASE, UNSPECIFIED CKD STAGE: ICD-10-CM

## 2020-04-06 LAB
ANION GAP SERPL CALCULATED.3IONS-SCNC: 6 MMOL/L (ref 4–13)
BASOPHILS # BLD AUTO: 0.04 THOUSANDS/ΜL (ref 0–0.1)
BASOPHILS NFR BLD AUTO: 1 % (ref 0–1)
BUN SERPL-MCNC: 56 MG/DL (ref 5–25)
CALCIUM SERPL-MCNC: 8.8 MG/DL (ref 8.3–10.1)
CHLORIDE SERPL-SCNC: 107 MMOL/L (ref 100–108)
CO2 SERPL-SCNC: 27 MMOL/L (ref 21–32)
CREAT SERPL-MCNC: 3.83 MG/DL (ref 0.6–1.3)
EOSINOPHIL # BLD AUTO: 0.31 THOUSAND/ΜL (ref 0–0.61)
EOSINOPHIL NFR BLD AUTO: 4 % (ref 0–6)
ERYTHROCYTE [DISTWIDTH] IN BLOOD BY AUTOMATED COUNT: 12.8 % (ref 11.6–15.1)
GFR SERPL CREATININE-BSD FRML MDRD: 13 ML/MIN/1.73SQ M
GLUCOSE SERPL-MCNC: 129 MG/DL (ref 65–140)
HCT VFR BLD AUTO: 38.8 % (ref 36.5–49.3)
HGB BLD-MCNC: 12.4 G/DL (ref 12–17)
IMM GRANULOCYTES # BLD AUTO: 0.04 THOUSAND/UL (ref 0–0.2)
IMM GRANULOCYTES NFR BLD AUTO: 1 % (ref 0–2)
LYMPHOCYTES # BLD AUTO: 2.05 THOUSANDS/ΜL (ref 0.6–4.47)
LYMPHOCYTES NFR BLD AUTO: 25 % (ref 14–44)
MCH RBC QN AUTO: 31.2 PG (ref 26.8–34.3)
MCHC RBC AUTO-ENTMCNC: 32 G/DL (ref 31.4–37.4)
MCV RBC AUTO: 98 FL (ref 82–98)
MONOCYTES # BLD AUTO: 0.69 THOUSAND/ΜL (ref 0.17–1.22)
MONOCYTES NFR BLD AUTO: 8 % (ref 4–12)
NEUTROPHILS # BLD AUTO: 5.07 THOUSANDS/ΜL (ref 1.85–7.62)
NEUTS SEG NFR BLD AUTO: 61 % (ref 43–75)
NRBC BLD AUTO-RTO: 0 /100 WBCS
PLATELET # BLD AUTO: 200 THOUSANDS/UL (ref 149–390)
PMV BLD AUTO: 10.7 FL (ref 8.9–12.7)
POTASSIUM SERPL-SCNC: 4.4 MMOL/L (ref 3.5–5.3)
RBC # BLD AUTO: 3.97 MILLION/UL (ref 3.88–5.62)
SODIUM SERPL-SCNC: 140 MMOL/L (ref 136–145)
WBC # BLD AUTO: 8.2 THOUSAND/UL (ref 4.31–10.16)

## 2020-04-06 PROCEDURE — 85025 COMPLETE CBC W/AUTO DIFF WBC: CPT

## 2020-04-06 PROCEDURE — 80048 BASIC METABOLIC PNL TOTAL CA: CPT

## 2020-04-07 ENCOUNTER — ANTICOAG VISIT (OUTPATIENT)
Dept: FAMILY MEDICINE CLINIC | Facility: CLINIC | Age: 85
End: 2020-04-07

## 2020-04-10 ENCOUNTER — OFFICE VISIT (OUTPATIENT)
Dept: FAMILY MEDICINE CLINIC | Facility: CLINIC | Age: 85
End: 2020-04-10
Payer: MEDICARE

## 2020-04-10 VITALS
TEMPERATURE: 98.2 F | HEIGHT: 65 IN | SYSTOLIC BLOOD PRESSURE: 120 MMHG | BODY MASS INDEX: 28.82 KG/M2 | HEART RATE: 74 BPM | DIASTOLIC BLOOD PRESSURE: 76 MMHG | WEIGHT: 173 LBS

## 2020-04-10 DIAGNOSIS — E11.22 TYPE 2 DIABETES MELLITUS WITH STAGE 5 CHRONIC KIDNEY DISEASE NOT ON CHRONIC DIALYSIS, WITHOUT LONG-TERM CURRENT USE OF INSULIN (HCC): Primary | ICD-10-CM

## 2020-04-10 DIAGNOSIS — N18.5 TYPE 2 DIABETES MELLITUS WITH STAGE 5 CHRONIC KIDNEY DISEASE NOT ON CHRONIC DIALYSIS, WITHOUT LONG-TERM CURRENT USE OF INSULIN (HCC): Primary | ICD-10-CM

## 2020-04-10 DIAGNOSIS — I50.32 CHRONIC DIASTOLIC HEART FAILURE (HCC): ICD-10-CM

## 2020-04-10 DIAGNOSIS — J61 ASBESTOSIS (HCC): ICD-10-CM

## 2020-04-10 DIAGNOSIS — E03.9 ACQUIRED HYPOTHYROIDISM: ICD-10-CM

## 2020-04-10 DIAGNOSIS — I48.0 PAROXYSMAL A-FIB (HCC): ICD-10-CM

## 2020-04-10 DIAGNOSIS — I10 ESSENTIAL HYPERTENSION: ICD-10-CM

## 2020-04-10 DIAGNOSIS — I25.10 3-VESSEL CORONARY ARTERY DISEASE: ICD-10-CM

## 2020-04-10 DIAGNOSIS — N18.5 CKD (CHRONIC KIDNEY DISEASE) STAGE 5, GFR LESS THAN 15 ML/MIN (HCC): ICD-10-CM

## 2020-04-10 LAB — SL AMB POCT HEMOGLOBIN AIC: 6.2 (ref ?–6.5)

## 2020-04-10 PROCEDURE — 3044F HG A1C LEVEL LT 7.0%: CPT | Performed by: FAMILY MEDICINE

## 2020-04-10 PROCEDURE — 1036F TOBACCO NON-USER: CPT | Performed by: FAMILY MEDICINE

## 2020-04-10 PROCEDURE — 4040F PNEUMOC VAC/ADMIN/RCVD: CPT | Performed by: FAMILY MEDICINE

## 2020-04-10 PROCEDURE — 3066F NEPHROPATHY DOC TX: CPT | Performed by: FAMILY MEDICINE

## 2020-04-10 PROCEDURE — 99215 OFFICE O/P EST HI 40 MIN: CPT | Performed by: FAMILY MEDICINE

## 2020-04-10 PROCEDURE — 83036 HEMOGLOBIN GLYCOSYLATED A1C: CPT | Performed by: FAMILY MEDICINE

## 2020-04-10 PROCEDURE — 1160F RVW MEDS BY RX/DR IN RCRD: CPT | Performed by: FAMILY MEDICINE

## 2020-04-10 PROCEDURE — 3008F BODY MASS INDEX DOCD: CPT | Performed by: FAMILY MEDICINE

## 2020-04-12 DIAGNOSIS — R60.1 GENERALIZED EDEMA: ICD-10-CM

## 2020-04-12 DIAGNOSIS — R39.11 URINARY HESITANCY: ICD-10-CM

## 2020-04-12 RX ORDER — TAMSULOSIN HYDROCHLORIDE 0.4 MG/1
CAPSULE ORAL
Qty: 30 CAPSULE | Refills: 0 | Status: SHIPPED | OUTPATIENT
Start: 2020-04-12 | End: 2020-05-11

## 2020-04-12 RX ORDER — TORSEMIDE 20 MG/1
TABLET ORAL
Qty: 30 TABLET | Refills: 1 | Status: SHIPPED | OUTPATIENT
Start: 2020-04-12 | End: 2020-09-15

## 2020-04-14 ENCOUNTER — TELEPHONE (OUTPATIENT)
Dept: NEPHROLOGY | Facility: CLINIC | Age: 85
End: 2020-04-14

## 2020-04-14 ENCOUNTER — TELEMEDICINE (OUTPATIENT)
Dept: NEPHROLOGY | Facility: CLINIC | Age: 85
End: 2020-04-14
Payer: MEDICARE

## 2020-04-14 VITALS — SYSTOLIC BLOOD PRESSURE: 122 MMHG | DIASTOLIC BLOOD PRESSURE: 70 MMHG | HEART RATE: 70 BPM

## 2020-04-14 DIAGNOSIS — N18.5 CKD (CHRONIC KIDNEY DISEASE) STAGE 5, GFR LESS THAN 15 ML/MIN (HCC): Primary | ICD-10-CM

## 2020-04-14 PROCEDURE — 99214 OFFICE O/P EST MOD 30 MIN: CPT | Performed by: INTERNAL MEDICINE

## 2020-04-15 DIAGNOSIS — N18.5 CKD (CHRONIC KIDNEY DISEASE) STAGE 5, GFR LESS THAN 15 ML/MIN (HCC): Primary | ICD-10-CM

## 2020-04-17 DIAGNOSIS — E03.9 HYPOTHYROIDISM, UNSPECIFIED TYPE: ICD-10-CM

## 2020-04-17 RX ORDER — LEVOTHYROXINE SODIUM 0.07 MG/1
TABLET ORAL
Qty: 90 TABLET | Refills: 3 | Status: SHIPPED | OUTPATIENT
Start: 2020-04-17 | End: 2020-07-08 | Stop reason: CLARIF

## 2020-04-17 RX ORDER — LEVOTHYROXINE SODIUM 0.07 MG/1
75 TABLET ORAL DAILY
Qty: 90 TABLET | Refills: 3 | Status: SHIPPED | OUTPATIENT
Start: 2020-04-17 | End: 2021-04-22

## 2020-04-27 ENCOUNTER — TELEPHONE (OUTPATIENT)
Dept: LAB | Facility: HOSPITAL | Age: 85
End: 2020-04-27

## 2020-04-27 ENCOUNTER — TRANSCRIBE ORDERS (OUTPATIENT)
Dept: LAB | Facility: HOSPITAL | Age: 85
End: 2020-04-27

## 2020-04-27 DIAGNOSIS — I48.0 PAROXYSMAL ATRIAL FIBRILLATION (HCC): Primary | ICD-10-CM

## 2020-05-06 ENCOUNTER — ANTICOAG VISIT (OUTPATIENT)
Dept: FAMILY MEDICINE CLINIC | Facility: CLINIC | Age: 85
End: 2020-05-06

## 2020-05-06 ENCOUNTER — APPOINTMENT (OUTPATIENT)
Dept: LAB | Facility: HOSPITAL | Age: 85
End: 2020-05-06
Payer: MEDICARE

## 2020-05-09 DIAGNOSIS — R39.11 URINARY HESITANCY: ICD-10-CM

## 2020-05-11 RX ORDER — TAMSULOSIN HYDROCHLORIDE 0.4 MG/1
CAPSULE ORAL
Qty: 30 CAPSULE | Refills: 0 | Status: SHIPPED | OUTPATIENT
Start: 2020-05-11 | End: 2020-05-19

## 2020-05-13 ENCOUNTER — REMOTE DEVICE CLINIC VISIT (OUTPATIENT)
Dept: CARDIOLOGY CLINIC | Facility: CLINIC | Age: 85
End: 2020-05-13
Payer: MEDICARE

## 2020-05-13 DIAGNOSIS — Z95.0 PRESENCE OF PERMANENT CARDIAC PACEMAKER: Primary | ICD-10-CM

## 2020-05-13 PROCEDURE — 93294 REM INTERROG EVL PM/LDLS PM: CPT | Performed by: INTERNAL MEDICINE

## 2020-05-13 PROCEDURE — 93296 REM INTERROG EVL PM/IDS: CPT | Performed by: INTERNAL MEDICINE

## 2020-05-19 DIAGNOSIS — N18.5: ICD-10-CM

## 2020-05-19 DIAGNOSIS — R39.11 URINARY HESITANCY: ICD-10-CM

## 2020-05-19 DIAGNOSIS — E11.22: ICD-10-CM

## 2020-05-19 RX ORDER — TAMSULOSIN HYDROCHLORIDE 0.4 MG/1
CAPSULE ORAL
Qty: 30 CAPSULE | Refills: 0 | Status: SHIPPED | OUTPATIENT
Start: 2020-05-19 | End: 2020-07-07

## 2020-06-01 ENCOUNTER — TELEPHONE (OUTPATIENT)
Dept: LAB | Facility: HOSPITAL | Age: 85
End: 2020-06-01

## 2020-06-05 ENCOUNTER — ANTICOAG VISIT (OUTPATIENT)
Dept: FAMILY MEDICINE CLINIC | Facility: CLINIC | Age: 85
End: 2020-06-05

## 2020-06-05 ENCOUNTER — APPOINTMENT (OUTPATIENT)
Dept: LAB | Facility: HOSPITAL | Age: 85
End: 2020-06-05
Payer: MEDICARE

## 2020-06-05 DIAGNOSIS — I48.0 PAROXYSMAL A-FIB (HCC): ICD-10-CM

## 2020-06-05 DIAGNOSIS — R06.02 SHORTNESS OF BREATH: ICD-10-CM

## 2020-06-05 DIAGNOSIS — R60.0 LEG EDEMA, LEFT: ICD-10-CM

## 2020-06-05 LAB — NT-PROBNP SERPL-MCNC: 2862 PG/ML

## 2020-06-05 PROCEDURE — 83880 ASSAY OF NATRIURETIC PEPTIDE: CPT

## 2020-06-09 ENCOUNTER — TELEPHONE (OUTPATIENT)
Dept: FAMILY MEDICINE CLINIC | Facility: CLINIC | Age: 85
End: 2020-06-09

## 2020-07-03 ENCOUNTER — APPOINTMENT (OUTPATIENT)
Dept: LAB | Facility: HOSPITAL | Age: 85
End: 2020-07-03
Payer: MEDICARE

## 2020-07-03 DIAGNOSIS — N18.5 CKD (CHRONIC KIDNEY DISEASE) STAGE 5, GFR LESS THAN 15 ML/MIN (HCC): ICD-10-CM

## 2020-07-03 LAB
ANION GAP SERPL CALCULATED.3IONS-SCNC: 8 MMOL/L (ref 4–13)
BUN SERPL-MCNC: 46 MG/DL (ref 5–25)
CALCIUM SERPL-MCNC: 8.5 MG/DL (ref 8.3–10.1)
CHLORIDE SERPL-SCNC: 101 MMOL/L (ref 100–108)
CO2 SERPL-SCNC: 26 MMOL/L (ref 21–32)
CREAT SERPL-MCNC: 3.95 MG/DL (ref 0.6–1.3)
GFR SERPL CREATININE-BSD FRML MDRD: 13 ML/MIN/1.73SQ M
GLUCOSE SERPL-MCNC: 220 MG/DL (ref 65–140)
POTASSIUM SERPL-SCNC: 4.2 MMOL/L (ref 3.5–5.3)
SODIUM SERPL-SCNC: 135 MMOL/L (ref 136–145)

## 2020-07-03 PROCEDURE — 80048 BASIC METABOLIC PNL TOTAL CA: CPT

## 2020-07-06 ENCOUNTER — ANTICOAG VISIT (OUTPATIENT)
Dept: FAMILY MEDICINE CLINIC | Facility: CLINIC | Age: 85
End: 2020-07-06

## 2020-07-06 DIAGNOSIS — N18.5: ICD-10-CM

## 2020-07-06 DIAGNOSIS — E11.22: ICD-10-CM

## 2020-07-07 ENCOUNTER — IN-CLINIC DEVICE VISIT (OUTPATIENT)
Dept: CARDIOLOGY CLINIC | Facility: MEDICAL CENTER | Age: 85
End: 2020-07-07
Payer: MEDICARE

## 2020-07-07 DIAGNOSIS — Z95.0 PRESENCE OF PERMANENT CARDIAC PACEMAKER: Primary | ICD-10-CM

## 2020-07-07 DIAGNOSIS — R39.11 URINARY HESITANCY: ICD-10-CM

## 2020-07-07 PROCEDURE — 93280 PM DEVICE PROGR EVAL DUAL: CPT | Performed by: INTERNAL MEDICINE

## 2020-07-07 RX ORDER — TAMSULOSIN HYDROCHLORIDE 0.4 MG/1
CAPSULE ORAL
Qty: 30 CAPSULE | Refills: 0 | Status: SHIPPED | OUTPATIENT
Start: 2020-07-07 | End: 2020-08-04

## 2020-07-07 NOTE — PROGRESS NOTES
MDT DUAL CHAMBER PM/ ACTIVE SYSTEM IS MRI CONDITIONAL  DEVICE INTERROGATED IN THE [place] OFFICE:  BATTERY VOLTAGE ADEQUATE (4 YR)   AP 96 2%  1 1%    ALL LEAD PARAMETERS WITHIN NORMAL LIMITS   NO NEW HIGH RATE EPISODES   NO PROGRAMMING CHANGES MADE TO DEVICE PARAMETERS   NORMAL DEVICE FUNCTION  Shalini Ibrahim

## 2020-07-08 ENCOUNTER — TELEMEDICINE (OUTPATIENT)
Dept: NEPHROLOGY | Facility: CLINIC | Age: 85
End: 2020-07-08
Payer: MEDICARE

## 2020-07-08 VITALS
BODY MASS INDEX: 27.62 KG/M2 | HEART RATE: 70 BPM | DIASTOLIC BLOOD PRESSURE: 70 MMHG | WEIGHT: 166 LBS | SYSTOLIC BLOOD PRESSURE: 118 MMHG

## 2020-07-08 DIAGNOSIS — E11.22 TYPE 2 DIABETES MELLITUS WITH STAGE 5 CHRONIC KIDNEY DISEASE NOT ON CHRONIC DIALYSIS, WITHOUT LONG-TERM CURRENT USE OF INSULIN (HCC): Primary | ICD-10-CM

## 2020-07-08 DIAGNOSIS — N18.5 TYPE 2 DIABETES MELLITUS WITH STAGE 5 CHRONIC KIDNEY DISEASE NOT ON CHRONIC DIALYSIS, WITHOUT LONG-TERM CURRENT USE OF INSULIN (HCC): Primary | ICD-10-CM

## 2020-07-08 DIAGNOSIS — N18.4 STAGE 4 CHRONIC KIDNEY DISEASE (HCC): ICD-10-CM

## 2020-07-08 PROCEDURE — 4040F PNEUMOC VAC/ADMIN/RCVD: CPT | Performed by: INTERNAL MEDICINE

## 2020-07-08 PROCEDURE — 1036F TOBACCO NON-USER: CPT | Performed by: INTERNAL MEDICINE

## 2020-07-08 PROCEDURE — 99443 PR PHYS/QHP TELEPHONE EVALUATION 21-30 MIN: CPT | Performed by: INTERNAL MEDICINE

## 2020-07-08 PROCEDURE — 3066F NEPHROPATHY DOC TX: CPT | Performed by: INTERNAL MEDICINE

## 2020-07-08 PROCEDURE — 3044F HG A1C LEVEL LT 7.0%: CPT | Performed by: INTERNAL MEDICINE

## 2020-07-08 NOTE — LETTER
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  He had protein estimations under a gram in January of this year  Creatinine is stable at 3 9 from 8 months ago so there has been no significant progression  Eating well and in speaking to him and his wife they have been watching fluid and swelling and has been doing great  At this point continue current medications and he has good BP control and glycemic control and he is on a statin as well  At this time I would not use an ACEI or ARB as had low proteinuria and do not want to cause elevation in creatinine from medication  Follow up October and monitor  They were told to call if any questions before next visit              Problem List Items Addressed This Visit        Endocrine    RESOLVED: 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 ([facility] ) - Primary      Other Visit Diagnoses     [facility]        Relevant Orders    Basic metabolic panel    CBC and differential               Reason for visit is   Chief Complaint   Patient presents with    Virtual Regular Visit        Encounter provider Melanie Vargas MD    Provider located at [address] [id number]      Recent Visits  No visits were found meeting these conditions  Showing recent visits within past 7 days and meeting all other requirements     Today's Visits  Date Type Provider Dept   07/08/20 Telemedicine Adam Aggarwal MD [facility]   Showing today's visits and meeting all other requirements     Future Appointments  No visits were found meeting these conditions  Showing future appointments within next 150 days and meeting all other requirements        The patient was identified by name and date of birth  Ko Vargas was informed that this is a telemedicine visit and that the visit is being conducted through telephone  My office door was closed  No one else was in the room  He acknowledged consent and understanding of privacy and security of the video platform  The patient has agreed to participate and understands they can discontinue the visit at any time  It was my intent to perform this visit via video technology but the patient was not able to do a video connection so the visit was completed via audio telephone only  Patient is aware this is a billable service  Subjective  Ko Vargas is a 80 y o  male who is followed for CKD  [facility]     The patient was contacted and he is feeling well  He has no specific complaints and feels well  No intercurrent illnesses or hospitalizations  He is emptying bladder well  We personally reviewed the medications      Past Medical History:   Diagnosis Date    3-vessel coronary artery disease     last assessed: 08/15/2016    BPH (benign prostatic hyperplasia)     Chronic kidney disease     Diabetes mellitus ([facility] [address] )     Hyperlipidemia     Hypertension     Shingles 04/29/2019    SOB (shortness of breath)     Stroke [facility])        Past Surgical History:   Procedure Laterality Date    CARDIAC PACEMAKER PLACEMENT      CATARACT EXTRACTION      last assessed: 11/11/2015    CORONARY ARTERY BYPASS GRAFT      last assessed: 08/26/2015       Current Outpatient Medications Medication Sig Dispense Refill    acetaminophen (TYLENOL) 325 mg tablet Take 2 tablets (650 mg total) by mouth 3 (three) times a day as needed for mild pain or moderate pain 30 tablet 0    aspirin (ECOTRIN LOW STRENGTH) 81 mg EC tablet Take 1 tablet (81 mg total) by mouth daily 30 tablet 0    atorvastatin (LIPITOR) 40 mg tablet Take 1 tablet (40 mg total) by mouth daily 90 tablet 3    cholecalciferol 2000 units TABS Take 1 tablet (2,000 Units total) by mouth daily 60 tablet 0    finasteride (PROSCAR) 5 mg tablet Take 1 tablet (5 mg total) by mouth daily 90 tablet 3    glucose blood test strip PATIENT TESTS BLOOD SUGARS BID  PATIENT IS NOT INSULIN DEPENDENT  DX E11 20 100 each 3    levothyroxine 75 mcg tablet Take 1 tablet (75 mcg total) by mouth daily 90 tablet 3    metoprolol tartrate (LOPRESSOR) 50 mg tablet Take 0 5 tablets (25 mg total) by mouth 2 (two) times a day with meals 90 tablet 1    ranolazine (RANEXA) 500 mg 12 hr tablet TAKE 1 TABLET BY MOUTH EVERY 12 HOURS 170 tablet 1    tamsulosin (FLOMAX) 0 4 mg TAKE 1 CAPSULE BY MOUTH NIGHTLY FOR 30 DAYS  30 capsule 0    torsemide (DEMADEX) 20 mg tablet TAKE 1 TABLET BY MOUTH EVERY DAY 30 tablet 1    TRADJENTA 5 MG TABS TAKE 1 TABLET (5 MG) BY MOUTH DAILY 90 tablet 3    warfarin (COUMADIN) 2 mg tablet TAKE 1 TABLET BY MOUTH EVERY DAY 90 tablet 1    lidocaine (LIDODERM) 5 % Apply 1 patch topically daily Remove & Discard patch within 12 hours or as directed by MD (Patient not taking: Reported on 1/21/2020) 30 patch 0    senna-docusate sodium (SENOKOT S) 8 6-50 mg per tablet Take 2 tablets by mouth 2 (two) times a day (Patient not taking: Reported on 1/21/2020) 60 tablet 0     No current facility-administered medications for this visit  No Known Allergies    Review of Systems   Constitutional: Negative for appetite change, chills, fatigue and fever  HENT: Negative  Eyes: Negative  Respiratory: Negative    Negative for cough, chest tightness, shortness of breath and wheezing  Cardiovascular: Negative  Negative for chest pain, palpitations and leg swelling  Gastrointestinal: Negative  Negative for abdominal distention, abdominal pain, diarrhea, nausea and vomiting  Genitourinary: Negative for difficulty urinating, dysuria, flank pain and hematuria  Feels he is emptying bladder completely  Skin: Negative  Negative for rash  No itching  Neurological: Negative  Negative for dizziness, numbness and headaches  No confusion  Psychiatric/Behavioral: Negative  Negative for agitation, behavioral problems, confusion and decreased concentration  Video Exam    Vitals:    07/08/20 0858   BP: 118/70   Pulse: 70   Weight: 75 3 kg (166 lb)       Physical Exam    The patient was AAO time three and no confusion ar focal complaints of weakness in upper or lower extremities  Pulse regular  They denied swelling in legs  Nontender in abdomen  Able to talk full sentences with no wheezing appreciated  I spent 30 minutes directly with the patient during this visit      VIRTUAL VISIT DISCLAIMER    Yu Barroso acknowledges that he has consented to an online visit or consultation  He understands that the online visit is based solely on information provided by him, and that, in the absence of a face-to-face physical evaluation by the physician, the diagnosis he receives is both limited and provisional in terms of accuracy and completeness  This is not intended to replace a full medical face-to-face evaluation by the physician  Yu Barroso understands and accepts these terms

## 2020-07-08 NOTE — PROGRESS NOTES
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  He had protein estimations under a gram in January of this year  Creatinine is stable at 3 9 from 8 months ago so there has been no significant progression  Eating well and in speaking to him and his wife they have been watching fluid and swelling and has been doing great  At this point continue current medications and he has good BP control and glycemic control and he is on a statin as well  At this time I would not use an ACEI or ARB as had low proteinuria and do not want to cause elevation in creatinine from medication  Follow up October and monitor  They were told to call if any questions before next visit  Problem List Items Addressed This Visit        Endocrine    RESOLVED: 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 ([facility] [address] ) - Primary      Other Visit Diagnoses     Stage 4 chronic kidney disease ([facility] [address] )        Relevant Orders    Basic metabolic panel    CBC and differential               Reason for visit is   Chief Complaint   Patient presents with    Virtual Regular Visit        Encounter provider Nyla Garcia MD    Provider located at [address] [place] [id number]      Recent Visits  No visits were found meeting these conditions  Showing recent visits within past 7 days and meeting all other requirements     Today's Visits  Date Type Provider Dept   07/08/20 Telemedicine Nyla Garcia MD [facility] [place]   Showing today's visits and meeting all other requirements     Future Appointments  No visits were found meeting these conditions  Showing future appointments within next 150 days and meeting all other requirements        The patient was identified by name and date of birth   Floridalma Minors was informed that this is a telemedicine visit and that the visit is being conducted through telephone  My office door was closed  No one else was in the room  He acknowledged consent and understanding of privacy and security of the video platform  The patient has agreed to participate and understands they can discontinue the visit at any time  It was my intent to perform this visit via video technology but the patient was not able to do a video connection so the visit was completed via audio telephone only  Patient is aware this is a billable service  Lindy Alexander is a 80 y o  male who is followed for CKD  Khai GAMBLE     The patient was contacted and he is feeling well  He has no specific complaints and feels well  No intercurrent illnesses or hospitalizations  He is emptying bladder well  We personally reviewed the medications      Past Medical History:   Diagnosis Date    3-vessel coronary artery disease     last assessed: 08/15/2016    BPH (benign prostatic hyperplasia)     Chronic kidney disease     Diabetes mellitus ([facility] [address] )     Hyperlipidemia     Hypertension     Shingles 04/29/2019    SOB (shortness of breath)     Stroke [facility])        Past Surgical History:   Procedure Laterality Date    CARDIAC PACEMAKER PLACEMENT      CATARACT EXTRACTION      last assessed: 11/11/2015    CORONARY ARTERY BYPASS GRAFT      last assessed: 08/26/2015       Current Outpatient Medications   Medication Sig Dispense Refill    acetaminophen (TYLENOL) 325 mg tablet Take 2 tablets (650 mg total) by mouth 3 (three) times a day as needed for mild pain or moderate pain 30 tablet 0    aspirin (ECOTRIN LOW STRENGTH) 81 mg EC tablet Take 1 tablet (81 mg total) by mouth daily 30 tablet 0    atorvastatin (LIPITOR) 40 mg tablet Take 1 tablet (40 mg total) by mouth daily 90 tablet 3    cholecalciferol 2000 units TABS Take 1 tablet (2,000 Units total) by mouth daily 60 tablet 0    finasteride (PROSCAR) 5 mg tablet Take 1 tablet (5 mg total) by mouth daily 90 tablet 3    glucose blood test strip PATIENT TESTS BLOOD SUGARS BID  PATIENT IS NOT INSULIN DEPENDENT  DX E11 20 100 each 3    levothyroxine 75 mcg tablet Take 1 tablet (75 mcg total) by mouth daily 90 tablet 3    metoprolol tartrate (LOPRESSOR) 50 mg tablet Take 0 5 tablets (25 mg total) by mouth 2 (two) times a day with meals 90 tablet 1    ranolazine (RANEXA) 500 mg 12 hr tablet TAKE 1 TABLET BY MOUTH EVERY 12 HOURS 170 tablet 1    tamsulosin (FLOMAX) 0 4 mg TAKE 1 CAPSULE BY MOUTH NIGHTLY FOR 30 DAYS  30 capsule 0    torsemide (DEMADEX) 20 mg tablet TAKE 1 TABLET BY MOUTH EVERY DAY 30 tablet 1    TRADJENTA 5 MG TABS TAKE 1 TABLET (5 MG) BY MOUTH DAILY 90 tablet 3    warfarin (COUMADIN) 2 mg tablet TAKE 1 TABLET BY MOUTH EVERY DAY 90 tablet 1    lidocaine (LIDODERM) 5 % Apply 1 patch topically daily Remove & Discard patch within 12 hours or as directed by MD (Patient not taking: Reported on 1/21/2020) 30 patch 0    senna-docusate sodium (SENOKOT S) 8 6-50 mg per tablet Take 2 tablets by mouth 2 (two) times a day (Patient not taking: Reported on 1/21/2020) 60 tablet 0     No current facility-administered medications for this visit  No Known Allergies    Review of Systems   Constitutional: Negative for appetite change, chills, fatigue and fever  HENT: Negative  Eyes: Negative  Respiratory: Negative  Negative for cough, chest tightness, shortness of breath and wheezing  Cardiovascular: Negative  Negative for chest pain, palpitations and leg swelling  Gastrointestinal: Negative  Negative for abdominal distention, abdominal pain, diarrhea, nausea and vomiting  Genitourinary: Negative for difficulty urinating, dysuria, flank pain and hematuria  Feels he is emptying bladder completely  Skin: Negative  Negative for rash  No itching  Neurological: Negative  Negative for dizziness, numbness and headaches  No confusion     Psychiatric/Behavioral: Negative  Negative for agitation, behavioral problems, confusion and decreased concentration  Video Exam    Vitals:    07/08/20 0858   BP: 118/70   Pulse: 70   Weight: 75 3 kg (166 lb)       Physical Exam    The patient was AAO time three and no confusion ar focal complaints of weakness in upper or lower extremities  Pulse regular  They denied swelling in legs  Nontender in abdomen  Able to talk full sentences with no wheezing appreciated  I spent 30 minutes directly with the patient during this visit      VIRTUAL VISIT DISCLAIMER    Mar Camarena acknowledges that he has consented to an online visit or consultation  He understands that the online visit is based solely on information provided by him, and that, in the absence of a face-to-face physical evaluation by the physician, the diagnosis he receives is both limited and provisional in terms of accuracy and completeness  This is not intended to replace a full medical face-to-face evaluation by the physician  Mar Camarena understands and accepts these terms

## 2020-07-11 DIAGNOSIS — Z79.4 TYPE 2 DIABETES MELLITUS WITHOUT COMPLICATION, WITH LONG-TERM CURRENT USE OF INSULIN (HCC): ICD-10-CM

## 2020-07-11 DIAGNOSIS — E11.9 TYPE 2 DIABETES MELLITUS WITHOUT COMPLICATION, WITH LONG-TERM CURRENT USE OF INSULIN (HCC): ICD-10-CM

## 2020-07-11 RX ORDER — LINAGLIPTIN 5 MG/1
TABLET, FILM COATED ORAL
Qty: 90 TABLET | Refills: 3 | Status: SHIPPED | OUTPATIENT
Start: 2020-07-11

## 2020-07-12 DIAGNOSIS — I10 ESSENTIAL HYPERTENSION: ICD-10-CM

## 2020-07-13 DIAGNOSIS — I25.10 3-VESSEL CORONARY ARTERY DISEASE: ICD-10-CM

## 2020-07-13 DIAGNOSIS — R07.9 CHEST PAIN: ICD-10-CM

## 2020-07-13 RX ORDER — RANOLAZINE 500 MG/1
TABLET, EXTENDED RELEASE ORAL
Qty: 180 TABLET | Refills: 1 | Status: SHIPPED | OUTPATIENT
Start: 2020-07-13 | End: 2020-12-07

## 2020-07-13 RX ORDER — METOPROLOL TARTRATE 50 MG/1
25 TABLET, FILM COATED ORAL 2 TIMES DAILY WITH MEALS
Qty: 90 TABLET | Refills: 1 | Status: SHIPPED | OUTPATIENT
Start: 2020-07-13 | End: 2020-12-21

## 2020-07-15 ENCOUNTER — OFFICE VISIT (OUTPATIENT)
Dept: FAMILY MEDICINE CLINIC | Facility: CLINIC | Age: 85
End: 2020-07-15
Payer: MEDICARE

## 2020-07-15 VITALS
HEART RATE: 72 BPM | SYSTOLIC BLOOD PRESSURE: 128 MMHG | RESPIRATION RATE: 16 BRPM | BODY MASS INDEX: 29.09 KG/M2 | TEMPERATURE: 98.4 F | HEIGHT: 64 IN | DIASTOLIC BLOOD PRESSURE: 74 MMHG | WEIGHT: 170.4 LBS

## 2020-07-15 DIAGNOSIS — N18.5 TYPE 2 DIABETES MELLITUS WITH STAGE 5 CHRONIC KIDNEY DISEASE NOT ON CHRONIC DIALYSIS, WITH LONG-TERM CURRENT USE OF INSULIN (HCC): Primary | ICD-10-CM

## 2020-07-15 DIAGNOSIS — I50.32 CHRONIC DIASTOLIC HEART FAILURE (HCC): ICD-10-CM

## 2020-07-15 DIAGNOSIS — N18.5 CKD (CHRONIC KIDNEY DISEASE) STAGE 5, GFR LESS THAN 15 ML/MIN (HCC): ICD-10-CM

## 2020-07-15 DIAGNOSIS — E03.9 HYPOTHYROIDISM, UNSPECIFIED TYPE: ICD-10-CM

## 2020-07-15 DIAGNOSIS — I48.0 PAROXYSMAL A-FIB (HCC): ICD-10-CM

## 2020-07-15 DIAGNOSIS — Z79.4 TYPE 2 DIABETES MELLITUS WITH STAGE 5 CHRONIC KIDNEY DISEASE NOT ON CHRONIC DIALYSIS, WITH LONG-TERM CURRENT USE OF INSULIN (HCC): Primary | ICD-10-CM

## 2020-07-15 DIAGNOSIS — F32.4 MAJOR DEPRESSIVE DISORDER WITH SINGLE EPISODE, IN PARTIAL REMISSION (HCC): ICD-10-CM

## 2020-07-15 DIAGNOSIS — I10 ESSENTIAL HYPERTENSION: ICD-10-CM

## 2020-07-15 DIAGNOSIS — I25.10 3-VESSEL CORONARY ARTERY DISEASE: ICD-10-CM

## 2020-07-15 DIAGNOSIS — E11.22 TYPE 2 DIABETES MELLITUS WITH STAGE 5 CHRONIC KIDNEY DISEASE NOT ON CHRONIC DIALYSIS, WITH LONG-TERM CURRENT USE OF INSULIN (HCC): Primary | ICD-10-CM

## 2020-07-15 PROCEDURE — 99215 OFFICE O/P EST HI 40 MIN: CPT | Performed by: FAMILY MEDICINE

## 2020-07-15 PROCEDURE — 3066F NEPHROPATHY DOC TX: CPT | Performed by: FAMILY MEDICINE

## 2020-07-15 PROCEDURE — 3008F BODY MASS INDEX DOCD: CPT | Performed by: FAMILY MEDICINE

## 2020-07-15 PROCEDURE — 1036F TOBACCO NON-USER: CPT | Performed by: FAMILY MEDICINE

## 2020-07-15 PROCEDURE — 3044F HG A1C LEVEL LT 7.0%: CPT | Performed by: FAMILY MEDICINE

## 2020-07-15 PROCEDURE — 4040F PNEUMOC VAC/ADMIN/RCVD: CPT | Performed by: FAMILY MEDICINE

## 2020-07-15 PROCEDURE — 1160F RVW MEDS BY RX/DR IN RCRD: CPT | Performed by: FAMILY MEDICINE

## 2020-07-15 RX ORDER — DOCUSATE SODIUM 100 MG/1
100 CAPSULE, LIQUID FILLED ORAL 2 TIMES DAILY
COMMUNITY

## 2020-07-15 NOTE — PROGRESS NOTES
Assessment/Plan:    Type 2 diabetes mellitus, with long-term current use of insulin ([facility] [address] )    Lab Results   Component Value Date    HGBA1C 6 2 04/10/2020     Has been well controlled  Continue present meds  Recheck labs and f/u in 3m    Hypothyroidism  Appears to be stable clinically  Check TSH  Adjust meds if TSH is not at goal  Recheck 3m    Paroxysmal A-fib (HCC)  ? in NSR today  Continue present care  F/u with Cardio  Recheck 6m    Essential hypertension  Well controlled  Cont present treatment  Monitor labs  Counseled re: diet, exercise and weight loss effects on BP  BMI Counseling: Body mass index is 29 25 kg/m²  The BMI is above normal  Nutrition recommendations include reducing portion sizes, decreasing overall calorie intake, consuming healthier snacks, moderation in carbohydrate intake, increasing intake of lean protein, reducing intake of saturated fat and trans fat and reducing intake of cholesterol  Recheck 6m     Recheck 6m      Chronic diastolic heart failure (HCC)  Wt Readings from Last 3 Encounters:   07/15/20 77 3 kg (170 lb 6 4 oz)   07/08/20 75 3 kg (166 lb)   04/10/20 78 5 kg (173 lb)       Weight down 3 lbs on our scale over the last 3m  Continue present care  Cont to monitor weight  F/u with cardio and nephro  Recheck 3m    3-vessel coronary artery disease  Stable  No anginal symptoms  Continue present care  Recheck 3m    CKD (chronic kidney disease) stage 5, GFR less than 15 ml/min ([facility])  Creat and GFR have remained stable  Continue to f/u with nephro  Continue present care  Recheck 3m    Major depressive disorder with single episode, in partial remission ([facility] [address] )  PHQ = 0  Continue to monitor  Recheck 3m       Diagnoses and all orders for this visit:    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, with long-term current use of insulin (HCC)  -     Comprehensive metabolic panel;  Future  -     Hemoglobin A1C; Future  -     Lipid panel; Future    Hypothyroidism, unspecified type  -     TSH, 3rd generation; Future    Paroxysmal A-fib (HCC)    Essential hypertension    Chronic diastolic heart failure (HCC)    3-vessel coronary artery disease  -     Hemoglobin A1C; Future  -     Lipid panel; Future  -     CBC and differential; Future    CKD (chronic kidney disease) stage 5, GFR less than 15 ml/min (HCC)    Major depressive disorder with single episode, in partial remission ([facility] [address] )    Other orders  -     docusate sodium (COLACE) 100 mg capsule; Take 100 mg by mouth 2 (two) times a day          Subjective:      Patient ID: Shaylee Clayton is a 80 y o  male  F/u multiple med issues   - pt continues to do well since last visit  Pt and wife have been practicing safe COVID protection practices  - weight stable  No worsening SOB, or edema  Pt up to date with Nephro and Cardio  Creat stable  Pt denies CP, palp increased edema or other CV symptoms  - pt remains compliant with diet  Continues to monitor home BGs,  Due for repeta A1C in October  - no GI or  issues at present   - I reviewed recent labs with pt and wife            The following portions of the patient's history were reviewed and updated as appropriate:   He  has a past medical history of 3-vessel coronary artery disease, BPH (benign prostatic hyperplasia), Chronic kidney disease, Diabetes mellitus ([address] ), Hyperlipidemia, Hypertension, Shingles (04/29/2019), SOB (shortness of breath), and Stroke ([address] )    He   Patient Active Problem List    Diagnosis Date Noted    Essential hypertension 03/27/2020    History of constipation 09/04/2019    CKD (chronic kidney disease) stage 5, GFR less than 15 ml/min ([address] ) 09/02/2019    Cognitive impairment 08/29/2019    Vitamin D insufficiency 08/29/2019    Anticoagulated on warfarin 08/28/2019    History of recurrent UTIs 08/28/2019    Buttock wound 08/28/2019    Presence of permanent cardiac pacemaker 08/28/2019    Pain 08/28/2019    Anemia 08/28/2019 Poli Freed maintenance 08/28/2019    Impaired mobility and ADLs 08/27/2019    Warfarin-induced coagulopathy ([place] [address] ) 08/26/2019    Benign prostatic hyperplasia with urinary hesitancy 08/22/2019    Abnormal chest x-ray 08/22/2019    Chronic diastolic heart failure ([place] [address] ) 07/04/2019    Leg edema, left 06/13/2019    Shortness of breath 06/13/2019    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 06/13/2019    Tibial artery occlusion, left (HCC) 06/13/2019    Chest pain 06/12/2019    Ambulatory dysfunction/generalized weakness 06/12/2019    Major depressive disorder with single episode, in partial remission ([place] [address] ) 04/29/2019    Frozen shoulder 05/30/2018    Paroxysmal A-fib ([place] [address] ) 04/13/2016    Hyperlipidemia 11/11/2015    Elevated serum alkaline phosphatase level 10/20/2015    Increased frequency of urination 10/20/2015    3-vessel coronary artery disease 08/26/2015    Type 2 diabetes mellitus, with long-term current use of insulin ([place] [address] ) 08/26/2015    Hypothyroidism 08/26/2015    Asbestosis ([place] [address] ) 05/06/2014    Seasonal allergies 05/06/2014     He  has a past surgical history that includes Coronary artery bypass graft; Cataract extraction; and Cardiac pacemaker placement  He  reports that he has never smoked  He has never used smokeless tobacco  He reports that he drank alcohol  He reports that he does not use drugs    Current Outpatient Medications   Medication Sig Dispense Refill    acetaminophen (TYLENOL) 325 mg tablet Take 2 tablets (650 mg total) by mouth 3 (three) times a day as needed for mild pain or moderate pain 30 tablet 0    aspirin (ECOTRIN LOW STRENGTH) 81 mg EC tablet Take 1 tablet (81 mg total) by mouth daily 30 tablet 0    atorvastatin (LIPITOR) 40 mg tablet Take 1 tablet (40 mg total) by mouth daily 90 tablet 3    cholecalciferol 2000 units TABS Take 1 tablet (2,000 Units total) by mouth daily 60 tablet 0    docusate sodium (COLACE) 100 mg capsule Take 100 mg by mouth 2 (two) times a day      glucose blood test strip PATIENT TESTS BLOOD SUGARS BID  PATIENT IS NOT INSULIN DEPENDENT  DX E11 20 100 each 3    levothyroxine 75 mcg tablet Take 1 tablet (75 mcg total) by mouth daily 90 tablet 3    metoprolol tartrate (LOPRESSOR) 50 mg tablet TAKE 0 5 TABLETS (25 MG TOTAL) BY MOUTH 2 (TWO) TIMES A DAY WITH MEALS 90 tablet 1    ranolazine (RANEXA) 500 mg 12 hr tablet TAKE 1 TABLET BY MOUTH EVERY 12 HOURS 180 tablet 1    tamsulosin (FLOMAX) 0 4 mg TAKE 1 CAPSULE BY MOUTH NIGHTLY FOR 30 DAYS  30 capsule 0    torsemide (DEMADEX) 20 mg tablet TAKE 1 TABLET BY MOUTH EVERY DAY 30 tablet 1    TRADJENTA 5 MG TABS TAKE 1 TABLET BY MOUTH EVERY DAY 90 tablet 3    warfarin (COUMADIN) 2 mg tablet TAKE 1 TABLET BY MOUTH EVERY DAY 90 tablet 1    finasteride (PROSCAR) 5 mg tablet Take 1 tablet (5 mg total) by mouth daily 90 tablet 3     No current facility-administered medications for this visit  He has No Known Allergies       Review of Systems   Constitutional: Negative  HENT: Negative  Eyes: Negative  Respiratory: Negative  Cardiovascular: Negative  Gastrointestinal: Negative  Genitourinary: Negative  Musculoskeletal: Positive for arthralgias (mild) and gait problem (ambulates with cane)  Negative for myalgias  Neurological: Negative for dizziness, weakness, light-headedness, numbness and headaches  Hematological: Negative  Psychiatric/Behavioral: Negative  Objective:      /74   Pulse 72   Temp 98 4 °F (36 9 °C)   Resp 16   Ht 5' 4" (1 626 m)   Wt 77 3 kg (170 lb 6 4 oz)   BMI 29 25 kg/m²          Physical Exam   Constitutional: He is oriented to person, place, and time  He appears well-developed and well-nourished  HENT:   Head: Normocephalic and atraumatic  Right Ear: External ear normal    Left Ear: External ear normal    Pt masked   Eyes: Pupils are equal, round, and reactive to light   Conjunctivae and EOM are normal    Neck: Normal range of motion  Neck supple  Cardiovascular: Normal rate, regular rhythm and intact distal pulses  Pulmonary/Chest: Effort normal and breath sounds normal    Abdominal: Soft  Bowel sounds are normal  He exhibits no distension and no mass  There is no tenderness  Musculoskeletal: Normal range of motion  He exhibits deformity (mild OA changes without effusion/synovitis)  He exhibits no edema or tenderness  Lymphadenopathy:     He has no cervical adenopathy  Neurological: He is alert and oriented to person, place, and time  No cranial nerve deficit  Coordination (ambulates with cane) abnormal    Skin: Skin is warm and dry  Psychiatric: He has a normal mood and affect   His behavior is normal  Judgment and thought content normal

## 2020-07-16 NOTE — ASSESSMENT & PLAN NOTE
Wt Readings from Last 3 Encounters:   07/15/20 77 3 kg (170 lb 6 4 oz)   07/08/20 75 3 kg (166 lb)   04/10/20 78 5 kg (173 lb)       Weight down 3 lbs on our scale over the last 3m  Continue present care  Cont to monitor weight  F/u with cardio and nephro   Recheck 3m

## 2020-07-16 NOTE — ASSESSMENT & PLAN NOTE
Well controlled  Cont present treatment  Monitor labs  Counseled re: diet, exercise and weight loss effects on BP  BMI Counseling: Body mass index is 29 25 kg/m²  The BMI is above normal  Nutrition recommendations include reducing portion sizes, decreasing overall calorie intake, consuming healthier snacks, moderation in carbohydrate intake, increasing intake of lean protein, reducing intake of saturated fat and trans fat and reducing intake of cholesterol    Recheck 6m     Recheck 6m

## 2020-07-16 NOTE — ASSESSMENT & PLAN NOTE
Lab Results   Component Value Date    HGBA1C 6 2 04/10/2020     Has been well controlled  Continue present meds   Recheck labs and f/u in 3m

## 2020-08-04 DIAGNOSIS — R39.11 URINARY HESITANCY: ICD-10-CM

## 2020-08-04 RX ORDER — TAMSULOSIN HYDROCHLORIDE 0.4 MG/1
CAPSULE ORAL
Qty: 30 CAPSULE | Refills: 0 | Status: SHIPPED | OUTPATIENT
Start: 2020-08-04 | End: 2020-08-27

## 2020-08-07 ENCOUNTER — ANTICOAG VISIT (OUTPATIENT)
Dept: FAMILY MEDICINE CLINIC | Facility: CLINIC | Age: 85
End: 2020-08-07

## 2020-08-07 ENCOUNTER — APPOINTMENT (OUTPATIENT)
Dept: LAB | Facility: HOSPITAL | Age: 85
End: 2020-08-07
Payer: MEDICARE

## 2020-08-07 DIAGNOSIS — E03.9 HYPOTHYROIDISM, UNSPECIFIED TYPE: ICD-10-CM

## 2020-08-07 LAB — TSH SERPL DL<=0.05 MIU/L-ACNC: 3.74 UIU/ML (ref 0.36–3.74)

## 2020-08-07 PROCEDURE — 84443 ASSAY THYROID STIM HORMONE: CPT

## 2020-08-14 ENCOUNTER — TELEPHONE (OUTPATIENT)
Dept: LAB | Facility: HOSPITAL | Age: 85
End: 2020-08-14

## 2020-08-21 ENCOUNTER — APPOINTMENT (OUTPATIENT)
Dept: LAB | Facility: HOSPITAL | Age: 85
End: 2020-08-21
Payer: MEDICARE

## 2020-08-21 ENCOUNTER — ANTICOAG VISIT (OUTPATIENT)
Dept: FAMILY MEDICINE CLINIC | Facility: CLINIC | Age: 85
End: 2020-08-21

## 2020-08-27 DIAGNOSIS — R39.11 URINARY HESITANCY: ICD-10-CM

## 2020-08-27 RX ORDER — TAMSULOSIN HYDROCHLORIDE 0.4 MG/1
CAPSULE ORAL
Qty: 30 CAPSULE | Refills: 0 | Status: SHIPPED | OUTPATIENT
Start: 2020-08-27 | End: 2020-09-24

## 2020-08-29 DIAGNOSIS — N18.5: ICD-10-CM

## 2020-08-29 DIAGNOSIS — E11.22: ICD-10-CM

## 2020-08-30 RX ORDER — BLOOD-GLUCOSE METER
KIT MISCELLANEOUS
Qty: 50 EACH | Refills: 5 | Status: SHIPPED | OUTPATIENT
Start: 2020-08-30

## 2020-09-02 ENCOUNTER — TELEPHONE (OUTPATIENT)
Dept: NEPHROLOGY | Facility: CLINIC | Age: 85
End: 2020-09-02

## 2020-09-02 NOTE — TELEPHONE ENCOUNTER
A message was left requesting a call back to office to schedule an October follow up with provider or an AP

## 2020-09-08 DIAGNOSIS — N17.9 AKI (ACUTE KIDNEY INJURY) (HCC): ICD-10-CM

## 2020-09-08 RX ORDER — FINASTERIDE 5 MG/1
TABLET, FILM COATED ORAL
Qty: 90 TABLET | Refills: 3 | Status: SHIPPED | OUTPATIENT
Start: 2020-09-08

## 2020-09-14 DIAGNOSIS — I48.0 PAROXYSMAL A-FIB (HCC): ICD-10-CM

## 2020-09-14 RX ORDER — WARFARIN SODIUM 2 MG/1
TABLET ORAL
Qty: 90 TABLET | Refills: 1 | Status: SHIPPED | OUTPATIENT
Start: 2020-09-14 | End: 2020-12-21

## 2020-09-15 DIAGNOSIS — R60.1 GENERALIZED EDEMA: ICD-10-CM

## 2020-09-15 RX ORDER — TORSEMIDE 20 MG/1
TABLET ORAL
Qty: 30 TABLET | Refills: 1 | Status: SHIPPED | OUTPATIENT
Start: 2020-09-15 | End: 2020-11-07

## 2020-09-22 ENCOUNTER — ANTICOAG VISIT (OUTPATIENT)
Dept: FAMILY MEDICINE CLINIC | Facility: CLINIC | Age: 85
End: 2020-09-22

## 2020-09-22 ENCOUNTER — APPOINTMENT (OUTPATIENT)
Dept: LAB | Facility: HOSPITAL | Age: 85
End: 2020-09-22
Payer: MEDICARE

## 2020-09-22 ENCOUNTER — TELEPHONE (OUTPATIENT)
Dept: NEPHROLOGY | Facility: CLINIC | Age: 85
End: 2020-09-22

## 2020-09-22 DIAGNOSIS — I25.10 3-VESSEL CORONARY ARTERY DISEASE: ICD-10-CM

## 2020-09-22 DIAGNOSIS — E11.22 TYPE 2 DIABETES MELLITUS WITH STAGE 5 CHRONIC KIDNEY DISEASE NOT ON CHRONIC DIALYSIS, WITH LONG-TERM CURRENT USE OF INSULIN (HCC): ICD-10-CM

## 2020-09-22 DIAGNOSIS — N18.4 STAGE 4 CHRONIC KIDNEY DISEASE (HCC): ICD-10-CM

## 2020-09-22 DIAGNOSIS — Z79.4 TYPE 2 DIABETES MELLITUS WITH STAGE 5 CHRONIC KIDNEY DISEASE NOT ON CHRONIC DIALYSIS, WITH LONG-TERM CURRENT USE OF INSULIN (HCC): ICD-10-CM

## 2020-09-22 DIAGNOSIS — N18.5 TYPE 2 DIABETES MELLITUS WITH STAGE 5 CHRONIC KIDNEY DISEASE NOT ON CHRONIC DIALYSIS, WITH LONG-TERM CURRENT USE OF INSULIN (HCC): ICD-10-CM

## 2020-09-22 LAB
ALBUMIN SERPL BCP-MCNC: 3.4 G/DL (ref 3.5–5)
ALP SERPL-CCNC: 144 U/L (ref 46–116)
ALT SERPL W P-5'-P-CCNC: 14 U/L (ref 12–78)
ANION GAP SERPL CALCULATED.3IONS-SCNC: 9 MMOL/L (ref 4–13)
AST SERPL W P-5'-P-CCNC: 15 U/L (ref 5–45)
BASOPHILS # BLD AUTO: 0.04 THOUSANDS/ΜL (ref 0–0.1)
BASOPHILS NFR BLD AUTO: 1 % (ref 0–1)
BILIRUB SERPL-MCNC: 0.58 MG/DL (ref 0.2–1)
BUN SERPL-MCNC: 44 MG/DL (ref 5–25)
CALCIUM ALBUM COR SERPL-MCNC: 9.2 MG/DL (ref 8.3–10.1)
CALCIUM SERPL-MCNC: 8.7 MG/DL (ref 8.3–10.1)
CHLORIDE SERPL-SCNC: 104 MMOL/L (ref 100–108)
CHOLEST SERPL-MCNC: 125 MG/DL (ref 50–200)
CO2 SERPL-SCNC: 26 MMOL/L (ref 21–32)
CREAT SERPL-MCNC: 3.84 MG/DL (ref 0.6–1.3)
EOSINOPHIL # BLD AUTO: 0.33 THOUSAND/ΜL (ref 0–0.61)
EOSINOPHIL NFR BLD AUTO: 4 % (ref 0–6)
ERYTHROCYTE [DISTWIDTH] IN BLOOD BY AUTOMATED COUNT: 13.2 % (ref 11.6–15.1)
EST. AVERAGE GLUCOSE BLD GHB EST-MCNC: 128 MG/DL
GFR SERPL CREATININE-BSD FRML MDRD: 13 ML/MIN/1.73SQ M
GLUCOSE P FAST SERPL-MCNC: 129 MG/DL (ref 65–99)
HBA1C MFR BLD: 6.1 %
HCT VFR BLD AUTO: 38.9 % (ref 36.5–49.3)
HDLC SERPL-MCNC: 40 MG/DL
HGB BLD-MCNC: 12.1 G/DL (ref 12–17)
IMM GRANULOCYTES # BLD AUTO: 0.05 THOUSAND/UL (ref 0–0.2)
IMM GRANULOCYTES NFR BLD AUTO: 1 % (ref 0–2)
LDLC SERPL CALC-MCNC: 55 MG/DL (ref 0–100)
LYMPHOCYTES # BLD AUTO: 2.46 THOUSANDS/ΜL (ref 0.6–4.47)
LYMPHOCYTES NFR BLD AUTO: 30 % (ref 14–44)
MCH RBC QN AUTO: 31.3 PG (ref 26.8–34.3)
MCHC RBC AUTO-ENTMCNC: 31.1 G/DL (ref 31.4–37.4)
MCV RBC AUTO: 101 FL (ref 82–98)
MONOCYTES # BLD AUTO: 0.54 THOUSAND/ΜL (ref 0.17–1.22)
MONOCYTES NFR BLD AUTO: 7 % (ref 4–12)
NEUTROPHILS # BLD AUTO: 4.72 THOUSANDS/ΜL (ref 1.85–7.62)
NEUTS SEG NFR BLD AUTO: 57 % (ref 43–75)
NONHDLC SERPL-MCNC: 85 MG/DL
NRBC BLD AUTO-RTO: 0 /100 WBCS
PLATELET # BLD AUTO: 193 THOUSANDS/UL (ref 149–390)
PMV BLD AUTO: 10.6 FL (ref 8.9–12.7)
POTASSIUM SERPL-SCNC: 4 MMOL/L (ref 3.5–5.3)
PROT SERPL-MCNC: 7 G/DL (ref 6.4–8.2)
RBC # BLD AUTO: 3.87 MILLION/UL (ref 3.88–5.62)
SODIUM SERPL-SCNC: 139 MMOL/L (ref 136–145)
TRIGL SERPL-MCNC: 149 MG/DL
WBC # BLD AUTO: 8.14 THOUSAND/UL (ref 4.31–10.16)

## 2020-09-22 PROCEDURE — 80061 LIPID PANEL: CPT

## 2020-09-22 PROCEDURE — 83036 HEMOGLOBIN GLYCOSYLATED A1C: CPT

## 2020-09-22 PROCEDURE — 85025 COMPLETE CBC W/AUTO DIFF WBC: CPT

## 2020-09-22 PROCEDURE — 36415 COLL VENOUS BLD VENIPUNCTURE: CPT

## 2020-09-22 PROCEDURE — 80053 COMPREHEN METABOLIC PANEL: CPT

## 2020-09-22 NOTE — TELEPHONE ENCOUNTER
Spoke with the patient's wife and she is aware of his lab test results and she has no further questions at this time        Wilner Hyde MA

## 2020-09-22 NOTE — TELEPHONE ENCOUNTER
----- Message from Serjio Bender MD sent at 9/22/2020  3:47 PM EDT -----  Please call patient let them know that his kidney function is stable at his baseline    Please tell him  ----- Message -----  From: Lab, Background User  Sent: 9/22/2020   1:12 PM EDT  To: Serjio Bender MD

## 2020-09-24 DIAGNOSIS — R39.11 URINARY HESITANCY: ICD-10-CM

## 2020-09-24 RX ORDER — TAMSULOSIN HYDROCHLORIDE 0.4 MG/1
CAPSULE ORAL
Qty: 30 CAPSULE | Refills: 0 | Status: SHIPPED | OUTPATIENT
Start: 2020-09-24 | End: 2020-10-29

## 2020-10-02 ENCOUNTER — OFFICE VISIT (OUTPATIENT)
Dept: UROLOGY | Facility: CLINIC | Age: 85
End: 2020-10-02
Payer: MEDICARE

## 2020-10-02 VITALS
DIASTOLIC BLOOD PRESSURE: 80 MMHG | HEIGHT: 65 IN | WEIGHT: 175 LBS | SYSTOLIC BLOOD PRESSURE: 142 MMHG | HEART RATE: 78 BPM | TEMPERATURE: 97.3 F | RESPIRATION RATE: 18 BRPM | BODY MASS INDEX: 29.16 KG/M2

## 2020-10-02 DIAGNOSIS — R39.11 BENIGN PROSTATIC HYPERPLASIA WITH URINARY HESITANCY: Primary | ICD-10-CM

## 2020-10-02 DIAGNOSIS — N40.1 BENIGN PROSTATIC HYPERPLASIA WITH URINARY HESITANCY: Primary | ICD-10-CM

## 2020-10-02 LAB — POST-VOID RESIDUAL VOLUME, ML POC: 69 ML

## 2020-10-02 PROCEDURE — 99213 OFFICE O/P EST LOW 20 MIN: CPT | Performed by: PHYSICIAN ASSISTANT

## 2020-10-02 PROCEDURE — 51798 US URINE CAPACITY MEASURE: CPT | Performed by: PHYSICIAN ASSISTANT

## 2020-10-08 ENCOUNTER — REMOTE DEVICE CLINIC VISIT (OUTPATIENT)
Dept: CARDIOLOGY CLINIC | Facility: CLINIC | Age: 85
End: 2020-10-08
Payer: MEDICARE

## 2020-10-08 DIAGNOSIS — Z95.0 CARDIAC PACEMAKER IN SITU: Primary | ICD-10-CM

## 2020-10-08 PROCEDURE — 93294 REM INTERROG EVL PM/LDLS PM: CPT | Performed by: INTERNAL MEDICINE

## 2020-10-08 PROCEDURE — 93296 REM INTERROG EVL PM/IDS: CPT | Performed by: INTERNAL MEDICINE

## 2020-10-13 ENCOUNTER — TELEPHONE (OUTPATIENT)
Dept: LAB | Facility: HOSPITAL | Age: 85
End: 2020-10-13

## 2020-10-13 ENCOUNTER — TELEPHONE (OUTPATIENT)
Dept: FAMILY MEDICINE CLINIC | Facility: CLINIC | Age: 85
End: 2020-10-13

## 2020-10-14 DIAGNOSIS — I48.0 PAROXYSMAL A-FIB (HCC): Primary | ICD-10-CM

## 2020-10-15 ENCOUNTER — IMMUNIZATIONS (OUTPATIENT)
Dept: FAMILY MEDICINE CLINIC | Facility: CLINIC | Age: 85
End: 2020-10-15
Payer: MEDICARE

## 2020-10-15 DIAGNOSIS — Z23 ENCOUNTER FOR IMMUNIZATION: ICD-10-CM

## 2020-10-15 PROCEDURE — 90662 IIV NO PRSV INCREASED AG IM: CPT | Performed by: FAMILY MEDICINE

## 2020-10-15 PROCEDURE — G0008 ADMIN INFLUENZA VIRUS VAC: HCPCS | Performed by: FAMILY MEDICINE

## 2020-10-16 ENCOUNTER — OFFICE VISIT (OUTPATIENT)
Dept: CARDIOLOGY CLINIC | Facility: CLINIC | Age: 85
End: 2020-10-16
Payer: MEDICARE

## 2020-10-16 VITALS
WEIGHT: 170.8 LBS | HEART RATE: 71 BPM | TEMPERATURE: 97.5 F | DIASTOLIC BLOOD PRESSURE: 82 MMHG | BODY MASS INDEX: 28.46 KG/M2 | HEIGHT: 65 IN | SYSTOLIC BLOOD PRESSURE: 128 MMHG

## 2020-10-16 DIAGNOSIS — I35.8 AORTIC HEART MURMUR: Primary | ICD-10-CM

## 2020-10-16 PROCEDURE — 93000 ELECTROCARDIOGRAM COMPLETE: CPT | Performed by: PHYSICIAN ASSISTANT

## 2020-10-16 PROCEDURE — 99213 OFFICE O/P EST LOW 20 MIN: CPT | Performed by: PHYSICIAN ASSISTANT

## 2020-10-20 ENCOUNTER — OFFICE VISIT (OUTPATIENT)
Dept: FAMILY MEDICINE CLINIC | Facility: CLINIC | Age: 85
End: 2020-10-20
Payer: MEDICARE

## 2020-10-20 VITALS
WEIGHT: 167 LBS | TEMPERATURE: 98.2 F | DIASTOLIC BLOOD PRESSURE: 80 MMHG | HEIGHT: 65 IN | SYSTOLIC BLOOD PRESSURE: 128 MMHG | BODY MASS INDEX: 27.82 KG/M2 | HEART RATE: 74 BPM

## 2020-10-20 DIAGNOSIS — Z79.4 TYPE 2 DIABETES MELLITUS WITH STAGE 5 CHRONIC KIDNEY DISEASE NOT ON CHRONIC DIALYSIS, WITH LONG-TERM CURRENT USE OF INSULIN (HCC): Primary | ICD-10-CM

## 2020-10-20 DIAGNOSIS — E11.22 TYPE 2 DIABETES MELLITUS WITH STAGE 5 CHRONIC KIDNEY DISEASE NOT ON CHRONIC DIALYSIS, WITH LONG-TERM CURRENT USE OF INSULIN (HCC): Primary | ICD-10-CM

## 2020-10-20 DIAGNOSIS — N18.5 TYPE 2 DIABETES MELLITUS WITH STAGE 5 CHRONIC KIDNEY DISEASE NOT ON CHRONIC DIALYSIS, WITH LONG-TERM CURRENT USE OF INSULIN (HCC): Primary | ICD-10-CM

## 2020-10-20 DIAGNOSIS — N18.5 CKD (CHRONIC KIDNEY DISEASE) STAGE 5, GFR LESS THAN 15 ML/MIN (HCC): ICD-10-CM

## 2020-10-20 DIAGNOSIS — I48.0 PAROXYSMAL A-FIB (HCC): ICD-10-CM

## 2020-10-20 DIAGNOSIS — E03.9 HYPOTHYROIDISM, UNSPECIFIED TYPE: ICD-10-CM

## 2020-10-20 DIAGNOSIS — I10 ESSENTIAL HYPERTENSION: ICD-10-CM

## 2020-10-20 DIAGNOSIS — I25.10 3-VESSEL CORONARY ARTERY DISEASE: ICD-10-CM

## 2020-10-20 PROBLEM — S31.809A BUTTOCK WOUND: Status: RESOLVED | Noted: 2019-08-28 | Resolved: 2020-10-20

## 2020-10-20 PROCEDURE — 99214 OFFICE O/P EST MOD 30 MIN: CPT | Performed by: FAMILY MEDICINE

## 2020-10-21 ENCOUNTER — ANTICOAG VISIT (OUTPATIENT)
Dept: FAMILY MEDICINE CLINIC | Facility: CLINIC | Age: 85
End: 2020-10-21

## 2020-10-21 ENCOUNTER — APPOINTMENT (OUTPATIENT)
Dept: LAB | Facility: HOSPITAL | Age: 85
End: 2020-10-21
Payer: MEDICARE

## 2020-10-21 LAB
INR PPP: 2.73 (ref 0.84–1.19)
PROTHROMBIN TIME: 28.7 SECONDS (ref 11.6–14.5)

## 2020-10-21 PROCEDURE — 85610 PROTHROMBIN TIME: CPT | Performed by: FAMILY MEDICINE

## 2020-10-21 PROCEDURE — 36415 COLL VENOUS BLD VENIPUNCTURE: CPT | Performed by: FAMILY MEDICINE

## 2020-10-29 DIAGNOSIS — R39.11 URINARY HESITANCY: ICD-10-CM

## 2020-10-29 RX ORDER — TAMSULOSIN HYDROCHLORIDE 0.4 MG/1
CAPSULE ORAL
Qty: 30 CAPSULE | Refills: 0 | Status: SHIPPED | OUTPATIENT
Start: 2020-10-29 | End: 2020-11-27

## 2020-10-30 DIAGNOSIS — E78.2 MIXED HYPERLIPIDEMIA: ICD-10-CM

## 2020-10-30 RX ORDER — ATORVASTATIN CALCIUM 40 MG/1
TABLET, FILM COATED ORAL
Qty: 90 TABLET | Refills: 3 | Status: SHIPPED | OUTPATIENT
Start: 2020-10-30

## 2020-11-07 DIAGNOSIS — R60.1 GENERALIZED EDEMA: ICD-10-CM

## 2020-11-07 RX ORDER — TORSEMIDE 20 MG/1
TABLET ORAL
Qty: 30 TABLET | Refills: 1 | Status: SHIPPED | OUTPATIENT
Start: 2020-11-07 | End: 2020-12-02

## 2020-11-09 ENCOUNTER — TELEPHONE (OUTPATIENT)
Dept: LAB | Facility: HOSPITAL | Age: 85
End: 2020-11-09

## 2020-11-12 ENCOUNTER — OFFICE VISIT (OUTPATIENT)
Dept: NEPHROLOGY | Facility: CLINIC | Age: 85
End: 2020-11-12
Payer: MEDICARE

## 2020-11-12 VITALS
TEMPERATURE: 96.8 F | BODY MASS INDEX: 28.82 KG/M2 | HEART RATE: 70 BPM | WEIGHT: 173 LBS | DIASTOLIC BLOOD PRESSURE: 64 MMHG | SYSTOLIC BLOOD PRESSURE: 124 MMHG | HEIGHT: 65 IN

## 2020-11-12 DIAGNOSIS — N18.9 CHRONIC KIDNEY DISEASE, UNSPECIFIED CKD STAGE: Primary | ICD-10-CM

## 2020-11-12 PROCEDURE — 99214 OFFICE O/P EST MOD 30 MIN: CPT | Performed by: INTERNAL MEDICINE

## 2020-11-13 ENCOUNTER — ANTICOAG VISIT (OUTPATIENT)
Dept: FAMILY MEDICINE CLINIC | Facility: CLINIC | Age: 85
End: 2020-11-13

## 2020-11-13 ENCOUNTER — APPOINTMENT (OUTPATIENT)
Dept: LAB | Age: 85
End: 2020-11-13
Payer: MEDICARE

## 2020-11-19 ENCOUNTER — TELEPHONE (OUTPATIENT)
Dept: FAMILY MEDICINE CLINIC | Facility: CLINIC | Age: 85
End: 2020-11-19

## 2020-11-26 DIAGNOSIS — R39.11 URINARY HESITANCY: ICD-10-CM

## 2020-11-27 ENCOUNTER — TELEPHONE (OUTPATIENT)
Dept: FAMILY MEDICINE CLINIC | Facility: CLINIC | Age: 85
End: 2020-11-27

## 2020-11-27 LAB
INR PPP: 3.1
PROTHROMBIN TIME: 30.3 SEC (ref 9–11.5)

## 2020-11-27 RX ORDER — TAMSULOSIN HYDROCHLORIDE 0.4 MG/1
CAPSULE ORAL
Qty: 30 CAPSULE | Refills: 0 | Status: SHIPPED | OUTPATIENT
Start: 2020-11-27 | End: 2020-12-21

## 2020-11-30 ENCOUNTER — TELEPHONE (OUTPATIENT)
Dept: FAMILY MEDICINE CLINIC | Facility: CLINIC | Age: 85
End: 2020-11-30

## 2020-11-30 ENCOUNTER — ANTICOAG VISIT (OUTPATIENT)
Dept: FAMILY MEDICINE CLINIC | Facility: CLINIC | Age: 85
End: 2020-11-30

## 2020-12-02 DIAGNOSIS — R60.1 GENERALIZED EDEMA: ICD-10-CM

## 2020-12-02 RX ORDER — TORSEMIDE 20 MG/1
TABLET ORAL
Qty: 30 TABLET | Refills: 1 | Status: SHIPPED | OUTPATIENT
Start: 2020-12-02 | End: 2020-12-29

## 2020-12-04 ENCOUNTER — TELEPHONE (OUTPATIENT)
Dept: LAB | Facility: HOSPITAL | Age: 85
End: 2020-12-04

## 2020-12-07 DIAGNOSIS — I25.10 3-VESSEL CORONARY ARTERY DISEASE: ICD-10-CM

## 2020-12-07 DIAGNOSIS — R07.9 CHEST PAIN: ICD-10-CM

## 2020-12-07 RX ORDER — RANOLAZINE 500 MG/1
TABLET, EXTENDED RELEASE ORAL
Qty: 180 TABLET | Refills: 1 | Status: SHIPPED | OUTPATIENT
Start: 2020-12-07 | End: 2021-04-26

## 2020-12-10 ENCOUNTER — TELEPHONE (OUTPATIENT)
Dept: FAMILY MEDICINE CLINIC | Facility: CLINIC | Age: 85
End: 2020-12-10

## 2020-12-10 ENCOUNTER — ANTICOAG VISIT (OUTPATIENT)
Dept: FAMILY MEDICINE CLINIC | Facility: CLINIC | Age: 85
End: 2020-12-10

## 2020-12-10 ENCOUNTER — LAB (OUTPATIENT)
Dept: LAB | Age: 85
End: 2020-12-10
Payer: MEDICARE

## 2020-12-20 DIAGNOSIS — I48.0 PAROXYSMAL A-FIB (HCC): ICD-10-CM

## 2020-12-20 DIAGNOSIS — R39.11 URINARY HESITANCY: ICD-10-CM

## 2020-12-20 DIAGNOSIS — I10 ESSENTIAL HYPERTENSION: ICD-10-CM

## 2020-12-21 RX ORDER — METOPROLOL TARTRATE 50 MG/1
TABLET, FILM COATED ORAL
Qty: 90 TABLET | Refills: 1 | Status: SHIPPED | OUTPATIENT
Start: 2020-12-21 | End: 2021-04-26

## 2020-12-21 RX ORDER — WARFARIN SODIUM 2 MG/1
TABLET ORAL
Qty: 90 TABLET | Refills: 1 | Status: SHIPPED | OUTPATIENT
Start: 2020-12-21 | End: 2020-12-23

## 2020-12-21 RX ORDER — TAMSULOSIN HYDROCHLORIDE 0.4 MG/1
CAPSULE ORAL
Qty: 30 CAPSULE | Refills: 0 | Status: SHIPPED | OUTPATIENT
Start: 2020-12-21 | End: 2021-01-08

## 2020-12-23 ENCOUNTER — ANTICOAG VISIT (OUTPATIENT)
Dept: FAMILY MEDICINE CLINIC | Facility: CLINIC | Age: 85
End: 2020-12-23

## 2020-12-23 ENCOUNTER — APPOINTMENT (OUTPATIENT)
Dept: LAB | Facility: HOSPITAL | Age: 85
End: 2020-12-23
Payer: MEDICARE

## 2020-12-23 DIAGNOSIS — I48.0 PAROXYSMAL A-FIB (HCC): ICD-10-CM

## 2020-12-23 RX ORDER — WARFARIN SODIUM 2 MG/1
TABLET ORAL
Qty: 90 TABLET | Refills: 1 | Status: SHIPPED | OUTPATIENT
Start: 2020-12-23 | End: 2021-05-07

## 2020-12-29 ENCOUNTER — TELEPHONE (OUTPATIENT)
Dept: LAB | Facility: HOSPITAL | Age: 85
End: 2020-12-29

## 2020-12-29 DIAGNOSIS — R60.1 GENERALIZED EDEMA: ICD-10-CM

## 2020-12-29 RX ORDER — TORSEMIDE 20 MG/1
TABLET ORAL
Qty: 30 TABLET | Refills: 1 | Status: SHIPPED | OUTPATIENT
Start: 2020-12-29 | End: 2020-12-30

## 2020-12-30 RX ORDER — TORSEMIDE 20 MG/1
TABLET ORAL
Qty: 30 TABLET | Refills: 1 | Status: SHIPPED | OUTPATIENT
Start: 2020-12-30 | End: 2021-02-01

## 2021-01-06 ENCOUNTER — ANTICOAG VISIT (OUTPATIENT)
Dept: FAMILY MEDICINE CLINIC | Facility: CLINIC | Age: 86
End: 2021-01-06

## 2021-01-06 ENCOUNTER — APPOINTMENT (OUTPATIENT)
Dept: LAB | Facility: HOSPITAL | Age: 86
End: 2021-01-06
Payer: MEDICARE

## 2021-01-07 ENCOUNTER — TELEPHONE (OUTPATIENT)
Dept: LAB | Facility: HOSPITAL | Age: 86
End: 2021-01-07

## 2021-01-07 ENCOUNTER — HOSPITAL ENCOUNTER (OUTPATIENT)
Dept: NON INVASIVE DIAGNOSTICS | Facility: MEDICAL CENTER | Age: 86
Discharge: HOME | End: 2021-01-07
Payer: MEDICARE

## 2021-01-07 ENCOUNTER — REMOTE DEVICE CLINIC VISIT (OUTPATIENT)
Dept: CARDIOLOGY CLINIC | Facility: CLINIC | Age: 86
End: 2021-01-07
Payer: MEDICARE

## 2021-01-07 DIAGNOSIS — Z95.0 PRESENCE OF PERMANENT CARDIAC PACEMAKER: Primary | ICD-10-CM

## 2021-01-07 DIAGNOSIS — I35.8 AORTIC HEART MURMUR: ICD-10-CM

## 2021-01-07 PROCEDURE — 93308 TTE F-UP OR LMTD: CPT

## 2021-01-07 PROCEDURE — 93308 TTE F-UP OR LMTD: CPT | Performed by: INTERNAL MEDICINE

## 2021-01-07 PROCEDURE — 93321 DOPPLER ECHO F-UP/LMTD STD: CPT | Performed by: INTERNAL MEDICINE

## 2021-01-07 PROCEDURE — 93296 REM INTERROG EVL PM/IDS: CPT | Performed by: INTERNAL MEDICINE

## 2021-01-07 PROCEDURE — 93325 DOPPLER ECHO COLOR FLOW MAPG: CPT | Performed by: INTERNAL MEDICINE

## 2021-01-07 PROCEDURE — 93294 REM INTERROG EVL PM/LDLS PM: CPT | Performed by: INTERNAL MEDICINE

## 2021-01-07 NOTE — PROGRESS NOTES
Results for orders placed or performed in visit on 01/07/21   Cardiac EP device report    Narrative    MDT DUAL CHAMBER PM/ ACTIVE SYSTEM IS MRI CONDITIONAL  CARELINK TRANSMISSION: BATTERY VOLTAGE ADEQUATE (3 YRS) AP 91%  2%  ALL AVAILABLE LEAD PARAMETERS WITHIN NORMAL LIMITS  NO SIGNIFICANT HIGH RATE EPISODES  NORMAL DEVICE FUNCTION  ----TORRES

## 2021-01-08 DIAGNOSIS — R39.11 URINARY HESITANCY: ICD-10-CM

## 2021-01-08 RX ORDER — TAMSULOSIN HYDROCHLORIDE 0.4 MG/1
CAPSULE ORAL
Qty: 30 CAPSULE | Refills: 0 | Status: SHIPPED | OUTPATIENT
Start: 2021-01-08 | End: 2021-01-24

## 2021-01-11 ENCOUNTER — OFFICE VISIT (OUTPATIENT)
Dept: FAMILY MEDICINE CLINIC | Facility: CLINIC | Age: 86
End: 2021-01-11
Payer: MEDICARE

## 2021-01-11 VITALS
WEIGHT: 171 LBS | HEIGHT: 65 IN | HEART RATE: 76 BPM | DIASTOLIC BLOOD PRESSURE: 72 MMHG | TEMPERATURE: 98.1 F | SYSTOLIC BLOOD PRESSURE: 126 MMHG | BODY MASS INDEX: 28.49 KG/M2

## 2021-01-11 DIAGNOSIS — N18.5 CKD (CHRONIC KIDNEY DISEASE) STAGE 5, GFR LESS THAN 15 ML/MIN (HCC): ICD-10-CM

## 2021-01-11 DIAGNOSIS — I25.10 3-VESSEL CORONARY ARTERY DISEASE: ICD-10-CM

## 2021-01-11 DIAGNOSIS — I10 ESSENTIAL HYPERTENSION: ICD-10-CM

## 2021-01-11 DIAGNOSIS — N18.5 TYPE 2 DIABETES MELLITUS WITH STAGE 5 CHRONIC KIDNEY DISEASE NOT ON CHRONIC DIALYSIS, WITH LONG-TERM CURRENT USE OF INSULIN (HCC): ICD-10-CM

## 2021-01-11 DIAGNOSIS — I50.32 CHRONIC DIASTOLIC HEART FAILURE (HCC): ICD-10-CM

## 2021-01-11 DIAGNOSIS — R26.2 AMBULATORY DYSFUNCTION: ICD-10-CM

## 2021-01-11 DIAGNOSIS — Z00.00 MEDICARE ANNUAL WELLNESS VISIT, SUBSEQUENT: Primary | ICD-10-CM

## 2021-01-11 DIAGNOSIS — I48.0 PAROXYSMAL A-FIB (HCC): ICD-10-CM

## 2021-01-11 DIAGNOSIS — E03.9 HYPOTHYROIDISM, UNSPECIFIED TYPE: ICD-10-CM

## 2021-01-11 DIAGNOSIS — F32.4 MAJOR DEPRESSIVE DISORDER WITH SINGLE EPISODE, IN PARTIAL REMISSION (HCC): ICD-10-CM

## 2021-01-11 DIAGNOSIS — E11.22 TYPE 2 DIABETES MELLITUS WITH STAGE 5 CHRONIC KIDNEY DISEASE NOT ON CHRONIC DIALYSIS, WITH LONG-TERM CURRENT USE OF INSULIN (HCC): ICD-10-CM

## 2021-01-11 DIAGNOSIS — E78.2 MIXED HYPERLIPIDEMIA: ICD-10-CM

## 2021-01-11 DIAGNOSIS — Z79.4 TYPE 2 DIABETES MELLITUS WITH STAGE 5 CHRONIC KIDNEY DISEASE NOT ON CHRONIC DIALYSIS, WITH LONG-TERM CURRENT USE OF INSULIN (HCC): ICD-10-CM

## 2021-01-11 DIAGNOSIS — Z23 ENCOUNTER FOR IMMUNIZATION: ICD-10-CM

## 2021-01-11 DIAGNOSIS — I70.202 TIBIAL ARTERY OCCLUSION, LEFT (HCC): ICD-10-CM

## 2021-01-11 LAB — SL AMB POCT HEMOGLOBIN AIC: 6.5 (ref ?–6.5)

## 2021-01-11 PROCEDURE — 83036 HEMOGLOBIN GLYCOSYLATED A1C: CPT | Performed by: FAMILY MEDICINE

## 2021-01-11 PROCEDURE — 90732 PPSV23 VACC 2 YRS+ SUBQ/IM: CPT | Performed by: FAMILY MEDICINE

## 2021-01-11 PROCEDURE — G0439 PPPS, SUBSEQ VISIT: HCPCS | Performed by: FAMILY MEDICINE

## 2021-01-11 PROCEDURE — G0009 ADMIN PNEUMOCOCCAL VACCINE: HCPCS | Performed by: FAMILY MEDICINE

## 2021-01-11 PROCEDURE — 99214 OFFICE O/P EST MOD 30 MIN: CPT | Performed by: FAMILY MEDICINE

## 2021-01-11 NOTE — PROGRESS NOTES
Assessment and Plan:     Problem List Items Addressed This Visit        Endocrine    Hypothyroidism     Appears to be stable clinically  Check TSH  Adjust meds if TSH is not at goal  Recheck 6m           Relevant Orders    TSH, 3rd generation    Type 2 diabetes mellitus, with long-term current use of insulin ([facility] [address] )       Lab Results   Component Value Date    HGBA1C 6 5 01/11/2021   Remains well controlled  Continue to monitor  Continue present meds  Recheck 6m         Relevant Orders    POCT hemoglobin A1c (Completed)    Microalbumin / creatinine urine ratio    CBC and differential    Comprehensive metabolic panel    Lipid panel    Ambulatory referral to Podiatry       Cardiovascular and Mediastinum    3-vessel coronary artery disease     Pt asymptomatic  Continue present meds  f/u with Cardio  Recheck 6m         Relevant Orders    CBC and differential    Comprehensive metabolic panel    Lipid panel    Chronic diastolic heart failure (HCC)     Wt Readings from Last 3 Encounters:   01/11/21 77 6 kg (171 lb)   11/12/20 78 5 kg (173 lb)   10/20/20 75 8 kg (167 lb)     Pt appears to euvolemic  Continue to monitor  Pt to continue to check weight qAM  F/u with Cardio  Recheck 6m               Essential hypertension     Well controlled  Cont present treatment  Monitor labs  Recheck 6m           Paroxysmal A-fib (HCC)     Pt compliant with medication  INR a little labile recently  Continue to monitor  F/u with Cardio  Recheck 6m         Relevant Orders    Protime-INR    Tibial artery occlusion, left [facility])     Vascular check las summer showed adequate flow to legs  Encourage ambulation   Recheck scan this summer            Genitourinary    CKD (chronic kidney disease) stage 5, GFR less than 15 ml/min [facility])     Lab Results   Component Value Date    EGFR 13 09/22/2020    EGFR 13 07/03/2020    EGFR 13 04/06/2020    CREATININE 3 84 (H) 09/22/2020    CREATININE 3 95 (H) 07/03/2020    CREATININE 3 83 (H) 04/06/2020   Creat stable  Continue to monitor  F/u with Nephro            Other    Ambulatory dysfunction/generalized weakness     Given PAD, recommend increased ambulation with walker  Recheck 6m         Hyperlipidemia     Check labs  Continue present care  Recheck 6m         Major depressive disorder with single episode, in partial remission (HCC)     PHQ stable  Continue to monitor           Other Visit Diagnoses     Medicare annual wellness visit, subsequent    -  Primary    Encounter for immunization        Relevant Orders    PNEUMOCOCCAL POLYSACCHARIDE VACCINE 23-VALENT =>1YO SQ IM (Pneumovax) (Completed)           Preventive health issues were discussed with patient, and age appropriate screening tests were ordered as noted in patient's After Visit Summary  Personalized health advice and appropriate referrals for health education or preventive services given if needed, as noted in patient's After Visit Summary       History of Present Illness:     Patient presents for Medicare Annual Wellness visit    Patient Care Team:  Donald Villa MD as PCP - General (Family Medicine)  Miguel Marc MD (Nephrology)  Dana Garner DO (Cardiology)  Cielo Kim MD (Urology)     Problem List:     Patient Active Problem List   Diagnosis    3-vessel coronary artery disease    [facility]    Type 2 diabetes mellitus, with long-term current use of insulin ([facility] [address] )    Elevated serum alkaline phosphatase level    Hyperlipidemia    Hypothyroidism    Paroxysmal A-fib ([facility] )    Seasonal allergies    Increased frequency of urination    Frozen shoulder    Major depressive disorder with single episode, in partial remission ([facility] [address] )    Ambulatory dysfunction/generalized weakness    Leg edema, left    Shortness of breath    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    Tibial artery occlusion, left (HCC)    Chronic diastolic heart failure ([facility] [address] )    Benign prostatic hyperplasia with urinary hesitancy    Abnormal chest x-ray    Warfarin-induced coagulopathy (HCC)    Impaired mobility and ADLs    Anticoagulated on warfarin    History of recurrent UTIs    Presence of permanent cardiac pacemaker    Anemia    Healthcare maintenance    Vitamin D insufficiency    CKD (chronic kidney disease) stage 5, GFR less than 15 ml/min (HCC)    History of constipation    Essential hypertension      Past Medical and Surgical History:     Past Medical History:   Diagnosis Date    3-vessel coronary artery disease     last assessed: 08/15/2016    BPH (benign prostatic hyperplasia)     Chronic kidney disease     Diabetes mellitus ([facility] [address] )     Hyperlipidemia     Hypertension     Shingles 04/29/2019    SOB (shortness of breath)     Stroke [facility])      Past Surgical History:   Procedure Laterality Date    CARDIAC PACEMAKER PLACEMENT      CATARACT EXTRACTION      last assessed: 11/11/2015    CORONARY ARTERY BYPASS GRAFT      last assessed: 08/26/2015      Family History:     Family History   Problem Relation Age of Onset    Heart disease Mother     Cancer Father         stomach    No Known Problems Sister     No Known Problems Brother     No Known Problems Brother       Social History:        Social History     Socioeconomic History    Marital status: /Civil Union     Spouse name: None    Number of children: None    Years of education: None    Highest education level: None   Occupational History    None   Social Needs    Financial resource strain: None    Food insecurity     Worry: None     Inability: None    Transportation needs     Medical: None     Non-medical: None   Tobacco Use    Smoking status: Never Smoker    Smokeless tobacco: Never Used   Substance and Sexual Activity    Alcohol use: Not Currently     Frequency: Never    Drug use: No    Sexual activity: Never   Lifestyle    Physical activity     Days per week: None     Minutes per session: None    Stress: None   Relationships    Social connections Talks on phone: None     Gets together: None     Attends Hindu service: None     Active member of club or organization: None     Attends meetings of clubs or organizations: None     Relationship status: None    Intimate partner violence     Fear of current or ex partner: None     Emotionally abused: None     Physically abused: None     Forced sexual activity: None   Other Topics Concern    None   Social History Narrative    No advance directive      Medications and Allergies:     Current Outpatient Medications   Medication Sig Dispense Refill    acetaminophen (TYLENOL) 325 mg tablet Take 2 tablets (650 mg total) by mouth 3 (three) times a day as needed for mild pain or moderate pain 30 tablet 0    aspirin (ECOTRIN LOW STRENGTH) 81 mg EC tablet Take 1 tablet (81 mg total) by mouth daily 30 tablet 0    atorvastatin (LIPITOR) 40 mg tablet TAKE 1 TABLET BY MOUTH EVERY DAY 90 tablet 3    cholecalciferol 2000 units TABS Take 1 tablet (2,000 Units total) by mouth daily 60 tablet 0    docusate sodium (COLACE) 100 mg capsule Take 100 mg by mouth 2 (two) times a day      finasteride (PROSCAR) 5 mg tablet TAKE 1 TABLET BY MOUTH EVERY DAY 90 tablet 3    glucose blood (FREESTYLE LITE) test strip PATIENT TESTS BLOOD SUGARS ONCE DAILY  PATIENT IS NOT INSULIN DEPENDENT   DX E11 20 50 each 5    levothyroxine 75 mcg tablet Take 1 tablet (75 mcg total) by mouth daily 90 tablet 3    metoprolol tartrate (LOPRESSOR) 50 mg tablet TAKE 1/2 TABLET BY MOUTH TWICE A DAY WITH MEALS 90 tablet 1    ranolazine (RANEXA) 500 mg 12 hr tablet TAKE 1 TABLET BY MOUTH EVERY 12 HOURS 180 tablet 1    tamsulosin (FLOMAX) 0 4 mg TAKE 1 CAPSULE BY MOUTH EVERYDAY AT BEDTIME 30 capsule 0    torsemide (DEMADEX) 20 mg tablet TAKE 1 TABLET BY MOUTH EVERY DAY 30 tablet 1    TRADJENTA 5 MG TABS TAKE 1 TABLET BY MOUTH EVERY DAY 90 tablet 3    warfarin (COUMADIN) 2 mg tablet 1/2 tab (1mg) M,W,F, 1 tab (2mg) all other days 90 tablet 1     No current facility-administered medications for this visit  No Known Allergies   Immunizations:     Immunization History   Administered Date(s) Administered    INFLUENZA 10/31/2013, 01/01/2014, 10/06/2015, 10/06/2015, 10/23/2018    Influenza, high dose seasonal 0 7 mL 10/29/2019, 10/15/2020    Influenza, seasonal, injectable 01/01/2014, 10/01/2015    Pneumococcal Conjugate 13-Valent 02/17/2015, 10/20/2015    Pneumococcal Polysaccharide PPV23 01/29/1932, 01/11/2021    Tdap 01/29/1932, 11/27/2019    Zoster 01/29/1932    Zoster Vaccine Recombinant 12/08/2020      Health Maintenance: There are no preventive care reminders to display for this patient  There are no preventive care reminders to display for this patient  Medicare Health Risk Assessment:     /72   Pulse 76   Temp 98 1 °F (36 7 °C)   Ht 5' 5" (1 651 m)   Wt 77 6 kg (171 lb)   BMI 28 46 kg/m²      Antoine Cowan is here for his Subsequent Wellness visit  Last Medicare Wellness visit information reviewed, patient interviewed and updates made to the record today  Health Risk Assessment:   Patient rates overall health as good  Patient feels that their physical health rating is slightly better  Eyesight was rated as same  Hearing was rated as same  Patient feels that their emotional and mental health rating is slightly better  Pain experienced in the last 7 days has been some  Patient's pain rating has been 5/10  Patient states that he has experienced no weight loss or gain in last 6 months  R shoulder - positional    Depression Screening:   PHQ-2 Score: 0      Fall Risk Screening: In the past year, patient has experienced: no history of falling in past year      Home Safety:  Patient has trouble with stairs inside or outside of their home  Patient has working smoke alarms and has working carbon monoxide detector  Home safety hazards include: none  Pt has a stair lift     Nutrition:   Current diet is Diabetic       Medications:   Patient is currently taking over-the-counter supplements  OTC medications include: see medication list  Patient is able to manage medications  Activities of Daily Living (ADLs)/Instrumental Activities of Daily Living (IADLs):   Walk and transfer into and out of bed and chair?: Yes  Dress and groom yourself?: Yes    Bathe or shower yourself?: Yes    Feed yourself? Yes  Do your laundry/housekeeping?: No  Manage your money, pay your bills and track your expenses?: Yes  Make your own meals?: Yes    Do your own shopping?: Yes    ADL comments: Some limitation of dressing due to shoulder issues    Previous Hospitalizations:   Any hospitalizations or ED visits within the last 12 months?: No      Advance Care Planning:   Living will: Yes    Durable POA for healthcare: Yes    Advanced directive: Yes      Cognitive Screening:   Provider or family/friend/caregiver concerned regarding cognition?: No    PREVENTIVE SCREENINGS      Cardiovascular Screening:    General: Screening Not Indicated and History Lipid Disorder      Diabetes Screening:     General: Screening Not Indicated and History Diabetes      Colorectal Cancer Screening:     General: Screening Not Indicated      Prostate Cancer Screening:    General: Screening Not Indicated      Osteoporosis Screening:    General: Screening Not Indicated      Abdominal Aortic Aneurysm (AAA) Screening:        General: Screening Not Indicated      Lung Cancer Screening:     General: Screening Not Indicated      Hepatitis C Screening:    General: Risks and Benefits Discussed    Hep C Screening Accepted: Yes      Other Counseling Topics:   Calcium and vitamin D intake and regular weightbearing exercise         Bashir Streeter MD

## 2021-01-11 NOTE — PROGRESS NOTES
Assessment/Plan:    No problem-specific Assessment & Plan notes found for this encounter  Diagnoses and all orders for this visit:    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, with long-term current use of insulin ([facility])  -     POCT hemoglobin A1c  -     Microalbumin / creatinine urine ratio    Hypothyroidism, unspecified type    3-vessel coronary artery disease    Paroxysmal A-fib ([facility])    Essential hypertension    Chronic diastolic heart failure (HCC)    CKD (chronic kidney disease) stage 5, GFR less than 15 ml/min ([facility])    Major depressive disorder with single episode, in partial remission ([facility] [address] )          Subjective:      Patient ID: Warren Medeiros is a 80 y o  male  F/u multiple med issues and AWV  - pt doing well  - pt is up to date with echo  Echo done last week was essentially unremarkable  Denies CP, palpitations, lightheadedness or other CV symptoms with or without exertion  Pt does some chair exercises  - pt doing well with diet  Continues to monitor home BGs which have been well controlled  A1C in office = 6 5  - pt up to date with Nephro, has an appt next month     No new concerns  - no new GI complaints  - pt denies any new  complaints                The following portions of the patient's history were reviewed and updated as appropriate:   He  has a past medical history of 3-vessel coronary artery disease, BPH (benign prostatic hyperplasia), Chronic kidney disease, Diabetes mellitus ([address] ), Hyperlipidemia, Hypertension, Shingles (04/29/2019), SOB (shortness of breath), and Stroke ([facility] )    He   Patient Active Problem List    Diagnosis Date Noted    CKD (chronic kidney disease) 11/12/2020    Essential hypertension 03/27/2020    History of constipation 09/04/2019    CKD (chronic kidney disease) stage 5, GFR less than 15 ml/min ([address] ) 09/02/2019    Cognitive impairment 08/29/2019    Vitamin D insufficiency 08/29/2019    Anticoagulated on warfarin 08/28/2019    History of recurrent UTIs 08/28/2019    Presence of permanent cardiac pacemaker 08/28/2019    Pain 08/28/2019    Anemia 08/28/2019    Healthcare maintenance 08/28/2019    Impaired mobility and ADLs 08/27/2019    Warfarin-induced coagulopathy ([place] [id number] ) 08/26/2019    Benign prostatic hyperplasia with urinary hesitancy 08/22/2019    Abnormal chest x-ray 08/22/2019    Chronic diastolic heart failure ([place] [id number] ) 07/04/2019    Leg edema, left 06/13/2019    Shortness of breath 06/13/2019    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 06/13/2019    Tibial artery occlusion, left (HCC) 06/13/2019    Chest pain 06/12/2019    Ambulatory dysfunction/generalized weakness 06/12/2019    Major depressive disorder with single episode, in partial remission ([place] [id number] ) 04/29/2019    Frozen shoulder 05/30/2018    Paroxysmal A-fib ([place] [address] ) 04/13/2016    Hyperlipidemia 11/11/2015    Elevated serum alkaline phosphatase level 10/20/2015    Increased frequency of urination 10/20/2015    3-vessel coronary artery disease 08/26/2015    Type 2 diabetes mellitus, with long-term current use of insulin ([place] [id number] ) 08/26/2015    Hypothyroidism 08/26/2015    Asbestosis ([place] [address] ) 05/06/2014    Seasonal allergies 05/06/2014     He  has a past surgical history that includes Coronary artery bypass graft; Cataract extraction; and Cardiac pacemaker placement  He  reports that he has never smoked  He has never used smokeless tobacco  He reports previous alcohol use  He reports that he does not use drugs    Current Outpatient Medications   Medication Sig Dispense Refill    acetaminophen (TYLENOL) 325 mg tablet Take 2 tablets (650 mg total) by mouth 3 (three) times a day as needed for mild pain or moderate pain 30 tablet 0    aspirin (ECOTRIN LOW STRENGTH) 81 mg EC tablet Take 1 tablet (81 mg total) by mouth daily 30 tablet 0    atorvastatin (LIPITOR) 40 mg tablet TAKE 1 TABLET BY MOUTH EVERY DAY 90 tablet 3    cholecalciferol 2000 units TABS Take 1 tablet (2,000 Units total) by mouth daily 60 tablet 0    docusate sodium (COLACE) 100 mg capsule Take 100 mg by mouth 2 (two) times a day      finasteride (PROSCAR) 5 mg tablet TAKE 1 TABLET BY MOUTH EVERY DAY 90 tablet 3    glucose blood (FREESTYLE LITE) test strip PATIENT TESTS BLOOD SUGARS ONCE DAILY  PATIENT IS NOT INSULIN DEPENDENT  DX E11 20 50 each 5    levothyroxine 75 mcg tablet Take 1 tablet (75 mcg total) by mouth daily 90 tablet 3    metoprolol tartrate (LOPRESSOR) 50 mg tablet TAKE 1/2 TABLET BY MOUTH TWICE A DAY WITH MEALS 90 tablet 1    ranolazine (RANEXA) 500 mg 12 hr tablet TAKE 1 TABLET BY MOUTH EVERY 12 HOURS 180 tablet 1    tamsulosin (FLOMAX) 0 4 mg TAKE 1 CAPSULE BY MOUTH EVERYDAY AT BEDTIME 30 capsule 0    torsemide (DEMADEX) 20 mg tablet TAKE 1 TABLET BY MOUTH EVERY DAY 30 tablet 1    TRADJENTA 5 MG TABS TAKE 1 TABLET BY MOUTH EVERY DAY 90 tablet 3    warfarin (COUMADIN) 2 mg tablet 1/2 tab (1mg) M,W,F, 1 tab (2mg) all other days 90 tablet 1     No current facility-administered medications for this visit  He has No Known Allergies       Review of Systems   Constitutional: Negative  HENT: Negative  Eyes: Negative  Respiratory: Negative  Cardiovascular: Negative  Gastrointestinal: Negative  Endocrine: Negative  Genitourinary: Negative  Musculoskeletal: Positive for arthralgias and gait problem  Negative for joint swelling and myalgias  Skin: Negative  Allergic/Immunologic: Negative  Neurological: Negative for dizziness, weakness, light-headedness, numbness and headaches  Hematological: Negative  Psychiatric/Behavioral: Negative  Objective:      /72   Pulse 76   Temp 98 1 °F (36 7 °C)   Ht 5' 5" (1 651 m)   Wt 77 6 kg (171 lb)   BMI 28 46 kg/m²          Physical Exam  Constitutional:       Appearance: He is well-developed  HENT:      Head: Normocephalic and atraumatic        Right Ear: Tympanic membrane, ear canal and external ear normal       Left Ear: Tympanic membrane, ear canal and external ear normal    Eyes:      General: No scleral icterus  Extraocular Movements: Extraocular movements intact  Conjunctiva/sclera: Conjunctivae normal       Pupils: Pupils are equal, round, and reactive to light  Neck:      Musculoskeletal: Normal range of motion and neck supple  No muscular tenderness  Thyroid: No thyromegaly  Cardiovascular:      Rate and Rhythm: Normal rate and regular rhythm  Pulses: Pulses are weak  Dorsalis pedis pulses are 0 on the right side and 0 on the left side  Posterior tibial pulses are 0 on the right side and 0 on the left side  Heart sounds: Murmur (II/VI AZALEA at LUSB) present  Pulmonary:      Effort: Pulmonary effort is normal       Breath sounds: Normal breath sounds  Abdominal:      General: Bowel sounds are normal  There is no distension  Palpations: Abdomen is soft  There is no mass  Tenderness: There is no abdominal tenderness  Musculoskeletal: Normal range of motion  General: Swelling (mild, MCPs, R>L) and deformity (mild diffuse arthritic changes in hands) present  No tenderness  Right lower leg: No edema  Left lower leg: No edema  Feet:      Right foot:      Skin integrity: No ulcer, skin breakdown, erythema, warmth, callus or dry skin  Left foot:      Skin integrity: No ulcer, skin breakdown, erythema, warmth, callus or dry skin  Lymphadenopathy:      Cervical: No cervical adenopathy  Skin:     General: Skin is warm and dry  Capillary Refill: Capillary refill takes less than 2 seconds  Neurological:      Mental Status: He is alert and oriented to person, place, and time  Cranial Nerves: No cranial nerve deficit  Sensory: No sensory deficit  Motor: No weakness        Gait: Gait normal       Comments: Minicog 4/5   Psychiatric:         Mood and Affect: Mood normal          Behavior: Behavior normal          Thought Content: Thought content normal          Judgment: Judgment normal       Comments: PHQ-9 Depression Screening    PHQ-9:   Frequency of the following problems over the past two weeks:      Little interest or pleasure in doing things: 0 - not at all  Feeling down, depressed, or hopeless: 0 - not at all  PHQ-2 Score: 0              Patient's shoes and socks removed  Right Foot/Ankle   Right Foot Inspection  Skin Exam: skin normal and skin intact no dry skin, no warmth, no callus, no erythema, no maceration, no abnormal color, no pre-ulcer, no ulcer and no callus                          Toe Exam: ROM and strength within normal limits  Sensory   Vibration: intact    Monofilament testing: intact  Vascular  Capillary refills: < 3 seconds  The right DP pulse is 0  The right PT pulse is 0  Left Foot/Ankle  Left Foot Inspection  Skin Exam: skin normal and skin intactno dry skin, no warmth, no erythema, no maceration, normal color, no pre-ulcer, no ulcer and no callus                         Toe Exam: ROM and strength within normal limits                   Sensory   Vibration: intact    Monofilament: intact  Vascular  Capillary refills: < 3 seconds  The left DP pulse is 0  The left PT pulse is 0  Assign Risk Category:  No deformity present;  No loss of protective sensation; Weak pulses       Risk: 1

## 2021-01-11 NOTE — PATIENT INSTRUCTIONS

## 2021-01-12 PROBLEM — R07.9 CHEST PAIN: Status: RESOLVED | Noted: 2019-06-12 | Resolved: 2021-01-12

## 2021-01-12 PROBLEM — R41.89 COGNITIVE IMPAIRMENT: Status: RESOLVED | Noted: 2019-08-29 | Resolved: 2021-01-12

## 2021-01-12 PROBLEM — N18.9 CKD (CHRONIC KIDNEY DISEASE): Status: RESOLVED | Noted: 2020-11-12 | Resolved: 2021-01-12

## 2021-01-12 PROBLEM — R52 PAIN: Status: RESOLVED | Noted: 2019-08-28 | Resolved: 2021-01-12

## 2021-01-12 NOTE — ASSESSMENT & PLAN NOTE
Wt Readings from Last 3 Encounters:   01/11/21 77 6 kg (171 lb)   11/12/20 78 5 kg (173 lb)   10/20/20 75 8 kg (167 lb)     Pt appears to euvolemic  Continue to monitor  Pt to continue to check weight qAM  F/u with Cardio   Recheck 6m

## 2021-01-12 NOTE — ASSESSMENT & PLAN NOTE
Lab Results   Component Value Date    HGBA1C 6 5 01/11/2021   Remains well controlled  Continue to monitor  Continue present meds   Recheck 6m

## 2021-01-12 NOTE — ASSESSMENT & PLAN NOTE
Pt compliant with medication  INR a little labile recently  Continue to monitor  F/u with Cardio    Recheck 6m

## 2021-01-12 NOTE — ASSESSMENT & PLAN NOTE
Lab Results   Component Value Date    EGFR 13 09/22/2020    EGFR 13 07/03/2020    EGFR 13 04/06/2020    CREATININE 3 84 (H) 09/22/2020    CREATININE 3 95 (H) 07/03/2020    CREATININE 3 83 (H) 04/06/2020   Creat stable  Continue to monitor   F/u with Nephro

## 2021-01-12 NOTE — ASSESSMENT & PLAN NOTE
Vascular check las summer showed adequate flow to legs  Encourage ambulation   Recheck scan this summer

## 2021-01-15 ENCOUNTER — IMMUNIZATIONS (OUTPATIENT)
Dept: FAMILY MEDICINE CLINIC | Facility: HOSPITAL | Age: 86
End: 2021-01-15

## 2021-01-15 DIAGNOSIS — Z23 ENCOUNTER FOR IMMUNIZATION: Primary | ICD-10-CM

## 2021-01-15 PROCEDURE — 0001A SARS-COV-2 / COVID-19 MRNA VACCINE (PFIZER-BIONTECH) 30 MCG: CPT

## 2021-01-15 PROCEDURE — 91300 SARS-COV-2 / COVID-19 MRNA VACCINE (PFIZER-BIONTECH) 30 MCG: CPT

## 2021-01-20 ENCOUNTER — APPOINTMENT (OUTPATIENT)
Dept: LAB | Facility: HOSPITAL | Age: 86
End: 2021-01-20
Payer: MEDICARE

## 2021-01-20 ENCOUNTER — ANTICOAG VISIT (OUTPATIENT)
Dept: FAMILY MEDICINE CLINIC | Facility: CLINIC | Age: 86
End: 2021-01-20

## 2021-01-20 DIAGNOSIS — I25.10 3-VESSEL CORONARY ARTERY DISEASE: ICD-10-CM

## 2021-01-20 DIAGNOSIS — N18.5 TYPE 2 DIABETES MELLITUS WITH STAGE 5 CHRONIC KIDNEY DISEASE NOT ON CHRONIC DIALYSIS, WITH LONG-TERM CURRENT USE OF INSULIN (HCC): ICD-10-CM

## 2021-01-20 DIAGNOSIS — N18.9 CHRONIC KIDNEY DISEASE, UNSPECIFIED CKD STAGE: ICD-10-CM

## 2021-01-20 DIAGNOSIS — Z79.4 TYPE 2 DIABETES MELLITUS WITH STAGE 5 CHRONIC KIDNEY DISEASE NOT ON CHRONIC DIALYSIS, WITH LONG-TERM CURRENT USE OF INSULIN (HCC): ICD-10-CM

## 2021-01-20 DIAGNOSIS — E11.22 TYPE 2 DIABETES MELLITUS WITH STAGE 5 CHRONIC KIDNEY DISEASE NOT ON CHRONIC DIALYSIS, WITH LONG-TERM CURRENT USE OF INSULIN (HCC): ICD-10-CM

## 2021-01-20 DIAGNOSIS — E03.9 HYPOTHYROIDISM, UNSPECIFIED TYPE: ICD-10-CM

## 2021-01-20 LAB
ALBUMIN SERPL BCP-MCNC: 3.5 G/DL (ref 3.5–5)
ALP SERPL-CCNC: 146 U/L (ref 46–116)
ALT SERPL W P-5'-P-CCNC: 14 U/L (ref 12–78)
ANION GAP SERPL CALCULATED.3IONS-SCNC: 4 MMOL/L (ref 4–13)
AST SERPL W P-5'-P-CCNC: 10 U/L (ref 5–45)
BASOPHILS # BLD AUTO: 0.04 THOUSANDS/ΜL (ref 0–0.1)
BASOPHILS NFR BLD AUTO: 0 % (ref 0–1)
BILIRUB SERPL-MCNC: 0.38 MG/DL (ref 0.2–1)
BUN SERPL-MCNC: 47 MG/DL (ref 5–25)
CALCIUM SERPL-MCNC: 9.2 MG/DL (ref 8.3–10.1)
CHLORIDE SERPL-SCNC: 108 MMOL/L (ref 100–108)
CHOLEST SERPL-MCNC: 129 MG/DL (ref 50–200)
CO2 SERPL-SCNC: 28 MMOL/L (ref 21–32)
CREAT SERPL-MCNC: 3.96 MG/DL (ref 0.6–1.3)
CREAT UR-MCNC: 69.3 MG/DL
EOSINOPHIL # BLD AUTO: 0.19 THOUSAND/ΜL (ref 0–0.61)
EOSINOPHIL NFR BLD AUTO: 2 % (ref 0–6)
ERYTHROCYTE [DISTWIDTH] IN BLOOD BY AUTOMATED COUNT: 13.3 % (ref 11.6–15.1)
GFR SERPL CREATININE-BSD FRML MDRD: 13 ML/MIN/1.73SQ M
GLUCOSE SERPL-MCNC: 163 MG/DL (ref 65–140)
HCT VFR BLD AUTO: 39.4 % (ref 36.5–49.3)
HDLC SERPL-MCNC: 39 MG/DL
HGB BLD-MCNC: 12 G/DL (ref 12–17)
IMM GRANULOCYTES # BLD AUTO: 0.05 THOUSAND/UL (ref 0–0.2)
IMM GRANULOCYTES NFR BLD AUTO: 1 % (ref 0–2)
LDLC SERPL CALC-MCNC: 42 MG/DL (ref 0–100)
LYMPHOCYTES # BLD AUTO: 2.01 THOUSANDS/ΜL (ref 0.6–4.47)
LYMPHOCYTES NFR BLD AUTO: 21 % (ref 14–44)
MCH RBC QN AUTO: 30.9 PG (ref 26.8–34.3)
MCHC RBC AUTO-ENTMCNC: 30.5 G/DL (ref 31.4–37.4)
MCV RBC AUTO: 102 FL (ref 82–98)
MICROALBUMIN UR-MCNC: 56.9 MG/L (ref 0–20)
MICROALBUMIN/CREAT 24H UR: 82 MG/G CREATININE (ref 0–30)
MONOCYTES # BLD AUTO: 0.62 THOUSAND/ΜL (ref 0.17–1.22)
MONOCYTES NFR BLD AUTO: 7 % (ref 4–12)
NEUTROPHILS # BLD AUTO: 6.68 THOUSANDS/ΜL (ref 1.85–7.62)
NEUTS SEG NFR BLD AUTO: 69 % (ref 43–75)
NONHDLC SERPL-MCNC: 90 MG/DL
NRBC BLD AUTO-RTO: 0 /100 WBCS
PLATELET # BLD AUTO: 197 THOUSANDS/UL (ref 149–390)
PMV BLD AUTO: 9.9 FL (ref 8.9–12.7)
POTASSIUM SERPL-SCNC: 4.4 MMOL/L (ref 3.5–5.3)
PROT SERPL-MCNC: 6.7 G/DL (ref 6.4–8.2)
RBC # BLD AUTO: 3.88 MILLION/UL (ref 3.88–5.62)
SODIUM SERPL-SCNC: 140 MMOL/L (ref 136–145)
TRIGL SERPL-MCNC: 239 MG/DL
TSH SERPL DL<=0.05 MIU/L-ACNC: 2.25 UIU/ML (ref 0.36–3.74)
WBC # BLD AUTO: 9.59 THOUSAND/UL (ref 4.31–10.16)

## 2021-01-20 PROCEDURE — 82043 UR ALBUMIN QUANTITATIVE: CPT | Performed by: FAMILY MEDICINE

## 2021-01-20 PROCEDURE — 84443 ASSAY THYROID STIM HORMONE: CPT

## 2021-01-20 PROCEDURE — 80061 LIPID PANEL: CPT

## 2021-01-20 PROCEDURE — 85025 COMPLETE CBC W/AUTO DIFF WBC: CPT

## 2021-01-20 PROCEDURE — 82570 ASSAY OF URINE CREATININE: CPT | Performed by: FAMILY MEDICINE

## 2021-01-20 PROCEDURE — 80053 COMPREHEN METABOLIC PANEL: CPT

## 2021-01-21 ENCOUNTER — TELEPHONE (OUTPATIENT)
Dept: LAB | Facility: HOSPITAL | Age: 86
End: 2021-01-21

## 2021-01-23 DIAGNOSIS — R39.11 URINARY HESITANCY: ICD-10-CM

## 2021-01-24 RX ORDER — TAMSULOSIN HYDROCHLORIDE 0.4 MG/1
CAPSULE ORAL
Qty: 30 CAPSULE | Refills: 0 | Status: SHIPPED | OUTPATIENT
Start: 2021-01-24 | End: 2021-03-02

## 2021-01-30 DIAGNOSIS — R60.1 GENERALIZED EDEMA: ICD-10-CM

## 2021-02-01 RX ORDER — TORSEMIDE 20 MG/1
TABLET ORAL
Qty: 30 TABLET | Refills: 1 | Status: SHIPPED | OUTPATIENT
Start: 2021-02-01 | End: 2021-02-22

## 2021-02-03 ENCOUNTER — TELEMEDICINE (OUTPATIENT)
Dept: NEPHROLOGY | Facility: CLINIC | Age: 86
End: 2021-02-03
Payer: MEDICARE

## 2021-02-03 VITALS — HEART RATE: 70 BPM | DIASTOLIC BLOOD PRESSURE: 70 MMHG | SYSTOLIC BLOOD PRESSURE: 124 MMHG

## 2021-02-03 DIAGNOSIS — N18.5 CKD (CHRONIC KIDNEY DISEASE) STAGE 5, GFR LESS THAN 15 ML/MIN (HCC): Primary | ICD-10-CM

## 2021-02-03 DIAGNOSIS — I10 ESSENTIAL HYPERTENSION: ICD-10-CM

## 2021-02-03 PROCEDURE — 99214 OFFICE O/P EST MOD 30 MIN: CPT | Performed by: INTERNAL MEDICINE

## 2021-02-03 NOTE — PROGRESS NOTES
Virtual Regular Visit      Assessment/Plan:    1  Renal     patient is chronic kidney disease in L microalbumin screen was in the microalbumin range so it is not diabetic  The patient's creatinine is stable at around 3 6 and is not progressed significantly over the last year  He has no symptoms of uremia no volume overload things remain stable and at this point he just going to continue with conservative follow-up with medical treatment  Patient's blood pressure is well controlled medications were reviewed and no changes  Blood pressure in lipid panel excellent  Continue to monitor labs and follow-up visit in 3 months  The patient is wife informed me that they potentially may be moving to [place] sometime in the near future  I told him I would help him establish a relationship with a nephrologist [place] there from [place] they will call me if there is any changes before next visit  Problem List Items Addressed This Visit        Cardiovascular and Mediastinum    Essential hypertension       Genitourinary    CKD (chronic kidney disease) stage 5, GFR less than 15 ml/min ([facility] [address] ) - Primary               Reason for visit is   Chief Complaint   Patient presents with    Virtual Regular Visit        Encounter provider Vipin Landry MD    Provider located at [address] [place] [id number]      Recent Visits  No visits were found meeting these conditions  Showing recent visits within past 7 days and meeting all other requirements     Today's Visits  Date Type Provider Dept   02/03/21 Telemedicine Vipin Landry MD [facility] [place]   Showing today's visits and meeting all other requirements     Future Appointments  No visits were found meeting these conditions  Showing future appointments within next 150 days and meeting all other requirements        The patient was identified by name and date of birth   Lauren Corina was informed that this is a telemedicine visit and that the visit is being conducted through telephone  My office door was closed  No one else was in the room  He acknowledged consent and understanding of privacy and security of the video platform  The patient has agreed to participate and understands they can discontinue the visit at any time  It was my intent to perform this visit via video technology but the patient was not able to do a video connection so the visit was completed via audio telephone only  Patient is aware this is a billable service  Subjective  Zoraida Vital is a 80 y o  male   Who was followed for chronic kidney disease  Yusra Monsalve HPI       The patient was contacted he has received his 1st COVID vaccine is getting ready to receive the 2nd  He recently celebrated his birthday  There has been no intercurrent illnesses no hospitalizations and he is feeling well and has no complaints for me  We reviewed his medications and there have been no changes in his health status or medications      Past Medical History:   Diagnosis Date    3-vessel coronary artery disease     last assessed: 08/15/2016    BPH (benign prostatic hyperplasia)     Chronic kidney disease     Diabetes mellitus ([facility] [address] )     Hyperlipidemia     Hypertension     Shingles 04/29/2019    SOB (shortness of breath)     Stroke [facility])        Past Surgical History:   Procedure Laterality Date    CARDIAC PACEMAKER PLACEMENT      CATARACT EXTRACTION      last assessed: 11/11/2015    CORONARY ARTERY BYPASS GRAFT      last assessed: 08/26/2015       Current Outpatient Medications   Medication Sig Dispense Refill    acetaminophen (TYLENOL) 325 mg tablet Take 2 tablets (650 mg total) by mouth 3 (three) times a day as needed for mild pain or moderate pain 30 tablet 0    aspirin (ECOTRIN LOW STRENGTH) 81 mg EC tablet Take 1 tablet (81 mg total) by mouth daily 30 tablet 0    atorvastatin (LIPITOR) 40 mg tablet TAKE 1 TABLET BY MOUTH EVERY DAY 90 tablet 3    cholecalciferol 2000 units TABS Take 1 tablet (2,000 Units total) by mouth daily 60 tablet 0    docusate sodium (COLACE) 100 mg capsule Take 100 mg by mouth 2 (two) times a day      finasteride (PROSCAR) 5 mg tablet TAKE 1 TABLET BY MOUTH EVERY DAY 90 tablet 3    glucose blood (FREESTYLE LITE) test strip PATIENT TESTS BLOOD SUGARS ONCE DAILY  PATIENT IS NOT INSULIN DEPENDENT  DX E11 20 50 each 5    levothyroxine 75 mcg tablet Take 1 tablet (75 mcg total) by mouth daily 90 tablet 3    metoprolol tartrate (LOPRESSOR) 50 mg tablet TAKE 1/2 TABLET BY MOUTH TWICE A DAY WITH MEALS 90 tablet 1    ranolazine (RANEXA) 500 mg 12 hr tablet TAKE 1 TABLET BY MOUTH EVERY 12 HOURS 180 tablet 1    tamsulosin (FLOMAX) 0 4 mg TAKE 1 CAPSULE BY MOUTH EVERYDAY AT BEDTIME 30 capsule 0    torsemide (DEMADEX) 20 mg tablet TAKE 1 TABLET BY MOUTH EVERY DAY 30 tablet 1    TRADJENTA 5 MG TABS TAKE 1 TABLET BY MOUTH EVERY DAY 90 tablet 3    warfarin (COUMADIN) 2 mg tablet 1/2 tab (1mg) M,W,F, 1 tab (2mg) all other days 90 tablet 1     No current facility-administered medications for this visit  No Known Allergies    Review of Systems   Constitutional: Negative for appetite change, chills, diaphoresis and fever  HENT: Negative  Eyes: Negative  Respiratory: Negative  Negative for cough, chest tightness, shortness of breath and wheezing  Cardiovascular: Negative  Negative for chest pain, palpitations and leg swelling  Gastrointestinal: Negative  Negative for abdominal distention, abdominal pain, diarrhea, nausea and vomiting  Neurological: Negative  Negative for dizziness, weakness, light-headedness and headaches  Psychiatric/Behavioral: Negative  Negative for agitation, behavioral problems, confusion and decreased concentration  Video Exam    Vitals:    02/03/21 1310   BP: 124/70   Pulse: 70       Physical Exam       Alert oriented x3 with no focal complaints  He was not confused  Abdomen was soft and not tender  Pulse was regular  No swelling in his legs  Talking full sentences with no shortness of breath appreciated in no wheezing appreciated  I spent 27 minutes directly with the patient during this visit      VIRTUAL VISIT DISCLAIMER    Zoraida Amanuel acknowledges that he has consented to an online visit or consultation  He understands that the online visit is based solely on information provided by him, and that, in the absence of a face-to-face physical evaluation by the physician, the diagnosis he receives is both limited and provisional in terms of accuracy and completeness  This is not intended to replace a full medical face-to-face evaluation by the physician  Zoraida Vital understands and accepts these terms

## 2021-02-03 NOTE — LETTER
February 3, 2021     Anna Thacker MD  [address]  [facility]    Patient: Zoraida Vital   YOB: 1932   Date of Visit: 2/3/2021       Dear Dr Kristina Swain:    Thank you for referring Zoraida Vital to me for evaluation  Below are my notes for this consultation  If you have questions, please do not hesitate to call me  I look forward to following your patient along with you  Sincerely,        Yu Fernandez MD        CC: No Recipients  Yu Fernandez MD  2/3/2021  1:25 PM  Sign when Signing Visit    Virtual Regular Visit      Assessment/Plan:    1  Renal     patient is chronic kidney disease in L microalbumin screen was in the microalbumin range so it is not diabetic  The patient's creatinine is stable at around 3 6 and is not progressed significantly over the last year  He has no symptoms of uremia no volume overload things remain stable and at this point he just going to continue with conservative follow-up with medical treatment  Patient's blood pressure is well controlled medications were reviewed and no changes  Blood pressure in lipid panel excellent  Continue to monitor labs and follow-up visit in 3 months  The patient is wife informed me that they potentially may be moving to [place] sometime in the near future  I told him I would help him establish a relationship with a nephrologist [place] there from [place] they will call me if there is any changes before next visit    Problem List Items Addressed This Visit        Cardiovascular and Mediastinum    Essential hypertension       Genitourinary    CKD (chronic kidney disease) stage 5, GFR less than 15 ml/min ([facility]) - Primary               Reason for visit is   Chief Complaint   Patient presents with    Virtual Regular Visit        Encounter provider Yu Fernandez MD    Provider located at [address] [place] [address]  [address] [place] [id number]      Recent Visits  No visits were found meeting these conditions  Showing recent visits within past 7 days and meeting all other requirements     Today's Visits  Date Type Provider Dept   02/03/21 Telemedicine Yudy Harris MD [facility]   Showing today's visits and meeting all other requirements     Future Appointments  No visits were found meeting these conditions  Showing future appointments within next 150 days and meeting all other requirements        The patient was identified by name and date of birth  Huma Lutz was informed that this is a telemedicine visit and that the visit is being conducted through telephone  My office door was closed  No one else was in the room  He acknowledged consent and understanding of privacy and security of the video platform  The patient has agreed to participate and understands they can discontinue the visit at any time  It was my intent to perform this visit via video technology but the patient was not able to do a video connection so the visit was completed via audio telephone only  Patient is aware this is a billable service  Subjective  Huma Lutz is a 80 y o  male   Who was followed for chronic kidney disease  Aston Iraheta HPI       The patient was contacted he has received his 1st COVID vaccine is getting ready to receive the 2nd  He recently celebrated his birthday  There has been no intercurrent illnesses no hospitalizations and he is feeling well and has no complaints for me  We reviewed his medications and there have been no changes in his health status or medications      Past Medical History:   Diagnosis Date    3-vessel coronary artery disease     last assessed: 08/15/2016    BPH (benign prostatic hyperplasia)     Chronic kidney disease     Diabetes mellitus ([facility] [address] )     Hyperlipidemia     Hypertension     Shingles 04/29/2019    SOB (shortness of breath)     Stroke [facility])        Past Surgical History:   Procedure Laterality Date    CARDIAC PACEMAKER PLACEMENT      CATARACT EXTRACTION      last assessed: 11/11/2015    CORONARY ARTERY BYPASS GRAFT      last assessed: 08/26/2015       Current Outpatient Medications   Medication Sig Dispense Refill    acetaminophen (TYLENOL) 325 mg tablet Take 2 tablets (650 mg total) by mouth 3 (three) times a day as needed for mild pain or moderate pain 30 tablet 0    aspirin (ECOTRIN LOW STRENGTH) 81 mg EC tablet Take 1 tablet (81 mg total) by mouth daily 30 tablet 0    atorvastatin (LIPITOR) 40 mg tablet TAKE 1 TABLET BY MOUTH EVERY DAY 90 tablet 3    cholecalciferol 2000 units TABS Take 1 tablet (2,000 Units total) by mouth daily 60 tablet 0    docusate sodium (COLACE) 100 mg capsule Take 100 mg by mouth 2 (two) times a day      finasteride (PROSCAR) 5 mg tablet TAKE 1 TABLET BY MOUTH EVERY DAY 90 tablet 3    glucose blood (FREESTYLE LITE) test strip PATIENT TESTS BLOOD SUGARS ONCE DAILY  PATIENT IS NOT INSULIN DEPENDENT  DX E11 20 50 each 5    levothyroxine 75 mcg tablet Take 1 tablet (75 mcg total) by mouth daily 90 tablet 3    metoprolol tartrate (LOPRESSOR) 50 mg tablet TAKE 1/2 TABLET BY MOUTH TWICE A DAY WITH MEALS 90 tablet 1    ranolazine (RANEXA) 500 mg 12 hr tablet TAKE 1 TABLET BY MOUTH EVERY 12 HOURS 180 tablet 1    tamsulosin (FLOMAX) 0 4 mg TAKE 1 CAPSULE BY MOUTH EVERYDAY AT BEDTIME 30 capsule 0    torsemide (DEMADEX) 20 mg tablet TAKE 1 TABLET BY MOUTH EVERY DAY 30 tablet 1    TRADJENTA 5 MG TABS TAKE 1 TABLET BY MOUTH EVERY DAY 90 tablet 3    warfarin (COUMADIN) 2 mg tablet 1/2 tab (1mg) M,W,F, 1 tab (2mg) all other days 90 tablet 1     No current facility-administered medications for this visit  No Known Allergies    Review of Systems   Constitutional: Negative for appetite change, chills, diaphoresis and fever  HENT: Negative  Eyes: Negative  Respiratory: Negative  Negative for cough, chest tightness, shortness of breath and wheezing  Cardiovascular: Negative  Negative for chest pain, palpitations and leg swelling  Gastrointestinal: Negative  Negative for abdominal distention, abdominal pain, diarrhea, nausea and vomiting  Neurological: Negative  Negative for dizziness, weakness, light-headedness and headaches  Psychiatric/Behavioral: Negative  Negative for agitation, behavioral problems, confusion and decreased concentration  Video Exam    Vitals:    02/03/21 1310   BP: 124/70   Pulse: 70       Physical Exam       Alert oriented x3 with no focal complaints  He was not confused  Abdomen was soft and not tender  Pulse was regular  No swelling in his legs  Talking full sentences with no shortness of breath appreciated in no wheezing appreciated  I spent 27 minutes directly with the patient during this visit      VIRTUAL VISIT DISCLAIMER    Lance Villa acknowledges that he has consented to an online visit or consultation  He understands that the online visit is based solely on information provided by him, and that, in the absence of a face-to-face physical evaluation by the physician, the diagnosis he receives is both limited and provisional in terms of accuracy and completeness  This is not intended to replace a full medical face-to-face evaluation by the physician  Lance Villa understands and accepts these terms

## 2021-02-04 ENCOUNTER — IMMUNIZATIONS (OUTPATIENT)
Dept: FAMILY MEDICINE CLINIC | Facility: HOSPITAL | Age: 86
End: 2021-02-04

## 2021-02-04 DIAGNOSIS — Z23 ENCOUNTER FOR IMMUNIZATION: Primary | ICD-10-CM

## 2021-02-04 PROCEDURE — 0002A SARS-COV-2 / COVID-19 MRNA VACCINE (PFIZER-BIONTECH) 30 MCG: CPT

## 2021-02-04 PROCEDURE — 91300 SARS-COV-2 / COVID-19 MRNA VACCINE (PFIZER-BIONTECH) 30 MCG: CPT

## 2021-02-20 ENCOUNTER — APPOINTMENT (OUTPATIENT)
Dept: LAB | Facility: HOSPITAL | Age: 86
End: 2021-02-20
Payer: MEDICARE

## 2021-02-20 LAB
ANION GAP SERPL CALCULATED.3IONS-SCNC: 8 MMOL/L (ref 4–13)
BUN SERPL-MCNC: 46 MG/DL (ref 5–25)
CALCIUM SERPL-MCNC: 8.4 MG/DL (ref 8.3–10.1)
CHLORIDE SERPL-SCNC: 104 MMOL/L (ref 100–108)
CO2 SERPL-SCNC: 26 MMOL/L (ref 21–32)
CREAT SERPL-MCNC: 4.6 MG/DL (ref 0.6–1.3)
GFR SERPL CREATININE-BSD FRML MDRD: 10 ML/MIN/1.73SQ M
GLUCOSE SERPL-MCNC: 270 MG/DL (ref 65–140)
POTASSIUM SERPL-SCNC: 4.6 MMOL/L (ref 3.5–5.3)
SODIUM SERPL-SCNC: 138 MMOL/L (ref 136–145)

## 2021-02-20 PROCEDURE — 80048 BASIC METABOLIC PNL TOTAL CA: CPT

## 2021-02-21 DIAGNOSIS — R60.1 GENERALIZED EDEMA: ICD-10-CM

## 2021-02-22 ENCOUNTER — ANTICOAG VISIT (OUTPATIENT)
Dept: FAMILY MEDICINE CLINIC | Facility: CLINIC | Age: 86
End: 2021-02-22

## 2021-02-22 RX ORDER — TORSEMIDE 20 MG/1
TABLET ORAL
Qty: 30 TABLET | Refills: 1 | Status: SHIPPED | OUTPATIENT
Start: 2021-02-22 | End: 2021-03-21

## 2021-03-02 DIAGNOSIS — R39.11 URINARY HESITANCY: ICD-10-CM

## 2021-03-02 RX ORDER — TAMSULOSIN HYDROCHLORIDE 0.4 MG/1
CAPSULE ORAL
Qty: 30 CAPSULE | Refills: 5 | Status: SHIPPED | OUTPATIENT
Start: 2021-03-02

## 2021-03-04 ENCOUNTER — TELEPHONE (OUTPATIENT)
Dept: LAB | Facility: HOSPITAL | Age: 86
End: 2021-03-04

## 2021-03-15 ENCOUNTER — TELEPHONE (OUTPATIENT)
Dept: CARDIOLOGY CLINIC | Facility: CLINIC | Age: 86
End: 2021-03-15

## 2021-03-15 NOTE — TELEPHONE ENCOUNTER
Pt had an echo in January, 2021  Can you please review the results and advise? Recall with Dr Segura Chol October, 2021  Thank you

## 2021-03-19 NOTE — TELEPHONE ENCOUNTER
I called the pt to advise him that his echo was normal   I left a message on the phone number listed

## 2021-03-20 DIAGNOSIS — R60.1 GENERALIZED EDEMA: ICD-10-CM

## 2021-03-21 RX ORDER — TORSEMIDE 20 MG/1
TABLET ORAL
Qty: 30 TABLET | Refills: 1 | Status: SHIPPED | OUTPATIENT
Start: 2021-03-21 | End: 2021-04-19

## 2021-03-23 ENCOUNTER — APPOINTMENT (OUTPATIENT)
Dept: LAB | Facility: HOSPITAL | Age: 86
End: 2021-03-23
Payer: MEDICARE

## 2021-03-23 LAB — INR PPP: 3.23 (ref 0.84–1.19)

## 2021-03-24 ENCOUNTER — ANTICOAG VISIT (OUTPATIENT)
Dept: FAMILY MEDICINE CLINIC | Facility: CLINIC | Age: 86
End: 2021-03-24

## 2021-03-24 ENCOUNTER — TELEPHONE (OUTPATIENT)
Dept: LAB | Facility: HOSPITAL | Age: 86
End: 2021-03-24

## 2021-04-06 ENCOUNTER — TELEPHONE (OUTPATIENT)
Dept: LAB | Facility: HOSPITAL | Age: 86
End: 2021-04-06

## 2021-04-07 ENCOUNTER — APPOINTMENT (OUTPATIENT)
Dept: LAB | Facility: HOSPITAL | Age: 86
End: 2021-04-07
Payer: MEDICARE

## 2021-04-07 LAB
INR PPP: 2.62 (ref 0.84–1.19)
PROTHROMBIN TIME: 27.9 SECONDS (ref 11.6–14.5)

## 2021-04-07 PROCEDURE — 85610 PROTHROMBIN TIME: CPT | Performed by: FAMILY MEDICINE

## 2021-04-07 PROCEDURE — 36415 COLL VENOUS BLD VENIPUNCTURE: CPT | Performed by: FAMILY MEDICINE

## 2021-04-08 ENCOUNTER — TELEPHONE (OUTPATIENT)
Dept: LAB | Facility: HOSPITAL | Age: 86
End: 2021-04-08

## 2021-04-08 ENCOUNTER — ANTICOAG VISIT (OUTPATIENT)
Dept: FAMILY MEDICINE CLINIC | Facility: CLINIC | Age: 86
End: 2021-04-08

## 2021-04-08 ENCOUNTER — REMOTE DEVICE CLINIC VISIT (OUTPATIENT)
Dept: CARDIOLOGY CLINIC | Facility: CLINIC | Age: 86
End: 2021-04-08
Payer: MEDICARE

## 2021-04-08 DIAGNOSIS — Z95.0 PRESENCE OF PERMANENT CARDIAC PACEMAKER: Primary | ICD-10-CM

## 2021-04-08 PROCEDURE — 93294 REM INTERROG EVL PM/LDLS PM: CPT | Performed by: INTERNAL MEDICINE

## 2021-04-08 PROCEDURE — 93296 REM INTERROG EVL PM/IDS: CPT | Performed by: INTERNAL MEDICINE

## 2021-04-08 NOTE — PROGRESS NOTES
MDT DUAL CHAMBER PM/ ACTIVE SYSTEM IS MRI CONDITIONAL   CARELINK TRANSMISSION: BATTERY VOLTAGE ADEQUATE (3 5 YRS)  AP 92 8%  0 8%  ALL AVAILABLE LEAD PARAMETERS WITHIN NORMAL LIMITS  NO SIGNIFICANT HIGH RATE EPISODES   PACEMAKER FUNCTIONING APPROPRIATELY   EBS

## 2021-04-17 DIAGNOSIS — R60.1 GENERALIZED EDEMA: ICD-10-CM

## 2021-04-19 RX ORDER — FUROSEMIDE 40 MG/1
TABLET ORAL
Qty: 30 TABLET | Refills: 0 | Status: SHIPPED | OUTPATIENT
Start: 2021-04-19 | End: 2021-04-22

## 2021-04-21 DIAGNOSIS — R60.1 GENERALIZED EDEMA: ICD-10-CM

## 2021-04-22 ENCOUNTER — TRANSCRIBE ORDERS (OUTPATIENT)
Dept: LAB | Facility: CLINIC | Age: 86
End: 2021-04-22

## 2021-04-22 ENCOUNTER — OFFICE VISIT (OUTPATIENT)
Dept: FAMILY MEDICINE CLINIC | Facility: CLINIC | Age: 86
End: 2021-04-22
Payer: MEDICARE

## 2021-04-22 ENCOUNTER — APPOINTMENT (OUTPATIENT)
Dept: LAB | Facility: CLINIC | Age: 86
End: 2021-04-22
Payer: MEDICARE

## 2021-04-22 VITALS
BODY MASS INDEX: 28.99 KG/M2 | HEIGHT: 65 IN | WEIGHT: 174 LBS | DIASTOLIC BLOOD PRESSURE: 70 MMHG | SYSTOLIC BLOOD PRESSURE: 122 MMHG | HEART RATE: 74 BPM | TEMPERATURE: 98.5 F

## 2021-04-22 DIAGNOSIS — N18.5 TYPE 2 DIABETES MELLITUS WITH STAGE 5 CHRONIC KIDNEY DISEASE NOT ON CHRONIC DIALYSIS, WITH LONG-TERM CURRENT USE OF INSULIN (HCC): ICD-10-CM

## 2021-04-22 DIAGNOSIS — I10 ESSENTIAL HYPERTENSION: ICD-10-CM

## 2021-04-22 DIAGNOSIS — E03.9 HYPOTHYROIDISM, UNSPECIFIED TYPE: ICD-10-CM

## 2021-04-22 DIAGNOSIS — I25.10 3-VESSEL CORONARY ARTERY DISEASE: ICD-10-CM

## 2021-04-22 DIAGNOSIS — Z79.4 TYPE 2 DIABETES MELLITUS WITH STAGE 5 CHRONIC KIDNEY DISEASE NOT ON CHRONIC DIALYSIS, WITH LONG-TERM CURRENT USE OF INSULIN (HCC): Primary | ICD-10-CM

## 2021-04-22 DIAGNOSIS — N18.5 TYPE 2 DIABETES MELLITUS WITH STAGE 5 CHRONIC KIDNEY DISEASE NOT ON CHRONIC DIALYSIS, WITH LONG-TERM CURRENT USE OF INSULIN (HCC): Primary | ICD-10-CM

## 2021-04-22 DIAGNOSIS — E11.22 TYPE 2 DIABETES MELLITUS WITH STAGE 5 CHRONIC KIDNEY DISEASE NOT ON CHRONIC DIALYSIS, WITH LONG-TERM CURRENT USE OF INSULIN (HCC): ICD-10-CM

## 2021-04-22 DIAGNOSIS — Z79.4 TYPE 2 DIABETES MELLITUS WITH STAGE 5 CHRONIC KIDNEY DISEASE NOT ON CHRONIC DIALYSIS, WITH LONG-TERM CURRENT USE OF INSULIN (HCC): ICD-10-CM

## 2021-04-22 DIAGNOSIS — I48.0 PAROXYSMAL A-FIB (HCC): ICD-10-CM

## 2021-04-22 DIAGNOSIS — N18.5 CKD (CHRONIC KIDNEY DISEASE) STAGE 5, GFR LESS THAN 15 ML/MIN (HCC): ICD-10-CM

## 2021-04-22 DIAGNOSIS — E11.22 TYPE 2 DIABETES MELLITUS WITH STAGE 5 CHRONIC KIDNEY DISEASE NOT ON CHRONIC DIALYSIS, WITH LONG-TERM CURRENT USE OF INSULIN (HCC): Primary | ICD-10-CM

## 2021-04-22 LAB
ALBUMIN SERPL BCP-MCNC: 3.3 G/DL (ref 3.5–5)
ANION GAP SERPL CALCULATED.3IONS-SCNC: 8 MMOL/L (ref 4–13)
BASOPHILS # BLD AUTO: 0.02 THOUSANDS/ΜL (ref 0–0.1)
BASOPHILS NFR BLD AUTO: 0 % (ref 0–1)
BUN SERPL-MCNC: 49 MG/DL (ref 5–25)
CALCIUM ALBUM COR SERPL-MCNC: 9.2 MG/DL (ref 8.3–10.1)
CALCIUM SERPL-MCNC: 8.6 MG/DL (ref 8.3–10.1)
CHLORIDE SERPL-SCNC: 105 MMOL/L (ref 100–108)
CO2 SERPL-SCNC: 25 MMOL/L (ref 21–32)
CREAT SERPL-MCNC: 4.76 MG/DL (ref 0.6–1.3)
EOSINOPHIL # BLD AUTO: 0.21 THOUSAND/ΜL (ref 0–0.61)
EOSINOPHIL NFR BLD AUTO: 2 % (ref 0–6)
ERYTHROCYTE [DISTWIDTH] IN BLOOD BY AUTOMATED COUNT: 13.3 % (ref 11.6–15.1)
GFR SERPL CREATININE-BSD FRML MDRD: 10 ML/MIN/1.73SQ M
GLUCOSE SERPL-MCNC: 247 MG/DL (ref 65–140)
HCT VFR BLD AUTO: 33.4 % (ref 36.5–49.3)
HGB BLD-MCNC: 10.4 G/DL (ref 12–17)
IMM GRANULOCYTES # BLD AUTO: 0.04 THOUSAND/UL (ref 0–0.2)
IMM GRANULOCYTES NFR BLD AUTO: 0 % (ref 0–2)
LYMPHOCYTES # BLD AUTO: 1.7 THOUSANDS/ΜL (ref 0.6–4.47)
LYMPHOCYTES NFR BLD AUTO: 19 % (ref 14–44)
MCH RBC QN AUTO: 31.7 PG (ref 26.8–34.3)
MCHC RBC AUTO-ENTMCNC: 31.1 G/DL (ref 31.4–37.4)
MCV RBC AUTO: 102 FL (ref 82–98)
MONOCYTES # BLD AUTO: 0.69 THOUSAND/ΜL (ref 0.17–1.22)
MONOCYTES NFR BLD AUTO: 8 % (ref 4–12)
NEUTROPHILS # BLD AUTO: 6.46 THOUSANDS/ΜL (ref 1.85–7.62)
NEUTS SEG NFR BLD AUTO: 71 % (ref 43–75)
NRBC BLD AUTO-RTO: 0 /100 WBCS
PHOSPHATE SERPL-MCNC: 3.3 MG/DL (ref 2.3–4.1)
PLATELET # BLD AUTO: 188 THOUSANDS/UL (ref 149–390)
PMV BLD AUTO: 10.8 FL (ref 8.9–12.7)
POTASSIUM SERPL-SCNC: 4.5 MMOL/L (ref 3.5–5.3)
RBC # BLD AUTO: 3.28 MILLION/UL (ref 3.88–5.62)
SL AMB POCT HEMOGLOBIN AIC: 6.4 (ref ?–6.5)
SODIUM SERPL-SCNC: 138 MMOL/L (ref 136–145)
TSH SERPL DL<=0.05 MIU/L-ACNC: 3.28 UIU/ML (ref 0.36–3.74)
WBC # BLD AUTO: 9.12 THOUSAND/UL (ref 4.31–10.16)

## 2021-04-22 PROCEDURE — 84443 ASSAY THYROID STIM HORMONE: CPT

## 2021-04-22 PROCEDURE — 83036 HEMOGLOBIN GLYCOSYLATED A1C: CPT | Performed by: FAMILY MEDICINE

## 2021-04-22 PROCEDURE — 82306 VITAMIN D 25 HYDROXY: CPT

## 2021-04-22 PROCEDURE — 85025 COMPLETE CBC W/AUTO DIFF WBC: CPT

## 2021-04-22 PROCEDURE — 80069 RENAL FUNCTION PANEL: CPT

## 2021-04-22 PROCEDURE — 99214 OFFICE O/P EST MOD 30 MIN: CPT | Performed by: FAMILY MEDICINE

## 2021-04-22 PROCEDURE — 36415 COLL VENOUS BLD VENIPUNCTURE: CPT

## 2021-04-22 RX ORDER — LEVOTHYROXINE SODIUM 0.07 MG/1
TABLET ORAL
Qty: 90 TABLET | Refills: 3 | Status: SHIPPED | OUTPATIENT
Start: 2021-04-22

## 2021-04-22 RX ORDER — TORSEMIDE 20 MG/1
TABLET ORAL
Qty: 30 TABLET | Refills: 1 | Status: SHIPPED | OUTPATIENT
Start: 2021-04-22 | End: 2021-05-17

## 2021-04-22 NOTE — PROGRESS NOTES
Assessment/Plan:    Type 2 diabetes mellitus, with long-term current use of insulin (HCC)    Lab Results   Component Value Date    HGBA1C 6 4 04/22/2021     A1C at goal  Continue present care  Monitor diet  Recheck 3m    Hypothyroidism  Appears to be stable clinically  Check TSH  Adjust meds if TSH is not at goal  Recheck 6m      3-vessel coronary artery disease  Asymptomatic  Continue present care  Monitor lipids  F/u with Cardio  Recheck 3m    Paroxysmal A-fib (HCC)  Clinically stable  Contnue present care  Recheck 3m    Essential hypertension  Well controlled  Cont present treatment  Monitor labs  Recheck 3m      CKD (chronic kidney disease) stage 5, GFR less than 15 ml/min [facility])  Lab Results   Component Value Date    EGFR 10 04/22/2021    EGFR 10 02/20/2021    EGFR 13 01/20/2021    CREATININE 4 76 (H) 04/22/2021    CREATININE 4 60 (H) 02/20/2021    CREATININE 3 96 (H) 01/20/2021   GFR around 10  Recheck renal panel  F/u with nephro  Continue present care for now  Rechec k3m       Diagnoses and all orders for this visit:    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, with long-term current use of insulin (HCC)  -     POCT hemoglobin A1c  -     CBC and differential; Future    Hypothyroidism, unspecified type  -     TSH, 3rd generation; Future    CKD (chronic kidney disease) stage 5, GFR less than 15 ml/min (HCC)  -     Renal function panel; Future  -     CBC and differential; Future  -     Vitamin D 25 hydroxy; Future    3-vessel coronary artery disease    Paroxysmal A-fib (HCC)    Essential hypertension          Subjective:      Patient ID: King Jae is a 80 y o  male  F/u multiple med issues   - pt doing well  - wife is concerned that his weight has increased over the last 6m  He does have some SOB with exertion, though pt feels that it is not worse than baseline  - pt has a craving for sweets, but his wife says that they control it well   Continues to monitor home BGs - typically they are in the 115-140 range  A1C today =  6 4   - pt is up to date with Cardio  Pt denies CP, palpitations, lightheadedness or other CV symptoms with or without exertion  Pt does some chair exercises  - pt up to date with Nephro (2/8/21)  Due for labs  - no new GI complaints  - pt denies any new  complaints  The following portions of the patient's history were reviewed and updated as appropriate:   He  has a past medical history of 3-vessel coronary artery disease, BPH (benign prostatic hyperplasia), Chronic kidney disease, Diabetes mellitus ([facility] ), Hyperlipidemia, Hypertension, Shingles (04/29/2019), SOB (shortness of breath), and Stroke ([facility] )    He   Patient Active Problem List    Diagnosis Date Noted    Essential hypertension 03/27/2020    History of constipation 09/04/2019    CKD (chronic kidney disease) stage 5, GFR less than 15 ml/min ([facility] ) 09/02/2019    Vitamin D insufficiency 08/29/2019    Anticoagulated on warfarin 08/28/2019    History of recurrent UTIs 08/28/2019    Presence of permanent cardiac pacemaker 08/28/2019    Anemia 08/28/2019    Healthcare maintenance 08/28/2019    Impaired mobility and ADLs 08/27/2019    Warfarin-induced coagulopathy ([facility] ) 08/26/2019    Benign prostatic hyperplasia with urinary hesitancy 08/22/2019    Abnormal chest x-ray 08/22/2019    [facility] diastolic heart failure ([facility] ) 07/04/2019    Leg edema, left 06/13/2019    Shortness of breath 06/13/2019    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 06/13/2019    Tibial artery occlusion, left ([facility] ) 06/13/2019    Ambulatory dysfunction/generalized weakness 06/12/2019    Frozen shoulder 05/30/2018    Paroxysmal A-fib ([facility] ) 04/13/2016    Hyperlipidemia 11/11/2015    Elevated serum alkaline phosphatase level 10/20/2015    Increased frequency of urination 10/20/2015    3-vessel coronary artery disease 08/26/2015    Type 2 diabetes mellitus, with long-term current use of insulin ([facility] ) 08/26/2015    Hypothyroidism 08/26/2015    Asbestosis ([place] [id number] ) 05/06/2014    Seasonal allergies 05/06/2014     He  has a past surgical history that includes Coronary artery bypass graft; Cataract extraction; and Cardiac pacemaker placement  He  reports that he has never smoked  He has never used smokeless tobacco  He reports previous alcohol use  He reports that he does not use drugs  Current Outpatient Medications   Medication Sig Dispense Refill    acetaminophen (TYLENOL) 325 mg tablet Take 2 tablets (650 mg total) by mouth 3 (three) times a day as needed for mild pain or moderate pain 30 tablet 0    aspirin (ECOTRIN LOW STRENGTH) 81 mg EC tablet Take 1 tablet (81 mg total) by mouth daily 30 tablet 0    atorvastatin (LIPITOR) 40 mg tablet TAKE 1 TABLET BY MOUTH EVERY DAY 90 tablet 3    cholecalciferol 2000 units TABS Take 1 tablet (2,000 Units total) by mouth daily 60 tablet 0    docusate sodium (COLACE) 100 mg capsule Take 100 mg by mouth 2 (two) times a day      finasteride (PROSCAR) 5 mg tablet TAKE 1 TABLET BY MOUTH EVERY DAY 90 tablet 3    glucose blood (FREESTYLE LITE) test strip PATIENT TESTS BLOOD SUGARS ONCE DAILY  PATIENT IS NOT INSULIN DEPENDENT  DX E11 20 50 each 5    levothyroxine 75 mcg tablet TAKE 1 TABLET BY MOUTH EVERY DAY 90 tablet 3    metoprolol tartrate (LOPRESSOR) 50 mg tablet TAKE 1/2 TABLET BY MOUTH TWICE A DAY WITH MEALS 90 tablet 1    ranolazine (RANEXA) 500 mg 12 hr tablet TAKE 1 TABLET BY MOUTH EVERY 12 HOURS 180 tablet 1    tamsulosin (FLOMAX) 0 4 mg TAKE 1 CAPSULE BY MOUTH EVERYDAY AT BEDTIME 30 capsule 5    torsemide (DEMADEX) 20 mg tablet TAKE 1 TABLET BY MOUTH EVERY DAY 30 tablet 1    TRADJENTA 5 MG TABS TAKE 1 TABLET BY MOUTH EVERY DAY 90 tablet 3    warfarin (COUMADIN) 2 mg tablet 1/2 tab (1mg) M,W,F, 1 tab (2mg) all other days 90 tablet 1     No current facility-administered medications for this visit  He has No Known Allergies       Review of Systems   Constitutional: Negative  HENT: Negative  Eyes: Negative  Respiratory: Negative  Cardiovascular: Negative  Gastrointestinal: Negative  Endocrine: Negative  Genitourinary: Negative  Musculoskeletal: Positive for arthralgias (scattered, unchanged), back pain (occasional) and gait problem  Negative for joint swelling and myalgias  Skin: Negative  Allergic/Immunologic: Negative  Neurological: Negative for dizziness, weakness (mild leg  Difficulty with stairs (has stair lift at home)), light-headedness, numbness and headaches  Hematological: Negative  Psychiatric/Behavioral: Negative  Objective:      /70   Pulse 74   Temp 98 5 °F (36 9 °C)   Ht 5' 5" (1 651 m)   Wt 78 9 kg (174 lb)   BMI 28 96 kg/m²          Physical Exam  Constitutional:       Appearance: He is well-developed  HENT:      Head: Normocephalic and atraumatic  Right Ear: Tympanic membrane, ear canal and external ear normal       Left Ear: Tympanic membrane, ear canal and external ear normal    Eyes:      General: No scleral icterus  Extraocular Movements: Extraocular movements intact  Conjunctiva/sclera: Conjunctivae normal       Pupils: Pupils are equal, round, and reactive to light  Neck:      Musculoskeletal: Normal range of motion and neck supple  No muscular tenderness  Thyroid: No thyromegaly  Vascular: No carotid bruit  Cardiovascular:      Rate and Rhythm: Normal rate and regular rhythm  Pulses:           Dorsalis pedis pulses are 0 on the right side and 0 on the left side  Posterior tibial pulses are 0 on the right side and 0 on the left side  Heart sounds: Murmur (LUSB) present  Pulmonary:      Effort: Pulmonary effort is normal       Breath sounds: Normal breath sounds  Abdominal:      General: Bowel sounds are normal  There is no distension  Palpations: Abdomen is soft  There is no mass  Tenderness:  There is no abdominal tenderness  Musculoskeletal: Normal range of motion  General: Swelling (MCPs bilat - unchanged) and deformity (mild MCP synovitis as above  Mild arthritic change sin hands) present  No tenderness  Right lower leg: No edema  Left lower leg: No edema  Feet:      Right foot:      Skin integrity: No ulcer, skin breakdown, erythema, warmth, callus or dry skin  Left foot:      Skin integrity: No ulcer, skin breakdown, erythema, warmth, callus or dry skin  Lymphadenopathy:      Cervical: No cervical adenopathy  Skin:     General: Skin is warm and dry  Capillary Refill: Capillary refill takes less than 2 seconds  Neurological:      Mental Status: He is alert and oriented to person, place, and time  Cranial Nerves: No cranial nerve deficit  Sensory: No sensory deficit  Motor: No weakness  Gait: Gait abnormal (mild imbalance)  Psychiatric:         Mood and Affect: Mood normal       Comments: PHQ-9 Depression Screening    PHQ-9:   Frequency of the following problems over the past two weeks:      Little interest or pleasure in doing things: 0 - not at all  Feeling down, depressed, or hopeless: 0 - not at all  Trouble falling or staying asleep, or sleeping too much: 0 - not at all  Feeling tired or having little energy: 0 - not at all  Poor appetite or overeatin - not at all  Feeling bad about yourself - or that you are a failure or have let   yourself or your family down: 0 - not at all  Trouble concentrating on things, such as reading the newspaper or watching   television: 0 - not at all  Moving or speaking so slowly that other people could have noticed   Or the   opposite - being so fidgety or restless that you have been moving around a   lot more than usual: 0 - not at all  Thoughts that you would be better off dead, or of hurting yourself in some   way: 0 - not at all  PHQ-2 Score: 0  PHQ-9 Score: 0

## 2021-04-23 LAB — 25(OH)D3 SERPL-MCNC: 55.3 NG/ML (ref 30–100)

## 2021-04-25 DIAGNOSIS — R07.9 CHEST PAIN: ICD-10-CM

## 2021-04-25 DIAGNOSIS — I10 ESSENTIAL HYPERTENSION: ICD-10-CM

## 2021-04-25 DIAGNOSIS — I25.10 3-VESSEL CORONARY ARTERY DISEASE: ICD-10-CM

## 2021-04-25 PROBLEM — F32.4 MAJOR DEPRESSIVE DISORDER WITH SINGLE EPISODE, IN PARTIAL REMISSION (HCC): Status: RESOLVED | Noted: 2019-04-29 | Resolved: 2021-04-25

## 2021-04-25 NOTE — ASSESSMENT & PLAN NOTE
Lab Results   Component Value Date    HGBA1C 6 4 04/22/2021     A1C at goal  Continue present care  Monitor diet   Recheck 3m

## 2021-04-25 NOTE — ASSESSMENT & PLAN NOTE
Lab Results   Component Value Date    EGFR 10 04/22/2021    EGFR 10 02/20/2021    EGFR 13 01/20/2021    CREATININE 4 76 (H) 04/22/2021    CREATININE 4 60 (H) 02/20/2021    CREATININE 3 96 (H) 01/20/2021   GFR around 10  Recheck renal panel  F/u with nephro  Continue present care for now   Rechec k3m

## 2021-04-26 RX ORDER — RANOLAZINE 500 MG/1
TABLET, EXTENDED RELEASE ORAL
Qty: 180 TABLET | Refills: 1 | Status: SHIPPED | OUTPATIENT
Start: 2021-04-26 | End: 2021-05-15 | Stop reason: HOSPADM

## 2021-04-26 RX ORDER — METOPROLOL TARTRATE 50 MG/1
TABLET, FILM COATED ORAL
Qty: 90 TABLET | Refills: 1 | Status: SHIPPED | OUTPATIENT
Start: 2021-04-26

## 2021-05-06 ENCOUNTER — APPOINTMENT (OUTPATIENT)
Dept: LAB | Facility: HOSPITAL | Age: 86
End: 2021-05-06
Payer: MEDICARE

## 2021-05-07 ENCOUNTER — ANTICOAG VISIT (OUTPATIENT)
Dept: FAMILY MEDICINE CLINIC | Facility: CLINIC | Age: 86
End: 2021-05-07

## 2021-05-07 DIAGNOSIS — I48.0 PAROXYSMAL A-FIB (HCC): ICD-10-CM

## 2021-05-07 RX ORDER — WARFARIN SODIUM 2 MG/1
TABLET ORAL
Qty: 90 TABLET | Refills: 1
Start: 2021-05-07 | End: 2021-05-15 | Stop reason: HOSPADM

## 2021-05-10 ENCOUNTER — TELEPHONE (OUTPATIENT)
Dept: LAB | Facility: HOSPITAL | Age: 86
End: 2021-05-10

## 2021-05-11 ENCOUNTER — APPOINTMENT (EMERGENCY)
Dept: CT IMAGING | Facility: HOSPITAL | Age: 86
DRG: 086 | End: 2021-05-11
Payer: MEDICARE

## 2021-05-11 ENCOUNTER — HOSPITAL ENCOUNTER (INPATIENT)
Facility: HOSPITAL | Age: 86
LOS: 4 days | Discharge: NURSING, REHAB OR LONG-TERM CARE | DRG: 086 | End: 2021-05-15
Attending: EMERGENCY MEDICINE | Admitting: SURGERY
Payer: MEDICARE

## 2021-05-11 ENCOUNTER — APPOINTMENT (EMERGENCY)
Dept: RADIOLOGY | Facility: HOSPITAL | Age: 86
DRG: 086 | End: 2021-05-11
Payer: MEDICARE

## 2021-05-11 ENCOUNTER — APPOINTMENT (EMERGENCY)
Dept: ULTRASOUND IMAGING | Facility: HOSPITAL | Age: 86
DRG: 086 | End: 2021-05-11
Payer: MEDICARE

## 2021-05-11 DIAGNOSIS — S06.5X9A SDH (SUBDURAL HEMATOMA) (HCC): ICD-10-CM

## 2021-05-11 DIAGNOSIS — S06.5X9A SUBDURAL HEMATOMA (HCC): Primary | ICD-10-CM

## 2021-05-11 PROBLEM — R79.89 ELEVATED LFTS: Status: ACTIVE | Noted: 2021-05-11

## 2021-05-11 PROBLEM — S06.5XAA SUBDURAL HEMATOMA: Status: ACTIVE | Noted: 2021-05-11

## 2021-05-11 LAB
ALBUMIN SERPL BCP-MCNC: 3.2 G/DL (ref 3.5–5)
ALP SERPL-CCNC: 305 U/L (ref 46–116)
ALT SERPL W P-5'-P-CCNC: 325 U/L (ref 12–78)
ANION GAP SERPL CALCULATED.3IONS-SCNC: 9 MMOL/L (ref 4–13)
APTT PPP: 83 SECONDS (ref 23–37)
AST SERPL W P-5'-P-CCNC: 144 U/L (ref 5–45)
BASOPHILS # BLD AUTO: 0.02 THOUSANDS/ΜL (ref 0–0.1)
BASOPHILS NFR BLD AUTO: 0 % (ref 0–1)
BILIRUB SERPL-MCNC: 0.91 MG/DL (ref 0.2–1)
BUN SERPL-MCNC: 52 MG/DL (ref 5–25)
CALCIUM ALBUM COR SERPL-MCNC: 9.4 MG/DL (ref 8.3–10.1)
CALCIUM SERPL-MCNC: 8.8 MG/DL (ref 8.3–10.1)
CHLORIDE SERPL-SCNC: 100 MMOL/L (ref 100–108)
CO2 SERPL-SCNC: 28 MMOL/L (ref 21–32)
CREAT SERPL-MCNC: 4.85 MG/DL (ref 0.6–1.3)
EOSINOPHIL # BLD AUTO: 0.31 THOUSAND/ΜL (ref 0–0.61)
EOSINOPHIL NFR BLD AUTO: 4 % (ref 0–6)
ERYTHROCYTE [DISTWIDTH] IN BLOOD BY AUTOMATED COUNT: 13.9 % (ref 11.6–15.1)
GFR SERPL CREATININE-BSD FRML MDRD: 10 ML/MIN/1.73SQ M
GLUCOSE SERPL-MCNC: 161 MG/DL (ref 65–140)
GLUCOSE SERPL-MCNC: 168 MG/DL (ref 65–140)
GLUCOSE SERPL-MCNC: 188 MG/DL (ref 65–140)
HCT VFR BLD AUTO: 31.5 % (ref 36.5–49.3)
HGB BLD-MCNC: 9.8 G/DL (ref 12–17)
IMM GRANULOCYTES # BLD AUTO: 0.08 THOUSAND/UL (ref 0–0.2)
IMM GRANULOCYTES NFR BLD AUTO: 1 % (ref 0–2)
INR PPP: 3.87 (ref 0.84–1.19)
LYMPHOCYTES # BLD AUTO: 1.15 THOUSANDS/ΜL (ref 0.6–4.47)
LYMPHOCYTES NFR BLD AUTO: 13 % (ref 14–44)
MCH RBC QN AUTO: 31.2 PG (ref 26.8–34.3)
MCHC RBC AUTO-ENTMCNC: 31.1 G/DL (ref 31.4–37.4)
MCV RBC AUTO: 100 FL (ref 82–98)
MONOCYTES # BLD AUTO: 0.73 THOUSAND/ΜL (ref 0.17–1.22)
MONOCYTES NFR BLD AUTO: 8 % (ref 4–12)
NEUTROPHILS # BLD AUTO: 6.6 THOUSANDS/ΜL (ref 1.85–7.62)
NEUTS SEG NFR BLD AUTO: 74 % (ref 43–75)
NRBC BLD AUTO-RTO: 0 /100 WBCS
PLATELET # BLD AUTO: 167 THOUSANDS/UL (ref 149–390)
PMV BLD AUTO: 9.6 FL (ref 8.9–12.7)
POTASSIUM SERPL-SCNC: 4 MMOL/L (ref 3.5–5.3)
PROT SERPL-MCNC: 7.2 G/DL (ref 6.4–8.2)
PROTHROMBIN TIME: 38 SECONDS (ref 11.6–14.5)
RBC # BLD AUTO: 3.14 MILLION/UL (ref 3.88–5.62)
SODIUM SERPL-SCNC: 137 MMOL/L (ref 136–145)
TROPONIN I SERPL-MCNC: 0.04 NG/ML
WBC # BLD AUTO: 8.89 THOUSAND/UL (ref 4.31–10.16)

## 2021-05-11 PROCEDURE — 70450 CT HEAD/BRAIN W/O DYE: CPT

## 2021-05-11 PROCEDURE — 99223 1ST HOSP IP/OBS HIGH 75: CPT | Performed by: SURGERY

## 2021-05-11 PROCEDURE — 84484 ASSAY OF TROPONIN QUANT: CPT | Performed by: EMERGENCY MEDICINE

## 2021-05-11 PROCEDURE — G1004 CDSM NDSC: HCPCS

## 2021-05-11 PROCEDURE — 96375 TX/PRO/DX INJ NEW DRUG ADDON: CPT

## 2021-05-11 PROCEDURE — 99285 EMERGENCY DEPT VISIT HI MDM: CPT

## 2021-05-11 PROCEDURE — 82948 REAGENT STRIP/BLOOD GLUCOSE: CPT

## 2021-05-11 PROCEDURE — 99291 CRITICAL CARE FIRST HOUR: CPT | Performed by: NURSE PRACTITIONER

## 2021-05-11 PROCEDURE — 85730 THROMBOPLASTIN TIME PARTIAL: CPT | Performed by: EMERGENCY MEDICINE

## 2021-05-11 PROCEDURE — 85025 COMPLETE CBC W/AUTO DIFF WBC: CPT | Performed by: EMERGENCY MEDICINE

## 2021-05-11 PROCEDURE — 96374 THER/PROPH/DIAG INJ IV PUSH: CPT

## 2021-05-11 PROCEDURE — 99285 EMERGENCY DEPT VISIT HI MDM: CPT | Performed by: EMERGENCY MEDICINE

## 2021-05-11 PROCEDURE — 93005 ELECTROCARDIOGRAM TRACING: CPT

## 2021-05-11 PROCEDURE — 96365 THER/PROPH/DIAG IV INF INIT: CPT

## 2021-05-11 PROCEDURE — 80053 COMPREHEN METABOLIC PANEL: CPT | Performed by: EMERGENCY MEDICINE

## 2021-05-11 PROCEDURE — 36415 COLL VENOUS BLD VENIPUNCTURE: CPT | Performed by: EMERGENCY MEDICINE

## 2021-05-11 PROCEDURE — NC001 PR NO CHARGE: Performed by: PHYSICIAN ASSISTANT

## 2021-05-11 PROCEDURE — 71045 X-RAY EXAM CHEST 1 VIEW: CPT

## 2021-05-11 PROCEDURE — 85610 PROTHROMBIN TIME: CPT | Performed by: EMERGENCY MEDICINE

## 2021-05-11 PROCEDURE — 76705 ECHO EXAM OF ABDOMEN: CPT

## 2021-05-11 PROCEDURE — C9132 KCENTRA, PER I.U.: HCPCS | Performed by: EMERGENCY MEDICINE

## 2021-05-11 RX ORDER — HYDROMORPHONE HCL/PF 1 MG/ML
0.2 SYRINGE (ML) INJECTION EVERY 4 HOURS PRN
Status: DISCONTINUED | OUTPATIENT
Start: 2021-05-11 | End: 2021-05-15 | Stop reason: HOSPADM

## 2021-05-11 RX ORDER — LABETALOL 20 MG/4 ML (5 MG/ML) INTRAVENOUS SYRINGE
10 ONCE
Status: DISCONTINUED | OUTPATIENT
Start: 2021-05-11 | End: 2021-05-11

## 2021-05-11 RX ORDER — TORSEMIDE 20 MG/1
20 TABLET ORAL DAILY
Status: DISCONTINUED | OUTPATIENT
Start: 2021-05-12 | End: 2021-05-15 | Stop reason: HOSPADM

## 2021-05-11 RX ORDER — ONDANSETRON 2 MG/ML
4 INJECTION INTRAMUSCULAR; INTRAVENOUS EVERY 4 HOURS PRN
Status: DISCONTINUED | OUTPATIENT
Start: 2021-05-11 | End: 2021-05-15 | Stop reason: HOSPADM

## 2021-05-11 RX ORDER — LEVOTHYROXINE SODIUM 0.07 MG/1
75 TABLET ORAL
Status: DISCONTINUED | OUTPATIENT
Start: 2021-05-12 | End: 2021-05-15 | Stop reason: HOSPADM

## 2021-05-11 RX ORDER — PANTOPRAZOLE SODIUM 40 MG/1
40 TABLET, DELAYED RELEASE ORAL
Status: DISCONTINUED | OUTPATIENT
Start: 2021-05-12 | End: 2021-05-12

## 2021-05-11 RX ORDER — DOCUSATE SODIUM 100 MG/1
100 CAPSULE, LIQUID FILLED ORAL 2 TIMES DAILY
Status: DISCONTINUED | OUTPATIENT
Start: 2021-05-11 | End: 2021-05-11

## 2021-05-11 RX ORDER — ATORVASTATIN CALCIUM 40 MG/1
40 TABLET, FILM COATED ORAL DAILY
Status: DISCONTINUED | OUTPATIENT
Start: 2021-05-12 | End: 2021-05-11

## 2021-05-11 RX ORDER — CHLORHEXIDINE GLUCONATE 0.12 MG/ML
15 RINSE ORAL EVERY 12 HOURS SCHEDULED
Status: DISCONTINUED | OUTPATIENT
Start: 2021-05-11 | End: 2021-05-12

## 2021-05-11 RX ORDER — LABETALOL 20 MG/4 ML (5 MG/ML) INTRAVENOUS SYRINGE
10 ONCE
Status: COMPLETED | OUTPATIENT
Start: 2021-05-11 | End: 2021-05-11

## 2021-05-11 RX ORDER — FINASTERIDE 5 MG/1
5 TABLET, FILM COATED ORAL DAILY
Status: DISCONTINUED | OUTPATIENT
Start: 2021-05-12 | End: 2021-05-15 | Stop reason: HOSPADM

## 2021-05-11 RX ORDER — DOCUSATE SODIUM 100 MG/1
100 CAPSULE, LIQUID FILLED ORAL 2 TIMES DAILY
Status: DISCONTINUED | OUTPATIENT
Start: 2021-05-11 | End: 2021-05-15 | Stop reason: HOSPADM

## 2021-05-11 RX ORDER — TAMSULOSIN HYDROCHLORIDE 0.4 MG/1
0.4 CAPSULE ORAL
Status: DISCONTINUED | OUTPATIENT
Start: 2021-05-11 | End: 2021-05-15 | Stop reason: HOSPADM

## 2021-05-11 RX ORDER — OXYCODONE HYDROCHLORIDE 5 MG/1
2.5 TABLET ORAL EVERY 4 HOURS PRN
Status: DISCONTINUED | OUTPATIENT
Start: 2021-05-11 | End: 2021-05-15 | Stop reason: HOSPADM

## 2021-05-11 RX ORDER — POLYETHYLENE GLYCOL 3350 17 G/17G
17 POWDER, FOR SOLUTION ORAL DAILY PRN
Status: DISCONTINUED | OUTPATIENT
Start: 2021-05-11 | End: 2021-05-15 | Stop reason: HOSPADM

## 2021-05-11 RX ORDER — OXYCODONE HYDROCHLORIDE 5 MG/1
5 TABLET ORAL EVERY 4 HOURS PRN
Status: DISCONTINUED | OUTPATIENT
Start: 2021-05-11 | End: 2021-05-15 | Stop reason: HOSPADM

## 2021-05-11 RX ORDER — ACETAMINOPHEN 325 MG/1
975 TABLET ORAL EVERY 8 HOURS SCHEDULED
Status: DISCONTINUED | OUTPATIENT
Start: 2021-05-11 | End: 2021-05-15 | Stop reason: HOSPADM

## 2021-05-11 RX ADMIN — TAMSULOSIN HYDROCHLORIDE 0.4 MG: 0.4 CAPSULE ORAL at 23:56

## 2021-05-11 RX ADMIN — LABETALOL 20 MG/4 ML (5 MG/ML) INTRAVENOUS SYRINGE 10 MG: at 22:05

## 2021-05-11 RX ADMIN — Medication 2000 UNITS: at 21:19

## 2021-05-11 RX ADMIN — DESMOPRESSIN ACETATE 23.6 MCG: 4 SOLUTION INTRAVENOUS at 21:39

## 2021-05-11 RX ADMIN — CHLORHEXIDINE GLUCONATE 0.12% ORAL RINSE 15 ML: 1.2 LIQUID ORAL at 23:56

## 2021-05-11 RX ADMIN — LABETALOL 20 MG/4 ML (5 MG/ML) INTRAVENOUS SYRINGE 10 MG: at 20:53

## 2021-05-11 RX ADMIN — PHYTONADIONE 10 MG: 10 INJECTION, EMULSION INTRAMUSCULAR; INTRAVENOUS; SUBCUTANEOUS at 23:30

## 2021-05-12 ENCOUNTER — APPOINTMENT (INPATIENT)
Dept: CT IMAGING | Facility: HOSPITAL | Age: 86
DRG: 086 | End: 2021-05-12
Payer: MEDICARE

## 2021-05-12 LAB
ANION GAP SERPL CALCULATED.3IONS-SCNC: 10 MMOL/L (ref 4–13)
ATRIAL RATE: 163 BPM
BASOPHILS # BLD AUTO: 0.02 THOUSANDS/ΜL (ref 0–0.1)
BASOPHILS NFR BLD AUTO: 0 % (ref 0–1)
BUN SERPL-MCNC: 51 MG/DL (ref 5–25)
CALCIUM SERPL-MCNC: 8 MG/DL (ref 8.3–10.1)
CHLORIDE SERPL-SCNC: 102 MMOL/L (ref 100–108)
CO2 SERPL-SCNC: 27 MMOL/L (ref 21–32)
CREAT SERPL-MCNC: 4.78 MG/DL (ref 0.6–1.3)
EOSINOPHIL # BLD AUTO: 0.27 THOUSAND/ΜL (ref 0–0.61)
EOSINOPHIL NFR BLD AUTO: 4 % (ref 0–6)
ERYTHROCYTE [DISTWIDTH] IN BLOOD BY AUTOMATED COUNT: 13.8 % (ref 11.6–15.1)
GFR SERPL CREATININE-BSD FRML MDRD: 10 ML/MIN/1.73SQ M
GLUCOSE SERPL-MCNC: 171 MG/DL (ref 65–140)
GLUCOSE SERPL-MCNC: 177 MG/DL (ref 65–140)
GLUCOSE SERPL-MCNC: 179 MG/DL (ref 65–140)
GLUCOSE SERPL-MCNC: 182 MG/DL (ref 65–140)
GLUCOSE SERPL-MCNC: 192 MG/DL (ref 65–140)
GLUCOSE SERPL-MCNC: 233 MG/DL (ref 65–140)
HCT VFR BLD AUTO: 26.5 % (ref 36.5–49.3)
HGB BLD-MCNC: 8.5 G/DL (ref 12–17)
IMM GRANULOCYTES # BLD AUTO: 0.05 THOUSAND/UL (ref 0–0.2)
IMM GRANULOCYTES NFR BLD AUTO: 1 % (ref 0–2)
INR PPP: 1.12 (ref 0.84–1.19)
INR PPP: 1.27 (ref 0.84–1.19)
LYMPHOCYTES # BLD AUTO: 1.13 THOUSANDS/ΜL (ref 0.6–4.47)
LYMPHOCYTES NFR BLD AUTO: 17 % (ref 14–44)
MCH RBC QN AUTO: 32 PG (ref 26.8–34.3)
MCHC RBC AUTO-ENTMCNC: 32.1 G/DL (ref 31.4–37.4)
MCV RBC AUTO: 100 FL (ref 82–98)
MONOCYTES # BLD AUTO: 0.67 THOUSAND/ΜL (ref 0.17–1.22)
MONOCYTES NFR BLD AUTO: 10 % (ref 4–12)
NEUTROPHILS # BLD AUTO: 4.65 THOUSANDS/ΜL (ref 1.85–7.62)
NEUTS SEG NFR BLD AUTO: 68 % (ref 43–75)
NRBC BLD AUTO-RTO: 0 /100 WBCS
PLATELET # BLD AUTO: 156 THOUSANDS/UL (ref 149–390)
PMV BLD AUTO: 9.9 FL (ref 8.9–12.7)
POTASSIUM SERPL-SCNC: 3.9 MMOL/L (ref 3.5–5.3)
PROTHROMBIN TIME: 14.6 SECONDS (ref 11.6–14.5)
PROTHROMBIN TIME: 16 SECONDS (ref 11.6–14.5)
QRS AXIS: -24 DEGREES
QRSD INTERVAL: 90 MS
QT INTERVAL: 386 MS
QTC INTERVAL: 450 MS
RBC # BLD AUTO: 2.66 MILLION/UL (ref 3.88–5.62)
SODIUM SERPL-SCNC: 139 MMOL/L (ref 136–145)
T WAVE AXIS: 43 DEGREES
VENTRICULAR RATE: 82 BPM
WBC # BLD AUTO: 6.79 THOUSAND/UL (ref 4.31–10.16)

## 2021-05-12 PROCEDURE — 93010 ELECTROCARDIOGRAM REPORT: CPT | Performed by: INTERNAL MEDICINE

## 2021-05-12 PROCEDURE — 82948 REAGENT STRIP/BLOOD GLUCOSE: CPT

## 2021-05-12 PROCEDURE — 97116 GAIT TRAINING THERAPY: CPT

## 2021-05-12 PROCEDURE — 80048 BASIC METABOLIC PNL TOTAL CA: CPT | Performed by: SURGERY

## 2021-05-12 PROCEDURE — 99232 SBSQ HOSP IP/OBS MODERATE 35: CPT | Performed by: PHYSICIAN ASSISTANT

## 2021-05-12 PROCEDURE — 70450 CT HEAD/BRAIN W/O DYE: CPT

## 2021-05-12 PROCEDURE — 85610 PROTHROMBIN TIME: CPT | Performed by: SURGERY

## 2021-05-12 PROCEDURE — 85610 PROTHROMBIN TIME: CPT | Performed by: NURSE PRACTITIONER

## 2021-05-12 PROCEDURE — 99223 1ST HOSP IP/OBS HIGH 75: CPT | Performed by: INTERNAL MEDICINE

## 2021-05-12 PROCEDURE — G1004 CDSM NDSC: HCPCS

## 2021-05-12 PROCEDURE — 97163 PT EVAL HIGH COMPLEX 45 MIN: CPT

## 2021-05-12 PROCEDURE — 72125 CT NECK SPINE W/O DYE: CPT

## 2021-05-12 PROCEDURE — 97167 OT EVAL HIGH COMPLEX 60 MIN: CPT

## 2021-05-12 PROCEDURE — 85025 COMPLETE CBC W/AUTO DIFF WBC: CPT | Performed by: SURGERY

## 2021-05-12 PROCEDURE — 99291 CRITICAL CARE FIRST HOUR: CPT | Performed by: SURGERY

## 2021-05-12 RX ORDER — HYDRALAZINE HYDROCHLORIDE 20 MG/ML
10 INJECTION INTRAMUSCULAR; INTRAVENOUS EVERY 6 HOURS PRN
Status: DISCONTINUED | OUTPATIENT
Start: 2021-05-12 | End: 2021-05-15 | Stop reason: HOSPADM

## 2021-05-12 RX ADMIN — METOPROLOL TARTRATE 25 MG: 25 TABLET, FILM COATED ORAL at 17:28

## 2021-05-12 RX ADMIN — LEVETIRACETAM 500 MG: 100 INJECTION, SOLUTION INTRAVENOUS at 00:52

## 2021-05-12 RX ADMIN — TORSEMIDE 20 MG: 20 TABLET ORAL at 08:37

## 2021-05-12 RX ADMIN — LEVOTHYROXINE SODIUM 75 MCG: 75 TABLET ORAL at 06:42

## 2021-05-12 RX ADMIN — TAMSULOSIN HYDROCHLORIDE 0.4 MG: 0.4 CAPSULE ORAL at 21:27

## 2021-05-12 RX ADMIN — INSULIN LISPRO 1 UNITS: 100 INJECTION, SOLUTION INTRAVENOUS; SUBCUTANEOUS at 06:44

## 2021-05-12 RX ADMIN — INSULIN LISPRO 3 UNITS: 100 INJECTION, SOLUTION INTRAVENOUS; SUBCUTANEOUS at 18:06

## 2021-05-12 RX ADMIN — METOPROLOL TARTRATE 25 MG: 25 TABLET, FILM COATED ORAL at 06:42

## 2021-05-12 RX ADMIN — HYDRALAZINE HYDROCHLORIDE 10 MG: 20 INJECTION, SOLUTION INTRAMUSCULAR; INTRAVENOUS at 23:26

## 2021-05-12 RX ADMIN — FINASTERIDE 5 MG: 5 TABLET, FILM COATED ORAL at 08:37

## 2021-05-12 RX ADMIN — INSULIN LISPRO 1 UNITS: 100 INJECTION, SOLUTION INTRAVENOUS; SUBCUTANEOUS at 00:30

## 2021-05-12 RX ADMIN — PANTOPRAZOLE SODIUM 40 MG: 40 TABLET, DELAYED RELEASE ORAL at 06:42

## 2021-05-12 NOTE — PLAN OF CARE
Problem: OCCUPATIONAL THERAPY ADULT  Goal: Performs self-care activities at highest level of function for planned discharge setting  See evaluation for individualized goals  Description: Treatment Interventions: ADL retraining, Functional transfer training, UE strengthening/ROM, Endurance training, Patient/family training, Equipment evaluation/education, Compensatory technique education, Continued evaluation, Energy conservation, Activityengagement  Equipment Recommended: Bedside commode, Shower/Tub chair with back ($)(use of RW)       See flowsheet documentation for full assessment, interventions and recommendations  Note: Limitation: Decreased ADL status, Decreased Safe judgement during ADL, Decreased endurance, Decreased cognition, Decreased self-care trans, Decreased high-level ADLs, Decreased UE ROM, Decreased UE strength     Assessment: Pt is an 81yo male [facility] 5/11/2021  Pt presents w/ subdural hematoma and significant PMH impacting his occupational performance including DM, HTN, a-fib, CAD, hx CVA, pacemaker, CABG (2015)  Pt admit under trauma service in ICU  Diagnosed w/ subdural hematoma  Per neurosurgery recommend neuro monitoring and activity as tolerated  Pt reports living w/ wife, Austinan Leader in [address] [facility] w/ 2 [employer]  Pt reports having stair glide to access 2nd floor bedroom, bathroom  Pt reports using RW for functional mobility and needs assistance w/ ADL/ IADL from wife  Personal factors impacting performance include advanced age, difficulty managing steps, difficulty completing ADL, difficulty completing IADL  Pt w/ active OT orders and activity orders  Upon eval, pt alert and generally oriented  Pt required S to complete grooming seated after set- up  Pt required min A to complete UBD  Pt required min A to complete bed mobility supine to sit at EOB  Pt required min A to complete sit <> stand and functional mobility w/ in room using RW   Pt able to follow one step directions during ADL tasks w/ cues for hand placement, tech, walker mgmt  Pt reports R hand dominance and demonstrated B UE AROM limited at shoulders  Pt reports premorbid  Pt presents w/ decreased activity tolerance, decreased endurance, decreased standing balance, decreased UE ROM / strength, limited insight into deficits, decreased sitting balance impacting his I w/ dressing,bathing, oral hygiene, functional mobility, functional transfers, activity engagement, clothing mgmt  Pt completing ADL below baseline level of I and would benefit from OT while in acute care to address deficits  The patient's raw score on the AM-PAC Daily Activity inpatient short form is 17, standardized score is 37 26, less than 39 4  Patients at this level are likely to benefit from discharge to post-acute rehabilitation services  Please refer to the recommendation of the Occupational Therapist for safe discharge planning  Recommend post acute rehab when medically stable for discharge from acute care at this time vs home w/ family support / assist and Home OT pending progress and assist available at [employer]   Will continue to follow  Recommendation: Geriatric Consult  OT Discharge Recommendation: Post acute rehabilitation services(vs Home OT pend progress, consistency and A avail)

## 2021-05-12 NOTE — ASSESSMENT & PLAN NOTE
· On coumadin   · Currently on hold due to intracranial hemorrhage  · Continue home lopressor   · Hold Ranexa with GFR<15

## 2021-05-12 NOTE — ASSESSMENT & PLAN NOTE
· , , Alk Phos [phone], T Bili [phone]  · Abdominal exam benign  · Continue serial ABD exams  · RUQ US in ER: cholelithiasis without evidence of acute cholecystitis  · Avoid statins  · Trend LFTs, CMP

## 2021-05-12 NOTE — CONSULTS
[facility]  ICU Acceptance Note - King Jae 1/29/1932, 80 y o  male MRN: [id number]  Unit/Bed#: ICU 09 Encounter: [id number]  Primary Care Provider: Napoleon Natarajan MD   Date and time admitted to hospital: 5/11/2021  6:06 PM    Consults    Subdural hematoma [facility])  Assessment & Plan  · Appears acute on chronic on CT head  · Trauma evaluation done in ER  · Neurosurgery consulted, appreciate recommendations  · Patient and wife does not recollect recent falls or head strikes  · Denies HA, visual disturbance  · Admits to new onset Left sided upper and lower extremity weakness, that began approx 5 pm today  Wife noted a left upper extremity tremor that started yesterday  · Left upper extremity action tremor noted on physical exam  · Goal -160  · Hold further AC/AP  · Received [address], [address]  Will receive Vitamin K  · Recheck INR at midnight, consider FFP if still elevated  · PT/OT  · Coags, CBC in am    Anticoagulated on warfarin  Assessment & Plan  · On Coumadin for AFib  · INR on presentation 3 87  · Given acute on chronic SDH was given [address] and [address]  Will give Vitamin K and recheck INR at midnight and in am  · Will hold off on further AC/AP given SDH  · Monitor telemetry    Paroxysmal A-fib [facility])  Assessment & Plan  · Was on Coumadin for [facility] [place], operated by [facility] as outpatient  Will hold in the setting of SDH  · Continue home metoprolol for rate control  · If needed can start low dose metoprolol IV if additional rate control is needed  · Hold Ranexa given GFR < 15  · Does have history of SSS, now s/p PPM LCW 2014    Essential hypertension  Assessment & Plan  · Per recent office visits his blood pressure is typically well controlled on his home regimen with BPs 120s/70-80s  · Presented hypertensive in the -200/60s  · Received labetalol IV 10 mg x2 doses  · BP improved with 2 doses  Can continue as needed   Though will add hydralazine as needed for BP control  · Continue home metoprolol  · Goal SBP per Neurosurgery 140-160    CKD (chronic kidney disease) stage 5, GFR less than 15 ml/min [facility])  Assessment & Plan  Lab Results   Component Value Date    EGFR 10 05/11/2021    EGFR 10 04/22/2021    EGFR 10 02/20/2021    CREATININE 4 85 (H) 05/11/2021    CREATININE 4 76 (H) 04/22/2021    CREATININE 4 60 (H) 02/20/2021   · Follows with Dr Alfredo Rachel  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  · Monitor renal indices, UO, electrolytes  · Replete electrolytes as needed for K > 4, Mag > 2, Phos > 3  · Avoid nephrotoxins, hypotension  · Continue home toresmide    Type 2 diabetes mellitus, with long-term current use of insulin [facility])  Assessment & Plan  Lab Results   Component Value Date    HGBA1C 6 4 04/22/2021       Recent Labs     05/11/21  1809 05/11/21 2016   POCGLU 188* 161*       Blood Sugar Average: Last 72 hrs:  (P) 174 5   · Hold home Tradjenta  · Last A1c 6 4 April 2021  · Continue SSI with fingerstick accuchekcs q6h while NPO    Benign prostatic hyperplasia with urinary hesitancy  Assessment & Plan  · Continue home Flomax and Proscar  · Bladder scan q shift  · Urinary retention protocol    Hyperlipidemia  Assessment & Plan  · Hold home atorvastatin in the setting of elevated LFTs  · Abdominal exam benign, continue serial exams  · RUQ US cholelithiasis without CBD dilation  · CMP in am     Hypothyroidism  Assessment & Plan  · Continue home levothyroxine    Elevated LFTs  Assessment & Plan  · , , Alk Phos 305, T Bili 0 91  · Abdominal exam benign  · Continue serial ABD exams  · RUQ US in ER: cholelithiasis without evidence of acute cholecystitis  · Avoid statins  · Trend LFTs, CMP in am    -------------------------------------------------------------------------------------------------------------  Chief Complaint: Weakness of the left arm and leg  Inability to ambulate with walker      History of Present Illness     Elba Ospina is a 80 y o  male who presents with PMHx significant for AFib on Coumadin, HTN, HLD, CAD, CKD5, DM2, BPH, 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, and hypothyroidism  He presents today after being seen attempting to walk into his home with difficulty  Off duty [place]  noted patient having left sided weakness and helped him into his home  Officer notes that it felt as if patient's legs gave out  Both the patient and his wife deny recent falls or head strikes  He does note that he started with LUE tremors yesterday and now today has both LUE and LLE weakness  Denies loss of sensation, visual disturbance, dizziness, chest pain, shortness of breath, abdominal pain, N/V  History obtained from spouse, chart review and the patient   -------------------------------------------------------------------------------------------------------------  Dispo: Admit to Stepdown Level 1    Code Status: Level 3 - DNAR and DNI  --------------------------------------------------------------------------------------------------------------  Review of Systems   Constitutional: Positive for fatigue  Negative for chills and fever  Eyes: Negative for visual disturbance  Respiratory: Negative for cough, chest tightness and shortness of breath  Cardiovascular: Negative for chest pain, palpitations and leg swelling  Gastrointestinal: Negative for abdominal distention, abdominal pain, diarrhea, nausea and vomiting  Endocrine: Negative  Genitourinary: Positive for difficulty urinating  Negative for decreased urine volume, flank pain and hematuria  Musculoskeletal: Positive for gait problem (due to LLE weakness)  Negative for arthralgias and myalgias  Skin: Negative  Allergic/Immunologic: Negative  Neurological: Positive for dizziness, tremors (left upper extremity) and weakness (left side)  Negative for seizures, light-headedness and headaches  Hematological: Negative  Psychiatric/Behavioral: Negative          A 12-point, complete review of systems was reviewed and negative except as stated above     Physical Exam  Constitutional:       General: He is not in acute distress  Appearance: He is normal weight  He is ill-appearing  He is not toxic-appearing or diaphoretic  HENT:      Head: Normocephalic and atraumatic  Right Ear: External ear normal       Left Ear: External ear normal       Nose: Nose normal  No congestion or rhinorrhea  Mouth/Throat:      Mouth: Mucous membranes are dry  Pharynx: Oropharynx is clear  No oropharyngeal exudate or posterior oropharyngeal erythema  Eyes:      General: No scleral icterus  Extraocular Movements: Extraocular movements intact  Conjunctiva/sclera: Conjunctivae normal       Pupils: Pupils are equal, round, and reactive to light  Neck:      Musculoskeletal: Normal range of motion and neck supple  Vascular: No carotid bruit  Cardiovascular:      Rate and Rhythm: Normal rate and regular rhythm  Pulses:           Radial pulses are 2+ on the right side and 2+ on the left side  Dorsalis pedis pulses are 2+ on the right side and 2+ on the left side  Heart sounds: S1 normal and S2 normal  Murmur present  No friction rub  No gallop  Pulmonary:      Effort: Pulmonary effort is normal  No respiratory distress  Breath sounds: Normal breath sounds  No wheezing, rhonchi or rales  Abdominal:      General: Abdomen is flat  Bowel sounds are normal  There is no distension  Palpations: Abdomen is soft  Tenderness: There is no abdominal tenderness  Musculoskeletal: Normal range of motion  General: No swelling or tenderness  Right lower leg: No edema  Left lower leg: No edema  Lymphadenopathy:      Cervical: No cervical adenopathy  Skin:     General: Skin is warm and dry  Capillary Refill: Capillary refill takes less than 2 seconds  Coloration: Skin is not pale  Findings: No erythema or rash     Neurological: Mental Status: He is alert and oriented to person, place, and time  GCS: GCS eye subscore is 4  GCS verbal subscore is 5  GCS motor subscore is 6  Cranial Nerves: Facial asymmetry (does have history of L facial droop and L arm drift residual from previous CVA) present  No cranial nerve deficit  Sensory: No sensory deficit  Motor: Weakness (LUE and LLE) and tremor (action tremor of LUE) present  No seizure activity  Psychiatric:         Mood and Affect: Mood normal          Behavior: Behavior normal          Thought Content: Thought content normal          Judgment: Judgment normal        --------------------------------------------------------------------------------------------------------------  Vitals:   Vitals:    05/11/21 1817 05/11/21 2048 05/11/21 2202 05/11/21 2230   BP: 162/75 (!) 195/77 (!) 182/78 162/72   BP Location:  Right arm Right arm Right arm   Pulse: 77 81 76 68   Resp: 18 18 18 18   Temp: 98 2 °F (36 8 °C)      TempSrc: Oral      SpO2: 97% 99% 100% 98%     Temp  Min: 98 2 °F (36 8 °C)  Max: 98 2 °F (36 8 °C)        There is no height or weight on file to calculate BMI  Laboratory and Diagnostics:  Results from last 7 days   Lab Units 05/11/21  1924   WBC Thousand/uL 8 89   HEMOGLOBIN g/dL 9 8*   HEMATOCRIT % 31 5*   PLATELETS Thousands/uL 167   NEUTROS PCT % 74   MONOS PCT % 8     Results from last 7 days   Lab Units 05/11/21 1924   SODIUM mmol/L 137   POTASSIUM mmol/L 4 0   CHLORIDE mmol/L 100   CO2 mmol/L 28   ANION GAP mmol/L 9   BUN mg/dL 52*   CREATININE mg/dL 4 85*   CALCIUM mg/dL 8 8   GLUCOSE RANDOM mg/dL 168*   ALT U/L 325*   AST U/L 144*   ALK PHOS U/L 305*   ALBUMIN g/dL 3 2*   TOTAL BILIRUBIN mg/dL 0 91          Results from last 7 days   Lab Units 05/11/21 1924 05/06/21  1300   INR  3 87* 3 65*   PTT seconds 83*  --       Results from last 7 days   Lab Units 05/11/21 1924   TROPONIN I ng/mL 0 04       EKG: sinus rhythm with PVCs on telemetry  Imaging:  I have personally reviewed pertinent reports  and I have personally reviewed pertinent films in PACS      Historical Information   Past Medical History:   Diagnosis Date    3-vessel coronary artery disease     last assessed: 08/15/2016    BPH (benign prostatic hyperplasia)     Chronic kidney disease     Diabetes mellitus ([address] )     Hyperlipidemia     Hypertension     Shingles 04/29/2019    SOB (shortness of breath)     Stroke [facility])      Past Surgical History:   Procedure Laterality Date    CARDIAC PACEMAKER PLACEMENT      CATARACT EXTRACTION      last assessed: 11/11/2015    CORONARY ARTERY BYPASS GRAFT      last assessed: 08/26/2015     Social History   Social History     Substance and Sexual Activity   Alcohol Use Not Currently    Frequency: Never     Social History     Substance and Sexual Activity   Drug Use No     Social History     Tobacco Use   Smoking Status Never Smoker   Smokeless Tobacco Never Used     Exercise History: needs assistance  Walker at baseline     Family History:   Family History   Problem Relation Age of Onset    Heart disease Mother     Cancer Father         stomach    No Known Problems Sister     No Known Problems Brother     No Known Problems Brother      I have reviewed this patient's family history and commented on sigificant items within the HPI      Medications:  Current Facility-Administered Medications   Medication Dose Route Frequency    acetaminophen (TYLENOL) tablet 975 mg  975 mg Oral Q8H [address]    chlorhexidine (PERIDEX) 0 12 % oral rinse 15 mL  15 mL Swish & Spit Q12H [address]    docusate sodium (COLACE) capsule 100 mg  100 mg Oral BID    [START ON 5/12/2021] finasteride (PROSCAR) tablet 5 mg  5 mg Oral Daily    HYDROmorphone (DILAUDID) injection 0 2 mg  0 2 mg Intravenous Q4H PRN    [START ON 5/12/2021] insulin lispro (HumaLOG) 100 units/mL subcutaneous injection 1-6 Units  1-6 Units Subcutaneous Q6H [address]    [START ON 5/12/2021] levothyroxine tablet 75 mcg  75 mcg Oral Early Morning    [START ON 5/12/2021] metoprolol tartrate (LOPRESSOR) tablet 25 mg  25 mg Oral BID With Meals    naloxone (NARCAN) 0 04 mg/mL syringe 0 04 mg  0 04 mg Intravenous Q1MIN PRN    ondansetron (ZOFRAN) injection 4 mg  4 mg Intravenous Q4H PRN    oxyCODONE (ROXICODONE) IR tablet 2 5 mg  2 5 mg Oral Q4H PRN    oxyCODONE (ROXICODONE) IR tablet 5 mg  5 mg Oral Q4H PRN    [START ON 5/12/2021] pantoprazole (PROTONIX) EC tablet 40 mg  40 mg Oral Early Morning    phytonadione (AQUA-MEPHYTON) 10 mg/mL 10 mg in sodium chloride 0 9 % 50 mL IVPB  10 mg Intravenous Once    polyethylene glycol (MIRALAX) packet 17 g  17 g Oral Daily PRN    tamsulosin (FLOMAX) capsule 0 4 mg  0 4 mg Oral HS    [START ON 5/12/2021] torsemide (DEMADEX) tablet 20 mg  20 mg Oral Daily     Home medications:  Prior to Admission Medications   Prescriptions Last Dose Informant Patient Reported? Taking?    TRADJENTA 5 MG TABS 5/11/2021 at Unknown time Spouse/Significant Other No Yes   Sig: TAKE 1 TABLET BY MOUTH EVERY DAY   acetaminophen (TYLENOL) 325 mg tablet 5/11/2021 at Unknown time Spouse/Significant Other No Yes   Sig: Take 2 tablets (650 mg total) by mouth 3 (three) times a day as needed for mild pain or moderate pain   aspirin (ECOTRIN LOW STRENGTH) 81 mg EC tablet 5/11/2021 at Unknown time Spouse/Significant Other No Yes   Sig: Take 1 tablet (81 mg total) by mouth daily   atorvastatin (LIPITOR) 40 mg tablet 5/10/2021 at Unknown time Spouse/Significant Other No Yes   Sig: TAKE 1 TABLET BY MOUTH EVERY DAY   cholecalciferol 2000 units TABS 5/11/2021 at Unknown time Spouse/Significant Other No Yes   Sig: Take 1 tablet (2,000 Units total) by mouth daily   docusate sodium (COLACE) 100 mg capsule Past Week at Unknown time Spouse/Significant Other Yes Yes   Sig: Take 100 mg by mouth 2 (two) times a day   finasteride (PROSCAR) 5 mg tablet  Spouse/Significant Other No No   Sig: TAKE 1 TABLET BY MOUTH EVERY DAY   glucose blood (FREESTYLE LITE) test strip 2021 at Unknown time Spouse/Significant Other No Yes   Sig: PATIENT TESTS BLOOD SUGARS ONCE DAILY  PATIENT IS NOT INSULIN DEPENDENT  DX E11 20   levothyroxine 75 mcg tablet 2021 at Unknown time Spouse/Significant Other No Yes   Sig: TAKE 1 TABLET BY MOUTH EVERY DAY   metoprolol tartrate (LOPRESSOR) 50 mg tablet 2021 at Unknown time  No Yes   Sig: TAKE 1/2 TABLET BY MOUTH TWICE A DAY WITH MEALS   ranolazine (RANEXA) 500 mg 12 hr tablet 2021 at Unknown time  No Yes   Sig: TAKE 1 TABLET BY MOUTH EVERY 12 HOURS   tamsulosin (FLOMAX) 0 4 mg 5/10/2021 at Unknown time Spouse/Significant Other No Yes   Sig: TAKE 1 CAPSULE BY MOUTH EVERYDAY AT BEDTIME   torsemide (DEMADEX) 20 mg tablet 2021 at Unknown time Spouse/Significant Other No Yes   Sig: TAKE 1 TABLET BY MOUTH EVERY DAY   warfarin (COUMADIN) 2 mg tablet 5/10/2021 at Unknown time  No Yes   Si tab (2mg) M,W,F,   1/2 tab (1mg) all other days      Facility-Administered Medications: None     Allergies:  No Known Allergies  ------------------------------------------------------------------------------------------------------------  Advance Directive and Living Will: Yes    Power of :    POLST:    ------------------------------------------------------------------------------------------------------------  Care Time Delivered:   Upon my evaluation, this patient had a high probability of imminent or life-threatening deterioration due to Subdural hematoma, HTN, AFIB, Coumadin coagulopathy, CKD5, which required my direct attention, intervention, and personal management  I have personally provided 60 minutes (2157 to 2257) of critical care time, exclusive of procedures, teaching, family meetings, and any prior time recorded by providers other than myself  MAURICE Baig        Portions of the record may have been created with voice recognition software    Occasional wrong word or "sound a like" substitutions may have occurred due to the inherent limitations of voice recognition software    Read the chart carefully and recognize, using context, where substitutions have occurred

## 2021-05-12 NOTE — ASSESSMENT & PLAN NOTE
Lab Results   Component Value Date    HGBA1C 6 4 04/22/2021   · Well controlled with home regimen  · SSI with q6 glucose checks  · Hold oral meds

## 2021-05-12 NOTE — ASSESSMENT & PLAN NOTE
· Appears acute on chronic on CT head  · Admits to new onset Left sided upper and lower extremity weakness, that began approx 5 pm 5/11  · Patient and wife does not recollect recent falls or head strikes  · Trauma evaluation done in ER  · Neurosurgery consulted, no urgent surgical intervention at this time   · Goal -160  · STAT CT Head if greater than 2 GCS change in 1 hour time frame   · Hold further AC/AP  · Received [facility], [facility], Vitamin K  · INR normalized   · PT/OT

## 2021-05-12 NOTE — PLAN OF CARE
Problem: PHYSICAL THERAPY ADULT  Goal: Performs mobility at highest level of function for planned discharge setting  See evaluation for individualized goals  Description: Treatment/Interventions: Functional transfer training, LE strengthening/ROM, Elevations, Therapeutic exercise, Endurance training, Patient/family training, Equipment eval/education, Bed mobility, Gait training          See flowsheet documentation for full assessment, interventions and recommendations  Outcome: Progressing  Note: Prognosis: Fair  Problem List: Decreased strength, Decreased endurance, Impaired balance, Decreased mobility, Decreased safety awareness  Assessment: Therapist provided education to pt for mobility technique including transfers and roller walker management  Education was provided due to findings from evaluation  Frequent repetition was needed for carryover to be noted  Pt was found to have improvement after education w/ improved ambulation tolerance  Pt continues to be a fall risk  continued inpatient PT tx is indicated to reduce fall risk factors  PT Discharge Recommendation: Post acute rehabilitation services          See flowsheet documentation for full assessment

## 2021-05-12 NOTE — ASSESSMENT & PLAN NOTE
· On coumadin due to atrial fibrillation   · Reversal with [facility] given  · Will resume anticoagulation in 2 weeks of stable from an outpatient perspective with Neurosurgery  · Hold ac/ap secondary to intracranial hemorrhage

## 2021-05-12 NOTE — ASSESSMENT & PLAN NOTE
· Hold home atorvastatin in the setting of elevated LFTs  · Abdominal exam benign, continue serial exams  · RUQ US cholelithiasis without CBD dilation  · CMP in am

## 2021-05-12 NOTE — ASSESSMENT & PLAN NOTE
· On Coumadin for AFib  · INR on presentation 3 87  · Received [facility]< [facility], Vitamin K with normalization   · Will hold off on further AC/AP given SDH  · Monitor telemetry

## 2021-05-12 NOTE — PROGRESS NOTES
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 on coumadin  2  3 mm R-L MLS with mild mass effect  3  Hx of fall 2 weeks  4  Hx of stroke in 2014  5  Severe DJD of cervical spine      Plan:   · Exam: Patient A&OX3, EOMI 2mm, slight facial assymetry, communicates well, Finger to nose test normal bilat, slight left palm drift and difficulty keeping his left arm at stretched position noted  Strength 4+/5 and left arm hip and elbow flexion extension  Sensation to light touch intact bilaterally  Double touch test on UEs-patient identifies only right side  DTR 2+ No clonus and Babinski is negative bilaterally  · Imaging reviewed personally and by attendings  Findings as follows:   · CT head without contrast on 05/11/2021 demonstrates right extra-axial heterogeneous collection measuring up to 2 cm in thickness and the thing in mass effect with slight leftward 3 mm in midline shift  Mixed attenuation suggesting acute on chronic blood products  · Repeat CT head without contrast on 05/12/2021 demonstrates mixed density moderate-sized subdural hematoma within the right hemisphere with the increase in acute blood products noted posteriorly in the parietal region  Stable in the follow malacia and gliosis with the right mid to posterior MCA territory  · CT of cervical spine without contrast on 05/12/2021 demonstrates extensive degenerative disease with osteophyte complex and bony fusion starting from C4-5 C5-6, C6-7 C7 and C7-T1  Appears corticated C4-5 bridging osteophyte complex fracture  Official radiology report pending  · Pain control: per primary team  · DVT ppx: SCDs bilat legs    · Seizure ppx:Keppra 500 mg oral/24h  · Activity: As tolerated  · PT/OT evaluation & treatment  · Medical Mx:per primary team  · Neurocheck: close monitoring  · Get CT head wo if GCS drops 2 pts/1H  · HOB>30-45 degrees  · SBP<140-160  · NSx following the patient by neuromonitoring and reviweing images  Repeat CT head and also cervical spine CT without contrast ordered  And there seems slightly increased in size of right parietal acute SDH  Continue close neuromonitoring, seizure prophylaxis  No AC/AP or pharmacological ppx  Consider repeating CT head tomorrow morning  Subjective/Objective   Chief Complaint: " I am here because my left leg became week yesterday"    Subjective:  Patient reports ports history of weakness in his left leg and collapsing to the ground yesterday, had also history of fall about 2 weeks ago on Coumadin-status post open cardiac surgery for triple bypass procedure in 2014  Had also history of stroke in 2014 with left-sided weakness but improved with rehabilitation  Patient denies any headache, nausea, vomiting, blurry vision or diplopia  No bowel/bladder dysfunction  Denies fever, chills, rigors, cough or chest pain  Objective:  Patient lying supine, in no respiratory/pain distress, communicates well and cooperative  I/O       05/10 0701 - 05/11 0700 05/11 0701 - 05/12 0700 05/12 0701 - 05/13 0700    I V  (mL/kg)  60 (0 8)     IV Piggyback  370     Total Intake(mL/kg)  430 (5 6)     Urine (mL/kg/hr)  600 40 (0 2)    Total Output  600 40    Net  -170 -40           Unmeasured Urine Occurrence  1 x           Invasive Devices     Peripheral Intravenous Line            Peripheral IV 05/11/21 Left Arm less than 1 day    Peripheral IV 05/12/21 Left Antecubital less than 1 day          Drain            External Urinary Catheter Small less than 1 day                Physical Exam:  Vitals: Blood pressure 138/65, pulse 69, temperature 98 7 °F (37 1 °C), temperature source Oral, resp  rate 16, height 5' 6" (1 676 m), weight 77 3 kg (170 lb 6 7 oz), SpO2 95 %  ,Body mass index is 27 51 kg/m²          General appearance: alert, appears stated age, cooperative and no distress  Head: Normocephalic, without obvious abnormality, atraumatic  Eyes: EOMI, PERRL, with bilateral  Neck:  Nontender on palpation  Lungs: non labored breathing  Heart: regular heart rate  Neurologic:   Mental status: Alert, oriented x3, thought content appropriate  Cranial nerves: grossly intact (Cranial nerves II-XII)  Sensory: normal to light touch throughout  Motor: moving all extremities without focal weakness, slight left upper extremity weakness 4+/5  Reflexes: 2+ and symmetric  No clonus and Babinski negative bilateral  Coordination:  Right arm finger-to-nose tests normal and no drift; left arm slight hand drift noted and unable to maintain his arm stretched  Lab Results:  Results from last 7 days   Lab Units 05/12/21  0459 05/11/21 1924   WBC Thousand/uL 6 79 8 89   HEMOGLOBIN g/dL 8 5* 9 8*   HEMATOCRIT % 26 5* 31 5*   PLATELETS Thousands/uL 156 167   NEUTROS PCT % 68 74   MONOS PCT % 10 8     Results from last 7 days   Lab Units 05/12/21  0459 05/11/21  1924   POTASSIUM mmol/L 3 9 4 0   CHLORIDE mmol/L 102 100   CO2 mmol/L 27 28   BUN mg/dL 51* 52*   CREATININE mg/dL 4 78* 4 85*   CALCIUM mg/dL 8 0* 8 8   ALK PHOS U/L  --  305*   ALT U/L  --  325*   AST U/L  --  144*             Results from last 7 days   Lab Units 05/12/21  0459 05/12/21  0001 05/11/21 1924   INR  1 12 1 27* 3 87*   PTT seconds  --   --  83*     No results found for: TROPONINT  ABG:No results found for: PHART, QPF3VUO, PO2ART, TGZ3TYL, G8MXVRUC, BEART, SOURCE    Imaging Studies: I have personally reviewed pertinent reports  and I have personally reviewed pertinent films in PACS    EKG, Pathology, and Other Studies: I have personally reviewed pertinent reports        VTE Pharmacologic Prophylaxis: Reason for no pharmacologic prophylaxis No AC/AP or pharmacological DVT prophylaxis pending repeat CT head in 24 hours    VTE Mechanical Prophylaxis: sequential compression device

## 2021-05-12 NOTE — ASSESSMENT & PLAN NOTE
· On Coumadin for AFib  · INR on presentation 3 87  · Given acute on chronic SDH was given [address]    Will give Vitamin K and recheck INR at midnight and in am  · Will hold off on further AC/AP given SDH  · Monitor telemetry

## 2021-05-12 NOTE — ASSESSMENT & PLAN NOTE
· Hold home atorvastatin in the setting of elevated LFTs  · Abdominal exam benign, continue serial exams  · RUQ US cholelithiasis without CBD dilation

## 2021-05-12 NOTE — OCCUPATIONAL THERAPY NOTE
Occupational Therapy Evaluation     Patient Name: Jamarcus Choi  [id number] Date: 5/12/2021  Problem List  Principal Problem:    Subdural hematoma ([facility] [address] )  Active Problems:    Type 2 diabetes mellitus, with long-term current use of insulin (HCC)    Hyperlipidemia    Hypothyroidism    Paroxysmal A-fib (HCC)    Benign prostatic hyperplasia with urinary hesitancy    Anticoagulated on warfarin    CKD (chronic kidney disease) stage 5, GFR less than 15 ml/min (HCC)    Essential hypertension    Elevated LFTs    Past Medical History  Past Medical History:   Diagnosis Date    3-vessel coronary artery disease     last assessed: 08/15/2016    BPH (benign prostatic hyperplasia)     Chronic kidney disease     Diabetes mellitus ([facility] [address] )     Hyperlipidemia     Hypertension     Shingles 04/29/2019    SOB (shortness of breath)     Stroke [facility])      Past Surgical History  Past Surgical History:   Procedure Laterality Date    CARDIAC PACEMAKER PLACEMENT      CATARACT EXTRACTION      last assessed: 11/11/2015    CORONARY ARTERY BYPASS GRAFT      last assessed: 08/26/2015 05/12/21 1347   OT Last Visit   OT Visit Date 05/12/21  (Wednesday)   Note Type   Note type Evaluation   Restrictions/Precautions   Weight Bearing Precautions Per Order No   Braces or Orthoses   (hx B AFO's  Pt reports DC, no longer wear)   Other Precautions Chair Alarm; Bed Alarm;Multiple lines;Telemetry; Fall Risk;Hard of hearing  (condom catheter, telemetry)   Pain Assessment   Pain Assessment Tool 0-10   Pain Score No Pain   Home Living   Type of [address] [place] [address] Two level;Bed/bath upstairs;1/2 bath on main level; Other (Comment)  (2 JYOTSNA)   Bathroom Shower/Tub Walk-in shower   Bathroom Equipment Grab bars in shower; Shower chair   Bathroom Accessibility Accessible;Accessible via walker   Home Equipment Walker;Grab bars;Stair glide   Additional Comments Pt reports living w/ spouse, [facility] in 2 [place] w/ 2 JYOTSNA and stairglide to access 2nd floor bedroom, full bathroom  Pt and wife plan to move to [place] soon w/ first floor set- up   Prior Function   Level of [place] Needs assistance with IADLs   Lives With Spouse   Receives Help From Family   ADL Assistance Needs assistance  (A w/ UBD depending on shirt, A w/ bathing)   IADLs Needs assistance   Falls in the last 6 months 1 to 4   Vocational Retired   Comments Pt reports using RW for functional mobility and needs assistance from wife ADL/ IADL  Lifestyle   Autonomy Pt reports using RW for functional mobiilty w/ out assistance  Needs assist w/ ADL/ IADL from wife   Reciprocal Relationships Pt lives w/ spouse, Anna Raygoza (pressent during eval)  Supportive family and plan to move near son in [place] soon w/ first floor set- up   Service to Others 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[employer] / [address] Pt reports enjoying his family (3 children, 10 grandchildren, 1 great granchild)  Pt wathces tv and added there is not much to do right now   ADL   Where Assessed Edge of bed  (vs OOB in chair post eval)   Eating Assistance 6  Modified independent  (seated OOB in chair eating lunch post eval)   Eating Deficit Setup   Grooming Assistance 5  Supervision/Setup  (seated at EOB vs in chair after set- up w/ + time)   Grooming Deficit Setup;Supervision/safety; Increased time to complete   UB Bathing Assistance Unable to assess   LB Bathing Assistance Unable to assess   UB Dressing Assistance 4  Minimal Assistance   UB Dressing Deficit Pull around back; Setup;Verbal cueing;Supervision/safety; Increased time to complete  (due to limited shoulder ROM)   LB Dressing Assistance Unable to assess   Toileting Assistance  Unable to assess  (condom catheter in place)   Bed Mobility   Supine to Sit 4  Minimal assistance   Additional items Assist x 1; Increased time required;Verbal cues;LE management; Impulsive  (cues for pacing and line mgmt)   Sit to Supine Unable to assess   Additional Comments Pt seated OOB In chair post eval w/ needs met, call bell in reach and chair alarm activated  Wife present   Transfers   Sit to Stand 4  Minimal assistance   Additional items Assist x 1; Increased time required;Verbal cues; Bedrails;Armrests  (instruction for hand placement)   Stand to Sit   (mod A initial, progress to min A)   Additional items Assist x 1; Increased time required; Bedrails;Armrests; Verbal cues  (instruction for hand place, controlled decent)   Stand pivot 3  Moderate assistance   Additional items Assist x 1; Increased time required; Impulsive;Verbal cues  (using RW; + A to complete approach)   Functional Mobility   Functional Mobility 4  Minimal assistance   Additional Comments cues walker mgmt, body position w/ in room   Additional items Rolling walker   Balance   Static Sitting Fair +   Static Standing Poor +   Ambulatory Poor +   Activity Tolerance   Activity Tolerance Patient limited by fatigue   Medical Staff Made Aware care coordination w/ PT, RJ and spoke to Kaela BLAKELY 103 per RN, April appropriate to see pt   RUE Assessment   RUE Assessment   (limited ROM shoulder (premobid))   RUE Strength   RUE Overall Strength Deficits  (grasp 4-/5)   R Shoulder Flexion 2/5   R Shoulder Extension 2/5   R Elbow Flexion 3+/5   R Elbow Extension 3+/5   LUE Assessment   LUE Assessment   (limited shoulder ROM (premobid biceps tear per pt report))   LUE Strength   LUE Overall Strength Deficits  (grasp 3+/5)   L Shoulder Flexion 2/5   L Shoulder Extension 2/5   L Elbow Flexion 3+/5   L Elbow Extension 3+/5   Hand Function   Gross Motor Coordination Functional   Fine Motor Coordination Impaired   Sensation   Light Touch Not tested   Sharp/Dull Not tested   Additional Comments responded to light touch   Cognition   Overall Cognitive Status Impaired  (limited insight into deficits, cues for tech, line mgmt,pace)   Arousal/Participation Alert; Cooperative   Attention Attends with cues to redirect Orientation Level Oriented X4   Memory Decreased recall of recent events  (limited recall fall)   Following Commands Follows one step commands with increased time or repetition   Comments Identified pt by full name and birthdate  Pt alert and cooperative  Motivated to participate in OT eval  Able to participate in conversation and follow directions w/ + time and cues for pacing, tech  Assessment   Limitation Decreased ADL status; Decreased Safe judgement during ADL;Decreased endurance;Decreased cognition;Decreased self-care trans;Decreased high-level ADLs; Decreased UE ROM; Decreased UE strength   Assessment Pt is an 81yo male [facility] on 5/11/2021  Pt presents w/ subdural hematoma and significant PMH impacting his occupational performance including DM, HTN, a-fib, CAD, hx CVA, pacemaker, CABG (2015)  Pt admit under trauma service in ICU  Diagnosed w/ subdural hematoma  Per neurosurgery recommend neuro monitoring and activity as tolerated  Pt reports living w/ wife, Dorene Session in [address] [place] [address] w/ 2 JYOTSNA PTA  Pt reports having stair glide to access 2nd floor bedroom, bathroom  Pt reports using RW for functional mobility and needs assistance w/ ADL/ IADL from wife  Personal factors impacting performance include advanced age, difficulty managing steps, difficulty completing ADL, difficulty completing IADL  Pt w/ active OT orders and activity orders  Upon eval, pt alert and generally oriented  Pt required S to complete grooming seated after set- up  Pt required min A to complete UBD  Pt required min A to complete bed mobility supine to sit at EOB  Pt required min A to complete sit <> stand and functional mobility w/ in room using RW  Pt able to follow one step directions during ADL tasks w/ cues for hand placement, tech, walker mgmt  Pt reports R hand dominance and demonstrated B UE AROM limited at shoulders  Pt reports premorbid   Pt presents w/ decreased activity tolerance, decreased endurance, decreased standing balance, decreased UE ROM / strength, limited insight into deficits, decreased sitting balance impacting his I w/ dressing,bathing, oral hygiene, functional mobility, functional transfers, activity engagement, clothing mgmt  Pt completing ADL below baseline level of I and would benefit from OT while in acute care to address deficits  The patient's raw score on the AM-PAC Daily Activity inpatient short form is 17, standardized score is 37 26, less than 39 4  Patients at this level are likely to benefit from discharge to post-acute rehabilitation services  Please refer to the recommendation of the Occupational Therapist for safe discharge planning  Recommend post acute rehab when medically stable for discharge from acute care at this time vs home w/ family support / assist and Home OT pending progress and assist available at [facility]  Will continue to follow   Goals   Patient Goals Pt stated that he would like to go home and walk around the house   Plan   Treatment Interventions ADL retraining;Functional transfer training;UE strengthening/ROM; Endurance training;Patient/family training;Equipment evaluation/education; Compensatory technique education;Continued evaluation; Energy conservation; Activityengagement   Goal Expiration Date 05/22/21   OT Frequency 3-5x/wk   Recommendation   Recommendation Geriatric Consult   OT Discharge Recommendation Post acute rehabilitation services  (vs Home OT pend progress, consistency and A avail)   Equipment Recommended Bedside commode; Shower/Tub chair with back ($)  (use of RW)   Commode Type Standard   AM-PAC Daily Activity Inpatient   Lower Body Dressing 2   Bathing 2   Toileting 3   Upper Body Dressing 3   Grooming 3   Eating 4   Daily Activity Raw Score 17   Daily Activity Standardized Score (Calc for Raw Score >=11) 37 26   AM-PAC Applied Cognition Inpatient   Following a Speech/Presentation 3   Understanding Ordinary Conversation 4   Taking Medications 3   Remembering Where Things Are Placed or Put Away 3   Remembering List of 4-5 Errands 3   Taking Care of Complicated Tasks 2   Applied Cognition Raw Score 18   Applied Cognition Standardized Score 38 07   Barthel Index   Feeding 10   Bathing 0   Grooming Score 0   Dressing Score 5   Bladder Score 0   Bowels Score 10   Toilet Use Score 5   Transfers (Bed/Chair) Score 10   Mobility (Level Surface) Score 0   Stairs Score 0   Barthel Index Score 40     Pt goals to be met by 5/22/2021:  -Pt will demonstrate good attention and participation in continued evaluation to assess functional cognitive skills to assist in DC planning    -Pt will complete bed mobility supine <> sit w/ S to max I and minimize burden of care to return home w/ family support    -Pt will complete UBD w/ mod I after set- up    -Pt will complete LBD w/ min A x1 after set- up using AE as needed    -Pt will complete functional transfers to bed, chair, and toilet using AD, DME as needed w/ S to max I w/ ADL Performance    -Pt will consistently engage in functional mobility using AD w/ S household distances to max I w/ ADL performance to return [facility] to [facility]    -Pt will demonstrate improved functional standing tolerance for at least 10 minutes using AD as needed w/ at least fair balance while engaged in ADL tasks to max I w/ ADL performance to return home    -Pt will demonstrate good attention and understanding EC tech to max I w/ ADL performance    -Pt will consistently follow two step directions during ADL tasks w/ good recall and no more than 1 cue /prompt to max I w/ ADL performance    [facility], OTR/L

## 2021-05-12 NOTE — ASSESSMENT & PLAN NOTE
Lab Results   Component Value Date    HGBA1C 6 4 04/22/2021       Recent Labs     05/11/21  1809 05/11/21 2016 05/12/21  0019   POCGLU 188* 161* 171*       Blood Sugar Average: Last 72 hrs:  (P) 016 [id number]   · Hold home Tradjenta  · Last A1c 6 4 April 2021  · Continue SSI with fingerstick accuchekcs q6h while NPO

## 2021-05-12 NOTE — ASSESSMENT & PLAN NOTE
· On coumadin due to atrial fibrillation   · Reversal with Kcentra given  · Monitor INR and repeat reversal as needed  · Hold ac/ap secondary to intracranial hemorrhage

## 2021-05-12 NOTE — ASSESSMENT & PLAN NOTE
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

## 2021-05-12 NOTE — ASSESSMENT & PLAN NOTE
Lab Results   Component Value Date    HGBA1C 6 4 04/22/2021   · Well controlled with home regimen  · SSI with basal coverage   · Hold oral meds

## 2021-05-12 NOTE — CONSULTS
Consultation - Geriatric Medicine   Nawaf James 80 y o  male MRN: [id number]  Unit/Bed#: ICU 09 Encounter: [id number]        Inpatient consult to Gerontology for [address] performed by: Colonel Michele MD  Consult ordered by: Burak Mckenzie PA-C            Assessment/Plan   1  Subdural Hematoma:  CT Scan on 12 May 2021 observed "Mixed density, moderate sized subdural hematoma within the right hemisphere with an increase in acute blood products noted posteriorly in the parietal region "  INR at 1 12 today  2   Chronic Kidney Disease Stage 5: eGFR <15  Patient did not require dialysis because of adequate urine output  3   Atrial Fibrillation with Controlled Ventricular Response:  Previously failed Eliquis therapy, had a stroke while taking this medication  Prescribed Coumadin  4   Coronary Artery Disease:  Patient's EF improved from 30 to 55% after surgery in 2014  Patient had been on Ranexa for his underlying heart disease but this medication should be used with caution in patients with an eGFR less than 60  Would recommend staying off Ranexa in the future  5   Diabetes Mellitus Type 2:  Had been on Tradjenta  Reports never having used insulin  6   Hypertension:  BP is 146/68 and typically under very good control per wife  Monitors BP at home  7   Mixed Osteoarthritis:  Previously told by orthopedics he would not be a surgical candidate for shoulder due to age  8   BPH:  Continue Tamsulosin and Finasteride  9   Goals of Care:  Son plans to take parents to [place] in approximately a month so he can have them closer  10  Systolic crescendo murmur that radiates to the neck  Echocardiogram more consistent with Aortic Sclerosis  Recommendations:  1  Do not restart anti-coagulant until cleared by neuro-surgery  2   PT and OT  Evaluation and treatment       3   Patient will need to have a Nephrologist, Neurologist, Cardiologist, and Primary Care Physician arranged prior to his move to [place]  History of Present Illness   Physician Requesting Consult: Alfonso Alexis MD  Reason for Consult / Principal Problem: Fall  Hx and PE limited by: None  Additional history obtained from: [place] (wife) [phone]    HPI: Kinjal Anaya is a 80 y o   male who enjoys his basic activities of daily living at his two story town-home with his wife  Patient fell 3 weeks ago with no sequelae from that fall until date of admission  He experienced L LE weakness and collapsed into an off-duty 's arms  During this hospital course it was determined that he suffered from an acute on chronic SDH and coumadin induced coagulopathy  PMH of stroke in 2014, CKD Stage 5, Atrial Fibrillation, Diabetes Mellitus Type 2, HTN  Patient had a stroke while on Eliquis in the past       Pertinent negatives:  Currently denies any HA, nausea, vomiting, blurry vision, or diplopia  No bowel or bladder incontinence  Further denies fever, chills, dyspnea, cough, or chest pain  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 career as an   Dyspnea and shortness of breath when wakes in the morning  Review of Systems   Constitutional: Negative  HENT:        L sided facial droop from past stroke 2019   Eyes:        Cataract sugery  No vision issues  Respiratory: Positive for shortness of breath (when waking up in the morning)  Negative for cough, choking, chest tightness, wheezing and stridor  Cardiovascular: Negative for chest pain, palpitations and leg swelling  Gastrointestinal: Negative for abdominal distention, abdominal pain, blood in stool, constipation, diarrhea, nausea and vomiting  Endocrine: Negative  Genitourinary: Negative  Musculoskeletal:        R shoulder pain  Previously seen for this but was told by ortho he was not surgical candidate   Neurological: Positive for facial asymmetry (L sided facial droop from previous stroke)  Negative for dizziness, syncope, light-headedness and headaches  Psychiatric/Behavioral: Negative  Memory/Cognitive screening:  Excellent  Mobility:  Limited  Falls:  Fell 3 weeks ago  Assistive Devices:  Front-wheel walker  Fraility: Increasing frailness with progressively slowing gait  Nutrition/weight loss/grocery shopping/meal preparation: Great appetite, limiting high glycemic foods  Vision impairment: No vision impairment  Cataract surgery  Hearing impairment: Excellent  Incontinence: Denies urinary or bowel incontinence  Delirium: Never  Polypharmacy: Finasteride 5mg, not taking insulin at home, Keppra 500mg (began in hospital), Metoprolol tartrate 25mg, Levothyroxine 75mcg, Tamsulosin 0 4mg, Torsemide 20mg, Atorvastatin 40mg, Tradjenta 5mg, Coumadin 2mg // and 1/2 tablet the rest of the days  Ranexa 500mg (Discontinued/HELD in hospital due to eGFR <15)  [facility] Lives with: Wife  iADL's:  Has not driven since   ADL's:  Bathes himself and feeds himself  Wife dresses patient  Historical Information   Past medical history:  Stroke, shortness of breath, shingles, HTN, hyperlipidemia, DM type 2, chronic kidney disease stage 5, BPH, 3-vessel artery disease  Past surgical history: CABG, cataract surgery, and cardiac pacemaker placement  Social history:  65 years  3 children  10 grandchildren  1 great grandson  Smoking: None  ETOH: Occasional glass of wine  College graduate    Family history: Mother:  at 80 y o  with Dementia  Father:  stomach CA at age 48 y o  Meds/Allergies   All current active meds have been reviewed       No Known Allergies    Objective   Vitals:    21 1300   BP: 146/68   Pulse: 69   Resp: 14   Temp:    SpO2: 98%       Intake/Output Summary (Last 24 hours) at 2021 1402  Last data filed at 2021 1101  Gross per 24 hour   Intake 430 ml Output 940 ml   Net -510 ml     Invasive Devices     Peripheral Intravenous Line            Peripheral IV 05/11/21 Left Arm less than 1 day    Peripheral IV 05/12/21 Left Antecubital less than 1 day          Drain            External Urinary Catheter Small less than 1 day                Physical Exam  Constitutional:       General: He is not in acute distress  Appearance: Normal appearance  He is normal weight  He is not toxic-appearing or diaphoretic  HENT:      Head: Normocephalic and atraumatic  Right Ear: Ear canal and external ear normal       Left Ear: Ear canal and external ear normal       Nose: Nose normal       Mouth/Throat:      Mouth: Mucous membranes are moist       Pharynx: Oropharynx is clear  Eyes:      Extraocular Movements: Extraocular movements intact  Conjunctiva/sclera: Conjunctivae normal       Pupils: Pupils are equal, round, and reactive to light  Neck:      Musculoskeletal: Normal range of motion  Vascular: Carotid bruit (BILAT carotid bruits) present  Cardiovascular:      Rate and Rhythm: Normal rate and regular rhythm  Pulses: Normal pulses  Heart sounds: Murmur present  No friction rub  No gallop  Comments: Aortic sclerosis with a crescendo murmur 3/6 radiating to neck  Pulmonary:      Effort: Pulmonary effort is normal       Breath sounds: Normal breath sounds  No wheezing, rhonchi or rales  Abdominal:      General: Abdomen is flat  Bowel sounds are normal  There is no distension  Palpations: Abdomen is soft  Tenderness: There is no abdominal tenderness  Musculoskeletal: Normal range of motion  Right lower leg: No edema  Left lower leg: No edema  Comments: Vertical scar from previous back surgery present mid-line at the approximate level of the lower T/ upper L spine  Skin:     General: Skin is warm and dry  Capillary Refill: Capillary refill takes less than 2 seconds        Coloration: Skin is not jaundiced or pale  Findings: Bruising (non-raised, non-edematous bruising of UE) present  No erythema  Neurological:      General: No focal deficit present  Mental Status: He is alert and oriented to person, place, and time  Mental status is at baseline  Psychiatric:         Mood and Affect: Mood normal          Behavior: Behavior normal          Thought Content: Thought content normal          Judgment: Judgment normal        Lab Results:   I have personally reviewed pertinent lab and imaging results  VTE Prophylaxis: Sequential compression device Brandon Holter)      Code Status: Level 3 - DNAR and DNI  Advance Directive and Living Will: Yes    Power of :    POLST:      Family and Social Support: Great family support with sons, daughter, and wife  Transcribed by Skinny PANIAGUA for Dr Claudia Chopra    No data recorded

## 2021-05-12 NOTE — PLAN OF CARE
Problem: Prexisting or High Potential for Compromised Skin Integrity  Goal: Skin integrity is maintained or improved  Description: INTERVENTIONS:  - Identify patients at risk for skin breakdown  - Assess and monitor skin integrity  - Assess and monitor nutrition and hydration status  - Monitor labs   - Assess for incontinence   - Turn and reposition patient  - Assist with mobility/ambulation  - Relieve pressure over bony prominences  - Avoid friction and shearing  - Provide appropriate hygiene as needed including keeping skin clean and dry  - Evaluate need for skin moisturizer/barrier cream  - Collaborate with interdisciplinary team   - Patient/family teaching  - Consider wound care consult   Outcome: Progressing     Problem: Potential for Falls  Goal: Patient will remain free of falls  Description: INTERVENTIONS:  - Assess patient frequently for physical needs  -  Identify cognitive and physical deficits and behaviors that affect risk of falls    -  [place] fall precautions as indicated by assessment   - Educate patient/family on patient safety including physical limitations  - Instruct patient to call for assistance with activity based on assessment  - Modify environment to reduce risk of injury  - Consider OT/PT consult to assist with strengthening/mobility  Outcome: Progressing

## 2021-05-12 NOTE — ASSESSMENT & PLAN NOTE
· , , Alk Phos 305, T Bili 0 91  · Abdominal exam benign  · Continue serial ABD exams  · RUQ US in ER: cholelithiasis without evidence of acute cholecystitis  · Avoid statins  · Trend LFTs, CMP in am

## 2021-05-12 NOTE — PROGRESS NOTES
[facility]  Progress Note Jerrica Lucio 1/29/1932, 80 y o  male MRN: [id number]  Unit/Bed#: ICU 09 Encounter: [id number]  Primary Care Provider: Bailey Nielsen MD   Date and time admitted to hospital: 5/11/2021  6:06 PM    * Subdural hematoma [facility])  Assessment & Plan  · Appears acute on chronic on CT head  · Admits to new onset Left sided upper and lower extremity weakness, that began approx 5 pm 5/11  · Patient and wife does not recollect recent falls or head strikes  · Trauma evaluation done in ER  · Neurosurgery consulted, no urgent surgical intervention at this time   · Goal -160  · STAT CT Head if greater than 2 GCS change in 1 hour time frame   · Hold further AC/AP  · Received [address], [address], Vitamin K  · INR normalized   · PT/OT    Anticoagulated on warfarin  Assessment & Plan  · On Coumadin for AFib  · INR on presentation 3 87  · Received PCC< [facility], Vitamin K with normalization   · Will hold off on further AC/AP given SDH  · Monitor telemetry    Paroxysmal A-fib ([address] )  Assessment & Plan  · Was on Coumadin for [facility] as outpatient  Will hold in the setting of SDH  · Continue home metoprolol for rate control  · If needed can start low dose metoprolol IV if additional rate control is needed  · Hold Ranexa given GFR < 15  · Does have history of SSS, now s/p PPM LCW 2014    Essential hypertension  Assessment & Plan  · Per recent office visits his blood pressure is typically well controlled on his home regimen with BPs 120s/70-80s  · Presented hypertensive in the -200/60s  · Received labetalol IV 10 mg x2 doses  · BP improved with 2 doses  Can continue as needed   Though will add hydralazine as needed for BP control  · Continue home metoprolol  · Goal SBP per Neurosurgery 140-160    CKD (chronic kidney disease) stage 5, GFR less than 15 ml/min [facility])  Assessment & Plan  Lab Results   Component Value Date    EGFR 10 05/12/2021    EGFR 10 05/11/2021    EGFR 10 04/22/2021    CREATININE 4 78 (H) 05/12/2021    CREATININE 4 85 (H) 05/11/2021    CREATININE 4 76 (H) 04/22/2021   · Follows with Dr Guillermina Aleman  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  · Baseline Creatinine 3 05-4 76  · Monitor renal indices, UO, electrolytes  · Replete electrolytes as needed for K > 4, Mag > 2, Phos > 3  · Avoid nephrotoxins, hypotension  · Continue home toresmide    Type 2 diabetes mellitus, with long-term current use of insulin [facility])  Assessment & Plan  Lab Results   Component Value Date    HGBA1C 6 4 04/22/2021       Recent Labs     05/11/21  1809 05/11/21 2016 05/12/21  0019   POCGLU 188* 161* 171*       Blood Sugar Average: Last 72 hrs:  (P) 859 [id number]   · Hold home Tradjenta  · Last A1c 6 4 April 2021  · Continue SSI with fingerstick accuchekcs q6h while NPO    Benign prostatic hyperplasia with urinary hesitancy  Assessment & Plan  · Continue home Flomax and Proscar  · Bladder scan q shift  · Urinary retention protocol    Hyperlipidemia  Assessment & Plan  · Hold home atorvastatin in the setting of elevated LFTs  · Abdominal exam benign, continue serial exams  · RUQ US cholelithiasis without CBD dilation      Hypothyroidism  Assessment & Plan  · Continue home levothyroxine    Elevated LFTs  Assessment & Plan  · , , Alk Phos 305, T Bili 0 91  · Abdominal exam benign  · Continue serial ABD exams  · RUQ US in ER: cholelithiasis without evidence of acute cholecystitis  · Avoid statins  · Trend LFTs, CMP        ----------------------------------------------------------------------------------------  HPI/24hr events: 80year old male admitted due to acute Left sided weakness with concurrent R subdural hematoma  No acute events overnight  Patient with chronic L sided arm drift and L sided facial droop present on exam  Patient remains afebrile and hemodynamically stable       Disposition: Transfer to Med-Surg   Code Status: Level 3 - DNAR and DNI  ---------------------------------------------------------------------------------------  SUBJECTIVE  Patient is very pleasant on exam  States that he "feels much better today " Denies HA, visual changes, chest pain, difficulty breathing, abdominal pain, lower extremity pain  Review of Systems   Eyes: Negative for visual disturbance  Respiratory: Negative for shortness of breath  Cardiovascular: Negative for chest pain and leg swelling  Gastrointestinal: Negative for abdominal distention and abdominal pain  Neurological: Positive for weakness  Negative for headaches  Psychiatric/Behavioral: Negative for confusion  Review of systems was reviewed and negative unless stated above in HPI/24-hour events   ---------------------------------------------------------------------------------------  OBJECTIVE    Vitals   Vitals:    21 0500 21 0600 21 0642 21 0700   BP: 148/67 170/72 (!) 189/84 131/62   Pulse: 71 72 77 71   Resp: 12   14   Temp:       TempSrc:       SpO2: 99% 99%  98%   Weight:       Height:         Temp (24hrs), Av 3 °F (36 8 °C), Min:98 2 °F (36 8 °C), Max:98 4 °F (36 9 °C)  Current: Temperature: 98 4 °F (36 9 °C)          Respiratory:  SpO2: SpO2: 98 %, SpO2 Activity: SpO2 Activity: At Rest, SpO2 Device: O2 Device: None (Room air)       Invasive/non-invasive ventilation settings   Respiratory    Lab Data (Last 4 hours)    None         O2/Vent Data (Last 4 hours)    None                Physical Exam  Constitutional:       General: He is not in acute distress  Appearance: He is not ill-appearing or toxic-appearing  HENT:      Head: Normocephalic and atraumatic  Nose: Nose normal       Mouth/Throat:      Mouth: Mucous membranes are moist       Pharynx: Oropharynx is clear  Comments: L sided facial weakness noted; uneven smile/strength   Eyes:      General: No scleral icterus       Conjunctiva/sclera: Conjunctivae normal    Neck: Musculoskeletal: Normal range of motion and neck supple  Cardiovascular:      Rate and Rhythm: Normal rate  Rhythm irregular  Heart sounds: Normal heart sounds  Pulmonary:      Effort: Pulmonary effort is normal  No respiratory distress  Breath sounds: Normal breath sounds  Abdominal:      General: There is no distension  Palpations: Abdomen is soft  Tenderness: There is no abdominal tenderness  Musculoskeletal:      Right lower leg: No edema  Left lower leg: No edema  Skin:     General: Skin is warm and dry  Capillary Refill: Capillary refill takes less than 2 seconds  Neurological:      Mental Status: He is alert and oriented to person, place, and time  Motor: Weakness present        Comments: L arm drift, L lower extremity weakness          Laboratory and Diagnostics:  Results from last 7 days   Lab Units 05/12/21 0459 05/11/21 1924   WBC Thousand/uL 6 79 8 89   HEMOGLOBIN g/dL 8 5* 9 8*   HEMATOCRIT % 26 5* 31 5*   PLATELETS Thousands/uL 156 167   NEUTROS PCT % 68 74   MONOS PCT % 10 8     Results from last 7 days   Lab Units 05/12/21  0459 05/11/21 1924   SODIUM mmol/L 139 137   POTASSIUM mmol/L 3 9 4 0   CHLORIDE mmol/L 102 100   CO2 mmol/L 27 28   ANION GAP mmol/L 10 9   BUN mg/dL 51* 52*   CREATININE mg/dL 4 78* 4 85*   CALCIUM mg/dL 8 0* 8 8   GLUCOSE RANDOM mg/dL 179* 168*   ALT U/L  --  325*   AST U/L  --  144*   ALK PHOS U/L  --  305*   ALBUMIN g/dL  --  3 2*   TOTAL BILIRUBIN mg/dL  --  0 91          Results from last 7 days   Lab Units 05/12/21 0459 05/12/21  0001 05/11/21 1924 05/06/21  1300   INR  1 12 1 27* 3 87* 3 65*   PTT seconds  --   --  83*  --       Results from last 7 days   Lab Units 05/11/21 1924   TROPONIN I ng/mL 0 04         ABG:    VBG:          Micro        EKG: Reviewed       Imaging: I have personally reviewed pertinent films in PACS    Intake and Output  I/O       05/10 0701 - 05/11 0700 05/11 0701 - 05/12 0700 05/12 0701 - 05/13 0700    I V  (mL/kg)  60 (0 8)     IV Piggyback  370     Total Intake(mL/kg)  430 (5 6)     Urine (mL/kg/hr)  600     Total Output  600     Net  -170            Unmeasured Urine Occurrence  1 x           Height and Weights   Height: 5' 6" (167 6 cm)  IBW (Ideal Body Weight): 63 8 kg  Body mass index is 27 51 kg/m²  Weight (last 2 days)     Date/Time   Weight    05/11/21 2300   77 3 (170 42)                Nutrition       Diet Orders   (From admission, onward)             Start     Ordered    05/11/21 2310  Room Service  Once     Question:  Type of Service  Answer:  Room Service - Appropriate with Assistance    05/11/21 2309 05/11/21 2210  Diet NPO; Sips with meds  Diet effective now     Question Answer Comment   Diet Type NPO    NPO Except: Sips with meds    RD to adjust diet per protocol?  Yes        05/11/21 2210                  Active Medications  Scheduled Meds:  Current Facility-Administered Medications   Medication Dose Route Frequency Provider Last Rate    acetaminophen  975 mg Oral [facility] MAURICE Naranjo      chlorhexidine  15 mL Swish & Spit Q12H [facility] HOME [facility], [address]      docusate sodium  100 mg Oral BID [facility], MAURICE      finasteride  5 mg Oral Daily Chyna Estes PA-C      HYDROmorphone  0 2 mg Intravenous Q4H PRN Ronaldo Giron, MAURICE      insulin lispro  1-6 Units Subcutaneous Q6H [facility] MAURICE Giron      levETIRAcetam  500 mg Intravenous Q24H MAURICE Naranjo Stopped (05/12/21 0112)    levothyroxine  75 mcg Oral Early Morning Chyna Estes PA-C      metoprolol tartrate  25 mg Oral BID With Meals Deo Estes PA-C      naloxone  0 04 mg Intravenous Q1MIN PRN Stasell Breech, MAURICE      ondansetron  4 mg Intravenous Q4H PRN Lenell Breech, MAURICE      oxyCODONE  2 5 mg Oral Q4H PRN [facility] Bree[facility], CRKEVYN      oxyCODONE  5 mg Oral Q4H PRN [facility], CRNP      pantoprazole  40 mg Oral Early Morning Lenell Breech, CRNP      polyethylene glycol 17 g Oral Daily PRN MAURICE Love      tamsulosin  0 4 mg Oral HS Chyna Estes PA-C      torsemide  20 mg Oral Daily Chyna Estes PA-C       Continuous Infusions:     PRN Meds:   HYDROmorphone, 0 2 mg, Q4H PRN  naloxone, 0 04 mg, Q1MIN PRN  ondansetron, 4 mg, Q4H PRN  oxyCODONE, 2 5 mg, Q4H PRN  oxyCODONE, 5 mg, Q4H PRN  polyethylene glycol, 17 g, Daily PRN        Invasive Devices Review  Invasive Devices     Peripheral Intravenous Line            Peripheral IV 05/11/21 Left Arm less than 1 day    Peripheral IV 05/12/21 Left Antecubital less than 1 day          Drain            External Urinary Catheter Small less than 1 day                Rationale for remaining devices: None   ---------------------------------------------------------------------------------------  Advance Directive and Living Will: Yes    Power of :    POLST:    ---------------------------------------------------------------------------------------  Care Time Delivered:   No Critical Care time spent       Mark Fan PA-C

## 2021-05-12 NOTE — CONSULTS
On-Call Telephone Note    Contacted by faiza Higgins  Shea Alvarado 80 y o  male MRN: [id number]  Unit/Bed#: ED 29 Encounter: [id number]    Per provider report, patient presents with sudden onset of weakness when trying to get into his home  A passing  noticed his struggle, and proceeded to investigate  The patient became weak and collapsed into the officer's arms  The patient has a PMHx of a mild facial droop from previous strokes  The patient was found to have a acute on chronic SDH while on Coumadin  A reversal agent was being was ordered per the provider  The patient admits to a fall 3 weeks ago  Currently has an exam of GCS 15  Available past medical history,social history, surgical history, medication list, drug allergies and review of systems were reviewed  BP (!) 195/77 (BP Location: Right arm)   Pulse 81   Temp 98 2 °F (36 8 °C) (Oral)   Resp 18   SpO2 99%      Clinical exam per provider report, faiza Higgins  Imaging personally reviewed with attending  Assessment and Plan  1  Continue frequent neurologic checks  2  STAT CT if GCS declines < than 2 points within an hour  3  No emergent surgerical intervention is needed at this time  Continue observation  4  BP parameters can be 140-160  5  Case discussed with on-call attending  All questions answered  Provider is in agreement with the course of action  A total of 20 minutes was spent discussing the presentation, physical exam and formulating a management plan with the provider

## 2021-05-12 NOTE — ASSESSMENT & PLAN NOTE
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

## 2021-05-12 NOTE — ASSESSMENT & PLAN NOTE
· Per recent office visits his blood pressure is typically well controlled on his home regimen with BPs 120s/70-80s  · Presented hypertensive in the -200/60s  · Received labetalol IV 10 mg x2 doses  · BP improved with 2 doses  Can continue as needed   Though will add hydralazine as needed for BP control  · Continue home metoprolol  · Goal SBP per Neurosurgery 140-160

## 2021-05-12 NOTE — ASSESSMENT & PLAN NOTE
· Was on Coumadin for [facility] as outpatient  Will hold in the setting of SDH  · Continue home metoprolol for rate control  · If needed can start low dose metoprolol IV if additional rate control is needed    · Hold Ranexa given GFR < 15  · Does have history of SSS, now s/p PPM LCW 2014

## 2021-05-12 NOTE — ED PROVIDER NOTES
History  Chief Complaint   Patient presents with    Weakness - Generalized     pt presents via EMS after working outside and becoming lightheaded, EMS was called and blood sugar was reading low, EMS gave approx 50ml of D10 and  upon arrival, pt denies complaints        History provided by:  Patient   used: No      Patient is 77-year-old male presenting to emergency department after being lightheaded and weak  Helping to the house by neighbor  Found to have low blood sugar  Given D10 by EMS  Per EMS patient was dragging left lower extremity  He has chronic left upper extremity and facial droop on left side from previous stroke  He is on Coumadin  Denies trauma  Denies chest pain or shortness of breath  No headache  No fevers or chills  History of multiple strokes  MDM no new deficits noted at this time  Will do TIA evaluation  Admission to hospital      Prior to Admission Medications   Prescriptions Last Dose Informant Patient Reported? Taking?    TRADJENTA 5 MG TABS 5/11/2021 at Unknown time Spouse/Significant Other No Yes   Sig: TAKE 1 TABLET BY MOUTH EVERY DAY   acetaminophen (TYLENOL) 325 mg tablet 5/11/2021 at Unknown time Spouse/Significant Other No Yes   Sig: Take 2 tablets (650 mg total) by mouth 3 (three) times a day as needed for mild pain or moderate pain   aspirin (ECOTRIN LOW STRENGTH) 81 mg EC tablet 5/11/2021 at Unknown time Spouse/Significant Other No Yes   Sig: Take 1 tablet (81 mg total) by mouth daily   atorvastatin (LIPITOR) 40 mg tablet 5/10/2021 at Unknown time Spouse/Significant Other No Yes   Sig: TAKE 1 TABLET BY MOUTH EVERY DAY   cholecalciferol 2000 units TABS 5/11/2021 at Unknown time Spouse/Significant Other No Yes   Sig: Take 1 tablet (2,000 Units total) by mouth daily   docusate sodium (COLACE) 100 mg capsule Past Week at Unknown time Spouse/Significant Other Yes Yes   Sig: Take 100 mg by mouth 2 (two) times a day   finasteride (PROSCAR) 5 mg tablet  Spouse/Significant Other No No   Sig: TAKE 1 TABLET BY MOUTH EVERY DAY   glucose blood (FREESTYLE LITE) test strip 2021 at Unknown time Spouse/Significant Other No Yes   Sig: PATIENT TESTS BLOOD SUGARS ONCE DAILY  PATIENT IS NOT INSULIN DEPENDENT  DX E11 20   levothyroxine 75 mcg tablet 2021 at Unknown time Spouse/Significant Other No Yes   Sig: TAKE 1 TABLET BY MOUTH EVERY DAY   metoprolol tartrate (LOPRESSOR) 50 mg tablet 2021 at Unknown time  No Yes   Sig: TAKE 1/2 TABLET BY MOUTH TWICE A DAY WITH MEALS   ranolazine (RANEXA) 500 mg 12 hr tablet 2021 at Unknown time  No Yes   Sig: TAKE 1 TABLET BY MOUTH EVERY 12 HOURS   tamsulosin (FLOMAX) 0 4 mg 5/10/2021 at Unknown time Spouse/Significant Other No Yes   Sig: TAKE 1 CAPSULE BY MOUTH EVERYDAY AT BEDTIME   torsemide (DEMADEX) 20 mg tablet 2021 at Unknown time Spouse/Significant Other No Yes   Sig: TAKE 1 TABLET BY MOUTH EVERY DAY   warfarin (COUMADIN) 2 mg tablet 5/10/2021 at Unknown time  No Yes   Si tab (2mg) M,W,F,   1/2 tab (1mg) all other days      Facility-Administered Medications: None       Past Medical History:   Diagnosis Date    3-vessel coronary artery disease     last assessed: 08/15/2016    BPH (benign prostatic hyperplasia)     Chronic kidney disease     Diabetes mellitus ([facility] [address] )     Hyperlipidemia     Hypertension     Shingles 2019    SOB (shortness of breath)     Stroke [facility])        Past Surgical History:   Procedure Laterality Date    CARDIAC PACEMAKER PLACEMENT      CATARACT EXTRACTION      last assessed: 2015    CORONARY ARTERY BYPASS GRAFT      last assessed: 2015       Family History   Problem Relation Age of Onset    Heart disease Mother     Cancer Father         stomach    No Known Problems Sister     No Known Problems Brother     No Known Problems Brother      I have reviewed and agree with the history as documented      E-Cigarette/Vaping     E-Cigarette/Vaping Substances Social History     Tobacco Use    Smoking status: Never Smoker    Smokeless tobacco: Never Used   Substance Use Topics    Alcohol use: Not Currently     Frequency: Never    Drug use: No       Review of Systems   Constitutional: Negative for chills, diaphoresis and fever  HENT: Negative for congestion and sore throat  Respiratory: Negative for cough, shortness of breath, wheezing and stridor  Cardiovascular: Negative for chest pain, palpitations and leg swelling  Gastrointestinal: Negative for abdominal pain, blood in stool, diarrhea, nausea and vomiting  Genitourinary: Negative for dysuria, frequency and urgency  Musculoskeletal: Negative for neck pain and neck stiffness  Skin: Negative for pallor and rash  Neurological: Negative for dizziness, syncope, weakness, light-headedness and headaches  All other systems reviewed and are negative  Physical Exam  Physical Exam  Vitals signs reviewed  Constitutional:       Appearance: Normal appearance  He is well-developed  HENT:      Head: Normocephalic and atraumatic  Eyes:      Extraocular Movements: Extraocular movements intact  Pupils: Pupils are equal, round, and reactive to light  Neck:      Musculoskeletal: Normal range of motion and neck supple  Cardiovascular:      Rate and Rhythm: Normal rate and regular rhythm  Heart sounds: Normal heart sounds  Pulmonary:      Effort: Pulmonary effort is normal  No respiratory distress  Breath sounds: Normal breath sounds  Abdominal:      General: Bowel sounds are normal       Palpations: Abdomen is soft  Tenderness: There is no abdominal tenderness  Musculoskeletal:         General: No deformity  Right lower leg: No edema  Left lower leg: No edema  Skin:     General: Skin is warm and dry  Capillary Refill: Capillary refill takes less than 2 seconds  Neurological:      General: No focal deficit present        Mental Status: He is alert and oriented to person, place, and time  Comments: Left-sided facial droop and left arm drift which are old from previous stroke    Otherwise nonfocal          Vital Signs  ED Triage Vitals   Temperature Pulse Respirations Blood Pressure SpO2   05/11/21 1817 05/11/21 1817 05/11/21 1817 05/11/21 1817 05/11/21 1817   98 2 °F (36 8 °C) 77 18 162/75 97 %      Temp Source Heart Rate Source Patient Position - Orthostatic VS BP Location FiO2 (%)   05/11/21 1817 05/11/21 1817 05/11/21 2048 05/11/21 2048 --   Oral Monitor Lying Right arm       Pain Score       05/11/21 2301       No Pain           Vitals:    05/12/21 2250 05/12/21 2324 05/13/21 0045 05/13/21 0400   BP:  (!) 197/74 142/64    Pulse: 71   85   Patient Position - Orthostatic VS: Lying            Visual Acuity  Visual Acuity      Most Recent Value   L Pupil Size (mm)  3   R Pupil Size (mm)  3   L Pupil Shape  Round   R Pupil Shape  Round          ED Medications  Medications   finasteride (PROSCAR) tablet 5 mg (5 mg Oral Given 5/12/21 0837)   levothyroxine tablet 75 mcg (75 mcg Oral Given 5/13/21 0503)   metoprolol tartrate (LOPRESSOR) tablet 25 mg (25 mg Oral Given 5/12/21 1728)   tamsulosin (FLOMAX) capsule 0 4 mg (0 4 mg Oral Given 5/12/21 2127)   torsemide (DEMADEX) tablet 20 mg (20 mg Oral Given 5/12/21 0837)   acetaminophen (TYLENOL) tablet 975 mg (975 mg Oral Refused 5/12/21 2113)   oxyCODONE (ROXICODONE) IR tablet 2 5 mg (has no administration in time range)   oxyCODONE (ROXICODONE) IR tablet 5 mg (has no administration in time range)   HYDROmorphone (DILAUDID) injection 0 2 mg (has no administration in time range)   docusate sodium (COLACE) capsule 100 mg (0 mg Oral Hold 5/12/21 1721)   polyethylene glycol (MIRALAX) packet 17 g (has no administration in time range)   ondansetron (ZOFRAN) injection 4 mg (has no administration in time range)   naloxone (NARCAN) 0 04 mg/mL syringe 0 04 mg (has no administration in time range)   levETIRAcetam (KEPPRA) 500 mg in sodium chloride 0 9 % 100 mL IVPB (0 mg Intravenous Stopped 5/13/21 0145)   insulin lispro (HumaLOG) 100 units/mL subcutaneous injection 1-6 Units (3 Units Subcutaneous Given 5/12/21 1806)   hydrALAZINE (APRESOLINE) injection 10 mg (10 mg Intravenous Given 5/12/21 2326)   Labetalol HCl (NORMODYNE) injection 10 mg (10 mg Intravenous Given 5/11/21 2053)   desmopressin (DDAVP) 23 6 mcg in sodium chloride 0 9 % 50 mL IVPB (0 mcg/kg × 78 9 kg Intravenous Stopped 5/11/21 2214)   prothrombin complex conc human (KCENTRA) IV 2,000 Units (2,000 Units Intravenous Given 5/11/21 2119)   Labetalol HCl (NORMODYNE) injection 10 mg (10 mg Intravenous Given 5/11/21 2205)   phytonadione (AQUA-MEPHYTON) 10 mg/mL 10 mg in sodium chloride 0 9 % 50 mL IVPB (0 mg Intravenous Stopped 5/12/21 0026)       Diagnostic Studies  Results Reviewed     Procedure Component Value Units Date/Time    Protime-INR [[id number]]  (Abnormal) Collected: 05/12/21 0001    Lab Status: Final result Specimen: Blood from Arm, Left Updated: 05/12/21 0050     Protime 16 0 seconds      INR 1 27    Fingerstick Glucose (POCT) [[id number]]  (Abnormal) Collected: 05/11/21 2016    Lab Status: Final result Updated: 05/11/21 2018     POC Glucose 161 mg/dl     Protime-INR [[id number]]  (Abnormal) Collected: 05/11/21 1924    Lab Status: Final result Specimen: Blood from Arm, Left Updated: 05/11/21 1958     Protime 38 0 seconds      INR 3 87    APTT [[id number]]  (Abnormal) Collected: 05/11/21 1924    Lab Status: Final result Specimen: Blood from Arm, Left Updated: 05/11/21 1958     PTT 83 seconds     Comprehensive metabolic panel [[id number]]  (Abnormal) Collected: 05/11/21 1924    Lab Status: Final result Specimen: Blood from Arm, Left Updated: 05/11/21 1954     Sodium 137 mmol/L      Potassium 4 0 mmol/L      Chloride 100 mmol/L      CO2 28 mmol/L      ANION GAP 9 mmol/L      BUN 52 mg/dL      Creatinine 4 85 mg/dL      Glucose 168 mg/dL      Calcium 8 8 mg/dL      Corrected Calcium 9 4 mg/dL       U/L       U/L      Alkaline Phosphatase 305 U/L      Total Protein 7 2 g/dL      Albumin 3 2 g/dL      Total Bilirubin 0 91 mg/dL      eGFR 10 ml/min/1 73sq m     Narrative:      National Kidney Disease Foundation guidelines for Chronic Kidney Disease (CKD):     Stage 1 with normal or high GFR (GFR > 90 mL/min/1 73 square meters)    Stage 2 Mild CKD (GFR = 60-89 mL/min/1 73 square meters)    Stage 3A Moderate CKD (GFR = 45-59 mL/min/1 73 square meters)    Stage 3B Moderate CKD (GFR = 30-44 mL/min/1 73 square meters)    Stage 4 Severe CKD (GFR = 15-29 mL/min/1 73 square meters)    Stage 5 End Stage CKD (GFR <15 mL/min/1 73 square meters)  Note: GFR calculation is accurate only with a steady state creatinine    Troponin I [[id number]]  (Normal) Collected: 05/11/21 1924    Lab Status: Final result Specimen: Blood from Arm, Left Updated: 05/11/21 1951     Troponin I 0 04 ng/mL     CBC and differential [[id number]]  (Abnormal) Collected: 05/11/21 1924    Lab Status: Final result Specimen: Blood from Arm, Left Updated: 05/1933     WBC 8 89 Thousand/uL      RBC 3 14 Million/uL      Hemoglobin 9 8 g/dL      Hematocrit 31 5 %       fL      MCH 31 2 pg      MCHC 31 1 g/dL      RDW 13 9 %      MPV 9 6 fL      Platelets 198 Thousands/uL      nRBC 0 /100 WBCs      Neutrophils Relative 74 %      Immat GRANS % 1 %      Lymphocytes Relative 13 %      Monocytes Relative 8 %      Eosinophils Relative 4 %      Basophils Relative 0 %      Neutrophils Absolute 6 60 Thousands/µL      Immature Grans Absolute 0 08 Thousand/uL      Lymphocytes Absolute 1 15 Thousands/µL      Monocytes Absolute 0 73 Thousand/µL      Eosinophils Absolute 0 31 Thousand/µL      Basophils Absolute 0 02 Thousands/µL     Fingerstick Glucose (POCT) [[id number]]  (Abnormal) Collected: 05/11/21 1809    Lab Status: Final result Updated: 05/11/21 1821     POC Glucose 188 mg/dl                  CT head wo contrast Final Result by Eric Sinclair DO (05/12 1019)      Mixed density, moderate sized subdural hematoma within the right hemisphere with an increase in acute blood products noted posteriorly in the parietal region  Stable encephalomalacia and gliosis within the right mid to posterior MCA territory  Workstation performed: [id number]         CT spine cervical wo contrast   Final Result by Asher Ruggiero DO (05/12 1029)      Moderate diffuse cervical degenerative change with moderate to severe foraminal narrowing  Multilevel mild canal stenosis  No acute osseous abnormalities  Workstation performed: [id number]         CT head without contrast   Final Result by Mile Castano MD (05/12 5184)   1  Right extra-axial heterogeneous collection measuring up to 2 0 cm in thickness and resulting in mass effect with slight leftward 3 mm midline shift  Mixed attenuation suggesting acute on chronic blood products  Short-term interval follow-up study    recommended to ensure stability  2   Chronic microangiopathic changes  These findings were discussed with SALONI WASHINGTON by my colleague Dr Diomedes Hughes on 5/11/2021 at 9:08 PM       The study was marked in [facility] for immediate notification  Workstation performed: [id number]         US right upper quadrant   Final Result by Samira Casiano MD (05/11 2123)      Cholelithiasis and gallbladder sludge  Small right kidney  Workstation performed: [id number]         XR chest 1 view portable   Final Result by Amanda Jones DO (05/12 3282)      Stable chest   Calcified bilateral pleural plaques with no acute interval change  If clinically warranted, assessment of the lung parenchyma would be best evaluated with CT imaging given superimposed plaques                    Workstation performed: [id number]         CT head wo contrast    (Results Pending)              Procedures  Procedures         ED Course  ED Course as of May 13 0503   Tue May 11, 2021   1926 ECG shows rate of 82, AFib, normal axis, nonspecific ST and T-waves throughout, independently interpreted by me      2057 Discussed with Trauma  They will be here to evaluate patient  MDM    Disposition  Final diagnoses:   Subdural hematoma ([address] )     Time reflects when diagnosis was documented in both MDM as applicable and the Disposition within this note     Time User Action Codes Description Comment    5/11/2021  9:13 PM Mara Barrett Add [S06 5X9A] Subdural hematoma ([facility] [address] )     5/11/2021  9:43 PM Glasco Silvio Modify [S06 5X9A] Subdural hematoma [facility])       ED Disposition     ED Disposition Condition Date/Time Comment    Admit Stable Tue May 11, 2021  9:13 PM Case was discussed with trauma team and the patient's admission status was agreed to be Admission Status: inpatient status to the service of Dr Cj Fraga   Follow-up Information    None         Current Discharge Medication List      CONTINUE these medications which have NOT CHANGED    Details   acetaminophen (TYLENOL) 325 mg tablet Take 2 tablets (650 mg total) by mouth 3 (three) times a day as needed for mild pain or moderate pain  Qty: 30 tablet, Refills: 0    Associated Diagnoses: Pain      aspirin (ECOTRIN LOW STRENGTH) 81 mg EC tablet Take 1 tablet (81 mg total) by mouth daily  Qty: 30 tablet, Refills: 0    Associated Diagnoses: 3-vessel coronary artery disease      atorvastatin (LIPITOR) 40 mg tablet TAKE 1 TABLET BY MOUTH EVERY DAY  Qty: 90 tablet, Refills: 3    Associated Diagnoses: Mixed hyperlipidemia      cholecalciferol 2000 units TABS Take 1 tablet (2,000 Units total) by mouth daily  Qty: 60 tablet, Refills: 0    Associated Diagnoses: Vitamin D insufficiency      docusate sodium (COLACE) 100 mg capsule Take 100 mg by mouth 2 (two) times a day      glucose blood (FREESTYLE LITE) test strip PATIENT TESTS BLOOD SUGARS ONCE DAILY   PATIENT IS NOT INSULIN DEPENDENT  DX E11 20  Qty: 50 each, Refills: 5    Associated Diagnoses: 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)      levothyroxine 75 mcg tablet TAKE 1 TABLET BY MOUTH EVERY DAY  Qty: 90 tablet, Refills: 3    Associated Diagnoses: Hypothyroidism, unspecified type      metoprolol tartrate (LOPRESSOR) 50 mg tablet TAKE 1/2 TABLET BY MOUTH TWICE A DAY WITH MEALS  Qty: 90 tablet, Refills: 1    Associated Diagnoses: Essential hypertension      ranolazine (RANEXA) 500 mg 12 hr tablet TAKE 1 TABLET BY MOUTH EVERY 12 HOURS  Qty: 180 tablet, Refills: 1    Associated Diagnoses: Chest pain; 3-vessel coronary artery disease      tamsulosin (FLOMAX) 0 4 mg TAKE 1 CAPSULE BY MOUTH EVERYDAY AT BEDTIME  Qty: 30 capsule, Refills: 5    Associated Diagnoses: Urinary hesitancy      torsemide (DEMADEX) 20 mg tablet TAKE 1 TABLET BY MOUTH EVERY DAY  Qty: 30 tablet, Refills: 1    Associated Diagnoses: Generalized edema      TRADJENTA 5 MG TABS TAKE 1 TABLET BY MOUTH EVERY DAY  Qty: 90 tablet, Refills: 3    Associated Diagnoses: Type 2 diabetes mellitus without complication, with long-term current use of insulin (HCC)      warfarin (COUMADIN) 2 mg tablet 1 tab (2mg) M,W,F,   1/2 tab (1mg) all other days  Qty: 90 tablet, Refills: 1    Associated Diagnoses: Paroxysmal A-fib (HCC)      finasteride (PROSCAR) 5 mg tablet TAKE 1 TABLET BY MOUTH EVERY DAY  Qty: 90 tablet, Refills: 3    Associated Diagnoses: MAGUI (acute kidney injury) ([facility] [address] )           No discharge procedures on file      PDMP Review     None          ED Provider  Electronically Signed by           Alexey Stephen MD  05/13/21 7901

## 2021-05-12 NOTE — ASSESSMENT & PLAN NOTE
· Appears acute on chronic by CT scan with possible fall with headstrike 3 weeks ago  · No recent trauma or falls in the past week  · Neurosurgery input appreciated  · Admit to ICU, q1h neuro checks  · Kcentra given - monitor INR   · Repeat ct scan stat for changes in GCS/Neuro exam

## 2021-05-12 NOTE — ASSESSMENT & PLAN NOTE
Lab Results   Component Value Date    HGBA1C 6 4 04/22/2021       Recent Labs     05/11/21  1809 05/11/21 2016   POCGLU 188* 161*       Blood Sugar Average: Last 72 hrs:  (P) 174 5   · Hold home Tradjenta  · Last A1c 6 4 April 2021  · Continue SSI with fingerstick accuchekcs q6h while NPO

## 2021-05-12 NOTE — ASSESSMENT & PLAN NOTE
· Was on Coumadin for [facility] as outpatient    Will hold in the setting of SDH  · Continue home metoprolol for rate control  · If needed can start low dose metoprolol IV if additional rate control is needed  · Hold Ranexa given GFR < 15  · Does have history of SSS, now s/p PPM LCW 2014

## 2021-05-12 NOTE — ASSESSMENT & PLAN NOTE
Lab Results   Component Value Date    EGFR 10 05/11/2021    EGFR 10 04/22/2021    EGFR 10 02/20/2021    CREATININE 4 85 (H) 05/11/2021    CREATININE 4 76 (H) 04/22/2021    CREATININE 4 60 (H) 02/20/2021   · Monitor kidney function   · Avoid nephrotoxins

## 2021-05-12 NOTE — ASSESSMENT & PLAN NOTE
· SBP very elevated in the 200's in the ER  · Restart home meds   · Keep SBP between 140-160  · Outpatient follow-up with PCP

## 2021-05-12 NOTE — PHYSICAL THERAPY NOTE
PHYSICAL THERAPY EVALUATION NOTE    Patient Name: Chiquita Erickson  [facility]'S Date: 5/12/2021  AGE:   80 y o   Mrn:   [id number]  ADMIT DX:  Subdural hematoma ([facility] ) [S06 5X9A]  Weakness [R53 1]    Past Medical History:   Diagnosis Date    3-vessel coronary artery disease     last assessed: 08/15/2016    BPH (benign prostatic hyperplasia)     Chronic kidney disease     Diabetes mellitus ([facility] )     Hyperlipidemia     Hypertension     Shingles 04/29/2019    SOB (shortness of breath)     Stroke [facility])      Length Of Stay: 1  PHYSICAL THERAPY EVALUATION :    05/12/21 1338   PT Last Visit   PT Visit Date 05/12/21   Note Type   Note type Evaluation   Pain Assessment   Pain Assessment Tool Pain Assessment not indicated - pt denies pain   Home Living   Type of [address] [place] [address] Two level;1/2 bath on main level;Bed/bath upstairs; Other (Comment)  (2 JYOTSNA  stairglide to 2nd floor )   Additional Comments lives w/ spouse  family is supportive  ambulates w/ roller walker  spouse provides assistance as needed  Prior Function   Falls in the last 6 months 1 to 4   Comments pt seen supine in bed w/ spouse and family present  pt agreed to PT eval  denied pain or dizziness  Restrictions/Precautions   Other Precautions Chair Alarm; Bed Alarm;Multiple lines;Telemetry; Fall Risk   General   Additional Pertinent History 5/12/21 at 8:00, blood pressure was 146/101  Family/Caregiver Present Yes   Cognition   Arousal/Participation Alert   Orientation Level Oriented to person; Other (Comment)  (pt was identified w/ full name, birth date)   Following Commands Follows one step commands with increased time or repetition   RUE Assessment   RUE Assessment WFL   LUE Assessment   LUE Assessment WFL   RLE Assessment   RLE Assessment WFL  (4- to 4/5)   LLE Assessment   LLE Assessment WFL  (3 to 3+/5)   Light Touch   RLE Light Touch Grossly intact   LLE Light Touch Grossly intact   Bed Mobility   Supine to Sit 4  Minimal assistance   Additional items Assist x 1;HOB elevated; Bedrails; Increased time required;Verbal cues;LE management  (for trunk/LE positioning)   Transfers   Sit to Stand 4  Minimal assistance   Additional items Assist x 1; Increased time required   Stand to Sit 3  Moderate assistance   Additional items Assist x 1; Impulsive  (poorly controlled descent to chair)   Ambulation/Elevation   Gait pattern Decreased L stance; Foward flexed; Short stride; Excessively slow   Gait Assistance 4  Minimal assist   Additional items Assist x 1   Assistive Device Rolling walker   Distance 3 feet  (additional no tpossible due to fatigue)   Stair Management Assistance Not tested  (due to limited ambulation tolerance, safety awareness)   Balance   Static Sitting Fair +   Static Standing Poor +   Ambulatory Poor +  (w/ roller walker)   Activity Tolerance   Activity Tolerance Patient limited by fatigue   Nurse Made Aware spoke to Estelita Zabala OT   Assessment   Prognosis Fair   Problem List Decreased strength;Decreased endurance; Impaired balance;Decreased mobility; Decreased safety awareness   Assessment Pt was reportedly struggling to get into his house with obvious weakness  Dx: 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, 3mm right to left midline shift, CKD, a-fib, and HTN  order placed for PT eval and tx, w/ activity order of activity as tolerated  pt presents w/ comorbidities of CKD, DM, HTN, hyperlipidemia, CVA, cardiac pacemaker, CABG, and a-fib and personal factors of advanced age, living in 2 story house, mobilizing w/ assistive device, stair(s) to enter home and positive fall history  pt presents w/ weakness, decreased endurance, impaired balance, gait deviations, decreased safety awareness and fall risk   these impairments are evident in findings from physical examination (weakness), mobility assessment (need for min to mod assist w/ all phases of mobility when usually mobilizing independently, tolerance to only 3 feet of ambulation and need for cueing for mobility technique), and Barthel Index: 45/100  pt needed input for mobility technique  pt is at risk for falls due to physical and safety awareness deficits  pt's clinical presentation is unstable/unpredictable (evident in poor blood pressure control, need for assist w/ all phases of mobility when usually mobilizing independently, tolerance to only 3 feet of ambulation and need for input for mobility technique/safety)  pt needs inpatient PT tx to improve mobility deficits and progress mobility training as appropriate  discharge recommendation is for inpatient rehab to reduce fall risk and maximize level of functional independence  Goals   Patient Goals I want to walk into my house   STG Expiration Date 05/22/21   Short Term Goal #1 pt will: Increase bilateral LE strength 1/2 grade to facilitate independent mobility, Perform all bed mobility tasks modified independent to decrease fall risk factors, Perform all transfers modified independent to improve independence, Ambulate 150 ft  with roller walker w/ supervision w/o LOB to expedite safe return home, Navigate 2 stairs w/ minx1 with unilateral handrail to facilitate return to previous living environment, Increase ambulatory balance 1 grade to decrease risk for falls, Complete exercise program independently to increase strength and endurance, Tolerate 3 hr OOB to faciliate upright tolerance and Improve Barthel Index score to 70 or greater to facilitate independence   Plan   Treatment/Interventions Functional transfer training;LE strengthening/ROM; Elevations; Therapeutic exercise; Endurance training;Patient/family training;Equipment eval/education; Bed mobility;Gait training   PT Frequency 5x/wk   Recommendation   PT Discharge Recommendation Post acute rehabilitation services   Additional Comments recommend roller walker use w/ mobility   AM-PAC Basic Mobility Inpatient   Turning in Bed Without Bedrails 4   Lying on Back to Sitting on Edge of Flat Bed 3   Moving Bed to Chair 3   Standing Up From Chair 3   Walk in Room 3   Climb 3-5 Stairs 2   Basic Mobility Inpatient Raw Score 18   Basic Mobility Standardized Score 41 05   Barthel Index   Feeding 10   Bathing 0   Grooming Score 5   Dressing Score 5   Bladder Score 0   Bowels Score 10   Toilet Use Score 5   Transfers (Bed/Chair) Score 10   Mobility (Level Surface) Score 0   Stairs Score 0   Barthel Index Score 45     The patient's AM-PAC Basic Mobility Inpatient Short Form Raw Score is 18, Standardized Score is 41 05  A standardized score less than 42 9 suggests the patient may benefit from discharge to post-acute rehabilitation services  Please also refer to the recommendation of the Physical Therapist for safe discharge planning  Skilled PT recommended while in hospital and upon DC to progress pt toward treatment goals       Janny Oneal, PT

## 2021-05-12 NOTE — ASSESSMENT & PLAN NOTE
Neuro exam: stable  Continue neurologic checks: Every 4 hours  Reversal agent administered upon admission  CT scan of the head on 5/13/21 reviewed: stable   Appreciate Neurosurgery evaluation and recommendations  Complete 7 day course of Keppra for seizure prophylaxis  Chemical DVT prophylaxis: SQH  Hold all anticoagulants and anti platelet medications for 2 weeks and/or until cleared by Neurosurgery to resume  · PT and OT (including cognitive evaluation) evaluation and treatment as indicated

## 2021-05-12 NOTE — PHYSICAL THERAPY NOTE
Physical Therapy Cancellation Note      Referral received for PT eval and tx  Chart check performed  Pt is pending CT of cervical spine  Will await imaging completion and perform eval as appropriate  Notified April NSG of cancellation      Allegra Sharpe, PT

## 2021-05-12 NOTE — PHYSICAL THERAPY NOTE
PHYSICAL THERAPY TREATMENT NOTE    Patient Name: Miriam FRANCE Date: 5/12/2021 05/12/21 1348   PT Last Visit   PT Visit Date 05/12/21   Note Type   Note Type Treatment   Pain Assessment   Pain Assessment Tool Pain Assessment not indicated - pt denies pain   Restrictions/Precautions   Other Precautions Chair Alarm; Bed Alarm;Multiple lines;Telemetry; Fall Risk   General   Chart Reviewed Yes   Family/Caregiver Present Yes   Cognition   Arousal/Participation Cooperative   Attention Attends with cues to redirect   Orientation Level Oriented to person; Other (Comment)  (pt was identified w/ full name, birth date)   Following Commands Follows one step commands with increased time or repetition   Subjective   Subjective pt agreed to PT intervention  mobility education was provided regarding transfer technique and roller walker use   education was completed via teachback, demonstration and verbal instruction  Transfers   Sit to Stand 4  Minimal assistance   Additional items Assist x 1; Increased time required;Verbal cues  (for hand placement)   Stand to Sit 4  Minimal assistance   Additional items Assist x 1; Impulsive;Verbal cues  (for body positioning, hand placement)   Ambulation/Elevation   Gait pattern Decreased L stance; Foward flexed; Excessively slow   Gait Assistance 4  Minimal assist   Additional items Assist x 1;Verbal cues; Tactile cues  (for walker positioning, posture, full step length)   Assistive Device Rolling walker   Distance 25 feet  (additional not possible due to fatigue)   Stair Management Assistance Not tested   Balance   Static Sitting Fair +   Static Standing Poor +   Ambulatory Poor +  (w/ roller walker)   Activity Tolerance   Activity Tolerance Patient limited by fatigue   Nurse Made Aware spoke to Estelita Zabala OT   Assessment   Prognosis Fair   Problem List Decreased strength;Decreased endurance; Impaired balance;Decreased mobility; Decreased safety awareness   Assessment Therapist provided education to pt for mobility technique including transfers and roller walker management  Education was provided due to findings from evaluation  Frequent repetition was needed for carryover to be noted  Pt was found to have improvement after education w/ improved ambulation tolerance  Pt continues to be a fall risk  continued inpatient PT tx is indicated to reduce fall risk factors  Goals   Patient Goals I want to walk into my house   STG Expiration Date 05/22/21   Short Term Goal #1 pt will: Increase bilateral LE strength 1/2 grade to facilitate independent mobility, Perform all bed mobility tasks modified independent to decrease fall risk factors, Perform all transfers modified independent to improve independence, Ambulate 150 ft  with roller walker w/ supervision w/o LOB to expedite safe return home, Navigate 2 stairs w/ minx1 with unilateral handrail to facilitate return to previous living environment, Increase ambulatory balance 1 grade to decrease risk for falls, Complete exercise program independently to increase strength and endurance, Tolerate 3 hr OOB to faciliate upright tolerance and Improve Barthel Index score to 70 or greater to facilitate independence   PT Treatment Day 1   Plan   Treatment/Interventions Functional transfer training;LE strengthening/ROM; Elevations; Therapeutic exercise; Endurance training;Patient/family training;Equipment eval/education; Bed mobility;Gait training   Progress Progressing toward goals   PT Frequency 5x/wk   Recommendation   PT Discharge Recommendation Post acute rehabilitation services   Additional Comments recommend roller walker use w/ mobility   AM-PAC Basic Mobility Inpatient   Turning in Bed Without Bedrails 4   Lying on Back to Sitting on Edge of Flat Bed 3   Moving Bed to Chair 3   Standing Up From Chair 3   Walk in Room 3   Climb 3-5 Stairs 2   Basic Mobility Inpatient Raw Score 18   Basic Mobility Standardized Score 41 05     Skilled inpatient PT recommended while in hospital to progress pt toward treatment goals      Cholo Remy, PT

## 2021-05-12 NOTE — H&P
[facility]  H&P- Miguel Rosen 1/29/1932, 80 y o  male MRN: [id number]  Unit/Bed#: ED 28 Encounter: [id number]  Primary Care Provider: Valeria Tellez MD   Date and time admitted to hospital: 5/11/2021  6:06 PM    Subdural hematoma [facility])  Assessment & Plan  · Appears acute on chronic by CT scan with possible fall with headstrike 3 weeks ago  · No recent trauma or falls in the past week  · Neurosurgery input appreciated  · Admit to ICU, q1h neuro checks  · Kcentra given - monitor INR   · Repeat ct scan stat for changes in GCS/Neuro exam    Anticoagulated on warfarin  Assessment & Plan  · On coumadin due to atrial fibrillation   · Reversal with Kcentra given  · Monitor INR with recheck at midnight, additional reversal as needed  · Hold ac/ap secondary to intracranial hemorrhage    CKD (chronic kidney disease) stage 5, GFR less than 15 ml/min [facility])  Assessment & Plan  Lab Results   Component Value Date    EGFR 10 05/11/2021    EGFR 10 04/22/2021    EGFR 10 02/20/2021    CREATININE 4 85 (H) 05/11/2021    CREATININE 4 76 (H) 04/22/2021    CREATININE 4 60 (H) 02/20/2021   · Monitor kidney function   · Avoid nephrotoxins    Paroxysmal A-fib ([address] )  Assessment & Plan  · On coumadin   · Currently on hold due to intracranial hemorrhage  · Continue home lopressor   · Hold Ranexa with GFR<15     Type 2 diabetes mellitus, with long-term current use of insulin (HCC)  Assessment & Plan    Lab Results   Component Value Date    HGBA1C 6 4 04/22/2021   · Well controlled with home regimen  · SSI with q6 glucose checks  · Hold oral meds    Essential hypertension  Assessment & Plan  · SBP very elevated in the 200's in the ER  · Restart home meds   · Keep SBP between 140-160  · Labetalol 10mg given with minimal improvement in sbp to the 180's repeat dose just given will monitor bp and trial hydralazine if needed for better control    Benign prostatic hyperplasia with urinary hesitancy  Assessment & Plan  · Continue home meds  · Urinary retention protocol     Assessment/Plan   Trauma Alert: Evaluation  Model of Arrival: Ambulance  Trauma Team: Attending Katelynn Mott and PATRICIO David  Consultants: Neurosurgery: Serenity Woodall  Returned call: Yes [phone]    Chief Complaint: Weakness    History of Present Illness   HPI:  Willy Victor is a 80 y o  male with past medical history of CKD 5, IDDM type 2, hypertension, BPH, AFib on Coumadin who presents to [address] [place] [address] in [employer] [facility] with weakness  He was reportedly struggling to get into his house with obvious weakness to a passing by off duty state  when the officer offered to help the patient get into the house  While assisting the patient the office or noted that the patient had a hard time moving his legs with increasing weakness until the patient gets legs gave out while the officer was holding him up  It is unclear if the patient struck his head on a wall at that time  Patient had obvious left-sided weakness to the officer who then insisted the patient come to the ER for evaluation  Patient's wife reports he may have fallen 3 weeks ago however she does not remember clearly  He denies other traumatic injuries in the last several weeks  He denies headaches, focal weakness, changes to speech, mental status changes  He denies changes in vision, nausea, vomiting, abdominal pain, chest pain, shortness of breath, or extremity pain  Mechanism:Other:  Unknown    Review of Systems   Constitutional: Negative for activity change, appetite change, diaphoresis, fatigue and fever  HENT: Negative for congestion, ear pain, facial swelling, nosebleeds, postnasal drip, rhinorrhea, sinus pressure, sinus pain and sore throat  Eyes: Negative for photophobia, pain and redness  Respiratory: Negative for cough, chest tightness, shortness of breath and wheezing  Cardiovascular: Negative for chest pain, palpitations and leg swelling     Gastrointestinal: Negative for abdominal pain, blood in stool, constipation, diarrhea, nausea and vomiting  Genitourinary: Positive for frequency (Chronic)  Negative for flank pain, hematuria and urgency  Musculoskeletal: Negative for arthralgias, back pain, neck pain and neck stiffness  Skin: Negative for wound  Neurological: Positive for weakness (Generalized)  Negative for dizziness, seizures, light-headedness, numbness and headaches  12-point, complete review of systems was reviewed and negative except as stated above         Historical Information   Past Medical History:   Diagnosis Date    3-vessel coronary artery disease     last assessed: 08/15/2016    BPH (benign prostatic hyperplasia)     Chronic kidney disease     Diabetes mellitus ([facility] )     Hyperlipidemia     Hypertension     Shingles 04/29/2019    SOB (shortness of breath)     Stroke [facility])      Past Surgical History:   Procedure Laterality Date    CARDIAC PACEMAKER PLACEMENT      CATARACT EXTRACTION      last assessed: 11/11/2015    CORONARY ARTERY BYPASS GRAFT      last assessed: 08/26/2015     Social History   Social History     Substance and Sexual Activity   Alcohol Use Not Currently    Frequency: Never     Social History     Substance and Sexual Activity   Drug Use No     Social History     Tobacco Use   Smoking Status Never Smoker   Smokeless Tobacco Never Used     E-Cigarette/Vaping     E-Cigarette/Vaping Substances     Immunization History   Administered Date(s) Administered    INFLUENZA 10/31/2013, 01/01/2014, 10/06/2015, 10/06/2015, 10/23/2018    Influenza, high dose seasonal 0 7 mL 10/29/2019, 10/15/2020    Influenza, seasonal, injectable 01/01/2014, 10/01/2015    Pneumococcal Conjugate 13-Valent 02/17/2015, 10/20/2015    Pneumococcal Polysaccharide PPV23 01/29/1932, 01/11/2021    SARS-CoV-2 / COVID-19 mRNA IM (Pfizer-BioNTech) 01/15/2021, 02/04/2021    Tdap 01/29/1932, 11/27/2019    Zoster 01/29/1932    Zoster Vaccine Recombinant 12/08/2020     Last Tetanus:  2019  Family History: Non-contributory      Meds/Allergies   all current active meds have been reviewed    No Known Allergies      PHYSICAL EXAM    Objective   Vitals:   First set: Temperature: 98 2 °F (36 8 °C) (05/11/21 1817)  Pulse: 77 (05/11/21 1817)  Respirations: 18 (05/11/21 1817)  Blood Pressure: 162/75 (05/11/21 1817)    Primary Survey:   (A) Airway:  Intact  (B) Breathing:  Equal bilaterally  (C) Circulation: Pulses:   pedal  3/4, radial  3/4 and femoral  3/4  (D) Disabliity:  Eye Opening:   Spontaneous = 4, Motor Response: Obeys commands = 6 and Verbal Response:  Oriented = 5  (E) Expose:  Completed    Secondary Survey: (Click on Physical Exam tab above)  Physical Exam  Vitals signs and nursing note reviewed  Constitutional:       General: He is not in acute distress  Appearance: Normal appearance  He is not ill-appearing or toxic-appearing  HENT:      Head: Normocephalic and atraumatic  Right Ear: Tympanic membrane normal       Left Ear: Tympanic membrane normal       Nose: Nose normal  No congestion or rhinorrhea  Mouth/Throat:      Mouth: Mucous membranes are moist       Pharynx: Oropharynx is clear  No oropharyngeal exudate  Eyes:      Extraocular Movements: Extraocular movements intact  Pupils: Pupils are equal, round, and reactive to light  Neck:      Musculoskeletal: Normal range of motion  No neck rigidity or muscular tenderness  Cardiovascular:      Rate and Rhythm: Normal rate and regular rhythm  Heart sounds: No murmur  No friction rub  No gallop  Pulmonary:      Effort: Pulmonary effort is normal       Breath sounds: No wheezing, rhonchi or rales  Abdominal:      General: Abdomen is flat  There is no distension  Palpations: Abdomen is soft  Tenderness: There is no abdominal tenderness  There is no guarding or rebound  Musculoskeletal: Normal range of motion           General: No tenderness, deformity or signs of injury  Skin:     General: Skin is warm and dry  Capillary Refill: Capillary refill takes less than 2 seconds  Findings: Bruising (Bilateral upper extremities) present  Neurological:      General: No focal deficit present  Mental Status: He is alert and oriented to person, place, and time  GCS: GCS eye subscore is 4  GCS verbal subscore is 5  GCS motor subscore is 6  Cranial Nerves: Facial asymmetry (Left-sided droop-chronic) present  No dysarthria  Motor: Motor function is intact  No weakness or atrophy           Invasive Devices     Peripheral Intravenous Line            Peripheral IV 05/11/21 Left Antecubital less than 1 day    Peripheral IV 05/11/21 Left Arm less than 1 day                Lab Results:   BMP/CMP:   Lab Results   Component Value Date    SODIUM 137 05/11/2021    K 4 0 05/11/2021     05/11/2021    CO2 28 05/11/2021    BUN 52 (H) 05/11/2021    CREATININE 4 85 (H) 05/11/2021    CALCIUM 8 8 05/11/2021     (H) 05/11/2021     (H) 05/11/2021    ALKPHOS 305 (H) 05/11/2021    EGFR 10 05/11/2021   , CBC:   Lab Results   Component Value Date    WBC 8 89 05/11/2021    HGB 9 8 (L) 05/11/2021    HCT 31 5 (L) 05/11/2021     (H) 05/11/2021     05/11/2021    MCH 31 2 05/11/2021    MCHC 31 1 (L) 05/11/2021    RDW 13 9 05/11/2021    MPV 9 6 05/11/2021    NRBC 0 05/11/2021    and ISTAT: No components found for: VBG  Imaging/EKG Studies: CT Scan Head: Acute on chronic subdural hematoma  Other Studies:  None    Code Status: Level 3 - DNAR and DNI

## 2021-05-13 ENCOUNTER — APPOINTMENT (INPATIENT)
Dept: CT IMAGING | Facility: HOSPITAL | Age: 86
DRG: 086 | End: 2021-05-13
Payer: MEDICARE

## 2021-05-13 LAB
ALBUMIN SERPL BCP-MCNC: 2.7 G/DL (ref 3.5–5)
ALP SERPL-CCNC: 239 U/L (ref 46–116)
ALT SERPL W P-5'-P-CCNC: 164 U/L (ref 12–78)
ANION GAP SERPL CALCULATED.3IONS-SCNC: 10 MMOL/L (ref 4–13)
AST SERPL W P-5'-P-CCNC: 54 U/L (ref 5–45)
BASOPHILS # BLD AUTO: 0.02 THOUSANDS/ΜL (ref 0–0.1)
BASOPHILS NFR BLD AUTO: 0 % (ref 0–1)
BILIRUB SERPL-MCNC: 0.91 MG/DL (ref 0.2–1)
BUN SERPL-MCNC: 50 MG/DL (ref 5–25)
CALCIUM ALBUM COR SERPL-MCNC: 9.2 MG/DL (ref 8.3–10.1)
CALCIUM SERPL-MCNC: 8.2 MG/DL (ref 8.3–10.1)
CHLORIDE SERPL-SCNC: 100 MMOL/L (ref 100–108)
CO2 SERPL-SCNC: 25 MMOL/L (ref 21–32)
CREAT SERPL-MCNC: 4.52 MG/DL (ref 0.6–1.3)
EOSINOPHIL # BLD AUTO: 0.19 THOUSAND/ΜL (ref 0–0.61)
EOSINOPHIL NFR BLD AUTO: 2 % (ref 0–6)
ERYTHROCYTE [DISTWIDTH] IN BLOOD BY AUTOMATED COUNT: 13.9 % (ref 11.6–15.1)
GFR SERPL CREATININE-BSD FRML MDRD: 11 ML/MIN/1.73SQ M
GLUCOSE SERPL-MCNC: 128 MG/DL (ref 65–140)
GLUCOSE SERPL-MCNC: 180 MG/DL (ref 65–140)
GLUCOSE SERPL-MCNC: 195 MG/DL (ref 65–140)
GLUCOSE SERPL-MCNC: 199 MG/DL (ref 65–140)
GLUCOSE SERPL-MCNC: 200 MG/DL (ref 65–140)
HCT VFR BLD AUTO: 28.8 % (ref 36.5–49.3)
HGB BLD-MCNC: 9.1 G/DL (ref 12–17)
IMM GRANULOCYTES # BLD AUTO: 0.05 THOUSAND/UL (ref 0–0.2)
IMM GRANULOCYTES NFR BLD AUTO: 1 % (ref 0–2)
LYMPHOCYTES # BLD AUTO: 1.21 THOUSANDS/ΜL (ref 0.6–4.47)
LYMPHOCYTES NFR BLD AUTO: 15 % (ref 14–44)
MAGNESIUM SERPL-MCNC: 1.9 MG/DL (ref 1.6–2.6)
MCH RBC QN AUTO: 31.4 PG (ref 26.8–34.3)
MCHC RBC AUTO-ENTMCNC: 31.6 G/DL (ref 31.4–37.4)
MCV RBC AUTO: 99 FL (ref 82–98)
MONOCYTES # BLD AUTO: 0.71 THOUSAND/ΜL (ref 0.17–1.22)
MONOCYTES NFR BLD AUTO: 9 % (ref 4–12)
NEUTROPHILS # BLD AUTO: 5.73 THOUSANDS/ΜL (ref 1.85–7.62)
NEUTS SEG NFR BLD AUTO: 73 % (ref 43–75)
NRBC BLD AUTO-RTO: 0 /100 WBCS
PLATELET # BLD AUTO: 170 THOUSANDS/UL (ref 149–390)
PMV BLD AUTO: 10.1 FL (ref 8.9–12.7)
POTASSIUM SERPL-SCNC: 4.4 MMOL/L (ref 3.5–5.3)
PROT SERPL-MCNC: 6.5 G/DL (ref 6.4–8.2)
RBC # BLD AUTO: 2.9 MILLION/UL (ref 3.88–5.62)
SODIUM SERPL-SCNC: 135 MMOL/L (ref 136–145)
WBC # BLD AUTO: 7.91 THOUSAND/UL (ref 4.31–10.16)

## 2021-05-13 PROCEDURE — 97116 GAIT TRAINING THERAPY: CPT

## 2021-05-13 PROCEDURE — 70450 CT HEAD/BRAIN W/O DYE: CPT

## 2021-05-13 PROCEDURE — 99232 SBSQ HOSP IP/OBS MODERATE 35: CPT | Performed by: SURGERY

## 2021-05-13 PROCEDURE — 82948 REAGENT STRIP/BLOOD GLUCOSE: CPT

## 2021-05-13 PROCEDURE — 99232 SBSQ HOSP IP/OBS MODERATE 35: CPT | Performed by: INTERNAL MEDICINE

## 2021-05-13 PROCEDURE — 97110 THERAPEUTIC EXERCISES: CPT

## 2021-05-13 PROCEDURE — 83735 ASSAY OF MAGNESIUM: CPT | Performed by: NURSE PRACTITIONER

## 2021-05-13 PROCEDURE — G1004 CDSM NDSC: HCPCS

## 2021-05-13 PROCEDURE — 85025 COMPLETE CBC W/AUTO DIFF WBC: CPT | Performed by: NURSE PRACTITIONER

## 2021-05-13 PROCEDURE — 80053 COMPREHEN METABOLIC PANEL: CPT | Performed by: NURSE PRACTITIONER

## 2021-05-13 RX ORDER — HEPARIN SODIUM 5000 [USP'U]/ML
5000 INJECTION, SOLUTION INTRAVENOUS; SUBCUTANEOUS EVERY 8 HOURS SCHEDULED
Status: DISCONTINUED | OUTPATIENT
Start: 2021-05-13 | End: 2021-05-15 | Stop reason: HOSPADM

## 2021-05-13 RX ADMIN — TORSEMIDE 20 MG: 20 TABLET ORAL at 08:22

## 2021-05-13 RX ADMIN — METOPROLOL TARTRATE 25 MG: 25 TABLET, FILM COATED ORAL at 08:23

## 2021-05-13 RX ADMIN — INSULIN LISPRO 2 UNITS: 100 INJECTION, SOLUTION INTRAVENOUS; SUBCUTANEOUS at 11:11

## 2021-05-13 RX ADMIN — DOCUSATE SODIUM 100 MG: 100 CAPSULE, LIQUID FILLED ORAL at 08:22

## 2021-05-13 RX ADMIN — LEVETIRACETAM 500 MG: 100 INJECTION, SOLUTION INTRAVENOUS at 01:28

## 2021-05-13 RX ADMIN — METOPROLOL TARTRATE 25 MG: 25 TABLET, FILM COATED ORAL at 17:08

## 2021-05-13 RX ADMIN — LEVOTHYROXINE SODIUM 75 MCG: 75 TABLET ORAL at 05:03

## 2021-05-13 RX ADMIN — INSULIN LISPRO 2 UNITS: 100 INJECTION, SOLUTION INTRAVENOUS; SUBCUTANEOUS at 17:00

## 2021-05-13 RX ADMIN — TAMSULOSIN HYDROCHLORIDE 0.4 MG: 0.4 CAPSULE ORAL at 21:33

## 2021-05-13 RX ADMIN — INSULIN LISPRO 1 UNITS: 100 INJECTION, SOLUTION INTRAVENOUS; SUBCUTANEOUS at 08:23

## 2021-05-13 RX ADMIN — FINASTERIDE 5 MG: 5 TABLET, FILM COATED ORAL at 08:22

## 2021-05-13 RX ADMIN — HEPARIN SODIUM 5000 UNITS: 5000 INJECTION INTRAVENOUS; SUBCUTANEOUS at 21:33

## 2021-05-13 NOTE — PLAN OF CARE
Problem: Prexisting or High Potential for Compromised Skin Integrity  Goal: Skin integrity is maintained or improved  Description: INTERVENTIONS:  - Identify patients at risk for skin breakdown  - Assess and monitor skin integrity  - Assess and monitor nutrition and hydration status  - Monitor labs   - Assess for incontinence   - Turn and reposition patient  - Assist with mobility/ambulation  - Relieve pressure over bony prominences  - Avoid friction and shearing  - Provide appropriate hygiene as needed including keeping skin clean and dry  - Evaluate need for skin moisturizer/barrier cream  - Collaborate with interdisciplinary team   - Patient/family teaching  - Consider wound care consult   Outcome: Progressing     Problem: Potential for Falls  Goal: Patient will remain free of falls  Description: INTERVENTIONS:  - Assess patient frequently for physical needs  -  Identify cognitive and physical deficits and behaviors that affect risk of falls  -  [place] fall precautions as indicated by assessment   - Educate patient/family on patient safety including physical limitations  - Instruct patient to call for assistance with activity based on assessment  - Modify environment to reduce risk of injury  - Consider OT/PT consult to assist with strengthening/mobility  Outcome: Progressing     Problem: Nutrition/Hydration-ADULT  Goal: Nutrient/Hydration intake appropriate for improving, restoring or maintaining nutritional needs  Description: Monitor and assess patient's nutrition/hydration status for malnutrition  Collaborate with interdisciplinary team and initiate plan and interventions as ordered  Monitor patient's weight and dietary intake as ordered or per policy  Utilize nutrition screening tool and intervene as necessary  Determine patient's food preferences and provide high-protein, high-caloric foods as appropriate       INTERVENTIONS:  - Monitor oral intake, urinary output, labs, and treatment plans  - Assess nutrition and hydration status and recommend course of action  - Evaluate amount of meals eaten  - Assist patient with eating if necessary   - Allow adequate time for meals  - Recommend/ encourage appropriate diets, oral nutritional supplements, and vitamin/mineral supplements  - Order, calculate, and assess calorie counts as needed  - Recommend, monitor, and adjust tube feedings and TPN/PPN based on assessed needs  - Assess need for intravenous fluids  - Provide specific nutrition/hydration education as appropriate  - Include patient/family/caregiver in decisions related to nutrition  Outcome: Progressing

## 2021-05-13 NOTE — PROGRESS NOTES
Progress Note - Geriatric Medicine   [facility] 80 y o  male MRN: [id number]  Unit/Bed#: ICU 09 Encounter: [id number]      Assessment/Plan:  1  Stable Subdural Hematoma:  Recent CT today 13 May 2021 observed stable right hemispheric mixed density SDH  Neurology recommends that he can start Heparin and can be transferred to the floor tomorrow  Follow-up with cardiology and neurology for future anticoagulation  2   End Stage Renal Failure:  CKD Stage 5 is stable and does not require dialysis at this time  3   Atrial Fibrillation with Controlled Ventricular Response: Well controlled at this time, follow-up with cardiology  Recommendations:  1  Social workers will meet with family to discuss need for additional support for when patient returns home  Subjective:   24-hour follow up; Mr Paolo Coffey states he is feeling extremely well and improving  Head CT was performed again today and Stable Right hemispheric mixed density SDH was observed  Patient can start Heparin tomorrow per neurology and can be transferred to the floor  Extremely pleasant patient and family  Family members were requesting information on "At-home care" and for "help in deciding what to do about anticoagulation in the future since he failed Eliquis therapy (I e  had a previous stroke while on Eliquis)  "      Patient has not had a bowel movement since admission and has been urinating with no difficulty  Denies headaches, dizziness, vision changes, loss of consciousness, or syncopal episodes  Further denies any fever, chills, or night sweats  Review of Systems   Constitutional: Negative for chills, diaphoresis, fatigue and fever  HENT: Negative for drooling, ear pain, facial swelling, hearing loss, nosebleeds, sinus pressure, sinus pain, sore throat, tinnitus and trouble swallowing  Eyes: Negative for pain and visual disturbance  Respiratory: Negative for cough and shortness of breath      Cardiovascular: Negative for chest pain and palpitations  Gastrointestinal: Positive for constipation (Typically has 1 bowel movement every two or 3 days  Has not gone since admission  )  Negative for abdominal pain, diarrhea, nausea and vomiting  Genitourinary: Positive for frequency ("Peeing up a storm" per patient)  Negative for dysuria and hematuria  Musculoskeletal: Negative for arthralgias, back pain, neck pain and neck stiffness  Skin: Negative for color change and rash  Neurological: Positive for facial asymmetry (L sided facial droop from previous stroke)  Negative for dizziness, tremors, seizures, syncope, speech difficulty, weakness, light-headedness, numbness and headaches  Psychiatric/Behavioral: Negative  All other systems reviewed and are negative  Objective:     Vitals: Blood pressure 140/63, pulse 90, temperature 98 2 °F (36 8 °C), temperature source Oral, resp  rate 17, height 5' 6" (1 676 m), weight 77 9 kg (171 lb 11 8 oz), SpO2 94 %  ,Body mass index is 27 72 kg/m²  Intake/Output Summary (Last 24 hours) at 5/13/2021 1354  Last data filed at 5/13/2021 1200  Gross per 24 hour   Intake 1450 ml   Output 680 ml   Net 770 ml     Current Medications: Reviewed    Physical Exam:   Physical Exam  Vitals signs and nursing note reviewed  Constitutional:       Appearance: He is well-developed  HENT:      Head: Normocephalic and atraumatic  Eyes:      Conjunctiva/sclera: Conjunctivae normal    Neck:      Musculoskeletal: Neck supple  Cardiovascular:      Rate and Rhythm: Normal rate and regular rhythm  Pulses: Normal pulses  Heart sounds: Murmur (mitral regurgitant murmur 2/6) present  No friction rub  No gallop  Pulmonary:      Effort: Pulmonary effort is normal  No respiratory distress  Breath sounds: Normal breath sounds  No wheezing, rhonchi or rales  Abdominal:      Palpations: Abdomen is soft  Tenderness: There is no abdominal tenderness     Skin:     General: Skin is warm and dry  Neurological:      Mental Status: He is alert  Invasive Devices     Peripheral Intravenous Line            Peripheral IV 05/11/21 Left Arm 1 day    Peripheral IV 05/12/21 Left Antecubital 1 day          Drain            External Urinary Catheter Small 1 day                Lab, Imaging and other studies: I have personally reviewed pertinent reports         Transcribed by Linda PANIAGUA for Dr Scarlett Sommer

## 2021-05-13 NOTE — ASSESSMENT & PLAN NOTE
· On Coumadin for AFib  · INR on presentation 3 87, received PCC< [facility], Vitamin K with normalization   · Will hold off on further AC/AP given SDH  · Monitor telemetry

## 2021-05-13 NOTE — PROGRESS NOTES
[facility]  Progress Note Benjamin Amado 1/29/1932, 80 y o  male MRN: [id number]  Unit/Bed#: ICU 09 Encounter: [id number]  Primary Care Provider: Natalie Bowens MD   Date and time admitted to hospital: 5/11/2021  6:06 PM    * Subdural hematoma [facility])  Assessment & Plan  · Appears acute on chronic on CT head  · Admits to new onset Left sided upper and lower extremity weakness, that began approx 5 pm 5/11  · Patient and wife does not recollect recent falls or head strikes  · Trauma evaluation done in ER  · Neurosurgery consulted, no urgent surgical intervention at this time, continue Keppra for seizure ppx  · Goal -160  · STAT CT Head if greater than 2 GCS change in 1 hour time frame   · Hold further AC/AP  · Received [address], [facility], Vitamin K  · INR normalized   · PT/OT    Anticoagulated on warfarin  Assessment & Plan  · On Coumadin for AFib  · INR on presentation 3 87, received PCC< [facility], Vitamin K with normalization   · Will hold off on further AC/AP given SDH  · Monitor telemetry    Paroxysmal A-fib ([address] )  Assessment & Plan  · Was on Coumadin for [facility] as outpatient  Will hold in the setting of SDH  · Continue home metoprolol for rate control  · Hold Ranexa given GFR < 15  · Does have history of SSS, now s/p PPM LCW 2014    Essential hypertension  Assessment & Plan  · Continue home metoprolol and torsemide  · Consider PRN hydralazine if unable to maintain SBP<160    CKD (chronic kidney disease) stage 5, GFR less than 15 ml/min [facility])  Assessment & Plan  Lab Results   Component Value Date    EGFR 10 05/12/2021    EGFR 10 05/11/2021    EGFR 10 04/22/2021    CREATININE 4 78 (H) 05/12/2021    CREATININE 4 85 (H) 05/11/2021    CREATININE 4 76 (H) 04/22/2021   · Follows with Dr He Mandujano    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  · Baseline Creatinine 3 05-4 76  · Monitor renal indices, UO, electrolytes  · Continue home toresmide    Type 2 diabetes mellitus, with long-term current use of insulin [facility])  Assessment & Plan  Lab Results   Component Value Date    HGBA1C 6 4 04/22/2021       Recent Labs     05/12/21  0642 05/12/21  1120 05/12/21  1619 05/12/21  2104   POCGLU 177* 182* 233* 192*       Blood Sugar Average: Last 72 hrs:  (P) 186 [id number]   · Hold home Tradjenta  · Last A1c 6 4 April 2021  · Continue ACHS glucose checks and SSI     Benign prostatic hyperplasia with urinary hesitancy  Assessment & Plan  · Continue home Flomax and Proscar  · Bladder scan q shift  · Urinary retention protocol    Hyperlipidemia  Assessment & Plan  · Hold home atorvastatin in the setting of elevated LFTs, consider restarting once downtrending       Hypothyroidism  Assessment & Plan  · Continue home levothyroxine    Elevated LFTs  Assessment & Plan  · Unclear etiology  · Abdominal exam benign  · RUQ US in ER: cholelithiasis without evidence of acute cholecystitis  · Hold statin  · Trend daily       ----------------------------------------------------------------------------------------  HPI/24hr events: Repeat head Ct showed slight increase in subdural hematoma  Stable neuro exam  No acute events  Disposition: Continue Stepdown Level 1 level of care   Code Status: Level 3 - DNAR and DNI  ---------------------------------------------------------------------------------------  SUBJECTIVE  "I feel great"    Review of Systems   Constitutional: Negative  HENT: Negative  Eyes: Negative  Respiratory: Negative  Cardiovascular: Negative  Gastrointestinal: Negative  Endocrine: Negative  Genitourinary: Negative  Musculoskeletal: Negative  Skin: Negative  Allergic/Immunologic: Negative  Neurological: Negative  Hematological: Negative  Psychiatric/Behavioral: Negative        Review of systems was reviewed and negative unless stated above in HPI/24-hour events   ---------------------------------------------------------------------------------------  OBJECTIVE    Vitals Vitals:    21 2037 21 2250 21 2324 21 0045   BP: 102/59  (!) 197/74 142/64   BP Location:       Pulse:  71     Resp:  (!) 26     Temp:  98 °F (36 7 °C)     TempSrc:  Oral     SpO2:  94%     Weight:       Height:         Temp (24hrs), Av 1 °F (36 7 °C), Min:97 3 °F (36 3 °C), Max:98 7 °F (37 1 °C)  Current: Temperature: 98 °F (36 7 °C)          Respiratory:  SpO2: SpO2: 94 %, SpO2 Device: O2 Device: None (Room air)       Invasive/non-invasive ventilation settings   Respiratory    Lab Data (Last 4 hours)    None         O2/Vent Data (Last 4 hours)    None                Physical Exam  Constitutional:       General: He is not in acute distress  Appearance: Normal appearance  He is not ill-appearing or toxic-appearing  HENT:      Head: Normocephalic and atraumatic  Mouth/Throat:      Mouth: Mucous membranes are moist    Eyes:      Pupils: Pupils are equal, round, and reactive to light  Neck:      Musculoskeletal: Neck supple  Cardiovascular:      Rate and Rhythm: Normal rate  Rhythm irregular  Pulses: Normal pulses  Heart sounds: Normal heart sounds  No murmur  No friction rub  No gallop  Pulmonary:      Effort: Pulmonary effort is normal  No respiratory distress  Breath sounds: Normal breath sounds  No wheezing, rhonchi or rales  Abdominal:      General: Bowel sounds are normal  There is no distension  Palpations: Abdomen is soft  Tenderness: There is no abdominal tenderness  Musculoskeletal: Normal range of motion  General: No swelling  Skin:     General: Skin is warm and dry  Capillary Refill: Capillary refill takes less than 2 seconds  Neurological:      Mental Status: He is alert  Comments: A&Ox3, L facial droop, no tongue deviation  Equal 5/5 strength in all extremities  No pronator drift            Laboratory and Diagnostics:  Results from last 7 days   Lab Units 21  0459 21  1924   WBC Thousand/uL 6 79 8  89   HEMOGLOBIN g/dL 8 5* 9 8*   HEMATOCRIT % 26 5* 31 5*   PLATELETS Thousands/uL 156 167   NEUTROS PCT % 68 74   MONOS PCT % 10 8     Results from last 7 days   Lab Units 05/12/21  0459 05/11/21 1924   SODIUM mmol/L 139 137   POTASSIUM mmol/L 3 9 4 0   CHLORIDE mmol/L 102 100   CO2 mmol/L 27 28   ANION GAP mmol/L 10 9   BUN mg/dL 51* 52*   CREATININE mg/dL 4 78* 4 85*   CALCIUM mg/dL 8 0* 8 8   GLUCOSE RANDOM mg/dL 179* 168*   ALT U/L  --  325*   AST U/L  --  144*   ALK PHOS U/L  --  305*   ALBUMIN g/dL  --  3 2*   TOTAL BILIRUBIN mg/dL  --  0 91          Results from last 7 days   Lab Units 05/12/21 0459 05/12/21  0001 05/11/21 1924 05/06/21  1300   INR  1 12 1 27* 3 87* 3 65*   PTT seconds  --   --  83*  --       Results from last 7 days   Lab Units 05/11/21 1924   TROPONIN I ng/mL 0 04         ABG:    VBG:          Micro        EKG: Afib rate 80  Imaging: I have personally reviewed pertinent reports  and I have personally reviewed pertinent films in PACS    Intake and Output  I/O       05/11 0701 - 05/12 0700 05/12 0701 - 05/13 0700    P  O   1080    I V  (mL/kg) 60 (0 8)     IV Piggyback 370     Total Intake(mL/kg) 430 (5 6) 1080 (14)    Urine (mL/kg/hr) 600 370 (0 2)    Total Output 600 370    Net -170 +710          Unmeasured Urine Occurrence 1 x           Height and Weights   Height: 5' 6" (167 6 cm)  IBW (Ideal Body Weight): 63 8 kg  Body mass index is 27 51 kg/m²  Weight (last 2 days)     Date/Time   Weight    05/11/21 2300   77 3 (170 42)                Nutrition       Diet Orders   (From admission, onward)             Start     Ordered    05/12/21 0944  Diet Kavin/CHO Controlled; Consistent Carbohydrate Diet Level 3 (6 carb servings/90 grams CHO/meal)  Diet effective now     Question Answer Comment   Diet Type Kavin/CHO Controlled    Kavin/CHO Controlled Consistent Carbohydrate Diet Level 3 (6 carb servings/90 grams CHO/meal)    RD to adjust diet per protocol?  Yes        05/12/21 0943    05/11/21 4185 Room Service  Once     Question:  Type of Service  Answer:  Room Service - Appropriate with Assistance    05/11/21 9782                  Active Medications  Scheduled Meds:  Current Facility-Administered Medications   Medication Dose Route Frequency Provider Last Rate    acetaminophen  975 mg Oral [facility] Velez Band, [place]      docusate sodium  100 mg Oral BID Velez Band, PA-C      finasteride  5 mg Oral Daily Velez Band, PA-C      hydrALAZINE  10 mg Intravenous Q6H PRN MAURICE Velasco      HYDROmorphone  0 2 mg Intravenous Q4H PRN Velez Band, PA-C      insulin lispro  1-6 Units Subcutaneous TID AC Norma Larose DO      levETIRAcetam  500 mg Intravenous Q24H Velez Band, PA-C Stopped (05/13/21 0145)    levothyroxine  75 mcg Oral Early Morning Velez Band, PA-C      metoprolol tartrate  25 mg Oral BID With Meals Velez Band, PA-C      naloxone  0 04 mg Intravenous Q1MIN PRN Velez Band, PA-C      ondansetron  4 mg Intravenous Q4H PRN Velez Band, PA-C      oxyCODONE  2 5 mg Oral Q4H PRN Velez Band, PA-C      oxyCODONE  5 mg Oral Q4H PRN Velez Band, PA-C      polyethylene glycol  17 g Oral Daily PRN Velez Band, PA-C      tamsulosin  0 4 mg Oral HS Velez Band, PA-C      torsemide  20 mg Oral Daily Velez Band, PA-C       Continuous Infusions:     PRN Meds:   hydrALAZINE, 10 mg, Q6H PRN  HYDROmorphone, 0 2 mg, Q4H PRN  naloxone, 0 04 mg, Q1MIN PRN  ondansetron, 4 mg, Q4H PRN  oxyCODONE, 2 5 mg, Q4H PRN  oxyCODONE, 5 mg, Q4H PRN  polyethylene glycol, 17 g, Daily PRN        Invasive Devices Review  Invasive Devices     Peripheral Intravenous Line            Peripheral IV 05/11/21 Left Arm 1 day    Peripheral IV 05/12/21 Left Antecubital less than 1 day          Drain            External Urinary Catheter Small 1 day ---------------------------------------------------------------------------------------  Advance Directive and Living Will: Yes    Power of :    POLST:    ---------------------------------------------------------------------------------------  Care Time Delivered:   Upon my evaluation, this patient had a high probability of imminent or life-threatening deterioration due to SDH, which required my direct attention, intervention, and personal management  I have personally provided 32 minutes (0430 to 0502) of critical care time, exclusive of procedures, teaching, family meetings, and any prior time recorded by providers other than myself  MAURICE Desai      Portions of the record may have been created with voice recognition software  Occasional wrong word or "sound a like" substitutions may have occurred due to the inherent limitations of voice recognition software    Read the chart carefully and recognize, using context, where substitutions have occurred

## 2021-05-13 NOTE — QUICK NOTE
Patient's repeat CT head wo 5/13/2021 demonstrates  Stable Right hemispheric mixed density SDH and slightly redistributed right parietal acute hemorrhage seen on previous CT head done on 5/12/2021  Patient can be TF to the floor  Patient can start Heparin SQ  Consider Signing off tomorrow, provided he neurologically stable today  Future plan would be:  F/U at OP NSx clinic with head CT, No AC/AP  Patient could be a candidate for MMA embolization  I discussed with the patient and he wants to discuss with his family  Call for question or concern

## 2021-05-13 NOTE — ASSESSMENT & PLAN NOTE
· Was on Coumadin for [facility] as outpatient    Will hold in the setting of SDH  · Continue home metoprolol for rate control  · Hold Ranexa given GFR < 15  · Does have history of SSS, now s/p PPM LCW 2014

## 2021-05-13 NOTE — ASSESSMENT & PLAN NOTE
Lab Results   Component Value Date    HGBA1C 6 4 04/22/2021       Recent Labs     05/12/21  0642 05/12/21  1120 05/12/21  1619 05/12/21  2104   POCGLU 177* 182* 233* 192*       Blood Sugar Average: Last 72 hrs:  (P) 128 [id number]   · Hold home Tradjenta  · Last A1c 6 4 April 2021  · Continue ACHS glucose checks and SSI

## 2021-05-13 NOTE — PLAN OF CARE
Problem: PHYSICAL THERAPY ADULT  Goal: Performs mobility at highest level of function for planned discharge setting  See evaluation for individualized goals  Description: Treatment/Interventions: Functional transfer training, LE strengthening/ROM, Elevations, Therapeutic exercise, Endurance training, Patient/family training, Equipment eval/education, Bed mobility, Gait training          See flowsheet documentation for full assessment, interventions and recommendations  Outcome: Progressing  Note: Prognosis: Fair  Problem List: Decreased strength, Decreased endurance, Impaired balance, Decreased mobility, Decreased safety awareness  Assessment: pt shows improvement in mobility status from previous session w/ increased ambulation tolerance  pt continues to need assist w/ all phases of mobility including chair follow and input for technique  pt remains at risk for falling  continued inpatient PT is needed to reduce fall risk factors  discharge recommendation is for inpatient rehab to maximize functional independence  PT Discharge Recommendation: Post acute rehabilitation services          See flowsheet documentation for full assessment

## 2021-05-13 NOTE — ASSESSMENT & PLAN NOTE
· Unclear etiology  · Abdominal exam benign  · RUQ US in ER: cholelithiasis without evidence of acute cholecystitis  · Hold statin  · Trend daily

## 2021-05-13 NOTE — PHYSICAL THERAPY NOTE
PHYSICAL THERAPY TREATMENT NOTE    Patient Name: Ko Vargas  [id number] Date: 5/13/2021 05/13/21 0704   PT Last Visit   PT Visit Date 05/13/21   Note Type   Note Type Treatment   Pain Assessment   Pain Assessment Tool Pain Assessment not indicated - pt denies pain   Restrictions/Precautions   Other Precautions Chair Alarm; Bed Alarm; Fall Risk;Telemetry   General   Chart Reviewed Yes   Additional Pertinent History room air resting pulse ox 96% and 84 BPM, active 91% and 96 BPM    Family/Caregiver Present No   Cognition   Arousal/Participation Alert; Cooperative   Attention Attends with cues to redirect   Orientation Level Oriented to person; Other (Comment)  (pt was identified w/ full name, birth date)   Following Commands Follows one step commands with increased time or repetition   Subjective   Subjective pt seen supine in bed  agreed to PT session  denied pain or dizziness  occasional redirection was needed for task focus  Bed Mobility   Supine to Sit 4  Minimal assistance   Additional items Assist x 1;HOB elevated; Bedrails; Increased time required;Verbal cues;LE management  (for trunk/LE positioning, breathing technique)   Transfers   Sit to Stand 4  Minimal assistance   Additional items Assist x 1; Increased time required;Verbal cues  (for hand placement, LE positioning)   Stand to Sit 4  Minimal assistance   Additional items Assist x 1;Verbal cues  (for body positioning, controlled descent)   Ambulation/Elevation   Gait pattern Decreased L stance; Foward flexed; Short stride; Excessively slow   Gait Assistance 4  Minimal assist   Additional items Assist x 1;Verbal cues; Tactile cues  (for walker positioning, full step lenght, breathing techniqu)   Assistive Device Rolling walker   Distance 5 feet, 15 feet, 50 feet  seated rest breaks x 3 to 4 minutes each    (additional not possible due to fatigue)   Stair Management Assistance Not tested  (due to limited ambulation tolerance, safety awareness)   Balance   Static Sitting Fair +   Static Standing Poor +   Ambulatory Poor +  (w/ roller walker)   Activity Tolerance   Activity Tolerance Patient limited by fatigue   Nurse Made Aware spoke to Edgardo Brown   Equipment Use   Comments ankle pumps 30  quad sets 20  heel slides, hip abduction, and straight leg raises 10 each  pt was provided w/ exercise handout  Assessment   Prognosis Fair   Problem List Decreased strength;Decreased endurance; Impaired balance;Decreased mobility; Decreased safety awareness   Assessment pt shows improvement in mobility status from previous session w/ increased ambulation tolerance  pt continues to need assist w/ all phases of mobility including chair follow and input for technique  pt remains at risk for falling  continued inpatient PT is needed to reduce fall risk factors  discharge recommendation is for inpatient rehab to maximize functional independence  Goals   Patient Goals I want to go home today  I want to move to [place] with my family  [facility] Expiration Date 05/22/21   Short Term Goal #1 pt will: Increase bilateral LE strength 1/2 grade to facilitate independent mobility, Perform all bed mobility tasks modified independent to decrease fall risk factors, Perform all transfers modified independent to improve independence, Ambulate 150 ft  with roller walker w/ supervision w/o LOB to expedite safe return home, Navigate 2 stairs w/ minx1 with unilateral handrail to facilitate return to previous living environment, Increase ambulatory balance 1 grade to decrease risk for falls, Complete exercise program independently to increase strength and endurance, Tolerate 3 hr OOB to faciliate upright tolerance and Improve Barthel Index score to 70 or greater to facilitate independence   PT Treatment Day 2   Plan   Treatment/Interventions Functional transfer training;LE strengthening/ROM; Elevations; Therapeutic exercise; Endurance training;Patient/family training;Equipment eval/education; Bed mobility;Gait training   Progress Progressing toward goals   PT Frequency 5x/wk   Recommendation   PT Discharge Recommendation Post acute rehabilitation services   Additional Comments recommend roller walker use w/ mobility   AM-PAC Basic Mobility Inpatient   Turning in Bed Without Bedrails 4   Lying on Back to Sitting on Edge of Flat Bed 3   Moving Bed to Chair 3   Standing Up From Chair 3   Walk in Room 3   Climb 3-5 Stairs 2   Basic Mobility Inpatient Raw Score 18   Basic Mobility Standardized Score 41 05     The patient's AM-PAC Basic Mobility Inpatient Short Form Raw Score is 18, Standardized Score is 41 05  A standardized score less than 42 9 suggests the patient may benefit from discharge to post-acute rehabilitation services  Please also refer to the recommendation of the Physical Therapist for safe discharge planning  Skilled inpatient PT recommended while in hospital to progress pt toward treatment goals      Renee Love, PT

## 2021-05-13 NOTE — ASSESSMENT & PLAN NOTE
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

## 2021-05-13 NOTE — CASE MANAGEMENT
LOS: 2 DAYS  PATIENT IS NOT A BUNDLE  PATIENT IS NOT A READMISSION  UNPLANNED RISK OF READMISSION: 19      CM met with the Patient's spouse at the bedside; Patient was sleeping  CM introduced self and role  Patient resides in a 2-story home with his spouse  There are 2 JYOTSNA  Patient has a stair glide  Prior to admission, Patient required some assistance with ADLs and utilized a RW to ambulate  Patient has had [facility] for HHPT/OT in the past  Patient has also completed rehab at [facility] JOHANNY HANCOCK in the past  Patient utilizes [facility] in [place] and is able to afford all of his medications  Patient has no MH/substance use history or concerns  Patient's spouse, Elayne Robison, identifies herself as the Patient's POA  Patient also has an AD  Copy present on the Patient's chart  Patient is retired and receives [employer] as his main source of income  Patient no longer drives and his family provides all transportation needs  Patient sees his PCP, Dr Mis Kilpatrick, every 3 months  CM reviewed the recommendation for STR upon dc  Patient's spouse states that she prefers that he return home with VNA services  Patient's spouse feels safe with the Patient returning home  Patient and his spouse plan to move to [place] to be closer to their family  Patient's spouse is requesting resources for VNA and SNF in the [place], [place] region  Patient's spouse is requesting referral to [facility] for SN/PT/OT/HHA  Patient's spouse is also requesting a list of non-skilled caregiver list    CM provided the Patient's spouse with requested resources  CM sent referral to [facility] for SN/PT/OT/HHA via [address]  CM informed that agency is able to accept the Patient for services  CM updated AVS to reflect same       CM reviewed discharge planning process including the following: identifying caregivers at home, preference for d/c planning needs, availability of treatment team to discuss questions or concerns patient and/or family may have regarding diagnosis, plan of care, old or new medications and discharge planning   CM will continue to follow for care coordination and update assessment as appropriate

## 2021-05-13 NOTE — ASSESSMENT & PLAN NOTE
· Appears acute on chronic on CT head  · Admits to new onset Left sided upper and lower extremity weakness, that began approx 5 pm 5/11  · Patient and wife does not recollect recent falls or head strikes  · Trauma evaluation done in ER  · Neurosurgery consulted, no urgent surgical intervention at this time, continue Keppra for seizure ppx  · Goal -160  · STAT CT Head if greater than 2 GCS change in 1 hour time frame   · Hold further AC/AP  · Received [facility], K Centra, Vitamin K  · INR normalized   · PT/OT

## 2021-05-14 ENCOUNTER — TELEPHONE (OUTPATIENT)
Dept: NEUROSURGERY | Facility: CLINIC | Age: 86
End: 2021-05-14

## 2021-05-14 ENCOUNTER — TELEPHONE (OUTPATIENT)
Dept: CARDIOLOGY CLINIC | Facility: CLINIC | Age: 86
End: 2021-05-14

## 2021-05-14 ENCOUNTER — TELEPHONE (OUTPATIENT)
Dept: NEPHROLOGY | Facility: CLINIC | Age: 86
End: 2021-05-14

## 2021-05-14 LAB
ANION GAP SERPL CALCULATED.3IONS-SCNC: 9 MMOL/L (ref 4–13)
BASOPHILS # BLD AUTO: 0.03 THOUSANDS/ΜL (ref 0–0.1)
BASOPHILS NFR BLD AUTO: 0 % (ref 0–1)
BUN SERPL-MCNC: 54 MG/DL (ref 5–25)
CALCIUM SERPL-MCNC: 8.5 MG/DL (ref 8.3–10.1)
CHLORIDE SERPL-SCNC: 100 MMOL/L (ref 100–108)
CO2 SERPL-SCNC: 26 MMOL/L (ref 21–32)
CREAT SERPL-MCNC: 4.39 MG/DL (ref 0.6–1.3)
EOSINOPHIL # BLD AUTO: 0.27 THOUSAND/ΜL (ref 0–0.61)
EOSINOPHIL NFR BLD AUTO: 3 % (ref 0–6)
ERYTHROCYTE [DISTWIDTH] IN BLOOD BY AUTOMATED COUNT: 13.8 % (ref 11.6–15.1)
GFR SERPL CREATININE-BSD FRML MDRD: 11 ML/MIN/1.73SQ M
GLUCOSE SERPL-MCNC: 120 MG/DL (ref 65–140)
GLUCOSE SERPL-MCNC: 121 MG/DL (ref 65–140)
GLUCOSE SERPL-MCNC: 149 MG/DL (ref 65–140)
GLUCOSE SERPL-MCNC: 192 MG/DL (ref 65–140)
GLUCOSE SERPL-MCNC: 269 MG/DL (ref 65–140)
HCT VFR BLD AUTO: 29.9 % (ref 36.5–49.3)
HGB BLD-MCNC: 9.3 G/DL (ref 12–17)
IMM GRANULOCYTES # BLD AUTO: 0.12 THOUSAND/UL (ref 0–0.2)
IMM GRANULOCYTES NFR BLD AUTO: 2 % (ref 0–2)
LYMPHOCYTES # BLD AUTO: 1.61 THOUSANDS/ΜL (ref 0.6–4.47)
LYMPHOCYTES NFR BLD AUTO: 20 % (ref 14–44)
MCH RBC QN AUTO: 30.9 PG (ref 26.8–34.3)
MCHC RBC AUTO-ENTMCNC: 31.1 G/DL (ref 31.4–37.4)
MCV RBC AUTO: 99 FL (ref 82–98)
MONOCYTES # BLD AUTO: 0.79 THOUSAND/ΜL (ref 0.17–1.22)
MONOCYTES NFR BLD AUTO: 10 % (ref 4–12)
NEUTROPHILS # BLD AUTO: 5.2 THOUSANDS/ΜL (ref 1.85–7.62)
NEUTS SEG NFR BLD AUTO: 65 % (ref 43–75)
NRBC BLD AUTO-RTO: 0 /100 WBCS
PLATELET # BLD AUTO: 177 THOUSANDS/UL (ref 149–390)
PMV BLD AUTO: 10.1 FL (ref 8.9–12.7)
POTASSIUM SERPL-SCNC: 4.1 MMOL/L (ref 3.5–5.3)
RBC # BLD AUTO: 3.01 MILLION/UL (ref 3.88–5.62)
SARS-COV-2 RNA RESP QL NAA+PROBE: NEGATIVE
SODIUM SERPL-SCNC: 135 MMOL/L (ref 136–145)
WBC # BLD AUTO: 8.02 THOUSAND/UL (ref 4.31–10.16)

## 2021-05-14 PROCEDURE — 85025 COMPLETE CBC W/AUTO DIFF WBC: CPT | Performed by: PHYSICIAN ASSISTANT

## 2021-05-14 PROCEDURE — 80048 BASIC METABOLIC PNL TOTAL CA: CPT | Performed by: PHYSICIAN ASSISTANT

## 2021-05-14 PROCEDURE — 97116 GAIT TRAINING THERAPY: CPT

## 2021-05-14 PROCEDURE — 97110 THERAPEUTIC EXERCISES: CPT

## 2021-05-14 PROCEDURE — U0003 INFECTIOUS AGENT DETECTION BY NUCLEIC ACID (DNA OR RNA); SEVERE ACUTE RESPIRATORY SYNDROME CORONAVIRUS 2 (SARS-COV-2) (CORONAVIRUS DISEASE [COVID-19]), AMPLIFIED PROBE TECHNIQUE, MAKING USE OF HIGH THROUGHPUT TECHNOLOGIES AS DESCRIBED BY CMS-2020-01-R: HCPCS | Performed by: PHYSICIAN ASSISTANT

## 2021-05-14 PROCEDURE — 82948 REAGENT STRIP/BLOOD GLUCOSE: CPT

## 2021-05-14 PROCEDURE — 99232 SBSQ HOSP IP/OBS MODERATE 35: CPT | Performed by: SURGERY

## 2021-05-14 PROCEDURE — U0005 INFEC AGEN DETEC AMPLI PROBE: HCPCS | Performed by: PHYSICIAN ASSISTANT

## 2021-05-14 RX ADMIN — FINASTERIDE 5 MG: 5 TABLET, FILM COATED ORAL at 09:35

## 2021-05-14 RX ADMIN — HEPARIN SODIUM 5000 UNITS: 5000 INJECTION INTRAVENOUS; SUBCUTANEOUS at 21:59

## 2021-05-14 RX ADMIN — HEPARIN SODIUM 5000 UNITS: 5000 INJECTION INTRAVENOUS; SUBCUTANEOUS at 05:42

## 2021-05-14 RX ADMIN — TORSEMIDE 20 MG: 20 TABLET ORAL at 09:35

## 2021-05-14 RX ADMIN — METOPROLOL TARTRATE 25 MG: 25 TABLET, FILM COATED ORAL at 16:59

## 2021-05-14 RX ADMIN — LEVETIRACETAM 500 MG: 100 INJECTION, SOLUTION INTRAVENOUS at 00:23

## 2021-05-14 RX ADMIN — TAMSULOSIN HYDROCHLORIDE 0.4 MG: 0.4 CAPSULE ORAL at 21:59

## 2021-05-14 RX ADMIN — INSULIN LISPRO 3 UNITS: 100 INJECTION, SOLUTION INTRAVENOUS; SUBCUTANEOUS at 12:16

## 2021-05-14 RX ADMIN — DOCUSATE SODIUM 100 MG: 100 CAPSULE, LIQUID FILLED ORAL at 09:35

## 2021-05-14 RX ADMIN — LEVOTHYROXINE SODIUM 75 MCG: 75 TABLET ORAL at 05:42

## 2021-05-14 RX ADMIN — METOPROLOL TARTRATE 25 MG: 25 TABLET, FILM COATED ORAL at 09:35

## 2021-05-14 RX ADMIN — HEPARIN SODIUM 5000 UNITS: 5000 INJECTION INTRAVENOUS; SUBCUTANEOUS at 14:22

## 2021-05-14 RX ADMIN — DOCUSATE SODIUM 100 MG: 100 CAPSULE, LIQUID FILLED ORAL at 18:38

## 2021-05-14 NOTE — CASE MANAGEMENT
CM informed by Christiano Floyd at [facility] that facility is able to accept the Patient for admission today  CM spoke with the Patient's spouse, Elko Beardsley, via phone, to update on acceptance to [facility]  Patient's spouse states that she will discuss this with her family  CM reviewed that [facility] Liaison will reach out to her to review their facility and answer any questions  CM met with the Patient and her family at the bedside to review their decision  Patient's family states that they want to accept the bed at [facility]  CM reviewed the OOP costs associated with [facility] transport  Patient's spouse verbalized her understanding and is agreeable to the cost    IMM reviewed with patient's caregiver  patient's caregiver agrees with discharge determination; copy provided  CM sent request for [facility] transport to [facility] via [place]  CM dept will follow for confirmed transport

## 2021-05-14 NOTE — TELEPHONE ENCOUNTER
Pt's daughter Gloria Langdon called, pt went into the hospital on Tuesday afternoon for some weakness on the left side  It was discovered by a CT that he is having a brain bleed  He was taken off his coumadin and will remain off for the next 3 weeks as per Neurology  The plan is to send him to a rehab facility for those 3 weeks  The geriatric doctor overseeing pt asked the daughter to call our office so that we are aware of what is going on  He is also having A Fib on the telemetry as per daughter  It was suggested that Dr Felicitas Walker speak with the neurologist in regards to the coumadin stop and A Fib  I have cc'ed Dr Harshil Harry on this msg as his office manages the coumadin for Mr López Barrios

## 2021-05-14 NOTE — ARC ADMISSION
Reviewed patient's case with [facility] medical director  He is acute appropriate  Currently there is no bed available at the [facility] or [place] [address] until Monday vs Tuesday  If the patient would like to stay within in the network for rehab a referral to [facility] is recommended  CM made aware

## 2021-05-14 NOTE — PROGRESS NOTES
[facility]  Progress Note Chantale Viveros 1/29/1932, 80 y o  male MRN: [id number]  Unit/Bed#: ICU 09 Encounter: [id number]  Primary Care Provider: Ellen Salguero MD   Date and time admitted to hospital: 5/11/2021  6:06 PM    Essential hypertension  Assessment & Plan  · SBP very elevated in the 200's in the ER  · Restart home meds   · Keep SBP between 140-160  · Outpatient follow-up with PCP      CKD (chronic kidney disease) stage 5, GFR less than 15 ml/min [facility])  Assessment & Plan  Lab Results   Component Value Date    EGFR 10 05/11/2021    EGFR 10 04/22/2021    EGFR 10 02/20/2021    CREATININE 4 85 (H) 05/11/2021    CREATININE 4 76 (H) 04/22/2021    CREATININE 4 60 (H) 02/20/2021   · Monitor kidney function   · Avoid nephrotoxins    Anticoagulated on warfarin  Assessment & Plan  · On coumadin due to atrial fibrillation   · Reversal with Kcentra given  · Will resume anticoagulation in 2 weeks of stable from an outpatient perspective with Neurosurgery  · Hold ac/ap secondary to intracranial hemorrhage    Benign prostatic hyperplasia with urinary hesitancy  Assessment & Plan  · Continue home meds  · Urinary retention protocol     Paroxysmal A-fib ([address] )  Assessment & Plan  · On coumadin   · Currently on hold due to intracranial hemorrhage  · Continue home lopressor   · Hold Ranexa with GFR<15     Type 2 diabetes mellitus, with long-term current use of insulin (HCC)  Assessment & Plan    Lab Results   Component Value Date    HGBA1C 6 4 04/22/2021   · Well controlled with home regimen  · SSI with q6 glucose checks  · Hold oral meds    * Subdural hematoma ([address] )  Assessment & Plan  Neuro exam: stable  Continue neurologic checks: Every 4 hours  Reversal agent administered upon admission  CT scan of the head on 5/13/21 reviewed: stable   Appreciate Neurosurgery evaluation and recommendations    Complete 7 day course of Keppra for seizure prophylaxis  Chemical DVT prophylaxis: SQH  Hold all anticoagulants and anti platelet medications for 2 weeks and/or until cleared by Neurosurgery to resume  · PT and OT (including cognitive evaluation) evaluation and treatment as indicated  DVT prophylaxis:  SCDs and subcutaneous heparin  PT and OT:  Eval and treat    Disposition:  DC to rehab  Family agreeable for rehab at this time  No further workup at this time  Will await Case Management to find placement  SUBJECTIVE:  Chief Complaint: "No complaints "    Subjective:  Patient is out of bed to chair with no complaints  He is resting comfortably  He is eating his breakfast   Denies any new headaches  No nausea or vomiting        OBJECTIVE:     Meds/Allergies     Current Facility-Administered Medications:     acetaminophen (TYLENOL) tablet 975 mg, 975 mg, Oral, Q8H [facility], Laura Mcintyre PA-C, Stopped at 05/14/21 0542    docusate sodium (COLACE) capsule 100 mg, 100 mg, Oral, BID, Laura Mcintyer PA-C, 100 mg at 05/14/21 0935    finasteride (PROSCAR) tablet 5 mg, 5 mg, Oral, Daily, Laura Mcintyre PA-C, 5 mg at 05/14/21 0935    heparin (porcine) subcutaneous injection 5,000 Units, 5,000 Units, Subcutaneous, Q8H [facility], Juana Stafford PA-C, 5,000 Units at 05/14/21 0542    hydrALAZINE (APRESOLINE) injection 10 mg, 10 mg, Intravenous, Q6H PRN, BYRON CallahanNP, 10 mg at 05/12/21 2326    HYDROmorphone (DILAUDID) injection 0 2 mg, 0 2 mg, Intravenous, Q4H PRN, Laura Mcintyre PA-C    insulin lispro (HumaLOG) 100 units/mL subcutaneous injection 1-6 Units, 1-6 Units, Subcutaneous, TID AC, 2 Units at 05/13/21 1700 **AND** Fingerstick Glucose (POCT), , , 4x Daily AC and at bedtime, [facility], DO    levETIRAcetam (KEPPRA) 500 mg in sodium chloride 0 9 % 100 mL IVPB, 500 mg, Intravenous, Q24H, Laura Mcintyre PA-C, Last Rate: 400 mL/hr at 05/14/21 0023, 500 mg at 05/14/21 0023    levothyroxine tablet 75 mcg, 75 mcg, Oral, Early Morning, Laura Mcintyre PA-C, 75 mcg at 05/14/21 0542    metoprolol tartrate (LOPRESSOR) tablet 25 mg, 25 mg, Oral, BID With Meals, Kenny Casas PA-C, 25 mg at 05/14/21 0935    naloxone (NARCAN) 0 04 mg/mL syringe 0 04 mg, 0 04 mg, Intravenous, Q1MIN PRN, Kenny Casas PA-C    ondansetron [facility]) injection 4 mg, 4 mg, Intravenous, Q4H PRN, Kenny Casas PA-C    oxyCODONE (ROXICODONE) IR tablet 2 5 mg, 2 5 mg, Oral, Q4H PRN, Kenny Casas PA-C    oxyCODONE (ROXICODONE) IR tablet 5 mg, 5 mg, Oral, Q4H PRN, Kenny Casas PA-C    polyethylene glycol (MIRALAX) packet 17 g, 17 g, Oral, Daily PRN, Kenny Casas PA-C    tamsulosin [facility]) capsule 0 4 mg, 0 4 mg, Oral, HS, Kenny Casas PA-C, 0 4 mg at 05/13/21 2133    torsemide (DEMADEX) tablet 20 mg, 20 mg, Oral, Daily, Kenny Casas PA-C, 20 mg at 05/14/21 0935     Vitals:   Vitals:    05/14/21 0700   BP: 149/65   Pulse: 75   Resp: 16   Temp: 98 5 °F (36 9 °C)   SpO2: 100%       Intake/Output:  I/O       05/12 0701 - 05/13 0700 05/13 0701 - 05/14 0700 05/14 0701 - 05/15 0700    P  O  1080 240     I V  (mL/kg) 30 (0 4)      IV Piggyback 100      Total Intake(mL/kg) 1210 (15 5) 240 (3)     Urine (mL/kg/hr) 770 (0 4) 1498 (0 8)     Stool  0     Total Output 770 1498     Net +440 -1258            Unmeasured Urine Occurrence 1 x 1 x     Unmeasured Stool Occurrence  2 x            Nutrition/GI Proph/Bowel Reg:  Continue current diet    Physical Exam:   GENERAL APPEARANCE:  No acute distress  NEURO:  GCS 15  HEENT:  Normocephalic  CV:  Regular rate and rhythm  LUNGS:  CTA bilaterally  GI:  Nontender, nondistended  :  No Barrientos  MSK:  Moving all extremities; neurovascular intact  SKIN:  Warm, dry, intact    Invasive Devices     Peripheral Intravenous Line            Peripheral IV 05/11/21 Left Arm 2 days    Peripheral IV 05/12/21 Left Antecubital 2 days          Drain            External Urinary Catheter Small 2 days                 Lab Results:   Results: I have personally reviewed pertinent reports   , BMP/CMP:   Lab Results   Component Value Date    SODIUM 135 (L) 05/14/2021    K 4 1 05/14/2021     05/14/2021    CO2 26 05/14/2021    BUN 54 (H) 05/14/2021    CREATININE 4 39 (H) 05/14/2021    CALCIUM 8 5 05/14/2021    EGFR 11 05/14/2021    and CBC:   Lab Results   Component Value Date    WBC 8 02 05/14/2021    HGB 9 3 (L) 05/14/2021    HCT 29 9 (L) 05/14/2021    MCV 99 (H) 05/14/2021     05/14/2021    MCH 30 9 05/14/2021    MCHC 31 1 (L) 05/14/2021    RDW 13 8 05/14/2021    MPV 10 1 05/14/2021    NRBC 0 05/14/2021     Imaging/EKG Studies: Results: I have personally reviewed pertinent reports      Other Studies:  No other studies  VTE Prophylaxis:  SCDs and subcutaneous heparin

## 2021-05-14 NOTE — TELEPHONE ENCOUNTER
5/17/21: PATIENT WAS DISCHARGED TO [address] [place] [address] # [phone]    SPOKE TO JOSE SHE IS AWARE OF APPT AND WILL BE SCHEDULING CT AT [facility]     5/14/21:  Kylie Murphy    TODAYS DATE:  5/14/21  EMAIL FROM PA:  BILLY  DATE OF EMAIL:   5/13/21    HOSPITAL FOLLOW UP PLAN:     3 WEEKS PAOLAX / PATRICIO/JORGE  6/2/21 / 10:00 / [place]    IMAGING: CT HEAD - NO AUTH NEEDED

## 2021-05-14 NOTE — QUICK NOTE
Patient neurologically stable  Last CT scan demonstrates stable Acute on chronic Heterogenous/ mixed density collection in the right hemisphere  From NSx perspective, no emergency surgery is anticipated  Can follow-up at outpatient Neurosurgery Clinic at [facility] in 3 weeks, SNPx with Dr Kristina Catherine for possible discussion of MMA embo  3 weeks follow-up CT head without contrast   No AC/AP for the next 3 weeks  Will sign off  Call 4 question or concern

## 2021-05-14 NOTE — TELEPHONE ENCOUNTER
Patient's spouse called the office and wanted to make Dr Basia Lima aware that the patient is in the hospital 
Patient

## 2021-05-14 NOTE — CASE MANAGEMENT
CM received VM from the Patient's spouse that after discussion with her family, they would like referrals to acute rehab  CM dept sent referrals to [place] [address] via [facility]  CM dept will follow for review  CM spoke with the Patient's spouse, via phone, to review additional referrals for STR  Patient's spouse is requesting additional referrals for [facility]  CM sent referrals to [facility] for STR via [address]  CM dept will follow for acceptance

## 2021-05-14 NOTE — PLAN OF CARE
Problem: Prexisting or High Potential for Compromised Skin Integrity  Goal: Skin integrity is maintained or improved  Description: INTERVENTIONS:  - Identify patients at risk for skin breakdown  - Assess and monitor skin integrity  - Assess and monitor nutrition and hydration status  - Monitor labs   - Assess for incontinence   - Turn and reposition patient  - Assist with mobility/ambulation  - Relieve pressure over bony prominences  - Avoid friction and shearing  - Provide appropriate hygiene as needed including keeping skin clean and dry  - Evaluate need for skin moisturizer/barrier cream  - Collaborate with interdisciplinary team   - Patient/family teaching  - Consider wound care consult   Outcome: Progressing     Problem: Potential for Falls  Goal: Patient will remain free of falls  Description: INTERVENTIONS:  - Assess patient frequently for physical needs  -  Identify cognitive and physical deficits and behaviors that affect risk of falls  -  Milo fall precautions as indicated by assessment   - Educate patient/family on patient safety including physical limitations  - Instruct patient to call for assistance with activity based on assessment  - Modify environment to reduce risk of injury  - Consider OT/PT consult to assist with strengthening/mobility  Outcome: Progressing     Problem: Nutrition/Hydration-ADULT  Goal: Nutrient/Hydration intake appropriate for improving, restoring or maintaining nutritional needs  Description: Monitor and assess patient's nutrition/hydration status for malnutrition  Collaborate with interdisciplinary team and initiate plan and interventions as ordered  Monitor patient's weight and dietary intake as ordered or per policy  Utilize nutrition screening tool and intervene as necessary  Determine patient's food preferences and provide high-protein, high-caloric foods as appropriate       INTERVENTIONS:  - Monitor oral intake, urinary output, labs, and treatment plans  - Assess nutrition and hydration status and recommend course of action  - Evaluate amount of meals eaten  - Assist patient with eating if necessary   - Allow adequate time for meals  - Recommend/ encourage appropriate diets, oral nutritional supplements, and vitamin/mineral supplements  - Order, calculate, and assess calorie counts as needed  - Recommend, monitor, and adjust tube feedings and TPN/PPN based on assessed needs  - Assess need for intravenous fluids  - Provide specific nutrition/hydration education as appropriate  - Include patient/family/caregiver in decisions related to nutrition  Outcome: Progressing

## 2021-05-14 NOTE — QUICK NOTE
Spoke with patient's wife at bedside today  Updated on the current care plan as well as medical management  Also followed up with case management regards to discharge planning process  Discussed discharge planning process with family at bedside

## 2021-05-14 NOTE — CASE MANAGEMENT
CM informed by Juan Antonio Deleon at [facility] that no [facility] transports are available for the remainder of the night  CM informed trauma of [facility]  CM met with the Patient and the family at the bedside to review the lack of the transportation  CM explained that dc will occur tomorrow once there is available transportation  Patient's family verbalized their understanding of the change in plans  CM informed by Juan Antonio Deleon at [facility] that transport is scheduled for tomorrow, 5/15, at [place] via [facility]  CM informed Trauma, RN, and facility of same  RN to inform family of scheduled dc time  CM dept will continue to follow the Patient through dc

## 2021-05-14 NOTE — PHYSICAL THERAPY NOTE
PHYSICAL THERAPY TREATMENT NOTE    Patient Name: Floridalma Harris  [id number] Date: 5/14/2021 05/14/21 0853   PT Last Visit   PT Visit Date 05/14/21   Pain Assessment   Pain Assessment Tool Pain Assessment not indicated - pt denies pain   Restrictions/Precautions   Other Precautions Chair Alarm; Bed Alarm; Fall Risk;Telemetry   General   Chart Reviewed Yes   Additional Pertinent History room air resting pulse ox 97% and 82 BPM    Family/Caregiver Present No   Cognition   Arousal/Participation Alert; Cooperative   Attention Attends with cues to redirect   Orientation Level Oriented to person; Other (Comment)  (pt was identified w/ full name and birth date)   Following Commands Follows one step commands without difficulty   Subjective   Subjective pt seen supine in bed  agreed to PT session  denied pain or dizziness  pt reports feeling tired  Bed Mobility   Supine to Sit 3  Moderate assistance   Additional items Assist x 1;HOB elevated; Bedrails; Increased time required;Verbal cues;LE management  (for trunk/LE positioningq)   Transfers   Sit to Stand 4  Minimal assistance   Additional items Assist x 1; Increased time required;Verbal cues  (for hand placement)   Stand to Sit 4  Minimal assistance   Additional items Assist x 1; Impulsive;Verbal cues  (for hand placement, controlled descent)   Additional Comments 30 second chair stand test: 0 (as pt is unable to stand w/o use of UEs)   Ambulation/Elevation   Gait pattern Decreased L stance;L Foot drag; Foward flexed; Excessively slow   Gait Assistance 4  Minimal assist   Additional items Assist x 1;Verbal cues  (for walker positioning, full step length)   Assistive Device Rolling walker   Distance 30 feet  60 feet   seated rest break x 3 minutes  (additional not possible due to fatigue)   Balance   Static Sitting Fair +   Static Standing Poor +   Ambulatory Poor +  (w/ roller walker)   Activity Tolerance   Activity Tolerance Patient limited by fatigue   Nurse Made Aware spoke to [facility] Norm FARRAR Most PCA   Equipment Use   Comments ankle pumps and quad sets 30 each  heel slides and supine hip abduction 20 eachq   Assessment   Prognosis Fair   Problem List Decreased strength;Decreased endurance; Impaired balance;Decreased mobility; Decreased safety awareness   Assessment pt continues to progress w/ additional ambulation tolerance  pt needed increased level of assistance to mobilize from bed  pt had limited retention regarding mobility technique  pt remains at risk for falling due to physical and safety awareness limitations  pt is also at risk for falling per 30 second chair stand test  continued inpatient PT is needed to improve strength, balance and endurance  inpatient rehab would be beneficial to pt following discharge from hospital to maximize functional mobility status  Goals   Patient Goals I want to get out of the ICU  STG Expiration Date 05/22/21   Short Term Goal #1 ADDENDUM TO PRESENT GOALS: pt will complete 30 second chair stand test w/ score of 4 or greater to reduce risk for falling  pt will: Increase bilateral LE strength 1/2 grade to facilitate independent mobility, Perform all bed mobility tasks modified independent to decrease fall risk factors, Perform all transfers modified independent to improve independence, Ambulate 150 ft  with roller walker w/ supervision w/o LOB to expedite safe return home, Navigate 2 stairs w/ minx1 with unilateral handrail to facilitate return to previous living environment, Increase ambulatory balance 1 grade to decrease risk for falls, Complete exercise program independently to increase strength and endurance, Tolerate 3 hr OOB to faciliate upright tolerance and Improve Barthel Index score to 70 or greater to facilitate independence   PT Treatment Day 3   Plan   Treatment/Interventions Functional transfer training;LE strengthening/ROM; Elevations; Therapeutic exercise; Endurance training;Patient/family training;Equipment eval/education; Bed mobility;Gait training   Progress Progressing toward goals   PT Frequency 5x/wk   Recommendation   PT Discharge Recommendation Post acute rehabilitation services   Additional Comments recommend roller walker use w/ mobility   AM-PAC Basic Mobility Inpatient   Turning in Bed Without Bedrails 4   Lying on Back to Sitting on Edge of Flat Bed 3   Moving Bed to Chair 3   Standing Up From Chair 3   Walk in Room 3   Climb 3-5 Stairs 2   Basic Mobility Inpatient Raw Score 18   Basic Mobility Standardized Score 41 05     30 second chair stand test: 0 (less than 8 indicates fall risk in males 80to 80years old)    The patient's AM-[facility] Basic Mobility Inpatient Short Form Raw Score is 18, Standardized Score is 41 05  A standardized score less than 42 9 suggests the patient may benefit from discharge to post-acute rehabilitation services  Please also refer to the recommendation of the Physical Therapist for safe discharge planning  Skilled inpatient PT recommended while in hospital to progress pt toward treatment goals      Stephen Swartz, PT

## 2021-05-14 NOTE — PLAN OF CARE
Problem: PHYSICAL THERAPY ADULT  Goal: Performs mobility at highest level of function for planned discharge setting  See evaluation for individualized goals  Description: Treatment/Interventions: Functional transfer training, LE strengthening/ROM, Elevations, Therapeutic exercise, Endurance training, Patient/family training, Equipment eval/education, Bed mobility, Gait training          See flowsheet documentation for full assessment, interventions and recommendations  Outcome: Progressing  Note: Prognosis: Fair  Problem List: Decreased strength, Decreased endurance, Impaired balance, Decreased mobility, Decreased safety awareness  Assessment: pt continues to progress w/ additional ambulation tolerance  pt needed increased level of assistance to mobilize from bed  pt had limited retention regarding mobility technique  pt remains at risk for falling due to physical and safety awareness limitations  pt is also at risk for falling per 30 second chair stand test  continued inpatient PT is needed to improve strength, balance and endurance  inpatient rehab would be beneficial to pt following discharge from hospital to maximize functional mobility status  PT Discharge Recommendation: Post acute rehabilitation services          See flowsheet documentation for full assessment

## 2021-05-15 ENCOUNTER — TELEPHONE (OUTPATIENT)
Dept: OTHER | Facility: OTHER | Age: 86
End: 2021-05-15

## 2021-05-15 VITALS
BODY MASS INDEX: 27.51 KG/M2 | HEART RATE: 66 BPM | WEIGHT: 171.2 LBS | SYSTOLIC BLOOD PRESSURE: 145 MMHG | OXYGEN SATURATION: 97 % | TEMPERATURE: 97.8 F | DIASTOLIC BLOOD PRESSURE: 67 MMHG | RESPIRATION RATE: 16 BRPM | HEIGHT: 66 IN

## 2021-05-15 LAB
GLUCOSE SERPL-MCNC: 140 MG/DL (ref 65–140)
GLUCOSE SERPL-MCNC: 226 MG/DL (ref 65–140)

## 2021-05-15 PROCEDURE — 99238 HOSP IP/OBS DSCHRG MGMT 30/<: CPT | Performed by: PHYSICIAN ASSISTANT

## 2021-05-15 PROCEDURE — 82948 REAGENT STRIP/BLOOD GLUCOSE: CPT

## 2021-05-15 PROCEDURE — NC001 PR NO CHARGE: Performed by: SURGERY

## 2021-05-15 RX ORDER — LEVETIRACETAM 500 MG/1
500 TABLET ORAL DAILY
Qty: 3 TABLET | Refills: 0
Start: 2021-05-16 | End: 2021-06-14 | Stop reason: ALTCHOICE

## 2021-05-15 RX ADMIN — FINASTERIDE 5 MG: 5 TABLET, FILM COATED ORAL at 09:45

## 2021-05-15 RX ADMIN — LEVOTHYROXINE SODIUM 75 MCG: 75 TABLET ORAL at 05:37

## 2021-05-15 RX ADMIN — METOPROLOL TARTRATE 25 MG: 25 TABLET, FILM COATED ORAL at 09:45

## 2021-05-15 RX ADMIN — HEPARIN SODIUM 5000 UNITS: 5000 INJECTION INTRAVENOUS; SUBCUTANEOUS at 05:37

## 2021-05-15 RX ADMIN — DOCUSATE SODIUM 100 MG: 100 CAPSULE, LIQUID FILLED ORAL at 09:45

## 2021-05-15 RX ADMIN — LEVETIRACETAM 500 MG: 100 INJECTION, SOLUTION INTRAVENOUS at 00:34

## 2021-05-15 RX ADMIN — TORSEMIDE 20 MG: 20 TABLET ORAL at 09:45

## 2021-05-15 NOTE — INCIDENTAL FINDINGS
The following findings require follow up:  Radiographic finding   Findin  Calcified bilateral pleural plaques with no acute interval change  2  Cholelithiasis and gallbladder sludge   Follow up required: yes   Follow up should be done within 2-4 week(s)    Please notify the following clinician to assist with the follow up:   Primary care provider  Patient should follow-up to discuss further imaging of lungs with PCP  This was discussed with patient's wife over the phone

## 2021-05-15 NOTE — ASSESSMENT & PLAN NOTE
Lab Results   Component Value Date    EGFR 11 05/14/2021    EGFR 11 05/13/2021    EGFR 10 05/12/2021    CREATININE 4 39 (H) 05/14/2021    CREATININE 4 52 (H) 05/13/2021    CREATININE 4 78 (H) 05/12/2021   · Monitor kidney function   · Avoid nephrotoxins

## 2021-05-15 NOTE — CASE MANAGEMENT
CM telephoned Gianna at [facility] and confirm transport today at 12N  Memo Cure confirmed admission  CM notified pt's nurse and spouse, Aurther Erik of transport time

## 2021-05-15 NOTE — QUICK NOTE
Called and updated patient's wife as well as patient has other family on speaker phone  Discussed with them the incidental findings an outpatient recommendations  Discussed with them there outpatient follow-up appointments as well as medication regimen moving forward  They stated that they had called the consultants as well as started to schedule some appointments  They are understanding of the discharge process  All other questions were answered

## 2021-05-15 NOTE — PROGRESS NOTES
[facility]  Progress Note Benjamin Amado 1/29/1932, 80 y o  male MRN: [id number]  Unit/Bed#: S -01 Encounter: [id number]  Primary Care Provider: Natalie Bowens MD   Date and time admitted to hospital: 5/11/2021  6:06 PM    Essential hypertension  Assessment & Plan  - Restarted home meds   - Outpatient follow-up with PCP      CKD (chronic kidney disease) stage 5, GFR less than 15 ml/min [facility])  Assessment & Plan  Lab Results   Component Value Date    EGFR 11 05/14/2021    EGFR 11 05/13/2021    EGFR 10 05/12/2021    CREATININE 4 39 (H) 05/14/2021    CREATININE 4 52 (H) 05/13/2021    CREATININE 4 78 (H) 05/12/2021   · Monitor kidney function   · Avoid nephrotoxins    Anticoagulated on warfarin  Assessment & Plan  · On coumadin due to atrial fibrillation   · Reversal with Kcentra given  · Will resume anticoagulation in 2 weeks of stable from an outpatient perspective with Neurosurgery  · Hold ac/ap secondary to intracranial hemorrhage    Benign prostatic hyperplasia with urinary hesitancy  Assessment & Plan  · Continue home meds  · Urinary retention protocol     Paroxysmal A-fib ([address] )  Assessment & Plan  · On coumadin   · Currently on hold due to intracranial hemorrhage  · Continue home lopressor   · Hold Ranexa with GFR<15     Type 2 diabetes mellitus, with long-term current use of insulin (HCC)  Assessment & Plan    Lab Results   Component Value Date    HGBA1C 6 4 04/22/2021   · Well controlled with home regimen  · SSI with q6 glucose checks  · Hold oral meds    * Subdural hematoma ([address] )  Assessment & Plan  Neuro exam: stable  Continue neurologic checks: Every 4 hours  Reversal agent administered upon admission  CT scan of the head on 5/13/21 reviewed: stable   Appreciate Neurosurgery evaluation and recommendations    Complete 7 day course of Keppra for seizure prophylaxis  Chemical DVT prophylaxis: SQH  Hold all anticoagulants and anti platelet medications for 2 weeks and/or until cleared by Neurosurgery to resume  · PT and OT (including cognitive evaluation) evaluation and treatment as indicated  DVT prophylaxis:  SCDs and subcutaneous heparin  PT and OT:  Rehab    Disposition:  DC today at noon to rehab facility  Family updated made aware on 05/14/2021  Patient medically appropriate for DC today  Will need outpatient follow-up with Neurosurgery  SUBJECTIVE:  Chief Complaint: "No new complaints "    Subjective:  Patient resting in bed today  He is offering no new complaints  Reports he is feeling better  Denies any new pain  No new headaches  No visual or auditory deficits        OBJECTIVE:     Meds/Allergies     Current Facility-Administered Medications:     acetaminophen (TYLENOL) tablet 975 mg, 975 mg, Oral, Q8H [address], Isreal Stevens PA-C, Stopped at 05/14/21 0542    docusate sodium (COLACE) capsule 100 mg, 100 mg, Oral, BID, Isreal Stevens PA-C, 100 mg at 05/14/21 8332    finasteride (PROSCAR) tablet 5 mg, 5 mg, Oral, Daily, Isreal Stevens PA-C, 5 mg at 05/14/21 0935    heparin (porcine) subcutaneous injection 5,000 Units, 5,000 Units, Subcutaneous, Q8H [address], ALISSON Jolley-EDITH, 5,000 Units at 05/15/21 0537    hydrALAZINE (APRESOLINE) injection 10 mg, 10 mg, Intravenous, Q6H PRN, Flavio Stevens PA-C, 10 mg at 05/12/21 2326    HYDROmorphone (DILAUDID) injection 0 2 mg, 0 2 mg, Intravenous, Q4H PRN, ALISSON Jolley-EDITH    insulin lispro (HumaLOG) 100 units/mL subcutaneous injection 1-6 Units, 1-6 Units, Subcutaneous, TID AC, 3 Units at 05/14/21 1216 **AND** Fingerstick Glucose (POCT), , , 4x Daily AC and at bedtime, Flavio Stevens PA-C    levETIRAcetam (KEPPRA) 500 mg in sodium chloride 0 9 % 100 mL IVPB, 500 mg, Intravenous, Q24H, Isreal Stevens PA-C, Last Rate: 400 mL/hr at 05/15/21 0034, 500 mg at 05/15/21 0034    levothyroxine tablet 75 mcg, 75 mcg, Oral, Early Morning, Isreal Stevens PA-C, 75 mcg at 05/15/21 0537    metoprolol tartrate (LOPRESSOR) tablet 25 mg, 25 mg, Oral, BID With Meals, Jil Stevens, PA-C, 25 mg at 05/14/21 1659    naloxone (NARCAN) 0 04 mg/mL syringe 0 04 mg, 0 04 mg, Intravenous, Q1MIN PRN, Jil Stevens, PA-C    ondansetron (ZOFRAN) injection 4 mg, 4 mg, Intravenous, Q4H PRN, Jil Stevens PA-C    oxyCODONE (ROXICODONE) IR tablet 2 5 mg, 2 5 mg, Oral, Q4H PRN, Jil Stevens PA-C    oxyCODONE (ROXICODONE) IR tablet 5 mg, 5 mg, Oral, Q4H PRN, Jil Stevens PA-C    polyethylene glycol (MIRALAX) packet 17 g, 17 g, Oral, Daily PRN, Jil Stevens PA-C    tamsulosin (FLOMAX) capsule 0 4 mg, 0 4 mg, Oral, HS, Isreal Stevens PA-C, 0 4 mg at 05/14/21 2159    torsemide (DEMADEX) tablet 20 mg, 20 mg, Oral, Daily, Isreal Stevens PA-C, 20 mg at 05/14/21 0935     Vitals:   Vitals:    05/14/21 2242   BP: 160/78   Pulse: 67   Resp: 16   Temp: 98 8 °F (37 1 °C)   SpO2: 97%       Intake/Output:  I/O       05/13 0701 - 05/14 0700 05/14 0701 - 05/15 0700 05/15 0701 - 05/16 0700    P  O  240 600     I V  (mL/kg)  10 (0 1)     IV Piggyback  0     Total Intake(mL/kg) 240 (3) 610 (7 9)     Urine (mL/kg/hr) 1498 (0 8) 602 (0 3)     Stool 0 0     Total Output 1498 602     Net -1258 +8            Unmeasured Urine Occurrence 1 x      Unmeasured Stool Occurrence 2 x 1 x            Nutrition/GI Proph/Bowel Reg:  Continue current diet    Physical Exam:   GENERAL APPEARANCE:  No acute distress  NEURO:  GCS 15  HEENT:  Normocephalic  CV:  Regular rate and rhythm  LUNGS:  CTA bilaterally  GI:  Nontender, nondistended  :  No Barrientos  MSK:  Moving all extremities; neurovascularly intact; sensation intact  SKIN:  Warm, dry, intact    Invasive Devices     Peripheral Intravenous Line            Peripheral IV 05/11/21 Left Arm 3 days    Peripheral IV 05/12/21 Left Antecubital 3 days                 Lab Results: Results: I have personally reviewed pertinent reports   , BMP/CMP: No results found for: SODIUM, K, CL, CO2, ANIONGAP, BUN, CREATININE, GLUCOSE, CALCIUM, AST, ALT, ALKPHOS, PROT, BILITOT, EGFR and CBC: No results found for: WBC, HGB, HCT, MCV, PLT, ADJUSTEDWBC, MCH, MCHC, RDW, MPV, NRBC  Imaging/EKG Studies: Results: I have personally reviewed pertinent reports      Other Studies: no other studies  VTE Prophylaxis: SCDs and SQH

## 2021-05-15 NOTE — DISCHARGE SUMMARY
Discharge Summary - Rajinder Singleton 80 y o  male MRN: [id number]    Unit/Bed#: S -01 Encounter: [id number]    Admission Date:   Admission Orders (From admission, onward)     Ordered        05/11/21 2207  Inpatient Admission  Once                     Admitting Diagnosis: Subdural hematoma ([facility] [address] ) [S06 5X9A]  Weakness [R53 1]    HPI: Per Neela Rogers, "Rajinder Singleton is a 80 y o  male with past medical history of CKD 5, IDDM type 2, hypertension, BPH, AFib on Coumadin who presents to [facility] in [facility] with weakness  He was reportedly struggling to get into his house with obvious weakness to a passing by off duty state  when the officer offered to help the patient get into the house  While assisting the patient the office or noted that the patient had a hard time moving his legs with increasing weakness until the patient gets legs gave out while the officer was holding him up  It is unclear if the patient struck his head on a wall at that time  Patient had obvious left-sided weakness to the officer who then insisted the patient come to the ER for evaluation  Patient's wife reports he may have fallen 3 weeks ago however she does not remember clearly  He denies other traumatic injuries in the last several weeks  He denies headaches, focal weakness, changes to speech, mental status changes  He denies changes in vision, nausea, vomiting, abdominal pain, chest pain, shortness of breath, or extremity pain  Mechanism:Other:  Unknown"    Procedures Performed: No orders of the defined types were placed in this encounter  Summary of Hospital Course:  Patient is an 24-year-old male who comes in status post fall and weakness  Evaluated and noted to have traumatic brain injury  He was brought to the ICU secondary to weakness and mental status upon arrival   Patient was monitored in the ICU and on day 2 in the hospital; patient was noted to have a worsening traumatic brain injury  He was subsequently kept in the ICU for another day of which is repeat head CT was stable  He was sent out to the floor  PT and OT cleared the patient  Neurosurgery recommended outpatient follow-up  Patient was then discharged in stable condition to rehab facility  Significant Findings, Care, Treatment and Services Provided: Xr Chest 1 View Portable    Result Date: 5/12/2021  Impression: Stable chest   Calcified bilateral pleural plaques with no acute interval change  If clinically warranted, assessment of the lung parenchyma would be best evaluated with CT imaging given superimposed plaques  Workstation performed: [id number]     Ct Head Wo Contrast    Result Date: 5/13/2021  Impression: Stable mixed attenuating right hemispheric subdural collection with mild redistribution and stable mass effect on the underlying cerebral hemisphere  Stable findings of remote right MCA distribution infarction, moderate generalized atrophy and cerebral chronic microangiopathic disease  Workstation performed: [id number]     Ct Head Wo Contrast    Result Date: 5/12/2021  Impression: Mixed density, moderate sized subdural hematoma within the right hemisphere with an increase in acute blood products noted posteriorly in the parietal region  Stable encephalomalacia and gliosis within the right mid to posterior MCA territory  Workstation performed: [id number]     Ct Head Without Contrast    Result Date: 5/12/2021  Impression: 1  Right extra-axial heterogeneous collection measuring up to 2 0 cm in thickness and resulting in mass effect with slight leftward 3 mm midline shift  Mixed attenuation suggesting acute on chronic blood products  Short-term interval follow-up study recommended to ensure stability  2   Chronic microangiopathic changes  These findings were discussed with SALONI WASHINGTON by my colleague Dr Mathew Moore on 5/11/2021 at 9:08 PM  The study was marked in [facility] for immediate notification   Workstation performed: [id number]     Ct Spine Cervical Wo Contrast    Result Date: 5/12/2021  Impression: Moderate diffuse cervical degenerative change with moderate to severe foraminal narrowing  Multilevel mild canal stenosis  No acute osseous abnormalities  Workstation performed: [id number]      Right Upper Quadrant    Result Date: 5/11/2021  Impression: Cholelithiasis and gallbladder sludge  Small right kidney  Workstation performed: [id number]       Complications: no complications    Discharge Diagnosis:   - subdural hematoma  - insulin-dependent type 2 diabetes  - paroxysmal atrial fibrillation  - coagulopathy secondary to Coumadin  - CKD    Resolved Problems  Date Reviewed: 5/15/2021    None          Condition at Discharge: good         Discharge instructions/Information to patient and family:   See after visit summary for information provided to patient and family  Provisions for Follow-Up Care:  See after visit summary for information related to follow-up care and any pertinent home health orders  PCP: Lio Zhong MD    Disposition: Rehab    Planned Readmission: No      Discharge Statement   I spent 23 minutes discharging the patient  This time was spent on the day of discharge  I had direct contact with the patient on the day of discharge  Additional documentation is required if more than 30 minutes were spent on discharge  Discharge Medications:  See after visit summary for reconciled discharge medications provided to patient and family

## 2021-05-16 NOTE — CASE MANAGEMENT
DC plan to S confirmed with rob June at [facility]  Transportation arranged by previous CM vis [facility] for today via [address] [facility], CM confirmed transport with Edvin Penny at [facility]  CM notified pt, pt's family, pt's nurse and physician

## 2021-05-17 ENCOUNTER — NURSING HOME VISIT (OUTPATIENT)
Dept: GERIATRICS | Facility: OTHER | Age: 86
End: 2021-05-17
Payer: MEDICARE

## 2021-05-17 ENCOUNTER — TELEPHONE (OUTPATIENT)
Dept: FAMILY MEDICINE CLINIC | Facility: CLINIC | Age: 86
End: 2021-05-17

## 2021-05-17 DIAGNOSIS — E78.2 MIXED HYPERLIPIDEMIA: ICD-10-CM

## 2021-05-17 DIAGNOSIS — E55.9 VITAMIN D INSUFFICIENCY: ICD-10-CM

## 2021-05-17 DIAGNOSIS — R39.11 BENIGN PROSTATIC HYPERPLASIA WITH URINARY HESITANCY: ICD-10-CM

## 2021-05-17 DIAGNOSIS — R26.2 AMBULATORY DYSFUNCTION: ICD-10-CM

## 2021-05-17 DIAGNOSIS — I50.32 CHRONIC DIASTOLIC HEART FAILURE (HCC): ICD-10-CM

## 2021-05-17 DIAGNOSIS — I48.0 PAROXYSMAL A-FIB (HCC): ICD-10-CM

## 2021-05-17 DIAGNOSIS — S06.5X9A SUBDURAL HEMATOMA (HCC): Primary | ICD-10-CM

## 2021-05-17 DIAGNOSIS — N18.5 TYPE 2 DIABETES MELLITUS WITH STAGE 5 CHRONIC KIDNEY DISEASE NOT ON CHRONIC DIALYSIS, WITH LONG-TERM CURRENT USE OF INSULIN (HCC): ICD-10-CM

## 2021-05-17 DIAGNOSIS — E03.9 HYPOTHYROIDISM, UNSPECIFIED TYPE: ICD-10-CM

## 2021-05-17 DIAGNOSIS — N18.5 CKD (CHRONIC KIDNEY DISEASE) STAGE 5, GFR LESS THAN 15 ML/MIN (HCC): ICD-10-CM

## 2021-05-17 DIAGNOSIS — R60.1 GENERALIZED EDEMA: ICD-10-CM

## 2021-05-17 DIAGNOSIS — E11.22 TYPE 2 DIABETES MELLITUS WITH STAGE 5 CHRONIC KIDNEY DISEASE NOT ON CHRONIC DIALYSIS, WITH LONG-TERM CURRENT USE OF INSULIN (HCC): ICD-10-CM

## 2021-05-17 DIAGNOSIS — Z79.4 TYPE 2 DIABETES MELLITUS WITH STAGE 5 CHRONIC KIDNEY DISEASE NOT ON CHRONIC DIALYSIS, WITH LONG-TERM CURRENT USE OF INSULIN (HCC): ICD-10-CM

## 2021-05-17 DIAGNOSIS — I10 ESSENTIAL HYPERTENSION: ICD-10-CM

## 2021-05-17 DIAGNOSIS — I25.10 3-VESSEL CORONARY ARTERY DISEASE: ICD-10-CM

## 2021-05-17 DIAGNOSIS — N40.1 BENIGN PROSTATIC HYPERPLASIA WITH URINARY HESITANCY: ICD-10-CM

## 2021-05-17 PROCEDURE — 99306 1ST NF CARE HIGH MDM 50: CPT | Performed by: FAMILY MEDICINE

## 2021-05-17 RX ORDER — TORSEMIDE 20 MG/1
TABLET ORAL
Qty: 30 TABLET | Refills: 1 | Status: SHIPPED | OUTPATIENT
Start: 2021-05-17 | End: 2021-06-11

## 2021-05-17 NOTE — ASSESSMENT & PLAN NOTE
· Follows with Cardiology  · Currently asymptomatic  · Continue atorvastatin  · No AC/AP until cleared by Neurosurgery

## 2021-05-17 NOTE — PROGRESS NOTES
[facility]  [id number] [facility]  [phone]      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, benign prostatic hyperplasia, atrial fibrillation on Coumadin admitted to [facility] 2021-05/15/2021 after he presented to the ED with complaints of weakness  Per hospital records reviewed, patient was found by an off duty  while attempting to enter his own house with difficulty   was assisting patient into his home and noted patient had a hard time moving his legs with increased weakness  The patient's legs gave out while the officer was holding him up  It was unclear whether patient had hit his head and he was taken to the ER for further evaluation  Of note, patient's wife had reported a full about 3 weeks prior to ER evaluation  Initial evaluation in the ED included a CT of the head without contrast that showed a mixed density, moderate sized subdural hematoma within the right hemisphere with an increase in acute blood products noted posteriorly in the parietal region  Stable encephalomalacia and gliosis within the right mid to posterior MCA territory  CT of the C-spine showed moderate diffuse cervical degenerative change with moderate to severe foraminal narrowing  Multilevel mild canal stenosis  No acute osseous abnormalities      Repeat CT of the head on 2021 showed mixed density, moderate sized subdural hematoma within the right hemisphere with an increase in acute blood products noted posteriorly in the parietal region  Patient was admitted to the ICU, warfarin was reversed  Evaluated by neuro surgery who recommended seizure prophylaxis and frequent neuro monitoring  Repeat CT of the head done 05/13/2021 showed stable right hemispheric mixed density as DH and slightly with distributed right parietal acute hemorrhage seen on previous CT of the head done on 05/12/2021  Per Neurosurgery recommendation, no anticoagulation or anti-platelet therapy and follow up in neurosurgery office outpatient with repeat CT of the head  Review of Systems     Review of Systems   Constitutional: Positive for activity change, appetite change and fatigue  Negative for unexpected weight change  HENT: Negative for congestion, dental problem, hearing loss, mouth sores, trouble swallowing and voice change  Eyes: Negative for visual disturbance  Respiratory: Negative for cough, chest tightness and shortness of breath  Cardiovascular: Negative for chest pain, palpitations and leg swelling  Gastrointestinal: Negative for abdominal distention, abdominal pain, constipation, diarrhea, nausea and vomiting  Genitourinary: Negative for dysuria  Musculoskeletal: Positive for gait problem  Negative for arthralgias and back pain  Skin: Negative for color change, pallor, rash and wound  Neurological: Negative for weakness and light-headedness  All other systems reviewed and are negative        History   No Known Allergies    Past Medical History:   Diagnosis Date    3-vessel coronary artery disease     last assessed: 08/15/2016    BPH (benign prostatic hyperplasia)     Chronic kidney disease     Diabetes mellitus ([facility] [address] )     Hyperlipidemia     Hypertension     Shingles 04/29/2019    SOB (shortness of breath)     Stroke [facility])      Past Surgical History:   Procedure Laterality Date    CARDIAC PACEMAKER PLACEMENT      CATARACT EXTRACTION      last assessed: 11/11/2015    CORONARY ARTERY BYPASS GRAFT      last assessed: 08/26/2015       Family History: non-contributory  Social History     Tobacco Use    Smoking status: Never Smoker    Smokeless tobacco: Never Used   Substance Use Topics    Alcohol use: Not Currently     Frequency: Never    Drug use: No         He lives  In a 2 story home with wife  There are 2 steps to enter the home, patient has sister glide to 2nd floor  Prior to admission, patient was requiring some assistance with ADLs and ambulating with rolling walker  Objective   Vital Signs   BP:   131/73    HR: 77    T: 98 2°    RR: 18    O2Sat: 94% and I     W: 168 lb    Physical Exam  Vitals signs reviewed  Constitutional:       General: He is not in acute distress  Appearance: He is well-developed  He is not diaphoretic  HENT:      Head: Normocephalic and atraumatic  Right Ear: External ear normal       Left Ear: External ear normal       Mouth/Throat:      Pharynx: No oropharyngeal exudate  Eyes:      General: No scleral icterus  Conjunctiva/sclera: Conjunctivae normal       Pupils: Pupils are equal, round, and reactive to light  Neck:      Musculoskeletal: Normal range of motion and neck supple  Thyroid: No thyromegaly  Vascular: No JVD  Cardiovascular:      Rate and Rhythm: Normal rate and regular rhythm  Heart sounds: Murmur present  Pulmonary:      Effort: Pulmonary effort is normal  No respiratory distress  Breath sounds: Normal breath sounds  Abdominal:      General: Bowel sounds are normal  There is no distension  Palpations: Abdomen is soft  Tenderness: There is no abdominal tenderness  Musculoskeletal: Normal range of motion  General: No tenderness or deformity  Lymphadenopathy:      Cervical: No cervical adenopathy  Skin:     General: Skin is warm and dry  Coloration: Skin is not pale  Findings: No erythema or rash     Neurological:      Mental Status: He is alert and oriented to person, place, and time  Cranial Nerves: No cranial nerve deficit  Motor: No abnormal muscle tone  Deep Tendon Reflexes: Reflexes are normal and symmetric  Reflexes normal    Psychiatric:         Behavior: Behavior normal          Thought Content: Thought content normal          Judgment: Judgment normal            Pertinent Laboratory/Diagnostic Studies:     Lab Results   Component Value Date    WBC 8 02 05/14/2021    HGB 9 3 (L) 05/14/2021    HCT 29 9 (L) 05/14/2021    MCV 99 (H) 05/14/2021     05/14/2021     Lab Results   Component Value Date    GLUCOSE 161 (H) 01/05/2016    CALCIUM 8 5 05/14/2021     01/05/2016    K 4 1 05/14/2021    CO2 26 05/14/2021     05/14/2021    BUN 54 (H) 05/14/2021    CREATININE 4 39 (H) 05/14/2021     Lab Results   Component Value Date    ILF4LVAEUZXM 3 280 04/22/2021     Lab Results   Component Value Date    HGBA1C 6 4 04/22/2021         Current Medications     All medications reviewed and updated in EMR/Chart  Assessment and Plan     Subdural hematoma ([facility] [address] )  · Patient admitted to the hospital with acute change in mental status and hx of prior fall about 3 weeks prior  · He also had a fall on the day of presentation, uncertain if head trauma was sustained  · CT H on admission showed a mixed density, moderate sized subdural hematoma within the right hemisphere  · Serial CT Hs were monitored  · Patient evaluated by Neurosurgery, no acute surgical intervention recommended  · He is to complete a 7 day course of Keppra for seizure prophylaxis  · Recommendation made to hold AC/AP x 2 weeks or until cleared by Neurosurgery  · He will follow up in their office with repeat CT H    · PT/OT to evaluate and treat as appropriate  Essential hypertension  · Blood pressure currently stable and well controlled  · Continue current management with metoprolol tartrate  Paroxysmal A-fib (HCC)  · Rate controlled   Continue metoprolol  · Per records reviewed, patient had previously failed Eliquis, had a stroke while taking medication  · He had been on Coumadin for anticoagulation  Currently on hold due to SDH  · Hold AC/AP until cleared by Neurosurgery  Chronic diastolic heart failure (HCC)  Wt Readings from Last 3 Encounters:   05/15/21 77 7 kg (171 lb 3 2 oz)   21 78 9 kg (174 lb)   21 77 6 kg (171 lb)       · euvolemic on exam   · Continue torsemide 20 mg daily  · Check BMP to monitor renal function and electrolytes  3-vessel coronary artery disease  · Follows with Cardiology  · Currently asymptomatic  · Continue atorvastatin  · No AC/AP until cleared by Neurosurgery  CKD (chronic kidney disease) stage 5, GFR less than 15 ml/min [facility])  Lab Results   Component Value Date    EGFR 2021    EGFR 11 2021    EGFR 10 2021    CREATININE 4 39 (H) 2021    CREATININE 4 52 (H) 2021    CREATININE 4 78 (H) 2021   · continue to avoid nephrotoxins and hypotension as possible  · Patient follows with Dr Tyesha Cabrera of Nephrology  · Follow up as outpatient  Benign prostatic hyperplasia with urinary hesitancy  · Voiding without difficulty  · Continue tamsulosin and finasteride  Vitamin D insufficiency  · Continue vitamin D supplements daily  Type 2 diabetes mellitus, with long-term current use of insulin ([facility])    Lab Results   Component Value Date    HGBA1C 6 4 2021   · well controlled  · Continue tradjenta  Hypothyroidism  · Continue levothyroxine 75 mcg daily  Hyperlipidemia  · Continue atorvastatin  Ambulatory dysfunction/generalized weakness  · PT/OT to evaluate and treat as appropriate  · Fall precautions recommended  The following findings require follow up:  Radiographic finding              Findin  Calcified bilateral pleural plaques with no acute interval change      2   Cholelithiasis and gallbladder sludge              Follow up required: yes              Follow up should be done within 2-4 week(s)      [address]  05/17/21    Please note:  Voice-recognition software may have been used in the preparation of this document  Occasional wrong word or "sound-alike" substitutions may have occurred due to the inherent limitations of voice recognition software  Interpretation should be guided by context

## 2021-05-17 NOTE — TELEPHONE ENCOUNTER
Patient's wife wanted you to know that he  is in [facility]     And he is no longer taking Coumadin because of his red bleed   If you have any questions please call her

## 2021-05-17 NOTE — ASSESSMENT & PLAN NOTE
· Rate controlled  Continue metoprolol  · Per records reviewed, patient had previously failed Eliquis, had a stroke while taking medication  · He had been on Coumadin for anticoagulation  Currently on hold due to SDH  · Hold AC/AP until cleared by Neurosurgery

## 2021-05-17 NOTE — ASSESSMENT & PLAN NOTE
Lab Results   Component Value Date    EGFR 11 05/14/2021    EGFR 11 05/13/2021    EGFR 10 05/12/2021    CREATININE 4 39 (H) 05/14/2021    CREATININE 4 52 (H) 05/13/2021    CREATININE 4 78 (H) 05/12/2021   · continue to avoid nephrotoxins and hypotension as possible  · Patient follows with Dr Celestina Puentes of Nephrology  · Follow up as outpatient

## 2021-05-17 NOTE — ASSESSMENT & PLAN NOTE
· Patient admitted to the hospital with acute change in mental status and hx of prior fall about 3 weeks prior  · He also had a fall on the day of presentation, uncertain if head trauma was sustained  · CT H on admission showed a mixed density, moderate sized subdural hematoma within the right hemisphere  · Serial CT Hs were monitored  · Patient evaluated by Neurosurgery, no acute surgical intervention recommended  · He is to complete a 7 day course of Keppra for seizure prophylaxis  · Recommendation made to hold AC/AP x 2 weeks or until cleared by Neurosurgery  · He will follow up in their office with repeat CT H    · PT/OT to evaluate and treat as appropriate

## 2021-05-17 NOTE — ASSESSMENT & PLAN NOTE
Wt Readings from Last 3 Encounters:   05/15/21 77 7 kg (171 lb 3 2 oz)   04/22/21 78 9 kg (174 lb)   01/11/21 77 6 kg (171 lb)       · euvolemic on exam   · Continue torsemide 20 mg daily  · Check BMP to monitor renal function and electrolytes

## 2021-05-17 NOTE — ASSESSMENT & PLAN NOTE
· Blood pressure currently stable and well controlled  · Continue current management with metoprolol tartrate

## 2021-05-20 ENCOUNTER — NURSING HOME VISIT (OUTPATIENT)
Dept: GERIATRICS | Facility: OTHER | Age: 86
End: 2021-05-20
Payer: MEDICARE

## 2021-05-20 DIAGNOSIS — S06.5X9A SUBDURAL HEMATOMA (HCC): Primary | ICD-10-CM

## 2021-05-20 DIAGNOSIS — I10 ESSENTIAL HYPERTENSION: ICD-10-CM

## 2021-05-20 DIAGNOSIS — N18.5 CKD (CHRONIC KIDNEY DISEASE) STAGE 5, GFR LESS THAN 15 ML/MIN (HCC): ICD-10-CM

## 2021-05-20 DIAGNOSIS — I50.32 CHRONIC DIASTOLIC HEART FAILURE (HCC): ICD-10-CM

## 2021-05-20 DIAGNOSIS — R79.89 ELEVATED LFTS: ICD-10-CM

## 2021-05-20 DIAGNOSIS — I48.0 PAROXYSMAL A-FIB (HCC): ICD-10-CM

## 2021-05-20 PROCEDURE — 99309 SBSQ NF CARE MODERATE MDM 30: CPT | Performed by: FAMILY MEDICINE

## 2021-05-20 NOTE — ASSESSMENT & PLAN NOTE
· Rate controlled  · Metoprolol as above  · Previously on coumadin for [facility], continue to hold until cleared by Neurosurgery

## 2021-05-20 NOTE — ASSESSMENT & PLAN NOTE
· Unclear etiology  · Abdominal exam benign  · RUQ US done  in ER showed cholelithiasis without evidence of acute cholecystitis  · Continue to hold statins

## 2021-05-20 NOTE — ASSESSMENT & PLAN NOTE
Lab Results   Component Value Date    EGFR 11 05/14/2021    EGFR 11 05/13/2021    EGFR 10 05/12/2021    CREATININE 4 39 (H) 05/14/2021    CREATININE 4 52 (H) 05/13/2021    CREATININE 4 78 (H) 05/12/2021   · continue to avoid nephrotoxins and hypotension as possible  · Patient follows with Dr Tracie Xiong of Nephrology  · Follows with Dr Tracie Xiong of Nephrology

## 2021-05-20 NOTE — ASSESSMENT & PLAN NOTE
Wt Readings from Last 3 Encounters:   05/15/21 77 7 kg (171 lb 3 2 oz)   04/22/21 78 9 kg (174 lb)   01/11/21 77 6 kg (171 lb)     · Patient is euvolemic on exam   · Continue Torsemide 20 mg po dialy  · Continue to monitor renal function and electrolytes

## 2021-05-20 NOTE — ASSESSMENT & PLAN NOTE
· Patient admitted to the hospital with acute change in mental status and hx of prior fall about 3 weeks prior  · He also had a fall on the day of presentation, uncertain if head trauma was sustained  · CT H on admission showed a mixed density, moderate sized subdural hematoma within the right hemisphere  · Serial CT Hs were monitored  · Patient evaluated by Neurosurgery, no acute surgical intervention recommended  · He is to complete a 7 day course of Keppra for seizure prophylaxis  · Recommendation made to hold AC/AP x 2 weeks or until cleared by Neurosurgery  · He will follow up in their office with repeat CT H    · Continue PT/OT  Anya Allen

## 2021-05-20 NOTE — PROGRESS NOTES
[address] [facility]  [address] [facility]  [phone]    [facility]  PROGRESS NOTE        NAME: Mayte Berrios  AGE: 80 y o  SEX: male  :  1932  DATE OF ENCOUNTER: 2021  POS: 31 (SNF)    Assessment and Plan     Subdural hematoma ([address] )  · Patient admitted to the hospital with acute change in mental status and hx of prior fall about 3 weeks prior  · He also had a fall on the day of presentation, uncertain if head trauma was sustained  · CT H on admission showed a mixed density, moderate sized subdural hematoma within the right hemisphere  · Serial CT Hs were monitored  · Patient evaluated by Neurosurgery, no acute surgical intervention recommended  · He is to complete a 7 day course of Keppra for seizure prophylaxis  · Recommendation made to hold AC/AP x 2 weeks or until cleared by Neurosurgery  · He will follow up in their office with repeat CT H    · Continue PT/OT        CKD (chronic kidney disease) stage 5, GFR less than 15 ml/min [facility])  Lab Results   Component Value Date    EGFR 11 2021    EGFR 11 2021    EGFR 10 2021    CREATININE 4 39 (H) 2021    CREATININE 4 52 (H) 2021    CREATININE 4 78 (H) 2021   · continue to avoid nephrotoxins and hypotension as possible  · Patient follows with Dr Basia Lima of Nephrology  · Follows with Dr Basia Lima of Nephrology  Essential hypertension  · Blood pressure currently elevated  Trends reviewed, BP elevated in the past 2 days above goal   · Will increase metoprolol to 37 5 mg po BID  Chronic diastolic heart failure (HCC)  Wt Readings from Last 3 Encounters:   05/15/21 77 7 kg (171 lb 3 2 oz)   21 78 9 kg (174 lb)   21 77 6 kg (171 lb)     · Patient is euvolemic on exam   · Continue Torsemide 20 mg po dialy  · Continue to monitor renal function and electrolytes  Paroxysmal A-fib (HCC)  · Rate controlled  · Metoprolol as above    · Previously on coumadin for [facility], continue to hold until cleared by Neurosurgery  Elevated LFTs  · Unclear etiology  · Abdominal exam benign  · RUQ US done  in ER showed cholelithiasis without evidence of acute cholecystitis  · Continue to hold statins  Brian Poe MD  Geriatric Medicine  05/20/2021       Chief Complaint   Patient seen and examined for follow up on chronic conditions  History of Present Illness      80year-old gentleman with underlying CKD 5, insulin-dependent diabetes type 2, hypertension, 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, benign prostatic hyperplasia, atrial fibrillation on Coumadin admitted to [facility] 05/11/2021-05/15/2021 after he presented to the ED with complaints of weakness  He was admitted for further work up  Initial evaluation in the ED included a CT of the head without contrast that showed a mixed density, moderate sized subdural hematoma within the right hemisphere with an increase in acute blood products noted posteriorly in the parietal region  Stable encephalomalacia and gliosis within the right mid to posterior MCA territory  CT of the C-spine showed moderate diffuse cervical degenerative change with moderate to severe foraminal narrowing  Multilevel mild canal stenosis  No acute osseous abnormalities      Repeat CT of the head on 05/12/2021 showed mixed density, moderate sized subdural hematoma within the right hemisphere with an increase in acute blood products noted posteriorly in the parietal region      Patient was admitted to the ICU, warfarin was reversed  Evaluated by neuro surgery who recommended seizure prophylaxis and frequent neuro monitoring  Repeat CT of the head done 05/13/2021 showed stable right hemispheric mixed density as DH and slightly with distributed right parietal acute hemorrhage seen on previous CT of the head done on 05/12/2021    Per Neurosurgery recommendation, no anticoagulation or anti-platelet therapy and follow up in neurosurgery office outpatient with repeat CT of the head  He reports feeling well and would like to go home soon  He has been tolerating therapy well  Meal completion ranging between %  The following portions of the patient's history were reviewed and updated as appropriate: allergies, current medications, past family history, past medical history, past social history, past surgical history and problem list     Review of Systems     A complete review of systems was performed  All negative, except as per HPI  History     Past Medical History:   Diagnosis Date    3-vessel coronary artery disease     last assessed: 08/15/2016    BPH (benign prostatic hyperplasia)     Chronic kidney disease     Diabetes mellitus ([facility] [address] )     Hyperlipidemia     Hypertension     Shingles 04/29/2019    SOB (shortness of breath)     Stroke ([facility])      No Known Allergies    Objective     Vital Signs  BP: 160/71       HR:75 T:97 3F    RR:200 O2Sat: 99% on RA W:171 lb    Physical Exam  GEN: NAD  Non-toxic appearing  Looks frail  HEENT: NC/AT  NATHALIA  EIOMI  Oropharynx moist and clear  No oral lesions  CV: S1, S2   RRR  +murmur appreciated  PULM: Non-labored respirations  CTA bilaterally  ABD: Soft, NT/ND  BSx4  EXT: No peripheral edema  NEURO:  Awake, alert and oriented x 3         Pertinent Laboratory/Diagnostic Studies     05/20/2021  CBC WITH DIFF  HEMOGLOBIN 9 8 g/dL 12 5-17 0 L Final  HEMATOCRIT 29 6 % 37 0-48 0 L Final  WBC 9 7 thou/cmm 4 0-10 5 Final  RBC 3 13 mill/cmm 4 00-5 40 L Final  PLATELET COUNT 043 thou/cmm 140-350 Final  MPV 7 7 fL 7 5-11 3 Final  MCV 94 fL  Final  MCH 31 3 pg 27 0-36 0 Final  MCHC 33 2 g/dL 32 0-37 0 Final  RDW 14 3 % 12 0-16 0 Final  DIFFERENTIAL TYPE AUTO Final  ABSOLUTE NEUT 6 5 thou/cmm 1 8-7 8 Final  ABSOLUTE LYMPH 2 2 thou/cmm 1 0-3 0 Final  ABSOLUTE MONO 0 7 thou/cmm 0 3-1 0 Final  ABSOLUTE EOS 0 4 thou/cmm 0 0-0 5 Final  ABSOLUTE BASO 0 1 thou/cmm 0 0-0 1 Final  NEUTROPHILS 66 % Final  LYMPHOCYTES 22 % Final  MONOCYTES 7 % Final  EOSINOPHILS 4 % Final  BASOPHILS 1 % Final    10/28/6884 BASIC METABOLIC PNL  GLUCOSE 665 mg/dL 65-99 H Final  BUN 73 mg/dL 7-28 H Final  CREATININE 4 03 mg/dL 0 53-1 30 H Final  SODIUM 139 mmol/L 135-145 Final  POTASSIUM 4 4 mmol/L 3 5-5 2 Final  CHLORIDE 105 mmol/L 100-109 Final  CARBON DIOXIDE 24 mmol/L 23-31 Final  CALCIUM 8 6 mg/dL 8 5-10 1 Final  ANION GAP 10 3-11 Final  eGFR, NON  AM 12 >60 L Final  eGFR,  AMER 14 >60 L Final    Current Medications     Current Medications Reviewed and updated in EMR  Monthly orders reviewed and signed in chart  Please note:  Voice-recognition software may have been used in the preparation of this document  Occasional wrong word or "sound-alike" substitutions may have occurred due to the inherent limitations of voice recognition software  Interpretation should be guided by context

## 2021-05-20 NOTE — ASSESSMENT & PLAN NOTE
· Blood pressure currently elevated  Trends reviewed, BP elevated in the past 2 days above goal   · Will increase metoprolol to 37 5 mg po BID

## 2021-05-22 ENCOUNTER — TRANSCRIBE ORDERS (OUTPATIENT)
Dept: ADMINISTRATIVE | Facility: HOSPITAL | Age: 86
End: 2021-05-22

## 2021-05-24 ENCOUNTER — HOSPITAL ENCOUNTER (OUTPATIENT)
Dept: RADIOLOGY | Facility: HOSPITAL | Age: 86
Discharge: HOME | End: 2021-05-24
Payer: MEDICARE

## 2021-05-24 DIAGNOSIS — S06.5X9A SDH (SUBDURAL HEMATOMA) (HCC): ICD-10-CM

## 2021-05-24 PROCEDURE — G1004 CDSM NDSC: HCPCS

## 2021-05-24 PROCEDURE — 70450 CT HEAD/BRAIN W/O DYE: CPT

## 2021-05-27 ENCOUNTER — NURSING HOME VISIT (OUTPATIENT)
Dept: GERIATRICS | Facility: OTHER | Age: 86
End: 2021-05-27
Payer: MEDICARE

## 2021-05-27 DIAGNOSIS — N40.1 BENIGN PROSTATIC HYPERPLASIA WITH URINARY HESITANCY: ICD-10-CM

## 2021-05-27 DIAGNOSIS — N18.5 TYPE 2 DIABETES MELLITUS WITH STAGE 5 CHRONIC KIDNEY DISEASE NOT ON CHRONIC DIALYSIS, WITH LONG-TERM CURRENT USE OF INSULIN (HCC): ICD-10-CM

## 2021-05-27 DIAGNOSIS — I48.0 PAROXYSMAL A-FIB (HCC): ICD-10-CM

## 2021-05-27 DIAGNOSIS — E11.22 TYPE 2 DIABETES MELLITUS WITH STAGE 5 CHRONIC KIDNEY DISEASE NOT ON CHRONIC DIALYSIS, WITH LONG-TERM CURRENT USE OF INSULIN (HCC): ICD-10-CM

## 2021-05-27 DIAGNOSIS — R79.89 ELEVATED LFTS: ICD-10-CM

## 2021-05-27 DIAGNOSIS — N18.5 CKD (CHRONIC KIDNEY DISEASE) STAGE 5, GFR LESS THAN 15 ML/MIN (HCC): ICD-10-CM

## 2021-05-27 DIAGNOSIS — R39.11 BENIGN PROSTATIC HYPERPLASIA WITH URINARY HESITANCY: ICD-10-CM

## 2021-05-27 DIAGNOSIS — N18.4 ANEMIA DUE TO STAGE 4 CHRONIC KIDNEY DISEASE (HCC): ICD-10-CM

## 2021-05-27 DIAGNOSIS — R26.2 AMBULATORY DYSFUNCTION: ICD-10-CM

## 2021-05-27 DIAGNOSIS — I10 ESSENTIAL HYPERTENSION: Primary | ICD-10-CM

## 2021-05-27 DIAGNOSIS — Z79.4 TYPE 2 DIABETES MELLITUS WITH STAGE 5 CHRONIC KIDNEY DISEASE NOT ON CHRONIC DIALYSIS, WITH LONG-TERM CURRENT USE OF INSULIN (HCC): ICD-10-CM

## 2021-05-27 DIAGNOSIS — S06.5X9A SUBDURAL HEMATOMA (HCC): ICD-10-CM

## 2021-05-27 DIAGNOSIS — D63.1 ANEMIA DUE TO STAGE 4 CHRONIC KIDNEY DISEASE (HCC): ICD-10-CM

## 2021-05-27 DIAGNOSIS — I25.10 3-VESSEL CORONARY ARTERY DISEASE: ICD-10-CM

## 2021-05-27 DIAGNOSIS — I50.32 CHRONIC DIASTOLIC HEART FAILURE (HCC): ICD-10-CM

## 2021-05-27 PROCEDURE — 99309 SBSQ NF CARE MODERATE MDM 30: CPT | Performed by: FAMILY MEDICINE

## 2021-05-27 NOTE — ASSESSMENT & PLAN NOTE
· Participating with PT/OT and making adequate gains    · Currently able to ambulate 75 feet with RW and contact guard/stand-by assist

## 2021-05-27 NOTE — ASSESSMENT & PLAN NOTE
Lab Results   Component Value Date    HGBA1C 6 4 04/22/2021   · currently stable  · Continue tradjenta  · Monitor Accuchecks

## 2021-05-27 NOTE — ASSESSMENT & PLAN NOTE
· BP trends reviewed, systolics ranging in the 7747-942G, HR ranging between high 60s and high 70s  · Will increase metoprolol to 37 5 mg po BID, holding parameters in place

## 2021-05-27 NOTE — ASSESSMENT & PLAN NOTE
· Chronic  In the setting of CKD 5   · Continue to follow up with Nephrology  · Last hgb done 5/20 stable at 9 8  · Continue to monitor

## 2021-05-27 NOTE — ASSESSMENT & PLAN NOTE
Lab Results   Component Value Date    EGFR 11 05/14/2021    EGFR 11 05/13/2021    EGFR 10 05/12/2021    CREATININE 4 39 (H) 05/14/2021    CREATININE 4 52 (H) 05/13/2021    CREATININE 4 78 (H) 05/12/2021   · last creatinine checked 5/20/21, stable at 4 03   · Continue to avoid nephrotoxins and hypotension as possible  · Follow up with Dr Miguel Barajas of Nephrology 6/11/21

## 2021-05-27 NOTE — ASSESSMENT & PLAN NOTE
Wt Readings from Last 3 Encounters:   05/15/21 77 7 kg (171 lb 3 2 oz)   04/22/21 78 9 kg (174 lb)   01/11/21 77 6 kg (171 lb)     · Euvolemic on exam   · Continue Torsemide 20 mg daily  · Continue to monitor renal function and electrolytes

## 2021-05-27 NOTE — ASSESSMENT & PLAN NOTE
· Rate controlled, metoprolol as above  · Coumadin on hold due to recent SDH until cleared by Neurosurgery

## 2021-05-27 NOTE — ASSESSMENT & PLAN NOTE
· He has now completed 7 day course of Keppra for seizure prophylaxis  · No AC/AP until cleared by Neurosurgery  · Has f/u appointment scheduled for 6/2/21

## 2021-05-27 NOTE — ASSESSMENT & PLAN NOTE
· Unclear etiology  · RUQ US in the hospital showed cholelithiasis without evidence of acute cholecystitis  · Continue to hold statins for now  · Monitor LFTs

## 2021-05-27 NOTE — PROGRESS NOTES
[facility]  [address] [facility]  [address] [facility]  [phone]    [facility]  PROGRESS NOTE        NAME: Diane [facility]  AGE: 80 y o  SEX: male  :  1932  DATE OF ENCOUNTER:  2021  POS: 31 (SNF)    Assessment and Plan     Essential hypertension  · BP trends reviewed, systolics ranging in the 8696-489L, HR ranging between high 60s and high 70s  · Will increase metoprolol to 37 5 mg po BID, holding parameters in place  Chronic diastolic heart failure (HCC)  Wt Readings from Last 3 Encounters:   05/15/21 77 7 kg (171 lb 3 2 oz)   21 78 9 kg (174 lb)   21 77 6 kg (171 lb)     · Euvolemic on exam   · Continue Torsemide 20 mg daily  · Continue to monitor renal function and electrolytes  3-vessel coronary artery disease  · Remains asymptomatic  · Follow up with Cardiology  · Continue statin  · No AC/AP until cleared by Neurosurgery  Subdural hematoma (HCC)  · He has now completed 7 day course of Keppra for seizure prophylaxis  · No AC/AP until cleared by Neurosurgery  · Has f/u appointment scheduled for 21  CKD (chronic kidney disease) stage 5, GFR less than 15 ml/min [facility])  Lab Results   Component Value Date    EGFR 11 2021    EGFR 11 2021    EGFR 10 2021    CREATININE 4 39 (H) 2021    CREATININE 4 52 (H) 2021    CREATININE 4 78 (H) 2021   · last creatinine checked 21, stable at 4 03   · Continue to avoid nephrotoxins and hypotension as possible  · Follow up with Dr Gayle Rivas of Nephrology 21  Benign prostatic hyperplasia with urinary hesitancy  · Voiding without difficulty  · Continue tamsulosin and finasteride  Anemia  · Chronic  In the setting of CKD 5   · Continue to follow up with Nephrology  · Last hgb done  stable at 9 8  · Continue to monitor  Elevated LFTs  · Unclear etiology     · RUQ US in the hospital showed cholelithiasis without evidence of acute cholecystitis  · Continue to hold statins for now  · Monitor LFTs  Ambulatory dysfunction/generalized weakness  · Participating with PT/OT and making adequate gains  · Currently able to ambulate 75 feet with RW and contact guard/stand-by assist      Paroxysmal A-fib (HCC)  · Rate controlled, metoprolol as above  · Coumadin on hold due to recent SDH until cleared by Neurosurgery  Type 2 diabetes mellitus, with long-term current use of insulin (HCC)    Lab Results   Component Value Date    HGBA1C 6 4 04/22/2021   · currently stable  · Continue tradjenta  · Monitor Accuchecks  Lindy Bain MD  Geriatric Medicine   05/27/2021       Chief Complaint   Patient seen and examined for follow up on chronic conditions  History of Present Illness     80year-old gentleman with underlying CKD 5, insulin-dependent diabetes type 2, hypertension, 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, benign prostatic hyperplasia, atrial fibrillation on Coumadin admitted to [facility] [place] 05/11/2021-05/15/2021 after he presented to the ED with complaints of weakness  He was admitted for further work up  Initial evaluation in the ED included a CT of the head without contrast that showed a mixed density, moderate sized subdural hematoma within the right hemisphere with an increase in acute blood products noted posteriorly in the parietal region  Stable encephalomalacia and gliosis within the right mid to posterior MCA territory  CT of the C-spine showed moderate diffuse cervical degenerative change with moderate to severe foraminal narrowing  Multilevel mild canal stenosis    No acute osseous abnormalities      Repeat CT of the head on 05/12/2021 showed mixed density, moderate sized subdural hematoma within the right hemisphere with an increase in acute blood products noted posteriorly in the parietal region      Patient was admitted to the ICU, warfarin was reversed  Evaluated by neuro surgery who recommended seizure prophylaxis and frequent neuro monitoring  Repeat CT of the head done 05/13/2021 showed stable right hemispheric mixed density as DH and slightly with distributed right parietal acute hemorrhage seen on previous CT of the head done on 05/12/2021  Per Neurosurgery recommendation, no anticoagulation or anti-platelet therapy and follow up in neurosurgery office outpatient with repeat CT of the head        Patient seen and examined today for follow up on acute and chronic conditions  He has been participating with therapy and making good progress  He is currently able to transfer with stand-by assist, ambulating 75 feet with RW with contact guard/stand by assist   Trace L LE edema and persistently elevated systolic BP readings in the 150's  Wife could not stay for visit, would just like to hear how he is progressing with OT, how the BP management is going, and about his swelling  Denies any fatigue, fever, HA, dizziness, lightheadedness, syncopal events, falls, chest pain, shortness of breath, cough, wheezing, weakness, or bowel/bladder incontinence  The following portions of the patient's history were reviewed and updated as appropriate: allergies, current medications, past family history, past medical history, past social history, past surgical history and problem list     Review of Systems     A complete review of systems was performed  All negative, except as per HPI      History     Past Medical History:   Diagnosis Date    3-vessel coronary artery disease     last assessed: 08/15/2016    BPH (benign prostatic hyperplasia)     Chronic kidney disease     Diabetes mellitus ([facility] [address] )     Hyperlipidemia     Hypertension     Shingles 04/29/2019    SOB (shortness of breath)     Stroke (HCC)      No Known Allergies    Objective     Vital Signs  BP:  143/76       HR: 81 T: 97 5°    RR: 20 O2Sat: 100% on RA W: 168 4 lb    Physical Exam  Constitutional:       General: He is not in acute distress  Appearance: Normal appearance  He is normal weight  He is not ill-appearing  Comments: Extremely pleasant 80 y o   Male presented sitting in his wheelchair prior to dinner  He had difficulty with fluent speech however this is his baseline due to a previous stroke  HENT:      Head: Normocephalic and atraumatic  Comments: L sided facial droop from previous stroke  Right Ear: External ear normal       Left Ear: External ear normal       Nose: Nose normal       Mouth/Throat:      Mouth: Mucous membranes are moist       Pharynx: Oropharynx is clear  Eyes:      Extraocular Movements: Extraocular movements intact  Conjunctiva/sclera: Conjunctivae normal       Pupils: Pupils are equal, round, and reactive to light  Neck:      Musculoskeletal: Normal range of motion  Cardiovascular:      Rate and Rhythm: Normal rate and regular rhythm  Heart sounds: Murmur present  Pulmonary:      Effort: Pulmonary effort is normal  No respiratory distress  Breath sounds: Normal breath sounds  No stridor  No wheezing or rales  Abdominal:      General: Bowel sounds are normal  There is no distension  Palpations: Abdomen is soft  Musculoskeletal: Normal range of motion  Right lower leg: No edema  Left lower leg: Edema (trace LLE edema) present  Skin:     General: Skin is warm and dry  Neurological:      General: No focal deficit present  Mental Status: He is alert and oriented to person, place, and time  Psychiatric:         Mood and Affect: Mood normal          Behavior: Behavior normal          Thought Content:  Thought content normal          Judgment: Judgment normal          Pertinent Laboratory/Diagnostic Studies     05/20/2021 CBC WITH DIFF  HEMOGLOBIN 9 8 g/dL 12 5-17 0 L Final  HEMATOCRIT 29 6 % 37 0-48 0 L Final  WBC 9 7 thou/cmm 4 0-10 5 Final  RBC 3 13 mill/cmm 4 00-5 40 L Final  PLATELET COUNT 319 thou/cmm 140-350 Final  MPV 7 7 fL 7 5-11 3 Final  MCV 94 fL  Final  MCH 31 3 pg 27 0-36 0 Final  MCHC 33 2 g/dL 32 0-37 0 Final  RDW 14 3 % 12 0-16 0 Final  DIFFERENTIAL TYPE AUTO Final  ABSOLUTE NEUT 6 5 thou/cmm 1 8-7 8 Final  ABSOLUTE LYMPH 2 2 thou/cmm 1 0-3 0 Final  ABSOLUTE MONO 0 7 thou/cmm 0 3-1 0 Final  ABSOLUTE EOS 0 4 thou/cmm 0 0-0 5 Final  ABSOLUTE BASO 0 1 thou/cmm 0 0-0 1 Final  NEUTROPHILS 66 % Final  LYMPHOCYTES 22 % Final  MONOCYTES 7 % Final  EOSINOPHILS 4 % Final  BASOPHILS 1 % Final      92/40/1076  BASIC METABOLIC PNL  GLUCOSE 593 mg/dL 65-99 H Final  BUN 73 mg/dL 7-28 H Final  CREATININE 4 03 mg/dL 0 53-1 30 H Final  SODIUM 139 mmol/L 135-145 Final  POTASSIUM 4 4 mmol/L 3 5-5 2 Final  CHLORIDE 105 mmol/L 100-109 Final  CARBON DIOXIDE 24 mmol/L 23-31 Final  CALCIUM 8 6 mg/dL 8 5-10 1 Final  ANION GAP 10 3-11 Final    Current Medications     Current Medications Reviewed and updated in EMR  Monthly orders reviewed and signed in chart  Please note:  Voice-recognition software may have been used in the preparation of this document  Occasional wrong word or "sound-alike" substitutions may have occurred due to the inherent limitations of voice recognition software  Interpretation should be guided by context

## 2021-05-28 ENCOUNTER — TELEPHONE (OUTPATIENT)
Dept: NEPHROLOGY | Facility: CLINIC | Age: 86
End: 2021-05-28

## 2021-05-28 NOTE — TELEPHONE ENCOUNTER
----- Message from Trip Wan MD sent at 5/28/2021  8:03 AM EDT -----  They should ask their primary as they do that more often   ----- Message -----  From: Tanner Moran  Sent: 5/27/2021   3:45 PM EDT  To: Trip Wan MD    Patients daughter in law called the office ans she wants to verify if patient eligible for Palliative care?

## 2021-05-30 ENCOUNTER — TELEPHONE (OUTPATIENT)
Dept: OTHER | Facility: HOSPITAL | Age: 86
End: 2021-05-30

## 2021-05-30 NOTE — TELEPHONE ENCOUNTER
Spoke with patient and wife over the phone today  After receiving a call from radiology and to review recent CT head  Reviewed CT head with radiologist which demonstrates evolving right-sided subdural hematoma with decrease attenuation of blood products but thickness of collection has mildly increased since prior  Reviewed imaging with patient and wife  Per wife patient is at his baseline  He denies any headaches, dizziness, blurry vision, no weakness or sensory changes or bowel or bladder issues  Patient and wife made aware if he experiences worsening headaches, dizziness, nausea, vomiting, confusion, slurred speech, weakness, or sleepiness to seek the nearest ED for further evaluation  Okay for patient to wait until outpatient follow-up Wednesday  Patient made aware to contact Neurosurgery with any further questions or concerns

## 2021-06-02 ENCOUNTER — OFFICE VISIT (OUTPATIENT)
Dept: NEUROSURGERY | Facility: CLINIC | Age: 86
End: 2021-06-02
Payer: MEDICARE

## 2021-06-02 ENCOUNTER — TELEPHONE (OUTPATIENT)
Dept: NEUROSURGERY | Facility: CLINIC | Age: 86
End: 2021-06-02

## 2021-06-02 VITALS
WEIGHT: 171 LBS | RESPIRATION RATE: 16 BRPM | TEMPERATURE: 97.5 F | BODY MASS INDEX: 27.48 KG/M2 | HEART RATE: 65 BPM | SYSTOLIC BLOOD PRESSURE: 126 MMHG | HEIGHT: 66 IN | DIASTOLIC BLOOD PRESSURE: 60 MMHG

## 2021-06-02 DIAGNOSIS — S06.5X9A SUBDURAL HEMATOMA (HCC): Primary | ICD-10-CM

## 2021-06-02 PROCEDURE — 99213 OFFICE O/P EST LOW 20 MIN: CPT | Performed by: PHYSICIAN ASSISTANT

## 2021-06-02 NOTE — ASSESSMENT & PLAN NOTE
Acute on chronic right SDH s/p fall mid April  · Previously on coumadin for atrial fibrillation, currently held  · Presented with left sided weakness and left drift 3 weeks after fall   · Currently in rehab at [facility]; plan to discharge home Saturday    Imaging:  · CT head w/o, 5/24/21: Evolving holohemispheric right-sided subdural hematoma with decrease in the degree of high attenuation blood products  Thickness of the collection has overall mildly increased in size with stable leftward bowing of the septum pellucidum  Plan:  · Imaging reviewed in detail with the patient and his wife today  · While there is less acute blood, the chronic subdural has increased in size  There continues to be well defined sulci  No significant compression or MLS  · Exam remains stable to improved; GCS 15 and no weakness detected  Good coordination  No drift  · All options discussed with the patient and his wife today  Unfortunately he is not a candidate for an MMA embolization based on his kidney function (most recent GFR 11) and he is not currently on dialysis  Marshel Era holes may be an option in the future but as his exam is stable I recommend continued surveillance imaging  They are agreeable with this at this time    · Recommend [address] conversation with family; if he does decline or the SDH enlarges would he want surgery or not  · Patient is moving to [place] to be closer to family on 6/14; they will need to establish with a neurosurgeon right away and take records/discs with them  · Recommend continuing to hold coumadin and all other AC/AP medications at this time; recommend close follow up with Dr Kezia Saavedra  · Will plan to repeat a CT head in 1 week and follow up with telephone call due to transportation difficulties  · Will discuss case with their son in law Dr Amberly Rebollar (orthopedics in [facility]) per their request: [phone]

## 2021-06-02 NOTE — PROGRESS NOTES
Neurosurgery Office Note  Ana Fair 80 y o  male MRN: [id number]      Assessment/Plan     Subdural hematoma [facility])  Acute on chronic right SDH s/p fall mid April  · Previously on coumadin for atrial fibrillation, currently held  · Presented with left sided weakness and left drift 3 weeks after fall   · Currently in rehab at [facility]; plan to discharge home Saturday    Imaging:  · CT head w/o, 5/24/21: Evolving holohemispheric right-sided subdural hematoma with decrease in the degree of high attenuation blood products  Thickness of the collection has overall mildly increased in size with stable leftward bowing of the septum pellucidum  Plan:  · Imaging reviewed in detail with the patient and his wife today  · While there is less acute blood, the chronic subdural has increased in size  There continues to be well defined sulci  No significant compression or MLS  · Exam remains stable to improved; GCS 15 and no weakness detected  Good coordination  No drift  · All options discussed with the patient and his wife today  Unfortunately he is not a candidate for an MMA embolization based on his kidney function (most recent GFR 11) and he is not currently on dialysis  Gwynda Regulus holes may be an option in the future but as his exam is stable I recommend continued surveillance imaging  They are agreeable with this at this time    · Recommend [address] conversation with family; if he does decline or the SDH enlarges would he want surgery or not  · Patient is moving to [place] to be closer to family on 6/14; they will need to establish with a neurosurgeon right away and take records/discs with them  · Recommend continuing to hold coumadin and all other AC/AP medications at this time; recommend close follow up with Dr Jg Graves  · Will plan to repeat a CT head in 1 week and follow up with telephone call due to transportation difficulties  · Will discuss case with their son in law Dr Mendy Jean-Baptiste (orthopedics in [facility]) per their request: [phone]       Diagnoses and all orders for this visit:    Subdural hematoma ([facility] [address] )  -     CT head without contrast; Future            CHIEF COMPLAINT    Chief Complaint   Patient presents with    Follow-up       HISTORY    This is an 79yo male with a PMH significant for h/o stroke, CAD s/p CABG, atrial fibrillation on coumadin previously, 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), DM, HTN, HLD who was found on 5/95/31 by police when he was having difficulty getting into his house due to sudden weakness  He admitted to falling 3 weeks prior  He was on coumadin for CAD and atrial fibrillation which was reversed during his hospitalization  He remains off coumadin at this time  Imaging in the ED was significant for a right acute on chronic SDH  He was ultimately discharged to rehab and is here today for a 2 week follow up  Currently he denies any HA, dizziness, confusion, syncope, slurred speech, vision changes, focal weakness  He is ambulating with a cane with assistance  Overall he feels well but is anxious about not taking coumadin  They are moving to [facility] in 2 weeks to be closer to family  Imaging was reviewed with the patient and his wife today, showing resolving acute components but increased size of chronic SDH  REVIEW OF SYSTEMS    Review of Systems   Constitutional: Negative  HENT: Negative  Eyes: Negative  Respiratory: Negative  Cardiovascular: Negative  Gastrointestinal: Negative  Endocrine: Negative  Genitourinary: Negative  Musculoskeletal: Positive for gait problem (uses walker, assisted)  Skin: Negative  Allergic/Immunologic: Negative  Psychiatric/Behavioral: Negative        ROS was personally reviewed and changes made as needed     Meds/Allergies     Current Outpatient Medications   Medication Sig Dispense Refill    acetaminophen (TYLENOL) 325 mg tablet Take 2 tablets (650 mg total) by mouth 3 (three) times a day as needed for mild pain or moderate pain (Patient taking differently: Take 650 mg by mouth every 6 (six) hours as needed for mild pain or moderate pain ) 30 tablet 0    atorvastatin (LIPITOR) 40 mg tablet TAKE 1 TABLET BY MOUTH EVERY DAY 90 tablet 3    cholecalciferol 2000 units TABS Take 1 tablet (2,000 Units total) by mouth daily 60 tablet 0    docusate sodium (COLACE) 100 mg capsule Take 100 mg by mouth 2 (two) times a day      finasteride (PROSCAR) 5 mg tablet TAKE 1 TABLET BY MOUTH EVERY DAY 90 tablet 3    glucose blood (FREESTYLE LITE) test strip PATIENT TESTS BLOOD SUGARS ONCE DAILY  PATIENT IS NOT INSULIN DEPENDENT  DX E11 20 50 each 5    levETIRAcetam (KEPPRA) 500 mg tablet Take 1 tablet (500 mg total) by mouth daily for 3 days (Patient not taking: Reported on 6/2/2021) 3 tablet 0    levothyroxine 75 mcg tablet TAKE 1 TABLET BY MOUTH EVERY DAY 90 tablet 3    metoprolol tartrate (LOPRESSOR) 50 mg tablet TAKE 1/2 TABLET BY MOUTH TWICE A DAY WITH MEALS 90 tablet 1    tamsulosin (FLOMAX) 0 4 mg TAKE 1 CAPSULE BY MOUTH EVERYDAY AT BEDTIME 30 capsule 5    torsemide (DEMADEX) 20 mg tablet TAKE 1 TABLET BY MOUTH EVERY DAY 30 tablet 1    TRADJENTA 5 MG TABS TAKE 1 TABLET BY MOUTH EVERY DAY 90 tablet 3     No current facility-administered medications for this visit          No Known Allergies    PAST HISTORY    Past Medical History:   Diagnosis Date    3-vessel coronary artery disease     last assessed: 08/15/2016    BPH (benign prostatic hyperplasia)     Chronic kidney disease     Diabetes mellitus ([facility] [address] )     Hyperlipidemia     Hypertension     Shingles 04/29/2019    SOB (shortness of breath)     Stroke [facility])        Past Surgical History:   Procedure Laterality Date    CARDIAC PACEMAKER PLACEMENT      CATARACT EXTRACTION      last assessed: 11/11/2015    CORONARY ARTERY BYPASS GRAFT      last assessed: 08/26/2015       Social History     Tobacco Use    Smoking status: Never Smoker    Smokeless tobacco: Never Used   Substance Use Topics    Alcohol use: Not Currently     Frequency: Never    Drug use: No       Family History   Problem Relation Age of Onset    Heart disease Mother     Cancer Father         stomach    No Known Problems Sister     No Known Problems Brother     No Known Problems Brother          Above history personally reviewed  EXAM    Vitals:Blood pressure 126/60, pulse 65, temperature 97 5 °F (36 4 °C), temperature source Tympanic, resp  rate 16, height 5' 6" (1 676 m), weight 77 6 kg (171 lb)  ,Body mass index is 27 6 kg/m²  Physical Exam  Vitals signs and nursing note reviewed  Constitutional:       Appearance: Normal appearance  He is well-developed and normal weight  HENT:      Head: Normocephalic and atraumatic  Eyes:      Extraocular Movements: Extraocular movements intact  Pupils: Pupils are equal, round, and reactive to light  Neck:      Musculoskeletal: Normal range of motion  Cardiovascular:      Rate and Rhythm: Normal rate  Pulmonary:      Effort: Pulmonary effort is normal  No respiratory distress  Abdominal:      Palpations: Abdomen is soft  Musculoskeletal: Normal range of motion  Skin:     General: Skin is warm and dry  Neurological:      General: No focal deficit present  Mental Status: He is alert and oriented to person, place, and time  Coordination: Finger-Nose-Finger Test normal       Deep Tendon Reflexes: Strength normal       Reflex Scores:       Brachioradialis reflexes are 2+ on the right side and 2+ on the left side  Patellar reflexes are 1+ on the right side and 1+ on the left side  Psychiatric:         Mood and Affect: Mood normal          Speech: Speech normal          Behavior: Behavior normal          Thought Content: Thought content normal          Judgment: Judgment normal          Neurologic Exam     Mental Status   Oriented to person, place, and time  Follows 2 step commands     Attention: normal  Concentration: normal    Speech: speech is normal   Level of consciousness: alert  Knowledge: good  Able to perform simple calculations  Able to repeat  Normal comprehension  Cranial Nerves   Cranial nerves II through XII intact  CN III, IV, VI   Pupils are equal, round, and reactive to light  Motor Exam   Muscle bulk: normal  Overall muscle tone: normal  Right arm pronator drift: absent  Left arm pronator drift: absent    Strength   Strength 5/5 throughout  Sensory Exam   Light touch normal      Gait, Coordination, and Reflexes     Gait  Gait: (Patient in WC)    Coordination   Finger to nose coordination: normal    Tremor   Resting tremor: absent    Reflexes   Right brachioradialis: 2+  Left brachioradialis: 2+  Right patellar: 1+  Left patellar: 1+  Right Calderon: absent  Left Calderon: absent        MEDICAL DECISION MAKING    Imaging Studies:     Ct Head Wo Contrast    Result Date: 5/30/2021  Narrative: CT BRAIN - WITHOUT CONTRAST INDICATION:   S06  5X9A: Traumatic subdural hemorrhage with loss of consciousness of unspecified duration, initial encounter  COMPARISON:  5/13/2021 TECHNIQUE:  CT examination of the brain was performed  In addition to axial images, sagittal and coronal 2D reformatted images were created and submitted for interpretation  Radiation dose length product (DLP) for this visit:  877 32 mGy-cm   This examination, like all CT scans performed in the [facility], was performed utilizing techniques to minimize radiation dose exposure, including the use of iterative  reconstruction and automated exposure control  IMAGE QUALITY:  Diagnostic  FINDINGS: PARENCHYMA:  There is an evolving right-sided holohemispheric subdural hematoma with decrease in the degree of high attenuation acute blood products  The collection is now predominantly low in attenuation however increase in thickness since the prior study  This measures up to 2 2 cm along the right parietal convexity, previously measured 1 8 cm  This measures up to 1 1 cm on the right temporal convexity and previously measured 0 8 cm  No new areas of acute intracranial hemorrhage are identified  The component of hemorrhage previously seen along the posterior falx has improved with a trace amount of blood products remaining  Stable mild leftward bowing of the septum pellucidum  No CT evidence for a large acute vascular distribution infarct  Stable chronic appearing area of right MCA distribution infarction  Age-related involutional and scattered chronic microvascular ischemic change  VENTRICLES AND EXTRA-AXIAL SPACES:  Ventricles and extra-axial CSF spaces are prominent commensurate with the degree of volume loss  No hydrocephalus  VISUALIZED ORBITS AND PARANASAL SINUSES:  The patient is status post bilateral cataract surgery  CALVARIUM AND EXTRACRANIAL SOFT TISSUES:  Normal      Impression: Evolving holohemispheric right-sided subdural hematoma with decrease in the degree of high attenuation blood products  Thickness of the collection has overall mildly increased in size with stable leftward bowing of the septum pellucidum  I personally discussed this study with Shilpi Osorio on 5/30/2021 at 1:26 PM  Workstation performed: [id number]     I have personally reviewed pertinent reports     and I have personally reviewed pertinent films in PACS

## 2021-06-02 NOTE — TELEPHONE ENCOUNTER
Pat called nurseline questioning if patient would benefit better from staying in his facility or going home with his family  Returned call to Pb Harmon and advised she discuss this with the facility since they are the ones who are with the patient daily  Pat appreciative of call back

## 2021-06-03 ENCOUNTER — TRANSITIONAL CARE MANAGEMENT (OUTPATIENT)
Dept: FAMILY MEDICINE CLINIC | Facility: CLINIC | Age: 86
End: 2021-06-03

## 2021-06-03 ENCOUNTER — NURSING HOME VISIT (OUTPATIENT)
Dept: GERIATRICS | Facility: OTHER | Age: 86
End: 2021-06-03
Payer: MEDICARE

## 2021-06-03 DIAGNOSIS — Z79.4 TYPE 2 DIABETES MELLITUS WITH STAGE 5 CHRONIC KIDNEY DISEASE NOT ON CHRONIC DIALYSIS, WITH LONG-TERM CURRENT USE OF INSULIN (HCC): ICD-10-CM

## 2021-06-03 DIAGNOSIS — S06.5X9A SUBDURAL HEMATOMA (HCC): Primary | ICD-10-CM

## 2021-06-03 DIAGNOSIS — R39.11 BENIGN PROSTATIC HYPERPLASIA WITH URINARY HESITANCY: ICD-10-CM

## 2021-06-03 DIAGNOSIS — N18.4 ANEMIA DUE TO STAGE 4 CHRONIC KIDNEY DISEASE (HCC): ICD-10-CM

## 2021-06-03 DIAGNOSIS — Z78.9 IMPAIRED MOBILITY AND ADLS: ICD-10-CM

## 2021-06-03 DIAGNOSIS — I25.10 3-VESSEL CORONARY ARTERY DISEASE: ICD-10-CM

## 2021-06-03 DIAGNOSIS — I10 ESSENTIAL HYPERTENSION: ICD-10-CM

## 2021-06-03 DIAGNOSIS — N18.5 TYPE 2 DIABETES MELLITUS WITH STAGE 5 CHRONIC KIDNEY DISEASE NOT ON CHRONIC DIALYSIS, WITH LONG-TERM CURRENT USE OF INSULIN (HCC): ICD-10-CM

## 2021-06-03 DIAGNOSIS — R26.2 AMBULATORY DYSFUNCTION: ICD-10-CM

## 2021-06-03 DIAGNOSIS — N40.1 BENIGN PROSTATIC HYPERPLASIA WITH URINARY HESITANCY: ICD-10-CM

## 2021-06-03 DIAGNOSIS — Z74.09 IMPAIRED MOBILITY AND ADLS: ICD-10-CM

## 2021-06-03 DIAGNOSIS — R79.89 ELEVATED LFTS: ICD-10-CM

## 2021-06-03 DIAGNOSIS — E03.9 HYPOTHYROIDISM, UNSPECIFIED TYPE: ICD-10-CM

## 2021-06-03 DIAGNOSIS — N18.5 CKD (CHRONIC KIDNEY DISEASE) STAGE 5, GFR LESS THAN 15 ML/MIN (HCC): ICD-10-CM

## 2021-06-03 DIAGNOSIS — I50.32 CHRONIC DIASTOLIC HEART FAILURE (HCC): ICD-10-CM

## 2021-06-03 DIAGNOSIS — D63.1 ANEMIA DUE TO STAGE 4 CHRONIC KIDNEY DISEASE (HCC): ICD-10-CM

## 2021-06-03 DIAGNOSIS — E11.22 TYPE 2 DIABETES MELLITUS WITH STAGE 5 CHRONIC KIDNEY DISEASE NOT ON CHRONIC DIALYSIS, WITH LONG-TERM CURRENT USE OF INSULIN (HCC): ICD-10-CM

## 2021-06-03 PROCEDURE — 99316 NF DSCHRG MGMT 30 MIN+: CPT | Performed by: FAMILY MEDICINE

## 2021-06-03 NOTE — PROGRESS NOTES
[facility]  [address]  [phone]    [facility]  DISCHARGE SUMMARY        NAME: Liz Arauz  AGE: 80 y o  SEX: male  :  1932  DATE OF ENCOUNTER: 2021  POS: 31 (SNF)    Assessment and Plan     Subdural hematoma ([address] )  · Evaluated by Neurosurgery yesterday, 2021  CT from 2021 showed "Evolving SDH Holohemispheric right-sided subdural hematoma with decrease in the degree of high attenuation blood products  Thickness of the collection has overall mildly increased in size with stable leftward bowing of the septum pellucidum" per neurosurgery  · Determined to not be a surgical candidate for MMA embolization due to poor renal function Stage 5 CKD  · No anticoagulation at this time until cleared by neurosurg  · Follow-up CT of the head is scheduled for 1 week  · GCS 15 with no weakness, headache, dizziness, lightheadedness, AMS, or drift on exam today  3-vessel coronary artery disease  · Remains asymptomatic at this time  · Continue Atorvastatin 40mg and follow up with Cardiology  · No anticoagulation until cleared by neurosurgery due to recent SDH  Essential hypertension  · Persistently elevated systolic blood pressure readings in 140's to low 150's  Recently increased Metoprolol tartrate to 37 5mg PO twice daily one week ago  · HR consistently 60's to 70's  · Will continue to monitor blood pressure readings until patient discharge; however if BP is persistently elevated or continues to rise will consider alternative anti-hypertensive medications at that time  Chronic diastolic heart failure (HCC)  Wt Readings from Last 3 Encounters:   21 77 6 kg (171 lb)   05/15/21 77 7 kg (171 lb 3 2 oz)   21 78 9 kg (174 lb)     · Euvolemic on examination today  · Continue Torsemide 20mg daily for prevention of acute CHF exacerbations  · Continue to monitor renal function and electrolytes          CKD (chronic kidney disease) stage 5, GFR less than 15 ml/min [facility])  Lab Results   Component Value Date    EGFR 11 05/14/2021    EGFR 11 05/13/2021    EGFR 10 05/12/2021    CREATININE 4 39 (H) 05/14/2021    CREATININE 4 52 (H) 05/13/2021    CREATININE 4 78 (H) 05/12/2021   · Last creatinine was checked 5/20/2021 and was stable at 4 03   · Avoid nephrotoxic medications, hypotension, and CT contrast whenever possible  · F/U with Dr Bernardino Sahu of nephrology is scheduled for 6/11/202  Ambulatory dysfunction/generalized weakness  · Improving through PT/OT very well; able to walk unlimited distances with walker and navigate stairs with BILAT railings  · Modified independent for transfers  · Does require supervision for toileting  · Home OT/PT  Anemia  ·  Patient with chronic anemia related to underlying CKD 5     · His hemoglobin has remained fairly stable  Last hemoglobin checked 05/20/2021 stable at 9 8  · Continue to monitor  Impaired mobility and ADLs  ·   Patient has completed a comprehensive course of rehabilitation at [facility]  · At this time he is able to ambulate with rolling walker and is modified independent for transfers and most ADLs, requires supervision for toileting  · He lives currently with his wife who is his primary caregiver and very supportive  · Patient's plan is to move down to [place] to be close to his son in the next couple of weeks  · Patient will need to establish care with a primary care physician down in [place]  ·   Patient will be discharging from [facility] with home physical therapy and occupational therapy services  Benign prostatic hyperplasia with urinary hesitancy  ·   Voiding without difficulty  · Continue tamsulosin and finasteride  Hypothyroidism  ·   Continue levothyroxine 75 times daily      Type 2 diabetes mellitus, with long-term current use of insulin [facility])    Lab Results   Component Value Date    HGBA1C 6 4 04/22/2021 ·   Currently stable well controlled  · Continue Tradjenta at current dose  Elevated LFTs  · Right upper quadrant ultrasound in the hospital showed cholelithiasis without evidence of acute cholecystitis  · Continue to hold statins for now  · Monitor LFTs  I spent 35 minutes for this encounter  Greater than 50% of the total time was spent on coordination of care and direct patient care during a face-to-face visit with patient  Discharge instructions provided  All quesitons were answered  Azeem Mcneill MD  Geriatric Medicine  06/03/2021       Chief Complaint   Patient seen and examined for follow up on chronic conditions  History of Present Illness     Very pleasant 80year-old  male with underlying CKD 5, insulin-dependent diabetes type 2, hypertension, 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, benign prostatic hyperplasia, atrial fibrillation on Coumadin admitted to [facility] [place] [facility] 05/11/2021-05/15/2021 after he presented to the ED with complaints of weakness  Hospital course included a moderately sized SDH within the right hemisphere with an increase in acute blood products in the parietal region  Stable encephalomalacia and gliosis within the right mid to posterior MCA territory  CT of the C-spine showed moderate diffuse cervical degenerative change with moderate to severe foraminal narrowing  Multilevel mild canal stenosis  No acute osseous abnormalities  Patient was admitted to the ICU, warfarin was reversed  Evaluated by neuro surgery who recommended seizure prophylaxis and frequent neuro monitoring  Repeat head CT on 05/13/2021 showed stable right hemispheric mixed density as DH and slightly with distributed right parietal acute hemorrhage seen on previous CT of the head done on 05/12/2021    This has evolved with most recent head CT 05/24/2021 showing an evolving SDH in size but no increase in blood product; the patient was considered not to be a candidate for MMA embolization due to Stage 5 CKD, age, and poor renal function but was advised to have family discuss if he would like surgery if SDH continues to evolve further  Per neurosurgery recommendation, no anticoagulation or antiplatelet therapy until cleared by neurosurg  F/U outpatient with neurosurgery and repeat head CT in one week  Important to note; this patient has previously had a history of a stroke with persistent-residual L sided facial paralysis  This previous stroke occurred while he was on Eliquis therapy which is why he was switched to Coumadin anticoagulation  Patient has been making great progress with therapy and has nora able to ambulate unlimited distances with front-wheeled rolling walker, step upwards with railing, modified independent for transfers, upper and lower body dressing, and requires supervision for toileting  Systolic BP's readings have still been consistently in the 150's  Denies any HA, lightheadedness, dizziness, syncopal events, falls, fatigue, fever, shortness of breath, cough, wheezing, weakness, or bowel/bladder incontinence  Currently planned for discharge this Saturday on 06/05/2021 to return home with his wife with OT/PT  Evaluated by neurosurgery yesterday, see plan for details regarding F/U   CT of head is scheduled for one week  Patient will then move to [facility] on 06/14/2021 to be closer to family  The following portions of the patient's history were reviewed and updated as appropriate: allergies, current medications, past family history, past medical history, past social history, past surgical history and problem list     Review of Systems     A complete review of systems was performed  All negative, except as per HPI      History     Past Medical History:   Diagnosis Date    3-vessel coronary artery disease     last assessed: 08/15/2016    BPH (benign prostatic hyperplasia)    Sabra Baptiste Chronic kidney disease     Diabetes mellitus ([facility] [address] )     Hyperlipidemia     Hypertension     Shingles 04/29/2019    SOB (shortness of breath)     Stroke (HCC)      No Known Allergies    Objective     Vital Signs  BP:  151/83       HR: 71 T: 97 0 deg F    RR:  19 O2Sat: 97 0% on RA   W:  167 6lbs    Physical Exam  GEN: Very pleasant  NAD  Non-toxic appearing  Looks frail, sitting in wheelchair for this exam   HEENT: NC/AT  NATHALIA  Sluggish pupillary response  EIOMI  Oropharynx moist and clear  No oral lesions  CV: S1, S2   RRR  + murmur appreciated  No gallops or rubs  PULM: Non-labored respirations  CTA bilaterally  No wheezes, rales, or ronchi  ABD: Soft, NT/ND  BSx4  EXT: Trace bilateral lower extremity edema L slightly > R      NEURO:    Awake, alert and oriented x 3  +L sided facial droop that is chronic         Pertinent Laboratory/Diagnostic Studies       CBC WITH DIFF 05/20/2021  HEMOGLOBIN 9 8 g/dL 12 5-17 0 L Final  HEMATOCRIT 29 6 % 37 0-48 0 L Final  WBC 9 7 thou/cmm 4 0-10 5 Final  RBC 3 13 mill/cmm 4 00-5 40 L Final  PLATELET COUNT 187 thou/cmm 140-350 Final  MPV 7 7 fL 7 5-11 3 Final  MCV 94 fL  Final  MCH 31 3 pg 27 0-36 0 Final  MCHC 33 2 g/dL 32 0-37 0 Final  RDW 14 3 % 12 0-16 0 Final  DIFFERENTIAL TYPE AUTO Final  ABSOLUTE NEUT 6 5 thou/cmm 1 8-7 8 Final  ABSOLUTE LYMPH 2 2 thou/cmm 1 0-3 0 Final  ABSOLUTE MONO 0 7 thou/cmm 0 3-1 0 Final  ABSOLUTE EOS 0 4 thou/cmm 0 0-0 5 Final  ABSOLUTE BASO 0 1 thou/cmm 0 0-0 1 Final  NEUTROPHILS 66 % Final  LYMPHOCYTES 22 % Final  MONOCYTES 7 % Final  EOSINOPHILS 4 % Final  BASOPHILS 1 % Final    BASIC METABOLIC PNL 25/43/3421  GLUCOSE 115 mg/dL 65-99 H Final  BUN 73 mg/dL 7-28 H Final  CREATININE 4 03 mg/dL 0 53-1 30 H Final  SODIUM 139 mmol/L 135-145 Final  POTASSIUM 4 4 mmol/L 3 5-5 2 Final  CHLORIDE 105 mmol/L 100-109 Final  CARBON DIOXIDE 24 mmol/L 23-31 Final  CALCIUM 8 6 mg/dL 8 5-10 1 Final  ANION GAP 10 3-11 Final  ANION GAP 10 3-11 Final  eGFR, NON  AM 12 >60 L Final  eGFR,  AMER 14 >60 L Final  eGFR COMMENT (Note) Final    Current Medications     Current Medications Reviewed and updated in EMR  Monthly orders reviewed and signed in chart  Please note:  Voice-recognition software may have been used in the preparation of this document  Occasional wrong word or "sound-alike" substitutions may have occurred due to the inherent limitations of voice recognition software  Interpretation should be guided by context

## 2021-06-03 NOTE — ASSESSMENT & PLAN NOTE
Lab Results   Component Value Date    EGFR 11 05/14/2021    EGFR 11 05/13/2021    EGFR 10 05/12/2021    CREATININE 4 39 (H) 05/14/2021    CREATININE 4 52 (H) 05/13/2021    CREATININE 4 78 (H) 05/12/2021   · Last creatinine was checked 5/20/2021 and was stable at 4 03   · Avoid nephrotoxic medications, hypotension, and CT contrast whenever possible  · F/U with Dr Twyla Sharp of nephrology is scheduled for 6/11/202 
Lab Results   Component Value Date    HGBA1C 6 4 04/22/2021   ·   Currently stable well controlled  · Continue Tradjenta at current dose 
Wt Readings from Last 3 Encounters:   06/02/21 77 6 kg (171 lb)   05/15/21 77 7 kg (171 lb 3 2 oz)   04/22/21 78 9 kg (174 lb)     · Euvolemic on examination today  · Continue Torsemide 20mg daily for prevention of acute CHF exacerbations  · Continue to monitor renal function and electrolytes 
·   Continue levothyroxine 75 times daily 
·   Patient has completed a comprehensive course of rehabilitation at [facility]  · At this time he is able to ambulate with rolling walker and is modified independent for transfers and most ADLs, requires supervision for toileting  · He lives currently with his wife who is his primary caregiver and very supportive  · Patient's plan is to move down to [place] to be close to his son in the next couple of weeks  · Patient will need to establish care with a primary care physician down in [place]  ·   Patient will be discharging from [facility] with home physical therapy and occupational therapy services 
·   Voiding without difficulty  · Continue tamsulosin and finasteride 
·  Patient with chronic anemia related to underlying CKD 5     · His hemoglobin has remained fairly stable  Last hemoglobin checked 05/20/2021 stable at 9 8  · Continue to monitor 
· Evaluated by Neurosurgery yesterday, 06/02/2021  CT from 05/24/2021 showed "Evolving SDH Holohemispheric right-sided subdural hematoma with decrease in the degree of high attenuation blood products  Thickness of the collection has overall mildly increased in size with stable leftward bowing of the septum pellucidum" per neurosurgery  · Determined to not be a surgical candidate for MMA embolization due to poor renal function Stage 5 CKD  · No anticoagulation at this time until cleared by neurosurg  · Follow-up CT of the head is scheduled for 1 week  · GCS 15 with no weakness, headache, dizziness, lightheadedness, AMS, or drift on exam today 
· Improving through PT/OT very well; able to walk unlimited distances with walker and navigate stairs with BILAT railings  · Modified independent for transfers  · Does require supervision for toileting  · Home OT/PT 
· Persistently elevated systolic blood pressure readings in 140's to low 150's  Recently increased Metoprolol tartrate to 37 5mg PO twice daily one week ago  · HR consistently 60's to 70's  · Will continue to monitor blood pressure readings until patient discharge; however if BP is persistently elevated or continues to rise will consider alternative anti-hypertensive medications at that time 
· Remains asymptomatic at this time  · Continue Atorvastatin 40mg and follow up with Cardiology  · No anticoagulation until cleared by neurosurgery due to recent SDH 
· Right upper quadrant ultrasound in the hospital showed cholelithiasis without evidence of acute cholecystitis  · Continue to hold statins for now  · Monitor LFTs 
[Negative] : Endocrine

## 2021-06-07 ENCOUNTER — TELEPHONE (OUTPATIENT)
Dept: NEUROSURGERY | Facility: CLINIC | Age: 86
End: 2021-06-07

## 2021-06-07 NOTE — TELEPHONE ENCOUNTER
Received a call from Pina Jaimes looking for patient's records be sent to his new doctors office in [place]  Returned call to Pina Jaimes and advised will fax NSX notes  Advised as far as imaging she should call radiology and provided her with the number  Appreciative of call back

## 2021-06-08 ENCOUNTER — OFFICE VISIT (OUTPATIENT)
Dept: CARDIOLOGY CLINIC | Facility: CLINIC | Age: 86
End: 2021-06-08
Payer: MEDICARE

## 2021-06-08 VITALS
DIASTOLIC BLOOD PRESSURE: 72 MMHG | SYSTOLIC BLOOD PRESSURE: 134 MMHG | BODY MASS INDEX: 27.6 KG/M2 | HEART RATE: 70 BPM | HEIGHT: 66 IN

## 2021-06-08 DIAGNOSIS — I10 ESSENTIAL HYPERTENSION: ICD-10-CM

## 2021-06-08 DIAGNOSIS — S06.5X9A SUBDURAL HEMATOMA (HCC): ICD-10-CM

## 2021-06-08 DIAGNOSIS — I25.10 3-VESSEL CORONARY ARTERY DISEASE: ICD-10-CM

## 2021-06-08 DIAGNOSIS — I48.0 PAROXYSMAL A-FIB (HCC): Primary | ICD-10-CM

## 2021-06-08 DIAGNOSIS — D68.32 WARFARIN-INDUCED COAGULOPATHY (HCC): ICD-10-CM

## 2021-06-08 DIAGNOSIS — I50.32 CHRONIC DIASTOLIC HEART FAILURE (HCC): ICD-10-CM

## 2021-06-08 DIAGNOSIS — T45.515A WARFARIN-INDUCED COAGULOPATHY (HCC): ICD-10-CM

## 2021-06-08 PROCEDURE — 99214 OFFICE O/P EST MOD 30 MIN: CPT | Performed by: INTERNAL MEDICINE

## 2021-06-08 NOTE — PROGRESS NOTES
Electrophysiology Office Visit  [address] Card[address] [facility]  [address] [facility], [place], [address]    Name: Nawaf James  : 1932  MRN: [id number]    ASSESSMENT:  1  Paroxysmal A-fib (HCC)     2  3-vessel coronary artery disease     3  Chronic diastolic heart failure ([facility] )     4  Essential hypertension     5  Subdural hematoma (HCC)     6  Warfarin-induced coagulopathy ([facility] )         PLAN:  1  Acute on chronic right subdural hematoma   - had fall mid April  - currently off all AC  2  Paroxysmal atrial fibrillation  - not on AC due to #1 / Oyndd4Susy score of 5 (age, HTN, CAD, DM)  - EF of 55% per echo 2021 / LA diameter of 3 75cm  - rate control: metoprolol tartrate 37 5mg twice daily   - antiarrhythmic therapy: none  3  Medtronic dual chamber pacemaker in situ   - implanted in   4  History of CAD  - prior CABGx3  - maintained on beta blocker and statin / not on aspirin due to #1  5  Essential hypertension  - maintained on metoprolol and torsemide 20mg daily   6  Hyperlipidemia  - maintained on atorvastatin 40mg daily   7  Non-insulin dependent diabetes mellitus  8  Hypothyroidism  9  CKD stage IV    IMPRESSION:  1  Anticoagulation - we did have a lengthy discussion concerning anticoagulation  Patient has been off his blood thinner as he has had a subdural hematoma  Did question the utility of the Watchman device and we did discuss how this would not be an option for him as a patient would still require at least 6 months worth of either anticoagulation or anti-platelet therapy for which he cannot take  There was some concern as patient is in atrial fibrillation currently  his CHADS2 Vasc score is 5  We discussed how there are risks and benefits of blood thinner and at this point it appears that risk would be too great to resume  2  Ranexa does not need to be reinstated as patient denies any chest pain at current time      Patient is moving to be closer to family in [place] - his wife was assured that we will be here for them if they need anything and will have our office fax his new cardiologist his records  Patient is to follow up in our EP office as needed (will be moving out of the area)  He is to call our office with any further questions or concerns in the meantime  HPI:   Interim history: Phillip Woodall is a 80 y o  male with paroxysmal atrial fibrillation not on AC currently due to subdural hematoma, Medtronic dual-chamber pacemaker in situ, history of CAD with prior CABG, essential hypertension, hyperlipidemia, non insulin and Bi V is mellitus, hypothyroidism and CKD stage IV  He presents today for office visit  Patient has followed with our office with Dr Marichuy Handley and Don Tanner PA-C  He has a history of CABG, atrial fibrillation that had been maintained on Coumadin the past   He also has a device that our clinic follows  More recently he was hospitalized with left-sided weakness and he was found to have right-sided subdural hematoma that had followed a fall a couple weeks before  Per Neurology was felt that he should stop blood thinner  Therefore, he and his wife requested an appointment to go over these changes in his care  They are accompanied by his sister  Patient reports that he feels well  He is actually moving down to [place] to be closer to family and is symptom-free at today's visit  Will    EKG:  Ventricular paced rhythm at 70 beats per minute with underlying atrial fibrillation    ROS:   Review of Systems   Constitutional: Negative for chills, fever and malaise/fatigue  Cardiovascular: Negative for chest pain, claudication, dyspnea on exertion, irregular heartbeat, leg swelling, near-syncope, orthopnea, palpitations, paroxysmal nocturnal dyspnea and syncope  Respiratory: Negative for shortness of breath and sleep disturbances due to breathing  All other systems reviewed and are negative        OBJECTIVE:   Vitals: /72 (BP Location: Left arm, Patient Position: Sitting, Cuff Size: Adult)   Pulse 70   Ht 5' 6" (1 676 m)   BMI 27 60 kg/m²       Physical Exam:   Physical Exam  Vitals and nursing note reviewed  Constitutional:       General: He is not in acute distress  Appearance: He is well-developed  He is not diaphoretic  HENT:      Head: Normocephalic and atraumatic  Eyes:      Pupils: Pupils are equal, round, and reactive to light  Neck:      Vascular: No JVD  Cardiovascular:      Rate and Rhythm: Normal rate and regular rhythm  Heart sounds: No murmur heard  No friction rub  No gallop  Pulmonary:      Effort: Pulmonary effort is normal  No respiratory distress  Breath sounds: Normal breath sounds  No wheezing  Abdominal:      Palpations: Abdomen is soft  Musculoskeletal:         General: Normal range of motion  Cervical back: Normal range of motion  Skin:     General: Skin is warm and dry  Neurological:      Mental Status: He is alert and oriented to person, place, and time  Medications:      Current Outpatient Medications:     acetaminophen (TYLENOL) 325 mg tablet, Take 2 tablets (650 mg total) by mouth 3 (three) times a day as needed for mild pain or moderate pain (Patient taking differently: Take 650 mg by mouth every 6 (six) hours as needed for mild pain or moderate pain ), Disp: 30 tablet, Rfl: 0    atorvastatin (LIPITOR) 40 mg tablet, TAKE 1 TABLET BY MOUTH EVERY DAY, Disp: 90 tablet, Rfl: 3    cholecalciferol 2000 units TABS, Take 1 tablet (2,000 Units total) by mouth daily, Disp: 60 tablet, Rfl: 0    docusate sodium (COLACE) 100 mg capsule, Take 100 mg by mouth 2 (two) times a day, Disp: , Rfl:     finasteride (PROSCAR) 5 mg tablet, TAKE 1 TABLET BY MOUTH EVERY DAY, Disp: 90 tablet, Rfl: 3    glucose blood (FREESTYLE LITE) test strip, PATIENT TESTS BLOOD SUGARS ONCE DAILY  PATIENT IS NOT INSULIN DEPENDENT   DX E11 20, Disp: 50 each, Rfl: 5   levETIRAcetam (KEPPRA) 500 mg tablet, Take 1 tablet (500 mg total) by mouth daily for 3 days (Patient not taking: Reported on 6/2/2021), Disp: 3 tablet, Rfl: 0    levothyroxine 75 mcg tablet, TAKE 1 TABLET BY MOUTH EVERY DAY, Disp: 90 tablet, Rfl: 3    metoprolol tartrate (LOPRESSOR) 50 mg tablet, TAKE 1/2 TABLET BY MOUTH TWICE A DAY WITH MEALS, Disp: 90 tablet, Rfl: 1    tamsulosin (FLOMAX) 0 4 mg, TAKE 1 CAPSULE BY MOUTH EVERYDAY AT BEDTIME, Disp: 30 capsule, Rfl: 5    torsemide (DEMADEX) 20 mg tablet, TAKE 1 TABLET BY MOUTH EVERY DAY, Disp: 30 tablet, Rfl: 1    TRADJENTA 5 MG TABS, TAKE 1 TABLET BY MOUTH EVERY DAY, Disp: 90 tablet, Rfl: 3     Family History   Problem Relation Age of Onset    Heart disease Mother     Cancer Father         stomach    No Known Problems Sister     No Known Problems Brother     No Known Problems Brother      Social History     Socioeconomic History    Marital status: /Civil Union     Spouse name: Not on file    Number of children: Not on file    Years of education: Not on file    Highest education level: Not on file   Occupational History    Not on file   Social Needs    Financial resource strain: Not on file    Food insecurity     Worry: Not on file     Inability: Not on file   [employer] needs     Medical: Not on file     Non-medical: Not on file   Tobacco Use    Smoking status: Never Smoker    Smokeless tobacco: Never Used   Substance and Sexual Activity    Alcohol use: Not Currently     Frequency: Never    Drug use: No    Sexual activity: Never   Lifestyle    Physical activity     Days per week: Not on file     Minutes per session: Not on file    Stress: Not on file   Relationships    Social connections     Talks on phone: Not on file     Gets together: Not on file     Attends Evangelical service: Not on file     Active member of club or organization: Not on file     Attends meetings of clubs or organizations: Not on file     Relationship status: Not on file    Intimate partner violence     Fear of current or ex partner: Not on file     Emotionally abused: Not on file     Physically abused: Not on file     Forced sexual activity: Not on file   Other Topics Concern    Not on file   Social History Narrative    No advance directive     Social History     Tobacco Use   Smoking Status Never Smoker   Smokeless Tobacco Never Used     Social History     Substance and Sexual Activity   Alcohol Use Not Currently    Frequency: Never       Labs & Results:  Below is the patient's most recent value for Albumin, ALT, AST, BUN, Calcium, Chloride, Cholesterol, CO2, Creatinine, GFR, Glucose, HDL, Hematocrit, Hemoglobin, Hemoglobin A1C, LDL, Magnesium, Phosphorus, Platelets, Potassium, PSA, Sodium, Triglycerides, and WBC  Lab Results   Component Value Date     (H) 2021    AST 54 (H) 2021    BUN 54 (H) 2021    CALCIUM 8 5 2021     2021    CHOL 119 2016    CO2 26 2021    CREATININE 4 39 (H) 2021    HDL 39 (L) 2021    HCT 29 9 (L) 2021    HGB 9 3 (L) 2021    HGBA1C 6 4 2021    MG 1 9 2021    PHOS 3 3 2021     2021    K 4 1 2021     2016    TRIG 239 (H) 2021    WBC 8 02 2021     Note: for a comprehensive list of the patient's lab results, access the Results Review activity      CARDIAC TESTING:   ECHO:   Results for orders placed during the hospital encounter of 19   Echo complete with contrast if indicated    Narrative [facility] [facility]  [phone]    Transthoracic Echocardiogram  2D, M-mode, Doppler, and Color Doppler    Study date:  2019    Patient: Paul Range  MR number: [id number]  Account number: [de-identified]  : 1932  Age: 80 years  Gender: Male  Status: Outpatient  Location: Bedside  Height: 65 in  Weight: 168 lb  BP: 136/ 63 mmHg    Indications: CAD    Diagnoses: I25 119 - Atherosclerotic heart disease of native coronary artery with unspecified angina pectoris    Sonographer:  FRAN Braxton  Primary Physician:  Lio Zhnog MD  Referring Physician:  Benedetto Litten, PA-C  Group:  [facility]  Interpreting Physician:  Brneda Beth MD    SUMMARY    LEFT VENTRICLE:  Size was normal   Systolic function was normal  Ejection fraction was estimated to be 55 %  There was mild concentric hypertrophy  Doppler parameters were consistent with abnormal left ventricular relaxation (grade 1 diastolic dysfunction)  VENTRICULAR SEPTUM:  There was dyssynergic motion  These changes are consistent with a post-thoracotomy state  RIGHT VENTRICLE:  The size was normal   Systolic function was normal   A pacing wire was present  MITRAL VALVE:  There was mild regurgitation  TRICUSPID VALVE:  There was trace regurgitation  HISTORY: PRIOR HISTORY: Chest Pain; 3-vessel CAD; CKD; DM2; HTN; HLD; Pacemaker    PROCEDURE: The procedure was performed at the bedside  This was a routine study  The transthoracic approach was used  The study included complete 2D imaging, M-mode, complete spectral Doppler, and color Doppler  The heart rate was 84 bpm,  at the start of the study  Images were obtained from the parasternal, apical, subcostal, and suprasternal notch acoustic windows  Image quality was adequate  LEFT VENTRICLE: Size was normal  Systolic function was normal  Ejection fraction was estimated to be 55 %  There were no regional wall motion abnormalities  Wall thickness was mildly increased  There was mild concentric hypertrophy  DOPPLER: Doppler parameters were consistent with abnormal left ventricular relaxation (grade 1 diastolic dysfunction)  VENTRICULAR SEPTUM: There was dyssynergic motion  These changes are consistent with a post-thoracotomy state      RIGHT VENTRICLE: The size was normal  Systolic function was normal  Wall thickness was normal  A pacing wire was present  LEFT ATRIUM: Size was normal     RIGHT ATRIUM: Size was normal     MITRAL VALVE: Valve structure was normal  There was normal leaflet separation  DOPPLER: The transmitral velocity was within the normal range  There was no evidence for stenosis  There was mild regurgitation  AORTIC VALVE: The valve was trileaflet  Leaflets exhibited mildly increased thickness, mild calcification, mildly reduced cuspal separation, and sclerosis  DOPPLER: Transaortic velocity was within the normal range  There was no evidence  for stenosis  There was no regurgitation  TRICUSPID VALVE: The valve structure was normal  There was normal leaflet separation  DOPPLER: The transtricuspid velocity was within the normal range  There was no evidence for stenosis  There was trace regurgitation  The tricuspid jet  envelope definition was inadequate for estimation of RV systolic pressure  There are no indirect findings suggestive of moderate or severe pulmonary hypertension  PULMONIC VALVE: Leaflets exhibited normal thickness, no calcification, and normal cuspal separation  DOPPLER: The transpulmonic velocity was within the normal range  There was trace regurgitation  PERICARDIUM: There was no pericardial effusion  The pericardium was normal in appearance  AORTA: The root exhibited normal size  SYSTEMIC VEINS: IVC: The inferior vena cava was normal in size and course   Respirophasic changes were normal     SYSTEM MEASUREMENT TABLES    2D  %FS: 33 13 %  Ao Diam: 3 64 cm  EDV(Teich): 103 64 ml  EF(Teich): 61 68 %  ESV(Teich): 39 71 ml  IVSd: 1 19 cm  LA Area: 15 02 cm2  LA Diam: 4 49 cm  LVEDV MOD A4C: 55 11 ml  LVEF MOD A4C: 62 88 %  LVESV MOD A4C: 20 46 ml  LVIDd: 4 73 cm  LVIDs: 3 16 cm  LVLd A4C: 6 76 cm  LVLs A4C: 5 99 cm  LVOT Diam: 2 04 cm  LVPWd: 1 17 cm  RA Area: 14 68 cm2  RVIDd: 3 1 cm  SV MOD A4C: 34 66 ml  SV(Teich): 63 93 ml    CW  AV MaxP 44 mmHg  AV Vmax: 1 69 m/s  MV PHT: 142 17 ms  MVA By PHT: 1 55 cm2  TR MaxP 08 mmHg  TR Vmax: 3 09 m/s    MM  TAPSE: 1 07 cm    PW  SYED Vmax: 1 45 cm2  E': 0 04 m/s  E/E': 20 44  LVOT Env  Ti: 359 86 ms  LVOT VTI: 14 96 cm  LVOT Vmax: 0 75 m/s  LVOT Vmean: 0 42 m/s  LVOT maxP 24 mmHg  LVOT meanP 95 mmHg  LVSV Dopp: 48 82 ml  MV A Humberto: 1 04 m/s  MV Dec [place]: 3 44 m/s2  MV DecT: 264 87 ms  MV E Humberto: 0 91 m/s  MV E/A Ratio: 0 88  MV PHT: 76 81 ms  MVA By PHT: 2 86 cm2    [facility] Accredited Echocardiography Laboratory    Prepared and electronically signed by    Ernst Mckenna MD  Signed 2019 08:15:25       No results found for this or any previous visit  CATH:  No results found for this or any previous visit  STRESS TEST:  No results found for this or any previous visit

## 2021-06-09 ENCOUNTER — HOSPITAL ENCOUNTER (OUTPATIENT)
Dept: RADIOLOGY | Age: 86
Discharge: HOME | End: 2021-06-09
Payer: MEDICARE

## 2021-06-09 DIAGNOSIS — S06.5X9A SUBDURAL HEMATOMA (HCC): ICD-10-CM

## 2021-06-09 PROCEDURE — 70450 CT HEAD/BRAIN W/O DYE: CPT

## 2021-06-09 PROCEDURE — G1004 CDSM NDSC: HCPCS

## 2021-06-10 ENCOUNTER — TELEMEDICINE (OUTPATIENT)
Dept: NEUROSURGERY | Facility: CLINIC | Age: 86
End: 2021-06-10
Payer: MEDICARE

## 2021-06-10 DIAGNOSIS — R60.1 GENERALIZED EDEMA: ICD-10-CM

## 2021-06-10 DIAGNOSIS — S06.5X9A SUBDURAL HEMATOMA (HCC): Primary | ICD-10-CM

## 2021-06-10 PROCEDURE — G2012 BRIEF CHECK IN BY MD/QHP: HCPCS | Performed by: PHYSICIAN ASSISTANT

## 2021-06-10 NOTE — PROGRESS NOTES
Virtual Brief Visit    Assessment/Plan:      Assessment/Plan     Subdural hematoma (HCC)  Acute on chronic right SDH s/p fall mid April  · Previously on coumadin for atrial fibrillation, currently held  · Presented with left sided weakness and left drift 3 weeks after fall but now returned to baseline per wife    Imaging:  · CT head w/o 6/9/2021:  Redistribution of acute on chronic right-sided subdural hematoma with mild interval increase in degree of acute blood products as well as slight interval increase in overall size  There is mild midline shift  Plan:  · Continue monitor neurological symptoms  Patient's wife along with family educated on signs and symptoms of progressive hematoma and increased intracranial pressure  · Imaging results reviewed in detail with the patient and his wife  · Family states patient is at his neurological baseline from prior to fall in mid April  · Again discussed all options for management  · Unfortunately he is not a candidate for an MMA embolization based on his kidney function (most recent GFR 11) and he is not currently on dialysis  · Prem Bridget holes are an option in the future but as his exam is at baseline, I recommend continued surveillance imaging  · There is ongoing [employer] conversation with his family; if he does decline or the SDH enlarges would he want surgery or not  · If they decide on no surgical intervention regardless of progressive neurological decline, may consider deferring additional CT imaging  · At this time they wished to continue with serial CT imaging  · Patient is moving to [place] to be closer to family on 6/14; they have contacted a neurologist to establish care  · I am happy to send a referral to a neuro surgical goal team in [place] of the patient's/family's preference     · Recommend continuing to hold coumadin and all other AC/AP medications at this time; recommend close follow up with Dr Margarita Brooks  · Would recommend CT head in 2 week  or sooner if the patient has any neurological decline  If the patient remains neurologically stable, it is reasonable to have a CT imaging once he has moved to [place] and established  · Patient and family aware to call the office with any questions or concerns  There are no diagnoses linked to this encounter  Reason for visit is   Chief Complaint   Patient presents with    Virtual Brief Visit        Encounter provider Rhett staples PA-C    Provider located at [address] [place]  [address] [facility] [address] [phone]    Recent Visits  Date Type Provider Dept   06/07/21 Telephone Keily Win RN [facility] [address] recent visits within past 7 days and meeting all other requirements     Today's Visits  Date Type Provider Dept   06/10/21 Telemedicine Rhett staples PA-C [facility] today's visits and meeting all other requirements     Future Appointments  No visits were found meeting these conditions  Showing future appointments within next 150 days and meeting all other requirements        After connecting through telephone, the patient was identified by name and date of birth  Livanmarlyn Villanueva was informed that this is a telemedicine visit and that the visit is being conducted through telephone  My office door was closed  No one else was in the room  He acknowledged consent and understanding of privacy and security of the platform  The patient has agreed to participate and understands he can discontinue the visit at any time  Patient is aware this is a billable service  It was my intent to perform this visit via video technology but the patient was not able to do a video connection so the visit was completed via audio telephone only        Subjective      This is a 72-year-old male past medical history significant for stroke, CAD status post CABG, atrial fibrillation previously on Coumadin, chronic kidney disease with GFR of 11, diabetes, hypertension and hyperlipidemia who was found on May 11, 7329 by police when he was having difficulty getting into his house due to sudden weakness  He admitted to falling three weeks prior  Upon evaluation patient underwent CT head which demonstrated a large right-sided subdural hematoma with acute on chronic hemorrhage  At that time he was on Coumadin and INR was reversed  Patient has been maintained off of Coumadin since the diagnosis of subdural hematoma  He was seen in follow-up two weeks after diagnosis with a repeat CT head that showed increased overall size of subdural hematoma  He presents today for one-week follow-up with repeat CT head  Today, patient is reportedly without any complaints  There are no complaints of headaches, dizziness, confusion, speech or vision changes along with any weakness  He reportedly ambulating at his baseline prior to this it episode  Patient requires help dressing himself secondary to her frozen shoulder  He is able to feed himself without difficulties  Overall family is pleased with his progress and states that he has returned to his prior baseline         Past Medical History:   Diagnosis Date    3-vessel coronary artery disease     last assessed: 08/15/2016    BPH (benign prostatic hyperplasia)     Chronic kidney disease     Diabetes mellitus ([address] )     Hyperlipidemia     Hypertension     Shingles 04/29/2019    SOB (shortness of breath)     Stroke [facility])        Past Surgical History:   Procedure Laterality Date    CARDIAC PACEMAKER PLACEMENT      CATARACT EXTRACTION      last assessed: 11/11/2015    CORONARY ARTERY BYPASS GRAFT      last assessed: 08/26/2015       Current Outpatient Medications   Medication Sig Dispense Refill    atorvastatin (LIPITOR) 40 mg tablet TAKE 1 TABLET BY MOUTH EVERY DAY 90 tablet 3    cholecalciferol 2000 units TABS Take 1 tablet (2,000 Units total) by mouth daily 60 tablet 0    docusate sodium (COLACE) 100 mg capsule Take 100 mg by mouth 2 (two) times a day      finasteride (PROSCAR) 5 mg tablet TAKE 1 TABLET BY MOUTH EVERY DAY 90 tablet 3    glucose blood (FREESTYLE LITE) test strip PATIENT TESTS BLOOD SUGARS ONCE DAILY  PATIENT IS NOT INSULIN DEPENDENT  DX E11 20 50 each 5    levothyroxine 75 mcg tablet TAKE 1 TABLET BY MOUTH EVERY DAY 90 tablet 3    metoprolol tartrate (LOPRESSOR) 50 mg tablet TAKE 1/2 TABLET BY MOUTH TWICE A DAY WITH MEALS (Patient taking differently: Take 25 mg by mouth every 12 (twelve) hours 37 5mg BID) 90 tablet 1    tamsulosin (FLOMAX) 0 4 mg TAKE 1 CAPSULE BY MOUTH EVERYDAY AT BEDTIME 30 capsule 5    torsemide (DEMADEX) 20 mg tablet TAKE 1 TABLET BY MOUTH EVERY DAY 30 tablet 1    TRADJENTA 5 MG TABS TAKE 1 TABLET BY MOUTH EVERY DAY 90 tablet 3    acetaminophen (TYLENOL) 325 mg tablet Take 2 tablets (650 mg total) by mouth 3 (three) times a day as needed for mild pain or moderate pain (Patient not taking: Reported on 6/8/2021) 30 tablet 0    levETIRAcetam (KEPPRA) 500 mg tablet Take 1 tablet (500 mg total) by mouth daily for 3 days (Patient not taking: Reported on 6/2/2021) 3 tablet 0     No current facility-administered medications for this visit  No Known Allergies    Review of Systems   Constitutional: Negative  HENT: Negative  Eyes: Negative  Gastrointestinal: Negative  Endocrine: Negative  Genitourinary: Negative  Musculoskeletal: Positive for gait problem (uses a walker)  Allergic/Immunologic: Negative  Neurological: Negative for dizziness, tremors, seizures, numbness and headaches  Hematological: Negative  Psychiatric/Behavioral: Negative  There were no vitals filed for this visit  VIRTUAL VISIT DISCLAIMER    Dylonfortunato Casey acknowledges that he has consented to an online visit or consultation   He understands that the online visit is based solely on information provided by him, and that, in the absence of a face-to-face physical evaluation by the physician, the diagnosis he receives is both limited and provisional in terms of accuracy and completeness  This is not intended to replace a full medical face-to-face evaluation by the physician  King Jae understands and accepts these terms

## 2021-06-10 NOTE — ASSESSMENT & PLAN NOTE
Acute on chronic right SDH s/p fall mid April  · Previously on coumadin for atrial fibrillation, currently held  · Presented with left sided weakness and left drift 3 weeks after fall but now returned to baseline per wife    Imaging:  · CT head w/o 6/9/2021:  Redistribution of acute on chronic right-sided subdural hematoma with mild interval increase in degree of acute blood products as well as slight interval increase in overall size  There is mild midline shift  Plan:  · Continue monitor neurological symptoms  Patient's wife along with family educated on signs and symptoms of progressive hematoma and increased intracranial pressure  · Imaging results reviewed in detail with the patient and his wife  · Family states patient is at his neurological baseline from prior to fall in mid April  · Again discussed all options for management  · Unfortunately he is not a candidate for an MMA embolization based on his kidney function (most recent GFR 11) and he is not currently on dialysis  · Heather Bis holes are an option in the future but as his exam is at baseline, I recommend continued surveillance imaging  · There is ongoing [employer] conversation with his family; if he does decline or the SDH enlarges would he want surgery or not  · If they decide on no surgical intervention regardless of progressive neurological decline, may consider deferring additional CT imaging  · At this time they wished to continue with serial CT imaging  · Patient is moving to [place] to be closer to family on 6/14; they have contacted a neurologist to establish care  · I am happy to send a referral to a neuro surgical goal team in [place] of the patient's/family's preference  · Recommend continuing to hold coumadin and all other AC/AP medications at this time; recommend close follow up with Dr Catarino Narayan  · Would recommend CT head in 2 week  or sooner if the patient has any neurological decline    If the patient remains neurologically stable, it is reasonable to have a CT imaging once he has moved to [place] and established  · Patient and family aware to call the office with any questions or concerns

## 2021-06-11 ENCOUNTER — OFFICE VISIT (OUTPATIENT)
Dept: NEPHROLOGY | Facility: CLINIC | Age: 86
End: 2021-06-11
Payer: MEDICARE

## 2021-06-11 ENCOUNTER — TELEPHONE (OUTPATIENT)
Dept: NEUROSURGERY | Facility: CLINIC | Age: 86
End: 2021-06-11

## 2021-06-11 ENCOUNTER — OFFICE VISIT (OUTPATIENT)
Dept: FAMILY MEDICINE CLINIC | Facility: CLINIC | Age: 86
End: 2021-06-11
Payer: MEDICARE

## 2021-06-11 VITALS
HEIGHT: 66 IN | WEIGHT: 171 LBS | HEART RATE: 68 BPM | TEMPERATURE: 98.5 F | BODY MASS INDEX: 27.48 KG/M2 | DIASTOLIC BLOOD PRESSURE: 78 MMHG | SYSTOLIC BLOOD PRESSURE: 120 MMHG

## 2021-06-11 VITALS — WEIGHT: 171 LBS | HEIGHT: 66 IN | BODY MASS INDEX: 27.48 KG/M2

## 2021-06-11 DIAGNOSIS — G81.94 LEFT HEMIPARESIS (HCC): ICD-10-CM

## 2021-06-11 DIAGNOSIS — Z79.4 TYPE 2 DIABETES MELLITUS WITH STAGE 5 CHRONIC KIDNEY DISEASE NOT ON CHRONIC DIALYSIS, WITH LONG-TERM CURRENT USE OF INSULIN (HCC): ICD-10-CM

## 2021-06-11 DIAGNOSIS — S06.5X9A SUBDURAL HEMATOMA (HCC): ICD-10-CM

## 2021-06-11 DIAGNOSIS — E03.9 HYPOTHYROIDISM, UNSPECIFIED TYPE: ICD-10-CM

## 2021-06-11 DIAGNOSIS — Z76.89 ENCOUNTER FOR SUPPORT AND COORDINATION OF TRANSITION OF CARE: Primary | ICD-10-CM

## 2021-06-11 DIAGNOSIS — N18.5 CKD (CHRONIC KIDNEY DISEASE) STAGE 5, GFR LESS THAN 15 ML/MIN (HCC): Primary | ICD-10-CM

## 2021-06-11 DIAGNOSIS — I48.0 PAROXYSMAL A-FIB (HCC): ICD-10-CM

## 2021-06-11 DIAGNOSIS — E11.22 TYPE 2 DIABETES MELLITUS WITH STAGE 5 CHRONIC KIDNEY DISEASE NOT ON CHRONIC DIALYSIS, WITH LONG-TERM CURRENT USE OF INSULIN (HCC): ICD-10-CM

## 2021-06-11 DIAGNOSIS — I25.10 3-VESSEL CORONARY ARTERY DISEASE: ICD-10-CM

## 2021-06-11 DIAGNOSIS — I10 ESSENTIAL HYPERTENSION: ICD-10-CM

## 2021-06-11 DIAGNOSIS — N18.5 TYPE 2 DIABETES MELLITUS WITH STAGE 5 CHRONIC KIDNEY DISEASE NOT ON CHRONIC DIALYSIS, WITH LONG-TERM CURRENT USE OF INSULIN (HCC): ICD-10-CM

## 2021-06-11 DIAGNOSIS — N18.5 CKD (CHRONIC KIDNEY DISEASE) STAGE 5, GFR LESS THAN 15 ML/MIN (HCC): ICD-10-CM

## 2021-06-11 PROCEDURE — 99214 OFFICE O/P EST MOD 30 MIN: CPT | Performed by: INTERNAL MEDICINE

## 2021-06-11 PROCEDURE — 99495 TRANSJ CARE MGMT MOD F2F 14D: CPT | Performed by: FAMILY MEDICINE

## 2021-06-11 RX ORDER — TORSEMIDE 20 MG/1
TABLET ORAL
Qty: 30 TABLET | Refills: 1 | Status: SHIPPED | OUTPATIENT
Start: 2021-06-11 | End: 2021-07-05

## 2021-06-11 RX ORDER — WARFARIN SODIUM 2 MG/1
TABLET ORAL
COMMUNITY
Start: 2021-06-03 | End: 2021-06-11 | Stop reason: ALTCHOICE

## 2021-06-11 RX ORDER — CARBOXYMETHYLCELLULOSE SODIUM 5 MG/ML
1 SOLUTION/ DROPS OPHTHALMIC 3 TIMES DAILY PRN
COMMUNITY

## 2021-06-11 NOTE — PATIENT INSTRUCTIONS
You are here for follow-up and we reviewed what happened when you pumped her head in you developed a subdural hematoma  You have completed rehab in her doing better but still need to be followed because of the slow bleeding that you had  With respect your kidneys the creatinine level when he left the hospital was 4 3 which is little higher than last visit but it may have been due to the acute illness or potentially that her baseline  As we discussed in detail based on her symptoms I do not feel that you even need dialysis at the time is your eating well your energy stable  You have stated that if he needed dialysis you had would not want to do it but again at this point be blood been talking about this to you for many years and things are remaining relatively stable in you do not have symptoms that would warrant the consideration of dialysis at this point  You are moving to [place] I am going to try and contact a patient I know down there to get you the 2  A nephrologist if it is in the area he will be living and I will call you with that  It has been a pleasure knowing you and please call if you have any questions or concerns

## 2021-06-11 NOTE — LETTER
June 11, 2021     Abelardo Trivedi MD  [address]    Patient: Rene Gutierrez   YOB: 1932   Date of Visit: 6/11/2021       Dear Dr Vazquez Hands:    Thank you for referring Rene Gutierrez to me for evaluation  Below are my notes for this consultation  If you have questions, please do not hesitate to call me  I look forward to following your patient along with you  Sincerely,        Paige Holley MD        CC: No Recipients  Paige Holley MD  6/11/2021  5:13 PM  Sign when Signing Visit  NEPHROLOGY PROGRESS NOTE    Rene Gutierrez 80 y o  male MRN: [id number]  Unit/Bed#:  Encounter: [id number]  Reason for Consult:  Chronic kidney disease    The patient is here with his wife for follow-up  Since I have last seen him he actually had suffered a fall and experienced a subdural hematoma  This required hospitalization and observation  He then went to rehabilitation  Initially when this was diagnosed it was a little bit of time after he fell out of bed and he was having some left leg weakness and unable to move it which has improved with therapy  The patient is eating well fully awake and alert in good spirits  ASSESSMENT/PLAN:  1  Renal    Patient is chronic kidney disease with proteinuria likely due to diabetic nephropathy  His latest creatinine is 4 3  This was done when he was actually leaving the hospital   The patient is eating well he has no symptoms of uremia  Him and his wife have expressed to me that if he would require dialysis in the future he would not want to do it and understands the consequences  I explained to them that you do not really decide to start people on dialysis based on numbers it is based on how they feel so at this point he has got a great appetite and he is very content with the weight life is now  They did inform me they will be moving to [place] so they will be picking up care with another primary physician    I am going to try and contact the area other living to see if there is any nephrologists I can recommend to them and if not I just told him to meet up with the family physician and then they can refer to the doctors in the area  Latest hemoglobin is 9 3  Electrolytes are normal     I asked him to call me if there is any problems or or questions  2  Subdural hematoma    The patient is being observed with serial CT scans in the most recent 1 was reported to them that it had expanded a little bit  He does not have any clinical signs or neurological issues that have changed  They were informed by neuro surgery that if it worsened he potentially would need brian hole drainage and they decided that they would not want that if it worsened but will continue to be monitored  SUBJECTIVE:  Review of Systems   Constitution: Negative for chills, diaphoresis, fever and night sweats  HENT: Negative  Eyes: Negative  Cardiovascular: Negative  Negative for chest pain, dyspnea on exertion, orthopnea and palpitations  Respiratory: Negative  Negative for cough, shortness of breath, sputum production and wheezing  Gastrointestinal: Negative for abdominal pain, anorexia, diarrhea, nausea and vomiting  Genitourinary: Negative for dysuria, flank pain, hematuria and incomplete emptying  Neurological: Negative for dizziness, focal weakness, headaches and light-headedness  Psychiatric/Behavioral: Negative for altered mental status, depression, hallucinations and hypervigilance  OBJECTIVE:  Current Weight: Weight - Scale: 77 6 kg (171 lb)  [email or web address]     Height 5' 6" (1 676 m), weight 77 6 kg (171 lb)  , Body mass index is 27 6 kg/m²  [unfilled]    Physical Exam: Ht 5' 6" (1 676 m)   Wt 77 6 kg (171 lb)   BMI 27 60 kg/m²   Physical Exam  Constitutional:       General: He is not in acute distress  Appearance: He is not toxic-appearing or diaphoretic     HENT:      Head: Normocephalic and atraumatic  Nose:      Comments: Wearing mask     Mouth/Throat:      Comments: Wearing mask  Eyes:      General: No scleral icterus  Extraocular Movements: Extraocular movements intact  Neck:      Musculoskeletal: Normal range of motion and neck supple  Cardiovascular:      Rate and Rhythm: Normal rate and regular rhythm  Heart sounds: No friction rub  No gallop  Comments: Mild edema left greater than right  Pulmonary:      Effort: Pulmonary effort is normal  No respiratory distress  Breath sounds: Normal breath sounds  No wheezing, rhonchi or rales  Abdominal:      General: Bowel sounds are normal  There is no distension  Palpations: Abdomen is soft  Tenderness: There is no abdominal tenderness  There is no rebound  Neurological:      Mental Status: He is alert and oriented to person, place, and time  Psychiatric:         Mood and Affect: Mood normal          Behavior: Behavior normal          Thought Content: Thought content normal          Judgment: Judgment normal          Medications:    Current Outpatient Medications:     atorvastatin (LIPITOR) 40 mg tablet, TAKE 1 TABLET BY MOUTH EVERY DAY, Disp: 90 tablet, Rfl: 3    carboxymethylcellulose 0 5 % SOLN, 1 drop 3 (three) times a day as needed for dry eyes, Disp: , Rfl:     cholecalciferol 2000 units TABS, Take 1 tablet (2,000 Units total) by mouth daily, Disp: 60 tablet, Rfl: 0    docusate sodium (COLACE) 100 mg capsule, Take 100 mg by mouth 2 (two) times a day, Disp: , Rfl:     finasteride (PROSCAR) 5 mg tablet, TAKE 1 TABLET BY MOUTH EVERY DAY, Disp: 90 tablet, Rfl: 3    glucose blood (FREESTYLE LITE) test strip, PATIENT TESTS BLOOD SUGARS ONCE DAILY  PATIENT IS NOT INSULIN DEPENDENT   DX E11 20, Disp: 50 each, Rfl: 5    levothyroxine 75 mcg tablet, TAKE 1 TABLET BY MOUTH EVERY DAY, Disp: 90 tablet, Rfl: 3    metoprolol tartrate (LOPRESSOR) 50 mg tablet, TAKE 1/2 TABLET BY MOUTH TWICE A DAY WITH MEALS (Patient taking differently: Take 25 mg by mouth every 12 (twelve) hours 37 5mg BID), Disp: 90 tablet, Rfl: 1    tamsulosin (FLOMAX) 0 4 mg, TAKE 1 CAPSULE BY MOUTH EVERYDAY AT BEDTIME, Disp: 30 capsule, Rfl: 5    torsemide (DEMADEX) 20 mg tablet, TAKE 1 TABLET BY MOUTH EVERY DAY, Disp: 30 tablet, Rfl: 1    TRADJENTA 5 MG TABS, TAKE 1 TABLET BY MOUTH EVERY DAY, Disp: 90 tablet, Rfl: 3    levETIRAcetam (KEPPRA) 500 mg tablet, Take 1 tablet (500 mg total) by mouth daily for 3 days (Patient not taking: Reported on 6/2/2021), Disp: 3 tablet, Rfl: 0    Sodium Phosphates (FLEET ENEMA RE), Insert into the rectum, Disp: , Rfl:     Laboratory Results:  Lab Results   Component Value Date    WBC 8 02 05/14/2021    HGB 9 3 (L) 05/14/2021    HCT 29 9 (L) 05/14/2021    MCV 99 (H) 05/14/2021     05/14/2021     Lab Results   Component Value Date    SODIUM 135 (L) 05/14/2021    K 4 1 05/14/2021     05/14/2021    CO2 26 05/14/2021    BUN 54 (H) 05/14/2021    CREATININE 4 39 (H) 05/14/2021    GLUC 120 05/14/2021    CALCIUM 8 5 05/14/2021     Lab Results   Component Value Date    CALCIUM 8 5 05/14/2021    PHOS 3 3 04/22/2021     No results found for: LABPROT

## 2021-06-11 NOTE — PROGRESS NOTES
NEPHROLOGY PROGRESS NOTE    Willy Victor 80 y o  male MRN: [id number]  Unit/Bed#:  Encounter: [id number]  Reason for Consult:  Chronic kidney disease    The patient is here with his wife for follow-up  Since I have last seen him he actually had suffered a fall and experienced a subdural hematoma  This required hospitalization and observation  He then went to rehabilitation  Initially when this was diagnosed it was a little bit of time after he fell out of bed and he was having some left leg weakness and unable to move it which has improved with therapy  The patient is eating well fully awake and alert in good spirits  ASSESSMENT/PLAN:  1  Renal    Patient is chronic kidney disease with proteinuria likely due to diabetic nephropathy  His latest creatinine is 4 3  This was done when he was actually leaving the hospital   The patient is eating well he has no symptoms of uremia  Him and his wife have expressed to me that if he would require dialysis in the future he would not want to do it and understands the consequences  I explained to them that you do not really decide to start people on dialysis based on numbers it is based on how they feel so at this point he has got a great appetite and he is very content with the weight life is now  They did inform me they will be moving to [place] so they will be picking up care with another primary physician  I am going to try and contact the area other living to see if there is any nephrologists I can recommend to them and if not I just told him to meet up with the family physician and then they can refer to the doctors in the area  Latest hemoglobin is 9 3  Electrolytes are normal     I asked him to call me if there is any problems or or questions  2  Subdural hematoma    The patient is being observed with serial CT scans in the most recent 1 was reported to them that it had expanded a little bit    He does not have any clinical signs or neurological issues that have changed  They were informed by neuro surgery that if it worsened he potentially would need brian hole drainage and they decided that they would not want that if it worsened but will continue to be monitored  SUBJECTIVE:  Review of Systems   Constitution: Negative for chills, diaphoresis, fever and night sweats  HENT: Negative  Eyes: Negative  Cardiovascular: Negative  Negative for chest pain, dyspnea on exertion, orthopnea and palpitations  Respiratory: Negative  Negative for cough, shortness of breath, sputum production and wheezing  Gastrointestinal: Negative for abdominal pain, anorexia, diarrhea, nausea and vomiting  Genitourinary: Negative for dysuria, flank pain, hematuria and incomplete emptying  Neurological: Negative for dizziness, focal weakness, headaches and light-headedness  Psychiatric/Behavioral: Negative for altered mental status, depression, hallucinations and hypervigilance  OBJECTIVE:  Current Weight: Weight - Scale: 77 6 kg (171 lb)  Man@[employer] com:     Height 5' 6" (1 676 m), weight 77 6 kg (171 lb)  , Body mass index is 27 6 kg/m²  [unfilled]    Physical Exam: Ht 5' 6" (1 676 m)   Wt 77 6 kg (171 lb)   BMI 27 60 kg/m²   Physical Exam  Constitutional:       General: He is not in acute distress  Appearance: He is not toxic-appearing or diaphoretic  HENT:      Head: Normocephalic and atraumatic  Nose:      Comments: Wearing mask     Mouth/Throat:      Comments: Wearing mask  Eyes:      General: No scleral icterus  Extraocular Movements: Extraocular movements intact  Neck:      Musculoskeletal: Normal range of motion and neck supple  Cardiovascular:      Rate and Rhythm: Normal rate and regular rhythm  Heart sounds: No friction rub  No gallop  Comments: Mild edema left greater than right  Pulmonary:      Effort: Pulmonary effort is normal  No respiratory distress  Breath sounds: Normal breath sounds   No wheezing, rhonchi or rales  Abdominal:      General: Bowel sounds are normal  There is no distension  Palpations: Abdomen is soft  Tenderness: There is no abdominal tenderness  There is no rebound  Neurological:      Mental Status: He is alert and oriented to person, place, and time  Psychiatric:         Mood and Affect: Mood normal          Behavior: Behavior normal          Thought Content: Thought content normal          Judgment: Judgment normal          Medications:    Current Outpatient Medications:     atorvastatin (LIPITOR) 40 mg tablet, TAKE 1 TABLET BY MOUTH EVERY DAY, Disp: 90 tablet, Rfl: 3    carboxymethylcellulose 0 5 % SOLN, 1 drop 3 (three) times a day as needed for dry eyes, Disp: , Rfl:     cholecalciferol 2000 units TABS, Take 1 tablet (2,000 Units total) by mouth daily, Disp: 60 tablet, Rfl: 0    docusate sodium (COLACE) 100 mg capsule, Take 100 mg by mouth 2 (two) times a day, Disp: , Rfl:     finasteride (PROSCAR) 5 mg tablet, TAKE 1 TABLET BY MOUTH EVERY DAY, Disp: 90 tablet, Rfl: 3    glucose blood (FREESTYLE LITE) test strip, PATIENT TESTS BLOOD SUGARS ONCE DAILY  PATIENT IS NOT INSULIN DEPENDENT   DX E11 20, Disp: 50 each, Rfl: 5    levothyroxine 75 mcg tablet, TAKE 1 TABLET BY MOUTH EVERY DAY, Disp: 90 tablet, Rfl: 3    metoprolol tartrate (LOPRESSOR) 50 mg tablet, TAKE 1/2 TABLET BY MOUTH TWICE A DAY WITH MEALS (Patient taking differently: Take 25 mg by mouth every 12 (twelve) hours 37 5mg BID), Disp: 90 tablet, Rfl: 1    tamsulosin (FLOMAX) 0 4 mg, TAKE 1 CAPSULE BY MOUTH EVERYDAY AT BEDTIME, Disp: 30 capsule, Rfl: 5    torsemide (DEMADEX) 20 mg tablet, TAKE 1 TABLET BY MOUTH EVERY DAY, Disp: 30 tablet, Rfl: 1    TRADJENTA 5 MG TABS, TAKE 1 TABLET BY MOUTH EVERY DAY, Disp: 90 tablet, Rfl: 3    levETIRAcetam (KEPPRA) 500 mg tablet, Take 1 tablet (500 mg total) by mouth daily for 3 days (Patient not taking: Reported on 6/2/2021), Disp: 3 tablet, Rfl: 0    Sodium Phosphates (FLEET ENEMA RE), Insert into the rectum, Disp: , Rfl:     Laboratory Results:  Lab Results   Component Value Date    WBC 8 02 05/14/2021    HGB 9 3 (L) 05/14/2021    HCT 29 9 (L) 05/14/2021    MCV 99 (H) 05/14/2021     05/14/2021     Lab Results   Component Value Date    SODIUM 135 (L) 05/14/2021    K 4 1 05/14/2021     05/14/2021    CO2 26 05/14/2021    BUN 54 (H) 05/14/2021    CREATININE 4 39 (H) 05/14/2021    GLUC 120 05/14/2021    CALCIUM 8 5 05/14/2021     Lab Results   Component Value Date    CALCIUM 8 5 05/14/2021    PHOS 3 3 04/22/2021     No results found for: LABPROT

## 2021-06-11 NOTE — TELEPHONE ENCOUNTER
Patient will have CT Head at [facility]  Address [place] [address] [address], [address]  Fax:  [phone]

## 2021-06-14 ENCOUNTER — TELEPHONE (OUTPATIENT)
Dept: CARDIOLOGY CLINIC | Facility: CLINIC | Age: 86
End: 2021-06-14

## 2021-06-14 PROBLEM — G81.94 LEFT HEMIPARESIS (HCC): Status: ACTIVE | Noted: 2021-06-14

## 2021-06-14 NOTE — ASSESSMENT & PLAN NOTE
With improving left hemiparesis  Recent CT did show slightly increased new blood products in the chronic left subdural area  However, patient has not had any worsening of his symptoms  Family have him scheduled for evaluation by Neurology as well as a neurosurgeon and down in [place] and are proceeding with a plans to move patient and wife to be closer to family  He will continue his present medications for now  Patient is not a good candidate for surgery given his age, renal issues and coronary artery disease however they agreed to have 1 more CT scan due to recent changes seen  Patient and wife understand that even if bleed has worsened, intervention may not be possible  I recommend that he continue his physical therapy post now and after the move  He is to avoid uneven ground and should always walk with a walker as well as with someone else  Patient understands that any further head trauma could be devastating  Patient follow-up with his new primary care as well as specialists once he moves   Patient  Or wife to call for any problems or concerns c/o dizziness and b/l lower extremity weakness with difficulty ambulating since tues after dialysis treatment l av shunt tues-thurs-sat didn't go to dialysis today due to dizziness/weakness perpts wife when she notified dialysis center of pts symptoms they states hgb=5 on thurs

## 2021-06-14 NOTE — ASSESSMENT & PLAN NOTE
Lab Results   Component Value Date    EGFR 11 05/14/2021    EGFR 11 05/13/2021    EGFR 10 05/12/2021    CREATININE 4 39 (H) 05/14/2021    CREATININE 4 52 (H) 05/13/2021    CREATININE 4 78 (H) 05/12/2021     Renal function actually improved during hospitalization  Will need Nephrology follow-up once he moves to [place]    Continue present treatment in the interim

## 2021-06-14 NOTE — PROGRESS NOTES
Assessment/Plan:    No problem-specific Assessment & Plan notes found for this encounter  Diagnoses and all orders for this visit:    Encounter for support and coordination of transition of care    Subdural hematoma (HCC)    Left hemiparesis (HCC)    CKD (chronic kidney disease) stage 5, GFR less than 15 ml/min (HCC)    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, with long-term current use of insulin (HCC)    3-vessel coronary artery disease    Essential hypertension    Hypothyroidism, unspecified type    Paroxysmal A-fib ([facility] [address] )    Other orders  -     Discontinue: warfarin (COUMADIN) 2 mg tablet  -     carboxymethylcellulose 0 5 % SOLN; 1 drop 3 (three) times a day as needed for dry eyes  -     Sodium Phosphates (FLEET ENEMA RE); Insert into the rectum          Subjective:     TCM Call (since 5/14/2021)     Date and time call was made  6/3/2021  3:16 PM    Hospital care reviewed  Records reviewed    Patient was hospitialized at  Other (comment)        [address]    Date of Admission  05/11/21    Date of discharge  05/15/21    Diagnosis  Subdural hematoma    Disposition  Home; Nursing Home    Were the patients medications reviewed and updated  No    Current Symptoms  None      TCM Call (since 5/14/2021)     Post hospital issues  None    Should patient be enrolled in anticoag monitoring? No    Scheduled for follow up?   Yes    Did you obtain your prescribed medications  Yes    Do you need help managing your prescriptions or medications  No    Is transportation to your appointment needed  No    I have advised the patient to call PCP with any new or worsening symptoms  FILEMON Mondragon     Living Arrangements  Spouse or Significiant other    Are you recieving any outpatient services  No    Are you recieving home care services  No    Are you using any community resources  No    Current waiver services  No    Have you fallen in the last 12 months  No Interperter language line needed  No    Counseling  Caregiver           Patient ID: Ana Fair is a 80 y o  male  Here for TCM  - 79 yo male with complicated past medical hx developed acute L sided weakness while going into his house on day of admission (5/11/21)  Pt's struggles was witnessed by an off duty  who helped him inside  Office and wife noted that the patient was getting progressively weaker and recommended that he go the ED  Pt taken to [facility] ED via ambulance where work up revealed chronic R subdural hematoma with acute extension  Wife noted that pt slipped off the bed 3 weeks prior to admission but is not sure if he hit his head  Pt had not slipped off the bed when trying to get up in the past  She did not note any worsening weakness or other neuro symptoms after that  Pt was subsequently admitted to the ICU  - pt remained in the ICU on observation for the next 2 days  Serial CT's initially showed sl worsening on 5/12, but stable findings on 5/13  Pt was subsequently moved to the floor  Pt remained stable over the next 2 days and was subsequently discharged to a rehab facility on 5/15  In rehab, pt has slowly improved  Speech and balance better  Pt had repeat CT on 5/24 that showed evolution of the subdural hematoma but lower amounts of high intensity fluid  Pt continued to improve  Pt discharged to home on 6/2/21  Pt here for TCM visit  - since return to home, plans were made to have patient and his wife move to [place] to be closer to family  Wife and patient feel that his symptoms remain unchanged since discharge  CT done 6/9/21 showed "mild interval increase in degree of acute blood products as well as slight interval increase in size"   Pt compliant with meds - reviewed  - I reviewed available hospital records, lab results and study reports with pt and wife         The following portions of the patient's history were reviewed and updated as appropriate:   He  has a past medical history of 3-vessel coronary artery disease, BPH (benign prostatic hyperplasia), Chronic kidney disease, [facility] ), Hyperlipidemia, Hypertension, Shingles (04/29/2019), SOB (shortness of breath), and Stroke ([place] [address] )  He   Patient Active Problem List    Diagnosis Date Noted    Left hemiparesis ([facility] ) 06/14/2021    Subdural hematoma ([place] [address] ) 05/11/2021    Elevated LFTs 05/11/2021    Essential hypertension 03/27/2020    History of constipation 09/04/2019    CKD (chronic kidney disease) stage 5, GFR less than 15 ml/min ([place] [address] ) 09/02/2019    Vitamin D insufficiency 08/29/2019    Anticoagulated on warfarin 08/28/2019    History of recurrent UTIs 08/28/2019    Presence of permanent cardiac pacemaker 08/28/2019    Anemia 08/28/2019    Healthcare maintenance 08/28/2019    Impaired mobility and ADLs 08/27/2019    Warfarin-induced coagulopathy ([place] [address] ) 08/26/2019    Benign prostatic hyperplasia with urinary hesitancy 08/22/2019    Abnormal chest x-ray 08/22/2019    Chronic diastolic heart failure ([facility] ) 07/04/2019    Leg edema, left 06/13/2019    Shortness of breath 06/13/2019    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 06/13/2019    Tibial artery occlusion, left ([facility] ) 06/13/2019    Ambulatory dysfunction/generalized weakness 06/12/2019    Frozen shoulder 05/30/2018    Paroxysmal A-fib ([place] [address] ) 04/13/2016    Hyperlipidemia 11/11/2015    Elevated serum alkaline phosphatase level 10/20/2015    Increased frequency of urination 10/20/2015    3-vessel coronary artery disease 08/26/2015    Type 2 diabetes mellitus, with long-term current use of insulin ([place] [address] ) 08/26/2015    Hypothyroidism 08/26/2015    Asbestosis ([place] [address] ) 05/06/2014    Seasonal allergies 05/06/2014     He  has a past surgical history that includes Coronary artery bypass graft; Cataract extraction; and Cardiac pacemaker placement  He  reports that he has never smoked   He has never used smokeless tobacco  He reports previous alcohol use  He reports that he does not use drugs  Current Outpatient Medications   Medication Sig Dispense Refill    atorvastatin (LIPITOR) 40 mg tablet TAKE 1 TABLET BY MOUTH EVERY DAY 90 tablet 3    carboxymethylcellulose 0 5 % SOLN 1 drop 3 (three) times a day as needed for dry eyes      cholecalciferol 2000 units TABS Take 1 tablet (2,000 Units total) by mouth daily 60 tablet 0    docusate sodium (COLACE) 100 mg capsule Take 100 mg by mouth 2 (two) times a day      finasteride (PROSCAR) 5 mg tablet TAKE 1 TABLET BY MOUTH EVERY DAY 90 tablet 3    glucose blood (FREESTYLE LITE) test strip PATIENT TESTS BLOOD SUGARS ONCE DAILY  PATIENT IS NOT INSULIN DEPENDENT  DX E11 20 50 each 5    levothyroxine 75 mcg tablet TAKE 1 TABLET BY MOUTH EVERY DAY 90 tablet 3    metoprolol tartrate (LOPRESSOR) 50 mg tablet TAKE 1/2 TABLET BY MOUTH TWICE A DAY WITH MEALS (Patient taking differently: Take 25 mg by mouth every 12 (twelve) hours 37 5mg BID) 90 tablet 1    Sodium Phosphates (FLEET ENEMA RE) Insert into the rectum      tamsulosin (FLOMAX) 0 4 mg TAKE 1 CAPSULE BY MOUTH EVERYDAY AT BEDTIME 30 capsule 5    torsemide (DEMADEX) 20 mg tablet TAKE 1 TABLET BY MOUTH EVERY DAY 30 tablet 1    TRADJENTA 5 MG TABS TAKE 1 TABLET BY MOUTH EVERY DAY 90 tablet 3    levETIRAcetam (KEPPRA) 500 mg tablet Take 1 tablet (500 mg total) by mouth daily for 3 days (Patient not taking: Reported on 6/2/2021) 3 tablet 0     No current facility-administered medications for this visit  He has No Known Allergies       Review of Systems   Constitutional: Negative  HENT: Negative  Mild L facial weakness   Eyes: Negative  Respiratory: Negative  Cardiovascular: Negative  Gastrointestinal: Negative  Endocrine: Negative  Genitourinary: Negative  Musculoskeletal: Positive for arthralgias (mild, diffuse) and gait problem (ambulates with walker)  Negative for myalgias  Neurological: Positive for weakness   Negative for dizziness, tremors, light-headedness and numbness  Objective:      /78   Pulse 68   Temp 98 5 °F (36 9 °C)   Ht 5' 6" (1 676 m)   Wt 77 6 kg (171 lb)   BMI 27 60 kg/m²          Physical Exam  Constitutional:       General: He is not in acute distress  Appearance: He is well-developed  HENT:      Head: Normocephalic and atraumatic  Comments: Mild flattening of L nasolabial fold     Right Ear: Tympanic membrane, ear canal and external ear normal       Left Ear: Tympanic membrane, ear canal and external ear normal       Mouth/Throat:      Comments: Weakness on pursing L upper and lower lips  Eyes:      General: No scleral icterus  Conjunctiva/sclera: Conjunctivae normal       Pupils: Pupils are equal, round, and reactive to light  Comments: ? Difficulty tracking past midline to the L   Neck:      Thyroid: No thyromegaly  Vascular: No carotid bruit  Cardiovascular:      Rate and Rhythm: Normal rate and regular rhythm  Pulses: Normal pulses  Heart sounds: Normal heart sounds  No murmur heard  Pulmonary:      Effort: Pulmonary effort is normal       Breath sounds: Normal breath sounds  Abdominal:      General: Bowel sounds are normal  There is no distension  Palpations: Abdomen is soft  There is no mass  Tenderness: There is no abdominal tenderness  Musculoskeletal:         General: No swelling, tenderness or deformity  Normal range of motion  Cervical back: Normal range of motion and neck supple  No tenderness  No muscular tenderness  Right lower leg: Edema (trace) present  Left lower leg: Edema (trace) present  Lymphadenopathy:      Cervical: No cervical adenopathy  Skin:     General: Skin is warm and dry  Capillary Refill: Capillary refill takes less than 2 seconds  Neurological:      Mental Status: He is alert and oriented to person, place, and time        Cranial Nerves: Cranial nerve deficit (L facial weakness) present  Sensory: No sensory deficit  Motor: Weakness (?mild L quad and hip flexor  no foot drop appreciated) present  Coordination: Coordination normal       Gait: Gait abnormal (ambulates with walker)  Deep Tendon Reflexes: Reflexes normal (sl increased L side throughout)  Psychiatric:         Mood and Affect: Mood normal          Behavior: Behavior normal          Thought Content:  Thought content normal          Judgment: Judgment normal

## 2021-06-14 NOTE — ASSESSMENT & PLAN NOTE
Secondary to right-sided subdural hematoma  Left hemiparesis appears to be improving -still has some facial weakness though extremities seem to be better  Able to ambulate with walker  Continue present medications  Follow-up with Neurology and Neurosurgery  Continue to monitor CT scans  Follow-up with new primary care provider once they have moved to [place]

## 2021-06-14 NOTE — ASSESSMENT & PLAN NOTE
Patient asymptomatic    Recommend that he establish with a new cardiologist once he  completes the moved to [facility]

## 2021-06-14 NOTE — ASSESSMENT & PLAN NOTE
Clinically in normal sinus rhythm today  Continue present medications  Recommend that he establish with a new cardiologist once he moves to [place]

## 2021-06-14 NOTE — ASSESSMENT & PLAN NOTE
Lab Results   Component Value Date    HGBA1C 6 4 04/22/2021    A1c has been at goal   Continue to monitor     Follow-up with new PCP once established in [place]

## 2021-06-24 ENCOUNTER — TELEPHONE (OUTPATIENT)
Dept: OTHER | Facility: OTHER | Age: 86
End: 2021-06-24

## 2021-06-24 ENCOUNTER — TELEPHONE (OUTPATIENT)
Dept: NEUROSURGERY | Facility: CLINIC | Age: 86
End: 2021-06-24

## 2021-06-24 NOTE — TELEPHONE ENCOUNTER
Received a Tiger text at 6:15 p m  from a radiology department in [place]  Spoke with Darryn Siu who states patient had CT head done today and read was "very large subdural hematoma which appears chronic  12 cm in length by 1 3 cm in with and 6 7 cm vertical dimension"  She reports she faxed over the radiology report as well as send a copy of patient's disc to our office  Unfortunately I am unable to review imaging at this time but when comparing measurements from [place] to patient's last CT scan that is available to review it does appear his subduraal may have increased in size  Spoke with patient's wife and daughter on the phone after reviewing imaging with them  Patient's daughter states she recently moved her mom and dad to [place] last Tuesday  She reports patient is not on Coumadin  They report patient is at his baseline and denies any headaches, dizziness, or confusion  Per wife patient did have some left leg weakness which she noticed after he was sitting in the sun today and this also happened a couple weeks ago  Patient's wife states he is back at his baseline  Per daughter patient would not want any surgical intervention if his subdural were to worsen  Daughter states her mother wanted one more CT head after moving to [place]  Daughter Hospit[facility] in [place] he has not been able to get in with a neurosurgeon while in [facility] after seeing Carmel Barnhart in the office on 06/10/2021, she stated she would review CT head and call patient with results  Spoke with family and once imaging available to compare to prior imaging will call patient and family with an update  Daughter also asked if patient was okay to take Tylenol, discussed with them that this is safe for patient to take but to avoid Advil or Motrin  Family made aware if he develops a severe headache, dizziness, confusion or other neurological changes to go to the nearest ED for further evaluation    Family and patient made aware to contact Neurosurgery with any further questions or concerns

## 2021-06-24 NOTE — TELEPHONE ENCOUNTER
Kinsey Guan with [facility] has stat results to release regarding the patients CT scan  She is requesting to release STAT results to on call provider

## 2021-06-28 NOTE — TELEPHONE ENCOUNTER
Spoke with Mary Kate Kate, patient's spouse who informed this RN she called the radiologist who informed her the disc is currently in transit to our office  Advised we will give her a call with these results once disc is received  Appreciative of callback

## 2021-06-28 NOTE — TELEPHONE ENCOUNTER
Received a call from patient's spouse, Lisa Dupree looking for the results of patient's CT done in [place] on 6/24  Returned call to patient and advised our office does not have the disc to review the images with patient and spouse  Advised Sweetie to have facility send over the disc to our office for a provider to review results with them  Sweetie states she will give the facility a call to have the disc mailed to our office  Provided office address  Sweetie appreciative of call back

## 2021-06-30 ENCOUNTER — ANTICOAG VISIT (OUTPATIENT)
Dept: FAMILY MEDICINE CLINIC | Facility: CLINIC | Age: 86
End: 2021-06-30

## 2021-07-02 NOTE — TELEPHONE ENCOUNTER
Received disc Ensa cthead dated  6/24/2021 advised [facility]bina disc came  placed on [employer] desk for uploading _BA

## 2021-07-03 DIAGNOSIS — R60.1 GENERALIZED EDEMA: ICD-10-CM

## 2021-07-03 NOTE — ASSESSMENT & PLAN NOTE
Addendum Note by Sid Abbott CRNA at 9/7/2019  7:17 PM     Author: Sid Abbott CRNA Service: -- Author Type: Nurse Anesthetist    Filed: 9/7/2019  7:17 PM Date of Service: 9/7/2019  7:17 PM Status: Signed    : Sid Abbott CRNA (Nurse Anesthetist)       Addendum  created 09/07/19 1917 by Sid Abbott CRNA    Intraprocedure Meds edited, Orders acknowledged in Narrator            · Back pain stable   Patient reports feeling comfortable, still not able to ambulate currently  · Received inpatient PT treatment; noted input of PT  · Continue monitoring for pain, ensure comfort and safety  · For discharge to short term rehab facility while completing course of antibiotic and awaiting planned date of surgery (7/18/2019)  · Discontinue all blood thinners starting today until surgery

## 2021-07-05 RX ORDER — TORSEMIDE 20 MG/1
TABLET ORAL
Qty: 30 TABLET | Refills: 1 | Status: SHIPPED | OUTPATIENT
Start: 2021-07-05 | End: 2021-07-28

## 2021-07-08 ENCOUNTER — TELEPHONE (OUTPATIENT)
Dept: NEUROSURGERY | Facility: CLINIC | Age: 86
End: 2021-07-08

## 2021-07-08 ENCOUNTER — TELEPHONE (OUTPATIENT)
Dept: FAMILY MEDICINE CLINIC | Facility: CLINIC | Age: 86
End: 2021-07-08

## 2021-07-08 NOTE — TELEPHONE ENCOUNTER
Inocente Rodriguez called the office and left a voice message requesting for a call back  Returned the call and left a voice message requesting for her to call back  Provided the office's phone number

## 2021-07-08 NOTE — TELEPHONE ENCOUNTER
Received a callback from [address]  for missing last call  Requesting another call back  Left message to return call at her convenience

## 2021-07-08 NOTE — TELEPHONE ENCOUNTER
Patient's wife called  They moved to [place]  They have not found a new PCP yet, but they went to see a new cardiologist today  We faxed the cardiologist his last set of labs on file  Patient's liver enzymes from May hospitalization were elevated and they are very concerned  They are suggesting he should be seeing gastro re this   They need to know what you think about this ASAP

## 2021-07-09 ENCOUNTER — TELEPHONE (OUTPATIENT)
Dept: NEUROSURGERY | Facility: CLINIC | Age: 86
End: 2021-07-09

## 2021-07-09 NOTE — TELEPHONE ENCOUNTER
Received a call from [facility] questioning if our office has received the disc for patient's last CT  Returned call to patient and advised our office has received the disc and Unruly Mario MA is currently working on uploading the disc  Advised someone will be in contact with her to review the results of the disc  Appreciative of call back

## 2021-07-12 NOTE — TELEPHONE ENCOUNTER
They were high at the beginning of May but were improving   I would repeat them before referring to GI - if not resolving, then seeing GI would be a good idea

## 2021-07-20 ENCOUNTER — TELEPHONE (OUTPATIENT)
Dept: NEUROSURGERY | Facility: CLINIC | Age: 86
End: 2021-07-20

## 2021-07-28 DIAGNOSIS — R60.1 GENERALIZED EDEMA: ICD-10-CM

## 2021-07-28 RX ORDER — TORSEMIDE 20 MG/1
TABLET ORAL
Qty: 30 TABLET | Refills: 1 | Status: SHIPPED | OUTPATIENT
Start: 2021-07-28

## 2021-08-02 ENCOUNTER — TELEPHONE (OUTPATIENT)
Dept: FAMILY MEDICINE CLINIC | Facility: CLINIC | Age: 86
End: 2021-08-02

## 2022-01-14 NOTE — TELEPHONE ENCOUNTER
Results in chart
Vna is resuming care today, will do finger stick for PT INR today,  Can you set up mobil lab for him until his wife is able to get him out  He is due for BMP in 1 wk plus PT INR weekly depending on results today 
High Risk (score 12 or above)

## 2022-11-01 NOTE — ASSESSMENT & PLAN NOTE
· BsCr: 3 4; elevated to 4 5 on admission  · Suspect prerenal due to sepsis  · Cont IVF hydration  · avoid hypotension, nephrotoxins Cigarettes

## 2022-11-07 ENCOUNTER — RA CDI HCC (OUTPATIENT)
Dept: OTHER | Facility: HOSPITAL | Age: 87
End: 2022-11-07

## 2022-11-07 NOTE — PROGRESS NOTES
[address] [facility] DOS 2/3/21  N18 5 billed and noted on DOS and  problem list; Dialysis not in place at [facility]

## 2023-08-31 NOTE — TELEPHONE ENCOUNTER
1)  Did he have the TDAP? Its recorded in the chart , can you confirm a date? His grand dtr had a baby  2) he had Shingles this past April, can he get the vaccine now? 3) he did not increase the water pill  Weights:  11/19  160 0  11/20  159 6  11/21  159 2  11/22  162 0  11/23  159 0  11/24  158 6  11/25  159 0    Pl adv  Itraconazole Counseling:  I discussed with the patient the risks of itraconazole including but not limited to liver damage, nausea/vomiting, neuropathy, and severe allergy.  The patient understands that this medication is best absorbed when taken with acidic beverages such as non-diet cola or ginger ale.  The patient understands that monitoring is required including baseline LFTs and repeat LFTs at intervals.  The patient understands that they are to contact us or the primary physician if concerning signs are noted.

## 2024-04-14 NOTE — RESULT NOTES
Verified Results  (1) PT WITH INR [id number] 10:21AM Um[facility] Bee   TW Order Number: [id number]  TW Order Number: [id number]     Test Name Result Flag Reference   INR 2 73 H 0 86-1 16   PT 27 5 seconds H 11 8-14 1 Yes

## 2024-05-17 NOTE — PROGRESS NOTES
Physical Medicine and Rehabilitation Progress Note  Wilber Aguilar 80 y o  male MRN: [id number]  Unit/Bed#: [facility] 119-09 Encounter: [id number]    Chief Complaints:  Decline in function    Subjective/Interval Events:   Patient reports sleeping adequately overnight  Patient reports strength is about the same  He denies shortness of breath, chest pain, lightheadedness, fever, chills, sweats, increased back pain or radiating leg pain, bowel or bladder problems, or other complaints  ROS: A 10-point ROS was performed  Negative except as listed above  Overall Assessment/relevant history:  [age]-year-old male with past medical history of chronic diastolic CHF, chronic kidney disease stage 4, paroxysmal atrial fibrillation, dual chamber permanent pacemaker, coronary artery disease, CABG, diabetes mellitus 2, hypothyroidism, BPH, recurrent UTIs, lumbar spinal stenosis, chronic low back pain, foot drop, history of left L2-L3 microdiskectomy in July of 2019 the developed worsening swelling with associated decline in ADLs and ambulation  Patient splits seen by Cardiology and Nephrology with noted worsening of renal function and volume overload believed to be related to this  Patient required initial IV and then more recently p o  Increase in diuretics  Patient's volume status has been improving although his functional status remains below his baseline    Patient was evaluated by skilled therapies and was found to have significant decline in ADLs and ambulation appropriate for admission to [facility]        Functional status on admission to [facility]:  Full functional scores pending     Functional status (recent):    See above    Functional status goal:  Supervision to Modified independent for ADLs and ambulation     * Impaired mobility and ADLs  Assessment & Plan  Multifactorial:  Recent volume overload related to acute on chronic kidney disease, history of diastolic CHF, generalized Subjective: R Headaches     Patient ID: Katie Reddy is a 78 y.o. female.    History of Present Illness Ms. Reddy is a 78-year-old  female who is following up on post stroke related headaches.  She was previously seen 10/31/2023.  She is currently treated with gabapentin and could titrate up to 3 times daily.  She would like to go up to BID on Gabapentin.     New Complaint:  Syncopal spells.  Occurs 1-2 times a week.  Sporadically passes out.  She has had no workup for syncopal spells.    Follow up on Headaches  Medication: Gabapentin- does feels like it helps  Frequency? daily  Duration?1-2 hours  Associated? none  Change in headache? none  .      MRI LUMBAR SPINE W WO CONTRAST   Date of Exam: 12/2/2023 11:50 AM EST  Impression:   1. L4-L5: Grade 1 spondylolisthesis. There is uncovering of the disc with mild bulge. The central canal is patent. There is mild bilateral foraminal stenosis with facet hypertrophy. There is mild reactive edema and enhancement of the facet joints.   2. Mild multilevel disc bulges     Electronically Signed: Ludin Liu MD    12/4/2023 10:18 AM EST   NEUROSURGEON  Neurological Exam Neurosurgical Consultation   Neurological examination appears stable compared to my last evaluation.  I do not appreciate any new red flag signs.  Result Review  The following data was reviewed by: Hermann Miramontes MD on 12/28/2023:  Data reviewed : Radiologic studies MRI of the lumbar spine shows minor grade 1 spondylolisthesis at L4-L5.  There is some mild lateral recess stenosis at this level.  There is no profound central canal stenosis or profound neural foraminal stenosis.       Assessment/plan:  This is a 77-year-old woman with chronic neck and back pain.  She has had extensive physical therapy and pain management injections.  This has not provided profound relief.  I discussed with her the possibility of exploration of spinal cord stimulator and she expresses strong desire to  discontinue management of the symptoms with physicians.  She no longer desires to engage with any specific treatment plans for the symptoms.  I do recommend a flexion-extension x-ray of the lumbar spine to confirm absence of dynamic spondylolisthesis.  She can follow-up with me on an as-needed basis.  I have encouraged her to call with any questions or concerns.                 The following portions of the patient's history were reviewed and updated as appropriate: allergies, current medications, past family history, past medical history, past social history, past surgical history and problem list.    Family History   Problem Relation Age of Onset    Heart disease Mother     Hypertension Mother     Diabetes Father     Heart disease Father     Alcohol abuse Father     Hypertension Father     Diabetes Brother     Heart disease Brother     Cancer Brother 68        Prostate Cancer    Hypertension Brother     Diabetes Maternal Aunt     Heart disease Maternal Grandmother     Hypertension Maternal Grandmother     Heart disease Maternal Grandfather     Hypertension Maternal Grandfather     Liver disease Paternal Grandmother     Hypertension Paternal Grandmother     Heart disease Paternal Grandfather     Hypertension Paternal Grandfather     Dementia Sister     Diabetes Brother        Past Medical History:   Diagnosis Date    Arthritis     Bladder incontinence     Colon polyp     Glucocorticoid deficiency     History of loop recorder     Intractable nausea and vomiting 11/11/2020    Osteopenia     Vitamin D deficiency        Social History     Socioeconomic History    Marital status:     Number of children: 4    Years of education: GED   Tobacco Use    Smoking status: Never     Passive exposure: Never    Smokeless tobacco: Never   Vaping Use    Vaping status: Never Used   Substance and Sexual Activity    Alcohol use: Not Currently    Drug use: Not Currently    Sexual activity: Defer         Current Outpatient  weakness including proximal lower extremity weakness possibly related to some chronic illness/critical illness deconditioning, low back pain and radiating leg pain with right foot drop from lumbar spinal stenosis possible nerve impingement status post surgical intervention recently, 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, left adhesive capsulitis, query component cognitive impairment,   - Recommend acute comprehensive interdisciplinary inpatient rehabilitation to include intensive skilled therapies (PT, OT) as outlined with oversight and management by rehabilitation physician as well as inpatient rehab level nursing, case management and weekly interdisciplinary team meetings  - Obtain MOCA to eval cog status   - optimal management of each  - Follow-up with PMR after discharge      Buttock wound  Assessment & Plan  Appreciated on admission to [address] [place] [address] care nurse c/s and assistance with management  - Notify MD of new or increasing drainage, development of purulent drainage, increased size/depth of wound, lack of healing, inability to maintain wound integrity due to degree of drainage, wound product, dressing, or currently ordered frequency of wound management  In general dressings should be changed PRN if soiled unless specifically noted otherwise  Do not allow soiled dressing on patient for extended period of time  - Turn patient Q2H   - Hydragaurd to buttocks and sacrum BID & PRN v Alleyvn >  - EHOB waffle cushion to chair/WC when OOB  - Maximum time in chair 2 hours at a time  - OOB minimum 3 times per day  Notify MD if unable to get patient OOB 3 times per day  - Elevate heels with pillows to offload  - Moisturizer daily and PRN to dry skin; do not use moisturizer on areas of redness or skin breakdown unless otherwise documented  - Nursing to document in chart and Notify MD if buttock, sacrum, heel, or other skin site develops erythema or skin breakdown as soon as possible    If patient Medications:     amitriptyline (ELAVIL) 25 MG tablet, Take 1 tablet by mouth Every Night., Disp: 90 tablet, Rfl: 3    amLODIPine (NORVASC) 10 MG tablet, Take 0.5 tablets by mouth Daily., Disp: 90 tablet, Rfl: 3    aspirin 81 MG EC tablet, Take 1 tablet by mouth Daily., Disp: 90 tablet, Rfl: 3    atorvastatin (LIPITOR) 80 MG tablet, Take 1 tablet by mouth Daily., Disp: , Rfl:     betamethasone valerate (VALISONE) 0.1 % cream, Apply 1 Application topically to the appropriate area as directed 2 (Two) Times a Day., Disp: 45 g, Rfl: 0    Calcium Carbonate-Vit D-Min (Calcium 1200) 7250-9814 MG-UNIT chewable tablet, Chew 1 tablet Daily., Disp: , Rfl:     cefdinir (OMNICEF) 300 MG capsule, Take 1 capsule by mouth 2 (Two) Times a Day., Disp: 14 capsule, Rfl: 0    ciprofloxacin (Cipro) 250 MG tablet, Take 1 tablet by mouth 2 (Two) Times a Day., Disp: 10 tablet, Rfl: 0    Evolocumab (REPATHA) solution prefilled syringe injection, Inject 1 mL under the skin into the appropriate area as directed Every 14 (Fourteen) Days., Disp: 6 mL, Rfl: 1    gabapentin (NEURONTIN) 300 MG capsule, Take 1 capsule by mouth 2 (Two) Times a Day for 180 days., Disp: 60 capsule, Rfl: 5    hydrocortisone (CORTEF) 10 MG tablet, Take 1 tablet by mouth 3 times a day., Disp: 270 tablet, Rfl: 1    lisinopril (PRINIVIL,ZESTRIL) 5 MG tablet, Take 1 tablet by mouth Daily., Disp: 90 tablet, Rfl: 1    meclizine (ANTIVERT) 12.5 MG tablet, Take 1 tablet by mouth 3 (Three) Times a Day As Needed for Dizziness., Disp: , Rfl:     metoprolol succinate XL (TOPROL-XL) 100 MG 24 hr tablet, Take 1 tablet by mouth Daily., Disp: 90 tablet, Rfl: 3    nitroglycerin (NITROSTAT) 0.4 MG SL tablet, Place 1 tablet under the tongue Every 5 (Five) Minutes As Needed for Chest Pain. Take no more than 3 doses in 15 minutes., Disp: 75 tablet, Rfl: 0    ondansetron ODT (ZOFRAN-ODT) 4 MG disintegrating tablet, Place 1 tablet on the tongue Every 8 (Eight) Hours As Needed for Nausea or  is soiling themselves with urine or stool notify MD   If you are unable to maintain skin integrity and prevent erythema due to frequency of soiling notify MD as soon as possible  Kidney disease with fluid retention  Assessment & Plan  Kidney function stable continue management as outlined  Diuretics at their discretion  Internal medicine consult and management during ARC course  Nephrology c/s to assist with mgmt  Cr  stable recently; monitor intermittently  Limit nephrotoxic agents when possible  WENDY hose       Pain  Assessment & Plan  Controlled, continue management  Try to limit sedating meds   APAP PRN   LD patch  Counseled on and continue to encourage deep breathing/relaxation/behavioral pain management techniques:     Deep breathin seconds in, 5 seconds out, 5 times per hour when awake and PRN when in pain or anticipate pain; avoid holding breath and tightening muscles and instead breathe slowly and deeply  Monitor for oversedation, AMS/delirium, and respiratory depression   If being administered - hold opiates, muscle relaxants, benzodiazepines, and gabapentin for oversedation, AMS, or RR<12        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  Assessment & Plan  Pain adequately controlled continue management as outlined  Complaining of some chronic right low back pain with intermittent radiating pain down the leg; also with right foot drop  Status post recent lumbar surgery - left L2-3 microdiskectomy  [facility])   Try to limit sedating medications  Acetaminophen as needed  Lidocaine patch for now patient prefers    Frozen shoulder  Assessment & Plan  Skilled therapies, modalities as indicated    History of recurrent UTIs  Assessment & Plan  Internal medicine consult and management during ARC course  Currently on flomax and proscar  Follow-up with Urology after discharge    Benign prostatic hyperplasia with urinary hesitancy  Assessment & Plan  Continue flomax and Proscar; monitor Vomiting., Disp: , Rfl:     pantoprazole (PROTONIX) 40 MG EC tablet, Take 1 tablet by mouth Every Morning., Disp: , Rfl:     raloxifene (EVISTA) 60 MG tablet, Take 1 tablet by mouth Daily., Disp: 90 tablet, Rfl: 3    sertraline (ZOLOFT) 100 MG tablet, Take 1 tablet by mouth Every Night., Disp: , Rfl:     sucralfate (CARAFATE) 1 g tablet, Take 1 tablet by mouth 4 (Four) Times a Day., Disp: , Rfl:     Vibegron (Gemtesa) 75 MG tablet, Take  by mouth., Disp: , Rfl:     Review of Systems   Neurological:  Positive for headaches.   All other systems reviewed and are negative.         I have reviewed ROS completed by medical assistant.     Objective:    Neurologic Exam     Mental Status   Oriented to person, place, and time.   Speech: speech is normal     Cranial Nerves     CN III, IV, VI   Pupils are equal, round, and reactive to light.      Physical Exam  Vitals reviewed.   Constitutional:       Appearance: Normal appearance. She is well-developed, well-groomed and overweight.   HENT:      Head: Normocephalic and atraumatic.      Right Ear: Hearing normal.      Left Ear: Hearing normal.      Nose: Nose normal.      Mouth/Throat:      Lips: Pink.      Mouth: Mucous membranes are moist.   Eyes:      General: Lids are normal. No visual field deficit.     Pupils: Pupils are equal, round, and reactive to light.   Cardiovascular:      Rate and Rhythm: Normal rate.   Pulmonary:      Effort: Pulmonary effort is normal.   Musculoskeletal:         General: Normal range of motion.      Cervical back: Normal range of motion.      Comments: Ambulating in and out of the room without difficulty   Skin:     General: Skin is warm and dry.   Neurological:      Mental Status: She is alert and oriented to person, place, and time.      Cranial Nerves: No dysarthria or facial asymmetry.      Sensory: No sensory deficit.      Motor: No weakness or tremor.      Gait: Gait normal.   Psychiatric:         Attention and Perception: Attention and  vitals  - monitor for retention, incontinence, signs/symptoms of UTI  - recommend voiding trial; nursing prompt to void followed by bladder scan starting Q4-6H or after each patient initiated void; nursing to record voided output and bladder scan totals; straight cath PRN >350-400 cc; if post void residual bladder scans are <150 cc x3 consecutive voids, can stop scans       Presence of permanent cardiac pacemaker  Assessment & Plan  +    Anticoagulated on warfarin  Assessment & Plan  Management per Medicine with recent supratherapeutic INR    Chronic diastolic heart failure [facility])  Assessment & Plan  Internal medicine consult and management during ARC course  Anti thrombotics per Medicine, blood pressure medications at their discretion     Paroxysmal A-fib [facility])  Assessment & Plan  Internal medicine consult and management during ARC course  Anticoagulation at their discretion  Monitor rate    Diabetes mellitus with multiple complications [facility])  Assessment & Plan  Lab Results   Component Value Date    HGBA1C 5 5 08/22/2019     Internal medicine consulted and management at their discretion  Monitor for signs and symptoms of hypoglycemia   Current meds: lantus, lispro       Essential hypertension  Assessment & Plan  BP acceptable  Internal medicine consulted and management at their discretion  Monitor vitals with and without activity; monitor for orthostasis  Monitor hemoglobin, electrolytes, kidney function, hydration status   Current meds:  Metoprolol, amlodipine      3-vessel coronary artery disease  Assessment & Plan  Antiplatelet, optimal blood pressure control, statin  Ranexa    Hyperlipidemia  Assessment & Plan  Statin    Anemia  Assessment & Plan  Hemoglobin monitor   IM consulted and assistance with management during ARC course  Monitor H/H, vitals, signs/symptoms of acute bleeding        Healthcare maintenance  Assessment & Plan  Obtain vitamin-D level    Hypothyroidism  Assessment & Plan  Levothyroxine    Elevated serum alkaline phosphatase level  Assessment & Plan  Repeat CMP      # Bowel care:    - monitor for constipation, incontinence, and diarrhea  - goal 1 appropriate BM every 1-2 days  - recommend colace +/- senna and PRN bowel protocol     # At risk for venous thromboembolism:  - Recommend SCDs and mobilization  - A/C     # Diet/Hydration:    Diabetic/cardiac     Disposition:   Home with family with [facility] 2 weeks from admission      Follow-up:   PCP, PMR, Nephrology, Cardiology, urology     CODE: Level 3: DNAR and DNI     Restrictions include: Fall precautions   ---------------------------------------------------------------------------------------------------------------------    Objective: Allergies and Medications per EMR    Physical Exam:  Temp:  [97 6 °F (36 4 °C)-97 9 °F (36 6 °C)] 97 9 °F (36 6 °C)  HR:  [69-78] 71  Resp:  [16-20] 18  BP: (106-137)/(53-63) 124/62  *General: Awake, alert in NAD  HENT:  MMM  Respiratory: Unlabored breathing, breath sounds equal, Lungs CTA, no wheezes, rales, or rhonchi  Cardiovascular: Regular rate and rhythm, no murmurs, rubs, or gallops  Gastrointestinal: Soft, non-tender, mildly-distended, normoactive bowel sounds  Genitourinary: No camargo  SkiN/MSK/Extremities:   1+ bilateral lower extremity edema and less so left upper extremity distal edema  No calf tenderness to palpation  Neurologic/Psych:   MENTAL STATUS: awake, orientation intact  Affect: Euthymic to slightly flattened  Strength/MMT:  grossly stable     Physical exam performed, documentation above reviewed and updated if appropriate on relevant date of encounter:   08/28/2019    Diagnostic Studies: reviewed, no new imaging  No results found      Laboratory:    Results from last 7 days   Lab Units 08/23/19  0459 08/22/19  1346   HEMOGLOBIN g/dL 9 4* 9 5*   HEMATOCRIT % 30 1* 30 6*   WBC Thousand/uL 7 36 7 35     Results from last 7 days   Lab Units 08/28/19  0559 08/27/19  0579 perception normal.         Mood and Affect: Mood normal. Mood is anxious.         Speech: Speech normal.         Behavior: Behavior is cooperative.         Thought Content: Thought content normal.     Assessment/Plan:  The patient is complaining of New Syncopal spells. Will order EEG.  Tried to order MRI however current bladder stem is not MRI compatible.  Will increase gabapentin to twice daily to help with neck pain and chronic right-sided headache.  The patient is to follow seizure precautions as listed below.  She will be scheduled back for follow-up in approximately 6 months.  I will call the patient as soon as we receive results of the EEG and MRI.      Diagnoses and all orders for this visit:    1. Syncope, unspecified syncope type (Primary)  -     EEG EXTENDED MONITORING  MIN; Future  -     CT Head Without Contrast; Future    2. Foraminal stenosis of cervical region  -     gabapentin (NEURONTIN) 300 MG capsule; Take 1 capsule by mouth 2 (Two) Times a Day for 180 days.  Dispense: 60 capsule; Refill: 5    3. Chronic right-sided headaches  -     gabapentin (NEURONTIN) 300 MG capsule; Take 1 capsule by mouth 2 (Two) Times a Day for 180 days.  Dispense: 60 capsule; Refill: 5      The patient was told to observe all seizure precautions, including, but not limited to:   If a Seizures occurs: No driving until seizure free for more than 3 months- as per State driving regulation / law;   Avoid all high-risk activity, Take showers instead of baths, avoid swimming without observation, Avoid open heat sources, Avoid working at heights, and Avoid engaging in all potentially hazardous activities.   Patient expressed clear understanding.      Return in about 6 months (around 11/21/2024) for Lori Selby.     I spent 49 minutes caring for Katie on this date of service. This time includes time spent by me in the following activities: preparing for the visit, reviewing tests, obtaining and/or reviewing a separately  08/26/19  0520  08/22/19  1347   BUN mg/dL 53* 47* 45*   < > 37*   POTASSIUM mmol/L 4 1 4 0 4 6   < > 4 7   CHLORIDE mmol/L 103 100 100   < > 102   CREATININE mg/dL 3 56* 3 85* 3 54*   < > 3 49*   AST U/L  --   --   --   --  24   ALT U/L  --   --   --   --  14    < > = values in this interval not displayed  Results from last 7 days   Lab Units 08/28/19  0637 08/27/19  0442 08/26/19  0520   PROTIME seconds 25 1* 32 8* 35 9*   INR  2 34* 3 34* 3 74*        Patient Active Problem List   Diagnosis    3-vessel coronary artery disease    Asbestosis ([facility] )    Kidney disease with fluid retention    Diabetes mellitus with multiple complications ([facility] )    Elevated serum alkaline phosphatase level    Hyperlipidemia    Essential hypertension    Hypothyroidism    Paroxysmal A-fib (HCC)    Seasonal allergies    Increased frequency of urination    Frozen shoulder    Herpes zoster without complication    Depressed mood    Chest pain    Ambulatory dysfunction/generalized weakness    Leg edema, left    Shortness of breath    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    UTI (urinary tract infection)    Tibial artery occlusion, left (HCC)    Hyponatremia    Chronic diastolic heart failure (HCC)    Benign prostatic hyperplasia with urinary hesitancy    Abnormal chest x-ray    Warfarin-induced coagulopathy (HCC)    Impaired mobility and ADLs    Anticoagulated on warfarin    History of recurrent UTIs    Buttock wound    Presence of permanent cardiac pacemaker    Pain    Anemia    Healthcare maintenance       ** Please Note: Fluency Direct voice to text software may have been used in the creation of this document   **      Romana Lisle MD, [address] [facility]  Physical Medicine and Rehabilitation  Brain Injury Medicine obtained history, performing a medically appropriate examination and/or evaluation, counseling and educating the patient/family/caregiver, ordering medications, tests, or procedures, referring and communicating with other health care professionals, and independently interpreting results and communicating that information with the patient/family/caregiver.      This document has been electronically signed by NICOLE Robertson on May 21, 2024 13:27 EDT

## 2024-11-02 NOTE — ASSESSMENT & PLAN NOTE
· Appears acute on chronic on CT head  · Trauma evaluation done in ER  · Neurosurgery consulted, appreciate recommendations  · Patient and wife does not recollect recent falls or head strikes  · Denies HA, visual disturbance  · Admits to new onset Left sided upper and lower extremity weakness, that began approx 5 pm today  Wife noted a left upper extremity tremor that started yesterday  · Left upper extremity action tremor noted on physical exam  · Goal -160  · Hold further AC/AP  · Received [facility]    Will receive Vitamin K  · Recheck INR at midnight, consider FFP if still elevated  · PT/OT  · Coags, CBC in am Anticipated physiological fall (ex: syncope)

## 2025-03-20 NOTE — ASSESSMENT & PLAN NOTE
· Remains asymptomatic  · Follow up with Cardiology  · Continue statin  · No AC/AP until cleared by Neurosurgery  5